# Patient Record
Sex: FEMALE | Race: WHITE | Employment: UNEMPLOYED | URBAN - METROPOLITAN AREA
[De-identification: names, ages, dates, MRNs, and addresses within clinical notes are randomized per-mention and may not be internally consistent; named-entity substitution may affect disease eponyms.]

---

## 2017-01-20 RX ORDER — AMLODIPINE BESYLATE 5 MG/1
5 TABLET ORAL EVERY MORNING
Qty: 30 TABLET | Refills: 0 | Status: SHIPPED | OUTPATIENT
Start: 2017-01-20 | End: 2017-02-16 | Stop reason: SDUPTHER

## 2017-02-16 RX ORDER — AMLODIPINE BESYLATE 5 MG/1
5 TABLET ORAL EVERY MORNING
Qty: 30 TABLET | Refills: 3 | Status: SHIPPED | OUTPATIENT
Start: 2017-02-16 | End: 2017-06-14 | Stop reason: SDUPTHER

## 2017-04-11 ENCOUNTER — OFFICE VISIT (OUTPATIENT)
Dept: FAMILY MEDICINE CLINIC | Age: 62
End: 2017-04-11
Payer: COMMERCIAL

## 2017-04-11 ENCOUNTER — HOSPITAL ENCOUNTER (OUTPATIENT)
Age: 62
Discharge: HOME | End: 2017-04-11
Payer: COMMERCIAL

## 2017-04-11 VITALS
HEIGHT: 67 IN | HEART RATE: 64 BPM | BODY MASS INDEX: 33.74 KG/M2 | RESPIRATION RATE: 16 BRPM | SYSTOLIC BLOOD PRESSURE: 134 MMHG | DIASTOLIC BLOOD PRESSURE: 80 MMHG | TEMPERATURE: 98 F | WEIGHT: 215 LBS

## 2017-04-11 DIAGNOSIS — Z12.11 COLON CANCER SCREENING: ICD-10-CM

## 2017-04-11 DIAGNOSIS — B35.3 TINEA PEDIS OF BOTH FEET: ICD-10-CM

## 2017-04-11 DIAGNOSIS — Z23 NEED FOR PNEUMOCOCCAL VACCINATION: ICD-10-CM

## 2017-04-11 DIAGNOSIS — I10 HTN, GOAL BELOW 130/80: Primary | ICD-10-CM

## 2017-04-11 LAB
ABSOLUTE EOS #: 0.2 K/UL (ref 0–0.4)
ABSOLUTE LYMPH #: 1.8 K/UL (ref 1–4.8)
ABSOLUTE MONO #: 0.4 K/UL (ref 0.1–1.3)
ALBUMIN SERPL-MCNC: 4.1 G/DL (ref 3.5–5.2)
ALP BLD-CCNC: 119 U/L (ref 35–104)
ALT SERPL-CCNC: 18 U/L (ref 5–33)
AST SERPL-CCNC: 21 U/L
BASOPHILS # BLD: 2 % (ref 0–2)
BASOPHILS ABSOLUTE: 0.1 K/UL (ref 0–0.2)
C-REACTIVE PROTEIN: 4.3 MG/L (ref 0–5)
CALCIUM SERPL-MCNC: 9.7 MG/DL (ref 8.6–10.4)
CREAT SERPL-MCNC: 0.57 MG/DL (ref 0.5–0.9)
DIFFERENTIAL TYPE: ABNORMAL
EOSINOPHILS RELATIVE PERCENT: 3 % (ref 0–4)
GFR AFRICAN AMERICAN: >60 ML/MIN
GFR NON-AFRICAN AMERICAN: >60 ML/MIN
GFR SERPL CREATININE-BSD FRML MDRD: NORMAL ML/MIN/{1.73_M2}
GFR SERPL CREATININE-BSD FRML MDRD: NORMAL ML/MIN/{1.73_M2}
HCT VFR BLD CALC: 44.6 % (ref 36–46)
HEMOGLOBIN: 14.5 G/DL (ref 12–16)
LYMPHOCYTES # BLD: 34 % (ref 24–44)
MCH RBC QN AUTO: 31.5 PG (ref 26–34)
MCHC RBC AUTO-ENTMCNC: 32.6 G/DL (ref 31–37)
MCV RBC AUTO: 96.7 FL (ref 80–100)
MONOCYTES # BLD: 8 % (ref 1–7)
PDW BLD-RTO: 14.6 % (ref 11.5–14.9)
PLATELET # BLD: 348 K/UL (ref 150–450)
PLATELET ESTIMATE: ABNORMAL
PMV BLD AUTO: 7.6 FL (ref 6–12)
RBC # BLD: 4.61 M/UL (ref 4–5.2)
RBC # BLD: ABNORMAL 10*6/UL
SEDIMENTATION RATE, ERYTHROCYTE: 15 MM (ref 0–20)
SEG NEUTROPHILS: 53 % (ref 36–66)
SEGMENTED NEUTROPHILS ABSOLUTE COUNT: 2.8 K/UL (ref 1.3–9.1)
WBC # BLD: 5.2 K/UL (ref 3.5–11)
WBC # BLD: ABNORMAL 10*3/UL

## 2017-04-11 PROCEDURE — 90471 IMMUNIZATION ADMIN: CPT | Performed by: FAMILY MEDICINE

## 2017-04-11 PROCEDURE — 85651 RBC SED RATE NONAUTOMATED: CPT

## 2017-04-11 PROCEDURE — 36415 COLL VENOUS BLD VENIPUNCTURE: CPT

## 2017-04-11 PROCEDURE — 82565 ASSAY OF CREATININE: CPT

## 2017-04-11 PROCEDURE — 84075 ASSAY ALKALINE PHOSPHATASE: CPT

## 2017-04-11 PROCEDURE — 84450 TRANSFERASE (AST) (SGOT): CPT

## 2017-04-11 PROCEDURE — 85025 COMPLETE CBC W/AUTO DIFF WBC: CPT

## 2017-04-11 PROCEDURE — 90732 PPSV23 VACC 2 YRS+ SUBQ/IM: CPT | Performed by: FAMILY MEDICINE

## 2017-04-11 PROCEDURE — 84460 ALANINE AMINO (ALT) (SGPT): CPT

## 2017-04-11 PROCEDURE — 82310 ASSAY OF CALCIUM: CPT

## 2017-04-11 PROCEDURE — 86140 C-REACTIVE PROTEIN: CPT

## 2017-04-11 PROCEDURE — 82040 ASSAY OF SERUM ALBUMIN: CPT

## 2017-04-11 PROCEDURE — 99213 OFFICE O/P EST LOW 20 MIN: CPT | Performed by: FAMILY MEDICINE

## 2017-04-11 RX ORDER — PRENATAL VIT 91/IRON/FOLIC/DHA 28-975-200
COMBINATION PACKAGE (EA) ORAL
Qty: 36 G | Refills: 3 | Status: ON HOLD | OUTPATIENT
Start: 2017-04-11 | End: 2019-12-23

## 2017-04-11 ASSESSMENT — ENCOUNTER SYMPTOMS
NAUSEA: 0
ABDOMINAL PAIN: 0
COUGH: 0
VOMITING: 0
SHORTNESS OF BREATH: 0

## 2017-06-06 RX ORDER — FOLIC ACID 1 MG/1
TABLET ORAL
Qty: 30 TABLET | Refills: 3 | Status: SHIPPED | OUTPATIENT
Start: 2017-06-06 | End: 2017-10-07 | Stop reason: SDUPTHER

## 2017-06-14 RX ORDER — AMLODIPINE BESYLATE 5 MG/1
TABLET ORAL
Qty: 30 TABLET | Refills: 3 | Status: SHIPPED | OUTPATIENT
Start: 2017-06-14 | End: 2017-10-07 | Stop reason: SDUPTHER

## 2017-07-11 ENCOUNTER — OFFICE VISIT (OUTPATIENT)
Dept: FAMILY MEDICINE CLINIC | Age: 62
End: 2017-07-11
Payer: COMMERCIAL

## 2017-07-11 VITALS
BODY MASS INDEX: 37.12 KG/M2 | DIASTOLIC BLOOD PRESSURE: 84 MMHG | OXYGEN SATURATION: 96 % | TEMPERATURE: 97.8 F | RESPIRATION RATE: 18 BRPM | HEART RATE: 76 BPM | HEIGHT: 66 IN | SYSTOLIC BLOOD PRESSURE: 134 MMHG | WEIGHT: 231 LBS

## 2017-07-11 DIAGNOSIS — M06.9 RHEUMATOID ARTHRITIS, INVOLVING UNSPECIFIED SITE, UNSPECIFIED RHEUMATOID FACTOR PRESENCE: ICD-10-CM

## 2017-07-11 DIAGNOSIS — Z87.891 STOPPED SMOKING BETWEEN 1 AND 3 MONTHS AGO: ICD-10-CM

## 2017-07-11 DIAGNOSIS — I10 HTN, GOAL BELOW 130/80: ICD-10-CM

## 2017-07-11 DIAGNOSIS — F32.A DEPRESSION, UNSPECIFIED DEPRESSION TYPE: Primary | ICD-10-CM

## 2017-07-11 PROCEDURE — 99214 OFFICE O/P EST MOD 30 MIN: CPT | Performed by: FAMILY MEDICINE

## 2017-07-11 RX ORDER — GUAIFENESIN AND DEXTROMETHORPHAN HYDROBROMIDE 600; 30 MG/1; MG/1
1 TABLET, EXTENDED RELEASE ORAL 2 TIMES DAILY
Qty: 30 TABLET | Refills: 3 | Status: SHIPPED | OUTPATIENT
Start: 2017-07-11 | End: 2019-06-12 | Stop reason: ALTCHOICE

## 2017-07-11 ASSESSMENT — ENCOUNTER SYMPTOMS
NAUSEA: 0
VOMITING: 0
COUGH: 0
SHORTNESS OF BREATH: 0

## 2017-08-02 ENCOUNTER — TELEPHONE (OUTPATIENT)
Dept: ADMINISTRATIVE | Age: 62
End: 2017-08-02

## 2017-09-21 RX ORDER — ALBUTEROL SULFATE 90 UG/1
2 AEROSOL, METERED RESPIRATORY (INHALATION) EVERY 6 HOURS PRN
Qty: 1 INHALER | Refills: 3 | Status: SHIPPED | OUTPATIENT
Start: 2017-09-21 | End: 2019-06-12 | Stop reason: SDUPTHER

## 2017-10-09 RX ORDER — AMLODIPINE BESYLATE 5 MG/1
TABLET ORAL
Qty: 30 TABLET | Refills: 3 | Status: SHIPPED | OUTPATIENT
Start: 2017-10-09 | End: 2018-01-29 | Stop reason: SDUPTHER

## 2017-10-09 RX ORDER — FOLIC ACID 1 MG/1
TABLET ORAL
Qty: 30 TABLET | Refills: 3 | Status: SHIPPED | OUTPATIENT
Start: 2017-10-09 | End: 2018-01-29 | Stop reason: SDUPTHER

## 2017-10-09 NOTE — TELEPHONE ENCOUNTER
PLEASE APPROVE OR REFUSE. Next Visit Date: Future Appointments  Date Time Provider Kuldeep Fosteristi   10/19/2017 9:30 AM Adolfo Nuñez MD fp sc [address] Maintenance   Topic Date Due    Hepatitis C screen  1955    HIV screen  05/01/1972    DTaP/Tdap/Td vaccine (1 - Tdap) 05/01/1976    Colon cancer screen colonoscopy  05/01/2007    Diabetes screen  03/19/2017    Cervical cancer screen  04/25/2017    Zostavax vaccine  05/01/2017    Breast cancer screen  05/27/2017    Flu vaccine (1) 09/01/2017    Lipid screen  01/30/2020    Pneumococcal med risk  Completed       Hemoglobin A1C (%)   Date Value   03/19/2014 5.9             ( goal A1C is < 7)   Microalb/Crt. Ratio (mcg/mg creat)   Date Value   03/19/2014 Unable to calculate or report.      LDL Cholesterol (mg/dL)   Date Value   01/30/2015 98       (goal LDL is <100)   AST (U/L)   Date Value   04/11/2017 21     ALT (U/L)   Date Value   04/11/2017 18     BUN (mg/dL)   Date Value   11/18/2016 19     BP Readings from Last 3 Encounters:   07/11/17 134/84   04/11/17 134/80   12/28/16 128/78          (goal 120/80)    All Future Testing planned in [facility]  Lab Frequency Next Occurrence   Comprehensive Metabolic Panel Once 51/78/6268   Hemoglobin A1C Once 12/29/2017   Lipid Panel Once 12/29/2017   TSH with Reflex Once 12/29/2017   Vitamin D 25 Hydroxy Once 12/29/2017   Vitamin B12 & Folate Once 12/28/2017   POCT Fecal Immunochemical Test (FIT) Once 04/11/2018         Patient Active Problem List:     HTN, goal below 130/80     GERD (gastroesophageal reflux disease)     Dysphagia     Dehydration     Nausea & vomiting     Hypocalcemia     Lower urinary tract infectious disease     Rheumatoid arthritis ([address]     Hypertension

## 2017-10-26 RX ORDER — PANTOPRAZOLE SODIUM 40 MG/1
TABLET, DELAYED RELEASE ORAL
Qty: 30 TABLET | Refills: 5 | Status: SHIPPED | OUTPATIENT
Start: 2017-10-26 | End: 2018-04-28 | Stop reason: SDUPTHER

## 2017-10-26 NOTE — TELEPHONE ENCOUNTER
PLEASE APPROVE OR REFUSE. Next Visit Date: No future appointments. Health Maintenance   Topic Date Due    Hepatitis C screen  1955    HIV screen  05/01/1972    DTaP/Tdap/Td vaccine (1 - Tdap) 05/01/1976    Colon cancer screen colonoscopy  05/01/2007    Diabetes screen  03/19/2017    Cervical cancer screen  04/25/2017    Zostavax vaccine  05/01/2017    Breast cancer screen  05/27/2017    Flu vaccine (1) 09/01/2017    Lipid screen  01/30/2020    Pneumococcal med risk  Completed       Hemoglobin A1C (%)   Date Value   03/19/2014 5.9             ( goal A1C is < 7)   Microalb/Crt. Ratio (mcg/mg creat)   Date Value   03/19/2014 Unable to calculate or report.      LDL Cholesterol (mg/dL)   Date Value   01/30/2015 98       (goal LDL is <100)   AST (U/L)   Date Value   04/11/2017 21     ALT (U/L)   Date Value   04/11/2017 18     BUN (mg/dL)   Date Value   11/18/2016 19     BP Readings from Last 3 Encounters:   07/11/17 134/84   04/11/17 134/80   12/28/16 128/78          (goal 120/80)    All Future Testing planned in [facility]  Lab Frequency Next Occurrence   Comprehensive Metabolic Panel Once 22/32/0728   Hemoglobin A1C Once 12/29/2017   Lipid Panel Once 12/29/2017   TSH with Reflex Once 12/29/2017   Vitamin D 25 Hydroxy Once 12/29/2017   Vitamin B12 & Folate Once 12/28/2017   POCT Fecal Immunochemical Test (FIT) Once 04/11/2018         Patient Active Problem List:     HTN, goal below 130/80     GERD (gastroesophageal reflux disease)     Dysphagia     Dehydration     Nausea & vomiting     Hypocalcemia     Lower urinary tract infectious disease     Rheumatoid arthritis ([facility] [address]     Hypertension

## 2018-01-30 RX ORDER — FOLIC ACID 1 MG/1
TABLET ORAL
Qty: 30 TABLET | Refills: 3 | Status: SHIPPED | OUTPATIENT
Start: 2018-01-30 | End: 2018-05-27 | Stop reason: SDUPTHER

## 2018-01-30 RX ORDER — AMLODIPINE BESYLATE 5 MG/1
TABLET ORAL
Qty: 30 TABLET | Refills: 3 | Status: SHIPPED | OUTPATIENT
Start: 2018-01-30 | End: 2018-05-27 | Stop reason: SDUPTHER

## 2018-03-05 NOTE — TELEPHONE ENCOUNTER
Please Approve or Refuse. Next Visit Date:  Visit date not found    Hemoglobin A1C (%)   Date Value   03/19/2014 5.9             ( goal A1C is < 7)   Microalb/Crt. Ratio (mcg/mg creat)   Date Value   03/19/2014 Unable to calculate or report.      LDL Cholesterol (mg/dL)   Date Value   01/30/2015 98       (goal LDL is <100)   AST (U/L)   Date Value   04/11/2017 21     ALT (U/L)   Date Value   04/11/2017 18     BUN (mg/dL)   Date Value   11/18/2016 19     BP Readings from Last 3 Encounters:   07/11/17 134/84   04/11/17 134/80   12/28/16 128/78          (goal 120/80)        Patient Active Problem List:     HTN, goal below 130/80     GERD (gastroesophageal reflux disease)     Dysphagia     Dehydration     Nausea & vomiting     Hypocalcemia     Lower urinary tract infectious disease     Rheumatoid arthritis ([facility] [address]     Hypertension

## 2018-04-30 RX ORDER — PANTOPRAZOLE SODIUM 40 MG/1
TABLET, DELAYED RELEASE ORAL
Qty: 30 TABLET | Refills: 5 | Status: SHIPPED | OUTPATIENT
Start: 2018-04-30 | End: 2018-11-21 | Stop reason: SDUPTHER

## 2018-05-29 RX ORDER — AMLODIPINE BESYLATE 5 MG/1
TABLET ORAL
Qty: 30 TABLET | Refills: 3 | Status: SHIPPED | OUTPATIENT
Start: 2018-05-29 | End: 2018-11-12 | Stop reason: SDUPTHER

## 2018-05-29 RX ORDER — FOLIC ACID 1 MG/1
TABLET ORAL
Qty: 30 TABLET | Refills: 3 | Status: SHIPPED | OUTPATIENT
Start: 2018-05-29 | End: 2018-10-08 | Stop reason: SDUPTHER

## 2018-06-02 RX ORDER — AMLODIPINE BESYLATE 5 MG/1
TABLET ORAL
Qty: 30 TABLET | Refills: 3 | Status: SHIPPED | OUTPATIENT
Start: 2018-06-02 | End: 2018-11-12 | Stop reason: SDUPTHER

## 2018-07-17 ENCOUNTER — HOSPITAL ENCOUNTER (EMERGENCY)
Age: 63
Discharge: HOME | End: 2018-07-17
Attending: EMERGENCY MEDICINE
Payer: COMMERCIAL

## 2018-07-17 VITALS
HEIGHT: 67 IN | DIASTOLIC BLOOD PRESSURE: 62 MMHG | SYSTOLIC BLOOD PRESSURE: 146 MMHG | OXYGEN SATURATION: 96 % | BODY MASS INDEX: 33.59 KG/M2 | WEIGHT: 214 LBS | TEMPERATURE: 98.3 F | HEART RATE: 68 BPM | RESPIRATION RATE: 14 BRPM

## 2018-07-17 DIAGNOSIS — M79.605 LEFT LEG PAIN: ICD-10-CM

## 2018-07-17 DIAGNOSIS — I80.02 THROMBOPHLEBITIS OF SUPERFICIAL VEINS OF LEFT LOWER EXTREMITY: Primary | ICD-10-CM

## 2018-07-17 PROCEDURE — 93970 EXTREMITY STUDY: CPT

## 2018-07-17 PROCEDURE — 99283 EMERGENCY DEPT VISIT LOW MDM: CPT

## 2018-07-17 RX ORDER — IBUPROFEN 800 MG/1
800 TABLET ORAL EVERY 6 HOURS PRN
Qty: 90 TABLET | Refills: 0 | Status: SHIPPED | OUTPATIENT
Start: 2018-07-17 | End: 2019-06-12 | Stop reason: ALTCHOICE

## 2018-07-17 ASSESSMENT — PAIN SCALES - GENERAL: PAINLEVEL_OUTOF10: 8

## 2018-07-17 ASSESSMENT — ENCOUNTER SYMPTOMS
PHOTOPHOBIA: 0
COLOR CHANGE: 0
ABDOMINAL PAIN: 0
VOMITING: 0
NAUSEA: 0
CHEST TIGHTNESS: 0
SHORTNESS OF BREATH: 0
DIARRHEA: 0
WHEEZING: 0

## 2018-07-17 ASSESSMENT — PAIN DESCRIPTION - DESCRIPTORS: DESCRIPTORS: ACHING

## 2018-07-17 ASSESSMENT — PAIN DESCRIPTION - ORIENTATION: ORIENTATION: LEFT

## 2018-07-17 ASSESSMENT — PAIN DESCRIPTION - FREQUENCY: FREQUENCY: CONTINUOUS

## 2018-07-17 ASSESSMENT — PAIN DESCRIPTION - LOCATION: LOCATION: LEG

## 2018-07-17 NOTE — ED NOTES
PT arrives with c/o L lower leg swelling and redness x 2 days. Pt states she has a hx of DVT and PE. Pt also has some redness on the medial lower leg. Pt is sitting on cart, eupneic, talkative.      Leelee Orellana RN  07/17/18 [id number]

## 2018-07-17 NOTE — ED PROVIDER NOTES
disease, DVT, cellulitis, tinea corporis, abscess    DIAGNOSTIC RESULTS / EMERGENCY DEPARTMENT COURSE / MDM     LABS:  No results found for this visit on 07/17/18. IMPRESSION: 59-year-old female with history of DVT presents with left-sided leg pain with overlying skin changes. Not currently on anticoagulation, antiplatelets. Physical exam shows tenderness medial portion. Ultrasound of lower extremities bilaterally shows superficial venous thrombosis of the left varicose veins. RADIOLOGY:    Vl Dup Lower Extremity Venous Bilateral    Result Date: 7/17/2018    [facility]  Vascular Lower Extremities DVT Study Procedure   [facility]    Date of Study           07/17/2018                 PAPO ALCAZAR   Date of Birth  1955   Gender                  Female   Age            66 year(s)   Race                       Room Number    57 [facility] ID # [id number]   Patient Acct # [de-identified]   MR #           [id number]       Sonographer             Angie Nunn RVT   Accession #    [id number]    Interpreting Physician  Joon Alvarado   Referring                   Referring Physician     ER MD *  Nurse  Practitioner  Procedure Type of Study:   Veins: Lower Extremities DVT Study, Venous Scan Lower Bilateral.  Indications for Study:Pain and swelling and Erythema. Patient Status:ER. Technical Quality:Adequate visualization. Conclusions   Summary   Simultaneous real time imaging utilizing B-Mode, color doppler and  spectral waveform analysis was performed on the bilateral lower  extremities for venous examination of the deep and superficial systems. Findings are:   Right:  No evidence of deep or superficial venous thrombosis. Left:  No evidence of deep or superficial venous thrombosis. Thrombosed superficial calf varicosity noted in the calf.    Signature   ----------------------------------------------------------------  Electronically signed by Angie Nunn RVT(Sonographer) on [facility] ED  [id number]  [phone]  Go to   If symptoms worsen      DISCHARGE MEDICATIONS:  Discharge Medication List as of 7/17/2018  6:54 PM      START taking these medications    Details   ibuprofen (IBU) 800 MG tablet Take 1 tablet by mouth every 6 hours as needed for Pain, Disp-90 tablet, R-0Print             Pablo Coronel DO    Emergency Medicine Resident    (Please note that portions of this note were completed with a voice recognition program.  Efforts were made to edit the dictations but occasionally words are mis-transcribed.)     Pablo Coronel MD  07/19/18 2422

## 2018-10-08 RX ORDER — FOLIC ACID 1 MG/1
TABLET ORAL
Qty: 30 TABLET | Refills: 3 | Status: SHIPPED | OUTPATIENT
Start: 2018-10-08 | End: 2019-01-26 | Stop reason: SDUPTHER

## 2018-11-12 ENCOUNTER — OFFICE VISIT (OUTPATIENT)
Dept: FAMILY MEDICINE CLINIC | Age: 63
End: 2018-11-12
Payer: COMMERCIAL

## 2018-11-12 VITALS
HEIGHT: 67 IN | DIASTOLIC BLOOD PRESSURE: 84 MMHG | SYSTOLIC BLOOD PRESSURE: 138 MMHG | OXYGEN SATURATION: 95 % | HEART RATE: 81 BPM | BODY MASS INDEX: 36.16 KG/M2 | WEIGHT: 230.4 LBS

## 2018-11-12 DIAGNOSIS — Z86.718 HISTORY OF DVT IN ADULTHOOD: ICD-10-CM

## 2018-11-12 DIAGNOSIS — Z72.0 TOBACCO ABUSE: ICD-10-CM

## 2018-11-12 DIAGNOSIS — F33.1 MAJOR DEPRESSIVE DISORDER, RECURRENT, MODERATE (HCC): ICD-10-CM

## 2018-11-12 DIAGNOSIS — Z23 NEED FOR INFLUENZA VACCINATION: ICD-10-CM

## 2018-11-12 DIAGNOSIS — R73.03 PREDIABETES: ICD-10-CM

## 2018-11-12 DIAGNOSIS — E66.9 OBESITY, CLASS II, BMI 35-39.9: ICD-10-CM

## 2018-11-12 DIAGNOSIS — K21.9 GASTROESOPHAGEAL REFLUX DISEASE, ESOPHAGITIS PRESENCE NOT SPECIFIED: ICD-10-CM

## 2018-11-12 DIAGNOSIS — M06.9 RHEUMATOID ARTHRITIS, INVOLVING UNSPECIFIED SITE, UNSPECIFIED RHEUMATOID FACTOR PRESENCE: ICD-10-CM

## 2018-11-12 DIAGNOSIS — Z12.31 ENCOUNTER FOR SCREENING MAMMOGRAM FOR BREAST CANCER: ICD-10-CM

## 2018-11-12 DIAGNOSIS — R73.9 HYPERGLYCEMIA: Primary | ICD-10-CM

## 2018-11-12 DIAGNOSIS — Z12.11 SCREENING FOR COLON CANCER: ICD-10-CM

## 2018-11-12 DIAGNOSIS — Z23 NEED FOR PROPHYLACTIC VACCINATION AND INOCULATION AGAINST VARICELLA: ICD-10-CM

## 2018-11-12 DIAGNOSIS — I10 ESSENTIAL HYPERTENSION: ICD-10-CM

## 2018-11-12 DIAGNOSIS — Z00.00 PREVENTATIVE HEALTH CARE: ICD-10-CM

## 2018-11-12 LAB — HBA1C MFR BLD: 5.9 %

## 2018-11-12 PROCEDURE — 90471 IMMUNIZATION ADMIN: CPT | Performed by: NURSE PRACTITIONER

## 2018-11-12 PROCEDURE — 4004F PT TOBACCO SCREEN RCVD TLK: CPT | Performed by: NURSE PRACTITIONER

## 2018-11-12 PROCEDURE — 99215 OFFICE O/P EST HI 40 MIN: CPT | Performed by: NURSE PRACTITIONER

## 2018-11-12 PROCEDURE — G8417 CALC BMI ABV UP PARAM F/U: HCPCS | Performed by: NURSE PRACTITIONER

## 2018-11-12 PROCEDURE — 90686 IIV4 VACC NO PRSV 0.5 ML IM: CPT | Performed by: NURSE PRACTITIONER

## 2018-11-12 PROCEDURE — G8482 FLU IMMUNIZE ORDER/ADMIN: HCPCS | Performed by: NURSE PRACTITIONER

## 2018-11-12 PROCEDURE — 3017F COLORECTAL CA SCREEN DOC REV: CPT | Performed by: NURSE PRACTITIONER

## 2018-11-12 PROCEDURE — G8427 DOCREV CUR MEDS BY ELIG CLIN: HCPCS | Performed by: NURSE PRACTITIONER

## 2018-11-12 PROCEDURE — 83036 HEMOGLOBIN GLYCOSYLATED A1C: CPT | Performed by: NURSE PRACTITIONER

## 2018-11-12 RX ORDER — AMLODIPINE BESYLATE 5 MG/1
TABLET ORAL
Qty: 30 TABLET | Refills: 3 | Status: SHIPPED | OUTPATIENT
Start: 2018-11-12 | End: 2019-03-05 | Stop reason: SDUPTHER

## 2018-11-12 ASSESSMENT — ENCOUNTER SYMPTOMS
SHORTNESS OF BREATH: 0
ABDOMINAL PAIN: 0
COUGH: 0
BLOOD IN STOOL: 0
EYE DISCHARGE: 0

## 2018-11-12 ASSESSMENT — PATIENT HEALTH QUESTIONNAIRE - PHQ9
1. LITTLE INTEREST OR PLEASURE IN DOING THINGS: 0
SUM OF ALL RESPONSES TO PHQ9 QUESTIONS 1 & 2: 0
2. FEELING DOWN, DEPRESSED OR HOPELESS: 0
SUM OF ALL RESPONSES TO PHQ QUESTIONS 1-9: 0
SUM OF ALL RESPONSES TO PHQ QUESTIONS 1-9: 0

## 2018-11-12 NOTE — PROGRESS NOTES
Visit Information    Have you changed or started any medications since your last visit including any over-the-counter medicines, vitamins, or herbal medicines? no   Are you having any side effects from any of your medications? -  no  Have you stopped taking any of your medications? Is so, why? -  no    Have you seen any other physician or provider since your last visit? Yes - Records Requested  Have you had any other diagnostic tests since your last visit? No  Have you been seen in the emergency room and/or had an admission to a hospital since we last saw you? No  Have you had your routine dental cleaning in the past 6 months? no    Have you activated your [employer] account? If not, what are your barriers? No:      Patient Care Team:  Pratibha Moctezuma MD as PCP - General (Family Medicine)  Joan Mcintosh MD as Consulting Physician (Internal Medicine)    Medical History Review  Past Medical, Family, and Social History reviewed and does contribute to the patient presenting condition    Health Maintenance   Topic Date Due    Hepatitis C screen  1955    HIV screen  05/01/1972    DTaP/Tdap/Td vaccine (1 - Tdap) 05/01/1976    Shingles Vaccine (1 of 2 - 2 Dose Series) 05/01/2007    Colon cancer screen colonoscopy  05/01/2007    A1C test (Diabetic or Prediabetic)  03/19/2015    Cervical cancer screen  04/25/2017    Breast cancer screen  05/27/2017    Flu vaccine (1) 09/01/2018    Lipid screen  01/30/2020    Pneumococcal med risk  Completed     Vaccine Information Sheet, \"Influenza - Inactivated\"  given to Mandie Azul, or parent/legal guardian of  Mandie Azul and verbalized understanding. Patient responses:    Have you ever had a reaction to a flu vaccine? No  Are you able to eat eggs without adverse effects? Yes  Do you have any current illness? No  Have you ever had Guillian O[place] Syndrome? No    Flu vaccine given per order. Please see immunization tab.
EOM are normal. No scleral icterus. Neck: Neck supple. Cardiovascular: Normal rate, regular rhythm, normal heart sounds and intact distal pulses. Pulmonary/Chest: Effort normal. She has wheezes (slight anterior wheeze, none posteriorly ). She has no rales. Abdominal: Soft. Bowel sounds are normal.   Musculoskeletal: Normal range of motion. She exhibits no edema. Neurological: She is alert and oriented to person, place, and time. Skin: Skin is warm and dry. Psychiatric: She has a normal mood and affect. Nursing note and vitals reviewed. /84   Pulse 81   Ht 5' 6.6\" (1.692 m)   Wt 230 lb 6.4 oz (104.5 kg)   SpO2 95%   BMI 36.52 kg/m²     CBC:   Lab Results   Component Value Date    WBC 5.2 04/11/2017    RBC 4.61 04/11/2017    HGB 14.5 04/11/2017    HCT 44.6 04/11/2017    MCV 96.7 04/11/2017    MCH 31.5 04/11/2017    MCHC 32.6 04/11/2017    RDW 14.6 04/11/2017     04/11/2017    MPV 7.6 04/11/2017     CMP:    Lab Results   Component Value Date     11/18/2016    K 3.9 11/18/2016     11/18/2016    CO2 24 11/18/2016    BUN 19 11/18/2016    CREATININE 0.57 04/11/2017    GFRAA >60 04/11/2017    LABGLOM >60 04/11/2017    GLUCOSE 116 11/18/2016    GLUCOSE 105 12/05/2011    PROT 6.5 05/03/2014    LABALBU 4.1 04/11/2017    LABALBU 4.0 12/05/2011    CALCIUM 9.7 04/11/2017    BILITOT 0.23 05/03/2014    ALKPHOS 119 04/11/2017    AST 21 04/11/2017    ALT 18 04/11/2017     Lab Results   Component Value Date    LABA1C 5.9 11/12/2018      Diagnosis Orders   1. Hyperglycemia  POCT glycosylated hemoglobin (Hb A1C)   2. Essential hypertension  amLODIPine (NORVASC) 5 MG tablet   3. Prediabetes     4. Gastroesophageal reflux disease, esophagitis presence not specified     5. Obesity, Class II, BMI 35-39.9     6. Major depressive disorder, recurrent, moderate (HCC)     7. Rheumatoid arthritis, involving unspecified site, unspecified rheumatoid factor presence ([facility] [address]     8. Tobacco abuse     9.

## 2018-11-21 RX ORDER — PANTOPRAZOLE SODIUM 40 MG/1
TABLET, DELAYED RELEASE ORAL
Qty: 30 TABLET | Refills: 5 | Status: SHIPPED | OUTPATIENT
Start: 2018-11-21 | End: 2019-05-16 | Stop reason: SDUPTHER

## 2018-12-04 ENCOUNTER — HOSPITAL ENCOUNTER (OUTPATIENT)
Dept: WOMENS IMAGING | Age: 63
Discharge: HOME | End: 2018-12-06
Payer: COMMERCIAL

## 2018-12-04 DIAGNOSIS — Z12.11 SCREENING FOR COLON CANCER: ICD-10-CM

## 2018-12-04 DIAGNOSIS — Z12.31 ENCOUNTER FOR SCREENING MAMMOGRAM FOR BREAST CANCER: ICD-10-CM

## 2018-12-04 LAB
CONTROL: NORMAL
HEMOCCULT STL QL: NEGATIVE

## 2018-12-04 PROCEDURE — 82274 ASSAY TEST FOR BLOOD FECAL: CPT | Performed by: NURSE PRACTITIONER

## 2018-12-04 PROCEDURE — 77067 SCR MAMMO BI INCL CAD: CPT

## 2019-01-28 RX ORDER — FOLIC ACID 1 MG/1
TABLET ORAL
Qty: 30 TABLET | Refills: 3 | Status: SHIPPED | OUTPATIENT
Start: 2019-01-28 | End: 2020-04-13 | Stop reason: SDUPTHER

## 2019-02-08 RX ORDER — FOLIC ACID 1 MG/1
TABLET ORAL
Qty: 30 TABLET | Refills: 3 | Status: SHIPPED | OUTPATIENT
Start: 2019-02-08 | End: 2019-06-12 | Stop reason: SDUPTHER

## 2019-03-05 DIAGNOSIS — I10 ESSENTIAL HYPERTENSION: ICD-10-CM

## 2019-03-05 RX ORDER — AMLODIPINE BESYLATE 5 MG/1
TABLET ORAL
Qty: 30 TABLET | Refills: 3 | Status: SHIPPED | OUTPATIENT
Start: 2019-03-05 | End: 2019-07-03 | Stop reason: SDUPTHER

## 2019-05-16 RX ORDER — PANTOPRAZOLE SODIUM 40 MG/1
TABLET, DELAYED RELEASE ORAL
Qty: 30 TABLET | Refills: 5 | Status: SHIPPED | OUTPATIENT
Start: 2019-05-16 | End: 2019-11-09 | Stop reason: SDUPTHER

## 2019-05-16 NOTE — TELEPHONE ENCOUNTER
Please Approve or Refuse.   Send to Pharmacy per Pt's Request:      Next Visit Date:  6/12/2019  Last Visit Date: 12/28/2016    Hemoglobin A1C (%)   Date Value   11/12/2018 5.9   03/19/2014 5.9             ( goal A1C is < 7)   BP Readings from Last 3 Encounters:   11/12/18 138/84   07/17/18 (!) 146/62   07/11/17 134/84          (goal 120/80)  BUN   Date Value Ref Range Status   11/18/2016 19 6 - 20 mg/dL Final     CREATININE   Date Value Ref Range Status   04/11/2017 0.57 0.50 - 0.90 mg/dL Final     Potassium   Date Value Ref Range Status   11/18/2016 3.9 3.7 - 5.3 mmol/L Final

## 2019-06-12 ENCOUNTER — OFFICE VISIT (OUTPATIENT)
Dept: FAMILY MEDICINE CLINIC | Age: 64
End: 2019-06-12
Payer: COMMERCIAL

## 2019-06-12 VITALS
DIASTOLIC BLOOD PRESSURE: 71 MMHG | HEIGHT: 66 IN | SYSTOLIC BLOOD PRESSURE: 132 MMHG | HEART RATE: 65 BPM | WEIGHT: 231.4 LBS | BODY MASS INDEX: 37.19 KG/M2 | OXYGEN SATURATION: 95 %

## 2019-06-12 DIAGNOSIS — I10 HTN, GOAL BELOW 130/80: Primary | ICD-10-CM

## 2019-06-12 DIAGNOSIS — K21.9 GASTROESOPHAGEAL REFLUX DISEASE, ESOPHAGITIS PRESENCE NOT SPECIFIED: ICD-10-CM

## 2019-06-12 DIAGNOSIS — M06.9 RHEUMATOID ARTHRITIS INVOLVING MULTIPLE SITES, UNSPECIFIED RHEUMATOID FACTOR PRESENCE: ICD-10-CM

## 2019-06-12 DIAGNOSIS — Z87.891 PERSONAL HISTORY OF TOBACCO USE: ICD-10-CM

## 2019-06-12 DIAGNOSIS — Z23 NEED FOR PROPHYLACTIC VACCINATION AGAINST DIPHTHERIA-TETANUS-PERTUSSIS (DTP): ICD-10-CM

## 2019-06-12 DIAGNOSIS — F33.1 MAJOR DEPRESSIVE DISORDER, RECURRENT, MODERATE (HCC): ICD-10-CM

## 2019-06-12 DIAGNOSIS — J44.9 CHRONIC OBSTRUCTIVE PULMONARY DISEASE, UNSPECIFIED COPD TYPE (HCC): ICD-10-CM

## 2019-06-12 DIAGNOSIS — Z00.00 PREVENTATIVE HEALTH CARE: ICD-10-CM

## 2019-06-12 DIAGNOSIS — R73.03 PREDIABETES: ICD-10-CM

## 2019-06-12 DIAGNOSIS — Z23 NEED FOR PROPHYLACTIC VACCINATION AND INOCULATION AGAINST VARICELLA: ICD-10-CM

## 2019-06-12 DIAGNOSIS — J30.2 SEASONAL ALLERGIES: ICD-10-CM

## 2019-06-12 LAB — HBA1C MFR BLD: 5.7 %

## 2019-06-12 PROCEDURE — G8427 DOCREV CUR MEDS BY ELIG CLIN: HCPCS | Performed by: FAMILY MEDICINE

## 2019-06-12 PROCEDURE — G8417 CALC BMI ABV UP PARAM F/U: HCPCS | Performed by: FAMILY MEDICINE

## 2019-06-12 PROCEDURE — 3023F SPIROM DOC REV: CPT | Performed by: FAMILY MEDICINE

## 2019-06-12 PROCEDURE — G8926 SPIRO NO PERF OR DOC: HCPCS | Performed by: FAMILY MEDICINE

## 2019-06-12 PROCEDURE — 4004F PT TOBACCO SCREEN RCVD TLK: CPT | Performed by: FAMILY MEDICINE

## 2019-06-12 PROCEDURE — 90471 IMMUNIZATION ADMIN: CPT | Performed by: FAMILY MEDICINE

## 2019-06-12 PROCEDURE — 3017F COLORECTAL CA SCREEN DOC REV: CPT | Performed by: FAMILY MEDICINE

## 2019-06-12 PROCEDURE — 99214 OFFICE O/P EST MOD 30 MIN: CPT | Performed by: FAMILY MEDICINE

## 2019-06-12 PROCEDURE — 90715 TDAP VACCINE 7 YRS/> IM: CPT | Performed by: FAMILY MEDICINE

## 2019-06-12 PROCEDURE — 83036 HEMOGLOBIN GLYCOSYLATED A1C: CPT | Performed by: FAMILY MEDICINE

## 2019-06-12 RX ORDER — CETIRIZINE HYDROCHLORIDE 10 MG/1
10 TABLET ORAL DAILY
Qty: 30 TABLET | Refills: 3 | Status: ON HOLD | OUTPATIENT
Start: 2019-06-12 | End: 2019-12-23

## 2019-06-12 RX ORDER — FLUTICASONE PROPIONATE 50 MCG
2 SPRAY, SUSPENSION (ML) NASAL DAILY
Qty: 1 BOTTLE | Refills: 0 | Status: SHIPPED | OUTPATIENT
Start: 2019-06-12 | End: 2020-04-01 | Stop reason: SDUPTHER

## 2019-06-12 RX ORDER — ALBUTEROL SULFATE 90 UG/1
2 AEROSOL, METERED RESPIRATORY (INHALATION) EVERY 6 HOURS PRN
Qty: 1 INHALER | Refills: 3 | Status: ON HOLD | OUTPATIENT
Start: 2019-06-12 | End: 2019-12-28 | Stop reason: HOSPADM

## 2019-06-12 ASSESSMENT — ENCOUNTER SYMPTOMS
SORE THROAT: 1
CONSTIPATION: 0
SINUS PAIN: 1
RHINORRHEA: 1
SHORTNESS OF BREATH: 1
BACK PAIN: 0
ABDOMINAL DISTENTION: 0
VOMITING: 0
WHEEZING: 0
CHEST TIGHTNESS: 0
COLOR CHANGE: 0
PHOTOPHOBIA: 0
ABDOMINAL PAIN: 0
TROUBLE SWALLOWING: 0
SINUS PRESSURE: 1
COUGH: 0
DIARRHEA: 0

## 2019-06-12 ASSESSMENT — PATIENT HEALTH QUESTIONNAIRE - PHQ9
2. FEELING DOWN, DEPRESSED OR HOPELESS: 0
1. LITTLE INTEREST OR PLEASURE IN DOING THINGS: 0
SUM OF ALL RESPONSES TO PHQ QUESTIONS 1-9: 0
SUM OF ALL RESPONSES TO PHQ QUESTIONS 1-9: 0
SUM OF ALL RESPONSES TO PHQ9 QUESTIONS 1 & 2: 0

## 2019-06-12 NOTE — PROGRESS NOTES
Chief Complaint   Patient presents with    Hypertension    Depression    Other     cant remember when last pap was     Cough         Gerald Whitley  here today for follow up on chronic medical problems, go over labs and/or diagnostic studies, and medication refills. Hypertension; Depression; Other (cant remember when last pap was ); and Cough      HPI: Patient is here for follow-up on hypertension and COPD. Hypertension controlled, still smokes 1 pack a day. Patient denies any chest pain. Patient has a history of COPD no recent PFTs takes albuterol inhaler only as needed. Patient is ready to quit has smoked 1 pack a day since last 35 years. Previous CT scan in 2016 which is normal.  Patient has not tried anything in the past to quit smoking. Prediabetes A1c has decreased to 5.7, on diet control    GERD stable on PPI    Patient has history of rheumatoid arthritis follows with rheumatologist on methotrexate  follows with rheumatologist.    Patient is complaining of seasonal allergies, complains of sinus congestion and sore throat and also watery eyes. /71   Pulse 65   Ht 5' 6\" (1.676 m)   Wt 231 lb 6.4 oz (105 kg)   SpO2 95% Comment: resting @ RA  BMI 37.35 kg/m²    Body mass index is 37.35 kg/m². Wt Readings from Last 3 Encounters:   06/12/19 231 lb 6.4 oz (105 kg)   11/12/18 230 lb 6.4 oz (104.5 kg)   07/17/18 214 lb (97.1 kg)        [x]Negative depression screening. PHQ Scores 6/12/2019 11/12/2018   PHQ2 Score 0 0   PHQ9 Score 0 0      []1-4 = Minimal depression   []5-9 = Milddepression   []10-14 = Moderate depression   []15-19 = Moderately severe depression   []20-27 = Severe depression    Discussed testing with the patient and all questions fully answered.     Orders Only on 12/04/2018   Component Date Value Ref Range Status    OCCULT BLOOD FECAL 12/04/2018 NEGATIVE   Final         Most recent labs reviewed:     Lab Results   Component Value Date    WBC 5.2 04/11/2017    HGB 14.5 04/11/2017    HCT 44.6 04/11/2017    MCV 96.7 04/11/2017     04/11/2017       @BRIEFLAB(NA,K,CL,CO2,BUN,CREATININE,GLUCOSE,CALCIUM)@     Lab Results   Component Value Date    ALT 18 04/11/2017    AST 21 04/11/2017    ALKPHOS 119 (H) 04/11/2017    BILITOT 0.23 (L) 05/03/2014       Lab Results   Component Value Date    TSH 0.93 12/05/2011       Lab Results   Component Value Date    CHOL 192 01/30/2015    CHOL 183 03/19/2014     Lab Results   Component Value Date    TRIG 84 01/30/2015    TRIG 139 03/19/2014     Lab Results   Component Value Date    HDL 77 01/30/2015    HDL 33 (L) 03/19/2014     Lab Results   Component Value Date    LDLCHOLESTEROL 98 01/30/2015    LDLCHOLESTEROL 122 (H) 03/19/2014     Lab Results   Component Value Date    VLDL NOT REPORTED 01/30/2015    VLDL NOT REPORTED 03/19/2014     Lab Results   Component Value Date    CHOLHDLRATIO 2.5 01/30/2015    CHOLHDLRATIO 5.5 (H) 03/19/2014       Lab Results   Component Value Date    LABA1C 5.7 06/12/2019       No results found for: TIOLACAL43    No results found for: FOLATE    No results found for: IRON, TIBC, FERRITIN    Lab Results   Component Value Date    VITD25 23.0 (L) 03/19/2014             Current Outpatient Medications   Medication Sig Dispense Refill    zoster recombinant adjuvanted vaccine (SHINGRIX) 50 MCG/0.5ML SUSR injection Inject 0.5 mLs into the muscle once for 1 dose 50 MCG IM then repeat 2-6 months.  1 each 1    albuterol sulfate HFA (PROAIR HFA) 108 (90 Base) MCG/ACT inhaler Inhale 2 puffs into the lungs every 6 hours as needed for Wheezing 1 Inhaler 3    albuterol (PROVENTIL) (5 MG/ML) 0.5% nebulizer solution Take 0.5 mLs by nebulization every 6 hours as needed for Wheezing 10 vial 0    fluticasone (FLONASE) 50 MCG/ACT nasal spray 2 sprays by Nasal route daily 1 Bottle 0    cetirizine (ZYRTEC ALLERGY) 10 MG tablet Take 1 tablet by mouth daily 30 tablet 3    nicotine polacrilex (NICORETTE) 2 MG gum Take 1 each by mouth as needed for Smoking cessation 110 each 3    pantoprazole (PROTONIX) 40 MG tablet take 1 tablet by mouth once daily 30 tablet 5    amLODIPine (NORVASC) 5 MG tablet take 1 tablet by mouth every morning 30 tablet 3    folic acid (FOLVITE) 1 MG tablet take 1 tablet by mouth once daily 30 tablet 3    acetaminophen (TYLENOL) 500 MG tablet Take 1,000 mg by mouth every 6 hours as needed for Pain.  methotrexate (RHEUMATREX) 2.5 MG chemo tablet Take 20 mg by mouth once a week Indications: takes 8 tablets on saturdays       terbinafine (LAMISIL) 1 % cream Apply topically 2 times daily. 36 g 3     No current facility-administered medications for this visit. Social History     Socioeconomic History    Marital status: Single     Spouse name: Not on file    Number of children: Not on file    Years of education: Not on file    Highest education level: Not on file   Occupational History     Employer: N/A   Social Needs    Financial resource strain: Not on file    Food insecurity:     Worry: Not on file     Inability: Not on file    Transportation needs:     Medical: Not on file     Non-medical: Not on file   Tobacco Use    Smoking status: Current Every Day Smoker     Packs/day: 1.00     Years: 35.00     Pack years: 35.00     Types: Cigarettes    Smokeless tobacco: Never Used   Substance and Sexual Activity    Alcohol use:  Yes     Alcohol/week: 0.0 oz     Comment: social    Drug use: No    Sexual activity: Not Currently   Lifestyle    Physical activity:     Days per week: Not on file     Minutes per session: Not on file    Stress: Not on file   Relationships    Social connections:     Talks on phone: Not on file     Gets together: Not on file     Attends [employer] service: Not on file     Active member of club or organization: Not on file     Attends meetings of clubs or organizations: Not on file     Relationship status: Not on file    Intimate partner violence:     Fear of current or ex partner: Not on file     Emotionally abused: Not on file     Physically abused: Not on file     Forced sexual activity: Not on file   Other Topics Concern    Not on file   Social History Narrative    Not on file     Ready to quit: Yes  Counseling given: Yes        Family History   Problem Relation Age of Onset    Cancer Mother     Diabetes Mother     Heart Disease Mother     Cancer Father     Cancer Brother              -rest of complaints with corresponding details per ROS    The patient's past medical, surgical, social, and family history as well as her current medications and allergies were reviewed as documented intoday's encounter. Review of Systems   Constitutional: Negative for activity change, appetite change, fatigue and unexpected weight change. HENT: Positive for congestion, rhinorrhea, sinus pressure, sinus pain, sneezing and sore throat. Negative for ear pain, postnasal drip, tinnitus and trouble swallowing. Eyes: Negative for photophobia and visual disturbance. Respiratory: Positive for shortness of breath. Negative for cough, chest tightness and wheezing. Cardiovascular: Negative for chest pain, palpitations and leg swelling. Gastrointestinal: Negative for abdominal distention, abdominal pain, constipation, diarrhea and vomiting. Endocrine: Negative for polyphagia and polyuria. Genitourinary: Negative for flank pain, frequency, urgency and vaginal pain. Musculoskeletal: Positive for arthralgias. Negative for back pain, myalgias, neck pain and neck stiffness. Skin: Negative for color change, rash and wound. Neurological: Negative for dizziness, weakness, numbness and headaches. Psychiatric/Behavioral: Negative for agitation, behavioral problems, decreased concentration and suicidal ideas. The patient is not nervous/anxious. Physical Exam   Constitutional: She is oriented to person, place, and time. She appears well-developed and well-nourished.    HENT: Right Ear: Hearing and tympanic membrane normal.   Left Ear: Hearing and tympanic membrane normal.   Nose: Mucosal edema and rhinorrhea present. Right sinus exhibits no maxillary sinus tenderness and no frontal sinus tenderness. Left sinus exhibits no frontal sinus tenderness. Mouth/Throat: Normal dentition. No dental caries. Posterior oropharyngeal erythema present. Cardiovascular: Regular rhythm, S1 normal, S2 normal and normal heart sounds. Pulmonary/Chest: Effort normal. She has no decreased breath sounds. She has wheezes in the right lower field, the left middle field and the left lower field. She has no rhonchi. Musculoskeletal: Normal range of motion. Lymphadenopathy:     She has no cervical adenopathy. She has no axillary adenopathy. Neurological: She is alert and oriented to person, place, and time. No cranial nerve deficit. Skin: Skin is warm. No rash noted. Psychiatric: She has a normal mood and affect. Her behavior is normal. Her mood appears not anxious. Her affect is not blunt. She is not actively hallucinating. Cognition and memory are normal. She is attentive. Nursing note and vitals reviewed. ASSESSMENT AND PLAN      1. HTN, goal below 130/80  Controlled continue same medications  - Comprehensive Metabolic Panel; Future  - Lipid Panel; Future  - CBC; Future  - TSH With Reflex Ft4; Future    2. Chronic obstructive pulmonary disease, unspecified COPD type ([facility] [address]  We will do PFTs, discussed about to quit smoking start on nicotine gums  - albuterol sulfate HFA (PROAIR HFA) 108 (90 Base) MCG/ACT inhaler; Inhale 2 puffs into the lungs every 6 hours as needed for Wheezing  Dispense: 1 Inhaler; Refill: 3  - albuterol (PROVENTIL) (5 MG/ML) 0.5% nebulizer solution; Take 0.5 mLs by nebulization every 6 hours as needed for Wheezing  Dispense: 10 vial; Refill: 0  - Full PFT Study With Bronchodilator; Future    3.  Prediabetes  A1c has decreased continue diet and exercise stable on current treatment  - POCT glycosylated hemoglobin (Hb A1C)    4. Gastroesophageal reflux disease, esophagitis presence not specified  Stable on current treatment    5. Major depressive disorder, recurrent, moderate ([facility] [place] [address]  Patient has stopped taking Zoloft, stable discontinue Zoloft  - CBC; Future  - TSH With Reflex Ft4; Future    6. Seasonal allergies  Start on Flonase and Zyrtec  - fluticasone (FLONASE) 50 MCG/ACT nasal spray; 2 sprays by Nasal route daily  Dispense: 1 Bottle; Refill: 0  - cetirizine (ZYRTEC ALLERGY) 10 MG tablet; Take 1 tablet by mouth daily  Dispense: 30 tablet; Refill: 3    7. Rheumatoid arthritis involving multiple sites, unspecified rheumatoid factor presence ([facility] [place] [address]  Follows with rheumatologist    8. Personal history of tobacco use  Start on nicotine gums  - nicotine polacrilex (NICORETTE) 2 MG gum; Take 1 each by mouth as needed for Smoking cessation  Dispense: 110 each; Refill: 3    9. Preventative health care    - Hepatitis C Antibody; Future  - HIV Screen; Future    10. Need for prophylactic vaccination against diphtheria-tetanus-pertussis (DTP)    - Tdap (age 6y and older) IM (Boostrix)    6. Need for prophylactic vaccination and inoculation against varicella    - zoster recombinant adjuvanted vaccine [facility]) 50 MCG/0.5ML SUSR injection; Inject 0.5 mLs into the muscle once for 1 dose 50 MCG IM then repeat 2-6 months. Dispense: 1 each;  Refill: 1      Orders Placed This Encounter   Procedures    Tdap (age 6y and older) IM (Boostrix)    Comprehensive Metabolic Panel     Standing Status:   Future     Standing Expiration Date:   6/12/2020    Hepatitis C Antibody     Standing Status:   Future     Standing Expiration Date:   6/12/2020    HIV Screen     Standing Status:   Future     Standing Expiration Date:   6/12/2020    Lipid Panel     Standing Status:   Future     Standing Expiration Date:   6/12/2020     Order Specific Question:   Is Patient Fasting?/# of Hours     Answer:   12  CBC     Standing Status:   Future     Standing Expiration Date:   2020    TSH With Reflex Ft4     Standing Status:   Future     Standing Expiration Date:   2020    POCT glycosylated hemoglobin (Hb A1C)    Full PFT Study With Bronchodilator     Standing Status:   Future     Standing Expiration Date:   2020         Medications Discontinued During This Encounter   Medication Reason    folic acid (FOLVITE) 1 MG tablet DUPLICATE    ibuprofen (IBU) 800 MG tablet Therapy completed    Dextromethorphan-Guaifenesin (MUCINEX DM)  MG TB12 Therapy completed    sertraline (ZOLOFT) 50 MG tablet Patient Choice    albuterol sulfate HFA (PROAIR HFA) 108 (90 Base) MCG/ACT inhaler REORDER    albuterol (PROVENTIL) (5 MG/ML) 0.5% nebulizer solution REORDER       Iris received counseling on the following healthy behaviors: nutrition, exercise, medication adherence and tobacco cessation  Reviewed prior labs and health maintenance  Continue current medications, diet and exercise. Discussed use, benefit, and side effects of prescribed medications. Barriers to medication compliance addressed. Patient given educational materials - see patient instructions  Was a self-tracking handout given in paper form or via Occasion? Yes    Requested Prescriptions     Signed Prescriptions Disp Refills    zoster recombinant adjuvanted vaccine (SHINGRIX) 50 MCG/0.5ML SUSR injection 1 each 1     Sig: Inject 0.5 mLs into the muscle once for 1 dose 50 MCG IM then repeat 2-6 months.     albuterol sulfate HFA (PROAIR HFA) 108 (90 Base) MCG/ACT inhaler 1 Inhaler 3     Sig: Inhale 2 puffs into the lungs every 6 hours as needed for Wheezing    albuterol (PROVENTIL) (5 MG/ML) 0.5% nebulizer solution 10 vial 0     Sig: Take 0.5 mLs by nebulization every 6 hours as needed for Wheezing    fluticasone (FLONASE) 50 MCG/ACT nasal spray 1 Bottle 0     Si sprays by Nasal route daily    cetirizine (ZYRTEC ALLERGY) 10 MG tablet 30 tablet 3     Sig: Take 1 tablet by mouth daily    nicotine polacrilex (NICORETTE) 2 MG gum 110 each 3     Sig: Take 1 each by mouth as needed for Smoking cessation       All patient questions answered. Patient voiced understanding. Quality Measures    Body mass index is 37.35 kg/m². Elevated. Weight control planned discussed Healthy diet and regular exercise. BP: 132/71 Blood pressure is normal. Treatment plan consists of No treatment change needed. Lab Results   Component Value Date    LDLCHOLESTEROL 98 01/30/2015    (goal LDL reduction with dx if diabetes is 50% LDL reduction)      PHQ Scores 6/12/2019 11/12/2018   PHQ2 Score 0 0   PHQ9 Score 0 0     Interpretation of Total Score Depression Severity: 1-4 = Minimal depression, 5-9 = Mild depression, 10-14 = Moderate depression, 15-19 = Moderately severe depression, 20-27 = Severe depression    The patient'spast medical, surgical, social, and family history as well as her   current medications and allergies were reviewed as documented in today's encounter. Medications, labs, diagnostic studies, consultations andfollow-up as documented in this encounter. Return in about 2 months (around 8/12/2019). Patient wasseen with total face to face time of 25 minutes. More than 50% of this visit was counseling and education. Future Appointments   Date Time Provider Kuldeep Sun   8/14/2019 10:30 AM Dusty Lopez MD [email or web address]     This note was completed by using the assistance of a speech-recognition program. However, inadvertent computerized transcription errors may be present. Althoughevery effort was made to ensure accuracy, no guarantees can be provided that every mistake has been identified and corrected by editing.   Electronically signed by Dusty Lopez MD on 6/12/2019  1:32 PM

## 2019-06-12 NOTE — PROGRESS NOTES
Visit Information    Have you changed or started any medications since your last visit including any over-the-counter medicines, vitamins, or herbal medicines? no   Are you having any side effects from any of your medications? -  no  Have you stopped taking any of your medications? Is so, why? -  no    Have you seen any other physician or provider since your last visit? No  Have you had any other diagnostic tests since your last visit? No  Have you been seen in the emergency room and/or had an admission to a hospital since we last saw you? No  Have you had your routine dental cleaning in the past 6 months? yes -     Have you activated your [employer] account? If not, what are your barriers?  No:      Patient Care Team:  Princess Guerrero MD as PCP - General (Family Medicine)  Princess Guerrero MD as PCP - [facility] Provider  Oliva Jasso MD as Consulting Physician (Internal Medicine)    Medical History Review  Past Medical, Family, and Social History reviewed and does contribute to the patient presenting condition    Health Maintenance   Topic Date Due    Hepatitis C screen  1955    HIV screen  05/01/1972    DTaP/Tdap/Td vaccine (1 - Tdap) 05/01/1976    Shingles Vaccine (1 of 2) 05/01/2007    Cervical cancer screen  04/25/2017    Potassium monitoring  11/18/2017    Creatinine monitoring  04/11/2018    A1C test (Diabetic or Prediabetic)  11/12/2019    Colon Cancer Screen FIT/FOBT  12/04/2019    Lipid screen  01/30/2020    Breast cancer screen  12/04/2020    Flu vaccine  Completed    Pneumococcal 0-64 years Vaccine  Completed

## 2019-07-03 DIAGNOSIS — I10 ESSENTIAL HYPERTENSION: ICD-10-CM

## 2019-07-03 RX ORDER — AMLODIPINE BESYLATE 5 MG/1
TABLET ORAL
Qty: 30 TABLET | Refills: 3 | Status: SHIPPED | OUTPATIENT
Start: 2019-07-03 | End: 2019-10-27 | Stop reason: SDUPTHER

## 2019-07-03 NOTE — TELEPHONE ENCOUNTER
Please Approve or Refuse.   Send to Pharmacy per Pt's Request:      Next Visit Date:  8/14/2019   Last Visit Date: 6/12/2019    Hemoglobin A1C (%)   Date Value   06/12/2019 5.7   11/12/2018 5.9   03/19/2014 5.9             ( goal A1C is < 7)   BP Readings from Last 3 Encounters:   06/12/19 132/71   11/12/18 138/84   07/17/18 (!) 146/62          (goal 120/80)  BUN   Date Value Ref Range Status   11/18/2016 19 6 - 20 mg/dL Final     CREATININE   Date Value Ref Range Status   04/11/2017 0.57 0.50 - 0.90 mg/dL Final     Potassium   Date Value Ref Range Status   11/18/2016 3.9 3.7 - 5.3 mmol/L Final

## 2019-08-14 ENCOUNTER — OFFICE VISIT (OUTPATIENT)
Dept: FAMILY MEDICINE CLINIC | Age: 64
End: 2019-08-14
Payer: COMMERCIAL

## 2019-08-14 VITALS
SYSTOLIC BLOOD PRESSURE: 171 MMHG | DIASTOLIC BLOOD PRESSURE: 89 MMHG | HEIGHT: 65 IN | OXYGEN SATURATION: 95 % | HEART RATE: 74 BPM | WEIGHT: 233.2 LBS | BODY MASS INDEX: 38.85 KG/M2

## 2019-08-14 DIAGNOSIS — F33.1 MAJOR DEPRESSIVE DISORDER, RECURRENT, MODERATE (HCC): ICD-10-CM

## 2019-08-14 DIAGNOSIS — J44.9 CHRONIC OBSTRUCTIVE PULMONARY DISEASE, UNSPECIFIED COPD TYPE (HCC): ICD-10-CM

## 2019-08-14 DIAGNOSIS — Z23 NEED FOR PROPHYLACTIC VACCINATION AND INOCULATION AGAINST VARICELLA: ICD-10-CM

## 2019-08-14 DIAGNOSIS — Z87.891 PERSONAL HISTORY OF TOBACCO USE: ICD-10-CM

## 2019-08-14 DIAGNOSIS — I10 ESSENTIAL HYPERTENSION: Primary | ICD-10-CM

## 2019-08-14 PROCEDURE — G8427 DOCREV CUR MEDS BY ELIG CLIN: HCPCS | Performed by: FAMILY MEDICINE

## 2019-08-14 PROCEDURE — G8926 SPIRO NO PERF OR DOC: HCPCS | Performed by: FAMILY MEDICINE

## 2019-08-14 PROCEDURE — G0296 VISIT TO DETERM LDCT ELIG: HCPCS | Performed by: FAMILY MEDICINE

## 2019-08-14 PROCEDURE — 99213 OFFICE O/P EST LOW 20 MIN: CPT | Performed by: FAMILY MEDICINE

## 2019-08-14 PROCEDURE — 3017F COLORECTAL CA SCREEN DOC REV: CPT | Performed by: FAMILY MEDICINE

## 2019-08-14 PROCEDURE — G8417 CALC BMI ABV UP PARAM F/U: HCPCS | Performed by: FAMILY MEDICINE

## 2019-08-14 PROCEDURE — 4004F PT TOBACCO SCREEN RCVD TLK: CPT | Performed by: FAMILY MEDICINE

## 2019-08-14 PROCEDURE — 3023F SPIROM DOC REV: CPT | Performed by: FAMILY MEDICINE

## 2019-08-14 ASSESSMENT — ENCOUNTER SYMPTOMS
BLOOD IN STOOL: 0
WHEEZING: 0
ABDOMINAL DISTENTION: 0
PHOTOPHOBIA: 0
SINUS PRESSURE: 0
RHINORRHEA: 0
CONSTIPATION: 0
SHORTNESS OF BREATH: 0

## 2019-08-14 NOTE — PROGRESS NOTES
Chief Complaint   Patient presents with    Hypertension    Depression         Rome Waldron  here today for follow up on chronic medical problems, go over labs and/or diagnostic studies, and medication refills. Hypertension and Depression      HPI: Patient is here for follow-up on hypertension and blood work and to discuss PFT. Patient has not done blood work has not been PFT. Patient reports her daughter has been sick and she is dying. She is taking care of her granddaughter. She did not get time to do the blood work nasal PFT. Hypertension uncontrolled normal in the past patient is crying due to stress. She does a history of depression and is on Zoloft. BP (!) 171/89   Pulse 74   Ht 5' 5\" (1.651 m)   Wt 233 lb 3.2 oz (105.8 kg)   SpO2 95% Comment: resting @ RA  BMI 38.81 kg/m²    Body mass index is 38.81 kg/m². Wt Readings from Last 3 Encounters:   08/14/19 233 lb 3.2 oz (105.8 kg)   06/12/19 231 lb 6.4 oz (105 kg)   11/12/18 230 lb 6.4 oz (104.5 kg)        [x]Negative depression screening. PHQ Scores 6/12/2019 11/12/2018   PHQ2 Score 0 0   PHQ9 Score 0 0      []1-4 = Minimal depression   []5-9 = Milddepression   []10-14 = Moderate depression   []15-19 = Moderately severe depression   []20-27 = Severe depression    Discussed testing with the patient and all questions fully answered.     Office Visit on 06/12/2019   Component Date Value Ref Range Status    Hemoglobin A1C 06/12/2019 5.7  % Final         Most recent labs reviewed:     Lab Results   Component Value Date    WBC 5.2 04/11/2017    HGB 14.5 04/11/2017    HCT 44.6 04/11/2017    MCV 96.7 04/11/2017     04/11/2017       @BRIEFLAB(NA,K,CL,CO2,BUN,CREATININE,GLUCOSE,CALCIUM)@     Lab Results   Component Value Date    ALT 18 04/11/2017    AST 21 04/11/2017    ALKPHOS 119 (H) 04/11/2017    BILITOT 0.23 (L) 05/03/2014       Lab Results   Component Value Date    TSH 0.93 12/05/2011       Lab Results   Component Value Date methotrexate (RHEUMATREX) 2.5 MG chemo tablet Take 20 mg by mouth once a week Indications: takes 8 tablets on saturdays       cetirizine (ZYRTEC ALLERGY) 10 MG tablet Take 1 tablet by mouth daily (Patient not taking: Reported on 8/14/2019) 30 tablet 3    terbinafine (LAMISIL) 1 % cream Apply topically 2 times daily. (Patient not taking: Reported on 8/14/2019) 36 g 3     No current facility-administered medications for this visit. Social History     Socioeconomic History    Marital status: Single     Spouse name: Not on file    Number of children: Not on file    Years of education: Not on file    Highest education level: Not on file   Occupational History     Employer: N/A   Social Needs    Financial resource strain: Not on file    Food insecurity:     Worry: Not on file     Inability: Not on file    Transportation needs:     Medical: Not on file     Non-medical: Not on file   Tobacco Use    Smoking status: Current Every Day Smoker     Packs/day: 1.00     Years: 35.00     Pack years: 35.00     Types: Cigarettes    Smokeless tobacco: Never Used   Substance and Sexual Activity    Alcohol use:  Yes     Alcohol/week: 0.0 standard drinks     Comment: social    Drug use: No    Sexual activity: Not Currently   Lifestyle    Physical activity:     Days per week: Not on file     Minutes per session: Not on file    Stress: Not on file   Relationships    Social connections:     Talks on phone: Not on file     Gets together: Not on file     Attends [employer]od[employer] service: Not on file     Active member of club or organization: Not on file     Attends meetings of clubs or organizations: Not on file     Relationship status: Not on file    Intimate partner violence:     Fear of current or ex partner: Not on file     Emotionally abused: Not on file     Physically abused: Not on file     Forced sexual activity: Not on file   Other Topics Concern    Not on file   Social History Narrative    Not on file months    2. Major depressive disorder, recurrent, moderate (HCC)  Discussed with patient to increase Zoloft and see if that helps, patient refused counseling. 3. Chronic obstructive pulmonary disease, unspecified COPD type ([facility] [address]  PFT reprinted    4. Need for prophylactic vaccination and inoculation against varicella    - zoster recombinant adjuvanted vaccine [facility]) 50 MCG/0.5ML SUSR injection; Inject 0.5 mLs into the muscle once for 1 dose 50 MCG IM then repeat 2-6 months. Dispense: 1 each; Refill: 1    5. Personal history of tobacco use    - [place] VISIT TO DISCUSS LUNG CA SCREEN W LDCT  - CT Lung Screen (Annual); Future      Orders Placed This Encounter   Procedures    CT Lung Screen (Annual)     Age: Patient is 60 y.o. Smoking History: Social History    Tobacco Use      Smoking status: Current Every Day Smoker        Packs/day: 1.00        Years: 35.00        Pack years: 35        Types: Cigarettes      Smokeless tobacco: Never Used    Alcohol use: Yes      Alcohol/week: 0.0 standard drinks      Comment: social    Drug use: No   Pack years: 28    Date of last lung cancer screening:       Standing Status:   Future     Standing Expiration Date:   2/14/2021     Order Specific Question:   Is there documentation of shared decision making? Answer:   Yes     Order Specific Question:   Is this a low dose CT or a routine CT? Answer:   Low Dose CT [1]     Order Specific Question:   Is this the first (baseline) CT or an annual exam?     Answer: Annual [2]     Order Specific Question:   Does the patient show any signs or symptoms of lung cancer? Answer:   No     Order Specific Question:   Smoking Status? Answer:   Current Every Day Smoker [1]     Order Specific Question:   Smoking packs per day? Answer:   1     Order Specific Question:   Years smoking? Answer:   28    [place] VISIT TO DISCUSS LUNG CA SCREEN W LDCT         There are no discontinued medications.     Caryetta Felty received counseling on

## 2019-09-30 RX ORDER — FOLIC ACID 1 MG/1
TABLET ORAL
Qty: 30 TABLET | Refills: 3 | Status: SHIPPED | OUTPATIENT
Start: 2019-09-30 | End: 2020-02-06

## 2019-10-27 DIAGNOSIS — I10 ESSENTIAL HYPERTENSION: ICD-10-CM

## 2019-10-28 RX ORDER — AMLODIPINE BESYLATE 5 MG/1
TABLET ORAL
Qty: 30 TABLET | Refills: 3 | Status: ON HOLD | OUTPATIENT
Start: 2019-10-28 | End: 2019-12-28 | Stop reason: HOSPADM

## 2019-11-11 RX ORDER — PANTOPRAZOLE SODIUM 40 MG/1
TABLET, DELAYED RELEASE ORAL
Qty: 30 TABLET | Refills: 5 | Status: SHIPPED | OUTPATIENT
Start: 2019-11-11 | End: 2020-04-15

## 2019-12-22 ENCOUNTER — HOSPITAL ENCOUNTER (INPATIENT)
Age: 64
LOS: 5 days | Discharge: HOME | DRG: 720 | End: 2019-12-28
Attending: EMERGENCY MEDICINE | Admitting: INTERNAL MEDICINE
Payer: COMMERCIAL

## 2019-12-22 ENCOUNTER — APPOINTMENT (OUTPATIENT)
Dept: GENERAL RADIOLOGY | Age: 64
DRG: 720 | End: 2019-12-22
Payer: COMMERCIAL

## 2019-12-22 ENCOUNTER — APPOINTMENT (OUTPATIENT)
Dept: CT IMAGING | Age: 64
DRG: 720 | End: 2019-12-22
Payer: COMMERCIAL

## 2019-12-22 DIAGNOSIS — J44.9 CHRONIC OBSTRUCTIVE PULMONARY DISEASE, UNSPECIFIED COPD TYPE (HCC): ICD-10-CM

## 2019-12-22 DIAGNOSIS — J18.9 PNEUMONIA DUE TO ORGANISM: Primary | ICD-10-CM

## 2019-12-22 LAB
ANION GAP SERPL CALCULATED.3IONS-SCNC: 12 MMOL/L (ref 9–17)
BUN BLDV-MCNC: 28 MG/DL (ref 8–23)
BUN/CREAT BLD: ABNORMAL (ref 9–20)
CALCIUM SERPL-MCNC: 9.9 MG/DL (ref 8.6–10.4)
CHLORIDE BLD-SCNC: 95 MMOL/L (ref 98–107)
CO2: 23 MMOL/L (ref 20–31)
CREAT SERPL-MCNC: 0.97 MG/DL (ref 0.5–0.9)
D-DIMER QUANTITATIVE: 3.43 MG/L FEU (ref 0–0.59)
DIRECT EXAM: NORMAL
GFR AFRICAN AMERICAN: >60 ML/MIN
GFR NON-AFRICAN AMERICAN: 58 ML/MIN
GFR SERPL CREATININE-BSD FRML MDRD: ABNORMAL ML/MIN/{1.73_M2}
GFR SERPL CREATININE-BSD FRML MDRD: ABNORMAL ML/MIN/{1.73_M2}
GLUCOSE BLD-MCNC: 133 MG/DL (ref 70–99)
INR BLD: 1.3
LACTIC ACID, SEPSIS WHOLE BLOOD: ABNORMAL MMOL/L (ref 0.5–1.9)
LACTIC ACID, SEPSIS: 2 MMOL/L (ref 0.5–1.9)
Lab: NORMAL
MAGNESIUM: 1.8 MG/DL (ref 1.6–2.6)
PARTIAL THROMBOPLASTIN TIME: 33.6 SEC (ref 24–36)
POTASSIUM SERPL-SCNC: 3.7 MMOL/L (ref 3.7–5.3)
PROTHROMBIN TIME: 16.1 SEC (ref 11.8–14.6)
SODIUM BLD-SCNC: 130 MMOL/L (ref 135–144)
SPECIMEN DESCRIPTION: NORMAL
TROPONIN INTERP: NORMAL
TROPONIN T: NORMAL NG/ML
TROPONIN, HIGH SENSITIVITY: 9 NG/L (ref 0–14)

## 2019-12-22 PROCEDURE — 36415 COLL VENOUS BLD VENIPUNCTURE: CPT

## 2019-12-22 PROCEDURE — 83735 ASSAY OF MAGNESIUM: CPT

## 2019-12-22 PROCEDURE — 85025 COMPLETE CBC W/AUTO DIFF WBC: CPT

## 2019-12-22 PROCEDURE — 87804 INFLUENZA ASSAY W/OPTIC: CPT

## 2019-12-22 PROCEDURE — 85610 PROTHROMBIN TIME: CPT

## 2019-12-22 PROCEDURE — 71046 X-RAY EXAM CHEST 2 VIEWS: CPT

## 2019-12-22 PROCEDURE — 83605 ASSAY OF LACTIC ACID: CPT

## 2019-12-22 PROCEDURE — 87040 BLOOD CULTURE FOR BACTERIA: CPT

## 2019-12-22 PROCEDURE — 84484 ASSAY OF TROPONIN QUANT: CPT

## 2019-12-22 PROCEDURE — 71260 CT THORAX DX C+: CPT

## 2019-12-22 PROCEDURE — 93005 ELECTROCARDIOGRAM TRACING: CPT | Performed by: STUDENT IN AN ORGANIZED HEALTH CARE EDUCATION/TRAINING PROGRAM

## 2019-12-22 PROCEDURE — 87186 SC STD MICRODIL/AGAR DIL: CPT

## 2019-12-22 PROCEDURE — 87150 DNA/RNA AMPLIFIED PROBE: CPT

## 2019-12-22 PROCEDURE — 6360000002 HC RX W HCPCS: Performed by: STUDENT IN AN ORGANIZED HEALTH CARE EDUCATION/TRAINING PROGRAM

## 2019-12-22 PROCEDURE — 2580000003 HC RX 258: Performed by: STUDENT IN AN ORGANIZED HEALTH CARE EDUCATION/TRAINING PROGRAM

## 2019-12-22 PROCEDURE — 85730 THROMBOPLASTIN TIME PARTIAL: CPT

## 2019-12-22 PROCEDURE — 85379 FIBRIN DEGRADATION QUANT: CPT

## 2019-12-22 PROCEDURE — 87205 SMEAR GRAM STAIN: CPT

## 2019-12-22 PROCEDURE — 80048 BASIC METABOLIC PNL TOTAL CA: CPT

## 2019-12-22 PROCEDURE — 80076 HEPATIC FUNCTION PANEL: CPT

## 2019-12-22 PROCEDURE — 96365 THER/PROPH/DIAG IV INF INIT: CPT

## 2019-12-22 PROCEDURE — 99285 EMERGENCY DEPT VISIT HI MDM: CPT

## 2019-12-22 PROCEDURE — 94640 AIRWAY INHALATION TREATMENT: CPT

## 2019-12-22 PROCEDURE — 6370000000 HC RX 637 (ALT 250 FOR IP): Performed by: EMERGENCY MEDICINE

## 2019-12-22 RX ORDER — IPRATROPIUM BROMIDE AND ALBUTEROL SULFATE 2.5; .5 MG/3ML; MG/3ML
1 SOLUTION RESPIRATORY (INHALATION)
Status: DISCONTINUED | OUTPATIENT
Start: 2019-12-22 | End: 2019-12-23

## 2019-12-22 RX ORDER — 0.9 % SODIUM CHLORIDE 0.9 %
80 INTRAVENOUS SOLUTION INTRAVENOUS ONCE
Status: COMPLETED | OUTPATIENT
Start: 2019-12-22 | End: 2019-12-23

## 2019-12-22 RX ORDER — METHYLPREDNISOLONE SODIUM SUCCINATE 125 MG/2ML
125 INJECTION, POWDER, LYOPHILIZED, FOR SOLUTION INTRAMUSCULAR; INTRAVENOUS ONCE
Status: COMPLETED | OUTPATIENT
Start: 2019-12-22 | End: 2019-12-22

## 2019-12-22 RX ORDER — SODIUM CHLORIDE 0.9 % (FLUSH) 0.9 %
10 SYRINGE (ML) INJECTION PRN
Status: DISCONTINUED | OUTPATIENT
Start: 2019-12-22 | End: 2019-12-28 | Stop reason: HOSPADM

## 2019-12-22 RX ORDER — IPRATROPIUM BROMIDE AND ALBUTEROL SULFATE 2.5; .5 MG/3ML; MG/3ML
SOLUTION RESPIRATORY (INHALATION)
Status: DISCONTINUED
Start: 2019-12-22 | End: 2019-12-23

## 2019-12-22 RX ORDER — 0.9 % SODIUM CHLORIDE 0.9 %
1000 INTRAVENOUS SOLUTION INTRAVENOUS ONCE
Status: COMPLETED | OUTPATIENT
Start: 2019-12-22 | End: 2019-12-22

## 2019-12-22 RX ADMIN — SODIUM CHLORIDE 1000 ML: 9 INJECTION, SOLUTION INTRAVENOUS at 22:54

## 2019-12-22 RX ADMIN — CEFTRIAXONE SODIUM 1 G: 1 INJECTION, POWDER, FOR SOLUTION INTRAMUSCULAR; INTRAVENOUS at 23:33

## 2019-12-22 RX ADMIN — IPRATROPIUM BROMIDE AND ALBUTEROL SULFATE 1 AMPULE: .5; 3 SOLUTION RESPIRATORY (INHALATION) at 23:31

## 2019-12-22 RX ADMIN — METHYLPREDNISOLONE SODIUM SUCCINATE 125 MG: 125 INJECTION, POWDER, FOR SOLUTION INTRAMUSCULAR; INTRAVENOUS at 23:21

## 2019-12-22 ASSESSMENT — PAIN DESCRIPTION - PAIN TYPE: TYPE: ACUTE PAIN

## 2019-12-22 ASSESSMENT — PAIN DESCRIPTION - FREQUENCY: FREQUENCY: CONTINUOUS

## 2019-12-22 ASSESSMENT — PAIN DESCRIPTION - ORIENTATION: ORIENTATION: RIGHT;UPPER

## 2019-12-22 ASSESSMENT — PAIN DESCRIPTION - LOCATION: LOCATION: ABDOMEN

## 2019-12-22 ASSESSMENT — PAIN DESCRIPTION - DESCRIPTORS: DESCRIPTORS: SHARP

## 2019-12-23 ENCOUNTER — APPOINTMENT (OUTPATIENT)
Dept: GENERAL RADIOLOGY | Age: 64
DRG: 720 | End: 2019-12-23
Payer: COMMERCIAL

## 2019-12-23 PROBLEM — Z86.39 HISTORY OF THYROID NODULE: Status: ACTIVE | Noted: 2019-12-23

## 2019-12-23 PROBLEM — A41.9 SEPSIS (HCC): Status: ACTIVE | Noted: 2019-12-23

## 2019-12-23 PROBLEM — J18.9 PNEUMONIA: Status: ACTIVE | Noted: 2019-12-23

## 2019-12-23 LAB
ABSOLUTE BANDS #: 4.44 K/UL (ref 0–1)
ABSOLUTE EOS #: 0 K/UL (ref 0–0.4)
ABSOLUTE IMMATURE GRANULOCYTE: ABNORMAL K/UL (ref 0–0.3)
ABSOLUTE LYMPH #: 2.96 K/UL (ref 1–4.8)
ABSOLUTE MONO #: 0.3 K/UL (ref 0.1–1.3)
ADENOVIRUS PCR: NOT DETECTED
ALBUMIN SERPL-MCNC: 3.1 G/DL (ref 3.5–5.2)
ALBUMIN/GLOBULIN RATIO: ABNORMAL (ref 1–2.5)
ALLEN TEST: ABNORMAL
ALP BLD-CCNC: 111 U/L (ref 35–104)
ALT SERPL-CCNC: 14 U/L (ref 5–33)
ANION GAP SERPL CALCULATED.3IONS-SCNC: 11 MMOL/L (ref 9–17)
AST SERPL-CCNC: 17 U/L
ATYPICAL LYMPHOCYTE ABSOLUTE COUNT: 0.3 K/UL
ATYPICAL LYMPHOCYTES: 1 %
BANDS: 15 % (ref 0–10)
BASOPHILS # BLD: 0 % (ref 0–2)
BASOPHILS ABSOLUTE: 0 K/UL (ref 0–0.2)
BILIRUB SERPL-MCNC: 0.53 MG/DL (ref 0.3–1.2)
BILIRUBIN DIRECT: 0.22 MG/DL
BILIRUBIN, INDIRECT: 0.31 MG/DL (ref 0–1)
BORDETELLA PARAPERTUSSIS: NOT DETECTED
BORDETELLA PERTUSSIS PCR: NOT DETECTED
BUN BLDV-MCNC: 34 MG/DL (ref 8–23)
BUN/CREAT BLD: ABNORMAL (ref 9–20)
CALCIUM IONIZED: 1.32 MMOL/L (ref 1.13–1.33)
CALCIUM SERPL-MCNC: 9.7 MG/DL (ref 8.6–10.4)
CARBOXYHEMOGLOBIN: 1 % (ref 0–5)
CHLAMYDIA PNEUMONIAE BY PCR: NOT DETECTED
CHLORIDE BLD-SCNC: 99 MMOL/L (ref 98–107)
CO2: 22 MMOL/L (ref 20–31)
CORONAVIRUS 229E PCR: NOT DETECTED
CORONAVIRUS HKU1 PCR: NOT DETECTED
CORONAVIRUS NL63 PCR: NOT DETECTED
CORONAVIRUS OC43 PCR: NOT DETECTED
CREAT SERPL-MCNC: 0.81 MG/DL (ref 0.5–0.9)
DIFFERENTIAL TYPE: ABNORMAL
DIRECT EXAM: NORMAL
DIRECT EXAM: NORMAL
EOSINOPHILS RELATIVE PERCENT: 0 % (ref 0–4)
ESTIMATED AVERAGE GLUCOSE: 126 MG/DL
FIO2: 70
GFR AFRICAN AMERICAN: >60 ML/MIN
GFR NON-AFRICAN AMERICAN: >60 ML/MIN
GFR SERPL CREATININE-BSD FRML MDRD: ABNORMAL ML/MIN/{1.73_M2}
GFR SERPL CREATININE-BSD FRML MDRD: ABNORMAL ML/MIN/{1.73_M2}
GLOBULIN: ABNORMAL G/DL (ref 1.5–3.8)
GLUCOSE BLD-MCNC: 160 MG/DL (ref 65–105)
GLUCOSE BLD-MCNC: 164 MG/DL (ref 65–105)
GLUCOSE BLD-MCNC: 178 MG/DL (ref 70–99)
GLUCOSE BLD-MCNC: 181 MG/DL (ref 65–105)
GLUCOSE BLD-MCNC: 195 MG/DL (ref 65–105)
GLUCOSE BLD-MCNC: 205 MG/DL (ref 65–105)
HBA1C MFR BLD: 6 % (ref 4–6)
HCO3 ARTERIAL: 25.5 MMOL/L (ref 22–26)
HCT VFR BLD CALC: 38.4 % (ref 36–46)
HCT VFR BLD CALC: 40.7 % (ref 36–46)
HEMOGLOBIN: 12.6 G/DL (ref 12–16)
HEMOGLOBIN: 13.5 G/DL (ref 12–16)
HUMAN METAPNEUMOVIRUS PCR: NOT DETECTED
IMMATURE GRANULOCYTES: ABNORMAL %
INFLUENZA A BY PCR: NOT DETECTED
INFLUENZA A H1 (2009) PCR: NORMAL
INFLUENZA A H1 PCR: NORMAL
INFLUENZA A H3 PCR: NORMAL
INFLUENZA B BY PCR: NOT DETECTED
LACTIC ACID, SEPSIS WHOLE BLOOD: NORMAL MMOL/L (ref 0.5–1.9)
LACTIC ACID, SEPSIS: 1.1 MMOL/L (ref 0.5–1.9)
LYMPHOCYTES # BLD: 10 % (ref 24–44)
Lab: NORMAL
Lab: NORMAL
MCH RBC QN AUTO: 31.3 PG (ref 26–34)
MCH RBC QN AUTO: 31.5 PG (ref 26–34)
MCHC RBC AUTO-ENTMCNC: 32.8 G/DL (ref 31–37)
MCHC RBC AUTO-ENTMCNC: 33.1 G/DL (ref 31–37)
MCV RBC AUTO: 95.3 FL (ref 80–100)
MCV RBC AUTO: 95.5 FL (ref 80–100)
METAMYELOCYTES ABSOLUTE COUNT: 0.59 K/UL
METAMYELOCYTES: 2 %
METHEMOGLOBIN: 0.9 % (ref 0–1.9)
MODE: ABNORMAL
MONOCYTES # BLD: 1 % (ref 1–7)
MORPHOLOGY: ABNORMAL
MYCOPLASMA PNEUMONIAE PCR: NOT DETECTED
NEGATIVE BASE EXCESS, ART: ABNORMAL MMOL/L (ref 0–2)
NOTIFICATION TIME: ABNORMAL
NOTIFICATION: ABNORMAL
NRBC AUTOMATED: ABNORMAL PER 100 WBC
NRBC AUTOMATED: ABNORMAL PER 100 WBC
O2 DEVICE/FLOW/%: ABNORMAL
O2 SAT, ARTERIAL: 90.4 % (ref 95–98)
OXYHEMOGLOBIN: ABNORMAL % (ref 95–98)
PARAINFLUENZA 1 PCR: NOT DETECTED
PARAINFLUENZA 2 PCR: NOT DETECTED
PARAINFLUENZA 3 PCR: NOT DETECTED
PARAINFLUENZA 4 PCR: NOT DETECTED
PATIENT TEMP: 37
PCO2 ARTERIAL: 42.6 MMHG (ref 35–45)
PCO2, ART, TEMP ADJ: ABNORMAL (ref 35–45)
PDW BLD-RTO: 15.2 % (ref 11.5–14.9)
PDW BLD-RTO: 15.4 % (ref 11.5–14.9)
PEEP/CPAP: ABNORMAL
PH ARTERIAL: 7.39 (ref 7.35–7.45)
PH, ART, TEMP ADJ: ABNORMAL (ref 7.35–7.45)
PLATELET # BLD: 284 K/UL (ref 150–450)
PLATELET # BLD: 315 K/UL (ref 150–450)
PLATELET ESTIMATE: ABNORMAL
PMV BLD AUTO: 7.7 FL (ref 6–12)
PMV BLD AUTO: 7.7 FL (ref 6–12)
PO2 ARTERIAL: 64 MMHG (ref 80–100)
PO2, ART, TEMP ADJ: ABNORMAL MMHG (ref 80–100)
POSITIVE BASE EXCESS, ART: 0.5 MMOL/L (ref 0–2)
POTASSIUM SERPL-SCNC: 3.7 MMOL/L (ref 3.7–5.3)
PSV: ABNORMAL
PT. POSITION: ABNORMAL
PTH INTACT: 34.32 PG/ML (ref 15–65)
RBC # BLD: 4.03 M/UL (ref 4–5.2)
RBC # BLD: 4.27 M/UL (ref 4–5.2)
RBC # BLD: ABNORMAL 10*6/UL
RESP SYNCYTIAL VIRUS PCR: NOT DETECTED
RESPIRATORY RATE: 24
RHINO/ENTEROVIRUS PCR: NOT DETECTED
SAMPLE SITE: ABNORMAL
SEG NEUTROPHILS: 71 % (ref 36–66)
SEGMENTED NEUTROPHILS ABSOLUTE COUNT: 21.01 K/UL (ref 1.3–9.1)
SET RATE: ABNORMAL
SODIUM BLD-SCNC: 132 MMOL/L (ref 135–144)
SPECIMEN DESCRIPTION: NORMAL
TEXT FOR RESPIRATORY: ABNORMAL
THYROXINE, FREE: 1.21 NG/DL (ref 0.93–1.7)
TOTAL HB: ABNORMAL G/DL (ref 12–16)
TOTAL PROTEIN: 9.6 G/DL (ref 6.4–8.3)
TOTAL RATE: ABNORMAL
TROPONIN INTERP: NORMAL
TROPONIN T: NORMAL NG/ML
TROPONIN, HIGH SENSITIVITY: 9 NG/L (ref 0–14)
TSH SERPL DL<=0.05 MIU/L-ACNC: 0.18 MIU/L (ref 0.3–5)
VT: ABNORMAL
WBC # BLD: 29.5 K/UL (ref 3.5–11)
WBC # BLD: 29.6 K/UL (ref 3.5–11)
WBC # BLD: ABNORMAL 10*3/UL

## 2019-12-23 PROCEDURE — 86738 MYCOPLASMA ANTIBODY: CPT

## 2019-12-23 PROCEDURE — 84484 ASSAY OF TROPONIN QUANT: CPT

## 2019-12-23 PROCEDURE — 99223 1ST HOSP IP/OBS HIGH 75: CPT | Performed by: INTERNAL MEDICINE

## 2019-12-23 PROCEDURE — 84439 ASSAY OF FREE THYROXINE: CPT

## 2019-12-23 PROCEDURE — 0100U HC RESPIRPTHGN MULT REV TRANS & AMP PRB TECH 21 TRGT: CPT

## 2019-12-23 PROCEDURE — 6360000004 HC RX CONTRAST MEDICATION: Performed by: EMERGENCY MEDICINE

## 2019-12-23 PROCEDURE — 2700000000 HC OXYGEN THERAPY PER DAY

## 2019-12-23 PROCEDURE — 6370000000 HC RX 637 (ALT 250 FOR IP): Performed by: STUDENT IN AN ORGANIZED HEALTH CARE EDUCATION/TRAINING PROGRAM

## 2019-12-23 PROCEDURE — 80048 BASIC METABOLIC PNL TOTAL CA: CPT

## 2019-12-23 PROCEDURE — 6360000002 HC RX W HCPCS: Performed by: INTERNAL MEDICINE

## 2019-12-23 PROCEDURE — 2580000003 HC RX 258: Performed by: STUDENT IN AN ORGANIZED HEALTH CARE EDUCATION/TRAINING PROGRAM

## 2019-12-23 PROCEDURE — 82805 BLOOD GASES W/O2 SATURATION: CPT

## 2019-12-23 PROCEDURE — 87641 MR-STAPH DNA AMP PROBE: CPT

## 2019-12-23 PROCEDURE — 87070 CULTURE OTHR SPECIMN AEROBIC: CPT

## 2019-12-23 PROCEDURE — 6370000000 HC RX 637 (ALT 250 FOR IP): Performed by: INTERNAL MEDICINE

## 2019-12-23 PROCEDURE — 6360000002 HC RX W HCPCS: Performed by: STUDENT IN AN ORGANIZED HEALTH CARE EDUCATION/TRAINING PROGRAM

## 2019-12-23 PROCEDURE — 2580000003 HC RX 258: Performed by: EMERGENCY MEDICINE

## 2019-12-23 PROCEDURE — 84443 ASSAY THYROID STIM HORMONE: CPT

## 2019-12-23 PROCEDURE — 94762 N-INVAS EAR/PLS OXIMTRY CONT: CPT

## 2019-12-23 PROCEDURE — 2000000000 HC ICU R&B

## 2019-12-23 PROCEDURE — 83605 ASSAY OF LACTIC ACID: CPT

## 2019-12-23 PROCEDURE — 94660 CPAP INITIATION&MGMT: CPT

## 2019-12-23 PROCEDURE — 71045 X-RAY EXAM CHEST 1 VIEW: CPT

## 2019-12-23 PROCEDURE — 82947 ASSAY GLUCOSE BLOOD QUANT: CPT

## 2019-12-23 PROCEDURE — 36415 COLL VENOUS BLD VENIPUNCTURE: CPT

## 2019-12-23 PROCEDURE — 87899 AGENT NOS ASSAY W/OPTIC: CPT

## 2019-12-23 PROCEDURE — 97530 THERAPEUTIC ACTIVITIES: CPT

## 2019-12-23 PROCEDURE — 87205 SMEAR GRAM STAIN: CPT

## 2019-12-23 PROCEDURE — 82330 ASSAY OF CALCIUM: CPT

## 2019-12-23 PROCEDURE — 2580000003 HC RX 258: Performed by: INTERNAL MEDICINE

## 2019-12-23 PROCEDURE — 94761 N-INVAS EAR/PLS OXIMETRY MLT: CPT

## 2019-12-23 PROCEDURE — 94640 AIRWAY INHALATION TREATMENT: CPT

## 2019-12-23 PROCEDURE — 36600 WITHDRAWAL OF ARTERIAL BLOOD: CPT

## 2019-12-23 PROCEDURE — 87449 NOS EACH ORGANISM AG IA: CPT

## 2019-12-23 PROCEDURE — 97162 PT EVAL MOD COMPLEX 30 MIN: CPT

## 2019-12-23 PROCEDURE — 85027 COMPLETE CBC AUTOMATED: CPT

## 2019-12-23 PROCEDURE — 83970 ASSAY OF PARATHORMONE: CPT

## 2019-12-23 PROCEDURE — 83036 HEMOGLOBIN GLYCOSYLATED A1C: CPT

## 2019-12-23 RX ORDER — IPRATROPIUM BROMIDE AND ALBUTEROL SULFATE 2.5; .5 MG/3ML; MG/3ML
1 SOLUTION RESPIRATORY (INHALATION) EVERY 4 HOURS
Status: DISCONTINUED | OUTPATIENT
Start: 2019-12-23 | End: 2019-12-23

## 2019-12-23 RX ORDER — AMLODIPINE BESYLATE 5 MG/1
5 TABLET ORAL DAILY
Status: DISCONTINUED | OUTPATIENT
Start: 2019-12-23 | End: 2019-12-26

## 2019-12-23 RX ORDER — ALBUTEROL SULFATE 2.5 MG/3ML
2.5 SOLUTION RESPIRATORY (INHALATION) EVERY 4 HOURS
Status: DISCONTINUED | OUTPATIENT
Start: 2019-12-23 | End: 2019-12-24

## 2019-12-23 RX ORDER — DEXTROSE MONOHYDRATE 25 G/50ML
12.5 INJECTION, SOLUTION INTRAVENOUS PRN
Status: DISCONTINUED | OUTPATIENT
Start: 2019-12-23 | End: 2019-12-28 | Stop reason: HOSPADM

## 2019-12-23 RX ORDER — POTASSIUM CHLORIDE 20 MEQ/1
40 TABLET, EXTENDED RELEASE ORAL PRN
Status: DISCONTINUED | OUTPATIENT
Start: 2019-12-23 | End: 2019-12-28 | Stop reason: HOSPADM

## 2019-12-23 RX ORDER — IBUPROFEN 200 MG
400 TABLET ORAL ONCE
Status: COMPLETED | OUTPATIENT
Start: 2019-12-23 | End: 2019-12-23

## 2019-12-23 RX ORDER — DEXTROSE MONOHYDRATE 50 MG/ML
100 INJECTION, SOLUTION INTRAVENOUS PRN
Status: DISCONTINUED | OUTPATIENT
Start: 2019-12-23 | End: 2019-12-28 | Stop reason: HOSPADM

## 2019-12-23 RX ORDER — SODIUM CHLORIDE 9 MG/ML
INJECTION, SOLUTION INTRAVENOUS CONTINUOUS
Status: DISCONTINUED | OUTPATIENT
Start: 2019-12-23 | End: 2019-12-25

## 2019-12-23 RX ORDER — ACETAMINOPHEN 325 MG/1
650 TABLET ORAL ONCE
Status: COMPLETED | OUTPATIENT
Start: 2019-12-23 | End: 2019-12-23

## 2019-12-23 RX ORDER — IPRATROPIUM BROMIDE AND ALBUTEROL SULFATE 2.5; .5 MG/3ML; MG/3ML
1 SOLUTION RESPIRATORY (INHALATION) EVERY 4 HOURS PRN
Status: DISCONTINUED | OUTPATIENT
Start: 2019-12-23 | End: 2019-12-23

## 2019-12-23 RX ORDER — PANTOPRAZOLE SODIUM 40 MG/1
40 TABLET, DELAYED RELEASE ORAL
Status: DISCONTINUED | OUTPATIENT
Start: 2019-12-24 | End: 2019-12-28 | Stop reason: HOSPADM

## 2019-12-23 RX ORDER — METHYLPREDNISOLONE SODIUM SUCCINATE 125 MG/2ML
60 INJECTION, POWDER, LYOPHILIZED, FOR SOLUTION INTRAMUSCULAR; INTRAVENOUS EVERY 6 HOURS
Status: DISCONTINUED | OUTPATIENT
Start: 2019-12-23 | End: 2019-12-24

## 2019-12-23 RX ORDER — ONDANSETRON 2 MG/ML
4 INJECTION INTRAMUSCULAR; INTRAVENOUS ONCE
Status: COMPLETED | OUTPATIENT
Start: 2019-12-23 | End: 2019-12-23

## 2019-12-23 RX ORDER — BENZONATATE 100 MG/1
200 CAPSULE ORAL 3 TIMES DAILY
Status: DISCONTINUED | OUTPATIENT
Start: 2019-12-23 | End: 2019-12-28 | Stop reason: HOSPADM

## 2019-12-23 RX ORDER — NICOTINE POLACRILEX 4 MG
15 LOZENGE BUCCAL PRN
Status: DISCONTINUED | OUTPATIENT
Start: 2019-12-23 | End: 2019-12-28 | Stop reason: HOSPADM

## 2019-12-23 RX ORDER — FAMOTIDINE 20 MG/1
20 TABLET, FILM COATED ORAL 2 TIMES DAILY
Status: DISCONTINUED | OUTPATIENT
Start: 2019-12-23 | End: 2019-12-23

## 2019-12-23 RX ORDER — POTASSIUM CHLORIDE 7.45 MG/ML
10 INJECTION INTRAVENOUS PRN
Status: DISCONTINUED | OUTPATIENT
Start: 2019-12-23 | End: 2019-12-28 | Stop reason: HOSPADM

## 2019-12-23 RX ORDER — CETIRIZINE HYDROCHLORIDE 10 MG/1
10 TABLET ORAL DAILY
Status: DISCONTINUED | OUTPATIENT
Start: 2019-12-23 | End: 2019-12-28 | Stop reason: HOSPADM

## 2019-12-23 RX ORDER — ACETYLCYSTEINE 200 MG/ML
200 SOLUTION ORAL; RESPIRATORY (INHALATION) EVERY 4 HOURS
Status: DISCONTINUED | OUTPATIENT
Start: 2019-12-23 | End: 2019-12-27

## 2019-12-23 RX ORDER — GUAIFENESIN 600 MG/1
1200 TABLET, EXTENDED RELEASE ORAL 2 TIMES DAILY
Status: DISCONTINUED | OUTPATIENT
Start: 2019-12-23 | End: 2019-12-28 | Stop reason: HOSPADM

## 2019-12-23 RX ADMIN — CETIRIZINE HYDROCHLORIDE 10 MG: 10 TABLET, FILM COATED ORAL at 12:08

## 2019-12-23 RX ADMIN — METHYLPREDNISOLONE SODIUM SUCCINATE 60 MG: 125 INJECTION, POWDER, FOR SOLUTION INTRAMUSCULAR; INTRAVENOUS at 18:48

## 2019-12-23 RX ADMIN — FAMOTIDINE 20 MG: 20 TABLET, FILM COATED ORAL at 09:14

## 2019-12-23 RX ADMIN — METHYLPREDNISOLONE SODIUM SUCCINATE 60 MG: 125 INJECTION, POWDER, FOR SOLUTION INTRAMUSCULAR; INTRAVENOUS at 12:07

## 2019-12-23 RX ADMIN — BENZONATATE 200 MG: 100 CAPSULE ORAL at 15:00

## 2019-12-23 RX ADMIN — AZITHROMYCIN MONOHYDRATE 500 MG: 500 INJECTION, POWDER, LYOPHILIZED, FOR SOLUTION INTRAVENOUS at 00:33

## 2019-12-23 RX ADMIN — Medication 10 ML: at 00:06

## 2019-12-23 RX ADMIN — INSULIN LISPRO 2 UNITS: 100 INJECTION, SOLUTION INTRAVENOUS; SUBCUTANEOUS at 12:12

## 2019-12-23 RX ADMIN — GUAIFENESIN 1200 MG: 600 TABLET, EXTENDED RELEASE ORAL at 21:08

## 2019-12-23 RX ADMIN — SODIUM CHLORIDE 80 ML: 9 INJECTION, SOLUTION INTRAVENOUS at 00:06

## 2019-12-23 RX ADMIN — IPRATROPIUM BROMIDE AND ALBUTEROL SULFATE 1 AMPULE: .5; 3 SOLUTION RESPIRATORY (INHALATION) at 03:55

## 2019-12-23 RX ADMIN — IPRATROPIUM BROMIDE AND ALBUTEROL SULFATE 1 AMPULE: .5; 3 SOLUTION RESPIRATORY (INHALATION) at 07:32

## 2019-12-23 RX ADMIN — SODIUM CHLORIDE: 9 INJECTION, SOLUTION INTRAVENOUS at 03:29

## 2019-12-23 RX ADMIN — AMPICILLIN SODIUM AND SULBACTAM SODIUM 3 G: 2; 1 INJECTION, POWDER, FOR SOLUTION INTRAMUSCULAR; INTRAVENOUS at 15:00

## 2019-12-23 RX ADMIN — ENOXAPARIN SODIUM 30 MG: 30 INJECTION SUBCUTANEOUS at 09:20

## 2019-12-23 RX ADMIN — BENZONATATE 200 MG: 100 CAPSULE ORAL at 09:13

## 2019-12-23 RX ADMIN — IBUPROFEN 400 MG: 200 TABLET, FILM COATED ORAL at 01:14

## 2019-12-23 RX ADMIN — SERTRALINE HYDROCHLORIDE 50 MG: 50 TABLET ORAL at 12:08

## 2019-12-23 RX ADMIN — ONDANSETRON 4 MG: 2 INJECTION INTRAMUSCULAR; INTRAVENOUS at 00:50

## 2019-12-23 RX ADMIN — ACETAMINOPHEN 650 MG: 325 TABLET, FILM COATED ORAL at 01:14

## 2019-12-23 RX ADMIN — AMPICILLIN SODIUM AND SULBACTAM SODIUM 3 G: 2; 1 INJECTION, POWDER, FOR SOLUTION INTRAMUSCULAR; INTRAVENOUS at 09:13

## 2019-12-23 RX ADMIN — AMPICILLIN SODIUM AND SULBACTAM SODIUM 3 G: 2; 1 INJECTION, POWDER, FOR SOLUTION INTRAMUSCULAR; INTRAVENOUS at 21:09

## 2019-12-23 RX ADMIN — ACETYLCYSTEINE 200 MG: 200 SOLUTION ORAL; RESPIRATORY (INHALATION) at 11:24

## 2019-12-23 RX ADMIN — INSULIN LISPRO 4 UNITS: 100 INJECTION, SOLUTION INTRAVENOUS; SUBCUTANEOUS at 18:46

## 2019-12-23 RX ADMIN — ALBUTEROL SULFATE 2.5 MG: 2.5 SOLUTION RESPIRATORY (INHALATION) at 15:15

## 2019-12-23 RX ADMIN — INSULIN LISPRO 2 UNITS: 100 INJECTION, SOLUTION INTRAVENOUS; SUBCUTANEOUS at 09:40

## 2019-12-23 RX ADMIN — ALBUTEROL SULFATE 2.5 MG: 2.5 SOLUTION RESPIRATORY (INHALATION) at 11:25

## 2019-12-23 RX ADMIN — BENZONATATE 200 MG: 100 CAPSULE ORAL at 21:08

## 2019-12-23 RX ADMIN — IOPAMIDOL 75 ML: 755 INJECTION, SOLUTION INTRAVENOUS at 00:06

## 2019-12-23 RX ADMIN — GUAIFENESIN 1200 MG: 600 TABLET, EXTENDED RELEASE ORAL at 09:13

## 2019-12-23 RX ADMIN — ACETYLCYSTEINE 200 MG: 200 SOLUTION ORAL; RESPIRATORY (INHALATION) at 15:15

## 2019-12-23 RX ADMIN — INSULIN LISPRO 1 UNITS: 100 INJECTION, SOLUTION INTRAVENOUS; SUBCUTANEOUS at 21:54

## 2019-12-23 RX ADMIN — ACETYLCYSTEINE 200 MG: 200 SOLUTION ORAL; RESPIRATORY (INHALATION) at 20:09

## 2019-12-23 RX ADMIN — ALBUTEROL SULFATE 2.5 MG: 2.5 SOLUTION RESPIRATORY (INHALATION) at 20:10

## 2019-12-23 RX ADMIN — ENOXAPARIN SODIUM 30 MG: 30 INJECTION SUBCUTANEOUS at 21:08

## 2019-12-23 RX ADMIN — METHYLPREDNISOLONE SODIUM SUCCINATE 60 MG: 125 INJECTION, POWDER, FOR SOLUTION INTRAMUSCULAR; INTRAVENOUS at 05:45

## 2019-12-23 ASSESSMENT — ENCOUNTER SYMPTOMS
NAUSEA: 1
WHEEZING: 1
SHORTNESS OF BREATH: 1
APNEA: 0
CHEST TIGHTNESS: 0
ABDOMINAL DISTENTION: 0
ABDOMINAL PAIN: 0
DIARRHEA: 1
VOMITING: 1
SINUS PAIN: 0
COUGH: 1
EYE PAIN: 0

## 2019-12-23 ASSESSMENT — PAIN DESCRIPTION - ORIENTATION: ORIENTATION: RIGHT

## 2019-12-23 ASSESSMENT — PAIN SCALES - GENERAL
PAINLEVEL_OUTOF10: 7
PAINLEVEL_OUTOF10: 3
PAINLEVEL_OUTOF10: 0

## 2019-12-23 ASSESSMENT — PAIN DESCRIPTION - FREQUENCY: FREQUENCY: CONTINUOUS

## 2019-12-23 ASSESSMENT — PAIN DESCRIPTION - DESCRIPTORS: DESCRIPTORS: SHARP

## 2019-12-23 ASSESSMENT — PAIN DESCRIPTION - PAIN TYPE: TYPE: ACUTE PAIN

## 2019-12-23 ASSESSMENT — PAIN DESCRIPTION - ONSET: ONSET: ON-GOING

## 2019-12-23 ASSESSMENT — PAIN DESCRIPTION - PROGRESSION: CLINICAL_PROGRESSION: GRADUALLY IMPROVING

## 2019-12-24 ENCOUNTER — APPOINTMENT (OUTPATIENT)
Dept: GENERAL RADIOLOGY | Age: 64
DRG: 720 | End: 2019-12-24
Payer: COMMERCIAL

## 2019-12-24 PROBLEM — J13 STREPTOCOCCUS PNEUMONIAE PNEUMONIA (HCC): Status: ACTIVE | Noted: 2019-12-24

## 2019-12-24 LAB
ANION GAP SERPL CALCULATED.3IONS-SCNC: 11 MMOL/L (ref 9–17)
BUN BLDV-MCNC: 31 MG/DL (ref 8–23)
BUN/CREAT BLD: ABNORMAL (ref 9–20)
CALCIUM SERPL-MCNC: 9.3 MG/DL (ref 8.6–10.4)
CHLORIDE BLD-SCNC: 102 MMOL/L (ref 98–107)
CO2: 22 MMOL/L (ref 20–31)
CORTISOL COLLECTION INFO: NORMAL
CORTISOL: 13.6 UG/DL (ref 2.7–18.4)
CREAT SERPL-MCNC: 0.59 MG/DL (ref 0.5–0.9)
GFR AFRICAN AMERICAN: >60 ML/MIN
GFR NON-AFRICAN AMERICAN: >60 ML/MIN
GFR SERPL CREATININE-BSD FRML MDRD: ABNORMAL ML/MIN/{1.73_M2}
GFR SERPL CREATININE-BSD FRML MDRD: ABNORMAL ML/MIN/{1.73_M2}
GLUCOSE BLD-MCNC: 163 MG/DL (ref 65–105)
GLUCOSE BLD-MCNC: 199 MG/DL (ref 65–105)
GLUCOSE BLD-MCNC: 220 MG/DL (ref 70–99)
GLUCOSE BLD-MCNC: 232 MG/DL (ref 65–105)
HCT VFR BLD CALC: 33.4 % (ref 36–46)
HEMOGLOBIN: 11.1 G/DL (ref 12–16)
MAGNESIUM: 2.2 MG/DL (ref 1.6–2.6)
MCH RBC QN AUTO: 31.8 PG (ref 26–34)
MCHC RBC AUTO-ENTMCNC: 33.3 G/DL (ref 31–37)
MCV RBC AUTO: 95.5 FL (ref 80–100)
MRSA, DNA, NASAL: NORMAL
NRBC AUTOMATED: ABNORMAL PER 100 WBC
PDW BLD-RTO: 15.3 % (ref 11.5–14.9)
PLATELET # BLD: 287 K/UL (ref 150–450)
PMV BLD AUTO: 8 FL (ref 6–12)
POTASSIUM SERPL-SCNC: 3.2 MMOL/L (ref 3.7–5.3)
RBC # BLD: 3.5 M/UL (ref 4–5.2)
SODIUM BLD-SCNC: 135 MMOL/L (ref 135–144)
SPECIMEN DESCRIPTION: NORMAL
WBC # BLD: 22.9 K/UL (ref 3.5–11)

## 2019-12-24 PROCEDURE — 82947 ASSAY GLUCOSE BLOOD QUANT: CPT

## 2019-12-24 PROCEDURE — 2580000003 HC RX 258: Performed by: STUDENT IN AN ORGANIZED HEALTH CARE EDUCATION/TRAINING PROGRAM

## 2019-12-24 PROCEDURE — 82533 TOTAL CORTISOL: CPT

## 2019-12-24 PROCEDURE — 85027 COMPLETE CBC AUTOMATED: CPT

## 2019-12-24 PROCEDURE — 80048 BASIC METABOLIC PNL TOTAL CA: CPT

## 2019-12-24 PROCEDURE — 6360000002 HC RX W HCPCS: Performed by: INTERNAL MEDICINE

## 2019-12-24 PROCEDURE — 94640 AIRWAY INHALATION TREATMENT: CPT

## 2019-12-24 PROCEDURE — 94761 N-INVAS EAR/PLS OXIMETRY MLT: CPT

## 2019-12-24 PROCEDURE — 36415 COLL VENOUS BLD VENIPUNCTURE: CPT

## 2019-12-24 PROCEDURE — 6360000002 HC RX W HCPCS: Performed by: STUDENT IN AN ORGANIZED HEALTH CARE EDUCATION/TRAINING PROGRAM

## 2019-12-24 PROCEDURE — 2700000000 HC OXYGEN THERAPY PER DAY

## 2019-12-24 PROCEDURE — 83735 ASSAY OF MAGNESIUM: CPT

## 2019-12-24 PROCEDURE — 6370000000 HC RX 637 (ALT 250 FOR IP): Performed by: INTERNAL MEDICINE

## 2019-12-24 PROCEDURE — 94667 MNPJ CHEST WALL 1ST: CPT

## 2019-12-24 PROCEDURE — 6370000000 HC RX 637 (ALT 250 FOR IP): Performed by: STUDENT IN AN ORGANIZED HEALTH CARE EDUCATION/TRAINING PROGRAM

## 2019-12-24 PROCEDURE — 71045 X-RAY EXAM CHEST 1 VIEW: CPT

## 2019-12-24 PROCEDURE — 2580000003 HC RX 258: Performed by: INTERNAL MEDICINE

## 2019-12-24 PROCEDURE — 2060000000 HC ICU INTERMEDIATE R&B

## 2019-12-24 PROCEDURE — 94668 MNPJ CHEST WALL SBSQ: CPT

## 2019-12-24 PROCEDURE — 87040 BLOOD CULTURE FOR BACTERIA: CPT

## 2019-12-24 PROCEDURE — 94660 CPAP INITIATION&MGMT: CPT

## 2019-12-24 PROCEDURE — 99233 SBSQ HOSP IP/OBS HIGH 50: CPT | Performed by: INTERNAL MEDICINE

## 2019-12-24 RX ORDER — LEVALBUTEROL 1.25 MG/.5ML
1.25 SOLUTION, CONCENTRATE RESPIRATORY (INHALATION) EVERY 4 HOURS
Status: DISCONTINUED | OUTPATIENT
Start: 2019-12-24 | End: 2019-12-27

## 2019-12-24 RX ORDER — METHYLPREDNISOLONE SODIUM SUCCINATE 40 MG/ML
40 INJECTION, POWDER, LYOPHILIZED, FOR SOLUTION INTRAMUSCULAR; INTRAVENOUS EVERY 6 HOURS
Status: DISCONTINUED | OUTPATIENT
Start: 2019-12-24 | End: 2019-12-25

## 2019-12-24 RX ORDER — SODIUM CHLORIDE FOR INHALATION 0.9 %
3 VIAL, NEBULIZER (ML) INHALATION EVERY 8 HOURS PRN
Status: DISCONTINUED | OUTPATIENT
Start: 2019-12-24 | End: 2019-12-28 | Stop reason: HOSPADM

## 2019-12-24 RX ADMIN — INSULIN LISPRO 4 UNITS: 100 INJECTION, SOLUTION INTRAVENOUS; SUBCUTANEOUS at 09:08

## 2019-12-24 RX ADMIN — CETIRIZINE HYDROCHLORIDE 10 MG: 10 TABLET, FILM COATED ORAL at 09:10

## 2019-12-24 RX ADMIN — AMPICILLIN SODIUM AND SULBACTAM SODIUM 3 G: 2; 1 INJECTION, POWDER, FOR SOLUTION INTRAMUSCULAR; INTRAVENOUS at 03:39

## 2019-12-24 RX ADMIN — GUAIFENESIN 1200 MG: 600 TABLET, EXTENDED RELEASE ORAL at 09:10

## 2019-12-24 RX ADMIN — ENOXAPARIN SODIUM 30 MG: 30 INJECTION SUBCUTANEOUS at 20:21

## 2019-12-24 RX ADMIN — METHYLPREDNISOLONE SODIUM SUCCINATE 60 MG: 125 INJECTION, POWDER, FOR SOLUTION INTRAMUSCULAR; INTRAVENOUS at 06:18

## 2019-12-24 RX ADMIN — METHYLPREDNISOLONE SODIUM SUCCINATE 60 MG: 125 INJECTION, POWDER, FOR SOLUTION INTRAMUSCULAR; INTRAVENOUS at 00:38

## 2019-12-24 RX ADMIN — ACETYLCYSTEINE 200 MG: 200 SOLUTION ORAL; RESPIRATORY (INHALATION) at 07:22

## 2019-12-24 RX ADMIN — AMPICILLIN SODIUM AND SULBACTAM SODIUM 3 G: 2; 1 INJECTION, POWDER, FOR SOLUTION INTRAMUSCULAR; INTRAVENOUS at 20:20

## 2019-12-24 RX ADMIN — Medication 1500 MG: at 21:09

## 2019-12-24 RX ADMIN — ALBUTEROL SULFATE 2.5 MG: 2.5 SOLUTION RESPIRATORY (INHALATION) at 00:47

## 2019-12-24 RX ADMIN — ACETYLCYSTEINE 200 MG: 200 SOLUTION ORAL; RESPIRATORY (INHALATION) at 23:56

## 2019-12-24 RX ADMIN — LEVALBUTEROL 1.25 MG: 1.25 SOLUTION, CONCENTRATE RESPIRATORY (INHALATION) at 11:04

## 2019-12-24 RX ADMIN — METHYLPREDNISOLONE SODIUM SUCCINATE 40 MG: 40 INJECTION, POWDER, FOR SOLUTION INTRAMUSCULAR; INTRAVENOUS at 17:17

## 2019-12-24 RX ADMIN — INSULIN LISPRO 2 UNITS: 100 INJECTION, SOLUTION INTRAVENOUS; SUBCUTANEOUS at 12:46

## 2019-12-24 RX ADMIN — BENZONATATE 200 MG: 100 CAPSULE ORAL at 14:09

## 2019-12-24 RX ADMIN — LEVALBUTEROL 1.25 MG: 1.25 SOLUTION, CONCENTRATE RESPIRATORY (INHALATION) at 20:23

## 2019-12-24 RX ADMIN — ACETYLCYSTEINE 200 MG: 200 SOLUTION ORAL; RESPIRATORY (INHALATION) at 00:47

## 2019-12-24 RX ADMIN — PANTOPRAZOLE SODIUM 40 MG: 40 TABLET, DELAYED RELEASE ORAL at 09:10

## 2019-12-24 RX ADMIN — POTASSIUM BICARBONATE 40 MEQ: 782 TABLET, EFFERVESCENT ORAL at 06:18

## 2019-12-24 RX ADMIN — ACETYLCYSTEINE 200 MG: 200 SOLUTION ORAL; RESPIRATORY (INHALATION) at 20:23

## 2019-12-24 RX ADMIN — ACETYLCYSTEINE 200 MG: 200 SOLUTION ORAL; RESPIRATORY (INHALATION) at 11:04

## 2019-12-24 RX ADMIN — INSULIN LISPRO 2 UNITS: 100 INJECTION, SOLUTION INTRAVENOUS; SUBCUTANEOUS at 17:17

## 2019-12-24 RX ADMIN — GUAIFENESIN 1200 MG: 600 TABLET, EXTENDED RELEASE ORAL at 20:21

## 2019-12-24 RX ADMIN — Medication 1500 MG: at 10:11

## 2019-12-24 RX ADMIN — AZITHROMYCIN MONOHYDRATE 500 MG: 500 INJECTION, POWDER, LYOPHILIZED, FOR SOLUTION INTRAVENOUS at 00:36

## 2019-12-24 RX ADMIN — LEVALBUTEROL 1.25 MG: 1.25 SOLUTION, CONCENTRATE RESPIRATORY (INHALATION) at 23:56

## 2019-12-24 RX ADMIN — ENOXAPARIN SODIUM 30 MG: 30 INJECTION SUBCUTANEOUS at 09:09

## 2019-12-24 RX ADMIN — INSULIN LISPRO 1 UNITS: 100 INJECTION, SOLUTION INTRAVENOUS; SUBCUTANEOUS at 21:07

## 2019-12-24 RX ADMIN — ACETYLCYSTEINE 200 MG: 200 SOLUTION ORAL; RESPIRATORY (INHALATION) at 14:57

## 2019-12-24 RX ADMIN — METHYLPREDNISOLONE SODIUM SUCCINATE 40 MG: 40 INJECTION, POWDER, FOR SOLUTION INTRAMUSCULAR; INTRAVENOUS at 12:46

## 2019-12-24 RX ADMIN — AMPICILLIN SODIUM AND SULBACTAM SODIUM 3 G: 2; 1 INJECTION, POWDER, FOR SOLUTION INTRAMUSCULAR; INTRAVENOUS at 09:09

## 2019-12-24 RX ADMIN — SODIUM CHLORIDE 100 ML/HR: 9 INJECTION, SOLUTION INTRAVENOUS at 16:43

## 2019-12-24 RX ADMIN — BENZONATATE 200 MG: 100 CAPSULE ORAL at 09:10

## 2019-12-24 RX ADMIN — BENZONATATE 200 MG: 100 CAPSULE ORAL at 20:21

## 2019-12-24 RX ADMIN — SERTRALINE HYDROCHLORIDE 50 MG: 50 TABLET ORAL at 09:10

## 2019-12-24 RX ADMIN — ALBUTEROL SULFATE 2.5 MG: 2.5 SOLUTION RESPIRATORY (INHALATION) at 07:22

## 2019-12-24 RX ADMIN — ACETYLCYSTEINE 200 MG: 200 SOLUTION ORAL; RESPIRATORY (INHALATION) at 03:56

## 2019-12-24 RX ADMIN — AMPICILLIN SODIUM AND SULBACTAM SODIUM 3 G: 2; 1 INJECTION, POWDER, FOR SOLUTION INTRAMUSCULAR; INTRAVENOUS at 14:16

## 2019-12-24 RX ADMIN — ALBUTEROL SULFATE 2.5 MG: 2.5 SOLUTION RESPIRATORY (INHALATION) at 03:56

## 2019-12-24 RX ADMIN — SODIUM CHLORIDE: 9 INJECTION, SOLUTION INTRAVENOUS at 03:40

## 2019-12-24 RX ADMIN — LEVALBUTEROL 1.25 MG: 1.25 SOLUTION, CONCENTRATE RESPIRATORY (INHALATION) at 14:57

## 2019-12-24 ASSESSMENT — PAIN SCALES - GENERAL
PAINLEVEL_OUTOF10: 0

## 2019-12-25 LAB
ANION GAP SERPL CALCULATED.3IONS-SCNC: 10 MMOL/L (ref 9–17)
BUN BLDV-MCNC: 27 MG/DL (ref 8–23)
BUN/CREAT BLD: ABNORMAL (ref 9–20)
CALCIUM SERPL-MCNC: 9.2 MG/DL (ref 8.6–10.4)
CHLORIDE BLD-SCNC: 105 MMOL/L (ref 98–107)
CO2: 22 MMOL/L (ref 20–31)
CREAT SERPL-MCNC: 0.54 MG/DL (ref 0.5–0.9)
CULTURE: ABNORMAL
EKG ATRIAL RATE: 94 BPM
EKG P AXIS: 75 DEGREES
EKG P-R INTERVAL: 136 MS
EKG Q-T INTERVAL: 362 MS
EKG QRS DURATION: 86 MS
EKG QTC CALCULATION (BAZETT): 452 MS
EKG R AXIS: -27 DEGREES
EKG T AXIS: 45 DEGREES
EKG VENTRICULAR RATE: 94 BPM
GFR AFRICAN AMERICAN: >60 ML/MIN
GFR NON-AFRICAN AMERICAN: >60 ML/MIN
GFR SERPL CREATININE-BSD FRML MDRD: ABNORMAL ML/MIN/{1.73_M2}
GFR SERPL CREATININE-BSD FRML MDRD: ABNORMAL ML/MIN/{1.73_M2}
GLUCOSE BLD-MCNC: 148 MG/DL (ref 65–105)
GLUCOSE BLD-MCNC: 160 MG/DL (ref 65–105)
GLUCOSE BLD-MCNC: 163 MG/DL (ref 65–105)
GLUCOSE BLD-MCNC: 167 MG/DL (ref 65–105)
GLUCOSE BLD-MCNC: 173 MG/DL (ref 65–105)
GLUCOSE BLD-MCNC: 197 MG/DL (ref 70–99)
HCT VFR BLD CALC: 33.6 % (ref 36–46)
HEMOGLOBIN: 11 G/DL (ref 12–16)
Lab: ABNORMAL
Lab: ABNORMAL
MCH RBC QN AUTO: 31.4 PG (ref 26–34)
MCHC RBC AUTO-ENTMCNC: 32.8 G/DL (ref 31–37)
MCV RBC AUTO: 95.9 FL (ref 80–100)
NRBC AUTOMATED: ABNORMAL PER 100 WBC
PDW BLD-RTO: 15.8 % (ref 11.5–14.9)
PLATELET # BLD: 344 K/UL (ref 150–450)
PMV BLD AUTO: 7.8 FL (ref 6–12)
POTASSIUM SERPL-SCNC: 3.7 MMOL/L (ref 3.7–5.3)
RBC # BLD: 3.5 M/UL (ref 4–5.2)
SODIUM BLD-SCNC: 137 MMOL/L (ref 135–144)
SPECIMEN DESCRIPTION: ABNORMAL
SPECIMEN DESCRIPTION: ABNORMAL
VANCOMYCIN TROUGH DATE LAST DOSE: NORMAL
VANCOMYCIN TROUGH DOSE AMOUNT: NORMAL
VANCOMYCIN TROUGH TIME LAST DOSE: 926
VANCOMYCIN TROUGH: 12.3 UG/ML (ref 10–20)
WBC # BLD: 13.8 K/UL (ref 3.5–11)

## 2019-12-25 PROCEDURE — 94669 MECHANICAL CHEST WALL OSCILL: CPT

## 2019-12-25 PROCEDURE — 2700000000 HC OXYGEN THERAPY PER DAY

## 2019-12-25 PROCEDURE — 85027 COMPLETE CBC AUTOMATED: CPT

## 2019-12-25 PROCEDURE — 99233 SBSQ HOSP IP/OBS HIGH 50: CPT | Performed by: INTERNAL MEDICINE

## 2019-12-25 PROCEDURE — 6370000000 HC RX 637 (ALT 250 FOR IP): Performed by: INTERNAL MEDICINE

## 2019-12-25 PROCEDURE — 93010 ELECTROCARDIOGRAM REPORT: CPT | Performed by: INTERNAL MEDICINE

## 2019-12-25 PROCEDURE — 6370000000 HC RX 637 (ALT 250 FOR IP): Performed by: STUDENT IN AN ORGANIZED HEALTH CARE EDUCATION/TRAINING PROGRAM

## 2019-12-25 PROCEDURE — 2580000003 HC RX 258: Performed by: STUDENT IN AN ORGANIZED HEALTH CARE EDUCATION/TRAINING PROGRAM

## 2019-12-25 PROCEDURE — 80202 ASSAY OF VANCOMYCIN: CPT

## 2019-12-25 PROCEDURE — 2060000000 HC ICU INTERMEDIATE R&B

## 2019-12-25 PROCEDURE — 36415 COLL VENOUS BLD VENIPUNCTURE: CPT

## 2019-12-25 PROCEDURE — 6360000002 HC RX W HCPCS: Performed by: STUDENT IN AN ORGANIZED HEALTH CARE EDUCATION/TRAINING PROGRAM

## 2019-12-25 PROCEDURE — 94761 N-INVAS EAR/PLS OXIMETRY MLT: CPT

## 2019-12-25 PROCEDURE — 6360000002 HC RX W HCPCS: Performed by: INTERNAL MEDICINE

## 2019-12-25 PROCEDURE — 94640 AIRWAY INHALATION TREATMENT: CPT

## 2019-12-25 PROCEDURE — 2580000003 HC RX 258: Performed by: INTERNAL MEDICINE

## 2019-12-25 PROCEDURE — 80048 BASIC METABOLIC PNL TOTAL CA: CPT

## 2019-12-25 RX ORDER — DEXTROMETHORPHAN POLISTIREX 30 MG/5ML
30 SUSPENSION ORAL 2 TIMES DAILY
Status: DISCONTINUED | OUTPATIENT
Start: 2019-12-25 | End: 2019-12-28 | Stop reason: HOSPADM

## 2019-12-25 RX ORDER — LACTOBACILLUS RHAMNOSUS GG 10B CELL
1 CAPSULE ORAL
Status: DISCONTINUED | OUTPATIENT
Start: 2019-12-25 | End: 2019-12-28 | Stop reason: HOSPADM

## 2019-12-25 RX ORDER — METHYLPREDNISOLONE SODIUM SUCCINATE 40 MG/ML
40 INJECTION, POWDER, LYOPHILIZED, FOR SOLUTION INTRAMUSCULAR; INTRAVENOUS EVERY 8 HOURS
Status: DISCONTINUED | OUTPATIENT
Start: 2019-12-25 | End: 2019-12-26

## 2019-12-25 RX ADMIN — Medication 10 ML: at 20:45

## 2019-12-25 RX ADMIN — Medication 1500 MG: at 09:26

## 2019-12-25 RX ADMIN — INSULIN LISPRO 1 UNITS: 100 INJECTION, SOLUTION INTRAVENOUS; SUBCUTANEOUS at 21:04

## 2019-12-25 RX ADMIN — ACETYLCYSTEINE 200 MG: 200 SOLUTION ORAL; RESPIRATORY (INHALATION) at 08:56

## 2019-12-25 RX ADMIN — METHYLPREDNISOLONE SODIUM SUCCINATE 40 MG: 40 INJECTION, POWDER, FOR SOLUTION INTRAMUSCULAR; INTRAVENOUS at 20:44

## 2019-12-25 RX ADMIN — BENZONATATE 200 MG: 100 CAPSULE ORAL at 14:17

## 2019-12-25 RX ADMIN — ACETYLCYSTEINE 200 MG: 200 SOLUTION ORAL; RESPIRATORY (INHALATION) at 12:00

## 2019-12-25 RX ADMIN — INSULIN LISPRO 2 UNITS: 100 INJECTION, SOLUTION INTRAVENOUS; SUBCUTANEOUS at 11:57

## 2019-12-25 RX ADMIN — ENOXAPARIN SODIUM 30 MG: 30 INJECTION SUBCUTANEOUS at 07:41

## 2019-12-25 RX ADMIN — ENOXAPARIN SODIUM 30 MG: 30 INJECTION SUBCUTANEOUS at 20:44

## 2019-12-25 RX ADMIN — ACETYLCYSTEINE 200 MG: 200 SOLUTION ORAL; RESPIRATORY (INHALATION) at 20:07

## 2019-12-25 RX ADMIN — Medication 10 ML: at 08:08

## 2019-12-25 RX ADMIN — METHYLPREDNISOLONE SODIUM SUCCINATE 40 MG: 40 INJECTION, POWDER, FOR SOLUTION INTRAMUSCULAR; INTRAVENOUS at 05:31

## 2019-12-25 RX ADMIN — ACETYLCYSTEINE 200 MG: 200 SOLUTION ORAL; RESPIRATORY (INHALATION) at 16:39

## 2019-12-25 RX ADMIN — SODIUM CHLORIDE: 9 INJECTION, SOLUTION INTRAVENOUS at 03:44

## 2019-12-25 RX ADMIN — AMPICILLIN SODIUM AND SULBACTAM SODIUM 3 G: 2; 1 INJECTION, POWDER, FOR SOLUTION INTRAMUSCULAR; INTRAVENOUS at 20:43

## 2019-12-25 RX ADMIN — LEVALBUTEROL 1.25 MG: 1.25 SOLUTION, CONCENTRATE RESPIRATORY (INHALATION) at 12:00

## 2019-12-25 RX ADMIN — LEVALBUTEROL 1.25 MG: 1.25 SOLUTION, CONCENTRATE RESPIRATORY (INHALATION) at 20:07

## 2019-12-25 RX ADMIN — DEXTROMETHORPHAN POLISTIREX 30 MG: 30 SUSPENSION ORAL at 10:44

## 2019-12-25 RX ADMIN — LEVALBUTEROL 1.25 MG: 1.25 SOLUTION, CONCENTRATE RESPIRATORY (INHALATION) at 08:55

## 2019-12-25 RX ADMIN — INSULIN LISPRO 2 UNITS: 100 INJECTION, SOLUTION INTRAVENOUS; SUBCUTANEOUS at 07:55

## 2019-12-25 RX ADMIN — BENZONATATE 200 MG: 100 CAPSULE ORAL at 07:42

## 2019-12-25 RX ADMIN — DEXTROMETHORPHAN POLISTIREX 30 MG: 30 SUSPENSION ORAL at 21:26

## 2019-12-25 RX ADMIN — AMPICILLIN SODIUM AND SULBACTAM SODIUM 3 G: 2; 1 INJECTION, POWDER, FOR SOLUTION INTRAMUSCULAR; INTRAVENOUS at 02:58

## 2019-12-25 RX ADMIN — Medication 1 CAPSULE: at 17:01

## 2019-12-25 RX ADMIN — GUAIFENESIN 1200 MG: 600 TABLET, EXTENDED RELEASE ORAL at 07:42

## 2019-12-25 RX ADMIN — BENZONATATE 200 MG: 100 CAPSULE ORAL at 20:44

## 2019-12-25 RX ADMIN — METHYLPREDNISOLONE SODIUM SUCCINATE 40 MG: 40 INJECTION, POWDER, FOR SOLUTION INTRAMUSCULAR; INTRAVENOUS at 14:17

## 2019-12-25 RX ADMIN — SERTRALINE HYDROCHLORIDE 50 MG: 50 TABLET ORAL at 07:42

## 2019-12-25 RX ADMIN — GUAIFENESIN 1200 MG: 600 TABLET, EXTENDED RELEASE ORAL at 20:44

## 2019-12-25 RX ADMIN — Medication 1500 MG: at 22:31

## 2019-12-25 RX ADMIN — LEVALBUTEROL 1.25 MG: 1.25 SOLUTION, CONCENTRATE RESPIRATORY (INHALATION) at 16:38

## 2019-12-25 RX ADMIN — AMPICILLIN SODIUM AND SULBACTAM SODIUM 3 G: 2; 1 INJECTION, POWDER, FOR SOLUTION INTRAMUSCULAR; INTRAVENOUS at 07:55

## 2019-12-25 RX ADMIN — METHYLPREDNISOLONE SODIUM SUCCINATE 40 MG: 40 INJECTION, POWDER, FOR SOLUTION INTRAMUSCULAR; INTRAVENOUS at 00:09

## 2019-12-25 RX ADMIN — AMPICILLIN SODIUM AND SULBACTAM SODIUM 3 G: 2; 1 INJECTION, POWDER, FOR SOLUTION INTRAMUSCULAR; INTRAVENOUS at 15:03

## 2019-12-25 RX ADMIN — CETIRIZINE HYDROCHLORIDE 10 MG: 10 TABLET, FILM COATED ORAL at 07:42

## 2019-12-25 RX ADMIN — PANTOPRAZOLE SODIUM 40 MG: 40 TABLET, DELAYED RELEASE ORAL at 05:37

## 2019-12-25 ASSESSMENT — PAIN SCALES - GENERAL: PAINLEVEL_OUTOF10: 0

## 2019-12-26 ENCOUNTER — APPOINTMENT (OUTPATIENT)
Dept: GENERAL RADIOLOGY | Age: 64
DRG: 720 | End: 2019-12-26
Payer: COMMERCIAL

## 2019-12-26 LAB
ANION GAP SERPL CALCULATED.3IONS-SCNC: 8 MMOL/L (ref 9–17)
BUN BLDV-MCNC: 21 MG/DL (ref 8–23)
BUN/CREAT BLD: ABNORMAL (ref 9–20)
CALCIUM SERPL-MCNC: 9 MG/DL (ref 8.6–10.4)
CHLORIDE BLD-SCNC: 104 MMOL/L (ref 98–107)
CO2: 26 MMOL/L (ref 20–31)
CREAT SERPL-MCNC: 0.49 MG/DL (ref 0.5–0.9)
GFR AFRICAN AMERICAN: >60 ML/MIN
GFR NON-AFRICAN AMERICAN: >60 ML/MIN
GFR SERPL CREATININE-BSD FRML MDRD: ABNORMAL ML/MIN/{1.73_M2}
GFR SERPL CREATININE-BSD FRML MDRD: ABNORMAL ML/MIN/{1.73_M2}
GLUCOSE BLD-MCNC: 142 MG/DL (ref 65–105)
GLUCOSE BLD-MCNC: 150 MG/DL (ref 65–105)
GLUCOSE BLD-MCNC: 175 MG/DL (ref 65–105)
GLUCOSE BLD-MCNC: 189 MG/DL (ref 70–99)
GLUCOSE BLD-MCNC: 192 MG/DL (ref 65–105)
HCT VFR BLD CALC: 34 % (ref 36–46)
HEMOGLOBIN: 11.2 G/DL (ref 12–16)
MCH RBC QN AUTO: 31.4 PG (ref 26–34)
MCHC RBC AUTO-ENTMCNC: 32.9 G/DL (ref 31–37)
MCV RBC AUTO: 95.5 FL (ref 80–100)
MYCOPLASMA PNEUMONIAE IGM: 0.07
NRBC AUTOMATED: ABNORMAL PER 100 WBC
PDW BLD-RTO: 15.3 % (ref 11.5–14.9)
PLATELET # BLD: 378 K/UL (ref 150–450)
PMV BLD AUTO: 7.8 FL (ref 6–12)
POTASSIUM SERPL-SCNC: 4.1 MMOL/L (ref 3.7–5.3)
RBC # BLD: 3.56 M/UL (ref 4–5.2)
SODIUM BLD-SCNC: 138 MMOL/L (ref 135–144)
WBC # BLD: 9.7 K/UL (ref 3.5–11)

## 2019-12-26 PROCEDURE — 2580000003 HC RX 258: Performed by: INTERNAL MEDICINE

## 2019-12-26 PROCEDURE — 6370000000 HC RX 637 (ALT 250 FOR IP): Performed by: INTERNAL MEDICINE

## 2019-12-26 PROCEDURE — 94640 AIRWAY INHALATION TREATMENT: CPT

## 2019-12-26 PROCEDURE — 6370000000 HC RX 637 (ALT 250 FOR IP): Performed by: STUDENT IN AN ORGANIZED HEALTH CARE EDUCATION/TRAINING PROGRAM

## 2019-12-26 PROCEDURE — 82947 ASSAY GLUCOSE BLOOD QUANT: CPT

## 2019-12-26 PROCEDURE — 80048 BASIC METABOLIC PNL TOTAL CA: CPT

## 2019-12-26 PROCEDURE — 6360000002 HC RX W HCPCS: Performed by: INTERNAL MEDICINE

## 2019-12-26 PROCEDURE — 2060000000 HC ICU INTERMEDIATE R&B

## 2019-12-26 PROCEDURE — 97110 THERAPEUTIC EXERCISES: CPT

## 2019-12-26 PROCEDURE — 97116 GAIT TRAINING THERAPY: CPT

## 2019-12-26 PROCEDURE — 99232 SBSQ HOSP IP/OBS MODERATE 35: CPT | Performed by: INTERNAL MEDICINE

## 2019-12-26 PROCEDURE — 6360000002 HC RX W HCPCS: Performed by: STUDENT IN AN ORGANIZED HEALTH CARE EDUCATION/TRAINING PROGRAM

## 2019-12-26 PROCEDURE — 71045 X-RAY EXAM CHEST 1 VIEW: CPT

## 2019-12-26 PROCEDURE — 94761 N-INVAS EAR/PLS OXIMETRY MLT: CPT

## 2019-12-26 PROCEDURE — 36415 COLL VENOUS BLD VENIPUNCTURE: CPT

## 2019-12-26 PROCEDURE — 85027 COMPLETE CBC AUTOMATED: CPT

## 2019-12-26 PROCEDURE — 2700000000 HC OXYGEN THERAPY PER DAY

## 2019-12-26 PROCEDURE — 94660 CPAP INITIATION&MGMT: CPT

## 2019-12-26 RX ORDER — METHYLPREDNISOLONE SODIUM SUCCINATE 40 MG/ML
40 INJECTION, POWDER, LYOPHILIZED, FOR SOLUTION INTRAMUSCULAR; INTRAVENOUS EVERY 12 HOURS
Status: DISCONTINUED | OUTPATIENT
Start: 2019-12-26 | End: 2019-12-28 | Stop reason: HOSPADM

## 2019-12-26 RX ORDER — AMLODIPINE BESYLATE 10 MG/1
10 TABLET ORAL DAILY
Status: DISCONTINUED | OUTPATIENT
Start: 2019-12-27 | End: 2019-12-28 | Stop reason: HOSPADM

## 2019-12-26 RX ADMIN — LEVALBUTEROL 1.25 MG: 1.25 SOLUTION, CONCENTRATE RESPIRATORY (INHALATION) at 04:31

## 2019-12-26 RX ADMIN — LEVALBUTEROL 1.25 MG: 1.25 SOLUTION, CONCENTRATE RESPIRATORY (INHALATION) at 22:37

## 2019-12-26 RX ADMIN — ENOXAPARIN SODIUM 30 MG: 30 INJECTION SUBCUTANEOUS at 08:15

## 2019-12-26 RX ADMIN — ACETYLCYSTEINE 200 MG: 200 SOLUTION ORAL; RESPIRATORY (INHALATION) at 11:54

## 2019-12-26 RX ADMIN — DEXTROMETHORPHAN POLISTIREX 30 MG: 30 SUSPENSION ORAL at 20:43

## 2019-12-26 RX ADMIN — METHYLPREDNISOLONE SODIUM SUCCINATE 40 MG: 40 INJECTION, POWDER, FOR SOLUTION INTRAMUSCULAR; INTRAVENOUS at 05:33

## 2019-12-26 RX ADMIN — INSULIN LISPRO 2 UNITS: 100 INJECTION, SOLUTION INTRAVENOUS; SUBCUTANEOUS at 12:30

## 2019-12-26 RX ADMIN — INSULIN LISPRO 1 UNITS: 100 INJECTION, SOLUTION INTRAVENOUS; SUBCUTANEOUS at 22:06

## 2019-12-26 RX ADMIN — GUAIFENESIN 1200 MG: 600 TABLET, EXTENDED RELEASE ORAL at 20:42

## 2019-12-26 RX ADMIN — DEXTROMETHORPHAN POLISTIREX 30 MG: 30 SUSPENSION ORAL at 08:17

## 2019-12-26 RX ADMIN — CETIRIZINE HYDROCHLORIDE 10 MG: 10 TABLET, FILM COATED ORAL at 08:15

## 2019-12-26 RX ADMIN — AMPICILLIN SODIUM AND SULBACTAM SODIUM 3 G: 2; 1 INJECTION, POWDER, FOR SOLUTION INTRAMUSCULAR; INTRAVENOUS at 02:35

## 2019-12-26 RX ADMIN — ACETYLCYSTEINE 200 MG: 200 SOLUTION ORAL; RESPIRATORY (INHALATION) at 08:31

## 2019-12-26 RX ADMIN — AMPICILLIN SODIUM AND SULBACTAM SODIUM 3 G: 2; 1 INJECTION, POWDER, FOR SOLUTION INTRAMUSCULAR; INTRAVENOUS at 08:40

## 2019-12-26 RX ADMIN — Medication 1 CAPSULE: at 17:30

## 2019-12-26 RX ADMIN — INSULIN LISPRO 2 UNITS: 100 INJECTION, SOLUTION INTRAVENOUS; SUBCUTANEOUS at 08:30

## 2019-12-26 RX ADMIN — AMLODIPINE BESYLATE 5 MG: 5 TABLET ORAL at 08:13

## 2019-12-26 RX ADMIN — Medication 1 CAPSULE: at 08:14

## 2019-12-26 RX ADMIN — BENZONATATE 200 MG: 100 CAPSULE ORAL at 20:41

## 2019-12-26 RX ADMIN — METHYLPREDNISOLONE SODIUM SUCCINATE 40 MG: 40 INJECTION, POWDER, FOR SOLUTION INTRAMUSCULAR; INTRAVENOUS at 17:30

## 2019-12-26 RX ADMIN — AMPICILLIN SODIUM AND SULBACTAM SODIUM 3 G: 2; 1 INJECTION, POWDER, FOR SOLUTION INTRAMUSCULAR; INTRAVENOUS at 14:52

## 2019-12-26 RX ADMIN — LEVALBUTEROL 1.25 MG: 1.25 SOLUTION, CONCENTRATE RESPIRATORY (INHALATION) at 08:31

## 2019-12-26 RX ADMIN — GUAIFENESIN 1200 MG: 600 TABLET, EXTENDED RELEASE ORAL at 08:12

## 2019-12-26 RX ADMIN — PANTOPRAZOLE SODIUM 40 MG: 40 TABLET, DELAYED RELEASE ORAL at 05:33

## 2019-12-26 RX ADMIN — ACETYLCYSTEINE 200 MG: 200 SOLUTION ORAL; RESPIRATORY (INHALATION) at 22:38

## 2019-12-26 RX ADMIN — BENZONATATE 200 MG: 100 CAPSULE ORAL at 08:12

## 2019-12-26 RX ADMIN — AMPICILLIN SODIUM AND SULBACTAM SODIUM 3 G: 2; 1 INJECTION, POWDER, FOR SOLUTION INTRAMUSCULAR; INTRAVENOUS at 21:13

## 2019-12-26 RX ADMIN — SERTRALINE HYDROCHLORIDE 50 MG: 50 TABLET ORAL at 08:12

## 2019-12-26 RX ADMIN — LEVALBUTEROL 1.25 MG: 1.25 SOLUTION, CONCENTRATE RESPIRATORY (INHALATION) at 11:54

## 2019-12-26 RX ADMIN — Medication 1 CAPSULE: at 12:31

## 2019-12-26 RX ADMIN — ACETYLCYSTEINE 200 MG: 200 SOLUTION ORAL; RESPIRATORY (INHALATION) at 04:32

## 2019-12-26 RX ADMIN — BENZONATATE 200 MG: 100 CAPSULE ORAL at 12:31

## 2019-12-26 ASSESSMENT — PAIN SCALES - GENERAL
PAINLEVEL_OUTOF10: 0

## 2019-12-27 LAB
ANION GAP SERPL CALCULATED.3IONS-SCNC: 8 MMOL/L (ref 9–17)
BAL DIFF COMMENT: ABNORMAL
BUN BLDV-MCNC: 17 MG/DL (ref 8–23)
BUN/CREAT BLD: ABNORMAL (ref 9–20)
CALCIUM SERPL-MCNC: 9 MG/DL (ref 8.6–10.4)
CHLORIDE BLD-SCNC: 102 MMOL/L (ref 98–107)
CO2: 30 MMOL/L (ref 20–31)
COLUMNAR EPIS BAL: PRESENT
CREAT SERPL-MCNC: 0.45 MG/DL (ref 0.5–0.9)
CULTURE: ABNORMAL
DIRECT EXAM: ABNORMAL
EOSINOPHILS BAL: ABNORMAL % (ref 0–1)
FLUID DIFF COMMENT: NORMAL
GFR AFRICAN AMERICAN: >60 ML/MIN
GFR NON-AFRICAN AMERICAN: >60 ML/MIN
GFR SERPL CREATININE-BSD FRML MDRD: ABNORMAL ML/MIN/{1.73_M2}
GFR SERPL CREATININE-BSD FRML MDRD: ABNORMAL ML/MIN/{1.73_M2}
GLUCOSE BLD-MCNC: 121 MG/DL (ref 65–105)
GLUCOSE BLD-MCNC: 132 MG/DL (ref 70–99)
GLUCOSE BLD-MCNC: 148 MG/DL (ref 65–105)
GLUCOSE BLD-MCNC: 150 MG/DL (ref 65–105)
GLUCOSE BLD-MCNC: 156 MG/DL (ref 65–105)
HCT VFR BLD CALC: 36 % (ref 36–46)
HEMOGLOBIN: 11.9 G/DL (ref 12–16)
LYMPHOCYTES, BAL: 18 % (ref 8–12)
Lab: ABNORMAL
MACROPHAGES, BAL: 15 % (ref 85–95)
MCH RBC QN AUTO: 31.3 PG (ref 26–34)
MCHC RBC AUTO-ENTMCNC: 33.1 G/DL (ref 31–37)
MCV RBC AUTO: 94.5 FL (ref 80–100)
NRBC AUTOMATED: ABNORMAL PER 100 WBC
PDW BLD-RTO: 15.3 % (ref 11.5–14.9)
PLATELET # BLD: 438 K/UL (ref 150–450)
PMV BLD AUTO: 7.3 FL (ref 6–12)
POTASSIUM SERPL-SCNC: 3.9 MMOL/L (ref 3.7–5.3)
RBC # BLD: 3.81 M/UL (ref 4–5.2)
RBC, BAL: 530 /MM3
SEGMENTED NEUTROPHILS, BAL: 67 % (ref 0–10)
SODIUM BLD-SCNC: 140 MMOL/L (ref 135–144)
SPECIMEN DESCRIPTION: ABNORMAL
SPECIMEN TYPE: NORMAL
WBC # BLD: 12 K/UL (ref 3.5–11)
WBC, BAL: 163 /MM3

## 2019-12-27 PROCEDURE — 6370000000 HC RX 637 (ALT 250 FOR IP): Performed by: INTERNAL MEDICINE

## 2019-12-27 PROCEDURE — 99152 MOD SED SAME PHYS/QHP 5/>YRS: CPT | Performed by: INTERNAL MEDICINE

## 2019-12-27 PROCEDURE — 80048 BASIC METABOLIC PNL TOTAL CA: CPT

## 2019-12-27 PROCEDURE — 6370000000 HC RX 637 (ALT 250 FOR IP): Performed by: STUDENT IN AN ORGANIZED HEALTH CARE EDUCATION/TRAINING PROGRAM

## 2019-12-27 PROCEDURE — 3609010800 HC BRONCHOSCOPY ALVEOLAR LAVAGE: Performed by: INTERNAL MEDICINE

## 2019-12-27 PROCEDURE — 82947 ASSAY GLUCOSE BLOOD QUANT: CPT

## 2019-12-27 PROCEDURE — 87015 SPECIMEN INFECT AGNT CONCNTJ: CPT

## 2019-12-27 PROCEDURE — 6360000002 HC RX W HCPCS: Performed by: INTERNAL MEDICINE

## 2019-12-27 PROCEDURE — 94640 AIRWAY INHALATION TREATMENT: CPT

## 2019-12-27 PROCEDURE — 85027 COMPLETE CBC AUTOMATED: CPT

## 2019-12-27 PROCEDURE — 7100000010 HC PHASE II RECOVERY - FIRST 15 MIN: Performed by: INTERNAL MEDICINE

## 2019-12-27 PROCEDURE — 2500000003 HC RX 250 WO HCPCS: Performed by: INTERNAL MEDICINE

## 2019-12-27 PROCEDURE — 2709999900 HC NON-CHARGEABLE SUPPLY: Performed by: INTERNAL MEDICINE

## 2019-12-27 PROCEDURE — 87205 SMEAR GRAM STAIN: CPT

## 2019-12-27 PROCEDURE — 94761 N-INVAS EAR/PLS OXIMETRY MLT: CPT

## 2019-12-27 PROCEDURE — 87070 CULTURE OTHR SPECIMN AEROBIC: CPT

## 2019-12-27 PROCEDURE — 88112 CYTOPATH CELL ENHANCE TECH: CPT

## 2019-12-27 PROCEDURE — 88104 CYTOPATH FL NONGYN SMEARS: CPT

## 2019-12-27 PROCEDURE — 36415 COLL VENOUS BLD VENIPUNCTURE: CPT

## 2019-12-27 PROCEDURE — 2060000000 HC ICU INTERMEDIATE R&B

## 2019-12-27 PROCEDURE — 88312 SPECIAL STAINS GROUP 1: CPT

## 2019-12-27 PROCEDURE — 99153 MOD SED SAME PHYS/QHP EA: CPT | Performed by: INTERNAL MEDICINE

## 2019-12-27 PROCEDURE — 87116 MYCOBACTERIA CULTURE: CPT

## 2019-12-27 PROCEDURE — 0BC58ZZ EXTIRPATION OF MATTER FROM RIGHT MIDDLE LOBE BRONCHUS, VIA NATURAL OR ARTIFICIAL OPENING ENDOSCOPIC: ICD-10-PCS | Performed by: INTERNAL MEDICINE

## 2019-12-27 PROCEDURE — 2580000003 HC RX 258: Performed by: INTERNAL MEDICINE

## 2019-12-27 PROCEDURE — 2700000000 HC OXYGEN THERAPY PER DAY

## 2019-12-27 PROCEDURE — 0B9D8ZX DRAINAGE OF RIGHT MIDDLE LUNG LOBE, VIA NATURAL OR ARTIFICIAL OPENING ENDOSCOPIC, DIAGNOSTIC: ICD-10-PCS | Performed by: INTERNAL MEDICINE

## 2019-12-27 PROCEDURE — 87102 FUNGUS ISOLATION CULTURE: CPT

## 2019-12-27 PROCEDURE — 99232 SBSQ HOSP IP/OBS MODERATE 35: CPT | Performed by: INTERNAL MEDICINE

## 2019-12-27 PROCEDURE — 87206 SMEAR FLUORESCENT/ACID STAI: CPT

## 2019-12-27 PROCEDURE — 6360000002 HC RX W HCPCS: Performed by: STUDENT IN AN ORGANIZED HEALTH CARE EDUCATION/TRAINING PROGRAM

## 2019-12-27 PROCEDURE — 88108 CYTOPATH CONCENTRATE TECH: CPT

## 2019-12-27 PROCEDURE — 88305 TISSUE EXAM BY PATHOLOGIST: CPT

## 2019-12-27 PROCEDURE — 89051 BODY FLUID CELL COUNT: CPT

## 2019-12-27 PROCEDURE — 87106 FUNGI IDENTIFICATION YEAST: CPT

## 2019-12-27 PROCEDURE — 7100000011 HC PHASE II RECOVERY - ADDTL 15 MIN: Performed by: INTERNAL MEDICINE

## 2019-12-27 RX ORDER — SODIUM CHLORIDE, SODIUM LACTATE, POTASSIUM CHLORIDE, CALCIUM CHLORIDE 600; 310; 30; 20 MG/100ML; MG/100ML; MG/100ML; MG/100ML
INJECTION, SOLUTION INTRAVENOUS CONTINUOUS
Status: DISCONTINUED | OUTPATIENT
Start: 2019-12-27 | End: 2019-12-27

## 2019-12-27 RX ORDER — FENTANYL CITRATE 50 UG/ML
INJECTION, SOLUTION INTRAMUSCULAR; INTRAVENOUS PRN
Status: DISCONTINUED | OUTPATIENT
Start: 2019-12-27 | End: 2019-12-27 | Stop reason: ALTCHOICE

## 2019-12-27 RX ORDER — LIDOCAINE HYDROCHLORIDE 10 MG/ML
INJECTION, SOLUTION EPIDURAL; INFILTRATION; INTRACAUDAL; PERINEURAL PRN
Status: DISCONTINUED | OUTPATIENT
Start: 2019-12-27 | End: 2019-12-27 | Stop reason: ALTCHOICE

## 2019-12-27 RX ORDER — ACETYLCYSTEINE 200 MG/ML
200 SOLUTION ORAL; RESPIRATORY (INHALATION) 4 TIMES DAILY
Status: DISCONTINUED | OUTPATIENT
Start: 2019-12-27 | End: 2019-12-28 | Stop reason: HOSPADM

## 2019-12-27 RX ORDER — ALBUTEROL SULFATE 2.5 MG/3ML
2.5 SOLUTION RESPIRATORY (INHALATION) ONCE
Status: COMPLETED | OUTPATIENT
Start: 2019-12-27 | End: 2019-12-27

## 2019-12-27 RX ORDER — LIDOCAINE HYDROCHLORIDE 20 MG/ML
SOLUTION OROPHARYNGEAL PRN
Status: DISCONTINUED | OUTPATIENT
Start: 2019-12-27 | End: 2019-12-27 | Stop reason: ALTCHOICE

## 2019-12-27 RX ORDER — MIDAZOLAM HYDROCHLORIDE 1 MG/ML
INJECTION INTRAMUSCULAR; INTRAVENOUS PRN
Status: DISCONTINUED | OUTPATIENT
Start: 2019-12-27 | End: 2019-12-27 | Stop reason: ALTCHOICE

## 2019-12-27 RX ORDER — LEVALBUTEROL 1.25 MG/.5ML
1.25 SOLUTION, CONCENTRATE RESPIRATORY (INHALATION) 4 TIMES DAILY
Status: DISCONTINUED | OUTPATIENT
Start: 2019-12-27 | End: 2019-12-28 | Stop reason: HOSPADM

## 2019-12-27 RX ORDER — LIDOCAINE HYDROCHLORIDE 40 MG/ML
4 INJECTION, SOLUTION RETROBULBAR; TOPICAL ONCE
Status: COMPLETED | OUTPATIENT
Start: 2019-12-27 | End: 2019-12-27

## 2019-12-27 RX ADMIN — ACETYLCYSTEINE 200 MG: 200 SOLUTION ORAL; RESPIRATORY (INHALATION) at 15:07

## 2019-12-27 RX ADMIN — METHYLPREDNISOLONE SODIUM SUCCINATE 40 MG: 40 INJECTION, POWDER, FOR SOLUTION INTRAMUSCULAR; INTRAVENOUS at 16:58

## 2019-12-27 RX ADMIN — ALBUTEROL SULFATE 2.5 MG: 2.5 SOLUTION RESPIRATORY (INHALATION) at 09:30

## 2019-12-27 RX ADMIN — Medication 1 CAPSULE: at 16:57

## 2019-12-27 RX ADMIN — LIDOCAINE HYDROCHLORIDE 4 ML: 40 INJECTION, SOLUTION RETROBULBAR; TOPICAL at 09:30

## 2019-12-27 RX ADMIN — LEVALBUTEROL 1.25 MG: 1.25 SOLUTION, CONCENTRATE RESPIRATORY (INHALATION) at 06:53

## 2019-12-27 RX ADMIN — BENZONATATE 200 MG: 100 CAPSULE ORAL at 16:57

## 2019-12-27 RX ADMIN — INSULIN LISPRO 2 UNITS: 100 INJECTION, SOLUTION INTRAVENOUS; SUBCUTANEOUS at 16:57

## 2019-12-27 RX ADMIN — AMPICILLIN SODIUM AND SULBACTAM SODIUM 3 G: 2; 1 INJECTION, POWDER, FOR SOLUTION INTRAMUSCULAR; INTRAVENOUS at 02:45

## 2019-12-27 RX ADMIN — GUAIFENESIN 1200 MG: 600 TABLET, EXTENDED RELEASE ORAL at 12:26

## 2019-12-27 RX ADMIN — ACETYLCYSTEINE 200 MG: 200 SOLUTION ORAL; RESPIRATORY (INHALATION) at 20:03

## 2019-12-27 RX ADMIN — ACETYLCYSTEINE 200 MG: 200 SOLUTION ORAL; RESPIRATORY (INHALATION) at 06:53

## 2019-12-27 RX ADMIN — INSULIN LISPRO 1 UNITS: 100 INJECTION, SOLUTION INTRAVENOUS; SUBCUTANEOUS at 21:17

## 2019-12-27 RX ADMIN — DEXTROMETHORPHAN POLISTIREX 30 MG: 30 SUSPENSION ORAL at 21:24

## 2019-12-27 RX ADMIN — CETIRIZINE HYDROCHLORIDE 10 MG: 10 TABLET, FILM COATED ORAL at 12:26

## 2019-12-27 RX ADMIN — AMPICILLIN SODIUM AND SULBACTAM SODIUM 3 G: 2; 1 INJECTION, POWDER, FOR SOLUTION INTRAMUSCULAR; INTRAVENOUS at 17:01

## 2019-12-27 RX ADMIN — SERTRALINE HYDROCHLORIDE 50 MG: 50 TABLET ORAL at 12:24

## 2019-12-27 RX ADMIN — GUAIFENESIN 1200 MG: 600 TABLET, EXTENDED RELEASE ORAL at 21:18

## 2019-12-27 RX ADMIN — LEVALBUTEROL 1.25 MG: 1.25 SOLUTION, CONCENTRATE RESPIRATORY (INHALATION) at 15:06

## 2019-12-27 RX ADMIN — METHYLPREDNISOLONE SODIUM SUCCINATE 40 MG: 40 INJECTION, POWDER, FOR SOLUTION INTRAMUSCULAR; INTRAVENOUS at 05:22

## 2019-12-27 RX ADMIN — ENOXAPARIN SODIUM 30 MG: 30 INJECTION SUBCUTANEOUS at 21:17

## 2019-12-27 RX ADMIN — AMLODIPINE BESYLATE 10 MG: 10 TABLET ORAL at 12:24

## 2019-12-27 RX ADMIN — BENZONATATE 200 MG: 100 CAPSULE ORAL at 21:18

## 2019-12-27 RX ADMIN — PANTOPRAZOLE SODIUM 40 MG: 40 TABLET, DELAYED RELEASE ORAL at 05:24

## 2019-12-27 RX ADMIN — LEVALBUTEROL 1.25 MG: 1.25 SOLUTION, CONCENTRATE RESPIRATORY (INHALATION) at 20:03

## 2019-12-27 RX ADMIN — Medication 1 CAPSULE: at 12:24

## 2019-12-27 RX ADMIN — AMPICILLIN SODIUM AND SULBACTAM SODIUM 3 G: 2; 1 INJECTION, POWDER, FOR SOLUTION INTRAMUSCULAR; INTRAVENOUS at 23:01

## 2019-12-27 ASSESSMENT — PAIN SCALES - GENERAL
PAINLEVEL_OUTOF10: 0

## 2019-12-27 ASSESSMENT — ENCOUNTER SYMPTOMS
VOMITING: 0
NAUSEA: 0
EYE PAIN: 0
COUGH: 1
CHEST TIGHTNESS: 0
APNEA: 0
SHORTNESS OF BREATH: 1
ABDOMINAL PAIN: 0
SINUS PAIN: 0
ABDOMINAL DISTENTION: 0

## 2019-12-28 VITALS
HEIGHT: 65 IN | DIASTOLIC BLOOD PRESSURE: 86 MMHG | TEMPERATURE: 98.6 F | OXYGEN SATURATION: 98 % | RESPIRATION RATE: 16 BRPM | WEIGHT: 224.87 LBS | SYSTOLIC BLOOD PRESSURE: 133 MMHG | HEART RATE: 60 BPM | BODY MASS INDEX: 37.47 KG/M2

## 2019-12-28 LAB
ANION GAP SERPL CALCULATED.3IONS-SCNC: 8 MMOL/L (ref 9–17)
BUN BLDV-MCNC: 17 MG/DL (ref 8–23)
BUN/CREAT BLD: ABNORMAL (ref 9–20)
CALCIUM SERPL-MCNC: 8.7 MG/DL (ref 8.6–10.4)
CHLORIDE BLD-SCNC: 101 MMOL/L (ref 98–107)
CO2: 30 MMOL/L (ref 20–31)
CREAT SERPL-MCNC: 0.44 MG/DL (ref 0.5–0.9)
CULTURE: NO GROWTH
DIRECT EXAM: NORMAL
GFR AFRICAN AMERICAN: >60 ML/MIN
GFR NON-AFRICAN AMERICAN: >60 ML/MIN
GFR SERPL CREATININE-BSD FRML MDRD: ABNORMAL ML/MIN/{1.73_M2}
GFR SERPL CREATININE-BSD FRML MDRD: ABNORMAL ML/MIN/{1.73_M2}
GLUCOSE BLD-MCNC: 110 MG/DL (ref 65–105)
GLUCOSE BLD-MCNC: 132 MG/DL (ref 70–99)
HCT VFR BLD CALC: 37.8 % (ref 36–46)
HEMOGLOBIN: 12.5 G/DL (ref 12–16)
Lab: NORMAL
Lab: NORMAL
MCH RBC QN AUTO: 31.4 PG (ref 26–34)
MCHC RBC AUTO-ENTMCNC: 33.1 G/DL (ref 31–37)
MCV RBC AUTO: 94.8 FL (ref 80–100)
NRBC AUTOMATED: ABNORMAL PER 100 WBC
PDW BLD-RTO: 15.2 % (ref 11.5–14.9)
PLATELET # BLD: 531 K/UL (ref 150–450)
PMV BLD AUTO: 7.4 FL (ref 6–12)
POTASSIUM SERPL-SCNC: 3.9 MMOL/L (ref 3.7–5.3)
RBC # BLD: 3.99 M/UL (ref 4–5.2)
SODIUM BLD-SCNC: 139 MMOL/L (ref 135–144)
SPECIMEN DESCRIPTION: NORMAL
SPECIMEN DESCRIPTION: NORMAL
WBC # BLD: 13 K/UL (ref 3.5–11)

## 2019-12-28 PROCEDURE — 6370000000 HC RX 637 (ALT 250 FOR IP): Performed by: INTERNAL MEDICINE

## 2019-12-28 PROCEDURE — 2580000003 HC RX 258: Performed by: INTERNAL MEDICINE

## 2019-12-28 PROCEDURE — 97116 GAIT TRAINING THERAPY: CPT

## 2019-12-28 PROCEDURE — 6360000002 HC RX W HCPCS: Performed by: INTERNAL MEDICINE

## 2019-12-28 PROCEDURE — 82947 ASSAY GLUCOSE BLOOD QUANT: CPT

## 2019-12-28 PROCEDURE — 6360000002 HC RX W HCPCS: Performed by: STUDENT IN AN ORGANIZED HEALTH CARE EDUCATION/TRAINING PROGRAM

## 2019-12-28 PROCEDURE — 99239 HOSP IP/OBS DSCHRG MGMT >30: CPT | Performed by: INTERNAL MEDICINE

## 2019-12-28 PROCEDURE — 94761 N-INVAS EAR/PLS OXIMETRY MLT: CPT

## 2019-12-28 PROCEDURE — 80048 BASIC METABOLIC PNL TOTAL CA: CPT

## 2019-12-28 PROCEDURE — 36415 COLL VENOUS BLD VENIPUNCTURE: CPT

## 2019-12-28 PROCEDURE — 97530 THERAPEUTIC ACTIVITIES: CPT

## 2019-12-28 PROCEDURE — 2700000000 HC OXYGEN THERAPY PER DAY

## 2019-12-28 PROCEDURE — 85027 COMPLETE CBC AUTOMATED: CPT

## 2019-12-28 PROCEDURE — 94640 AIRWAY INHALATION TREATMENT: CPT

## 2019-12-28 RX ORDER — DEXTROMETHORPHAN POLISTIREX 30 MG/5ML
30 SUSPENSION ORAL 2 TIMES DAILY
Qty: 100 ML | Refills: 0 | Status: SHIPPED | OUTPATIENT
Start: 2019-12-28 | End: 2020-01-07

## 2019-12-28 RX ORDER — LEVALBUTEROL TARTRATE 45 UG/1
2 AEROSOL, METERED ORAL EVERY 4 HOURS PRN
Qty: 1 INHALER | Refills: 3 | Status: SHIPPED | OUTPATIENT
Start: 2019-12-28 | End: 2020-04-13

## 2019-12-28 RX ORDER — PREDNISONE 10 MG/1
TABLET ORAL
Qty: 31 TABLET | Refills: 0 | Status: ON HOLD | OUTPATIENT
Start: 2019-12-28 | End: 2020-02-12 | Stop reason: ALTCHOICE

## 2019-12-28 RX ORDER — GUAIFENESIN 600 MG/1
1200 TABLET, EXTENDED RELEASE ORAL 2 TIMES DAILY
Qty: 56 TABLET | Refills: 3 | Status: SHIPPED | OUTPATIENT
Start: 2019-12-28 | End: 2020-01-11

## 2019-12-28 RX ORDER — AMLODIPINE BESYLATE 10 MG/1
10 TABLET ORAL DAILY
Qty: 30 TABLET | Refills: 3 | Status: SHIPPED | OUTPATIENT
Start: 2019-12-29 | End: 2020-04-27

## 2019-12-28 RX ORDER — AMOXICILLIN AND CLAVULANATE POTASSIUM 875; 125 MG/1; MG/1
1 TABLET, FILM COATED ORAL 2 TIMES DAILY
Qty: 14 TABLET | Refills: 0 | Status: SHIPPED | OUTPATIENT
Start: 2019-12-28 | End: 2020-01-04

## 2019-12-28 RX ORDER — BENZONATATE 200 MG/1
200 CAPSULE ORAL 3 TIMES DAILY
Qty: 21 CAPSULE | Refills: 3 | Status: SHIPPED | OUTPATIENT
Start: 2019-12-28 | End: 2020-01-04

## 2019-12-28 RX ORDER — LEVALBUTEROL 1.25 MG/.5ML
1.25 SOLUTION, CONCENTRATE RESPIRATORY (INHALATION) 4 TIMES DAILY
Qty: 120 EACH | Refills: 3 | Status: SHIPPED | OUTPATIENT
Start: 2019-12-28 | End: 2020-01-09 | Stop reason: ALTCHOICE

## 2019-12-28 RX ADMIN — GUAIFENESIN 1200 MG: 600 TABLET, EXTENDED RELEASE ORAL at 08:07

## 2019-12-28 RX ADMIN — SERTRALINE HYDROCHLORIDE 50 MG: 50 TABLET ORAL at 08:07

## 2019-12-28 RX ADMIN — BENZONATATE 200 MG: 100 CAPSULE ORAL at 08:06

## 2019-12-28 RX ADMIN — ACETYLCYSTEINE 200 MG: 200 SOLUTION ORAL; RESPIRATORY (INHALATION) at 07:19

## 2019-12-28 RX ADMIN — AMPICILLIN SODIUM AND SULBACTAM SODIUM 3 G: 2; 1 INJECTION, POWDER, FOR SOLUTION INTRAMUSCULAR; INTRAVENOUS at 04:01

## 2019-12-28 RX ADMIN — LEVALBUTEROL 1.25 MG: 1.25 SOLUTION, CONCENTRATE RESPIRATORY (INHALATION) at 07:19

## 2019-12-28 RX ADMIN — PANTOPRAZOLE SODIUM 40 MG: 40 TABLET, DELAYED RELEASE ORAL at 06:07

## 2019-12-28 RX ADMIN — ENOXAPARIN SODIUM 30 MG: 30 INJECTION SUBCUTANEOUS at 08:07

## 2019-12-28 RX ADMIN — Medication 1 CAPSULE: at 08:07

## 2019-12-28 RX ADMIN — METHYLPREDNISOLONE SODIUM SUCCINATE 40 MG: 40 INJECTION, POWDER, FOR SOLUTION INTRAMUSCULAR; INTRAVENOUS at 06:06

## 2019-12-28 RX ADMIN — ACETYLCYSTEINE 200 MG: 200 SOLUTION ORAL; RESPIRATORY (INHALATION) at 11:09

## 2019-12-28 RX ADMIN — AMLODIPINE BESYLATE 10 MG: 10 TABLET ORAL at 08:07

## 2019-12-28 RX ADMIN — CETIRIZINE HYDROCHLORIDE 10 MG: 10 TABLET, FILM COATED ORAL at 08:07

## 2019-12-28 RX ADMIN — DEXTROMETHORPHAN POLISTIREX 30 MG: 30 SUSPENSION ORAL at 08:09

## 2019-12-28 RX ADMIN — LEVALBUTEROL 1.25 MG: 1.25 SOLUTION, CONCENTRATE RESPIRATORY (INHALATION) at 11:08

## 2019-12-28 ASSESSMENT — PAIN SCALES - GENERAL
PAINLEVEL_OUTOF10: 0
PAINLEVEL_OUTOF10: 0

## 2019-12-30 ENCOUNTER — TELEPHONE (OUTPATIENT)
Dept: FAMILY MEDICINE CLINIC | Age: 64
End: 2019-12-30

## 2019-12-30 DIAGNOSIS — J43.1 PANLOBULAR EMPHYSEMA (HCC): Primary | ICD-10-CM

## 2019-12-30 DIAGNOSIS — B37.0 ORAL THRUSH: ICD-10-CM

## 2019-12-30 LAB
CULTURE: NORMAL
CULTURE: NORMAL
Lab: NORMAL
Lab: NORMAL
SPECIMEN DESCRIPTION: NORMAL
SPECIMEN DESCRIPTION: NORMAL
SURGICAL PATHOLOGY REPORT: NORMAL

## 2019-12-30 RX ORDER — ALBUTEROL SULFATE 2.5 MG/3ML
2.5 SOLUTION RESPIRATORY (INHALATION) EVERY 6 HOURS PRN
Qty: 125 VIAL | Refills: 0 | Status: SHIPPED | OUTPATIENT
Start: 2019-12-30 | End: 2021-08-23 | Stop reason: SDUPTHER

## 2020-01-02 ENCOUNTER — TELEPHONE (OUTPATIENT)
Dept: INTERNAL MEDICINE CLINIC | Age: 65
End: 2020-01-02

## 2020-01-03 LAB
CULTURE: ABNORMAL
CULTURE: ABNORMAL
Lab: ABNORMAL
SPECIMEN DESCRIPTION: ABNORMAL

## 2020-01-09 ENCOUNTER — OFFICE VISIT (OUTPATIENT)
Dept: FAMILY MEDICINE CLINIC | Age: 65
End: 2020-01-09
Payer: COMMERCIAL

## 2020-01-09 VITALS
HEART RATE: 77 BPM | SYSTOLIC BLOOD PRESSURE: 131 MMHG | HEIGHT: 66 IN | BODY MASS INDEX: 35.36 KG/M2 | OXYGEN SATURATION: 97 % | DIASTOLIC BLOOD PRESSURE: 75 MMHG | WEIGHT: 220 LBS

## 2020-01-09 PROBLEM — E04.1 THYROID NODULE: Status: ACTIVE | Noted: 2020-01-09

## 2020-01-09 PROBLEM — J44.1 COPD EXACERBATION (HCC): Status: ACTIVE | Noted: 2019-08-14

## 2020-01-09 PROBLEM — A41.9 SEPSIS (HCC): Status: RESOLVED | Noted: 2019-12-23 | Resolved: 2020-01-09

## 2020-01-09 PROCEDURE — 4004F PT TOBACCO SCREEN RCVD TLK: CPT | Performed by: FAMILY MEDICINE

## 2020-01-09 PROCEDURE — 3017F COLORECTAL CA SCREEN DOC REV: CPT | Performed by: FAMILY MEDICINE

## 2020-01-09 PROCEDURE — G8417 CALC BMI ABV UP PARAM F/U: HCPCS | Performed by: FAMILY MEDICINE

## 2020-01-09 PROCEDURE — 1111F DSCHRG MED/CURRENT MED MERGE: CPT | Performed by: FAMILY MEDICINE

## 2020-01-09 PROCEDURE — G8427 DOCREV CUR MEDS BY ELIG CLIN: HCPCS | Performed by: FAMILY MEDICINE

## 2020-01-09 PROCEDURE — 99215 OFFICE O/P EST HI 40 MIN: CPT | Performed by: FAMILY MEDICINE

## 2020-01-09 PROCEDURE — G8484 FLU IMMUNIZE NO ADMIN: HCPCS | Performed by: FAMILY MEDICINE

## 2020-01-09 PROCEDURE — G8926 SPIRO NO PERF OR DOC: HCPCS | Performed by: FAMILY MEDICINE

## 2020-01-09 PROCEDURE — 3023F SPIROM DOC REV: CPT | Performed by: FAMILY MEDICINE

## 2020-01-09 ASSESSMENT — ENCOUNTER SYMPTOMS
ABDOMINAL DISTENTION: 0
COUGH: 1
VOMITING: 0
CONSTIPATION: 0
BACK PAIN: 0
DIARRHEA: 0
RHINORRHEA: 1
WHEEZING: 0
FACIAL SWELLING: 0
PHOTOPHOBIA: 0
SHORTNESS OF BREATH: 1
BLOOD IN STOOL: 0
ABDOMINAL PAIN: 0

## 2020-01-09 NOTE — PROGRESS NOTES
Chief Complaint   Patient presents with   Katherine Gupta  here today for follow up on chronic medical problems, go over labs and/or diagnostic studies, and medication refills. Check-Up      HPI: Patient is here for follow-up on hospital visit, was admitted with pneumonia in the right middle lobe. Patient reports she was sick in the past 2 weeks and her symptoms got worse and she was short of breath and wheezy. She went to ER had chest x-ray done followed with CT chest that showed consolidation in the right middle lobe. Patient was admitted was treated with steroids antibiotics and also aerosol treatments. Patient does a history of smoking and had PFT and CT lung screening ordered on previous appointment she has not done that. Patient was discharged on aerosols and steroids. Patient reports her symptoms has improved. Patient developed oral thrush due to use of steroids and aerosol treatments. Patient was given nystatin reports the lesions improved. She has appointment with pulmonologist.    Patient has quit smoking since past 2 weeks needs help with nicotine gums. Prediabetes A1c has mildly increased  Likely due to steroids. /75   Pulse 77   Ht 5' 6\" (1.676 m)   Wt 220 lb (99.8 kg)   SpO2 97%   BMI 35.51 kg/m²    Body mass index is 35.51 kg/m². Wt Readings from Last 3 Encounters:   01/09/20 220 lb (99.8 kg)   12/24/19 224 lb 13.9 oz (102 kg)   08/14/19 233 lb 3.2 oz (105.8 kg)        [x]Negative depression screening. PHQ Scores 6/12/2019 11/12/2018   PHQ2 Score 0 0   PHQ9 Score 0 0      []1-4 = Minimal depression   []5-9 = Milddepression   []10-14 = Moderate depression   []15-19 = Moderately severe depression   []20-27 = Severe depression    Discussed testing with the patient and all questions fully answered. No results displayed because visit has over 200 results.             Most recent labs reviewed:     Lab Results   Component Value Date    WBC 13.0 (H) 12/28/2019    HGB 12.5 12/28/2019    HCT 37.8 12/28/2019    MCV 94.8 12/28/2019     (H) 12/28/2019       @BRIEFLAB(NA,K,CL,CO2,BUN,CREATININE,GLUCOSE,CALCIUM)@     Lab Results   Component Value Date    ALT 14 12/22/2019    AST 17 12/22/2019    ALKPHOS 111 (H) 12/22/2019    BILITOT 0.53 12/22/2019       Lab Results   Component Value Date    TSH 0.18 (L) 12/23/2019       Lab Results   Component Value Date    CHOL 192 01/30/2015    CHOL 183 03/19/2014     Lab Results   Component Value Date    TRIG 84 01/30/2015    TRIG 139 03/19/2014     Lab Results   Component Value Date    HDL 77 01/30/2015    HDL 33 (L) 03/19/2014     Lab Results   Component Value Date    LDLCHOLESTEROL 98 01/30/2015    LDLCHOLESTEROL 122 (H) 03/19/2014     Lab Results   Component Value Date    VLDL NOT REPORTED 01/30/2015    VLDL NOT REPORTED 03/19/2014     Lab Results   Component Value Date    CHOLHDLRATIO 2.5 01/30/2015    CHOLHDLRATIO 5.5 (H) 03/19/2014       Lab Results   Component Value Date    LABA1C 6.0 12/23/2019       No results found for: [id number]    No results found for: FOLATE    No results found for: IRON, TIBC, FERRITIN    Lab Results   Component Value Date    VITD25 23.0 (L) 03/19/2014             Current Outpatient Medications   Medication Sig Dispense Refill    nicotine polacrilex (NICORETTE) 2 MG gum Take 1 each by mouth as needed for Smoking cessation 110 each 3    albuterol (PROVENTIL) (2.5 MG/3ML) 0.083% nebulizer solution Take 3 mLs by nebulization every 6 hours as needed for Wheezing or Shortness of Breath 125 vial 0    nystatin (MYCOSTATIN) 869511 UNIT/ML suspension Take 5 mLs by mouth 4 times daily Swish and swallow 240 mL 0    levalbuterol (XOPENEX HFA) 45 MCG/ACT inhaler Inhale 2 puffs into the lungs every 4 hours as needed for Wheezing 1 Inhaler 3    guaiFENesin (MUCINEX) 600 MG extended release tablet Take 2 tablets by mouth 2 times daily for 14 days 56 tablet 3    predniSONE (DELTASONE) 10 MG tablet Take 40 mg for 3 days, then 30 mg for 3 days, then 20 mg for 3 days, then 10 mg for 4 days then stop. 31 tablet 0    amLODIPine (NORVASC) 10 MG tablet Take 1 tablet by mouth daily 30 tablet 3    pantoprazole (PROTONIX) 40 MG tablet take 1 tablet by mouth once daily 30 tablet 5    folic acid (FOLVITE) 1 MG tablet take 1 tablet by mouth once daily 30 tablet 3    fluticasone (FLONASE) 50 MCG/ACT nasal spray 2 sprays by Nasal route daily 1 Bottle 0    folic acid (FOLVITE) 1 MG tablet take 1 tablet by mouth once daily 30 tablet 3    methotrexate (RHEUMATREX) 2.5 MG chemo tablet Take 20 mg by mouth once a week Indications: takes 8 tablets on saturdays        No current facility-administered medications for this visit. Social History     Socioeconomic History    Marital status: Single     Spouse name: Not on file    Number of children: Not on file    Years of education: Not on file    Highest education level: Not on file   Occupational History     Employer: N/A   Social Needs    Financial resource strain: Not on file    Food insecurity:     Worry: Not on file     Inability: Not on file    Transportation needs:     Medical: Not on file     Non-medical: Not on file   Tobacco Use    Smoking status: Current Every Day Smoker     Packs/day: 1.00     Years: 35.00     Pack years: 35.00     Types: Cigarettes    Smokeless tobacco: Never Used   Substance and Sexual Activity    Alcohol use:  Yes     Alcohol/week: 0.0 standard drinks     Comment: social    Drug use: No    Sexual activity: Not Currently   Lifestyle    Physical activity:     Days per week: Not on file     Minutes per session: Not on file    Stress: Not on file   Relationships    Social connections:     Talks on phone: Not on file     Gets together: Not on file     Attends Mosque service: Not on file     Active member of club or organization: Not on file     Attends meetings of clubs or organizations: Not on file     Relationship status: Not on file    Intimate partner violence:     Fear of current or ex partner: Not on file     Emotionally abused: Not on file     Physically abused: Not on file     Forced sexual activity: Not on file   Other Topics Concern    Not on file   Social History Narrative    Not on file     Ready to quit: No  Counseling given: Yes        Family History   Problem Relation Age of Onset    Cancer Mother     Diabetes Mother     Heart Disease Mother     Cancer Father     Cancer Brother              -rest of complaints with corresponding details per ROS    The patient's past medical, surgical, social, and family history as well as her current medications and allergies were reviewed as documented intoday's encounter. Review of Systems   Constitutional: Positive for fatigue. Negative for activity change, appetite change, fever and unexpected weight change. HENT: Positive for congestion, mouth sores and rhinorrhea. Negative for ear pain, facial swelling, nosebleeds and postnasal drip. Eyes: Negative for photophobia and visual disturbance. Respiratory: Positive for cough and shortness of breath. Negative for wheezing. Cardiovascular: Negative for chest pain, palpitations and leg swelling. Gastrointestinal: Negative for abdominal distention, abdominal pain, blood in stool, constipation, diarrhea and vomiting. Endocrine: Negative for polyphagia and polyuria. Genitourinary: Negative for difficulty urinating, flank pain, frequency, menstrual problem, urgency and vaginal pain. Musculoskeletal: Negative for arthralgias, back pain, gait problem, neck pain and neck stiffness. Neurological: Negative for dizziness, weakness, numbness and headaches. Psychiatric/Behavioral: Negative for agitation, behavioral problems, decreased concentration, dysphoric mood and hallucinations. The patient is not nervous/anxious.             Physical Exam  Pulmonary:      Effort: No accessory muscle usage or respiratory each; Refill: 3    9. Screening for colon cancer    - Cologuard (For External Results Only); Future    10. Screening cholesterol level    - Lipid Panel; Future      Orders Placed This Encounter   Procedures    Cologuard (For External Results Only)     This test is performed by an external laboratory and is used for result attachment only. It is required that this order requisition be faxed to: [facility] @ [phone]. See [email or web address] for further information. Standing Status:   Future     Standing Expiration Date:   1/9/2021    Lipid Panel     Standing Status:   Future     Standing Expiration Date:   1/9/2021     Order Specific Question:   Is Patient Fasting?/# of Hours     Answer:   12         Medications Discontinued During This Encounter   Medication Reason    levalbuterol (Tanna Bob) 1.25 MG/0.5ML nebulizer solution Therapy completed       Bryan Yoder received counseling on the following healthy behaviors: nutrition, exercise, medication adherence and tobacco cessation  Reviewed prior labs and health maintenance. Continue current medications, diet and exercise. Discussed use, benefit, and side effects of prescribed medications. Barriers to medication compliance addressed. Patient given educational materials - see patient instructions. All patient questions answered. Patient voiced understanding. The patient'spast medical, surgical, social, and family history as well as her   current medications and allergies were reviewed as documented in today's encounter. Medications, labs, diagnostic studies, consultations andfollow-up as documented in this encounter. Return in about 3 months (around 4/9/2020) for pft results, fit test results, copd . Patient wasseen with total face to face time of 45 minutes. More than 50% of this visit was counseling and education.        Future Appointments   Date Time Provider Kuldeep Sun   4/9/2020 12:30 PM Nancy Harrell MD fp [facility]

## 2020-02-01 PROBLEM — R19.5 POSITIVE COLORECTAL CANCER SCREENING USING COLOGUARD TEST: Status: ACTIVE | Noted: 2020-02-01

## 2020-02-05 ENCOUNTER — TELEPHONE (OUTPATIENT)
Dept: GASTROENTEROLOGY | Age: 65
End: 2020-02-05

## 2020-02-05 RX ORDER — POLYETHYLENE GLYCOL 3350 17 G/17G
POWDER, FOR SOLUTION ORAL
Qty: 238 G | Refills: 0 | Status: ON HOLD | OUTPATIENT
Start: 2020-02-05 | End: 2020-02-12 | Stop reason: ALTCHOICE

## 2020-02-06 RX ORDER — FOLIC ACID 1 MG/1
TABLET ORAL
Qty: 30 TABLET | Refills: 3 | Status: ON HOLD | OUTPATIENT
Start: 2020-02-06 | End: 2020-04-13

## 2020-02-07 ENCOUNTER — TELEPHONE (OUTPATIENT)
Dept: GASTROENTEROLOGY | Age: 65
End: 2020-02-07

## 2020-02-10 LAB
CULTURE: NORMAL
DIRECT EXAM: NORMAL
Lab: NORMAL
SPECIMEN DESCRIPTION: NORMAL

## 2020-02-12 ENCOUNTER — HOSPITAL ENCOUNTER (OUTPATIENT)
Age: 65
Setting detail: OUTPATIENT SURGERY
Discharge: HOME | End: 2020-02-12
Attending: INTERNAL MEDICINE | Admitting: INTERNAL MEDICINE
Payer: COMMERCIAL

## 2020-02-12 ENCOUNTER — ANESTHESIA EVENT (OUTPATIENT)
Dept: ENDOSCOPY | Age: 65
End: 2020-02-12
Payer: COMMERCIAL

## 2020-02-12 ENCOUNTER — ANESTHESIA (OUTPATIENT)
Dept: ENDOSCOPY | Age: 65
End: 2020-02-12
Payer: COMMERCIAL

## 2020-02-12 VITALS — TEMPERATURE: 96.8 F | SYSTOLIC BLOOD PRESSURE: 126 MMHG | DIASTOLIC BLOOD PRESSURE: 62 MMHG | OXYGEN SATURATION: 95 %

## 2020-02-12 VITALS
OXYGEN SATURATION: 93 % | WEIGHT: 220 LBS | TEMPERATURE: 97.5 F | BODY MASS INDEX: 35.36 KG/M2 | RESPIRATION RATE: 16 BRPM | HEART RATE: 68 BPM | SYSTOLIC BLOOD PRESSURE: 136 MMHG | DIASTOLIC BLOOD PRESSURE: 71 MMHG | HEIGHT: 66 IN

## 2020-02-12 PROCEDURE — 2580000003 HC RX 258: Performed by: ANESTHESIOLOGY

## 2020-02-12 PROCEDURE — 7100000000 HC PACU RECOVERY - FIRST 15 MIN: Performed by: INTERNAL MEDICINE

## 2020-02-12 PROCEDURE — 3700000000 HC ANESTHESIA ATTENDED CARE: Performed by: INTERNAL MEDICINE

## 2020-02-12 PROCEDURE — 3609010400 HC COLONOSCOPY POLYPECTOMY HOT BIOPSY: Performed by: INTERNAL MEDICINE

## 2020-02-12 PROCEDURE — 45380 COLONOSCOPY AND BIOPSY: CPT | Performed by: INTERNAL MEDICINE

## 2020-02-12 PROCEDURE — 7100000001 HC PACU RECOVERY - ADDTL 15 MIN: Performed by: INTERNAL MEDICINE

## 2020-02-12 PROCEDURE — 88305 TISSUE EXAM BY PATHOLOGIST: CPT

## 2020-02-12 PROCEDURE — 2709999900 HC NON-CHARGEABLE SUPPLY: Performed by: INTERNAL MEDICINE

## 2020-02-12 PROCEDURE — 45385 COLONOSCOPY W/LESION REMOVAL: CPT | Performed by: INTERNAL MEDICINE

## 2020-02-12 PROCEDURE — 3700000001 HC ADD 15 MINUTES (ANESTHESIA): Performed by: INTERNAL MEDICINE

## 2020-02-12 PROCEDURE — 6360000002 HC RX W HCPCS: Performed by: NURSE ANESTHETIST, CERTIFIED REGISTERED

## 2020-02-12 PROCEDURE — 2580000003 HC RX 258: Performed by: NURSE ANESTHETIST, CERTIFIED REGISTERED

## 2020-02-12 RX ORDER — LIDOCAINE HYDROCHLORIDE 10 MG/ML
1 INJECTION, SOLUTION EPIDURAL; INFILTRATION; INTRACAUDAL; PERINEURAL
Status: DISCONTINUED | OUTPATIENT
Start: 2020-02-12 | End: 2020-02-12 | Stop reason: HOSPADM

## 2020-02-12 RX ORDER — SODIUM CHLORIDE, SODIUM LACTATE, POTASSIUM CHLORIDE, CALCIUM CHLORIDE 600; 310; 30; 20 MG/100ML; MG/100ML; MG/100ML; MG/100ML
INJECTION, SOLUTION INTRAVENOUS CONTINUOUS
Status: DISCONTINUED | OUTPATIENT
Start: 2020-02-12 | End: 2020-02-12 | Stop reason: HOSPADM

## 2020-02-12 RX ORDER — 0.9 % SODIUM CHLORIDE 0.9 %
500 INTRAVENOUS SOLUTION INTRAVENOUS
Status: DISCONTINUED | OUTPATIENT
Start: 2020-02-12 | End: 2020-02-12 | Stop reason: HOSPADM

## 2020-02-12 RX ORDER — DIPHENHYDRAMINE HYDROCHLORIDE 50 MG/ML
12.5 INJECTION INTRAMUSCULAR; INTRAVENOUS
Status: DISCONTINUED | OUTPATIENT
Start: 2020-02-12 | End: 2020-02-12 | Stop reason: HOSPADM

## 2020-02-12 RX ORDER — LABETALOL 20 MG/4 ML (5 MG/ML) INTRAVENOUS SYRINGE
5 EVERY 10 MIN PRN
Status: DISCONTINUED | OUTPATIENT
Start: 2020-02-12 | End: 2020-02-12 | Stop reason: HOSPADM

## 2020-02-12 RX ORDER — PROMETHAZINE HYDROCHLORIDE 25 MG/ML
6.25 INJECTION, SOLUTION INTRAMUSCULAR; INTRAVENOUS
Status: DISCONTINUED | OUTPATIENT
Start: 2020-02-12 | End: 2020-02-12 | Stop reason: CLARIF

## 2020-02-12 RX ORDER — ONDANSETRON 2 MG/ML
4 INJECTION INTRAMUSCULAR; INTRAVENOUS
Status: DISCONTINUED | OUTPATIENT
Start: 2020-02-12 | End: 2020-02-12 | Stop reason: HOSPADM

## 2020-02-12 RX ORDER — HYDRALAZINE HYDROCHLORIDE 20 MG/ML
5 INJECTION INTRAMUSCULAR; INTRAVENOUS EVERY 10 MIN PRN
Status: DISCONTINUED | OUTPATIENT
Start: 2020-02-12 | End: 2020-02-12 | Stop reason: HOSPADM

## 2020-02-12 RX ORDER — SODIUM CHLORIDE 0.9 % (FLUSH) 0.9 %
10 SYRINGE (ML) INJECTION EVERY 12 HOURS SCHEDULED
Status: DISCONTINUED | OUTPATIENT
Start: 2020-02-12 | End: 2020-02-12 | Stop reason: HOSPADM

## 2020-02-12 RX ORDER — SODIUM CHLORIDE 0.9 % (FLUSH) 0.9 %
10 SYRINGE (ML) INJECTION PRN
Status: DISCONTINUED | OUTPATIENT
Start: 2020-02-12 | End: 2020-02-12 | Stop reason: HOSPADM

## 2020-02-12 RX ORDER — SODIUM CHLORIDE, SODIUM LACTATE, POTASSIUM CHLORIDE, CALCIUM CHLORIDE 600; 310; 30; 20 MG/100ML; MG/100ML; MG/100ML; MG/100ML
INJECTION, SOLUTION INTRAVENOUS CONTINUOUS PRN
Status: DISCONTINUED | OUTPATIENT
Start: 2020-02-12 | End: 2020-02-12 | Stop reason: SDUPTHER

## 2020-02-12 RX ORDER — PROPOFOL 10 MG/ML
INJECTION, EMULSION INTRAVENOUS CONTINUOUS PRN
Status: DISCONTINUED | OUTPATIENT
Start: 2020-02-12 | End: 2020-02-12 | Stop reason: SDUPTHER

## 2020-02-12 RX ADMIN — PROPOFOL 125 MCG/KG/MIN: 10 INJECTION, EMULSION INTRAVENOUS at 09:05

## 2020-02-12 RX ADMIN — SODIUM CHLORIDE, POTASSIUM CHLORIDE, SODIUM LACTATE AND CALCIUM CHLORIDE: 600; 310; 30; 20 INJECTION, SOLUTION INTRAVENOUS at 09:00

## 2020-02-12 RX ADMIN — SODIUM CHLORIDE, POTASSIUM CHLORIDE, SODIUM LACTATE AND CALCIUM CHLORIDE: 600; 310; 30; 20 INJECTION, SOLUTION INTRAVENOUS at 07:32

## 2020-02-12 ASSESSMENT — PULMONARY FUNCTION TESTS
PIF_VALUE: 0
PIF_VALUE: 0
PIF_VALUE: 1
PIF_VALUE: 1
PIF_VALUE: 0
PIF_VALUE: 1
PIF_VALUE: 0

## 2020-02-12 ASSESSMENT — PAIN SCALES - GENERAL
PAINLEVEL_OUTOF10: 0

## 2020-02-12 ASSESSMENT — PAIN - FUNCTIONAL ASSESSMENT: PAIN_FUNCTIONAL_ASSESSMENT: 0-10

## 2020-02-12 NOTE — H&P
HISTORY and [id number]       NAME:  Jori Chan  MRN: [id number]   YOB: 1955   Date: 2/12/2020   Age: 60 y.o. Gender: female       COMPLAINT AND PRESENT HISTORY:     Jori Chan is 60 y.o.,   female, having a screening colonoscopy. Pt had a positive fit test 2 weeks ago. Denies previous colonoscopy. Pt c/o intermittent generalized abdominal pain and heartburn with certain foods which began about 20 years ago. Has had previous EGD which was negative. Patient denies any other GI symptoms. No nausea / vomiting. No diarrhea or constipation. No hematochezia or melena. Denies family history of colon cancer. Pt reports completed prep and BM running clear. Denies any current chest pain/pressure, palpitations, SOB, nausea, vomiting, diarrhea, constipation, fever or chills.      PAST MEDICAL HISTORY     Past Medical History:   Diagnosis Date    Arthritis     [facility] 2014    b/l     GERD (gastroesophageal reflux disease)     Hypertension     Rheumatoid arthritis ([facility]        SURGICAL HISTORY       Past Surgical History:   Procedure Laterality Date    APPENDECTOMY  1982    BRONCHOSCOPY N/A 12/27/2019    BRONCHOSCOPY ALVEOLAR LAVAGE performed by Samantha Farnsworth MD at [facility] ENDOSCOPY, COLON, DIAGNOSTIC  [id number]    gastritis, sm. bowel lipoma, biopsies       FAMILY HISTORY       Family History   Problem Relation Age of Onset    Cancer Mother     Diabetes Mother     Heart Disease Mother     Cancer Father     Cancer Brother        SOCIAL HISTORY       Social History     Socioeconomic History    Marital status: Single     Spouse name: None    Number of children: None    Years of education: None    Highest education level: None   Occupational History     Employer: N/A   Social Needs    Financial resource strain: None    Food insecurity:     Worry: None     Inability: None    Transportation needs:     Medical: Problem List    Diagnosis Date Noted    Positive colorectal cancer screening using Cologuard test 02/01/2020    Thyroid nodule 01/09/2020    Streptococcus pneumoniae pneumonia ([facility] 12/24/2019    Pneumonia 12/23/2019    History of thyroid nodule 12/23/2019    COPD exacerbation ([facility] 08/14/2019    Obesity, Class II, BMI 35-39.9 11/12/2018    Major depressive disorder, recurrent, moderate ([facility] 11/12/2018    Prediabetes 11/12/2018    Tobacco abuse 11/12/2018    History of DVT in adulthood 11/12/2018    Rheumatoid arthritis ([facility] 07/14/2014    Essential hypertension 07/14/2014    HTN, goal below 130/80 09/16/2013    GERD (gastroesophageal reflux disease) 09/16/2013           ILIR Landon - CNP on 2/12/2020 at 7:27 AM

## 2020-02-12 NOTE — OP NOTE
COLONOSCOPY    DATE OF PROCEDURE: 2/12/2020    SURGEON: Steph Smith MD    ASSISTANT: None    PREOPERATIVE DIAGNOSIS: Positive fit test.  Procedure performed to evaluate colonic lesions    POSTOPERATIVE DIAGNOSIS: Probable rectal prolapse, multiple polyps    OPERATION: Total colonoscopy and excision of polyp with biopsy forceps from cecum, snare polypectomy from hepatic flexure, splenic flexure, sigmoid colon. Kalosis  ANESTHESIA: MAC    ESTIMATED BLOOD LOSS: None    COMPLICATIONS: None     SPECIMENS:  Was Obtained: Polyp from left colon, polyp from cecum, polyp from right colon, polyp from hepatic flexure, polyp from transverse colon, polyp from sigmoid colon, biopsies of probable prolapsing mucosa from the rectum    HISTORY: The patient is a 60y.o. year old female with history of above preop diagnosis. I recommended colonoscopy with possible biopsy or polypectomy and I explained the risk, benefits, expected outcome, and alternatives to the procedure. Risks included but are not limited to bleeding, infection, respiratory distress, hypotension, and perforation of the colon and possibility of missing a lesion. The patient understands and is in agreement. PROCEDURE:  The patient's SPO2 remained above 90% throughout the procedure. Digital rectal exam was normal.  The colonoscope was inserted through the anus into the rectum and advanced under direct vision to the cecum without difficulty. Terminal ileum was examined for approximately 2 inches. The prep was adequate. Findings:  Terminal ileum: normal    Cecum/Ascending colon: abnormal: Also examined in the retroflexed view. In the base of the cecum there were 2 polyps seen. These are about 3 to 5 mm, excised with biopsy forceps. In the mid right colon there were 2 polyps seen which are about 3 to 5 mm, excised with biopsy forceps.     Near the hepatic flexure there is another polyp which is about 5 mm, removed with snare and cautery technique. Transverse colon: abnormal: In the transverse colon there is a polyp which is about 1 cm, sessile, removed with snare and cautery technique. Near the splenic flexure there is another polyp which is about 5 mm, removed with snare and cautery technique. Descending/Sigmoid colon: abnormal: There is a polyp which is about 5 mm, removed with the snare and cautery technique. Has diverticulosis         rectum/Anus: examined in normal and retroflexed positions and was abnormal: Looks like patient had prolapse of the rectum. This looks like a polyp. Multiple biopsies taken from this area. Withdrawal Time was (minutes): 30    Because of multiple polyps this became prolonged procedure and this is about 15 minutes longer than usual procedure. The colon was decompressed and the scope was removed. The patient tolerated the procedure without unusual events. During the procedure, the patient's blood pressure, pulse and oxygen saturation remained stable and documented. No unusual events occurred during the procedure. Patient was transferred to recovery room and will be discharged when criteria is met. Recommendations/Plan:   1. F/U Biopsies  2. F/U In Office as instructed  3. Discussed with the family  4. High fiber diet   5. Precautions to avoid constipation     Next colonoscopy: 2 years. If Colonoscopy is less than 10 years the recommended reason is due:polyps, multiple polyps.     Electronically signed by Beth Moss MD  on 2/12/2020 at 9:48 AM

## 2020-02-12 NOTE — ANESTHESIA PRE PROCEDURE
Department of Anesthesiology  Preprocedure Note       Name:  Chay Bill   Age:  60 y.o.  :  1955                                          MRN:  [id number]         Date:  2020      Surgeon: Jose Madrigal):  Aidee Sorto MD    Procedure: COLORECTAL CANCER SCREENING, NOT HIGH RISK (N/A )    Medications prior to admission:   Prior to Admission medications    Medication Sig Start Date End Date Taking?  Authorizing Provider   nicotine polacrilex (NICORETTE) 2 MG gum Take 1 each by mouth as needed for Smoking cessation 20  Yes Romero Castellon MD   amLODIPine (NORVASC) 10 MG tablet Take 1 tablet by mouth daily 19  Yes Deanna Wright MD   pantoprazole (PROTONIX) 40 MG tablet take 1 tablet by mouth once daily 19  Yes Romero Castellon MD   folic acid (FOLVITE) 1 MG tablet take 1 tablet by mouth once daily 19  Yes Romero Castellon MD   methotrexate (RHEUMATREX) 2.5 MG chemo tablet Take 20 mg by mouth once a week Indications: takes 8 tablets on     Yes Historical Provider, MD   folic acid (FOLVITE) 1 MG tablet take 1 tablet by mouth once daily 20   Romero Castellon MD   albuterol (PROVENTIL) (2.5 MG/3ML) 0.083% nebulizer solution Take 3 mLs by nebulization every 6 hours as needed for Wheezing or Shortness of Breath 19   Romero Castellon MD   levalbuterol [facility]) 45 MCG/ACT inhaler Inhale 2 puffs into the lungs every 4 hours as needed for Wheezing 19  Pj Stauffer MD   fluticasone [facility]) 50 MCG/ACT nasal spray 2 sprays by Nasal route daily 19   Romero Castellon MD       Current medications:    Current Facility-Administered Medications   Medication Dose Route Frequency Provider Last Rate Last Dose    lactated ringers infusion   Intravenous Continuous Kaushik Lazaro  mL/hr at 20 0732      sodium chloride flush 0.9 % injection 10 mL  10 mL Intravenous 2 times per day Kaushik Lazaro MD        sodium chloride flush 0.9 % injection 10 mL

## 2020-02-13 LAB — SURGICAL PATHOLOGY REPORT: NORMAL

## 2020-03-27 RX ORDER — AMLODIPINE BESYLATE 10 MG/1
TABLET ORAL
Qty: 30 TABLET | Refills: 3 | OUTPATIENT
Start: 2020-03-27

## 2020-04-01 ENCOUNTER — TELEMEDICINE (OUTPATIENT)
Dept: FAMILY MEDICINE CLINIC | Age: 65
End: 2020-04-01
Payer: COMMERCIAL

## 2020-04-01 PROBLEM — J44.1 COPD EXACERBATION (HCC): Status: RESOLVED | Noted: 2019-08-14 | Resolved: 2020-04-01

## 2020-04-01 PROBLEM — J18.9 PNEUMONIA: Status: RESOLVED | Noted: 2019-12-23 | Resolved: 2020-04-01

## 2020-04-01 PROCEDURE — G8427 DOCREV CUR MEDS BY ELIG CLIN: HCPCS | Performed by: FAMILY MEDICINE

## 2020-04-01 PROCEDURE — 3017F COLORECTAL CA SCREEN DOC REV: CPT | Performed by: FAMILY MEDICINE

## 2020-04-01 PROCEDURE — 99213 OFFICE O/P EST LOW 20 MIN: CPT | Performed by: FAMILY MEDICINE

## 2020-04-01 RX ORDER — GUAIFENESIN 600 MG/1
600 TABLET, EXTENDED RELEASE ORAL 2 TIMES DAILY
Qty: 30 TABLET | Refills: 0 | Status: ON HOLD | OUTPATIENT
Start: 2020-04-01 | End: 2020-04-13

## 2020-04-01 RX ORDER — FLUTICASONE PROPIONATE 50 MCG
2 SPRAY, SUSPENSION (ML) NASAL DAILY
Qty: 1 BOTTLE | Refills: 2 | Status: ON HOLD | OUTPATIENT
Start: 2020-04-01 | End: 2020-04-13

## 2020-04-01 RX ORDER — SERTRALINE HYDROCHLORIDE 25 MG/1
25 TABLET, FILM COATED ORAL DAILY
Qty: 90 TABLET | Refills: 3 | Status: ON HOLD | OUTPATIENT
Start: 2020-04-01 | End: 2020-04-13

## 2020-04-01 ASSESSMENT — PATIENT HEALTH QUESTIONNAIRE - PHQ9
SUM OF ALL RESPONSES TO PHQ9 QUESTIONS 1 & 2: 2
SUM OF ALL RESPONSES TO PHQ QUESTIONS 1-9: 2
SUM OF ALL RESPONSES TO PHQ QUESTIONS 1-9: 2
2. FEELING DOWN, DEPRESSED OR HOPELESS: 1
1. LITTLE INTEREST OR PLEASURE IN DOING THINGS: 1

## 2020-04-01 ASSESSMENT — ENCOUNTER SYMPTOMS
ABDOMINAL DISTENTION: 0
SHORTNESS OF BREATH: 0
COUGH: 1
SINUS PRESSURE: 0
RHINORRHEA: 0
CHEST TIGHTNESS: 1
DIARRHEA: 0
CONSTIPATION: 0
WHEEZING: 0

## 2020-04-01 NOTE — PROGRESS NOTES
Packs/day: 1.00     Years: 35.00     Pack years: 35.00     Types: Cigarettes    Smokeless tobacco: Never Used   Substance Use Topics    Alcohol use:  Yes     Alcohol/week: 0.0 standard drinks     Comment: social    Drug use: No   ,   Immunization History   Administered Date(s) Administered    Influenza Virus Vaccine 01/26/2016    Influenza, Quadv, IM, PF (6 mo and older Fluzone, Flulaval, Fluarix, and 3 yrs and older Afluria) 10/28/2016, 11/12/2018    Pneumococcal Polysaccharide ([id number]) 04/11/2017    Tdap (Boostrix, Adacel) 06/12/2019    Zoster Recombinant (Shingrix) 06/12/2019, 11/20/2019   ,   Health Maintenance   Topic Date Due    Hepatitis C screen  1955    HIV screen  05/01/1972    Low dose CT lung screening  05/01/2012    Cervical cancer screen  04/25/2017    Lipid screen  01/30/2020    Flu vaccine (Season Ended) 09/01/2020    Breast cancer screen  12/04/2020    A1C test (Diabetic or Prediabetic)  12/23/2020    Potassium monitoring  12/28/2020    Creatinine monitoring  12/28/2020    Colon cancer screen colonoscopy  02/12/2022    DTaP/Tdap/Td vaccine (2 - Td) 06/12/2029    Shingles Vaccine  Completed    Pneumococcal 0-64 years Vaccine  Completed    Hepatitis A vaccine  Aged Out    Hepatitis B vaccine  Aged Out    Hib vaccine  Aged Out    Meningococcal (ACWY) vaccine  Aged Out       PHYSICAL EXAMINATION:  [ INSTRUCTIONS:  \"[x]\" Indicates a positive item  \"[]\" Indicates a negative item  -- DELETE ALL ITEMS NOT EXAMINED]  Vital Signs: (As obtained by patient/caregiver or practitioner observation)    Blood pressure-  Heart rate-    Respiratory rate-    Temperature- 98.1 Pulse oximetry-     Constitutional: [x] Appears well-developed and well-nourished [x] No apparent distress      [] Abnormal-   Mental status  [x] Alert and awake  [x] Oriented to person/place/time [x]Able to follow commands      Eyes:  EOM    [x]  Normal  [] Abnormal- wear glasses Sclera  []  Normal  [] Abnormal

## 2020-04-03 ENCOUNTER — TELEPHONE (OUTPATIENT)
Dept: FAMILY MEDICINE CLINIC | Age: 65
End: 2020-04-03

## 2020-04-03 RX ORDER — PREDNISONE 20 MG/1
20 TABLET ORAL 2 TIMES DAILY
Qty: 10 TABLET | Refills: 0 | Status: SHIPPED | OUTPATIENT
Start: 2020-04-03 | End: 2020-04-08

## 2020-04-03 RX ORDER — AZITHROMYCIN 250 MG/1
TABLET, FILM COATED ORAL
Qty: 6 TABLET | Refills: 0 | Status: SHIPPED | OUTPATIENT
Start: 2020-04-03 | End: 2020-04-08

## 2020-04-03 NOTE — TELEPHONE ENCOUNTER
I will send z-pack and prednisone to pharmacy and she will continue inhalers. I started her on spiriva recently , confirm if she has received it.

## 2020-04-13 ENCOUNTER — HOSPITAL ENCOUNTER (INPATIENT)
Age: 65
LOS: 1 days | Discharge: HOME | DRG: 197 | End: 2020-04-14
Attending: EMERGENCY MEDICINE | Admitting: INTERNAL MEDICINE
Payer: COMMERCIAL

## 2020-04-13 PROBLEM — I82.4Y2 DVT, LOWER EXTREMITY, PROXIMAL, ACUTE, LEFT (HCC): Status: ACTIVE | Noted: 2020-04-13

## 2020-04-13 LAB
ABSOLUTE BANDS #: 0.12 K/UL (ref 0–1)
ABSOLUTE EOS #: 0.36 K/UL (ref 0–0.4)
ABSOLUTE IMMATURE GRANULOCYTE: ABNORMAL K/UL (ref 0–0.3)
ABSOLUTE LYMPH #: 2.54 K/UL (ref 1–4.8)
ABSOLUTE MONO #: 0.48 K/UL (ref 0.1–1.3)
ALBUMIN SERPL-MCNC: 3.6 G/DL (ref 3.5–5.2)
ALBUMIN/GLOBULIN RATIO: ABNORMAL (ref 1–2.5)
ALP BLD-CCNC: 116 U/L (ref 35–104)
ALT SERPL-CCNC: 22 U/L (ref 5–33)
ANION GAP SERPL CALCULATED.3IONS-SCNC: 12 MMOL/L (ref 9–17)
AST SERPL-CCNC: 15 U/L
BANDS: 1 % (ref 0–10)
BASOPHILS # BLD: 1 % (ref 0–2)
BASOPHILS ABSOLUTE: 0.12 K/UL (ref 0–0.2)
BILIRUB SERPL-MCNC: 0.51 MG/DL (ref 0.3–1.2)
BUN BLDV-MCNC: 12 MG/DL (ref 8–23)
BUN/CREAT BLD: ABNORMAL (ref 9–20)
C-REACTIVE PROTEIN: 116.1 MG/L (ref 0–5)
CALCIUM SERPL-MCNC: 9.6 MG/DL (ref 8.6–10.4)
CHLORIDE BLD-SCNC: 102 MMOL/L (ref 98–107)
CO2: 23 MMOL/L (ref 20–31)
CREAT SERPL-MCNC: 0.75 MG/DL (ref 0.5–0.9)
DIFFERENTIAL TYPE: ABNORMAL
EOSINOPHILS RELATIVE PERCENT: 3 % (ref 0–4)
GFR AFRICAN AMERICAN: >60 ML/MIN
GFR NON-AFRICAN AMERICAN: >60 ML/MIN
GFR SERPL CREATININE-BSD FRML MDRD: ABNORMAL ML/MIN/{1.73_M2}
GFR SERPL CREATININE-BSD FRML MDRD: ABNORMAL ML/MIN/{1.73_M2}
GLUCOSE BLD-MCNC: 98 MG/DL (ref 70–99)
HCT VFR BLD CALC: 40.2 % (ref 36–46)
HEMOGLOBIN: 13.3 G/DL (ref 12–16)
IMMATURE GRANULOCYTES: ABNORMAL %
INR BLD: 1
LACTATE DEHYDROGENASE: 271 U/L (ref 135–214)
LYMPHOCYTES # BLD: 21 % (ref 24–44)
MCH RBC QN AUTO: 30.6 PG (ref 26–34)
MCHC RBC AUTO-ENTMCNC: 33 G/DL (ref 31–37)
MCV RBC AUTO: 92.8 FL (ref 80–100)
METAMYELOCYTES ABSOLUTE COUNT: 0.24 K/UL
METAMYELOCYTES: 2 %
MONOCYTES # BLD: 4 % (ref 1–7)
MORPHOLOGY: ABNORMAL
MORPHOLOGY: ABNORMAL
MYELOCYTES ABSOLUTE COUNT: 0.12 K/UL
MYELOCYTES: 1 %
NRBC AUTOMATED: ABNORMAL PER 100 WBC
PARTIAL THROMBOPLASTIN TIME: 21.9 SEC (ref 24–36)
PDW BLD-RTO: 15.1 % (ref 11.5–14.9)
PLATELET # BLD: 384 K/UL (ref 150–450)
PLATELET ESTIMATE: ABNORMAL
PMV BLD AUTO: 7.6 FL (ref 6–12)
POTASSIUM SERPL-SCNC: 3.5 MMOL/L (ref 3.7–5.3)
PROTHROMBIN TIME: 13.3 SEC (ref 11.8–14.6)
RBC # BLD: 4.34 M/UL (ref 4–5.2)
RBC # BLD: ABNORMAL 10*6/UL
SEG NEUTROPHILS: 67 % (ref 36–66)
SEGMENTED NEUTROPHILS ABSOLUTE COUNT: 8.12 K/UL (ref 1.3–9.1)
SODIUM BLD-SCNC: 137 MMOL/L (ref 135–144)
TOTAL PROTEIN: 9.5 G/DL (ref 6.4–8.3)
WBC # BLD: 12.1 K/UL (ref 3.5–11)
WBC # BLD: ABNORMAL 10*3/UL

## 2020-04-13 PROCEDURE — 85025 COMPLETE CBC W/AUTO DIFF WBC: CPT

## 2020-04-13 PROCEDURE — 93971 EXTREMITY STUDY: CPT

## 2020-04-13 PROCEDURE — 83615 LACTATE (LD) (LDH) ENZYME: CPT

## 2020-04-13 PROCEDURE — 85610 PROTHROMBIN TIME: CPT

## 2020-04-13 PROCEDURE — 6360000002 HC RX W HCPCS: Performed by: EMERGENCY MEDICINE

## 2020-04-13 PROCEDURE — 85730 THROMBOPLASTIN TIME PARTIAL: CPT

## 2020-04-13 PROCEDURE — 2580000003 HC RX 258: Performed by: INTERNAL MEDICINE

## 2020-04-13 PROCEDURE — 1200000000 HC SEMI PRIVATE

## 2020-04-13 PROCEDURE — 96365 THER/PROPH/DIAG IV INF INIT: CPT

## 2020-04-13 PROCEDURE — 99284 EMERGENCY DEPT VISIT MOD MDM: CPT

## 2020-04-13 PROCEDURE — 6370000000 HC RX 637 (ALT 250 FOR IP): Performed by: INTERNAL MEDICINE

## 2020-04-13 PROCEDURE — 94640 AIRWAY INHALATION TREATMENT: CPT

## 2020-04-13 PROCEDURE — 80053 COMPREHEN METABOLIC PANEL: CPT

## 2020-04-13 PROCEDURE — 86140 C-REACTIVE PROTEIN: CPT

## 2020-04-13 PROCEDURE — 6360000002 HC RX W HCPCS: Performed by: NURSE PRACTITIONER

## 2020-04-13 PROCEDURE — 36415 COLL VENOUS BLD VENIPUNCTURE: CPT

## 2020-04-13 RX ORDER — ALBUTEROL SULFATE 90 UG/1
2 AEROSOL, METERED RESPIRATORY (INHALATION) EVERY 6 HOURS PRN
COMMUNITY
End: 2021-06-01

## 2020-04-13 RX ORDER — PANTOPRAZOLE SODIUM 40 MG/1
40 TABLET, DELAYED RELEASE ORAL
Status: DISCONTINUED | OUTPATIENT
Start: 2020-04-14 | End: 2020-04-14 | Stop reason: HOSPADM

## 2020-04-13 RX ORDER — SODIUM CHLORIDE 0.9 % (FLUSH) 0.9 %
10 SYRINGE (ML) INJECTION PRN
Status: DISCONTINUED | OUTPATIENT
Start: 2020-04-13 | End: 2020-04-14 | Stop reason: HOSPADM

## 2020-04-13 RX ORDER — HEPARIN SODIUM 5000 [USP'U]/ML
40 INJECTION, SOLUTION INTRAVENOUS; SUBCUTANEOUS PRN
Status: DISCONTINUED | OUTPATIENT
Start: 2020-04-13 | End: 2020-04-14

## 2020-04-13 RX ORDER — MAGNESIUM SULFATE 1 G/100ML
1 INJECTION INTRAVENOUS PRN
Status: DISCONTINUED | OUTPATIENT
Start: 2020-04-13 | End: 2020-04-14 | Stop reason: HOSPADM

## 2020-04-13 RX ORDER — ONDANSETRON 2 MG/ML
4 INJECTION INTRAMUSCULAR; INTRAVENOUS EVERY 6 HOURS PRN
Status: DISCONTINUED | OUTPATIENT
Start: 2020-04-13 | End: 2020-04-14 | Stop reason: HOSPADM

## 2020-04-13 RX ORDER — PROMETHAZINE HYDROCHLORIDE 25 MG/1
12.5 TABLET ORAL EVERY 6 HOURS PRN
Status: DISCONTINUED | OUTPATIENT
Start: 2020-04-13 | End: 2020-04-14 | Stop reason: HOSPADM

## 2020-04-13 RX ORDER — POTASSIUM CHLORIDE 20 MEQ/1
40 TABLET, EXTENDED RELEASE ORAL PRN
Status: DISCONTINUED | OUTPATIENT
Start: 2020-04-13 | End: 2020-04-14 | Stop reason: HOSPADM

## 2020-04-13 RX ORDER — SODIUM CHLORIDE 0.9 % (FLUSH) 0.9 %
10 SYRINGE (ML) INJECTION EVERY 12 HOURS SCHEDULED
Status: DISCONTINUED | OUTPATIENT
Start: 2020-04-13 | End: 2020-04-14 | Stop reason: HOSPADM

## 2020-04-13 RX ORDER — HEPARIN SODIUM 10000 [USP'U]/100ML
18 INJECTION, SOLUTION INTRAVENOUS CONTINUOUS
Status: DISCONTINUED | OUTPATIENT
Start: 2020-04-13 | End: 2020-04-14

## 2020-04-13 RX ORDER — POLYETHYLENE GLYCOL 3350 17 G/17G
17 POWDER, FOR SOLUTION ORAL DAILY PRN
Status: DISCONTINUED | OUTPATIENT
Start: 2020-04-13 | End: 2020-04-14 | Stop reason: HOSPADM

## 2020-04-13 RX ORDER — AMLODIPINE BESYLATE 10 MG/1
10 TABLET ORAL DAILY
Status: DISCONTINUED | OUTPATIENT
Start: 2020-04-14 | End: 2020-04-14 | Stop reason: HOSPADM

## 2020-04-13 RX ORDER — HEPARIN SODIUM 5000 [USP'U]/ML
80 INJECTION, SOLUTION INTRAVENOUS; SUBCUTANEOUS ONCE
Status: COMPLETED | OUTPATIENT
Start: 2020-04-13 | End: 2020-04-13

## 2020-04-13 RX ORDER — HEPARIN SODIUM 5000 [USP'U]/ML
80 INJECTION, SOLUTION INTRAVENOUS; SUBCUTANEOUS PRN
Status: DISCONTINUED | OUTPATIENT
Start: 2020-04-13 | End: 2020-04-14

## 2020-04-13 RX ORDER — POTASSIUM CHLORIDE 7.45 MG/ML
10 INJECTION INTRAVENOUS PRN
Status: DISCONTINUED | OUTPATIENT
Start: 2020-04-13 | End: 2020-04-14 | Stop reason: HOSPADM

## 2020-04-13 RX ORDER — ACETAMINOPHEN 325 MG/1
650 TABLET ORAL EVERY 6 HOURS PRN
Status: DISCONTINUED | OUTPATIENT
Start: 2020-04-13 | End: 2020-04-14 | Stop reason: HOSPADM

## 2020-04-13 RX ORDER — ACETAMINOPHEN 650 MG/1
650 SUPPOSITORY RECTAL EVERY 6 HOURS PRN
Status: DISCONTINUED | OUTPATIENT
Start: 2020-04-13 | End: 2020-04-14 | Stop reason: HOSPADM

## 2020-04-13 RX ORDER — ALBUTEROL SULFATE 2.5 MG/3ML
2.5 SOLUTION RESPIRATORY (INHALATION) EVERY 6 HOURS PRN
Status: DISCONTINUED | OUTPATIENT
Start: 2020-04-13 | End: 2020-04-14 | Stop reason: HOSPADM

## 2020-04-13 RX ORDER — SODIUM CHLORIDE 9 MG/ML
INJECTION, SOLUTION INTRAVENOUS CONTINUOUS
Status: DISCONTINUED | OUTPATIENT
Start: 2020-04-13 | End: 2020-04-14 | Stop reason: HOSPADM

## 2020-04-13 RX ADMIN — SODIUM CHLORIDE: 9 INJECTION, SOLUTION INTRAVENOUS at 21:31

## 2020-04-13 RX ADMIN — HEPARIN SODIUM 18 UNITS/KG/HR: 10000 INJECTION, SOLUTION INTRAVENOUS at 19:17

## 2020-04-13 RX ADMIN — ACETAMINOPHEN 650 MG: 325 TABLET, FILM COATED ORAL at 22:35

## 2020-04-13 RX ADMIN — ALBUTEROL SULFATE 2.5 MG: 2.5 SOLUTION RESPIRATORY (INHALATION) at 22:43

## 2020-04-13 RX ADMIN — HEPARIN SODIUM 7200 UNITS: 5000 INJECTION INTRAVENOUS; SUBCUTANEOUS at 19:17

## 2020-04-13 ASSESSMENT — PAIN SCALES - GENERAL
PAINLEVEL_OUTOF10: 8
PAINLEVEL_OUTOF10: 6
PAINLEVEL_OUTOF10: 6

## 2020-04-13 ASSESSMENT — ENCOUNTER SYMPTOMS
CONSTIPATION: 0
COUGH: 0
BLOOD IN STOOL: 0
BACK PAIN: 0
TROUBLE SWALLOWING: 0
ABDOMINAL PAIN: 0
VOMITING: 0
SORE THROAT: 0
COLOR CHANGE: 0
DIARRHEA: 0
SHORTNESS OF BREATH: 0
NAUSEA: 0

## 2020-04-13 ASSESSMENT — PAIN DESCRIPTION - LOCATION: LOCATION: LEG

## 2020-04-13 ASSESSMENT — PAIN DESCRIPTION - DESCRIPTORS: DESCRIPTORS: ACHING

## 2020-04-13 ASSESSMENT — PAIN DESCRIPTION - PAIN TYPE: TYPE: ACUTE PAIN

## 2020-04-13 ASSESSMENT — PAIN DESCRIPTION - ORIENTATION: ORIENTATION: LEFT

## 2020-04-13 ASSESSMENT — PAIN DESCRIPTION - FREQUENCY: FREQUENCY: CONTINUOUS

## 2020-04-13 NOTE — ED PROVIDER NOTES
[address] ED  eMERGENCY dEPARTMENT eNCOUnter    Pt Name: Trena Dixon  MRN: [id number]  YOB: 1955  Date of evaluation:4/13/20  PCP: Vince Ugarte MD    [address] [place] [address]       Chief Complaint   Patient presents with    Leg Swelling    Cough       HISTORY OF PRESENT ILLNESS    Trena Dixon is a 60 y.o. female who presents with a chief complaint of leg swelling. Patient states for the past 3 to 4 days she is had left leg swelling and pain in her left calf. No falls or trauma. Describes as sharp. Pain does not radiate. Nothing makes it better or worse. She denies any new chest pain, difficulty breathing or cough but states she coughs chronically. No fevers or chills at home. No GI symptoms. No other infectious symptoms. She does have a history of DVT. Not currently on blood thinners but she used to be on Coumadin. Symptoms are acute. Symptoms are moderate. Nothing make symptoms better or worse. Patient has no other complaints at this time. REVIEW OF SYSTEMS       Review of Systems   Constitutional: Negative for chills, fatigue and fever. HENT: Negative for congestion, ear pain, sore throat and trouble swallowing. Eyes: Negative for visual disturbance. Respiratory: Negative for cough and shortness of breath. Cardiovascular: Positive for leg swelling. Negative for chest pain and palpitations. Gastrointestinal: Negative for abdominal pain, blood in stool, constipation, diarrhea, nausea and vomiting. Genitourinary: Negative for dysuria and flank pain. Musculoskeletal: Positive for myalgias. Negative for arthralgias, back pain and neck pain. Skin: Negative for color change, rash and wound. Neurological: Negative for dizziness, weakness, light-headedness, numbness and headaches. Psychiatric/Behavioral: Negative for confusion. All other systems reviewed and are negative. Negativein 10 essential Systems except as mentioned above and in the HPI. PAST MEDICAL HISTORY     Past Medical History:   Diagnosis Date    Arthritis     D[facility] (deep venous thrombosis) ([facility] 2014    b/l     GERD (gastroesophageal reflux disease)     Hypertension     Rheumatoid arthritis ([facility]          SURGICAL HISTORY      has a past surgical history that includes Appendectomy (1982); Endoscopy, colon, diagnostic (398400); bronchoscopy (N/A, 12/27/2019); and Colonoscopy (N/A, 2/12/2020). CURRENT MEDICATIONS       Previous Medications    ALBUTEROL (PROVENTIL) (2.5 MG/3ML) 0.083% NEBULIZER SOLUTION    Take 3 mLs by nebulization every 6 hours as needed for Wheezing or Shortness of Breath    AMLODIPINE (NORVASC) 10 MG TABLET    Take 1 tablet by mouth daily    FLUTICASONE (FLONASE) 50 MCG/ACT NASAL SPRAY    2 sprays by Nasal route daily    FOLIC ACID (FOLVITE) 1 MG TABLET    take 1 tablet by mouth once daily    FOLIC ACID (FOLVITE) 1 MG TABLET    take 1 tablet by mouth once daily    GUAIFENESIN (MUCINEX) 600 MG EXTENDED RELEASE TABLET    Take 1 tablet by mouth 2 times daily    LEVALBUTEROL (XOPENEX HFA) 45 MCG/ACT INHALER    Inhale 2 puffs into the lungs every 4 hours as needed for Wheezing    METHOTREXATE (RHEUMATREX) 2.5 MG CHEMO TABLET    Take 20 mg by mouth once a week Indications: takes 8 tablets on saturdays     NICOTINE POLACRILEX (NICORETTE) 2 MG GUM    Take 1 each by mouth as needed for Smoking cessation    PANTOPRAZOLE (PROTONIX) 40 MG TABLET    take 1 tablet by mouth once daily    SERTRALINE (ZOLOFT) 25 MG TABLET    Take 1 tablet by mouth daily    TIOTROPIUM (SPIRIVA RESPIMAT) 2.5 MCG/ACT AERS INHALER    Inhale 2 puffs into the lungs daily       ALLERGIES     has No Known Allergies. FAMILY HISTORY     She indicated that the status of her mother is unknown. She indicated that the status of her father is unknown.  She indicated that the status of her brother is unknown.     family history includes Cancer in her brother, father, and mother; Diabetes in her mother;

## 2020-04-14 VITALS
DIASTOLIC BLOOD PRESSURE: 65 MMHG | HEIGHT: 67 IN | TEMPERATURE: 98.1 F | RESPIRATION RATE: 16 BRPM | SYSTOLIC BLOOD PRESSURE: 119 MMHG | HEART RATE: 81 BPM | WEIGHT: 198 LBS | BODY MASS INDEX: 31.08 KG/M2 | OXYGEN SATURATION: 92 %

## 2020-04-14 PROBLEM — I82.402 ACUTE DEEP VEIN THROMBOSIS (DVT) OF LEFT LOWER EXTREMITY (HCC): Status: ACTIVE | Noted: 2020-04-13

## 2020-04-14 LAB
ANION GAP SERPL CALCULATED.3IONS-SCNC: 9 MMOL/L (ref 9–17)
ANTI-XA UNFRAC HEPARIN: 0.66 IU/L (ref 0.3–0.7)
ANTI-XA UNFRAC HEPARIN: 0.76 IU/L (ref 0.3–0.7)
BUN BLDV-MCNC: 12 MG/DL (ref 8–23)
BUN/CREAT BLD: ABNORMAL (ref 9–20)
CALCIUM SERPL-MCNC: 8.8 MG/DL (ref 8.6–10.4)
CHLORIDE BLD-SCNC: 107 MMOL/L (ref 98–107)
CO2: 25 MMOL/L (ref 20–31)
CREAT SERPL-MCNC: 0.69 MG/DL (ref 0.5–0.9)
GFR AFRICAN AMERICAN: >60 ML/MIN
GFR NON-AFRICAN AMERICAN: >60 ML/MIN
GFR SERPL CREATININE-BSD FRML MDRD: ABNORMAL ML/MIN/{1.73_M2}
GFR SERPL CREATININE-BSD FRML MDRD: ABNORMAL ML/MIN/{1.73_M2}
GLUCOSE BLD-MCNC: 116 MG/DL (ref 70–99)
HCT VFR BLD CALC: 35.8 % (ref 36–46)
HEMOGLOBIN: 11.9 G/DL (ref 12–16)
MCH RBC QN AUTO: 30.7 PG (ref 26–34)
MCHC RBC AUTO-ENTMCNC: 33.3 G/DL (ref 31–37)
MCV RBC AUTO: 92.3 FL (ref 80–100)
NRBC AUTOMATED: ABNORMAL PER 100 WBC
PDW BLD-RTO: 15.7 % (ref 11.5–14.9)
PLATELET # BLD: 359 K/UL (ref 150–450)
PMV BLD AUTO: 7.2 FL (ref 6–12)
POTASSIUM SERPL-SCNC: 3.7 MMOL/L (ref 3.7–5.3)
RBC # BLD: 3.87 M/UL (ref 4–5.2)
SODIUM BLD-SCNC: 141 MMOL/L (ref 135–144)
WBC # BLD: 9.2 K/UL (ref 3.5–11)

## 2020-04-14 PROCEDURE — 6370000000 HC RX 637 (ALT 250 FOR IP): Performed by: NURSE PRACTITIONER

## 2020-04-14 PROCEDURE — 85520 HEPARIN ASSAY: CPT

## 2020-04-14 PROCEDURE — 99223 1ST HOSP IP/OBS HIGH 75: CPT | Performed by: INTERNAL MEDICINE

## 2020-04-14 PROCEDURE — 85027 COMPLETE CBC AUTOMATED: CPT

## 2020-04-14 PROCEDURE — 6370000000 HC RX 637 (ALT 250 FOR IP): Performed by: INTERNAL MEDICINE

## 2020-04-14 PROCEDURE — 80048 BASIC METABOLIC PNL TOTAL CA: CPT

## 2020-04-14 PROCEDURE — 85220 BLOOC CLOT FACTOR V TEST: CPT

## 2020-04-14 PROCEDURE — 36415 COLL VENOUS BLD VENIPUNCTURE: CPT

## 2020-04-14 PROCEDURE — 6360000002 HC RX W HCPCS: Performed by: INTERNAL MEDICINE

## 2020-04-14 RX ADMIN — APIXABAN 10 MG: 5 TABLET, FILM COATED ORAL at 13:04

## 2020-04-14 RX ADMIN — AMLODIPINE BESYLATE 10 MG: 10 TABLET ORAL at 08:48

## 2020-04-14 RX ADMIN — HEPARIN SODIUM 18 UNITS/KG/HR: 10000 INJECTION, SOLUTION INTRAVENOUS at 10:52

## 2020-04-14 RX ADMIN — PANTOPRAZOLE SODIUM 40 MG: 40 TABLET, DELAYED RELEASE ORAL at 05:47

## 2020-04-14 RX ADMIN — ACETAMINOPHEN 650 MG: 325 TABLET, FILM COATED ORAL at 08:52

## 2020-04-14 ASSESSMENT — ENCOUNTER SYMPTOMS
SORE THROAT: 0
NAUSEA: 0
VOMITING: 0
COUGH: 0
BACK PAIN: 0
CONSTIPATION: 0
WHEEZING: 1
ABDOMINAL PAIN: 0
SHORTNESS OF BREATH: 0
DIARRHEA: 0

## 2020-04-14 ASSESSMENT — PAIN SCALES - GENERAL
PAINLEVEL_OUTOF10: 6
PAINLEVEL_OUTOF10: 7

## 2020-04-14 NOTE — PROGRESS NOTES
CLINICAL PHARMACY NOTE: MEDS TO [address] Select Patient?: No  Total # of Prescriptions Filled: 1   The following medications were delivered to the patient:  · Eliquis starter pack  Total # of Interventions Completed: 0  Time Spent (min): 30    Additional Documentation:

## 2020-04-14 NOTE — CONSULTS
on file   Occupational History     Employer: N/A   Social Needs    Financial resource strain: Not on file    Food insecurity     Worry: Not on file     Inability: Not on file   [employer] needs     Medical: Not on file     Non-medical: Not on file   Tobacco Use    Smoking status: Current Every Day Smoker     Packs/day: 1.50     Years: 35.00     Pack years: 52.50     Types: Cigarettes    Smokeless tobacco: Never Used   Substance and Sexual Activity    Alcohol use:  Yes     Alcohol/week: 0.0 standard drinks     Comment: social    Drug use: No    Sexual activity: Not Currently   Lifestyle    Physical activity     Days per week: Not on file     Minutes per session: Not on file    Stress: Not on file   Relationships    Social connections     Talks on phone: Not on file     Gets together: Not on file     Attends Mandaeism service: Not on file     Active member of club or organization: Not on file     Attends meetings of clubs or organizations: Not on file     Relationship status: Not on file    Intimate partner violence     Fear of current or ex partner: Not on file     Emotionally abused: Not on file     Physically abused: Not on file     Forced sexual activity: Not on file   Other Topics Concern    Not on file   Social History Narrative    Not on file        Current Medications   apixaban  10 mg Oral BID    sodium chloride flush  10 mL Intravenous 2 times per day    amLODIPine  10 mg Oral Daily    [START ON 4/18/2020] methotrexate  20 mg Oral Weekly    pantoprazole  40 mg Oral QAM AC    tiotropium  2 puff Inhalation Daily        Allergies  No Known Allergies     Labs  Recent Labs     04/14/20  0548 04/13/20  1911   WBC 9.2 12.1*   HGB 11.9* 13.3   HCT 35.8* 40.2   MCV 92.3 92.8    384     Lab Results   Component Value Date     04/14/2020    K 3.7 04/14/2020     04/14/2020    CO2 25 04/14/2020    BUN 12 04/14/2020    CREATININE 0.69 04/14/2020    GLUCOSE 116 04/14/2020

## 2020-04-14 NOTE — PROGRESS NOTES
Discharged to home with family per w/c via private vehicle. All belongings taken home with pt. Stable upon discharge.

## 2020-04-14 NOTE — H&P
[address] [facility]     HISTORY AND PHYSICAL EXAMINATION            Date:   4/14/2020  Patientname:  Samy Martinez  Date of admission:  4/13/2020  3:53 PM  MRN:   [id number]  Account:  [de-identified]  YOB: 1955  PCP:    Marquis Violetta MD  Room:   2072/2072-01  Code Status:    Full Code    CHIEF COMPLAINT     Chief Complaint   Patient presents with    Leg Swelling    Cough       HISTORY OF PRESENT ILLNESS  (Character, Onset, Location, Duration,  Exacerbating/RelievingFactors, Radiation,   Associated Symptoms, Severity )      The patient is a 60 y.o.  female, with a history of COPD, HTN, prediabetes, and previous history of PE and DVT, who presents with leg swelling and cough. According to patient, she has swelling in left lower leg, which has progressively worsened over the past few days. Patient reports that she has had a DVT and PE in the past, for which she took Coumadin. However, her physician discontinued the medication a few years ago. Symptoms are associated with left calf pain. Additionally, patient reports occasional cough and wheezing, which is chronic in nature and unchanged from usual.  Denies fever, chills, chest pain, abdominal pain, nausea, vomiting, diarrhea, and urinary symptoms. There are no aggravating or alleviating factors. Symptoms are reported as constant and moderate. PAST MEDICAL HISTORY   Patient  has a past medical history of Arthritis, DVT (deep venous thrombosis) ([facility] GERD (gastroesophageal reflux disease), Hypertension, and Rheumatoid arthritis ([facility] PAST SURGICAL HISTORY    Patient  has a past surgical history that includes Appendectomy (1982); Endoscopy, colon, diagnostic (921243); bronchoscopy (N/A, 12/27/2019); and Colonoscopy (N/A, 2/12/2020). FAMILY HISTORY    Patient family history includes Cancer in her brother, father, and mother; Diabetes in her mother; Heart Disease in her mother.     SOCIAL HISTORY 132/55   Pulse 96   Temp 97.9 °F (36.6 °C) (Oral)   Resp 14   Ht 5' 7\" (1.702 m)   Wt 198 lb (89.8 kg)   SpO2 93%   BMI 31.01 kg/m²  Body mass index is 31.01 kg/m². Physical Exam  Constitutional:       General: She is not in acute distress. Appearance: She is well-developed. She is not diaphoretic. HENT:      Head: Normocephalic and atraumatic. Eyes:      Conjunctiva/sclera: Conjunctivae normal.      Pupils: Pupils are equal, round, and reactive to light. Neck:      Musculoskeletal: Normal range of motion and neck supple. Trachea: No tracheal deviation. Cardiovascular:      Rate and Rhythm: Normal rate and regular rhythm. Heart sounds: Normal heart sounds. No murmur. No friction rub. No gallop. Pulmonary:      Effort: Pulmonary effort is normal. No respiratory distress. Breath sounds: Wheezing present. No rales. Chest:      Chest wall: No tenderness. Abdominal:      General: Bowel sounds are normal. There is no distension. Palpations: Abdomen is soft. Tenderness: There is no abdominal tenderness. There is no guarding. Musculoskeletal: Normal range of motion. General: Tenderness (left calf) present. Left lower leg: Edema present. Lymphadenopathy:      Cervical: No cervical adenopathy. Skin:     General: Skin is warm and dry. Coloration: Skin is not pale. Findings: No erythema or rash. Neurological:      Mental Status: She is alert and oriented to person, place, and time. Motor: No seizure activity. Coordination: Coordination normal.   Psychiatric:         Behavior: Behavior normal.         Thought Content: Thought content normal.       DIAGNOSTICS      EKG: none    Labs:  CBC:   Recent Labs     04/13/20 1911   WBC 12.1*   HGB 13.3        BMP:    Recent Labs     04/13/20 1911      K 3.5*      CO2 23   BUN 12   CREATININE 0.75   GLUCOSE 98     S.  Calcium:  Recent Labs     04/13/20 1911   CALCIUM 9.6 S. Ionized Calcium:No results for input(s): IONCA in the last 72 hours. S. Magnesium:No results for input(s): MG in the last 72 hours. S. Phosphorus:No results for input(s): PHOS in the last 72 hours. S. Glucose:No results for input(s): POCGLU in the last 72 hours. Glycosylated hemoglobin A1C:   Lab Results   Component Value Date    LABA1C 6.0 12/23/2019     Hepatic:   Recent Labs     04/13/20 1911   AST 15   ALT 22   ALKPHOS 116*     CARDIAC ENZY: No results for input(s): CKTOTAL, CKMB, CKMBINDEX, TROPHS, MYOGLOBIN in the last 72 hours. INR:   Recent Labs     04/13/20 1911   INR 1.0     BNP: No results for input(s): PROBNP in the last 72 hours. ABGs: No results for input(s): PH, PCO2, PO2, HCO3, O2SAT in the last 72 hours. Lipids: No results for input(s): CHOL, TRIG, HDL, LDLCALC in the last 72 hours. Invalid input(s): LDL  Pancreatic functions:No results for input(s): LIPASE, AMYLASE in the last 72 hours. Fayne Crafts: No results for input(s): LACTA in the last 72 hours. Thyroid functions:   Lab Results   Component Value Date    TSH 0.18 12/23/2019      U/A:No results for input(s): NITRITE, COLORU, WBCUA, RBCUA, MUCUS, BACTERIA, CLARITYU, SPECGRAV, LEUKOCYTESUR, BLOODU, GLUCOSEU, AMORPHOUS in the last 72 hours.     Invalid input(s): Tanner Felipe    Imaging/Diagonstics:     Vl Dup Lower Extremity Venous Left    Result Date: 4/13/2020    [facility]  Vascular Lower Extremities DVT Study Procedure   Patient Name   [facility]    Date of Study           04/13/2020                 PAPO ALCAZAR   Date of Birth  1955   Gender                  Female   Age            60 year(s)   Race                       Room Number    B   Corporate ID # [id number]   Patient Acct # [de-identified]   MR #           [id number]       Sonographer             Shahana Irving, T   Accession #    [id number]    Interpreting Physician  Elvera Boxer   Referring                   Referring Physician     Bam Alfred Sharon Coronel DO  Nurse  Practitioner  Procedure Type of Study:   Veins: Lower Extremities DVT Study, Venous Scan Lower Left. Patient Status:ER. Technical Quality:Adequate visualization.   - Critical Result:MARTIN Coronel Do. Conclusions   Summary   Acute deep vein thrombosis of the left leg involving the femoral and  popliteal veins with superficial vein thrombosis noted in the left short  saphenous vein. Signature   ----------------------------------------------------------------  Electronically signed by Hali Villarreal RVT(Sonographer) on  04/13/2020 06:27 PM  ----------------------------------------------------------------   ----------------------------------------------------------------  Electronically signed by Sallee Nicolas Reyes,Arthur(Interpreting  physician) on 04/13/2020 07:44 PM  ----------------------------------------------------------------  Findings:   Right Impression:                 Left Impression:  The common femoral vein           The common femoral and tibial veins  demonstrates normal               demonstrate normal compressibility and  compressibility and augmentation. augmentation. The mid and distal femoral vein is                                    dilated and non-compressible with                                    hypoechoic echoes throughout. The popliteal and small saphenous veins                                    are dilated and non-compressible with                                    hypoechoic echoes. Normal compressibility of the great                                    saphenous vein. Velocities are measured in cm/s ; Diameters are measured in cm Right Lower Extremities DVT Study Measurements Right 2D Measurements +------------------------------------+----------+---------------+----------+ ! Location                            ! Visualized! Compressibility! Thrombosis!

## 2020-04-15 ENCOUNTER — TELEPHONE (OUTPATIENT)
Dept: FAMILY MEDICINE CLINIC | Age: 65
End: 2020-04-15

## 2020-04-15 ENCOUNTER — CARE COORDINATION (OUTPATIENT)
Dept: CASE MANAGEMENT | Age: 65
End: 2020-04-15

## 2020-04-15 NOTE — CARE COORDINATION
[facility] Transitions Initial Follow Up Call    Call within 2 business days of discharge: Yes    Patient: Samy Martinez Patient : 1955   MRN: [id number]  Reason for Admission:   Discharge Date: 20 RARS: Readmission Risk Score: 13      Last Discharge [facility]       Complaint Diagnosis Description Type Department Provider    20 Leg Swelling; Cough Acute deep vein thrombosis (DVT) of left lower extremity, unspecified vein ([address] ED to Hosp-Admission (Discharged) (ADMITTED) [facility] Kinza Ty MD; Regina Gabriel. .. Spoke with: [address]: [place]    Non-face-to-face services provided:  Obtained and reviewed discharge summary and/or continuity of care documents  Education of patient/family/caregiver/guardian to support self-management-COVID 19 Education  Assessment and support for treatment adherence and medication management-Medications reviewed. Care Transitions 24 Hour Call    Do you have any ongoing symptoms?:  Yes  Patient-reported symptoms:  Other  Interventions for patient-reported symptoms:  Other  Do you have a copy of your discharge instructions?:  Yes  Do you have all of your prescriptions and are they filled?:  Yes  Have you been contacted by a [address]?:  No  Were you discharged with any Home Care or Post Acute Services:  No  Do you feel like you have everything you need to keep you well at home?:  Yes  Care Transitions Interventions     Reports ace wrap in place. States LLE redness and warmth decreasing. Elevates. Patient contacted regarding recent discharge and COVID-19 risk   Care Transition Nurse contacted the patient by telephone to perform post discharge assessment. Verified name and  with patient as identifiers. Patient has following risk factors of: COPD CTN reviewed discharge instructions, medical action plan and red flags related to discharge diagnosis.  Reviewed and educated them on any new and changed medications related to discharge diagnosis. Advised obtaining a 90-day supply of all daily and as-needed medications. Education provided regarding infection prevention, and signs and symptoms of COVID-19 and when to seek medical attention with patient who verbalized understanding. Discussed exposure protocols and quarantine from [address] you at higher risk for severe illness 2019 and given an opportunity for questions and concerns. The patient agrees to contact her PCP office for questions related to their healthcare. From CDC: Are you at higher risk for severe illness?  Wash your hands often.  Avoid close contact (6 feet, which is about two arm lengths) with people who are sick.  Put distance between yourself and other people if COVID-19 is spreading in your community.  Clean and disinfect frequently touched surfaces.  Avoid all cruise travel and non-essential air travel.  Call your healthcare professional if you have concerns about COVID-19 and your underlying condition or if you are sick.     For more information on steps you can take to protect yourself, see CDC's How to Protect Yourself    Future Appointments   Date Time Provider Kuldeep Sun   4/16/2020  1:30 PM Cj Elizabeth MD fp kori Prado RN

## 2020-04-16 ENCOUNTER — TELEMEDICINE (OUTPATIENT)
Dept: FAMILY MEDICINE CLINIC | Age: 65
End: 2020-04-16
Payer: COMMERCIAL

## 2020-04-16 VITALS
WEIGHT: 198 LBS | HEART RATE: 65 BPM | DIASTOLIC BLOOD PRESSURE: 65 MMHG | HEIGHT: 67 IN | BODY MASS INDEX: 31.08 KG/M2 | SYSTOLIC BLOOD PRESSURE: 119 MMHG

## 2020-04-16 PROBLEM — I82.412 DEEP VENOUS THROMBOSIS OF LEFT PROFUNDA FEMORIS VEIN (HCC): Status: ACTIVE | Noted: 2020-04-16

## 2020-04-16 PROCEDURE — 3017F COLORECTAL CA SCREEN DOC REV: CPT | Performed by: FAMILY MEDICINE

## 2020-04-16 PROCEDURE — G8427 DOCREV CUR MEDS BY ELIG CLIN: HCPCS | Performed by: FAMILY MEDICINE

## 2020-04-16 PROCEDURE — 99214 OFFICE O/P EST MOD 30 MIN: CPT | Performed by: FAMILY MEDICINE

## 2020-04-16 PROCEDURE — 1111F DSCHRG MED/CURRENT MED MERGE: CPT | Performed by: FAMILY MEDICINE

## 2020-04-16 RX ORDER — BLOOD PRESSURE TEST KIT
KIT MISCELLANEOUS
Qty: 1 KIT | Refills: 0 | Status: SHIPPED | OUTPATIENT
Start: 2020-04-16 | End: 2021-05-18 | Stop reason: SDUPTHER

## 2020-04-16 ASSESSMENT — ENCOUNTER SYMPTOMS
COLOR CHANGE: 0
WHEEZING: 0
PHOTOPHOBIA: 0
RHINORRHEA: 0
DIARRHEA: 0
COUGH: 1
CHEST TIGHTNESS: 0
VOMITING: 0
SINUS PRESSURE: 0
NAUSEA: 0
ABDOMINAL DISTENTION: 0
CONSTIPATION: 0
SHORTNESS OF BREATH: 0
STRIDOR: 0
BACK PAIN: 1

## 2020-04-16 NOTE — DISCHARGE SUMMARY
[place] [address] Internal Medicine    Discharge Summary     Patient ID: Dontae Mercado  :  1955   MRN: [id number]     ACCOUNT:  [de-identified]   Patient's PCP: Leah Lin MD  Admit Date: 2020   Discharge Date: 2020    Length of Stay: 1  Code Status:  Prior  Admitting Physician: Jose Robledo MD  Discharge Physician: Aleida Marcial MD     Active Discharge Diagnoses:     Primary Problem  Acute deep vein thrombosis (DVT) of left lower extremity [facility])      [place] Problems    Diagnosis Date Noted    Acute deep vein thrombosis (DVT) of left lower extremity ([facility] [I82.402] 2020    [facility] [J44.9] 2019    Tobacco abuse [Z72.0] 2018       Admission Condition:  poor    Discharged Condition: fair    Hospital Stay:     Hospital Course:  Dontae Mercado is a 60 y.o. female who was admitted for the management of Acute deep vein thrombosis (DVT) of left lower extremity ([facility] [address] [facility] , presented to ER with Leg Swelling and Cough  Patient mated with acute DVT of left leg. She has history of DVT 10 years back, treated with anticoagulation. Patient did not had any immobilization. Evaluated by vascular surgery. Patient getting discharged on Eliquis. Will follow with vascular surgery as outpatient.         Significant therapeutic interventions:     Significant Diagnostic Studies:   Labs / Micro:        ,     Radiology:    Vl Dup Lower Extremity Venous Left    Result Date: 2020    [facility]  Vascular Lower Extremities DVT Study Procedure   Patient Name   [facility]    Date of Study           2020                 PAPO ALCAZAR   Date of Birth  1955   Gender                  Female   Age            60 year(s)   Race                       Room Number    B   Corporate ID # [id number]   Patient Acct # [de-identified]   MR #           [id number]       Destineenikole Naders Siri Ruiz T   Accession #    [id number]    Interpreting Physician  Sudha Kenney   Referring                   Referring Physician     Elyse Guallpa DO  Nurse  Practitioner  Procedure Type of Study:   Veins: Lower Extremities DVT Study, Venous Scan Lower Left. Patient Status:ER. Technical Quality:Adequate visualization.   - Critical Result:MARTIN Sharpe Do. Conclusions   Summary   Acute deep vein thrombosis of the left leg involving the femoral and  popliteal veins with superficial vein thrombosis noted in the left short  saphenous vein. Signature   ----------------------------------------------------------------  Electronically signed by Mata Mendez RVT(Sonographer) on  04/13/2020 06:27 PM  ----------------------------------------------------------------   ----------------------------------------------------------------  Electronically signed by Lawrence Sear Reyes,Arthur(Interpreting  physician) on 04/13/2020 07:44 PM  ----------------------------------------------------------------  Findings:   Right Impression:                 Left Impression:  The common femoral vein           The common femoral and tibial veins  demonstrates normal               demonstrate normal compressibility and  compressibility and augmentation. augmentation. The mid and distal femoral vein is                                    dilated and non-compressible with                                    hypoechoic echoes throughout. The popliteal and small saphenous veins                                    are dilated and non-compressible with                                    hypoechoic echoes. Normal compressibility of the great                                    saphenous vein.   Velocities are measured in cm/s ; Diameters are measured in cm Right Lower Extremities DVT Study Measurements Right 2D Measurements thereafter. CONTINUE taking these medications    * albuterol (2.5 MG/3ML) 0.083% nebulizer solution  Commonly known as:  PROVENTIL  Take 3 mLs by nebulization every 6 hours as needed for Wheezing or Shortness of Breath     * Ventolin  (90 Base) MCG/ACT inhaler  Generic drug:  albuterol sulfate HFA     amLODIPine 10 MG tablet  Commonly known as:  NORVASC  Take 1 tablet by mouth daily     methotrexate 2.5 MG chemo tablet  Commonly known as:  RHEUMATREX     tiotropium 2.5 MCG/ACT Aers inhaler  Commonly known as:  Spiriva Respimat  Inhale 2 puffs into the lungs daily         * This list has 2 medication(s) that are the same as other medications prescribed for you. Read the directions carefully, and ask your doctor or other care provider to review them with you. STOP taking these medications    pantoprazole 40 MG tablet  Commonly known as:  PROTONIX           Where to Get Your Medications      These medications were sent to [place] [address] [phone] raphael Hoffman [phone]  [address] [place] [phone]    Phone:  [phone]   · apixaban 5 MG Tabs tablet         Time Spent on discharge is  35 mins in patient examination, evaluation, counseling as well as medication reconciliation, prescriptions for required medications, discharge plan and follow up. Electronically signed by   Marylyn Apley, MD  4/16/2020  7:03 AM      Thank you Dr. Yovany Brooks MD for the opportunity to be involved in this patient's care.

## 2020-04-16 NOTE — PROGRESS NOTES
Alcohol/week: 0.0 standard drinks     Comment: social    Drug use: No   ,   Family History   Problem Relation Age of Onset    Cancer Mother     Diabetes Mother     Heart Disease Mother     Cancer Father     Cancer Brother    ,   Immunization History   Administered Date(s) Administered    Influenza Virus Vaccine 01/26/2016    Influenza, Quadv, IM, PF (6 mo and older Fluzone, Flulaval, Fluarix, and 3 yrs and older Afluria) 10/28/2016, 11/12/2018    Pneumococcal Polysaccharide ([id number]) 04/11/2017    Tdap (Boostrix, Adacel) 06/12/2019    Zoster Recombinant (Shingrix) 06/12/2019, 11/20/2019   ,   Health Maintenance   Topic Date Due    Hepatitis C screen  1955    HIV screen  05/01/1972    Low dose CT lung screening  05/01/2012    Cervical cancer screen  04/25/2017    Lipid screen  01/30/2020    Flu vaccine (Season Ended) 09/01/2020    Breast cancer screen  12/04/2020    A1C test (Diabetic or Prediabetic)  12/23/2020    Potassium monitoring  04/14/2021    Creatinine monitoring  04/14/2021    Colon cancer screen colonoscopy  02/12/2022    DTaP/Tdap/Td vaccine (2 - Td) 06/12/2029    Shingles Vaccine  Completed    Pneumococcal 0-64 years Vaccine  Completed    Hepatitis A vaccine  Aged Out    Hepatitis B vaccine  Aged Out    Hib vaccine  Aged Out    Meningococcal (ACWY) vaccine  Aged Out       PHYSICAL EXAMINATION:  [ INSTRUCTIONS:  \"[x]\" Indicates a positive item  \"[]\" Indicates a negative item  -- DELETE ALL ITEMS NOT EXAMINED]  Vital Signs: (As obtained by patient/caregiver or practitioner observation)  Vitals:    04/16/20 1122   BP: 119/65   Pulse: 65       Blood pressure- 119/65 Heart rate- 81   Respiratory rate-    Temperature- 97.2 Pulse oximetry-     Constitutional: [x] Appears well-developed and well-nourished [x] No apparent distress      [] Abnormal-   Mental status  [x] Alert and awake  [x] Oriented to person/place/time [x]Able to follow commands      Eyes:  EOM    [x] Normal  [] Abnormal-  Sclera  [x]  Normal  [] Abnormal -wearing glasses       discharge []  None visible  [] Abnormal -    HENT:   [x] Normocephalic, atraumatic. [] Abnormal   [x] Mouth/Throat: Mucous membranes are moist.     External Ears [x] Normal  [] Abnormal-     Neck: [x] No visualized mass     Pulmonary/Chest: [x] Respiratory effort normal.  [x] No visualized signs of difficulty breathing or respiratory distress        [] Abnormal-      Musculoskeletal:   [x] Normal gait with no signs of ataxia         [x] Normal range of motion of neck        [] Abnormal-       Neurological:        [x] No Facial Asymmetry (Cranial nerve 7 motor function) (limited exam to video visit)          [x] No gaze palsy        [] Abnormal-         Skin:        [x] No significant exanthematous lesions or discoloration noted on facial skin         [] Abnormal-            Psychiatric:       [x] Normal Affect [] No Hallucinations        [] Abnormal-     Other pertinent observable physical exam findings-     ASSESSMENT/PLAN:  1. Deep venous thrombosis of left profunda femoris vein (HCC)  Continue Eliquis, will refer to vascular surgeon in next appointment after covered pandemic is over  - [place] DISCHARGE MEDS RECONCILED W/ CURRENT OUTPATIENT MED LIST    2. HTN, goal below 130/80  Controlled, monitor blood pressure at home  - Blood Pressure KIT; Dx: HTN. Needs automatic blood pressure machine to monitor her blood pressure. Dispense: 1 kit; Refill: 0    3. Panlobular emphysema (HCC)  Breathing has improved continue Spiriva and albuterol and aerosol treatments. Return in about 3 months (around 7/16/2020) for copd. Josefa Garcia is a 60 y.o. female being evaluated by a Virtual Visit (video visit) encounter to address concerns as mentioned above. A caregiver was present when appropriate.  Due to this being a TeleHealth encounter (During [facility] public health emergency), evaluation of the following organ systems was limited:

## 2020-04-17 ENCOUNTER — TELEPHONE (OUTPATIENT)
Dept: FAMILY MEDICINE CLINIC | Age: 65
End: 2020-04-17

## 2020-04-17 LAB — FACTOR V ACTIVITY: 50 % (ref 50–150)

## 2020-04-20 RX ORDER — AMLODIPINE BESYLATE 10 MG/1
TABLET ORAL
Qty: 30 TABLET | Refills: 3 | OUTPATIENT
Start: 2020-04-20

## 2020-04-27 RX ORDER — AMLODIPINE BESYLATE 10 MG/1
TABLET ORAL
Qty: 30 TABLET | Refills: 3 | Status: SHIPPED | OUTPATIENT
Start: 2020-04-27 | End: 2020-08-17 | Stop reason: SDUPTHER

## 2020-05-07 RX ORDER — PANTOPRAZOLE SODIUM 40 MG/1
TABLET, DELAYED RELEASE ORAL
Qty: 90 TABLET | Refills: 2 | Status: SHIPPED | OUTPATIENT
Start: 2020-05-07 | End: 2021-02-18 | Stop reason: SDUPTHER

## 2020-06-12 RX ORDER — FOLIC ACID 1 MG/1
TABLET ORAL
Qty: 30 TABLET | Refills: 3 | Status: SHIPPED | OUTPATIENT
Start: 2020-06-12 | End: 2020-10-13 | Stop reason: SDUPTHER

## 2020-07-06 RX ORDER — APIXABAN 5 MG/1
TABLET, FILM COATED ORAL
Qty: 180 TABLET | Refills: 3 | Status: SHIPPED | OUTPATIENT
Start: 2020-07-06 | End: 2021-07-16

## 2020-07-10 RX ORDER — TIOTROPIUM BROMIDE INHALATION SPRAY 3.12 UG/1
SPRAY, METERED RESPIRATORY (INHALATION)
Qty: 4 G | Refills: 3 | Status: SHIPPED | OUTPATIENT
Start: 2020-07-10 | End: 2020-11-20

## 2020-08-17 RX ORDER — AMLODIPINE BESYLATE 10 MG/1
TABLET ORAL
Qty: 30 TABLET | Refills: 1 | Status: SHIPPED | OUTPATIENT
Start: 2020-08-17 | End: 2020-10-27

## 2020-08-17 NOTE — TELEPHONE ENCOUNTER
Please Approve or Refuse.   Send to Pharmacy per Pt's Request:      Next Visit Date:  Visit date not found   Last Visit Date: 4/16/2020    Hemoglobin A1C (%)   Date Value   12/23/2019 6.0   06/12/2019 5.7   11/12/2018 5.9             ( goal A1C is < 7)   BP Readings from Last 3 Encounters:   04/16/20 119/65   04/14/20 119/65   02/12/20 136/71          (goal 120/80)  BUN   Date Value Ref Range Status   04/14/2020 12 8 - 23 mg/dL Final     CREATININE   Date Value Ref Range Status   04/14/2020 0.69 0.50 - 0.90 mg/dL Final     Potassium   Date Value Ref Range Status   04/14/2020 3.7 3.7 - 5.3 mmol/L Final

## 2020-10-13 RX ORDER — FOLIC ACID 1 MG/1
1 TABLET ORAL DAILY
Qty: 30 TABLET | Refills: 0 | Status: SHIPPED | OUTPATIENT
Start: 2020-10-13 | End: 2020-11-10

## 2020-10-27 RX ORDER — AMLODIPINE BESYLATE 10 MG/1
TABLET ORAL
Qty: 90 TABLET | Refills: 1 | Status: SHIPPED | OUTPATIENT
Start: 2020-10-27 | End: 2021-04-15

## 2020-11-10 RX ORDER — FOLIC ACID 1 MG/1
TABLET ORAL
Qty: 30 TABLET | Refills: 0 | Status: SHIPPED | OUTPATIENT
Start: 2020-11-10 | End: 2020-12-10

## 2020-11-20 RX ORDER — TIOTROPIUM BROMIDE INHALATION SPRAY 3.12 UG/1
SPRAY, METERED RESPIRATORY (INHALATION)
Qty: 4 G | Refills: 3 | Status: SHIPPED | OUTPATIENT
Start: 2020-11-20 | End: 2021-05-24

## 2020-12-10 RX ORDER — FOLIC ACID 1 MG/1
TABLET ORAL
Qty: 30 TABLET | Refills: 0 | Status: SHIPPED | OUTPATIENT
Start: 2020-12-10 | End: 2021-01-12

## 2021-01-01 ENCOUNTER — APPOINTMENT (OUTPATIENT)
Dept: CT IMAGING | Age: 66
DRG: 207 | End: 2021-01-01
Payer: COMMERCIAL

## 2021-01-01 ENCOUNTER — HOSPITAL ENCOUNTER (INPATIENT)
Age: 66
LOS: 2 days | Discharge: HOME | DRG: 140 | End: 2021-09-21
Attending: INTERNAL MEDICINE | Admitting: INTERNAL MEDICINE
Payer: COMMERCIAL

## 2021-01-01 ENCOUNTER — TELEMEDICINE (OUTPATIENT)
Dept: FAMILY MEDICINE CLINIC | Age: 66
End: 2021-01-01
Payer: COMMERCIAL

## 2021-01-01 ENCOUNTER — TELEPHONE (OUTPATIENT)
Dept: FAMILY MEDICINE CLINIC | Age: 66
End: 2021-01-01

## 2021-01-01 ENCOUNTER — APPOINTMENT (OUTPATIENT)
Dept: ULTRASOUND IMAGING | Age: 66
DRG: 720 | End: 2021-01-01
Payer: COMMERCIAL

## 2021-01-01 ENCOUNTER — HOSPITAL ENCOUNTER (OUTPATIENT)
Facility: MEDICAL CENTER | Age: 66
End: 2021-01-01
Payer: COMMERCIAL

## 2021-01-01 ENCOUNTER — TELEPHONE (OUTPATIENT)
Dept: CASE MANAGEMENT | Age: 66
End: 2021-01-01

## 2021-01-01 ENCOUNTER — APPOINTMENT (OUTPATIENT)
Dept: CT IMAGING | Age: 66
DRG: 140 | End: 2021-01-01
Payer: COMMERCIAL

## 2021-01-01 ENCOUNTER — HOSPITAL ENCOUNTER (INPATIENT)
Age: 66
LOS: 2 days | Discharge: HOME | DRG: 207 | End: 2021-12-02
Attending: EMERGENCY MEDICINE | Admitting: INTERNAL MEDICINE
Payer: COMMERCIAL

## 2021-01-01 ENCOUNTER — TELEPHONE (OUTPATIENT)
Dept: ONCOLOGY | Age: 66
End: 2021-01-01

## 2021-01-01 ENCOUNTER — VIRTUAL VISIT (OUTPATIENT)
Dept: FAMILY MEDICINE CLINIC | Age: 66
End: 2021-01-01
Payer: COMMERCIAL

## 2021-01-01 ENCOUNTER — APPOINTMENT (OUTPATIENT)
Dept: GENERAL RADIOLOGY | Age: 66
DRG: 720 | End: 2021-01-01
Payer: COMMERCIAL

## 2021-01-01 ENCOUNTER — APPOINTMENT (OUTPATIENT)
Dept: GENERAL RADIOLOGY | Age: 66
DRG: 207 | End: 2021-01-01
Payer: COMMERCIAL

## 2021-01-01 ENCOUNTER — HOSPITAL ENCOUNTER (INPATIENT)
Age: 66
LOS: 1 days | Discharge: HOME | DRG: 720 | End: 2021-10-16
Attending: EMERGENCY MEDICINE | Admitting: INTERNAL MEDICINE
Payer: COMMERCIAL

## 2021-01-01 ENCOUNTER — HOSPITAL ENCOUNTER (OUTPATIENT)
Facility: MEDICAL CENTER | Age: 66
End: 2021-01-01

## 2021-01-01 ENCOUNTER — HOSPITAL ENCOUNTER (INPATIENT)
Age: 66
LOS: 3 days | Discharge: HOME | DRG: 720 | End: 2021-11-03
Attending: EMERGENCY MEDICINE | Admitting: INTERNAL MEDICINE
Payer: COMMERCIAL

## 2021-01-01 ENCOUNTER — CARE COORDINATION (OUTPATIENT)
Dept: CASE MANAGEMENT | Age: 66
End: 2021-01-01

## 2021-01-01 ENCOUNTER — TELEPHONE (OUTPATIENT)
Dept: INFECTIOUS DISEASES | Age: 66
End: 2021-01-01

## 2021-01-01 ENCOUNTER — APPOINTMENT (OUTPATIENT)
Dept: CT IMAGING | Age: 66
DRG: 720 | End: 2021-01-01
Payer: COMMERCIAL

## 2021-01-01 ENCOUNTER — OFFICE VISIT (OUTPATIENT)
Dept: ONCOLOGY | Age: 66
End: 2021-01-01
Payer: COMMERCIAL

## 2021-01-01 VITALS
HEIGHT: 65 IN | RESPIRATION RATE: 20 BRPM | OXYGEN SATURATION: 97 % | DIASTOLIC BLOOD PRESSURE: 60 MMHG | TEMPERATURE: 97.5 F | HEART RATE: 67 BPM | BODY MASS INDEX: 33.17 KG/M2 | WEIGHT: 199.08 LBS | SYSTOLIC BLOOD PRESSURE: 119 MMHG

## 2021-01-01 VITALS
TEMPERATURE: 97.4 F | SYSTOLIC BLOOD PRESSURE: 117 MMHG | WEIGHT: 198.2 LBS | HEART RATE: 91 BPM | BODY MASS INDEX: 32.98 KG/M2 | RESPIRATION RATE: 18 BRPM | DIASTOLIC BLOOD PRESSURE: 66 MMHG

## 2021-01-01 VITALS
HEIGHT: 65 IN | DIASTOLIC BLOOD PRESSURE: 74 MMHG | WEIGHT: 220.46 LBS | RESPIRATION RATE: 17 BRPM | BODY MASS INDEX: 36.73 KG/M2 | TEMPERATURE: 97.6 F | SYSTOLIC BLOOD PRESSURE: 92 MMHG | HEART RATE: 107 BPM | OXYGEN SATURATION: 100 %

## 2021-01-01 VITALS
RESPIRATION RATE: 16 BRPM | DIASTOLIC BLOOD PRESSURE: 89 MMHG | OXYGEN SATURATION: 95 % | BODY MASS INDEX: 33.74 KG/M2 | HEIGHT: 65 IN | TEMPERATURE: 98.2 F | WEIGHT: 202.5 LBS | SYSTOLIC BLOOD PRESSURE: 136 MMHG | HEART RATE: 112 BPM

## 2021-01-01 VITALS
WEIGHT: 221.78 LBS | OXYGEN SATURATION: 93 % | SYSTOLIC BLOOD PRESSURE: 126 MMHG | DIASTOLIC BLOOD PRESSURE: 68 MMHG | HEART RATE: 85 BPM | TEMPERATURE: 98.1 F | BODY MASS INDEX: 36.95 KG/M2 | HEIGHT: 65 IN | RESPIRATION RATE: 20 BRPM

## 2021-01-01 DIAGNOSIS — C34.92 ADENOCARCINOMA, LUNG, LEFT (HCC): Primary | ICD-10-CM

## 2021-01-01 DIAGNOSIS — J20.5 RSV BRONCHITIS: ICD-10-CM

## 2021-01-01 DIAGNOSIS — R09.02 HYPOXIA: Primary | ICD-10-CM

## 2021-01-01 DIAGNOSIS — D72.829 LEUKOCYTOSIS, UNSPECIFIED TYPE: ICD-10-CM

## 2021-01-01 DIAGNOSIS — F33.1 MAJOR DEPRESSIVE DISORDER, RECURRENT, MODERATE (HCC): ICD-10-CM

## 2021-01-01 DIAGNOSIS — R11.0 CHRONIC NAUSEA: ICD-10-CM

## 2021-01-01 DIAGNOSIS — J44.9 CHRONIC OBSTRUCTIVE PULMONARY DISEASE, UNSPECIFIED COPD TYPE (HCC): ICD-10-CM

## 2021-01-01 DIAGNOSIS — I82.412 DEEP VENOUS THROMBOSIS OF LEFT PROFUNDA FEMORIS VEIN (HCC): ICD-10-CM

## 2021-01-01 DIAGNOSIS — E78.2 MIXED HYPERLIPIDEMIA: ICD-10-CM

## 2021-01-01 DIAGNOSIS — R73.9 HYPERGLYCEMIA: ICD-10-CM

## 2021-01-01 DIAGNOSIS — J31.0 CHRONIC RHINITIS: ICD-10-CM

## 2021-01-01 DIAGNOSIS — I10 PRIMARY HYPERTENSION: ICD-10-CM

## 2021-01-01 DIAGNOSIS — J44.1 COPD EXACERBATION (HCC): Primary | ICD-10-CM

## 2021-01-01 DIAGNOSIS — J43.1 PANLOBULAR EMPHYSEMA (HCC): ICD-10-CM

## 2021-01-01 DIAGNOSIS — C34.90 PRIMARY MUCINOUS ADENOCARCINOMA OF LUNG (HCC): Primary | ICD-10-CM

## 2021-01-01 DIAGNOSIS — J20.5 RSV BRONCHITIS: Primary | ICD-10-CM

## 2021-01-01 DIAGNOSIS — I31.39 PERICARDIAL EFFUSION: ICD-10-CM

## 2021-01-01 DIAGNOSIS — Z90.2 S/P LOBECTOMY OF LUNG: ICD-10-CM

## 2021-01-01 DIAGNOSIS — Z90.2 S/P LOBECTOMY OF LUNG: Primary | ICD-10-CM

## 2021-01-01 DIAGNOSIS — J18.9 PNEUMONIA OF BOTH LUNGS DUE TO INFECTIOUS ORGANISM, UNSPECIFIED PART OF LUNG: Primary | ICD-10-CM

## 2021-01-01 DIAGNOSIS — R11.0 NAUSEA: ICD-10-CM

## 2021-01-01 DIAGNOSIS — R06.89 DYSPNEA AND RESPIRATORY ABNORMALITIES: ICD-10-CM

## 2021-01-01 DIAGNOSIS — C34.90 PRIMARY MUCINOUS ADENOCARCINOMA OF LUNG (HCC): ICD-10-CM

## 2021-01-01 DIAGNOSIS — J18.1 CONSOLIDATION OF LEFT LOWER LOBE OF LUNG (HCC): Primary | ICD-10-CM

## 2021-01-01 DIAGNOSIS — M05.79 RHEUMATOID ARTHRITIS INVOLVING MULTIPLE SITES WITH POSITIVE RHEUMATOID FACTOR (HCC): ICD-10-CM

## 2021-01-01 DIAGNOSIS — A41.9 SEPSIS WITHOUT ACUTE ORGAN DYSFUNCTION, DUE TO UNSPECIFIED ORGANISM (HCC): Primary | ICD-10-CM

## 2021-01-01 DIAGNOSIS — B37.0 ORAL THRUSH: Primary | ICD-10-CM

## 2021-01-01 DIAGNOSIS — E66.9 OBESITY, CLASS II, BMI 35-39.9: Primary | ICD-10-CM

## 2021-01-01 DIAGNOSIS — E44.0 MODERATE MALNUTRITION (HCC): ICD-10-CM

## 2021-01-01 DIAGNOSIS — I10 ESSENTIAL HYPERTENSION: ICD-10-CM

## 2021-01-01 DIAGNOSIS — R78.81 BACTEREMIA: Primary | ICD-10-CM

## 2021-01-01 DIAGNOSIS — R06.00 DYSPNEA AND RESPIRATORY ABNORMALITIES: ICD-10-CM

## 2021-01-01 LAB
-: ABNORMAL
-: NORMAL
ABSOLUTE BANDS #: 0.12 K/UL (ref 0–1)
ABSOLUTE EOS #: 0 K/UL (ref 0–0.4)
ABSOLUTE EOS #: 0.11 K/UL (ref 0–0.4)
ABSOLUTE EOS #: 0.12 K/UL (ref 0–0.4)
ABSOLUTE EOS #: 0.26 K/UL (ref 0–0.4)
ABSOLUTE IMMATURE GRANULOCYTE: 0.66 K/UL (ref 0–0.3)
ABSOLUTE IMMATURE GRANULOCYTE: 0.71 K/UL (ref 0–0.3)
ABSOLUTE IMMATURE GRANULOCYTE: 0.72 K/UL (ref 0–0.3)
ABSOLUTE IMMATURE GRANULOCYTE: 1.45 K/UL (ref 0–0.3)
ABSOLUTE IMMATURE GRANULOCYTE: 1.55 K/UL (ref 0–0.3)
ABSOLUTE IMMATURE GRANULOCYTE: 2.14 K/UL (ref 0–0.3)
ABSOLUTE IMMATURE GRANULOCYTE: 2.39 K/UL (ref 0–0.3)
ABSOLUTE IMMATURE GRANULOCYTE: 2.66 K/UL (ref 0–0.3)
ABSOLUTE IMMATURE GRANULOCYTE: 4.28 K/UL (ref 0–0.3)
ABSOLUTE IMMATURE GRANULOCYTE: ABNORMAL K/UL (ref 0–0.3)
ABSOLUTE LYMPH #: 1.68 K/UL (ref 1–4.8)
ABSOLUTE LYMPH #: 1.68 K/UL (ref 1–4.8)
ABSOLUTE LYMPH #: 1.72 K/UL (ref 1–4.8)
ABSOLUTE LYMPH #: 1.86 K/UL (ref 1–4.8)
ABSOLUTE LYMPH #: 2.26 K/UL (ref 1–4.8)
ABSOLUTE LYMPH #: 2.42 K/UL (ref 1–4.8)
ABSOLUTE LYMPH #: 2.85 K/UL (ref 1–4.8)
ABSOLUTE LYMPH #: 2.92 K/UL (ref 1–4.8)
ABSOLUTE LYMPH #: 2.94 K/UL (ref 1–4.8)
ABSOLUTE LYMPH #: 4.28 K/UL (ref 1–4.8)
ABSOLUTE MONO #: 0.3 K/UL (ref 0.2–0.8)
ABSOLUTE MONO #: 0.33 K/UL (ref 0.2–0.8)
ABSOLUTE MONO #: 0.4 K/UL (ref 0.1–0.8)
ABSOLUTE MONO #: 0.61 K/UL (ref 0.1–0.8)
ABSOLUTE MONO #: 0.72 K/UL (ref 0.2–0.8)
ABSOLUTE MONO #: 0.83 K/UL (ref 0.1–0.8)
ABSOLUTE MONO #: 1.04 K/UL (ref 0.1–0.8)
ABSOLUTE MONO #: 1.07 K/UL (ref 0.1–1.3)
ABSOLUTE MONO #: 1.34 K/UL (ref 0.1–0.8)
ABSOLUTE MONO #: 1.59 K/UL (ref 0.1–0.8)
ADENOVIRUS PCR: NOT DETECTED
ALBUMIN SERPL-MCNC: 2.4 G/DL (ref 3.5–5.2)
ALBUMIN/GLOBULIN RATIO: 0.3 (ref 1–2.5)
ALLEN TEST: ABNORMAL
ALLEN TEST: ABNORMAL
ALP BLD-CCNC: 106 U/L (ref 35–104)
ALT SERPL-CCNC: 6 U/L (ref 5–33)
AMORPHOUS: ABNORMAL
ANION GAP SERPL CALCULATED.3IONS-SCNC: 10 MMOL/L (ref 9–17)
ANION GAP SERPL CALCULATED.3IONS-SCNC: 10 MMOL/L (ref 9–17)
ANION GAP SERPL CALCULATED.3IONS-SCNC: 12 MMOL/L (ref 9–17)
ANION GAP SERPL CALCULATED.3IONS-SCNC: 4 MMOL/L (ref 9–17)
ANION GAP SERPL CALCULATED.3IONS-SCNC: 6 MMOL/L (ref 9–17)
ANION GAP SERPL CALCULATED.3IONS-SCNC: 7 MMOL/L (ref 9–17)
ANION GAP SERPL CALCULATED.3IONS-SCNC: 7 MMOL/L (ref 9–17)
ANION GAP SERPL CALCULATED.3IONS-SCNC: 8 MMOL/L (ref 9–17)
ANION GAP SERPL CALCULATED.3IONS-SCNC: 8 MMOL/L (ref 9–17)
ANION GAP SERPL CALCULATED.3IONS-SCNC: 9 MMOL/L (ref 9–17)
APPEARANCE FLUID: NORMAL
AST SERPL-CCNC: 15 U/L
ATYPICAL LYMPHOCYTE ABSOLUTE COUNT: 0.2 K/UL
ATYPICAL LYMPHOCYTE ABSOLUTE COUNT: 0.44 K/UL
ATYPICAL LYMPHOCYTES: 2 %
ATYPICAL LYMPHOCYTES: 4 %
BACTERIA: ABNORMAL
BANDS: 1 % (ref 0–10)
BASO FLUID: NORMAL %
BASOPHILS # BLD: 0 %
BASOPHILS # BLD: 0 % (ref 0–2)
BASOPHILS # BLD: 1 % (ref 0–2)
BASOPHILS ABSOLUTE: 0 K/UL (ref 0–0.2)
BASOPHILS ABSOLUTE: 0.15 K/UL (ref 0–0.2)
BILIRUB SERPL-MCNC: 0.6 MG/DL (ref 0.3–1.2)
BILIRUBIN DIRECT: 0.09 MG/DL
BILIRUBIN URINE: NEGATIVE
BILIRUBIN, INDIRECT: 0.51 MG/DL (ref 0–1)
BNP INTERPRETATION: ABNORMAL
BNP INTERPRETATION: NORMAL
BNP INTERPRETATION: NORMAL
BORDETELLA PARAPERTUSSIS: NOT DETECTED
BORDETELLA PERTUSSIS PCR: NOT DETECTED
BUN BLDV-MCNC: 10 MG/DL (ref 8–23)
BUN BLDV-MCNC: 10 MG/DL (ref 8–23)
BUN BLDV-MCNC: 12 MG/DL (ref 8–23)
BUN BLDV-MCNC: 12 MG/DL (ref 8–23)
BUN BLDV-MCNC: 17 MG/DL (ref 8–23)
BUN BLDV-MCNC: 30 MG/DL (ref 8–23)
BUN BLDV-MCNC: 34 MG/DL (ref 8–23)
BUN BLDV-MCNC: 42 MG/DL (ref 8–23)
BUN BLDV-MCNC: 42 MG/DL (ref 8–23)
BUN BLDV-MCNC: 8 MG/DL (ref 8–23)
BUN/CREAT BLD: 14 (ref 9–20)
BUN/CREAT BLD: 16 (ref 9–20)
BUN/CREAT BLD: 35 (ref 9–20)
BUN/CREAT BLD: ABNORMAL (ref 9–20)
C-REACTIVE PROTEIN: 108.3 MG/L (ref 0–5)
CALCIUM SERPL-MCNC: 8.7 MG/DL (ref 8.6–10.4)
CALCIUM SERPL-MCNC: 8.8 MG/DL (ref 8.6–10.4)
CALCIUM SERPL-MCNC: 8.8 MG/DL (ref 8.6–10.4)
CALCIUM SERPL-MCNC: 8.9 MG/DL (ref 8.6–10.4)
CALCIUM SERPL-MCNC: 9 MG/DL (ref 8.6–10.4)
CALCIUM SERPL-MCNC: 9 MG/DL (ref 8.6–10.4)
CALCIUM SERPL-MCNC: 9.1 MG/DL (ref 8.6–10.4)
CALCIUM SERPL-MCNC: 9.1 MG/DL (ref 8.6–10.4)
CALCIUM SERPL-MCNC: 9.2 MG/DL (ref 8.6–10.4)
CALCIUM SERPL-MCNC: 9.3 MG/DL (ref 8.6–10.4)
CARBOXYHEMOGLOBIN: 1.3 % (ref 0–5)
CARBOXYHEMOGLOBIN: 4.7 % (ref 0–5)
CASTS UA: ABNORMAL /LPF (ref 0–8)
CHLAMYDIA PNEUMONIAE BY PCR: NOT DETECTED
CHLORIDE BLD-SCNC: 100 MMOL/L (ref 98–107)
CHLORIDE BLD-SCNC: 101 MMOL/L (ref 98–107)
CHLORIDE BLD-SCNC: 101 MMOL/L (ref 98–107)
CHLORIDE BLD-SCNC: 102 MMOL/L (ref 98–107)
CHLORIDE BLD-SCNC: 102 MMOL/L (ref 98–107)
CHLORIDE BLD-SCNC: 95 MMOL/L (ref 98–107)
CHLORIDE BLD-SCNC: 98 MMOL/L (ref 98–107)
CHLORIDE BLD-SCNC: 98 MMOL/L (ref 98–107)
CO2: 22 MMOL/L (ref 20–31)
CO2: 22 MMOL/L (ref 20–31)
CO2: 23 MMOL/L (ref 20–31)
CO2: 24 MMOL/L (ref 20–31)
CO2: 24 MMOL/L (ref 20–31)
CO2: 25 MMOL/L (ref 20–31)
CO2: 25 MMOL/L (ref 20–31)
CO2: 27 MMOL/L (ref 20–31)
CO2: 30 MMOL/L (ref 20–31)
CO2: 31 MMOL/L (ref 20–31)
COLOR FLUID: NORMAL
COLOR: YELLOW
CORONAVIRUS 229E PCR: NOT DETECTED
CORONAVIRUS HKU1 PCR: NOT DETECTED
CORONAVIRUS NL63 PCR: NOT DETECTED
CORONAVIRUS OC43 PCR: NOT DETECTED
CREAT SERPL-MCNC: 0.48 MG/DL (ref 0.5–0.9)
CREAT SERPL-MCNC: 0.63 MG/DL (ref 0.5–0.9)
CREAT SERPL-MCNC: 0.65 MG/DL (ref 0.5–0.9)
CREAT SERPL-MCNC: 0.69 MG/DL (ref 0.5–0.9)
CREAT SERPL-MCNC: 0.71 MG/DL (ref 0.5–0.9)
CREAT SERPL-MCNC: 0.73 MG/DL (ref 0.5–0.9)
CREAT SERPL-MCNC: 0.74 MG/DL (ref 0.5–0.9)
CREAT SERPL-MCNC: 0.84 MG/DL (ref 0.5–0.9)
CREAT SERPL-MCNC: 1.05 MG/DL (ref 0.5–0.9)
CREAT SERPL-MCNC: 1.32 MG/DL (ref 0.5–0.9)
CRYSTALS, UA: ABNORMAL /HPF
CULTURE: ABNORMAL
CULTURE: NORMAL
D-DIMER QUANTITATIVE: 1.97 MG/L FEU
DIFFERENTIAL TYPE: ABNORMAL
DIRECT EXAM: ABNORMAL
DIRECT EXAM: ABNORMAL
DIRECT EXAM: NORMAL
EKG ATRIAL RATE: 150 BPM
EKG ATRIAL RATE: 86 BPM
EKG ATRIAL RATE: 93 BPM
EKG ATRIAL RATE: 95 BPM
EKG P AXIS: 54 DEGREES
EKG P AXIS: 54 DEGREES
EKG P AXIS: 68 DEGREES
EKG P-R INTERVAL: 136 MS
EKG P-R INTERVAL: 142 MS
EKG P-R INTERVAL: 156 MS
EKG Q-T INTERVAL: 306 MS
EKG Q-T INTERVAL: 340 MS
EKG Q-T INTERVAL: 364 MS
EKG Q-T INTERVAL: 398 MS
EKG QRS DURATION: 78 MS
EKG QRS DURATION: 84 MS
EKG QRS DURATION: 86 MS
EKG QRS DURATION: 90 MS
EKG QTC CALCULATION (BAZETT): 427 MS
EKG QTC CALCULATION (BAZETT): 438 MS
EKG QTC CALCULATION (BAZETT): 452 MS
EKG QTC CALCULATION (BAZETT): 476 MS
EKG R AXIS: -17 DEGREES
EKG R AXIS: -17 DEGREES
EKG R AXIS: -9 DEGREES
EKG R AXIS: 7 DEGREES
EKG T AXIS: 38 DEGREES
EKG T AXIS: 43 DEGREES
EKG T AXIS: 44 DEGREES
EKG T AXIS: 91 DEGREES
EKG VENTRICULAR RATE: 123 BPM
EKG VENTRICULAR RATE: 86 BPM
EKG VENTRICULAR RATE: 93 BPM
EKG VENTRICULAR RATE: 95 BPM
EOSINOPHIL FLUID: NORMAL %
EOSINOPHILS RELATIVE PERCENT: 0 % (ref 1–4)
EOSINOPHILS RELATIVE PERCENT: 1 % (ref 0–4)
EOSINOPHILS RELATIVE PERCENT: 1 % (ref 1–4)
EOSINOPHILS RELATIVE PERCENT: 1 % (ref 1–4)
EPITHELIAL CELLS UA: ABNORMAL /HPF (ref 0–5)
FIO2: ABNORMAL
FIO2: ABNORMAL
FLUID DIFF COMMENT: NORMAL
GFR AFRICAN AMERICAN: 49 ML/MIN
GFR AFRICAN AMERICAN: >60 ML/MIN
GFR NON-AFRICAN AMERICAN: 41 ML/MIN
GFR NON-AFRICAN AMERICAN: 53 ML/MIN
GFR NON-AFRICAN AMERICAN: >60 ML/MIN
GFR SERPL CREATININE-BSD FRML MDRD: ABNORMAL ML/MIN/{1.73_M2}
GLOBULIN: ABNORMAL G/DL (ref 1.5–3.8)
GLUCOSE BLD-MCNC: 140 MG/DL (ref 70–99)
GLUCOSE BLD-MCNC: 145 MG/DL (ref 70–99)
GLUCOSE BLD-MCNC: 162 MG/DL (ref 70–99)
GLUCOSE BLD-MCNC: 163 MG/DL (ref 65–105)
GLUCOSE BLD-MCNC: 165 MG/DL (ref 70–99)
GLUCOSE BLD-MCNC: 166 MG/DL (ref 70–99)
GLUCOSE BLD-MCNC: 196 MG/DL (ref 70–99)
GLUCOSE BLD-MCNC: 198 MG/DL (ref 65–105)
GLUCOSE BLD-MCNC: 204 MG/DL (ref 70–99)
GLUCOSE BLD-MCNC: 209 MG/DL (ref 65–105)
GLUCOSE BLD-MCNC: 211 MG/DL (ref 70–99)
GLUCOSE BLD-MCNC: 222 MG/DL (ref 65–105)
GLUCOSE BLD-MCNC: 223 MG/DL (ref 65–105)
GLUCOSE BLD-MCNC: 227 MG/DL (ref 65–105)
GLUCOSE BLD-MCNC: 233 MG/DL (ref 70–99)
GLUCOSE BLD-MCNC: 99 MG/DL (ref 70–99)
GLUCOSE URINE: NEGATIVE
HCO3 ARTERIAL: 26.7 MMOL/L (ref 22–26)
HCO3 VENOUS: 26 MMOL/L (ref 24–30)
HCT VFR BLD CALC: 26.3 % (ref 36.3–47.1)
HCT VFR BLD CALC: 26.6 % (ref 36.3–47.1)
HCT VFR BLD CALC: 26.8 % (ref 36.3–47.1)
HCT VFR BLD CALC: 28.6 % (ref 36.3–47.1)
HCT VFR BLD CALC: 29.1 % (ref 36–46)
HCT VFR BLD CALC: 29.8 % (ref 36.3–47.1)
HCT VFR BLD CALC: 30.8 % (ref 36.3–47.1)
HCT VFR BLD CALC: 31 % (ref 36.3–47.1)
HCT VFR BLD CALC: 31.3 % (ref 36.3–47.1)
HCT VFR BLD CALC: 31.5 % (ref 36.3–47.1)
HCT VFR BLD CALC: 32.5 % (ref 36.3–47.1)
HEMOGLOBIN: 10.5 G/DL (ref 11.9–15.1)
HEMOGLOBIN: 7.6 G/DL (ref 11.9–15.1)
HEMOGLOBIN: 8.1 G/DL (ref 11.9–15.1)
HEMOGLOBIN: 8.4 G/DL (ref 11.9–15.1)
HEMOGLOBIN: 8.6 G/DL (ref 11.9–15.1)
HEMOGLOBIN: 8.9 G/DL (ref 11.9–15.1)
HEMOGLOBIN: 8.9 G/DL (ref 11.9–15.1)
HEMOGLOBIN: 9.2 G/DL (ref 11.9–15.1)
HEMOGLOBIN: 9.4 G/DL (ref 11.9–15.1)
HEMOGLOBIN: 9.7 G/DL (ref 12–16)
HEMOGLOBIN: 9.9 G/DL (ref 11.9–15.1)
HUMAN METAPNEUMOVIRUS PCR: NOT DETECTED
IMMATURE GRANULOCYTES: 20 %
IMMATURE GRANULOCYTES: 28 %
IMMATURE GRANULOCYTES: 6 %
IMMATURE GRANULOCYTES: 7 %
IMMATURE GRANULOCYTES: 7 %
IMMATURE GRANULOCYTES: 8 %
IMMATURE GRANULOCYTES: 9 %
IMMATURE GRANULOCYTES: ABNORMAL %
INFLUENZA A BY PCR: NOT DETECTED
INFLUENZA A H1 (2009) PCR: ABNORMAL
INFLUENZA A H1 PCR: ABNORMAL
INFLUENZA A H3 PCR: ABNORMAL
INFLUENZA A: NEGATIVE
INFLUENZA B BY PCR: NOT DETECTED
INFLUENZA B: NEGATIVE
INR BLD: 1
INR BLD: 1.2
INTERVENTION: NORMAL
KETONES, URINE: ABNORMAL
LACTIC ACID, SEPSIS WHOLE BLOOD: 1.1 MMOL/L (ref 0.5–1.9)
LACTIC ACID, SEPSIS: NORMAL MMOL/L (ref 0.5–1.9)
LACTIC ACID, WHOLE BLOOD: 1 MMOL/L (ref 0.7–2.1)
LACTIC ACID: 1 MMOL/L (ref 0.5–2.2)
LEGIONELLA PNEUMOPHILIA AG, URINE: NEGATIVE
LEUKOCYTE ESTERASE, URINE: ABNORMAL
LV EF: 55 %
LV EF: 60 %
LVEF MODALITY: NORMAL
LVEF MODALITY: NORMAL
LYMPHOCYTES # BLD: 11 % (ref 24–44)
LYMPHOCYTES # BLD: 14 % (ref 24–44)
LYMPHOCYTES # BLD: 17 % (ref 24–44)
LYMPHOCYTES # BLD: 17 % (ref 24–44)
LYMPHOCYTES # BLD: 22 % (ref 24–44)
LYMPHOCYTES # BLD: 36 % (ref 24–44)
LYMPHOCYTES # BLD: 9 % (ref 24–44)
LYMPHOCYTES, BODY FLUID: 6 %
Lab: ABNORMAL
Lab: NORMAL
MAGNESIUM: 1.7 MG/DL (ref 1.6–2.6)
MCH RBC QN AUTO: 26.9 PG (ref 25.2–33.5)
MCH RBC QN AUTO: 27.2 PG (ref 25.2–33.5)
MCH RBC QN AUTO: 27.3 PG (ref 25.2–33.5)
MCH RBC QN AUTO: 28.3 PG (ref 25.2–33.5)
MCH RBC QN AUTO: 28.4 PG (ref 25.2–33.5)
MCH RBC QN AUTO: 29.1 PG (ref 25.2–33.5)
MCH RBC QN AUTO: 29.4 PG (ref 25.2–33.5)
MCH RBC QN AUTO: 29.6 PG (ref 25.2–33.5)
MCH RBC QN AUTO: 31.5 PG (ref 26–34)
MCHC RBC AUTO-ENTMCNC: 28.3 G/DL (ref 28.4–34.8)
MCHC RBC AUTO-ENTMCNC: 28.9 G/DL (ref 28.4–34.8)
MCHC RBC AUTO-ENTMCNC: 28.9 G/DL (ref 28.4–34.8)
MCHC RBC AUTO-ENTMCNC: 29.9 G/DL (ref 28.4–34.8)
MCHC RBC AUTO-ENTMCNC: 30.3 G/DL (ref 28.4–34.8)
MCHC RBC AUTO-ENTMCNC: 30.5 G/DL (ref 28.4–34.8)
MCHC RBC AUTO-ENTMCNC: 31.1 G/DL (ref 28.4–34.8)
MCHC RBC AUTO-ENTMCNC: 31.3 G/DL (ref 28.4–34.8)
MCHC RBC AUTO-ENTMCNC: 31.6 G/DL (ref 28.4–34.8)
MCHC RBC AUTO-ENTMCNC: 32.3 G/DL (ref 28.4–34.8)
MCHC RBC AUTO-ENTMCNC: 33.3 G/DL (ref 31–37)
MCV RBC AUTO: 90.5 FL (ref 82.6–102.9)
MCV RBC AUTO: 91.5 FL (ref 82.6–102.9)
MCV RBC AUTO: 92.1 FL (ref 82.6–102.9)
MCV RBC AUTO: 93 FL (ref 82.6–102.9)
MCV RBC AUTO: 94.3 FL (ref 82.6–102.9)
MCV RBC AUTO: 94.4 FL (ref 82.6–102.9)
MCV RBC AUTO: 94.6 FL (ref 82.6–102.9)
MCV RBC AUTO: 94.7 FL (ref 80–100)
MCV RBC AUTO: 95.2 FL (ref 82.6–102.9)
MCV RBC AUTO: 96.9 FL (ref 82.6–102.9)
MCV RBC AUTO: 98.4 FL (ref 82.6–102.9)
METAMYELOCYTES ABSOLUTE COUNT: 0.24 K/UL
METAMYELOCYTES: 2 %
METHEMOGLOBIN: 0.4 % (ref 0–1.9)
METHEMOGLOBIN: ABNORMAL % (ref 0–1.5)
MODE: ABNORMAL
MODE: ABNORMAL
MONOCYTE, FLUID: NORMAL %
MONOCYTES # BLD: 3 % (ref 1–7)
MONOCYTES # BLD: 4 % (ref 1–7)
MONOCYTES # BLD: 5 % (ref 1–7)
MONOCYTES # BLD: 6 % (ref 1–7)
MONOCYTES # BLD: 6 % (ref 1–7)
MONOCYTES # BLD: 9 % (ref 1–7)
MORPHOLOGY: ABNORMAL
MUCUS: ABNORMAL
MYCOPLASMA PNEUMONIAE IGM: 0.04
MYCOPLASMA PNEUMONIAE IGM: 0.08
MYCOPLASMA PNEUMONIAE PCR: NOT DETECTED
MYOGLOBIN: <21 NG/ML (ref 25–58)
MYOGLOBIN: <21 NG/ML (ref 25–58)
NEGATIVE BASE EXCESS, ART: ABNORMAL MMOL/L (ref 0–2)
NEGATIVE BASE EXCESS, VEN: ABNORMAL MMOL/L (ref 0–2)
NEUTROPHIL, FLUID: 48 %
NITRITE, URINE: NEGATIVE
NOTIFICATION TIME: ABNORMAL
NOTIFICATION TIME: ABNORMAL
NOTIFICATION: ABNORMAL
NOTIFICATION: ABNORMAL
NRBC AUTOMATED: 0.1 PER 100 WBC
NRBC AUTOMATED: 0.2 PER 100 WBC
NRBC AUTOMATED: 0.3 PER 100 WBC
NRBC AUTOMATED: 0.5 PER 100 WBC
NRBC AUTOMATED: 0.7 PER 100 WBC
NRBC AUTOMATED: ABNORMAL PER 100 WBC
NUCLEATED RED BLOOD CELLS: 1 PER 100 WBC
O2 DEVICE/FLOW/%: ABNORMAL
O2 DEVICE/FLOW/%: ABNORMAL
O2 SAT, ARTERIAL: 71.1 % (ref 95–98)
O2 SAT, VEN: 92.5 % (ref 60–85)
OTHER CELLS FLUID: 46 %
OTHER OBSERVATIONS UA: ABNORMAL
OXYHEMOGLOBIN: ABNORMAL % (ref 95–98)
OXYHEMOGLOBIN: ABNORMAL % (ref 95–98)
PARAINFLUENZA 1 PCR: NOT DETECTED
PARAINFLUENZA 2 PCR: NOT DETECTED
PARAINFLUENZA 3 PCR: DETECTED
PARAINFLUENZA 3 PCR: NOT DETECTED
PARAINFLUENZA 3 PCR: NOT DETECTED
PARAINFLUENZA 4 PCR: NOT DETECTED
PARTIAL THROMBOPLASTIN TIME: 22.8 SEC (ref 20.5–30.5)
PARTIAL THROMBOPLASTIN TIME: 27.4 SEC (ref 20.5–30.5)
PATIENT TEMP: 37
PATIENT TEMP: 37
PCO2 ARTERIAL: 39.2 MMHG (ref 35–45)
PCO2, ART, TEMP ADJ: ABNORMAL (ref 35–45)
PCO2, VEN, TEMP ADJ: ABNORMAL MMHG (ref 39–55)
PCO2, VEN: 48.6 (ref 39–55)
PDW BLD-RTO: 18.2 % (ref 11.5–14.9)
PDW BLD-RTO: 19 % (ref 11.8–14.4)
PDW BLD-RTO: 19.2 % (ref 11.8–14.4)
PDW BLD-RTO: 19.2 % (ref 11.8–14.4)
PDW BLD-RTO: 19.4 % (ref 11.8–14.4)
PDW BLD-RTO: 19.8 % (ref 11.8–14.4)
PDW BLD-RTO: 20.1 % (ref 11.8–14.4)
PDW BLD-RTO: 20.2 % (ref 11.8–14.4)
PDW BLD-RTO: 20.2 % (ref 11.8–14.4)
PDW BLD-RTO: 20.3 % (ref 11.8–14.4)
PDW BLD-RTO: 20.6 % (ref 11.8–14.4)
PEEP/CPAP: ABNORMAL
PEEP/CPAP: ABNORMAL
PH ARTERIAL: 7.44 (ref 7.35–7.45)
PH FLUID: 6.5
PH UA: 5.5 (ref 5–8)
PH VENOUS: 7.35 (ref 7.32–7.42)
PH, ART, TEMP ADJ: ABNORMAL (ref 7.35–7.45)
PH, VEN, TEMP ADJ: ABNORMAL (ref 7.32–7.42)
PLATELET # BLD: 299 K/UL (ref 150–450)
PLATELET # BLD: 353 K/UL (ref 138–453)
PLATELET # BLD: 382 K/UL (ref 138–453)
PLATELET # BLD: 387 K/UL (ref 138–453)
PLATELET # BLD: 407 K/UL (ref 138–453)
PLATELET # BLD: 424 K/UL (ref 138–453)
PLATELET # BLD: 429 K/UL (ref 138–453)
PLATELET # BLD: 443 K/UL (ref 138–453)
PLATELET # BLD: 467 K/UL (ref 138–453)
PLATELET # BLD: 477 K/UL (ref 138–453)
PLATELET # BLD: 492 K/UL (ref 138–453)
PLATELET ESTIMATE: ABNORMAL
PMV BLD AUTO: 10.2 FL (ref 8.1–13.5)
PMV BLD AUTO: 10.2 FL (ref 8.1–13.5)
PMV BLD AUTO: 6.6 FL (ref 6–12)
PMV BLD AUTO: 9 FL (ref 8.1–13.5)
PMV BLD AUTO: 9.1 FL (ref 8.1–13.5)
PMV BLD AUTO: 9.3 FL (ref 8.1–13.5)
PMV BLD AUTO: 9.3 FL (ref 8.1–13.5)
PMV BLD AUTO: 9.4 FL (ref 8.1–13.5)
PMV BLD AUTO: 9.6 FL (ref 8.1–13.5)
PMV BLD AUTO: 9.8 FL (ref 8.1–13.5)
PMV BLD AUTO: 9.9 FL (ref 8.1–13.5)
PNEUMOCOCCAL ANTIBODY TYPE 12: 0 UG/ML
PNEUMOCOCCAL ANTIBODY TYPE 14: 0.12 UG/ML
PNEUMOCOCCAL ANTIBODY TYPE 18: 0.17 UG/ML
PNEUMOCOCCAL ANTIBODY TYPE 19F: 0.67 UG/ML
PNEUMOCOCCAL ANTIBODY TYPE 1: 0.12 UG/ML
PNEUMOCOCCAL ANTIBODY TYPE 23: 0.06 UG/ML
PNEUMOCOCCAL ANTIBODY TYPE 3: 0.02 UG/ML
PNEUMOCOCCAL ANTIBODY TYPE 4: 0.01 UG/ML
PNEUMOCOCCAL ANTIBODY TYPE 5: 0.76 UG/ML
PNEUMOCOCCAL ANTIBODY TYPE 6B: 0.21 UG/ML
PNEUMOCOCCAL ANTIBODY TYPE 7F: 0.15 UG/ML
PNEUMOCOCCAL ANTIBODY TYPE 8: 0.04 UG/ML
PNEUMOCOCCAL ANTIBODY TYPE 9N: 0.1 UG/ML
PNEUMOCOCCAL ANTIBODY TYPE 9V: 0.41 UG/ML
PNEUMOCOCCAL INTERPRETATION: NORMAL
PO2 ARTERIAL: 38.2 MMHG (ref 80–100)
PO2, ART, TEMP ADJ: ABNORMAL MMHG (ref 80–100)
PO2, VEN, TEMP ADJ: ABNORMAL MMHG (ref 30–50)
PO2, VEN: 71 (ref 30–50)
POSITIVE BASE EXCESS, ART: 2.6 MMOL/L (ref 0–2)
POSITIVE BASE EXCESS, VEN: 0.6 MMOL/L (ref 0–2)
POTASSIUM SERPL-SCNC: 3.3 MMOL/L (ref 3.7–5.3)
POTASSIUM SERPL-SCNC: 3.6 MMOL/L (ref 3.7–5.3)
POTASSIUM SERPL-SCNC: 3.7 MMOL/L (ref 3.7–5.3)
POTASSIUM SERPL-SCNC: 3.8 MMOL/L (ref 3.7–5.3)
POTASSIUM SERPL-SCNC: 3.8 MMOL/L (ref 3.7–5.3)
POTASSIUM SERPL-SCNC: 3.9 MMOL/L (ref 3.7–5.3)
POTASSIUM SERPL-SCNC: 4.2 MMOL/L (ref 3.7–5.3)
POTASSIUM SERPL-SCNC: 4.3 MMOL/L (ref 3.7–5.3)
PRO-BNP: 1118 PG/ML
PRO-BNP: 1438 PG/ML
PRO-BNP: 163 PG/ML
PRO-BNP: 293 PG/ML
PRO-BNP: 467 PG/ML
PROCALCITONIN: 0.04 NG/ML
PROCALCITONIN: 0.04 NG/ML
PROCALCITONIN: 0.2 NG/ML
PROCALCITONIN: 3.47 NG/ML
PROTEIN UA: ABNORMAL
PROTHROMBIN TIME: 11.1 SEC (ref 9.1–12.3)
PROTHROMBIN TIME: 12.5 SEC (ref 9.1–12.3)
PSV: ABNORMAL
PSV: ABNORMAL
PT. POSITION: ABNORMAL
PT. POSITION: ABNORMAL
RBC # BLD: 2.79 M/UL (ref 3.95–5.11)
RBC # BLD: 2.86 M/UL (ref 3.95–5.11)
RBC # BLD: 2.96 M/UL (ref 3.95–5.11)
RBC # BLD: 3.03 M/UL (ref 3.95–5.11)
RBC # BLD: 3.07 M/UL (ref 4–5.2)
RBC # BLD: 3.13 M/UL (ref 3.95–5.11)
RBC # BLD: 3.15 M/UL (ref 3.95–5.11)
RBC # BLD: 3.2 M/UL (ref 3.95–5.11)
RBC # BLD: 3.31 M/UL (ref 3.95–5.11)
RBC # BLD: 3.4 M/UL (ref 3.95–5.11)
RBC # BLD: 3.55 M/UL (ref 3.95–5.11)
RBC # BLD: ABNORMAL 10*6/UL
RBC FLUID: NORMAL /MM3
RBC UA: ABNORMAL /HPF (ref 0–4)
REASON FOR REJECTION: NORMAL
RENAL EPITHELIAL, UA: ABNORMAL /HPF
RESP SYNCYTIAL VIRUS PCR: DETECTED
RESP SYNCYTIAL VIRUS PCR: NOT DETECTED
RESP SYNCYTIAL VIRUS PCR: NOT DETECTED
RESPIRATORY RATE: 22
RESPIRATORY RATE: ABNORMAL
RHINO/ENTEROVIRUS PCR: DETECTED
RHINO/ENTEROVIRUS PCR: NOT DETECTED
RHINO/ENTEROVIRUS PCR: NOT DETECTED
SAMPLE SITE: ABNORMAL
SAMPLE SITE: ABNORMAL
SARS-COV-2 RNA, RT PCR: NOT DETECTED
SARS-COV-2, PCR: NOT DETECTED
SARS-COV-2, RAPID: NOT DETECTED
SEG NEUTROPHILS: 51 % (ref 36–66)
SEG NEUTROPHILS: 56 % (ref 36–66)
SEG NEUTROPHILS: 60 % (ref 36–66)
SEG NEUTROPHILS: 64 % (ref 36–66)
SEG NEUTROPHILS: 71 % (ref 36–66)
SEG NEUTROPHILS: 74 % (ref 36–66)
SEG NEUTROPHILS: 74 % (ref 36–66)
SEG NEUTROPHILS: 76 % (ref 36–66)
SEG NEUTROPHILS: 78 % (ref 36–66)
SEG NEUTROPHILS: 80 % (ref 36–66)
SEGMENTED NEUTROPHILS ABSOLUTE COUNT: 16.56 K/UL (ref 1.8–7.7)
SEGMENTED NEUTROPHILS ABSOLUTE COUNT: 19.6 K/UL (ref 1.8–7.7)
SEGMENTED NEUTROPHILS ABSOLUTE COUNT: 20.2 K/UL (ref 1.8–7.7)
SEGMENTED NEUTROPHILS ABSOLUTE COUNT: 20.28 K/UL (ref 1.8–7.7)
SEGMENTED NEUTROPHILS ABSOLUTE COUNT: 6.05 K/UL (ref 1.3–9.1)
SEGMENTED NEUTROPHILS ABSOLUTE COUNT: 7.04 K/UL (ref 1.8–7.7)
SEGMENTED NEUTROPHILS ABSOLUTE COUNT: 7.17 K/UL (ref 1.8–7.7)
SEGMENTED NEUTROPHILS ABSOLUTE COUNT: 7.98 K/UL (ref 1.8–7.7)
SEGMENTED NEUTROPHILS ABSOLUTE COUNT: 8.58 K/UL (ref 1.8–7.7)
SEGMENTED NEUTROPHILS ABSOLUTE COUNT: 8.88 K/UL (ref 1.8–7.7)
SET RATE: ABNORMAL
SET RATE: ABNORMAL
SODIUM BLD-SCNC: 129 MMOL/L (ref 135–144)
SODIUM BLD-SCNC: 129 MMOL/L (ref 135–144)
SODIUM BLD-SCNC: 132 MMOL/L (ref 135–144)
SODIUM BLD-SCNC: 132 MMOL/L (ref 135–144)
SODIUM BLD-SCNC: 133 MMOL/L (ref 135–144)
SODIUM BLD-SCNC: 134 MMOL/L (ref 135–144)
SODIUM BLD-SCNC: 135 MMOL/L (ref 135–144)
SODIUM BLD-SCNC: 137 MMOL/L (ref 135–144)
SOURCE: NORMAL
SPECIFIC GRAVITY UA: 1.02 (ref 1–1.03)
SPECIMEN DESCRIPTION: ABNORMAL
SPECIMEN DESCRIPTION: NORMAL
SPECIMEN TYPE: NORMAL
STREP PNEUMONIAE ANTIGEN: NEGATIVE
STREP PNEUMONIAE ANTIGEN: NEGATIVE
STREP PNEUMONIAE ANTIGEN: POSITIVE
TEXT FOR RESPIRATORY: ABNORMAL
TEXT FOR RESPIRATORY: ABNORMAL
TOTAL HB: ABNORMAL G/DL (ref 12–16)
TOTAL HB: ABNORMAL G/DL (ref 12–16)
TOTAL PROTEIN, BODY FLUID: <1 G/DL
TOTAL PROTEIN: 11.1 G/DL (ref 6.4–8.3)
TOTAL RATE: ABNORMAL
TOTAL RATE: ABNORMAL
TRICHOMONAS: ABNORMAL
TROPONIN INTERP: ABNORMAL
TROPONIN T: ABNORMAL NG/ML
TROPONIN, HIGH SENSITIVITY: 17 NG/L (ref 0–14)
TROPONIN, HIGH SENSITIVITY: 17 NG/L (ref 0–14)
TROPONIN, HIGH SENSITIVITY: 18 NG/L (ref 0–14)
TROPONIN, HIGH SENSITIVITY: 19 NG/L (ref 0–14)
TROPONIN, HIGH SENSITIVITY: 19 NG/L (ref 0–14)
TROPONIN, HIGH SENSITIVITY: 21 NG/L (ref 0–14)
TROPONIN, HIGH SENSITIVITY: 21 NG/L (ref 0–14)
TROPONIN, HIGH SENSITIVITY: 24 NG/L (ref 0–14)
TURBIDITY: CLEAR
URINE HGB: NEGATIVE
UROBILINOGEN, URINE: NORMAL
VANCOMYCIN RANDOM DATE LAST DOSE: NORMAL
VANCOMYCIN RANDOM DOSE AMOUNT: NORMAL
VANCOMYCIN RANDOM TIME LAST DOSE: NORMAL
VANCOMYCIN RANDOM: 9.9 UG/ML
VT: ABNORMAL
VT: ABNORMAL
WBC # BLD: 10.1 K/UL (ref 3.5–11.3)
WBC # BLD: 11 K/UL (ref 3.5–11.3)
WBC # BLD: 11.9 K/UL (ref 3.5–11)
WBC # BLD: 12 K/UL (ref 3.5–11.3)
WBC # BLD: 13.3 K/UL (ref 3.5–11.3)
WBC # BLD: 15.3 K/UL (ref 3.5–11.3)
WBC # BLD: 20.7 K/UL (ref 3.5–11.3)
WBC # BLD: 25.9 K/UL (ref 3.5–11.3)
WBC # BLD: 26.5 K/UL (ref 3.5–11.3)
WBC # BLD: 26.7 K/UL (ref 3.5–11.3)
WBC # BLD: 27.5 K/UL (ref 3.5–11.3)
WBC # BLD: ABNORMAL 10*3/UL
WBC FLUID: 62 /MM3
WBC UA: ABNORMAL /HPF (ref 0–5)
YEAST: ABNORMAL
ZZ NTE CLEAN UP: ORDERED TEST: NORMAL
ZZ NTE WITH NAME CLEAN UP: SPECIMEN SOURCE: NORMAL

## 2021-01-01 PROCEDURE — 87186 SC STD MICRODIL/AGAR DIL: CPT

## 2021-01-01 PROCEDURE — 84484 ASSAY OF TROPONIN QUANT: CPT

## 2021-01-01 PROCEDURE — 6370000000 HC RX 637 (ALT 250 FOR IP): Performed by: INTERNAL MEDICINE

## 2021-01-01 PROCEDURE — 6360000002 HC RX W HCPCS: Performed by: INTERNAL MEDICINE

## 2021-01-01 PROCEDURE — 87449 NOS EACH ORGANISM AG IA: CPT

## 2021-01-01 PROCEDURE — 6370000000 HC RX 637 (ALT 250 FOR IP): Performed by: STUDENT IN AN ORGANIZED HEALTH CARE EDUCATION/TRAINING PROGRAM

## 2021-01-01 PROCEDURE — 2580000003 HC RX 258: Performed by: INTERNAL MEDICINE

## 2021-01-01 PROCEDURE — 2580000003 HC RX 258: Performed by: NURSE PRACTITIONER

## 2021-01-01 PROCEDURE — 94640 AIRWAY INHALATION TREATMENT: CPT

## 2021-01-01 PROCEDURE — 2580000003 HC RX 258: Performed by: STUDENT IN AN ORGANIZED HEALTH CARE EDUCATION/TRAINING PROGRAM

## 2021-01-01 PROCEDURE — 99232 SBSQ HOSP IP/OBS MODERATE 35: CPT | Performed by: INTERNAL MEDICINE

## 2021-01-01 PROCEDURE — 6360000002 HC RX W HCPCS: Performed by: STUDENT IN AN ORGANIZED HEALTH CARE EDUCATION/TRAINING PROGRAM

## 2021-01-01 PROCEDURE — 76937 US GUIDE VASCULAR ACCESS: CPT

## 2021-01-01 PROCEDURE — 87086 URINE CULTURE/COLONY COUNT: CPT

## 2021-01-01 PROCEDURE — 80048 BASIC METABOLIC PNL TOTAL CA: CPT

## 2021-01-01 PROCEDURE — 85610 PROTHROMBIN TIME: CPT

## 2021-01-01 PROCEDURE — 86317 IMMUNOASSAY INFECTIOUS AGENT: CPT

## 2021-01-01 PROCEDURE — 99239 HOSP IP/OBS DSCHRG MGMT >30: CPT | Performed by: INTERNAL MEDICINE

## 2021-01-01 PROCEDURE — 87205 SMEAR GRAM STAIN: CPT

## 2021-01-01 PROCEDURE — 6370000000 HC RX 637 (ALT 250 FOR IP): Performed by: NURSE PRACTITIONER

## 2021-01-01 PROCEDURE — 87040 BLOOD CULTURE FOR BACTERIA: CPT

## 2021-01-01 PROCEDURE — G8427 DOCREV CUR MEDS BY ELIG CLIN: HCPCS | Performed by: INTERNAL MEDICINE

## 2021-01-01 PROCEDURE — 94761 N-INVAS EAR/PLS OXIMETRY MLT: CPT

## 2021-01-01 PROCEDURE — 6360000002 HC RX W HCPCS: Performed by: EMERGENCY MEDICINE

## 2021-01-01 PROCEDURE — 93306 TTE W/DOPPLER COMPLETE: CPT

## 2021-01-01 PROCEDURE — 2700000000 HC OXYGEN THERAPY PER DAY

## 2021-01-01 PROCEDURE — 85379 FIBRIN DEGRADATION QUANT: CPT

## 2021-01-01 PROCEDURE — 36415 COLL VENOUS BLD VENIPUNCTURE: CPT

## 2021-01-01 PROCEDURE — 84145 PROCALCITONIN (PCT): CPT

## 2021-01-01 PROCEDURE — 6360000004 HC RX CONTRAST MEDICATION: Performed by: STUDENT IN AN ORGANIZED HEALTH CARE EDUCATION/TRAINING PROGRAM

## 2021-01-01 PROCEDURE — 85025 COMPLETE CBC W/AUTO DIFF WBC: CPT

## 2021-01-01 PROCEDURE — 0202U NFCT DS 22 TRGT SARS-COV-2: CPT

## 2021-01-01 PROCEDURE — 71045 X-RAY EXAM CHEST 1 VIEW: CPT

## 2021-01-01 PROCEDURE — 85027 COMPLETE CBC AUTOMATED: CPT

## 2021-01-01 PROCEDURE — 2060000000 HC ICU INTERMEDIATE R&B

## 2021-01-01 PROCEDURE — 97116 GAIT TRAINING THERAPY: CPT

## 2021-01-01 PROCEDURE — 97535 SELF CARE MNGMENT TRAINING: CPT

## 2021-01-01 PROCEDURE — 96374 THER/PROPH/DIAG INJ IV PUSH: CPT

## 2021-01-01 PROCEDURE — 36569 INSJ PICC 5 YR+ W/O IMAGING: CPT

## 2021-01-01 PROCEDURE — 87899 AGENT NOS ASSAY W/OPTIC: CPT

## 2021-01-01 PROCEDURE — 93005 ELECTROCARDIOGRAM TRACING: CPT | Performed by: STUDENT IN AN ORGANIZED HEALTH CARE EDUCATION/TRAINING PROGRAM

## 2021-01-01 PROCEDURE — 97161 PT EVAL LOW COMPLEX 20 MIN: CPT

## 2021-01-01 PROCEDURE — 99223 1ST HOSP IP/OBS HIGH 75: CPT | Performed by: INTERNAL MEDICINE

## 2021-01-01 PROCEDURE — 94760 N-INVAS EAR/PLS OXIMETRY 1: CPT

## 2021-01-01 PROCEDURE — 99254 IP/OBS CNSLTJ NEW/EST MOD 60: CPT | Performed by: INTERNAL MEDICINE

## 2021-01-01 PROCEDURE — 0W9B3ZZ DRAINAGE OF LEFT PLEURAL CAVITY, PERCUTANEOUS APPROACH: ICD-10-PCS | Performed by: PHYSICIAN ASSISTANT

## 2021-01-01 PROCEDURE — 71260 CT THORAX DX C+: CPT

## 2021-01-01 PROCEDURE — 87635 SARS-COV-2 COVID-19 AMP PRB: CPT

## 2021-01-01 PROCEDURE — 85730 THROMBOPLASTIN TIME PARTIAL: CPT

## 2021-01-01 PROCEDURE — 94660 CPAP INITIATION&MGMT: CPT

## 2021-01-01 PROCEDURE — 93010 ELECTROCARDIOGRAM REPORT: CPT | Performed by: INTERNAL MEDICINE

## 2021-01-01 PROCEDURE — 2580000003 HC RX 258: Performed by: EMERGENCY MEDICINE

## 2021-01-01 PROCEDURE — 99285 EMERGENCY DEPT VISIT HI MDM: CPT

## 2021-01-01 PROCEDURE — 87077 CULTURE AEROBIC IDENTIFY: CPT

## 2021-01-01 PROCEDURE — 6370000000 HC RX 637 (ALT 250 FOR IP): Performed by: EMERGENCY MEDICINE

## 2021-01-01 PROCEDURE — 82947 ASSAY GLUCOSE BLOOD QUANT: CPT

## 2021-01-01 PROCEDURE — 83605 ASSAY OF LACTIC ACID: CPT

## 2021-01-01 PROCEDURE — 1200000000 HC SEMI PRIVATE

## 2021-01-01 PROCEDURE — 99284 EMERGENCY DEPT VISIT MOD MDM: CPT

## 2021-01-01 PROCEDURE — 87070 CULTURE OTHR SPECIMN AEROBIC: CPT

## 2021-01-01 PROCEDURE — G8417 CALC BMI ABV UP PARAM F/U: HCPCS | Performed by: INTERNAL MEDICINE

## 2021-01-01 PROCEDURE — 83880 ASSAY OF NATRIURETIC PEPTIDE: CPT

## 2021-01-01 PROCEDURE — 96375 TX/PRO/DX INJ NEW DRUG ADDON: CPT

## 2021-01-01 PROCEDURE — 89051 BODY FLUID CELL COUNT: CPT

## 2021-01-01 PROCEDURE — 87636 SARSCOV2 & INF A&B AMP PRB: CPT

## 2021-01-01 PROCEDURE — 99233 SBSQ HOSP IP/OBS HIGH 50: CPT | Performed by: INTERNAL MEDICINE

## 2021-01-01 PROCEDURE — 82805 BLOOD GASES W/O2 SATURATION: CPT

## 2021-01-01 PROCEDURE — 83986 ASSAY PH BODY FLUID NOS: CPT

## 2021-01-01 PROCEDURE — 99214 OFFICE O/P EST MOD 30 MIN: CPT | Performed by: FAMILY MEDICINE

## 2021-01-01 PROCEDURE — 81001 URINALYSIS AUTO W/SCOPE: CPT

## 2021-01-01 PROCEDURE — 86738 MYCOPLASMA ANTIBODY: CPT

## 2021-01-01 PROCEDURE — 84157 ASSAY OF PROTEIN OTHER: CPT

## 2021-01-01 PROCEDURE — 80076 HEPATIC FUNCTION PANEL: CPT

## 2021-01-01 PROCEDURE — 94664 DEMO&/EVAL PT USE INHALER: CPT

## 2021-01-01 PROCEDURE — 36600 WITHDRAWAL OF ARTERIAL BLOOD: CPT

## 2021-01-01 PROCEDURE — 6360000004 HC RX CONTRAST MEDICATION: Performed by: EMERGENCY MEDICINE

## 2021-01-01 PROCEDURE — 97530 THERAPEUTIC ACTIVITIES: CPT

## 2021-01-01 PROCEDURE — 83874 ASSAY OF MYOGLOBIN: CPT

## 2021-01-01 PROCEDURE — 97110 THERAPEUTIC EXERCISES: CPT

## 2021-01-01 PROCEDURE — 80202 ASSAY OF VANCOMYCIN: CPT

## 2021-01-01 PROCEDURE — 99211 OFF/OP EST MAY X REQ PHY/QHP: CPT | Performed by: INTERNAL MEDICINE

## 2021-01-01 PROCEDURE — 3017F COLORECTAL CA SCREEN DOC REV: CPT | Performed by: INTERNAL MEDICINE

## 2021-01-01 PROCEDURE — 97162 PT EVAL MOD COMPLEX 30 MIN: CPT

## 2021-01-01 PROCEDURE — 6360000002 HC RX W HCPCS

## 2021-01-01 PROCEDURE — 86140 C-REACTIVE PROTEIN: CPT

## 2021-01-01 PROCEDURE — 99214 OFFICE O/P EST MOD 30 MIN: CPT | Performed by: INTERNAL MEDICINE

## 2021-01-01 PROCEDURE — 83735 ASSAY OF MAGNESIUM: CPT

## 2021-01-01 PROCEDURE — 99283 EMERGENCY DEPT VISIT LOW MDM: CPT

## 2021-01-01 PROCEDURE — C1729 CATH, DRAINAGE: HCPCS

## 2021-01-01 PROCEDURE — 1111F DSCHRG MED/CURRENT MED MERGE: CPT | Performed by: FAMILY MEDICINE

## 2021-01-01 PROCEDURE — 93005 ELECTROCARDIOGRAM TRACING: CPT

## 2021-01-01 PROCEDURE — 02HV33Z INSERTION OF INFUSION DEVICE INTO SUPERIOR VENA CAVA, PERCUTANEOUS APPROACH: ICD-10-PCS | Performed by: INTERNAL MEDICINE

## 2021-01-01 PROCEDURE — 97165 OT EVAL LOW COMPLEX 30 MIN: CPT

## 2021-01-01 PROCEDURE — 1111F DSCHRG MED/CURRENT MED MERGE: CPT | Performed by: INTERNAL MEDICINE

## 2021-01-01 PROCEDURE — 1036F TOBACCO NON-USER: CPT | Performed by: INTERNAL MEDICINE

## 2021-01-01 PROCEDURE — 87150 DNA/RNA AMPLIFIED PROBE: CPT

## 2021-01-01 PROCEDURE — 94669 MECHANICAL CHEST WALL OSCILL: CPT

## 2021-01-01 PROCEDURE — 97166 OT EVAL MOD COMPLEX 45 MIN: CPT

## 2021-01-01 PROCEDURE — 87075 CULTR BACTERIA EXCEPT BLOOD: CPT

## 2021-01-01 PROCEDURE — 2500000003 HC RX 250 WO HCPCS: Performed by: INTERNAL MEDICINE

## 2021-01-01 PROCEDURE — 6360000002 HC RX W HCPCS: Performed by: NURSE PRACTITIONER

## 2021-01-01 PROCEDURE — C1751 CATH, INF, PER/CENT/MIDLINE: HCPCS

## 2021-01-01 PROCEDURE — G8484 FLU IMMUNIZE NO ADMIN: HCPCS | Performed by: INTERNAL MEDICINE

## 2021-01-01 PROCEDURE — 93005 ELECTROCARDIOGRAM TRACING: CPT | Performed by: EMERGENCY MEDICINE

## 2021-01-01 RX ORDER — ONDANSETRON 4 MG/1
4 TABLET, ORALLY DISINTEGRATING ORAL EVERY 8 HOURS PRN
Status: DISCONTINUED | OUTPATIENT
Start: 2021-01-01 | End: 2021-01-01 | Stop reason: HOSPADM

## 2021-01-01 RX ORDER — SODIUM CHLORIDE 0.9 % (FLUSH) 0.9 %
5-40 SYRINGE (ML) INJECTION EVERY 12 HOURS SCHEDULED
Status: DISCONTINUED | OUTPATIENT
Start: 2021-01-01 | End: 2021-01-01 | Stop reason: HOSPADM

## 2021-01-01 RX ORDER — AMLODIPINE BESYLATE 10 MG/1
10 TABLET ORAL DAILY
Status: DISCONTINUED | OUTPATIENT
Start: 2021-01-01 | End: 2021-01-01 | Stop reason: HOSPADM

## 2021-01-01 RX ORDER — ACETAMINOPHEN 325 MG/1
650 TABLET ORAL EVERY 6 HOURS PRN
Status: DISCONTINUED | OUTPATIENT
Start: 2021-01-01 | End: 2021-01-01 | Stop reason: HOSPADM

## 2021-01-01 RX ORDER — 0.9 % SODIUM CHLORIDE 0.9 %
500 INTRAVENOUS SOLUTION INTRAVENOUS ONCE
Status: COMPLETED | OUTPATIENT
Start: 2021-01-01 | End: 2021-01-01

## 2021-01-01 RX ORDER — IPRATROPIUM BROMIDE AND ALBUTEROL SULFATE 2.5; .5 MG/3ML; MG/3ML
1 SOLUTION RESPIRATORY (INHALATION)
Status: DISCONTINUED | OUTPATIENT
Start: 2021-01-01 | End: 2021-01-01 | Stop reason: HOSPADM

## 2021-01-01 RX ORDER — BENZONATATE 200 MG/1
200 CAPSULE ORAL 3 TIMES DAILY
Qty: 21 CAPSULE | Refills: 0 | Status: SHIPPED | OUTPATIENT
Start: 2021-01-01 | End: 2021-01-01

## 2021-01-01 RX ORDER — SODIUM CHLORIDE 9 MG/ML
25 INJECTION, SOLUTION INTRAVENOUS PRN
Status: DISCONTINUED | OUTPATIENT
Start: 2021-01-01 | End: 2021-01-01 | Stop reason: HOSPADM

## 2021-01-01 RX ORDER — BUDESONIDE AND FORMOTEROL FUMARATE DIHYDRATE 160; 4.5 UG/1; UG/1
2 AEROSOL RESPIRATORY (INHALATION) 2 TIMES DAILY
Status: DISCONTINUED | OUTPATIENT
Start: 2021-01-01 | End: 2021-01-01 | Stop reason: HOSPADM

## 2021-01-01 RX ORDER — FLUTICASONE PROPIONATE 50 MCG
2 SPRAY, SUSPENSION (ML) NASAL DAILY
Status: DISCONTINUED | OUTPATIENT
Start: 2021-01-01 | End: 2021-01-01 | Stop reason: HOSPADM

## 2021-01-01 RX ORDER — METHYLPREDNISOLONE SODIUM SUCCINATE 125 MG/2ML
40 INJECTION, POWDER, LYOPHILIZED, FOR SOLUTION INTRAMUSCULAR; INTRAVENOUS EVERY 8 HOURS
Status: DISCONTINUED | OUTPATIENT
Start: 2021-01-01 | End: 2021-01-01

## 2021-01-01 RX ORDER — ALBUTEROL SULFATE 2.5 MG/3ML
2.5 SOLUTION RESPIRATORY (INHALATION) ONCE
Status: COMPLETED | OUTPATIENT
Start: 2021-01-01 | End: 2021-01-01

## 2021-01-01 RX ORDER — ONDANSETRON 4 MG/1
4 TABLET, FILM COATED ORAL 3 TIMES DAILY PRN
Qty: 30 TABLET | Refills: 2 | Status: SHIPPED | OUTPATIENT
Start: 2021-01-01 | End: 2022-01-01

## 2021-01-01 RX ORDER — ALBUTEROL SULFATE 2.5 MG/3ML
2.5 SOLUTION RESPIRATORY (INHALATION) EVERY 4 HOURS PRN
Status: DISCONTINUED | OUTPATIENT
Start: 2021-01-01 | End: 2021-01-01 | Stop reason: HOSPADM

## 2021-01-01 RX ORDER — PREDNISONE 20 MG/1
40 TABLET ORAL DAILY
Qty: 6 TABLET | Refills: 0 | Status: SHIPPED | OUTPATIENT
Start: 2021-01-01 | End: 2021-01-01

## 2021-01-01 RX ORDER — METHYLPREDNISOLONE SODIUM SUCCINATE 125 MG/2ML
125 INJECTION, POWDER, LYOPHILIZED, FOR SOLUTION INTRAMUSCULAR; INTRAVENOUS DAILY
Status: DISCONTINUED | OUTPATIENT
Start: 2021-01-01 | End: 2021-01-01

## 2021-01-01 RX ORDER — PREDNISONE 20 MG/1
TABLET ORAL
Qty: 20 TABLET | Refills: 0 | Status: SHIPPED | OUTPATIENT
Start: 2021-01-01 | End: 2021-01-01

## 2021-01-01 RX ORDER — ONDANSETRON 2 MG/ML
4 INJECTION INTRAMUSCULAR; INTRAVENOUS ONCE
Status: COMPLETED | OUTPATIENT
Start: 2021-01-01 | End: 2021-01-01

## 2021-01-01 RX ORDER — ONDANSETRON 2 MG/ML
4 INJECTION INTRAMUSCULAR; INTRAVENOUS EVERY 6 HOURS PRN
Status: DISCONTINUED | OUTPATIENT
Start: 2021-01-01 | End: 2021-01-01 | Stop reason: HOSPADM

## 2021-01-01 RX ORDER — SODIUM CHLORIDE 0.9 % (FLUSH) 0.9 %
5-40 SYRINGE (ML) INJECTION PRN
Status: DISCONTINUED | OUTPATIENT
Start: 2021-01-01 | End: 2021-01-01 | Stop reason: HOSPADM

## 2021-01-01 RX ORDER — LIDOCAINE HYDROCHLORIDE 10 MG/ML
5 INJECTION, SOLUTION EPIDURAL; INFILTRATION; INTRACAUDAL; PERINEURAL ONCE
Status: DISCONTINUED | OUTPATIENT
Start: 2021-01-01 | End: 2021-01-01 | Stop reason: HOSPADM

## 2021-01-01 RX ORDER — ACETAMINOPHEN 650 MG/1
650 SUPPOSITORY RECTAL EVERY 6 HOURS PRN
Status: DISCONTINUED | OUTPATIENT
Start: 2021-01-01 | End: 2021-01-01 | Stop reason: HOSPADM

## 2021-01-01 RX ORDER — AMLODIPINE BESYLATE 10 MG/1
TABLET ORAL
Qty: 90 TABLET | Refills: 1 | Status: ON HOLD | OUTPATIENT
Start: 2021-01-01 | End: 2021-01-01 | Stop reason: HOSPADM

## 2021-01-01 RX ORDER — FOLIC ACID 1 MG/1
1 TABLET ORAL DAILY
Status: DISCONTINUED | OUTPATIENT
Start: 2021-01-01 | End: 2021-01-01 | Stop reason: HOSPADM

## 2021-01-01 RX ORDER — PREDNISONE 20 MG/1
20 TABLET ORAL DAILY
Qty: 3 TABLET | Refills: 0 | Status: SHIPPED | OUTPATIENT
Start: 2021-01-01 | End: 2021-01-01

## 2021-01-01 RX ORDER — METHYLPREDNISOLONE SODIUM SUCCINATE 40 MG/ML
40 INJECTION, POWDER, LYOPHILIZED, FOR SOLUTION INTRAMUSCULAR; INTRAVENOUS DAILY
Status: DISCONTINUED | OUTPATIENT
Start: 2021-01-01 | End: 2021-01-01 | Stop reason: HOSPADM

## 2021-01-01 RX ORDER — BUDESONIDE AND FORMOTEROL FUMARATE DIHYDRATE 80; 4.5 UG/1; UG/1
2 AEROSOL RESPIRATORY (INHALATION) 2 TIMES DAILY
Status: DISCONTINUED | OUTPATIENT
Start: 2021-01-01 | End: 2021-01-01 | Stop reason: HOSPADM

## 2021-01-01 RX ORDER — DEXTROSE MONOHYDRATE 50 MG/ML
100 INJECTION, SOLUTION INTRAVENOUS PRN
Status: DISCONTINUED | OUTPATIENT
Start: 2021-01-01 | End: 2021-01-01 | Stop reason: HOSPADM

## 2021-01-01 RX ORDER — ACETAMINOPHEN 500 MG
1000 TABLET ORAL EVERY 6 HOURS PRN
Status: DISCONTINUED | OUTPATIENT
Start: 2021-01-01 | End: 2021-01-01 | Stop reason: HOSPADM

## 2021-01-01 RX ORDER — FLUTICASONE FUROATE AND VILANTEROL 100; 25 UG/1; UG/1
1 POWDER RESPIRATORY (INHALATION) DAILY
Qty: 2 EACH | Refills: 0 | Status: SHIPPED | OUTPATIENT
Start: 2021-01-01 | End: 2021-01-01

## 2021-01-01 RX ORDER — SERTRALINE HYDROCHLORIDE 100 MG/1
TABLET, FILM COATED ORAL
Qty: 60 TABLET | Refills: 1 | Status: ON HOLD | OUTPATIENT
Start: 2021-01-01 | End: 2021-01-01

## 2021-01-01 RX ORDER — NICOTINE POLACRILEX 4 MG
15 LOZENGE BUCCAL PRN
Status: DISCONTINUED | OUTPATIENT
Start: 2021-01-01 | End: 2021-01-01 | Stop reason: HOSPADM

## 2021-01-01 RX ORDER — 0.9 % SODIUM CHLORIDE 0.9 %
80 INTRAVENOUS SOLUTION INTRAVENOUS ONCE
Status: COMPLETED | OUTPATIENT
Start: 2021-01-01 | End: 2021-01-01

## 2021-01-01 RX ORDER — SERTRALINE HYDROCHLORIDE 100 MG/1
100 TABLET, FILM COATED ORAL DAILY PRN
COMMUNITY
End: 2022-01-01

## 2021-01-01 RX ORDER — POLYETHYLENE GLYCOL 3350 17 G/17G
17 POWDER, FOR SOLUTION ORAL DAILY PRN
Status: DISCONTINUED | OUTPATIENT
Start: 2021-01-01 | End: 2021-01-01 | Stop reason: HOSPADM

## 2021-01-01 RX ORDER — AMLODIPINE BESYLATE 10 MG/1
10 TABLET ORAL DAILY
Status: DISCONTINUED | OUTPATIENT
Start: 2021-01-01 | End: 2021-01-01

## 2021-01-01 RX ORDER — HEPARIN SODIUM 10000 [USP'U]/100ML
5-30 INJECTION, SOLUTION INTRAVENOUS CONTINUOUS
Status: DISCONTINUED | OUTPATIENT
Start: 2021-01-01 | End: 2021-01-01

## 2021-01-01 RX ORDER — CETIRIZINE HYDROCHLORIDE 10 MG/1
10 TABLET ORAL DAILY
Qty: 30 TABLET | Refills: 3 | Status: SHIPPED | OUTPATIENT
Start: 2021-01-01 | End: 2021-01-01 | Stop reason: SDUPTHER

## 2021-01-01 RX ORDER — ALBUTEROL SULFATE 2.5 MG/3ML
2.5 SOLUTION RESPIRATORY (INHALATION) 4 TIMES DAILY
Status: DISCONTINUED | OUTPATIENT
Start: 2021-01-01 | End: 2021-01-01 | Stop reason: HOSPADM

## 2021-01-01 RX ORDER — FUROSEMIDE 20 MG/1
40 TABLET ORAL DAILY
Status: DISCONTINUED | OUTPATIENT
Start: 2021-01-01 | End: 2021-01-01

## 2021-01-01 RX ORDER — SODIUM CHLORIDE 0.9 % (FLUSH) 0.9 %
10 SYRINGE (ML) INJECTION EVERY 12 HOURS SCHEDULED
Status: DISCONTINUED | OUTPATIENT
Start: 2021-01-01 | End: 2021-01-01 | Stop reason: HOSPADM

## 2021-01-01 RX ORDER — SODIUM CHLORIDE 0.9 % (FLUSH) 0.9 %
10 SYRINGE (ML) INJECTION PRN
Status: DISCONTINUED | OUTPATIENT
Start: 2021-01-01 | End: 2021-01-01 | Stop reason: HOSPADM

## 2021-01-01 RX ORDER — ALBUTEROL SULFATE 90 UG/1
2 AEROSOL, METERED RESPIRATORY (INHALATION) EVERY 6 HOURS PRN
Status: DISCONTINUED | OUTPATIENT
Start: 2021-01-01 | End: 2021-01-01

## 2021-01-01 RX ORDER — METHYLPREDNISOLONE SODIUM SUCCINATE 125 MG/2ML
125 INJECTION, POWDER, LYOPHILIZED, FOR SOLUTION INTRAMUSCULAR; INTRAVENOUS ONCE
Status: COMPLETED | OUTPATIENT
Start: 2021-01-01 | End: 2021-01-01

## 2021-01-01 RX ORDER — PROMETHAZINE HYDROCHLORIDE 25 MG/ML
12.5 INJECTION, SOLUTION INTRAMUSCULAR; INTRAVENOUS ONCE
Status: DISCONTINUED | OUTPATIENT
Start: 2021-01-01 | End: 2021-01-01

## 2021-01-01 RX ORDER — DEXTROSE MONOHYDRATE 25 G/50ML
12.5 INJECTION, SOLUTION INTRAVENOUS PRN
Status: DISCONTINUED | OUTPATIENT
Start: 2021-01-01 | End: 2021-01-01 | Stop reason: HOSPADM

## 2021-01-01 RX ORDER — PREDNISONE 10 MG/1
30 TABLET ORAL DAILY
Qty: 9 TABLET | Refills: 0 | Status: SHIPPED | OUTPATIENT
Start: 2021-01-01 | End: 2021-01-01

## 2021-01-01 RX ORDER — PREDNISONE 10 MG/1
TABLET ORAL
Qty: 30 TABLET | Refills: 0 | Status: SHIPPED | OUTPATIENT
Start: 2021-01-01 | End: 2021-01-01

## 2021-01-01 RX ORDER — LEVOFLOXACIN 5 MG/ML
500 INJECTION, SOLUTION INTRAVENOUS ONCE
Status: COMPLETED | OUTPATIENT
Start: 2021-01-01 | End: 2021-01-01

## 2021-01-01 RX ORDER — LEVOFLOXACIN 750 MG/1
750 TABLET ORAL DAILY
Status: DISCONTINUED | OUTPATIENT
Start: 2021-01-01 | End: 2021-01-01 | Stop reason: HOSPADM

## 2021-01-01 RX ORDER — FUROSEMIDE 10 MG/ML
40 INJECTION INTRAMUSCULAR; INTRAVENOUS DAILY
Status: DISCONTINUED | OUTPATIENT
Start: 2021-01-01 | End: 2021-01-01

## 2021-01-01 RX ORDER — DEXTROMETHORPHAN POLISTIREX 30 MG/5ML
60 SUSPENSION ORAL NIGHTLY PRN
Qty: 1 EACH | Refills: 1 | Status: SHIPPED | OUTPATIENT
Start: 2021-01-01 | End: 2021-01-01

## 2021-01-01 RX ORDER — ACETAMINOPHEN 325 MG/1
650 TABLET ORAL ONCE
Status: COMPLETED | OUTPATIENT
Start: 2021-01-01 | End: 2021-01-01

## 2021-01-01 RX ORDER — METHYLPREDNISOLONE SODIUM SUCCINATE 40 MG/ML
40 INJECTION, POWDER, LYOPHILIZED, FOR SOLUTION INTRAMUSCULAR; INTRAVENOUS EVERY 8 HOURS
Status: DISCONTINUED | OUTPATIENT
Start: 2021-01-01 | End: 2021-01-01

## 2021-01-01 RX ORDER — FLUTICASONE PROPIONATE 50 MCG
2 SPRAY, SUSPENSION (ML) NASAL DAILY
Qty: 16 G | Refills: 0 | Status: SHIPPED | OUTPATIENT
Start: 2021-01-01 | End: 2021-01-01

## 2021-01-01 RX ORDER — PREDNISONE 20 MG/1
40 TABLET ORAL DAILY
Status: DISCONTINUED | OUTPATIENT
Start: 2021-01-01 | End: 2021-01-01 | Stop reason: HOSPADM

## 2021-01-01 RX ORDER — ALBUTEROL SULFATE 2.5 MG/3ML
2.5 SOLUTION RESPIRATORY (INHALATION)
Status: DISCONTINUED | OUTPATIENT
Start: 2021-01-01 | End: 2021-01-01 | Stop reason: HOSPADM

## 2021-01-01 RX ORDER — ALBUTEROL SULFATE 2.5 MG/3ML
2.5 SOLUTION RESPIRATORY (INHALATION) EVERY 6 HOURS PRN
Qty: 90 ML | Refills: 0 | Status: SHIPPED | OUTPATIENT
Start: 2021-01-01 | End: 2022-01-01

## 2021-01-01 RX ORDER — IPRATROPIUM BROMIDE AND ALBUTEROL SULFATE 2.5; .5 MG/3ML; MG/3ML
1 SOLUTION RESPIRATORY (INHALATION)
Status: DISCONTINUED | OUTPATIENT
Start: 2021-01-01 | End: 2021-01-01

## 2021-01-01 RX ORDER — PREDNISONE 10 MG/1
10 TABLET ORAL DAILY
Qty: 10 TABLET | Refills: 0 | Status: SHIPPED | OUTPATIENT
Start: 2021-01-01 | End: 2021-01-01

## 2021-01-01 RX ORDER — FUROSEMIDE 40 MG/1
40 TABLET ORAL DAILY
Qty: 60 TABLET | Refills: 3 | Status: SHIPPED | OUTPATIENT
Start: 2021-01-01

## 2021-01-01 RX ORDER — FUROSEMIDE 10 MG/ML
20 INJECTION INTRAMUSCULAR; INTRAVENOUS ONCE
Status: COMPLETED | OUTPATIENT
Start: 2021-01-01 | End: 2021-01-01

## 2021-01-01 RX ORDER — LIDOCAINE HYDROCHLORIDE 10 MG/ML
5 INJECTION, SOLUTION EPIDURAL; INFILTRATION; INTRACAUDAL; PERINEURAL ONCE
Status: COMPLETED | OUTPATIENT
Start: 2021-01-01 | End: 2021-01-01

## 2021-01-01 RX ORDER — BENZOCAINE/MENTHOL 6 MG-10 MG
1 LOZENGE MUCOUS MEMBRANE
Qty: 30 LOZENGE | Refills: 0 | Status: SHIPPED | OUTPATIENT
Start: 2021-01-01 | End: 2021-01-01 | Stop reason: ALTCHOICE

## 2021-01-01 RX ORDER — FLUTICASONE PROPIONATE 50 MCG
SPRAY, SUSPENSION (ML) NASAL
Qty: 16 G | Refills: 0 | Status: SHIPPED | OUTPATIENT
Start: 2021-01-01 | End: 2021-01-01

## 2021-01-01 RX ORDER — FUROSEMIDE 10 MG/ML
20 INJECTION INTRAMUSCULAR; INTRAVENOUS DAILY
Status: DISCONTINUED | OUTPATIENT
Start: 2021-01-01 | End: 2021-01-01 | Stop reason: HOSPADM

## 2021-01-01 RX ORDER — POTASSIUM CHLORIDE 20 MEQ/1
40 TABLET, EXTENDED RELEASE ORAL ONCE
Status: COMPLETED | OUTPATIENT
Start: 2021-01-01 | End: 2021-01-01

## 2021-01-01 RX ORDER — PANTOPRAZOLE SODIUM 20 MG/1
20 TABLET, DELAYED RELEASE ORAL
Status: DISCONTINUED | OUTPATIENT
Start: 2021-01-01 | End: 2021-01-01 | Stop reason: HOSPADM

## 2021-01-01 RX ORDER — SODIUM CHLORIDE 9 MG/ML
INJECTION, SOLUTION INTRAVENOUS CONTINUOUS
Status: DISCONTINUED | OUTPATIENT
Start: 2021-01-01 | End: 2021-01-01

## 2021-01-01 RX ORDER — LEVOFLOXACIN 750 MG/1
750 TABLET ORAL DAILY
Qty: 5 TABLET | Refills: 0 | Status: SHIPPED | OUTPATIENT
Start: 2021-01-01 | End: 2021-01-01

## 2021-01-01 RX ORDER — ONDANSETRON 4 MG/1
4 TABLET, FILM COATED ORAL EVERY 6 HOURS PRN
Qty: 24 TABLET | Refills: 0 | Status: SHIPPED | OUTPATIENT
Start: 2021-01-01 | End: 2021-01-01

## 2021-01-01 RX ORDER — DEXTROMETHORPHAN POLISTIREX 30 MG/5ML
60 SUSPENSION ORAL NIGHTLY PRN
Status: DISCONTINUED | OUTPATIENT
Start: 2021-01-01 | End: 2021-01-01 | Stop reason: HOSPADM

## 2021-01-01 RX ORDER — FLUTICASONE FUROATE AND VILANTEROL 100; 25 UG/1; UG/1
1 POWDER RESPIRATORY (INHALATION) DAILY
Qty: 2 EACH | Refills: 0 | Status: CANCELLED | OUTPATIENT
Start: 2021-01-01

## 2021-01-01 RX ORDER — METHYLPREDNISOLONE SODIUM SUCCINATE 125 MG/2ML
60 INJECTION, POWDER, LYOPHILIZED, FOR SOLUTION INTRAMUSCULAR; INTRAVENOUS EVERY 6 HOURS
Status: DISCONTINUED | OUTPATIENT
Start: 2021-01-01 | End: 2021-01-01

## 2021-01-01 RX ORDER — PNV NO.95/FERROUS FUM/FOLIC AC 28MG-0.8MG
1 TABLET ORAL DAILY
Qty: 30 TABLET | Refills: 3 | Status: SHIPPED | OUTPATIENT
Start: 2021-01-01 | End: 2022-01-01

## 2021-01-01 RX ORDER — TIOTROPIUM BROMIDE INHALATION SPRAY 3.12 UG/1
SPRAY, METERED RESPIRATORY (INHALATION)
Qty: 4 G | Refills: 3 | Status: SHIPPED | OUTPATIENT
Start: 2021-01-01 | End: 2022-01-01

## 2021-01-01 RX ORDER — FUROSEMIDE 10 MG/ML
40 INJECTION INTRAMUSCULAR; INTRAVENOUS DAILY
Status: DISCONTINUED | OUTPATIENT
Start: 2021-01-01 | End: 2021-01-01 | Stop reason: HOSPADM

## 2021-01-01 RX ORDER — GUAIFENESIN/DEXTROMETHORPHAN 100-10MG/5
5 SYRUP ORAL EVERY 6 HOURS
Status: DISCONTINUED | OUTPATIENT
Start: 2021-01-01 | End: 2021-01-01 | Stop reason: HOSPADM

## 2021-01-01 RX ORDER — PANTOPRAZOLE SODIUM 40 MG/1
40 TABLET, DELAYED RELEASE ORAL
Status: DISCONTINUED | OUTPATIENT
Start: 2021-01-01 | End: 2021-01-01 | Stop reason: HOSPADM

## 2021-01-01 RX ORDER — PROMETHAZINE HYDROCHLORIDE 12.5 MG/1
12.5 TABLET ORAL EVERY 6 HOURS PRN
Status: DISCONTINUED | OUTPATIENT
Start: 2021-01-01 | End: 2021-01-01 | Stop reason: HOSPADM

## 2021-01-01 RX ORDER — ONDANSETRON 4 MG/1
4 TABLET, FILM COATED ORAL EVERY 6 HOURS PRN
Qty: 24 TABLET | Refills: 0 | Status: SHIPPED | OUTPATIENT
Start: 2021-01-01 | End: 2021-01-01 | Stop reason: SDUPTHER

## 2021-01-01 RX ORDER — LEVOFLOXACIN 5 MG/ML
750 INJECTION, SOLUTION INTRAVENOUS EVERY 24 HOURS
Status: DISCONTINUED | OUTPATIENT
Start: 2021-01-01 | End: 2021-01-01 | Stop reason: HOSPADM

## 2021-01-01 RX ORDER — MORPHINE SULFATE 4 MG/ML
4 INJECTION, SOLUTION INTRAMUSCULAR; INTRAVENOUS ONCE
Status: COMPLETED | OUTPATIENT
Start: 2021-01-01 | End: 2021-01-01

## 2021-01-01 RX ORDER — HEPARIN SODIUM 1000 [USP'U]/ML
80 INJECTION, SOLUTION INTRAVENOUS; SUBCUTANEOUS PRN
Status: DISCONTINUED | OUTPATIENT
Start: 2021-01-01 | End: 2021-01-01

## 2021-01-01 RX ORDER — CETIRIZINE HYDROCHLORIDE 10 MG/1
10 TABLET ORAL DAILY
Status: DISCONTINUED | OUTPATIENT
Start: 2021-01-01 | End: 2021-01-01 | Stop reason: HOSPADM

## 2021-01-01 RX ORDER — BENZONATATE 100 MG/1
200 CAPSULE ORAL 3 TIMES DAILY
Status: DISCONTINUED | OUTPATIENT
Start: 2021-01-01 | End: 2021-01-01 | Stop reason: HOSPADM

## 2021-01-01 RX ORDER — DOXYCYCLINE 100 MG/1
100 CAPSULE ORAL EVERY 12 HOURS SCHEDULED
Status: DISCONTINUED | OUTPATIENT
Start: 2021-01-01 | End: 2021-01-01 | Stop reason: HOSPADM

## 2021-01-01 RX ORDER — ALBUTEROL SULFATE 2.5 MG/3ML
2.5 SOLUTION RESPIRATORY (INHALATION) EVERY 6 HOURS PRN
Status: DISCONTINUED | OUTPATIENT
Start: 2021-01-01 | End: 2021-01-01

## 2021-01-01 RX ORDER — POTASSIUM CHLORIDE 7.45 MG/ML
10 INJECTION INTRAVENOUS PRN
Status: DISCONTINUED | OUTPATIENT
Start: 2021-01-01 | End: 2021-01-01 | Stop reason: HOSPADM

## 2021-01-01 RX ORDER — DEXAMETHASONE SODIUM PHOSPHATE 10 MG/ML
6 INJECTION, SOLUTION INTRAMUSCULAR; INTRAVENOUS ONCE
Status: COMPLETED | OUTPATIENT
Start: 2021-01-01 | End: 2021-01-01

## 2021-01-01 RX ORDER — METHYLPREDNISOLONE SODIUM SUCCINATE 125 MG/2ML
40 INJECTION, POWDER, LYOPHILIZED, FOR SOLUTION INTRAMUSCULAR; INTRAVENOUS EVERY 12 HOURS
Status: DISCONTINUED | OUTPATIENT
Start: 2021-01-01 | End: 2021-01-01

## 2021-01-01 RX ORDER — METHYLPREDNISOLONE SODIUM SUCCINATE 40 MG/ML
40 INJECTION, POWDER, LYOPHILIZED, FOR SOLUTION INTRAMUSCULAR; INTRAVENOUS EVERY 12 HOURS
Status: DISCONTINUED | OUTPATIENT
Start: 2021-01-01 | End: 2021-01-01 | Stop reason: HOSPADM

## 2021-01-01 RX ORDER — BENZONATATE 100 MG/1
100 CAPSULE ORAL 3 TIMES DAILY PRN
Qty: 30 CAPSULE | Refills: 0 | Status: SHIPPED | OUTPATIENT
Start: 2021-01-01 | End: 2021-01-01

## 2021-01-01 RX ORDER — HEPARIN SODIUM 1000 [USP'U]/ML
80 INJECTION, SOLUTION INTRAVENOUS; SUBCUTANEOUS ONCE
Status: COMPLETED | OUTPATIENT
Start: 2021-01-01 | End: 2021-01-01

## 2021-01-01 RX ORDER — METHYLPREDNISOLONE SODIUM SUCCINATE 125 MG/2ML
60 INJECTION, POWDER, LYOPHILIZED, FOR SOLUTION INTRAMUSCULAR; INTRAVENOUS EVERY 8 HOURS
Status: DISCONTINUED | OUTPATIENT
Start: 2021-01-01 | End: 2021-01-01

## 2021-01-01 RX ORDER — FLUTICASONE FUROATE AND VILANTEROL 100; 25 UG/1; UG/1
1 POWDER RESPIRATORY (INHALATION) DAILY
Qty: 2 EACH | Refills: 0 | Status: ON HOLD | OUTPATIENT
Start: 2021-01-01 | End: 2021-01-01

## 2021-01-01 RX ORDER — METHYLPREDNISOLONE SODIUM SUCCINATE 40 MG/ML
40 INJECTION, POWDER, LYOPHILIZED, FOR SOLUTION INTRAMUSCULAR; INTRAVENOUS EVERY 12 HOURS
Status: DISCONTINUED | OUTPATIENT
Start: 2021-01-01 | End: 2021-01-01

## 2021-01-01 RX ORDER — POTASSIUM CHLORIDE 20 MEQ/1
40 TABLET, EXTENDED RELEASE ORAL PRN
Status: DISCONTINUED | OUTPATIENT
Start: 2021-01-01 | End: 2021-01-01 | Stop reason: HOSPADM

## 2021-01-01 RX ORDER — POTASSIUM BICARBONATE 25 MEQ/1
50 TABLET, EFFERVESCENT ORAL ONCE
Status: COMPLETED | OUTPATIENT
Start: 2021-01-01 | End: 2021-01-01

## 2021-01-01 RX ORDER — FUROSEMIDE 20 MG/1
20 TABLET ORAL 2 TIMES DAILY
Status: DISCONTINUED | OUTPATIENT
Start: 2021-01-01 | End: 2021-01-01 | Stop reason: HOSPADM

## 2021-01-01 RX ORDER — CETIRIZINE HYDROCHLORIDE 10 MG/1
10 TABLET ORAL DAILY
Qty: 90 TABLET | Refills: 2 | Status: SHIPPED | OUTPATIENT
Start: 2021-01-01 | End: 2021-01-01

## 2021-01-01 RX ORDER — ACETYLCYSTEINE 200 MG/ML
600 SOLUTION ORAL; RESPIRATORY (INHALATION) 2 TIMES DAILY
Status: DISCONTINUED | OUTPATIENT
Start: 2021-01-01 | End: 2021-01-01 | Stop reason: HOSPADM

## 2021-01-01 RX ORDER — PREDNISONE 20 MG/1
40 TABLET ORAL DAILY
Qty: 10 TABLET | Refills: 0 | Status: SHIPPED | OUTPATIENT
Start: 2021-01-01 | End: 2021-01-01

## 2021-01-01 RX ORDER — METHYLPREDNISOLONE SODIUM SUCCINATE 40 MG/ML
40 INJECTION, POWDER, LYOPHILIZED, FOR SOLUTION INTRAMUSCULAR; INTRAVENOUS EVERY 8 HOURS
Status: DISCONTINUED | OUTPATIENT
Start: 2021-01-01 | End: 2021-01-01 | Stop reason: HOSPADM

## 2021-01-01 RX ORDER — ALBUTEROL SULFATE 2.5 MG/3ML
2.5 SOLUTION RESPIRATORY (INHALATION) EVERY 6 HOURS PRN
Status: DISCONTINUED | OUTPATIENT
Start: 2021-01-01 | End: 2021-01-01 | Stop reason: HOSPADM

## 2021-01-01 RX ORDER — LEVOFLOXACIN 750 MG/1
750 TABLET ORAL DAILY
Qty: 4 TABLET | Refills: 0 | Status: SHIPPED | OUTPATIENT
Start: 2021-01-01 | End: 2021-01-01

## 2021-01-01 RX ORDER — HEPARIN SODIUM 1000 [USP'U]/ML
40 INJECTION, SOLUTION INTRAVENOUS; SUBCUTANEOUS PRN
Status: DISCONTINUED | OUTPATIENT
Start: 2021-01-01 | End: 2021-01-01

## 2021-01-01 RX ADMIN — APIXABAN 5 MG: 5 TABLET, FILM COATED ORAL at 09:47

## 2021-01-01 RX ADMIN — APIXABAN 5 MG: 5 TABLET, FILM COATED ORAL at 08:23

## 2021-01-01 RX ADMIN — DOXYCYCLINE 100 MG: 100 CAPSULE ORAL at 21:45

## 2021-01-01 RX ADMIN — ACETAMINOPHEN 650 MG: 325 TABLET ORAL at 01:11

## 2021-01-01 RX ADMIN — PREDNISONE 40 MG: 20 TABLET ORAL at 14:18

## 2021-01-01 RX ADMIN — ACETAMINOPHEN 650 MG: 325 TABLET ORAL at 16:13

## 2021-01-01 RX ADMIN — METHYLPREDNISOLONE SODIUM SUCCINATE 40 MG: 40 INJECTION, POWDER, FOR SOLUTION INTRAMUSCULAR; INTRAVENOUS at 09:47

## 2021-01-01 RX ADMIN — FUROSEMIDE 20 MG: 10 INJECTION, SOLUTION INTRAMUSCULAR; INTRAVENOUS at 08:34

## 2021-01-01 RX ADMIN — AMLODIPINE BESYLATE 10 MG: 10 TABLET ORAL at 08:08

## 2021-01-01 RX ADMIN — ONDANSETRON 4 MG: 2 INJECTION INTRAMUSCULAR; INTRAVENOUS at 11:28

## 2021-01-01 RX ADMIN — SODIUM CHLORIDE, PRESERVATIVE FREE 10 ML: 5 INJECTION INTRAVENOUS at 23:30

## 2021-01-01 RX ADMIN — ONDANSETRON 4 MG: 2 INJECTION INTRAMUSCULAR; INTRAVENOUS at 06:28

## 2021-01-01 RX ADMIN — IPRATROPIUM BROMIDE AND ALBUTEROL SULFATE 1 AMPULE: .5; 2.5 SOLUTION RESPIRATORY (INHALATION) at 11:41

## 2021-01-01 RX ADMIN — APIXABAN 5 MG: 5 TABLET, FILM COATED ORAL at 10:18

## 2021-01-01 RX ADMIN — ACETAMINOPHEN 650 MG: 325 TABLET ORAL at 14:21

## 2021-01-01 RX ADMIN — IPRATROPIUM BROMIDE AND ALBUTEROL SULFATE 1 AMPULE: .5; 2.5 SOLUTION RESPIRATORY (INHALATION) at 07:56

## 2021-01-01 RX ADMIN — ACETAMINOPHEN 650 MG: 325 TABLET ORAL at 21:43

## 2021-01-01 RX ADMIN — ACETAMINOPHEN 650 MG: 325 TABLET ORAL at 00:55

## 2021-01-01 RX ADMIN — LIDOCAINE HYDROCHLORIDE 5 ML: 10 INJECTION, SOLUTION EPIDURAL; INFILTRATION; INTRACAUDAL; PERINEURAL at 14:07

## 2021-01-01 RX ADMIN — FOLIC ACID 1 MG: 1 TABLET ORAL at 09:47

## 2021-01-01 RX ADMIN — METHYLPREDNISOLONE SODIUM SUCCINATE 40 MG: 40 INJECTION, POWDER, FOR SOLUTION INTRAMUSCULAR; INTRAVENOUS at 01:04

## 2021-01-01 RX ADMIN — PANTOPRAZOLE SODIUM 40 MG: 40 TABLET, DELAYED RELEASE ORAL at 06:04

## 2021-01-01 RX ADMIN — METHYLPREDNISOLONE SODIUM SUCCINATE 60 MG: 125 INJECTION, POWDER, FOR SOLUTION INTRAMUSCULAR; INTRAVENOUS at 07:13

## 2021-01-01 RX ADMIN — SODIUM CHLORIDE, PRESERVATIVE FREE 10 ML: 5 INJECTION INTRAVENOUS at 07:29

## 2021-01-01 RX ADMIN — IPRATROPIUM BROMIDE AND ALBUTEROL SULFATE 1 AMPULE: .5; 2.5 SOLUTION RESPIRATORY (INHALATION) at 17:33

## 2021-01-01 RX ADMIN — SODIUM CHLORIDE 500 ML: 9 INJECTION, SOLUTION INTRAVENOUS at 06:28

## 2021-01-01 RX ADMIN — APIXABAN 5 MG: 5 TABLET, FILM COATED ORAL at 20:49

## 2021-01-01 RX ADMIN — SODIUM CHLORIDE, PRESERVATIVE FREE 10 ML: 5 INJECTION INTRAVENOUS at 17:03

## 2021-01-01 RX ADMIN — AMLODIPINE BESYLATE 10 MG: 10 TABLET ORAL at 20:27

## 2021-01-01 RX ADMIN — ACETAMINOPHEN 650 MG: 325 TABLET ORAL at 07:27

## 2021-01-01 RX ADMIN — ACETAMINOPHEN 1000 MG: 500 TABLET, FILM COATED ORAL at 07:17

## 2021-01-01 RX ADMIN — INSULIN LISPRO 4 UNITS: 100 INJECTION, SOLUTION INTRAVENOUS; SUBCUTANEOUS at 08:34

## 2021-01-01 RX ADMIN — BUDESONIDE AND FORMOTEROL FUMARATE DIHYDRATE 2 PUFF: 160; 4.5 AEROSOL RESPIRATORY (INHALATION) at 08:18

## 2021-01-01 RX ADMIN — FUROSEMIDE 20 MG: 20 TABLET ORAL at 08:07

## 2021-01-01 RX ADMIN — ONDANSETRON 4 MG: 2 INJECTION INTRAMUSCULAR; INTRAVENOUS at 07:29

## 2021-01-01 RX ADMIN — IPRATROPIUM BROMIDE AND ALBUTEROL SULFATE 1 AMPULE: .5; 2.5 SOLUTION RESPIRATORY (INHALATION) at 19:26

## 2021-01-01 RX ADMIN — ALBUTEROL SULFATE 2.5 MG: 2.5 SOLUTION RESPIRATORY (INHALATION) at 15:49

## 2021-01-01 RX ADMIN — HEPARIN SODIUM AND DEXTROSE 18 UNITS/KG/HR: 10000; 5 INJECTION INTRAVENOUS at 13:08

## 2021-01-01 RX ADMIN — GUAIFENESIN AND DEXTROMETHORPHAN 5 ML: 100; 10 SYRUP ORAL at 05:50

## 2021-01-01 RX ADMIN — CEFTRIAXONE SODIUM 2000 MG: 2 INJECTION, POWDER, FOR SOLUTION INTRAMUSCULAR; INTRAVENOUS at 15:05

## 2021-01-01 RX ADMIN — ALBUTEROL SULFATE 2.5 MG: 2.5 SOLUTION RESPIRATORY (INHALATION) at 20:48

## 2021-01-01 RX ADMIN — METHYLPREDNISOLONE SODIUM SUCCINATE 40 MG: 125 INJECTION, POWDER, FOR SOLUTION INTRAMUSCULAR; INTRAVENOUS at 13:07

## 2021-01-01 RX ADMIN — IPRATROPIUM BROMIDE AND ALBUTEROL SULFATE 1 AMPULE: .5; 2.5 SOLUTION RESPIRATORY (INHALATION) at 19:38

## 2021-01-01 RX ADMIN — GUAIFENESIN AND DEXTROMETHORPHAN 5 ML: 100; 10 SYRUP ORAL at 15:32

## 2021-01-01 RX ADMIN — ALBUTEROL SULFATE 2.5 MG: 2.5 SOLUTION RESPIRATORY (INHALATION) at 16:05

## 2021-01-01 RX ADMIN — PIPERACILLIN AND TAZOBACTAM 3375 MG: 3; .375 INJECTION, POWDER, FOR SOLUTION INTRAVENOUS at 13:30

## 2021-01-01 RX ADMIN — SODIUM CHLORIDE, PRESERVATIVE FREE 10 ML: 5 INJECTION INTRAVENOUS at 10:18

## 2021-01-01 RX ADMIN — CEFEPIME HYDROCHLORIDE 2000 MG: 2 INJECTION, POWDER, FOR SOLUTION INTRAVENOUS at 10:17

## 2021-01-01 RX ADMIN — CEFEPIME HYDROCHLORIDE 2000 MG: 2 INJECTION, POWDER, FOR SOLUTION INTRAVENOUS at 08:34

## 2021-01-01 RX ADMIN — ACETAMINOPHEN 650 MG: 325 TABLET ORAL at 16:05

## 2021-01-01 RX ADMIN — FLUTICASONE PROPIONATE 2 SPRAY: 50 SPRAY, METERED NASAL at 09:47

## 2021-01-01 RX ADMIN — IPRATROPIUM BROMIDE AND ALBUTEROL SULFATE 1 AMPULE: .5; 2.5 SOLUTION RESPIRATORY (INHALATION) at 10:15

## 2021-01-01 RX ADMIN — IPRATROPIUM BROMIDE AND ALBUTEROL SULFATE 1 AMPULE: .5; 2.5 SOLUTION RESPIRATORY (INHALATION) at 08:17

## 2021-01-01 RX ADMIN — FUROSEMIDE 20 MG: 20 TABLET ORAL at 17:13

## 2021-01-01 RX ADMIN — INSULIN LISPRO 2 UNITS: 100 INJECTION, SOLUTION INTRAVENOUS; SUBCUTANEOUS at 11:32

## 2021-01-01 RX ADMIN — METHYLPREDNISOLONE SODIUM SUCCINATE 60 MG: 125 INJECTION, POWDER, FOR SOLUTION INTRAMUSCULAR; INTRAVENOUS at 03:34

## 2021-01-01 RX ADMIN — LEVOFLOXACIN 500 MG: 5 INJECTION, SOLUTION INTRAVENOUS at 01:36

## 2021-01-01 RX ADMIN — DEXAMETHASONE SODIUM PHOSPHATE 6 MG: 10 INJECTION, SOLUTION INTRAMUSCULAR; INTRAVENOUS at 22:51

## 2021-01-01 RX ADMIN — ACETAMINOPHEN 1000 MG: 500 TABLET, FILM COATED ORAL at 20:47

## 2021-01-01 RX ADMIN — METHYLPREDNISOLONE SODIUM SUCCINATE 40 MG: 125 INJECTION, POWDER, FOR SOLUTION INTRAMUSCULAR; INTRAVENOUS at 21:14

## 2021-01-01 RX ADMIN — ACETAMINOPHEN 650 MG: 325 TABLET ORAL at 16:46

## 2021-01-01 RX ADMIN — METHYLPREDNISOLONE SODIUM SUCCINATE 60 MG: 125 INJECTION, POWDER, FOR SOLUTION INTRAMUSCULAR; INTRAVENOUS at 12:15

## 2021-01-01 RX ADMIN — FOLIC ACID 1 MG: 1 TABLET ORAL at 20:27

## 2021-01-01 RX ADMIN — METHYLPREDNISOLONE SODIUM SUCCINATE 60 MG: 125 INJECTION, POWDER, FOR SOLUTION INTRAMUSCULAR; INTRAVENOUS at 07:47

## 2021-01-01 RX ADMIN — FUROSEMIDE 20 MG: 10 INJECTION, SOLUTION INTRAMUSCULAR; INTRAVENOUS at 18:25

## 2021-01-01 RX ADMIN — APIXABAN 5 MG: 5 TABLET, FILM COATED ORAL at 20:32

## 2021-01-01 RX ADMIN — CEFEPIME HYDROCHLORIDE 2000 MG: 2 INJECTION, POWDER, FOR SOLUTION INTRAVENOUS at 18:02

## 2021-01-01 RX ADMIN — FOLIC ACID 1 MG: 1 TABLET ORAL at 10:17

## 2021-01-01 RX ADMIN — METHYLPREDNISOLONE SODIUM SUCCINATE 40 MG: 40 INJECTION, POWDER, FOR SOLUTION INTRAMUSCULAR; INTRAVENOUS at 21:45

## 2021-01-01 RX ADMIN — CETIRIZINE HYDROCHLORIDE 10 MG: 10 TABLET, FILM COATED ORAL at 09:47

## 2021-01-01 RX ADMIN — APIXABAN 5 MG: 5 TABLET, FILM COATED ORAL at 21:35

## 2021-01-01 RX ADMIN — SODIUM CHLORIDE, PRESERVATIVE FREE 10 ML: 5 INJECTION INTRAVENOUS at 09:06

## 2021-01-01 RX ADMIN — FUROSEMIDE 40 MG: 10 INJECTION, SOLUTION INTRAMUSCULAR; INTRAVENOUS at 13:08

## 2021-01-01 RX ADMIN — BUDESONIDE AND FORMOTEROL FUMARATE DIHYDRATE 2 PUFF: 160; 4.5 AEROSOL RESPIRATORY (INHALATION) at 12:16

## 2021-01-01 RX ADMIN — FOLIC ACID 1 MG: 1 TABLET ORAL at 14:08

## 2021-01-01 RX ADMIN — ACETAMINOPHEN 1000 MG: 500 TABLET, FILM COATED ORAL at 20:32

## 2021-01-01 RX ADMIN — IPRATROPIUM BROMIDE AND ALBUTEROL SULFATE 1 AMPULE: .5; 2.5 SOLUTION RESPIRATORY (INHALATION) at 06:06

## 2021-01-01 RX ADMIN — METHYLPREDNISOLONE SODIUM SUCCINATE 40 MG: 40 INJECTION, POWDER, FOR SOLUTION INTRAMUSCULAR; INTRAVENOUS at 06:13

## 2021-01-01 RX ADMIN — BUDESONIDE AND FORMOTEROL FUMARATE DIHYDRATE 2 PUFF: 160; 4.5 AEROSOL RESPIRATORY (INHALATION) at 20:52

## 2021-01-01 RX ADMIN — FUROSEMIDE 20 MG: 10 INJECTION, SOLUTION INTRAMUSCULAR; INTRAVENOUS at 13:24

## 2021-01-01 RX ADMIN — METHYLPREDNISOLONE SODIUM SUCCINATE 40 MG: 125 INJECTION, POWDER, FOR SOLUTION INTRAMUSCULAR; INTRAVENOUS at 08:34

## 2021-01-01 RX ADMIN — IPRATROPIUM BROMIDE AND ALBUTEROL SULFATE 1 AMPULE: .5; 2.5 SOLUTION RESPIRATORY (INHALATION) at 18:35

## 2021-01-01 RX ADMIN — GUAIFENESIN AND DEXTROMETHORPHAN 5 ML: 100; 10 SYRUP ORAL at 06:04

## 2021-01-01 RX ADMIN — FLUTICASONE PROPIONATE 2 SPRAY: 50 SPRAY, METERED NASAL at 11:28

## 2021-01-01 RX ADMIN — APIXABAN 5 MG: 5 TABLET, FILM COATED ORAL at 08:34

## 2021-01-01 RX ADMIN — IPRATROPIUM BROMIDE AND ALBUTEROL SULFATE 1 AMPULE: .5; 3 SOLUTION RESPIRATORY (INHALATION) at 18:52

## 2021-01-01 RX ADMIN — BUDESONIDE AND FORMOTEROL FUMARATE DIHYDRATE 2 PUFF: 80; 4.5 AEROSOL RESPIRATORY (INHALATION) at 07:22

## 2021-01-01 RX ADMIN — SODIUM CHLORIDE, PRESERVATIVE FREE 10 ML: 5 INJECTION INTRAVENOUS at 21:17

## 2021-01-01 RX ADMIN — DOXYCYCLINE 100 MG: 100 CAPSULE ORAL at 10:17

## 2021-01-01 RX ADMIN — Medication 400 MG: at 00:31

## 2021-01-01 RX ADMIN — CETIRIZINE HYDROCHLORIDE 10 MG: 10 TABLET, FILM COATED ORAL at 10:17

## 2021-01-01 RX ADMIN — PANTOPRAZOLE SODIUM 20 MG: 20 TABLET, DELAYED RELEASE ORAL at 05:51

## 2021-01-01 RX ADMIN — ACETYLCYSTEINE 600 MG: 200 INHALANT RESPIRATORY (INHALATION) at 07:39

## 2021-01-01 RX ADMIN — IOPAMIDOL 75 ML: 755 INJECTION, SOLUTION INTRAVENOUS at 23:30

## 2021-01-01 RX ADMIN — ONDANSETRON 4 MG: 2 INJECTION INTRAMUSCULAR; INTRAVENOUS at 02:17

## 2021-01-01 RX ADMIN — CEFEPIME HYDROCHLORIDE 2000 MG: 2 INJECTION, POWDER, FOR SOLUTION INTRAVENOUS at 02:00

## 2021-01-01 RX ADMIN — INSULIN LISPRO 2 UNITS: 100 INJECTION, SOLUTION INTRAVENOUS; SUBCUTANEOUS at 09:20

## 2021-01-01 RX ADMIN — BUDESONIDE AND FORMOTEROL FUMARATE DIHYDRATE 2 PUFF: 160; 4.5 AEROSOL RESPIRATORY (INHALATION) at 19:32

## 2021-01-01 RX ADMIN — METHYLPREDNISOLONE SODIUM SUCCINATE 40 MG: 125 INJECTION, POWDER, FOR SOLUTION INTRAMUSCULAR; INTRAVENOUS at 21:22

## 2021-01-01 RX ADMIN — SODIUM CHLORIDE, PRESERVATIVE FREE 10 ML: 5 INJECTION INTRAVENOUS at 08:38

## 2021-01-01 RX ADMIN — METHOTREXATE 20 MG: 2.5 TABLET ORAL at 20:48

## 2021-01-01 RX ADMIN — SODIUM CHLORIDE 80 ML: 9 INJECTION, SOLUTION INTRAVENOUS at 23:30

## 2021-01-01 RX ADMIN — TIOTROPIUM BROMIDE INHALATION SPRAY 2 PUFF: 3.12 SPRAY, METERED RESPIRATORY (INHALATION) at 12:16

## 2021-01-01 RX ADMIN — ACETAMINOPHEN 650 MG: 325 TABLET ORAL at 23:16

## 2021-01-01 RX ADMIN — METHYLPREDNISOLONE SODIUM SUCCINATE 40 MG: 125 INJECTION, POWDER, FOR SOLUTION INTRAMUSCULAR; INTRAVENOUS at 23:27

## 2021-01-01 RX ADMIN — POTASSIUM CHLORIDE 40 MEQ: 1500 TABLET, EXTENDED RELEASE ORAL at 08:34

## 2021-01-01 RX ADMIN — METHYLPREDNISOLONE SODIUM SUCCINATE 40 MG: 40 INJECTION, POWDER, FOR SOLUTION INTRAMUSCULAR; INTRAVENOUS at 00:25

## 2021-01-01 RX ADMIN — AMLODIPINE BESYLATE 10 MG: 10 TABLET ORAL at 08:47

## 2021-01-01 RX ADMIN — ACETAMINOPHEN 650 MG: 325 TABLET ORAL at 16:51

## 2021-01-01 RX ADMIN — TIOTROPIUM BROMIDE INHALATION SPRAY 2 PUFF: 3.12 SPRAY, METERED RESPIRATORY (INHALATION) at 08:18

## 2021-01-01 RX ADMIN — Medication 1250 MG: at 16:08

## 2021-01-01 RX ADMIN — CEFEPIME HYDROCHLORIDE 2000 MG: 2 INJECTION, POWDER, FOR SOLUTION INTRAVENOUS at 21:44

## 2021-01-01 RX ADMIN — IPRATROPIUM BROMIDE AND ALBUTEROL SULFATE 1 AMPULE: .5; 2.5 SOLUTION RESPIRATORY (INHALATION) at 07:21

## 2021-01-01 RX ADMIN — PANTOPRAZOLE SODIUM 40 MG: 40 TABLET, DELAYED RELEASE ORAL at 06:49

## 2021-01-01 RX ADMIN — Medication 1500 MG: at 09:51

## 2021-01-01 RX ADMIN — BENZONATATE 200 MG: 100 CAPSULE ORAL at 08:47

## 2021-01-01 RX ADMIN — INSULIN LISPRO 1 UNITS: 100 INJECTION, SOLUTION INTRAVENOUS; SUBCUTANEOUS at 22:18

## 2021-01-01 RX ADMIN — BUDESONIDE AND FORMOTEROL FUMARATE DIHYDRATE 2 PUFF: 160; 4.5 AEROSOL RESPIRATORY (INHALATION) at 07:39

## 2021-01-01 RX ADMIN — FUROSEMIDE 40 MG: 10 INJECTION, SOLUTION INTRAMUSCULAR; INTRAVENOUS at 10:17

## 2021-01-01 RX ADMIN — IPRATROPIUM BROMIDE AND ALBUTEROL SULFATE 1 AMPULE: .5; 2.5 SOLUTION RESPIRATORY (INHALATION) at 10:43

## 2021-01-01 RX ADMIN — BUDESONIDE AND FORMOTEROL FUMARATE DIHYDRATE 2 PUFF: 160; 4.5 AEROSOL RESPIRATORY (INHALATION) at 19:38

## 2021-01-01 RX ADMIN — SODIUM CHLORIDE, PRESERVATIVE FREE 10 ML: 5 INJECTION INTRAVENOUS at 21:37

## 2021-01-01 RX ADMIN — FUROSEMIDE 20 MG: 10 INJECTION, SOLUTION INTRAMUSCULAR; INTRAVENOUS at 09:06

## 2021-01-01 RX ADMIN — APIXABAN 5 MG: 5 TABLET, FILM COATED ORAL at 21:14

## 2021-01-01 RX ADMIN — ALBUTEROL SULFATE 2.5 MG: 2.5 SOLUTION RESPIRATORY (INHALATION) at 15:48

## 2021-01-01 RX ADMIN — ALBUTEROL SULFATE 2.5 MG: 2.5 SOLUTION RESPIRATORY (INHALATION) at 11:56

## 2021-01-01 RX ADMIN — SODIUM CHLORIDE: 9 INJECTION, SOLUTION INTRAVENOUS at 20:16

## 2021-01-01 RX ADMIN — BENZONATATE 200 MG: 100 CAPSULE ORAL at 20:32

## 2021-01-01 RX ADMIN — APIXABAN 5 MG: 5 TABLET, FILM COATED ORAL at 08:28

## 2021-01-01 RX ADMIN — FLUTICASONE PROPIONATE 2 SPRAY: 50 SPRAY, METERED NASAL at 08:34

## 2021-01-01 RX ADMIN — MORPHINE SULFATE 4 MG: 4 INJECTION INTRAVENOUS at 06:14

## 2021-01-01 RX ADMIN — ONDANSETRON 4 MG: 2 INJECTION INTRAMUSCULAR; INTRAVENOUS at 22:47

## 2021-01-01 RX ADMIN — APIXABAN 5 MG: 5 TABLET, FILM COATED ORAL at 14:08

## 2021-01-01 RX ADMIN — DOXYCYCLINE 100 MG: 100 CAPSULE ORAL at 21:35

## 2021-01-01 RX ADMIN — ACETAMINOPHEN 1000 MG: 500 TABLET, FILM COATED ORAL at 14:06

## 2021-01-01 RX ADMIN — CEFEPIME HYDROCHLORIDE 2000 MG: 2 INJECTION, POWDER, FOR SOLUTION INTRAVENOUS at 07:41

## 2021-01-01 RX ADMIN — SODIUM CHLORIDE, PRESERVATIVE FREE 10 ML: 5 INJECTION INTRAVENOUS at 08:34

## 2021-01-01 RX ADMIN — ACETAMINOPHEN 650 MG: 325 TABLET ORAL at 00:46

## 2021-01-01 RX ADMIN — PANTOPRAZOLE SODIUM 40 MG: 40 TABLET, DELAYED RELEASE ORAL at 07:43

## 2021-01-01 RX ADMIN — SODIUM CHLORIDE, PRESERVATIVE FREE 10 ML: 5 INJECTION INTRAVENOUS at 22:19

## 2021-01-01 RX ADMIN — ENOXAPARIN SODIUM 40 MG: 100 INJECTION SUBCUTANEOUS at 20:27

## 2021-01-01 RX ADMIN — ONDANSETRON 4 MG: 2 INJECTION INTRAMUSCULAR; INTRAVENOUS at 21:48

## 2021-01-01 RX ADMIN — LEVOFLOXACIN 750 MG: 5 INJECTION, SOLUTION INTRAVENOUS at 13:10

## 2021-01-01 RX ADMIN — FLUTICASONE PROPIONATE 2 SPRAY: 50 SPRAY, METERED NASAL at 10:18

## 2021-01-01 RX ADMIN — AMLODIPINE BESYLATE 10 MG: 10 TABLET ORAL at 14:23

## 2021-01-01 RX ADMIN — IPRATROPIUM BROMIDE AND ALBUTEROL SULFATE 1 AMPULE: .5; 2.5 SOLUTION RESPIRATORY (INHALATION) at 14:34

## 2021-01-01 RX ADMIN — ACETYLCYSTEINE 600 MG: 200 INHALANT RESPIRATORY (INHALATION) at 20:48

## 2021-01-01 RX ADMIN — LEVOFLOXACIN 750 MG: 5 INJECTION, SOLUTION INTRAVENOUS at 12:43

## 2021-01-01 RX ADMIN — IPRATROPIUM BROMIDE AND ALBUTEROL SULFATE 1 AMPULE: .5; 3 SOLUTION RESPIRATORY (INHALATION) at 15:26

## 2021-01-01 RX ADMIN — ALBUTEROL SULFATE 2.5 MG: 2.5 SOLUTION RESPIRATORY (INHALATION) at 19:23

## 2021-01-01 RX ADMIN — ACETYLCYSTEINE 600 MG: 200 INHALANT RESPIRATORY (INHALATION) at 15:49

## 2021-01-01 RX ADMIN — SODIUM CHLORIDE, PRESERVATIVE FREE 10 ML: 5 INJECTION INTRAVENOUS at 16:19

## 2021-01-01 RX ADMIN — FUROSEMIDE 40 MG: 10 INJECTION, SOLUTION INTRAMUSCULAR; INTRAVENOUS at 09:47

## 2021-01-01 RX ADMIN — METHYLPREDNISOLONE SODIUM SUCCINATE 40 MG: 40 INJECTION, POWDER, FOR SOLUTION INTRAMUSCULAR; INTRAVENOUS at 18:24

## 2021-01-01 RX ADMIN — IOPAMIDOL 75 ML: 755 INJECTION, SOLUTION INTRAVENOUS at 16:19

## 2021-01-01 RX ADMIN — PANTOPRAZOLE SODIUM 40 MG: 40 TABLET, DELAYED RELEASE ORAL at 05:50

## 2021-01-01 RX ADMIN — BUDESONIDE AND FORMOTEROL FUMARATE DIHYDRATE 2 PUFF: 80; 4.5 AEROSOL RESPIRATORY (INHALATION) at 06:06

## 2021-01-01 RX ADMIN — CEFEPIME HYDROCHLORIDE 2000 MG: 2 INJECTION, POWDER, FOR SOLUTION INTRAVENOUS at 18:46

## 2021-01-01 RX ADMIN — SODIUM CHLORIDE, PRESERVATIVE FREE 10 ML: 5 INJECTION INTRAVENOUS at 21:45

## 2021-01-01 RX ADMIN — IOPAMIDOL 85 ML: 755 INJECTION, SOLUTION INTRAVENOUS at 07:47

## 2021-01-01 RX ADMIN — PIPERACILLIN AND TAZOBACTAM 4500 MG: 4; .5 INJECTION, POWDER, LYOPHILIZED, FOR SOLUTION INTRAVENOUS; PARENTERAL at 13:07

## 2021-01-01 RX ADMIN — IPRATROPIUM BROMIDE AND ALBUTEROL SULFATE 1 AMPULE: .5; 2.5 SOLUTION RESPIRATORY (INHALATION) at 16:08

## 2021-01-01 RX ADMIN — ALBUTEROL SULFATE 2.5 MG: 2.5 SOLUTION RESPIRATORY (INHALATION) at 11:30

## 2021-01-01 RX ADMIN — IPRATROPIUM BROMIDE AND ALBUTEROL SULFATE 1 AMPULE: .5; 3 SOLUTION RESPIRATORY (INHALATION) at 14:51

## 2021-01-01 RX ADMIN — CEFEPIME HYDROCHLORIDE 2000 MG: 2 INJECTION, POWDER, FOR SOLUTION INTRAVENOUS at 09:46

## 2021-01-01 RX ADMIN — IPRATROPIUM BROMIDE AND ALBUTEROL SULFATE 1 AMPULE: .5; 2.5 SOLUTION RESPIRATORY (INHALATION) at 12:17

## 2021-01-01 RX ADMIN — ONDANSETRON 4 MG: 2 INJECTION INTRAMUSCULAR; INTRAVENOUS at 16:05

## 2021-01-01 RX ADMIN — APIXABAN 5 MG: 5 TABLET, FILM COATED ORAL at 21:42

## 2021-01-01 RX ADMIN — METHYLPREDNISOLONE SODIUM SUCCINATE 60 MG: 125 INJECTION, POWDER, FOR SOLUTION INTRAMUSCULAR; INTRAVENOUS at 14:08

## 2021-01-01 RX ADMIN — SODIUM CHLORIDE, PRESERVATIVE FREE 10 ML: 5 INJECTION INTRAVENOUS at 09:48

## 2021-01-01 RX ADMIN — SODIUM CHLORIDE, PRESERVATIVE FREE 10 ML: 5 INJECTION INTRAVENOUS at 02:36

## 2021-01-01 RX ADMIN — GUAIFENESIN AND DEXTROMETHORPHAN 5 ML: 100; 10 SYRUP ORAL at 01:04

## 2021-01-01 RX ADMIN — GUAIFENESIN AND DEXTROMETHORPHAN 5 ML: 100; 10 SYRUP ORAL at 18:21

## 2021-01-01 RX ADMIN — ALBUTEROL SULFATE 2.5 MG: 2.5 SOLUTION RESPIRATORY (INHALATION) at 07:46

## 2021-01-01 RX ADMIN — FLUTICASONE PROPIONATE 2 SPRAY: 50 SPRAY, METERED NASAL at 21:42

## 2021-01-01 RX ADMIN — ONDANSETRON 4 MG: 2 INJECTION INTRAMUSCULAR; INTRAVENOUS at 16:48

## 2021-01-01 RX ADMIN — INSULIN LISPRO 4 UNITS: 100 INJECTION, SOLUTION INTRAVENOUS; SUBCUTANEOUS at 12:37

## 2021-01-01 RX ADMIN — DOXYCYCLINE 100 MG: 100 CAPSULE ORAL at 09:47

## 2021-01-01 RX ADMIN — ONDANSETRON 4 MG: 2 INJECTION INTRAMUSCULAR; INTRAVENOUS at 16:13

## 2021-01-01 RX ADMIN — APIXABAN 5 MG: 5 TABLET, FILM COATED ORAL at 22:18

## 2021-01-01 RX ADMIN — ACETAMINOPHEN 650 MG: 325 TABLET ORAL at 16:59

## 2021-01-01 RX ADMIN — CEFTRIAXONE SODIUM 1000 MG: 1 INJECTION, POWDER, FOR SOLUTION INTRAMUSCULAR; INTRAVENOUS at 17:53

## 2021-01-01 RX ADMIN — IPRATROPIUM BROMIDE AND ALBUTEROL SULFATE 1 AMPULE: .5; 2.5 SOLUTION RESPIRATORY (INHALATION) at 08:29

## 2021-01-01 RX ADMIN — METHYLPREDNISOLONE SODIUM SUCCINATE 40 MG: 40 INJECTION, POWDER, FOR SOLUTION INTRAMUSCULAR; INTRAVENOUS at 10:17

## 2021-01-01 RX ADMIN — POTASSIUM BICARBONATE 50 MEQ: 978 TABLET, EFFERVESCENT ORAL at 00:31

## 2021-01-01 RX ADMIN — PANTOPRAZOLE SODIUM 40 MG: 40 TABLET, DELAYED RELEASE ORAL at 08:36

## 2021-01-01 RX ADMIN — FUROSEMIDE 40 MG: 10 INJECTION, SOLUTION INTRAMUSCULAR; INTRAVENOUS at 08:34

## 2021-01-01 RX ADMIN — METHYLPREDNISOLONE SODIUM SUCCINATE 40 MG: 125 INJECTION, POWDER, FOR SOLUTION INTRAMUSCULAR; INTRAVENOUS at 05:26

## 2021-01-01 RX ADMIN — TIOTROPIUM BROMIDE INHALATION SPRAY 2 PUFF: 3.12 SPRAY, METERED RESPIRATORY (INHALATION) at 07:39

## 2021-01-01 RX ADMIN — IPRATROPIUM BROMIDE AND ALBUTEROL SULFATE 1 AMPULE: .5; 3 SOLUTION RESPIRATORY (INHALATION) at 20:10

## 2021-01-01 RX ADMIN — FOLIC ACID 1 MG: 1 TABLET ORAL at 08:08

## 2021-01-01 RX ADMIN — BUDESONIDE AND FORMOTEROL FUMARATE DIHYDRATE 2 PUFF: 80; 4.5 AEROSOL RESPIRATORY (INHALATION) at 21:52

## 2021-01-01 RX ADMIN — SODIUM CHLORIDE, PRESERVATIVE FREE 10 ML: 5 INJECTION INTRAVENOUS at 20:32

## 2021-01-01 RX ADMIN — SODIUM CHLORIDE, PRESERVATIVE FREE 10 ML: 5 INJECTION INTRAVENOUS at 08:08

## 2021-01-01 RX ADMIN — BENZONATATE 200 MG: 100 CAPSULE ORAL at 15:17

## 2021-01-01 RX ADMIN — SODIUM CHLORIDE, PRESERVATIVE FREE 10 ML: 5 INJECTION INTRAVENOUS at 21:23

## 2021-01-01 RX ADMIN — BENZONATATE 200 MG: 100 CAPSULE ORAL at 20:48

## 2021-01-01 RX ADMIN — APIXABAN 5 MG: 5 TABLET, FILM COATED ORAL at 21:45

## 2021-01-01 RX ADMIN — TIOTROPIUM BROMIDE INHALATION SPRAY 2 PUFF: 3.12 SPRAY, METERED RESPIRATORY (INHALATION) at 07:46

## 2021-01-01 RX ADMIN — IPRATROPIUM BROMIDE AND ALBUTEROL SULFATE 1 AMPULE: .5; 2.5 SOLUTION RESPIRATORY (INHALATION) at 10:28

## 2021-01-01 RX ADMIN — IPRATROPIUM BROMIDE AND ALBUTEROL SULFATE 1 AMPULE: .5; 2.5 SOLUTION RESPIRATORY (INHALATION) at 21:53

## 2021-01-01 RX ADMIN — SODIUM CHLORIDE, PRESERVATIVE FREE 10 ML: 5 INJECTION INTRAVENOUS at 12:15

## 2021-01-01 RX ADMIN — IPRATROPIUM BROMIDE AND ALBUTEROL SULFATE 1 AMPULE: .5; 3 SOLUTION RESPIRATORY (INHALATION) at 11:14

## 2021-01-01 RX ADMIN — PANTOPRAZOLE SODIUM 20 MG: 20 TABLET, DELAYED RELEASE ORAL at 07:17

## 2021-01-01 RX ADMIN — BUDESONIDE AND FORMOTEROL FUMARATE DIHYDRATE 2 PUFF: 160; 4.5 AEROSOL RESPIRATORY (INHALATION) at 07:46

## 2021-01-01 RX ADMIN — APIXABAN 5 MG: 5 TABLET, FILM COATED ORAL at 08:08

## 2021-01-01 RX ADMIN — IPRATROPIUM BROMIDE AND ALBUTEROL SULFATE 1 AMPULE: .5; 2.5 SOLUTION RESPIRATORY (INHALATION) at 14:10

## 2021-01-01 RX ADMIN — AZITHROMYCIN MONOHYDRATE 500 MG: 500 INJECTION, POWDER, LYOPHILIZED, FOR SOLUTION INTRAVENOUS at 18:42

## 2021-01-01 RX ADMIN — PANTOPRAZOLE SODIUM 40 MG: 40 TABLET, DELAYED RELEASE ORAL at 08:34

## 2021-01-01 RX ADMIN — METHYLPREDNISOLONE SODIUM SUCCINATE 40 MG: 40 INJECTION, POWDER, FOR SOLUTION INTRAMUSCULAR; INTRAVENOUS at 16:41

## 2021-01-01 RX ADMIN — SODIUM CHLORIDE 500 ML: 0.9 INJECTION, SOLUTION INTRAVENOUS at 08:35

## 2021-01-01 RX ADMIN — METHYLPREDNISOLONE SODIUM SUCCINATE 40 MG: 125 INJECTION, POWDER, FOR SOLUTION INTRAMUSCULAR; INTRAVENOUS at 16:46

## 2021-01-01 RX ADMIN — METHYLPREDNISOLONE SODIUM SUCCINATE 125 MG: 125 INJECTION, POWDER, FOR SOLUTION INTRAMUSCULAR; INTRAVENOUS at 00:46

## 2021-01-01 RX ADMIN — IPRATROPIUM BROMIDE AND ALBUTEROL SULFATE 1 AMPULE: .5; 2.5 SOLUTION RESPIRATORY (INHALATION) at 16:19

## 2021-01-01 RX ADMIN — FLUTICASONE PROPIONATE 2 SPRAY: 50 SPRAY, METERED NASAL at 08:38

## 2021-01-01 RX ADMIN — LEVOFLOXACIN 750 MG: 5 INJECTION, SOLUTION INTRAVENOUS at 12:45

## 2021-01-01 RX ADMIN — METHYLPREDNISOLONE SODIUM SUCCINATE 40 MG: 40 INJECTION, POWDER, FOR SOLUTION INTRAMUSCULAR; INTRAVENOUS at 08:08

## 2021-01-01 RX ADMIN — SODIUM CHLORIDE, PRESERVATIVE FREE 10 ML: 5 INJECTION INTRAVENOUS at 14:11

## 2021-01-01 RX ADMIN — Medication 1500 MG: at 23:30

## 2021-01-01 RX ADMIN — FOLIC ACID 1 MG: 1 TABLET ORAL at 08:47

## 2021-01-01 RX ADMIN — CEFEPIME HYDROCHLORIDE 2000 MG: 2 INJECTION, POWDER, FOR SOLUTION INTRAVENOUS at 23:09

## 2021-01-01 RX ADMIN — BENZONATATE 200 MG: 100 CAPSULE ORAL at 08:08

## 2021-01-01 RX ADMIN — CEFTRIAXONE SODIUM 2000 MG: 2 INJECTION, POWDER, FOR SOLUTION INTRAMUSCULAR; INTRAVENOUS at 14:30

## 2021-01-01 RX ADMIN — SODIUM CHLORIDE, PRESERVATIVE FREE 10 ML: 5 INJECTION INTRAVENOUS at 20:48

## 2021-01-01 RX ADMIN — ACETAMINOPHEN 1000 MG: 500 TABLET, FILM COATED ORAL at 12:15

## 2021-01-01 RX ADMIN — SODIUM CHLORIDE, PRESERVATIVE FREE 10 ML: 5 INJECTION INTRAVENOUS at 16:05

## 2021-01-01 RX ADMIN — ALBUTEROL SULFATE 2.5 MG: 2.5 SOLUTION RESPIRATORY (INHALATION) at 07:38

## 2021-01-01 RX ADMIN — APIXABAN 5 MG: 5 TABLET, FILM COATED ORAL at 08:48

## 2021-01-01 RX ADMIN — HEPARIN SODIUM 6900 UNITS: 1000 INJECTION, SOLUTION INTRAVENOUS; SUBCUTANEOUS at 13:08

## 2021-01-01 RX ADMIN — ACETAMINOPHEN 650 MG: 325 TABLET ORAL at 11:28

## 2021-01-01 RX ADMIN — SODIUM CHLORIDE 80 ML: 9 INJECTION, SOLUTION INTRAVENOUS at 16:19

## 2021-01-01 RX ADMIN — METHYLPREDNISOLONE SODIUM SUCCINATE 60 MG: 125 INJECTION, POWDER, FOR SOLUTION INTRAMUSCULAR; INTRAVENOUS at 20:48

## 2021-01-01 RX ADMIN — ACETAMINOPHEN 650 MG: 325 TABLET ORAL at 20:00

## 2021-01-01 RX ADMIN — LEVOFLOXACIN 750 MG: 750 TABLET, FILM COATED ORAL at 10:45

## 2021-01-01 RX ADMIN — SODIUM CHLORIDE, PRESERVATIVE FREE 10 ML: 5 INJECTION INTRAVENOUS at 03:34

## 2021-01-01 RX ADMIN — IPRATROPIUM BROMIDE AND ALBUTEROL SULFATE 1 AMPULE: .5; 3 SOLUTION RESPIRATORY (INHALATION) at 07:18

## 2021-01-01 RX ADMIN — IPRATROPIUM BROMIDE AND ALBUTEROL SULFATE 1 AMPULE: .5; 3 SOLUTION RESPIRATORY (INHALATION) at 10:58

## 2021-01-01 RX ADMIN — BUDESONIDE AND FORMOTEROL FUMARATE DIHYDRATE 2 PUFF: 80; 4.5 AEROSOL RESPIRATORY (INHALATION) at 18:35

## 2021-01-01 RX ADMIN — METHOTREXATE SODIUM 20 MG: 2.5 TABLET ORAL at 08:22

## 2021-01-01 RX ADMIN — METHYLPREDNISOLONE SODIUM SUCCINATE 125 MG: 125 INJECTION, POWDER, FOR SOLUTION INTRAMUSCULAR; INTRAVENOUS at 06:29

## 2021-01-01 RX ADMIN — IPRATROPIUM BROMIDE AND ALBUTEROL SULFATE 1 AMPULE: .5; 3 SOLUTION RESPIRATORY (INHALATION) at 07:24

## 2021-01-01 RX ADMIN — INSULIN LISPRO 2 UNITS: 100 INJECTION, SOLUTION INTRAVENOUS; SUBCUTANEOUS at 16:41

## 2021-01-01 RX ADMIN — BUDESONIDE AND FORMOTEROL FUMARATE DIHYDRATE 2 PUFF: 80; 4.5 AEROSOL RESPIRATORY (INHALATION) at 07:56

## 2021-01-01 ASSESSMENT — ENCOUNTER SYMPTOMS
COUGH: 1
VOMITING: 0
CONSTIPATION: 0
VOMITING: 1
ABDOMINAL PAIN: 0
NAUSEA: 1
RHINORRHEA: 0
WHEEZING: 1
RECTAL PAIN: 0
CHEST TIGHTNESS: 0
COUGH: 1
EYE DISCHARGE: 0
SORE THROAT: 1
ABDOMINAL DISTENTION: 0
SORE THROAT: 0
ABDOMINAL DISTENTION: 0
STRIDOR: 0
SHORTNESS OF BREATH: 1
DIARRHEA: 0
SHORTNESS OF BREATH: 0
SHORTNESS OF BREATH: 0
SHORTNESS OF BREATH: 1
ABDOMINAL DISTENTION: 0
COLOR CHANGE: 0
FACIAL SWELLING: 0
COLOR CHANGE: 0
DIARRHEA: 0
BLOOD IN STOOL: 0
BACK PAIN: 1
COLOR CHANGE: 0
COUGH: 1
COLOR CHANGE: 0
APNEA: 0
BACK PAIN: 0
ABDOMINAL DISTENTION: 0
SINUS PAIN: 0
DIARRHEA: 0
EYE DISCHARGE: 0
WHEEZING: 1
VOMITING: 0
WHEEZING: 1
SINUS PRESSURE: 0
BLOOD IN STOOL: 0
GASTROINTESTINAL NEGATIVE: 1
EYE DISCHARGE: 0
COUGH: 1
CONSTIPATION: 0
DIARRHEA: 0
WHEEZING: 0
BACK PAIN: 0
NAUSEA: 1
VOMITING: 1
CHOKING: 0
CONSTIPATION: 0
DIARRHEA: 0
WHEEZING: 1
VOMITING: 1
ABDOMINAL PAIN: 0
VOMITING: 0
VOICE CHANGE: 0
STRIDOR: 0
SINUS PAIN: 0
WHEEZING: 1
VOMITING: 1
CHEST TIGHTNESS: 0
SHORTNESS OF BREATH: 1
ABDOMINAL DISTENTION: 0
WHEEZING: 1
DIARRHEA: 0
APNEA: 0
EYE REDNESS: 0
DIARRHEA: 0
ABDOMINAL PAIN: 0
TROUBLE SWALLOWING: 0
SHORTNESS OF BREATH: 1
ABDOMINAL PAIN: 0
NAUSEA: 1
TACHYPNEA: 1
CONSTIPATION: 0
ABDOMINAL PAIN: 0
CONSTIPATION: 0
COLOR CHANGE: 0
EYE DISCHARGE: 0
TROUBLE SWALLOWING: 0
SORE THROAT: 0
WHEEZING: 1
CHOKING: 0
EYES NEGATIVE: 1
BACK PAIN: 0
COUGH: 1
ABDOMINAL DISTENTION: 0
COUGH: 0
SHORTNESS OF BREATH: 1
COUGH: 1
ABDOMINAL PAIN: 0
CHEST TIGHTNESS: 0
NAUSEA: 1
SINUS PRESSURE: 1
ABDOMINAL PAIN: 0
COLOR CHANGE: 0
COLOR CHANGE: 0
SHORTNESS OF BREATH: 1
NAUSEA: 1
SORE THROAT: 0
RHINORRHEA: 1
COLOR CHANGE: 0
CONSTIPATION: 0
NAUSEA: 0
VOICE CHANGE: 0
RHINORRHEA: 1
NAUSEA: 1
TROUBLE SWALLOWING: 0
ABDOMINAL DISTENTION: 0
WHEEZING: 0

## 2021-01-01 ASSESSMENT — PAIN DESCRIPTION - ORIENTATION
ORIENTATION: MID;LOWER
ORIENTATION: LEFT;POSTERIOR
ORIENTATION: LEFT
ORIENTATION: LEFT;POSTERIOR
ORIENTATION: MID;LOWER
ORIENTATION: MID;LOWER

## 2021-01-01 ASSESSMENT — PAIN SCALES - GENERAL
PAINLEVEL_OUTOF10: 0
PAINLEVEL_OUTOF10: 6
PAINLEVEL_OUTOF10: 0
PAINLEVEL_OUTOF10: 0
PAINLEVEL_OUTOF10: 8
PAINLEVEL_OUTOF10: 8
PAINLEVEL_OUTOF10: 6
PAINLEVEL_OUTOF10: 5
PAINLEVEL_OUTOF10: 8
PAINLEVEL_OUTOF10: 7
PAINLEVEL_OUTOF10: 0
PAINLEVEL_OUTOF10: 6
PAINLEVEL_OUTOF10: 6
PAINLEVEL_OUTOF10: 2
PAINLEVEL_OUTOF10: 3
PAINLEVEL_OUTOF10: 0
PAINLEVEL_OUTOF10: 3
PAINLEVEL_OUTOF10: 2
PAINLEVEL_OUTOF10: 7
PAINLEVEL_OUTOF10: 5
PAINLEVEL_OUTOF10: 3
PAINLEVEL_OUTOF10: 3
PAINLEVEL_OUTOF10: 2
PAINLEVEL_OUTOF10: 9
PAINLEVEL_OUTOF10: 0
PAINLEVEL_OUTOF10: 8
PAINLEVEL_OUTOF10: 0
PAINLEVEL_OUTOF10: 7
PAINLEVEL_OUTOF10: 0
PAINLEVEL_OUTOF10: 3
PAINLEVEL_OUTOF10: 10
PAINLEVEL_OUTOF10: 10
PAINLEVEL_OUTOF10: 6
PAINLEVEL_OUTOF10: 9
PAINLEVEL_OUTOF10: 3
PAINLEVEL_OUTOF10: 6
PAINLEVEL_OUTOF10: 4
PAINLEVEL_OUTOF10: 3
PAINLEVEL_OUTOF10: 2
PAINLEVEL_OUTOF10: 0
PAINLEVEL_OUTOF10: 7
PAINLEVEL_OUTOF10: 7
PAINLEVEL_OUTOF10: 8
PAINLEVEL_OUTOF10: 8
PAINLEVEL_OUTOF10: 0
PAINLEVEL_OUTOF10: 7
PAINLEVEL_OUTOF10: 5
PAINLEVEL_OUTOF10: 5
PAINLEVEL_OUTOF10: 10
PAINLEVEL_OUTOF10: 7
PAINLEVEL_OUTOF10: 0
PAINLEVEL_OUTOF10: 0
PAINLEVEL_OUTOF10: 10
PAINLEVEL_OUTOF10: 0

## 2021-01-01 ASSESSMENT — PAIN DESCRIPTION - DESCRIPTORS
DESCRIPTORS: ACHING
DESCRIPTORS: ACHING
DESCRIPTORS: HEADACHE
DESCRIPTORS: ACHING
DESCRIPTORS: HEADACHE

## 2021-01-01 ASSESSMENT — PATIENT HEALTH QUESTIONNAIRE - PHQ9
SUM OF ALL RESPONSES TO PHQ9 QUESTIONS 1 & 2: 0
1. LITTLE INTEREST OR PLEASURE IN DOING THINGS: 0
SUM OF ALL RESPONSES TO PHQ QUESTIONS 1-9: 0
SUM OF ALL RESPONSES TO PHQ QUESTIONS 1-9: 0
2. FEELING DOWN, DEPRESSED OR HOPELESS: 0
SUM OF ALL RESPONSES TO PHQ QUESTIONS 1-9: 0
1. LITTLE INTEREST OR PLEASURE IN DOING THINGS: 0
SUM OF ALL RESPONSES TO PHQ QUESTIONS 1-9: 0
SUM OF ALL RESPONSES TO PHQ9 QUESTIONS 1 & 2: 0
SUM OF ALL RESPONSES TO PHQ QUESTIONS 1-9: 0
2. FEELING DOWN, DEPRESSED OR HOPELESS: 0
SUM OF ALL RESPONSES TO PHQ QUESTIONS 1-9: 0

## 2021-01-01 ASSESSMENT — PAIN DESCRIPTION - PAIN TYPE
TYPE: SURGICAL PAIN;CHRONIC PAIN
TYPE: CHRONIC PAIN
TYPE: ACUTE PAIN;CHRONIC PAIN
TYPE: CHRONIC PAIN
TYPE: ACUTE PAIN
TYPE: CHRONIC PAIN
TYPE: ACUTE PAIN

## 2021-01-01 ASSESSMENT — PAIN DESCRIPTION - LOCATION
LOCATION: CHEST
LOCATION: CHEST;INCISION
LOCATION: BACK
LOCATION: BACK
LOCATION: HEAD
LOCATION: BACK
LOCATION: HEAD

## 2021-01-01 ASSESSMENT — PAIN DESCRIPTION - FREQUENCY
FREQUENCY: INTERMITTENT
FREQUENCY: CONTINUOUS

## 2021-01-01 ASSESSMENT — PAIN DESCRIPTION - ONSET
ONSET: GRADUAL
ONSET: ON-GOING
ONSET: GRADUAL
ONSET: ON-GOING

## 2021-01-01 ASSESSMENT — PAIN DESCRIPTION - PROGRESSION
CLINICAL_PROGRESSION: GRADUALLY WORSENING
CLINICAL_PROGRESSION: NOT CHANGED

## 2021-01-01 ASSESSMENT — PAIN - FUNCTIONAL ASSESSMENT
PAIN_FUNCTIONAL_ASSESSMENT: ACTIVITIES ARE NOT PREVENTED
PAIN_FUNCTIONAL_ASSESSMENT: ACTIVITIES ARE NOT PREVENTED

## 2021-01-12 RX ORDER — FOLIC ACID 1 MG/1
TABLET ORAL
Qty: 30 TABLET | Refills: 0 | Status: SHIPPED | OUTPATIENT
Start: 2021-01-12 | End: 2021-02-18 | Stop reason: SDUPTHER

## 2021-02-18 RX ORDER — FOLIC ACID 1 MG/1
TABLET ORAL
Qty: 30 TABLET | Refills: 0 | Status: SHIPPED | OUTPATIENT
Start: 2021-02-18 | End: 2021-03-15

## 2021-02-18 RX ORDER — PANTOPRAZOLE SODIUM 40 MG/1
TABLET, DELAYED RELEASE ORAL
Qty: 90 TABLET | Refills: 2 | Status: SHIPPED | OUTPATIENT
Start: 2021-02-18 | End: 2021-05-18 | Stop reason: SDUPTHER

## 2021-02-18 NOTE — TELEPHONE ENCOUNTER
Please Approve or Refuse.   Send to Pharmacy per Pt's Request:      Next Visit Date:  3/3/2021   Last Visit Date: 4/16/2020    Hemoglobin A1C (%)   Date Value   12/23/2019 6.0   06/12/2019 5.7   11/12/2018 5.9             ( goal A1C is < 7)   BP Readings from Last 3 Encounters:   04/16/20 119/65   04/14/20 119/65   02/12/20 136/71          (goal 120/80)  BUN   Date Value Ref Range Status   04/14/2020 12 8 - 23 mg/dL Final     CREATININE   Date Value Ref Range Status   04/14/2020 0.69 0.50 - 0.90 mg/dL Final     Potassium   Date Value Ref Range Status   04/14/2020 3.7 3.7 - 5.3 mmol/L Final

## 2021-03-02 NOTE — PROGRESS NOTES
Visit Information    Have you changed or started any medications since your last visit including any over-the-counter medicines, vitamins, or herbal medicines? no   Are you having any side effects from any of your medications? -  no  Have you stopped taking any of your medications? Is so, why? -  no    Have you seen any other physician or provider since your last visit? No  Have you had any other diagnostic tests since your last visit? No  Have you been seen in the emergency room and/or had an admission to a hospital since we last saw you? No  Have you had your routine dental cleaning in the past 6 months? no    Have you activated your [employer] account? If not, what are your barriers?  Yes     Patient Care Team:  Nato Hernández MD as PCP - General (Family Medicine)  Nato Hernández MD as PCP - [address] [place] [address] Provider  Nir Shields MD as Consulting Physician (Internal Medicine)    Medical History Review  Past Medical, Family, and Social History reviewed and does contribute to the patient presenting condition    Health Maintenance   Topic Date Due    Hepatitis C screen  Never done    HIV screen  Never done    Low dose CT lung screening  Never done    Cervical cancer screen  04/25/2017    Lipid screen  01/30/2020    Flu vaccine (1) 09/01/2020    Breast cancer screen  12/04/2020    A1C test (Diabetic or Prediabetic)  12/23/2020    Colon cancer screen colonoscopy  02/12/2022    DTaP/Tdap/Td vaccine (2 - Td) 06/12/2029    Shingles Vaccine  Completed    Pneumococcal 0-64 years Vaccine  Completed    Hepatitis A vaccine  Aged Out    Hepatitis B vaccine  Aged Out    Hib vaccine  Aged Out    Meningococcal (ACWY) vaccine  Aged Out

## 2021-03-03 ENCOUNTER — TELEMEDICINE (OUTPATIENT)
Dept: FAMILY MEDICINE CLINIC | Age: 66
End: 2021-03-03
Payer: COMMERCIAL

## 2021-03-03 DIAGNOSIS — J43.1 PANLOBULAR EMPHYSEMA (HCC): Primary | ICD-10-CM

## 2021-03-03 DIAGNOSIS — I10 HTN, GOAL BELOW 130/80: ICD-10-CM

## 2021-03-03 DIAGNOSIS — R73.03 PREDIABETES: ICD-10-CM

## 2021-03-03 DIAGNOSIS — J40 BRONCHITIS: ICD-10-CM

## 2021-03-03 DIAGNOSIS — E04.1 THYROID NODULE: ICD-10-CM

## 2021-03-03 DIAGNOSIS — Z87.891 PERSONAL HISTORY OF TOBACCO USE: ICD-10-CM

## 2021-03-03 DIAGNOSIS — I82.412 DEEP VENOUS THROMBOSIS OF LEFT PROFUNDA FEMORIS VEIN (HCC): ICD-10-CM

## 2021-03-03 DIAGNOSIS — F33.1 MAJOR DEPRESSIVE DISORDER, RECURRENT, MODERATE (HCC): ICD-10-CM

## 2021-03-03 DIAGNOSIS — M06.9 RHEUMATOID ARTHRITIS INVOLVING MULTIPLE SITES, UNSPECIFIED WHETHER RHEUMATOID FACTOR PRESENT (HCC): ICD-10-CM

## 2021-03-03 DIAGNOSIS — Z12.31 ENCOUNTER FOR SCREENING MAMMOGRAM FOR BREAST CANCER: ICD-10-CM

## 2021-03-03 PROBLEM — J13 STREPTOCOCCUS PNEUMONIAE PNEUMONIA (HCC): Status: RESOLVED | Noted: 2019-12-24 | Resolved: 2021-03-03

## 2021-03-03 PROCEDURE — 3017F COLORECTAL CA SCREEN DOC REV: CPT | Performed by: FAMILY MEDICINE

## 2021-03-03 PROCEDURE — 99214 OFFICE O/P EST MOD 30 MIN: CPT | Performed by: FAMILY MEDICINE

## 2021-03-03 PROCEDURE — G8427 DOCREV CUR MEDS BY ELIG CLIN: HCPCS | Performed by: FAMILY MEDICINE

## 2021-03-03 RX ORDER — GUAIFENESIN, PSEUDOEPHEDRINE HYDROCHLORIDE 600; 60 MG/1; MG/1
1 TABLET, EXTENDED RELEASE ORAL EVERY 12 HOURS
Qty: 30 TABLET | Refills: 1 | Status: SHIPPED | OUTPATIENT
Start: 2021-03-03 | End: 2021-03-10

## 2021-03-03 RX ORDER — FLUTICASONE FUROATE AND VILANTEROL 100; 25 UG/1; UG/1
1 POWDER RESPIRATORY (INHALATION) DAILY
Qty: 2 EACH | Refills: 0 | Status: SHIPPED | OUTPATIENT
Start: 2021-03-03 | End: 2021-04-23 | Stop reason: ALTCHOICE

## 2021-03-03 RX ORDER — AZITHROMYCIN 250 MG/1
TABLET, FILM COATED ORAL
Qty: 6 TABLET | Refills: 0 | Status: SHIPPED | OUTPATIENT
Start: 2021-03-03 | End: 2021-03-08

## 2021-03-03 ASSESSMENT — ENCOUNTER SYMPTOMS
RHINORRHEA: 0
BLOOD IN STOOL: 0
RECTAL PAIN: 0
SHORTNESS OF BREATH: 0
SORE THROAT: 0
DIARRHEA: 0
ANAL BLEEDING: 0
CHEST TIGHTNESS: 1
APNEA: 0
COUGH: 1
CONSTIPATION: 0
NAUSEA: 0
ABDOMINAL PAIN: 0
SINUS PRESSURE: 0
BACK PAIN: 1
ABDOMINAL DISTENTION: 0
COLOR CHANGE: 0
VOMITING: 0
WHEEZING: 0

## 2021-03-03 ASSESSMENT — PATIENT HEALTH QUESTIONNAIRE - PHQ9
1. LITTLE INTEREST OR PLEASURE IN DOING THINGS: 1
SUM OF ALL RESPONSES TO PHQ9 QUESTIONS 1 & 2: 1

## 2021-03-03 NOTE — PROGRESS NOTES
[address] [address]  Phone: [phone], Fax: [phone]    TELEHEALTH EVALUATION -- Audio/Visual (During ZERQR-05 public health emergency)    Patient ID verified by me prior to start of this visit    Arielle Mccarty (:  1955) has requested an audio/video evaluation for the following concern(s):  Chief Complaint   Patient presents with    Hypertension    Depression    COPD      HPI:  Arielle Mccarty is an established patient of Demetrius Hector MD   Patient has a history of COPD on aerosols and given albuterol inhaler. Patient reports recently she is feeling congested, her grandson was sick 2 weeks before and she got sick also. She denies any fever chills complains of cough with mucopurulent discharge. Patient reports she tried over-the-counter medications and also did her aerosol treatments that did not help. She denies any chest pain, denies any breathing problems. Patient also follows with pulmonologist    Hypertension usually controlled on amlodipine does not check blood pressure at home. Reports her insurance does not cover BP apparatus. DVT on Eliquis deniesany pain redness. Prediabetes on diet control needs repeat A1c. Patient has history of smoking history quit last year in December, last CAT scan is in 2019 is due for CT lung screening. Patient also has history of depression stable on Zoloft. Patient needs refill, denies any suicidal thoughts or attempts. Patient is also due for health maintenance. []Negative depression screening.    [x]1-4 = Minimal depression   []5-9 = Mild depression   []10-14 = Moderate depression   []15-19 = Moderately severe depression   []20-27 = Severe depression  PHQ Scores 3/3/2021 2020 2019 2018   PHQ2 Score 1 2 0 0   PHQ9 Score 1 2 0 0     Review of Systems Constitutional: Negative for activity change, appetite change, diaphoresis, fatigue and unexpected weight change. HENT: Positive for congestion. Negative for dental problem, ear pain, hearing loss, mouth sores, postnasal drip, rhinorrhea, sinus pressure and sore throat. Eyes: Negative for visual disturbance. Respiratory: Positive for cough and chest tightness. Negative for apnea, shortness of breath and wheezing. Cardiovascular: Negative for chest pain, palpitations and leg swelling. Gastrointestinal: Negative for abdominal distention, abdominal pain, anal bleeding, blood in stool, constipation, diarrhea, nausea, rectal pain and vomiting. Endocrine: Negative for polyuria. Genitourinary: Negative for difficulty urinating, flank pain, frequency, pelvic pain and urgency. Musculoskeletal: Positive for arthralgias, back pain, gait problem, joint swelling and myalgias. Negative for neck stiffness. Skin: Negative for color change. Neurological: Positive for numbness. Negative for dizziness, speech difficulty, weakness, light-headedness and headaches. Psychiatric/Behavioral: Negative for agitation, decreased concentration, dysphoric mood and sleep disturbance. The patient is nervous/anxious. The patient is not hyperactive.         Patient Active Problem List    Diagnosis Date Noted    Deep venous thrombosis of left profunda femoris vein ([facility] 04/16/2020    Positive colorectal cancer screening using Cologuard test 02/01/2020    Thyroid nodule 01/09/2020    History of thyroid nodule 12/23/2019    Chronic obstructive pulmonary disease ([address] 08/14/2019    Obesity, Class II, BMI 35-39.9 11/12/2018    Major depressive disorder, recurrent, moderate ([address] 11/12/2018    Prediabetes 11/12/2018    Tobacco abuse 11/12/2018    History of DVT in adulthood 11/12/2018    Rheumatoid arthritis ([address] 07/14/2014    HTN, goal below 130/80 09/16/2013    GERD (gastroesophageal reflux disease) 09/16/2013 Past Surgical History:   Procedure Laterality Date    APPENDECTOMY  1982    BRONCHOSCOPY N/A 12/27/2019    BRONCHOSCOPY ALVEOLAR LAVAGE performed by Ambar Nielsen MD at [facility] COLONOSCOPY N/A 2/12/2020    COLONOSCOPY POLYPECTOMIES HOT SNARE, COLD BIOPSY POLYPECTOMIES performed by Bessie Siemens, MD at [address], COLON, DIAGNOSTIC  [id number]    gastritis, sm. bowel lipoma, biopsies     Family History   Problem Relation Age of Onset    Cancer Mother     Diabetes Mother     Heart Disease Mother     Cancer Father     Cancer Brother      Current Outpatient Medications   Medication Sig Dispense Refill    fluticasone-vilanterol (BREO ELLIPTA) 100-25 MCG/INH AEPB inhaler Inhale 1 puff into the lungs daily 2 each 0    azithromycin (ZITHROMAX) 250 MG tablet 500 mg orally on day one followed by 250 mg daily on days two through five 6 tablet 0    pseudoephedrine-guaiFENesin (MUCINEX D)  MG per extended release tablet Take 1 tablet by mouth every 12 hours for 7 days 30 tablet 1    sertraline (ZOLOFT) 50 MG tablet Take 1 tablet by mouth daily 90 tablet 0    folic acid (FOLVITE) 1 MG tablet take 1 tablet by mouth once daily 30 tablet 0    pantoprazole (PROTONIX) 40 MG tablet take 1 tablet by mouth once daily 90 tablet 2    SPIRIVA RESPIMAT 2.5 MCG/ACT AERS inhaler inhale 2 puffs INTO THE LUNGS once daily 4 g 3    amLODIPine (NORVASC) 10 MG tablet take 1 tablet by mouth once daily 90 tablet 1    ELIQUIS 5 MG TABS tablet take 1 tablet by mouth twice a day 180 tablet 3    Blood Pressure KIT Dx: HTN. Needs automatic blood pressure machine to monitor her blood pressure.  1 kit 0    albuterol sulfate HFA (VENTOLIN HFA) 108 (90 Base) MCG/ACT inhaler Inhale 2 puffs into the lungs every 6 hours as needed for Wheezing      albuterol (PROVENTIL) (2.5 MG/3ML) 0.083% nebulizer solution Take 3 mLs by nebulization every 6 hours as needed for Wheezing or Shortness of Breath 125 vial 0  methotrexate (RHEUMATREX) 2.5 MG chemo tablet Take 20 mg by mouth once a week Indications: takes 8 tablets on saturdays        No current facility-administered medications for this visit. No Known Allergies     Social History     Tobacco Use    Smoking status: Current Every Day Smoker     Packs/day: 1.50     Years: 35.00     Pack years: 52.50     Types: Cigarettes    Smokeless tobacco: Never Used   Substance Use Topics    Alcohol use: Yes     Alcohol/week: 0.0 standard drinks     Comment: social    Drug use: No        PHYSICAL EXAMINATION:  Vital Signs: (As obtained by patient/caregiver or practitioner observation)  Patient-Reported Vitals 3/2/2021   Patient-Reported Weight 192 lb   Patient-Reported Height 5'7        Constitutional: [x] Appears well-developed and well-nourished [x] No apparent distress      [] Abnormal-   Mental status  [x] Alert and awake  [x] Oriented to person/place/time [x]Able to follow commands      Eyes:  EOM    [x]  Normal  [] Abnormal-  Sclera  [x]  Normal  [] Abnormal -         Discharge [x]  None visible  [] Abnormal -    HENT:   [x] Normocephalic, atraumatic.   [] Abnormal   [x] Mouth/Throat: Mucous membranes are moist.     External Ears [x] Normal  [] Abnormal-     Neck: [x] No visualized mass     Pulmonary/Chest: [x] Respiratory effort normal.  [x] No visualized signs of difficulty breathing or respiratory distress        [] Abnormal , feels congested    Musculoskeletal:   [x] Normal gait with no signs of ataxia         [x] Normal range of motion of neck        [] Abnormal-     Neurological:        [x] No Facial Asymmetry (Cranial nerve 7 motor function) (limited exam to video visit)          [x] No gaze palsy        [] Abnormal-     Skin:        [x] No significant exanthematous lesions or discoloration noted on facial skin         [] Abnormal-     Psychiatric:       [x] Normal Affect [x] No Hallucinations        [x] Abnormal- Anxious, with pressured speech Other pertinent observable physical exam findings-   Lab Results   Component Value Date    WBC 9.2 04/14/2020    HGB 11.9 (L) 04/14/2020    HCT 35.8 (L) 04/14/2020    MCV 92.3 04/14/2020     04/14/2020     Lab Results   Component Value Date     04/14/2020    K 3.7 04/14/2020     04/14/2020    CO2 25 04/14/2020    BUN 12 04/14/2020    CREATININE 0.69 04/14/2020    GLUCOSE 116 04/14/2020    GLUCOSE 105 12/05/2011    CALCIUM 8.8 04/14/2020        Due to this being a TeleHealth encounter, evaluation of the following organ systems is limited: Vitals/Constitutional/EENT/Resp/CV/GI//MS/Neuro/Skin/Heme-Lymph-Imm. ASSESSMENT/PLAN:  1. Panlobular emphysema ([facility]  Start on Breo continue all other inhalers. Continue aerosol treatments  - fluticasone-vilanterol (BREO ELLIPTA) 100-25 MCG/INH AEPB inhaler; Inhale 1 puff into the lungs daily  Dispense: 2 each; Refill: 0    2. Bronchitis  Antibiotic , start on Breo, continue using aerosol treatments and inhalers. - azithromycin (ZITHROMAX) 250 MG tablet; 500 mg orally on day one followed by 250 mg daily on days two through five  Dispense: 6 tablet; Refill: 0  - pseudoephedrine-guaiFENesin (MUCINEX D)  MG per extended release tablet; Take 1 tablet by mouth every 12 hours for 7 days  Dispense: 30 tablet; Refill: 1    3. HTN, goal below 130/80  Usually controlled continue same medications  - Lipid Panel; Future  - CBC Auto Differential; Future  - Comprehensive Metabolic Panel; Future    4. Deep venous thrombosis of left profunda femoris vein (HCC)  Continue Eliquis    5. Thyroid nodule  Recheck TSH  - TSH with Reflex; Future  - Vitamin D 25 Hydroxy; Future    6. Prediabetes  On diet controlled recheck A1c  - Hemoglobin A1C; Future    7. Rheumatoid arthritis involving multiple sites, unspecified whether rheumatoid factor present ([facility]  Stable on methotrexate follows with rheumatologist    8.  Major depressive disorder, recurrent, moderate (HCC) Refill Zoloft stable  - sertraline (ZOLOFT) 50 MG tablet; Take 1 tablet by mouth daily  Dispense: 90 tablet; Refill: 0    9. Encounter for screening mammogram for breast cancer    - INDY Digital Screen Bilateral [[id number]]; Future    10. Personal history of tobacco use    - CT lung screen [Initial/Annual]; Future    Controlled Substance Monitoring:  Acute and Chronic Pain Monitoring:   No flowsheet data found. Orders Placed This Encounter   Procedures    INDY Digital Screen Bilateral [id number]     Standing Status:   Future     Standing Expiration Date:   3/2/2022     Order Specific Question:   Reason for exam:     Answer:   screening    CT lung screen [Initial/Annual]     Age: 63 y.o. Smoking History:   Social History    Tobacco Use      Smoking status: Current Every Day Smoker        Packs/day: 1.50        Years: 35.00        Pack years: 52.5        Types: Cigarettes      Smokeless tobacco: Never Used    Alcohol use: Yes      Alcohol/week: 0.0 standard drinks      Comment: social    Drug use: No    Pack years: 52.5  Last CT lung screen: [unfilled]     Standing Status:   Future     Standing Expiration Date:   3/3/2022     Order Specific Question:   Is there documentation of shared decision making? Answer:   Yes     Order Specific Question:   Does the patient show any signs or symptoms of lung cancer? Answer:   No     Order Specific Question:   Is this the first (baseline) CT or an annual exam?     Answer: Annual [2]     Order Specific Question:   Is this a low dose CT or a routine CT? Answer:   Low Dose CT [1]     Order Specific Question:   Smoking Status? Answer: Former Smoker [4]     Order Specific Question:   Date quit smoking? (must be within 15 years)     Answer:   12/1/2020     Order Specific Question:   Smoking packs per day? Answer:   1.5     Order Specific Question:   Years smoking?      Answer:   35    Hemoglobin A1C     Standing Status:   Future Standing Expiration Date:   3/2/2022    Lipid Panel     Standing Status:   Future     Standing Expiration Date:   3/2/2022     Order Specific Question:   Is Patient Fasting?/# of Hours     Answer:   8/10    CBC Auto Differential     Standing Status:   Future     Standing Expiration Date:   3/4/2022    Comprehensive Metabolic Panel     Fasting 8 hrs     Standing Status:   Future     Standing Expiration Date:   3/3/2022    TSH with Reflex     Standing Status:   Future     Standing Expiration Date:   3/3/2022    Vitamin D 25 Hydroxy     Standing Status:   Future     Standing Expiration Date:   3/3/2022      Orders Placed This Encounter   Medications    fluticasone-vilanterol (BREO ELLIPTA) 100-25 MCG/INH AEPB inhaler     Sig: Inhale 1 puff into the lungs daily     Dispense:  2 each     Refill:  0    azithromycin (ZITHROMAX) 250 MG tablet     Si mg orally on day one followed by 250 mg daily on days two through five     Dispense:  6 tablet     Refill:  0    pseudoephedrine-guaiFENesin (MUCINEX D)  MG per extended release tablet     Sig: Take 1 tablet by mouth every 12 hours for 7 days     Dispense:  30 tablet     Refill:  1    sertraline (ZOLOFT) 50 MG tablet     Sig: Take 1 tablet by mouth daily     Dispense:  90 tablet     Refill:  0      Medications Discontinued During This Encounter   Medication Reason    apixaban (ELIQUIS DVT/PE STARTER PACK) 5 MG TABS tablet DOSE ADJUSTMENT      Haresh Chavez received counseling on the following healthy behaviors: nutrition, exercise and medication adherence  Reviewed prior labs and health maintenance. Continue current medications, diet and exercise. Discussed use, benefit, and side effects of prescribed medications. Barriers to medication compliance addressed. Patient given educational materials - see patient instructions. All patient questions answered. Patient voiced understanding. Return in about 2 months (around 5/3/2021). Alexus Shields is a 63 y.o. female patient  being evaluated by a Virtual Visit (video visit) encounter to address concerns as mentioned above. A caregiver was present when appropriate. Due to this being a TeleHealth encounter (During GJSIB-09 public health emergency), evaluation of the following organ systems was limited:Vitals/Constitutional/EENT/Resp/CV/GI//MS/Neuro/Skin/Heme-Lymph-Imm. Services were provided through a video synchronous discussion virtually to substitute for in-person clinic visit. This is a telehealth visit that was performed with the originating site at Patient Location: home and provider Location of [place], [place]. Verbal consent to participate in video visit was obtained. Patient ID verified by me prior to start of this visit  I discussed with the patient the nature of our telehealth visits via interactive/real-time audio/video that:  - I would evaluate the patient and recommend diagnostics and treatments based on my assessment  - Our sessions are not being recorded and that personal health information is protected  - Our team would provide follow up care in person if/when the patient needs it. Pursuant to the emergency declaration under the [facility], [address] [place] [address] waiver authority and the [employer] Act, this Virtual Visit was conducted with patient's (and/or legal guardian's) consent, to reduce the patient's risk of exposure to COVID-19 and provide necessary medical care. The patient (and/or legal guardian) has also been advised to contact this office for worsening conditions or problems, and seek emergency medical treatment and/or call 911 if deemed necessary. This note was completed by using the assistance of a speech-recognition program. However, inadvertent computerized transcription errors may be present. Although every effort was made to ensure accuracy, no guarantees can be provided that every mistake has been identified and corrected by editing.   Electronically signed by Kvng Vicente MD on 3/3/21 at 7:41 AM EST

## 2021-03-15 RX ORDER — FOLIC ACID 1 MG/1
TABLET ORAL
Qty: 30 TABLET | Refills: 0 | Status: SHIPPED | OUTPATIENT
Start: 2021-03-15 | End: 2021-04-12

## 2021-03-15 NOTE — TELEPHONE ENCOUNTER
Please Approve or Refuse.   Send to Pharmacy per Pt's Request:      Next Visit Date:  5/18/2021   Last Visit Date: 3/3/2021    Hemoglobin A1C (%)   Date Value   12/23/2019 6.0   06/12/2019 5.7   11/12/2018 5.9             ( goal A1C is < 7)   BP Readings from Last 3 Encounters:   04/16/20 119/65   04/14/20 119/65   02/12/20 136/71          (goal 120/80)  BUN   Date Value Ref Range Status   04/14/2020 12 8 - 23 mg/dL Final     CREATININE   Date Value Ref Range Status   04/14/2020 0.69 0.50 - 0.90 mg/dL Final     Potassium   Date Value Ref Range Status   04/14/2020 3.7 3.7 - 5.3 mmol/L Final

## 2021-04-12 RX ORDER — FOLIC ACID 1 MG/1
TABLET ORAL
Qty: 30 TABLET | Refills: 0 | Status: SHIPPED | OUTPATIENT
Start: 2021-04-12 | End: 2022-01-01 | Stop reason: ALTCHOICE

## 2021-04-14 ENCOUNTER — TELEPHONE (OUTPATIENT)
Dept: ONCOLOGY | Age: 66
End: 2021-04-14

## 2021-04-14 NOTE — LETTER
4/14/2021        [address] [place] [address]    Dear Aditi Davenport: Your healthcare provider has ordered a low dose CT scan of the chest for lung cancer screening. You will find enclosed, information about CT lung screening. Please review the statement of understanding, you will be asked to sign a copy of this at the time of your CT scan    If you have not already been contacted to make the appointment for your scan, please call our scheduling department at [phone]    Keep in mind that CT lung screening does not take the place of smoking cessation. If you are a current smoker, you will find enclosed smoking cessation resources. Please do not hesitate to contact me if you have any questions or concerns.     [address] [facility] Lung Screening Program  [phone]

## 2021-04-15 DIAGNOSIS — I10 ESSENTIAL HYPERTENSION: ICD-10-CM

## 2021-04-15 RX ORDER — AMLODIPINE BESYLATE 10 MG/1
TABLET ORAL
Qty: 90 TABLET | Refills: 1 | Status: SHIPPED | OUTPATIENT
Start: 2021-04-15 | End: 2021-01-01

## 2021-04-20 ENCOUNTER — HOSPITAL ENCOUNTER (OUTPATIENT)
Dept: CT IMAGING | Age: 66
Discharge: HOME | End: 2021-04-22
Payer: COMMERCIAL

## 2021-04-20 DIAGNOSIS — Z87.891 PERSONAL HISTORY OF TOBACCO USE: ICD-10-CM

## 2021-04-20 PROCEDURE — 71271 CT THORAX LUNG CANCER SCR C-: CPT

## 2021-04-21 DIAGNOSIS — R93.89 ABNORMAL CT OF THE CHEST: Primary | ICD-10-CM

## 2021-04-23 ENCOUNTER — OFFICE VISIT (OUTPATIENT)
Dept: PULMONOLOGY | Age: 66
End: 2021-04-23
Payer: COMMERCIAL

## 2021-04-23 VITALS
OXYGEN SATURATION: 96 % | WEIGHT: 241.6 LBS | BODY MASS INDEX: 37.92 KG/M2 | HEIGHT: 67 IN | DIASTOLIC BLOOD PRESSURE: 75 MMHG | TEMPERATURE: 97.9 F | HEART RATE: 82 BPM | RESPIRATION RATE: 14 BRPM | SYSTOLIC BLOOD PRESSURE: 137 MMHG

## 2021-04-23 DIAGNOSIS — J44.9 COPD, SEVERITY TO BE DETERMINED (HCC): ICD-10-CM

## 2021-04-23 DIAGNOSIS — Z71.6 ENCOUNTER FOR SMOKING CESSATION COUNSELING: ICD-10-CM

## 2021-04-23 DIAGNOSIS — R91.1 NODULE OF LOWER LOBE OF LEFT LUNG: Primary | ICD-10-CM

## 2021-04-23 DIAGNOSIS — M06.9 RHEUMATOID ARTHRITIS INVOLVING MULTIPLE SITES, UNSPECIFIED WHETHER RHEUMATOID FACTOR PRESENT (HCC): ICD-10-CM

## 2021-04-23 DIAGNOSIS — Z86.718 HISTORY OF DVT IN ADULTHOOD: ICD-10-CM

## 2021-04-23 PROCEDURE — G8417 CALC BMI ABV UP PARAM F/U: HCPCS | Performed by: INTERNAL MEDICINE

## 2021-04-23 PROCEDURE — G8926 SPIRO NO PERF OR DOC: HCPCS | Performed by: INTERNAL MEDICINE

## 2021-04-23 PROCEDURE — 99204 OFFICE O/P NEW MOD 45 MIN: CPT | Performed by: INTERNAL MEDICINE

## 2021-04-23 PROCEDURE — 3023F SPIROM DOC REV: CPT | Performed by: INTERNAL MEDICINE

## 2021-04-23 PROCEDURE — 3017F COLORECTAL CA SCREEN DOC REV: CPT | Performed by: INTERNAL MEDICINE

## 2021-04-23 PROCEDURE — 1036F TOBACCO NON-USER: CPT | Performed by: INTERNAL MEDICINE

## 2021-04-23 PROCEDURE — G8427 DOCREV CUR MEDS BY ELIG CLIN: HCPCS | Performed by: INTERNAL MEDICINE

## 2021-04-23 RX ORDER — VARENICLINE TARTRATE
KIT
Qty: 1 BOX | Refills: 0 | Status: ON HOLD | OUTPATIENT
Start: 2021-04-23 | End: 2021-07-19

## 2021-04-23 NOTE — PATIENT INSTRUCTIONS
Called IR left VM to return call. I will call on Mon. To follow up. PFT sched. On 21 @ 7:30 am. Covid screening Mon May 10th. 2:45 pm. Pet scan please arrive at 1:30 test at 2pm. Destinee Salazar [id number]. PRIOR              Patient Education        varenicline  Pronunciation:  ilda Hurtado  Brand:  Chantix  What is the most important information I should know about varenicline? When you stop smoking, you may have nicotine withdrawal symptoms with or without using medication such as varenicline. This includes feeling restless, depressed, angry, frustrated, or irritated. Stop taking varenicline and call your doctor if you have if you feel depressed, agitated, hostile, aggressive, or have thoughts about suicide or hurting yourself. Do not drink large amounts alcohol. Varenicline can increase the effects of alcohol or change the way you react to it. What is varenicline? Varenicline is a smoking cessation medicine. It is used together with behavior modification and counseling support to help you stop smoking. Varenicline may also be used for purposes not listed in this medication guide. What should I discuss with my health care provider before taking varenicline? You should not use varenicline if you used it in the past and had:  · a serious allergic reaction --trouble breathing, swelling in your face (lips, tongue, throat) or neck; or  · a serious skin reaction --blisters in your mouth, peeling skin rash. Tell your doctor if you have ever had:  · depression or mental illness;  · a seizure;  · kidney disease (or if you are on dialysis);  · heart or blood vessel problems; or  · if you drink alcohol. Tell your doctor if you are pregnant. It is not known whether varenicline will harm an unborn baby if you use the medicine during pregnancy. However, smoking while you are pregnant can harm the unborn baby or cause birth defects.    If you breast-feed while using this medicine, your baby may spit up or vomit more than normal, and may have a seizure. Varenicline is not approved for use by anyone younger than 25years old. How should I take varenicline? Follow all directions on your prescription label and read all medication guides or instruction sheets. Use the medicine exactly as directed. When you first start taking varenicline, you will take a low dose and then gradually increase it over the first several days of treatment. Take varenicline regularly to get the most benefit. You may choose from 3 ways to use varenicline. Ask your doctor which method is best for you:  · Set a date to quit smoking and start taking varenicline 1 week before that date. Make sure to quit smoking on your planned quit date. Take varenicline for a total of 12 weeks. · Start taking varenicline before you set a planned quit date, and choose a quit date that is between 8 and 35 days after you start treatment. Take varenicline for a total of 12 weeks. · Start taking varenicline and gradually reduce the number of cigarettes you smoke each day over a 12-week period, until you no longer smoke at all. Then take varenicline for another 12 weeks, for a total of 24 weeks. Take varenicline after eating. Take the medicine with a full glass of water. When you stop smoking, you may have nicotine withdrawal symptoms with or without using medication such as varenicline. Withdrawal symptoms include: increased appetite, weight gain, trouble sleeping, slower heart rate, feeling anxious or restless, and having the urge to smoke. Smoking cessation may also cause new or worsening mental health problems, such as depression. Stop taking varenicline and call your doctor if you have if you feel depressed, agitated, hostile, aggressive, or have thoughts about suicide or hurting yourself. Store at room temperature away from moisture and heat. What happens if I miss a dose?   Take the medicine as soon as you can, but skip the missed dose if it is almost time for This is not a complete list of side effects and others may occur. Call your doctor for medical advice about side effects. You may report side effects to FDA at 8-336-QYY-4936. What other drugs will affect varenicline? After you stop smoking, your doctor may need to adjust the doses of certain medicines you take on a regular basis. Tell your doctor about all your current medicines and any medicine you start or stop using. This includes prescription and over-the-counter medicines, vitamins, and herbal products. Not all possible interactions are listed here. Where can I get more information? Your pharmacist can provide more information about varenicline. Remember, keep this and all other medicines out of the reach of children, never share your medicines with others, and use this medication only for the indication prescribed. Every effort has been made to ensure that the information provided by [address] is accurate, up-to-date, and complete, but no guarantee is made to that effect. Drug information contained herein may be time sensitive. [facility] information has been compiled for use by healthcare practitioners and consumers in the [facility] and therefore [facility] does not warrant that uses outside of the [facility] are appropriate, unless specifically indicated otherwise. [facility]'s drug information does not endorse drugs, diagnose patients or recommend therapy. [employer] drug information is an informational resource designed to assist licensed healthcare practitioners in caring for their patients and/or to serve consumers viewing this service as a supplement to, and not a substitute for, the expertise, skill, knowledge and judgment of healthcare practitioners. The absence of a warning for a given drug or drug combination in no way should be construed to indicate that the drug or drug combination is safe, effective or appropriate for any given patient.  [employer] does not assume any responsibility for any aspect of healthcare administered with the aid of information [facility] provides. The information contained herein is not intended to cover all possible uses, directions, precautions, warnings, drug interactions, allergic reactions, or adverse effects. If you have questions about the drugs you are taking, check with your doctor, nurse or pharmacist.  Copyright 2685-3046 Jose [address] [place] [address]. Version: 10.01. Revision date: 8/8/2018. Care instructions adapted under license by [facility] ([facility]). If you have questions about a medical condition or this instruction, always ask your healthcare professional. [place] [address] any warranty or liability for your use of this information. Patient Education        Learning About Lung Nodules  What is a lung nodule? A lung nodule is a growth in the lung. A single nodule surrounded by lung tissue is called a solitary pulmonary nodule. A lung nodule might not cause any symptoms. Your doctor may have found one or more nodules on your lung when you were having a chest X-ray or CT scan. Or it may have been found during a lung cancer screening. A lung nodule may be caused by an old infection or cancer. It might also be a noncancerous growth. Lung nodules can cause a screening to give an abnormal result. Most nodules do not cause any harm. But without further tests, your doctor can't tell whether an abnormal finding is cancer, a harmless nodule, or something else. What can you expect when you have a lung nodule? Your doctor will look at several risk factors to see how likely it is that the nodule is cancer. He or she will look at:  · Whether you smoke or have ever smoked. · Your age and your family's medical history. · Whether you have ever had lung cancer. · The size, density, and other characteristics of the nodule. · Whether the nodule has changed in size.  Your doctor may look at past chest X-rays or CT scans, if available, and

## 2021-04-23 NOTE — PROGRESS NOTES
N/A 12/27/2019    BRONCHOSCOPY ALVEOLAR LAVAGE performed by Luis Rodriguez MD at [address] [facility] COLONOSCOPY N/A 2/12/2020    COLONOSCOPY POLYPECTOMIES HOT SNARE, COLD BIOPSY POLYPECTOMIES performed by Michael Fischer MD at [address], DIAGNOSTIC  [id number]    gastritis, sm. bowel lipoma, biopsies           SOCIAL AND OCCUPATIONAL HEALTH:    No history of tuberculosis   no occupational exposure  Pets -cats Yes, dogsNo  Birds No    Occupational history - heavy duty cleaning     TOBACCO:   reports that she quit smoking about 35 years ago. Her smoking use included cigarettes. She has a 52.50 pack-year smoking history. She has never used smokeless tobacco.  ETOH:   reports current alcohol use. ALLERGIES:      No Known Allergies      Home Meds:   Prior to Admission medications    Medication Sig Start Date End Date Taking? Authorizing Provider   varenicline (CHANTIX STARTING MONTH PAK) 0.5 MG X 11 & 1 MG X 42 tablet Take by mouth as directed 4/23/21  Yes Johanny Duncan MD   amLODIPine (NORVASC) 10 MG tablet take 1 tablet by mouth once daily 4/15/21  Yes Vickie Gutierrez MD   folic acid (FOLVITE) 1 MG tablet take 1 tablet by mouth once daily 4/12/21  Yes Vickie Gutierrez MD   sertraline (ZOLOFT) 50 MG tablet Take 1 tablet by mouth daily 3/3/21  Yes Vickie Gutierrez MD   pantoprazole (PROTONIX) 40 MG tablet take 1 tablet by mouth once daily 2/18/21  Yes Vickie Gutierrez MD   SPIRIVA RESPIMAT 2.5 MCG/ACT AERS inhaler inhale 2 puffs INTO THE LUNGS once daily 11/20/20  Yes Vickie Gutierrez MD   ELIQUIS 5 MG TABS tablet take 1 tablet by mouth twice a day 7/6/20  Yes Vickie Gutierrez MD   Blood Pressure KIT Dx: HTN. Needs automatic blood pressure machine to monitor her blood pressure.  4/16/20  Yes Vickie Gutierrez MD   methotrexate (RHEUMATREX) 2.5 MG chemo tablet Take 20 mg by mouth once a week Indications: takes 8 tablets on saturdays    Yes Historical Provider, MD   albuterol sulfate HFA (VENTOLIN HFA) 108 (90 Base) MCG/ACT inhaler Inhale 2 puffs into the lungs every 6 hours as needed for Wheezing    Historical Provider, MD   albuterol (PROVENTIL) (2.5 MG/3ML) 0.083% nebulizer solution Take 3 mLs by nebulization every 6 hours as needed for Wheezing or Shortness of Breath  Patient not taking: Reported on 4/23/2021 12/30/19   Citlali Silveira MD              REVIEW OF SYSTEMS:    CONSTITUTIONAL:  negative for  fevers, chills, sweats, fatigue, malaise, anorexia and weight loss  EYES:  negative for  double vision, blurred vision, dry eyes, eye discharge and redness  HEENT:  negative for  hearing loss, tinnitus, ear drainage, earaches and nasal congestion  RESPIRATORY:  See hpi  CARDIOVASCULAR:  negative for  chest pain,, palpitations, orthopnea, PND  GASTROINTESTINAL:  negative for nausea, vomiting, change in bowel habits, diarrhea, constipation, abdominal pain, pruritus, abdominal mass and abdominal distention  GENITOURINARY:  negative for frequency, dysuria, nocturia, urinary incontinence and hesitancy  INTEGUMENT  negative for rash, skin lesion(s), dryness, skin color change, changes in lesion, pruritus and changes in hair  HEMATOLOGIC/LYMPHATIC:  negative for easy bruising, bleeding, lymphadenopathy, petechiae and swelling/edema  ALLERGIC/IMMUNOLOGIC:  negative for recurrent infections, urticaria and drug reactions  ENDOCRINE:  negative for heat intolerance, cold intolerance, tremor, weight changes and change in bowel habits  MUSCULOSKELETAL:  negative for  myalgias, arthralgias, pain, joint swelling, stiff joints and decreased range of motion  NEUROLOGICAL:  negative for headaches, dizziness, seizures, memory problems, speech problems, visual disturbance and coordination problems  Skin no rash no dermatitis  Vitals:  /75   Pulse 82   Temp 97.9 °F (36.6 °C) (Temporal)   Resp 14   Ht 5' 7\" (1.702 m)   Wt 241 lb 9.6 oz (109.6 kg)   SpO2 96% Comment: Room air at rest  Breastfeeding No   BMI 37.84 kg/m² PHYSICAL EXAM:  General Appearance:    Alert, cooperative, no distress, appears stated age   Head:    Normocephalic, without obvious abnormality, atraumatic      Eyes:    PERRL, conjunctiva/corneas clear, EOM's intact   Ears:    Normal  external ear canals, both ears   Nose:   Nares normal, septum midline, mucosa normal, no drainage        or sinus tenderness   Throat:   Lips, mucosa, and tongue normal; teeth and gums normal   Neck:   Supple, symmetrical, trachea midline, no adenopathy;     thyroid:  no enlargement/tenderness/nodules; no carotid    bruit , noJVD   Back:     Symmetric, no curvature, ROM normal, no CVA tenderness   Lungs:    Ventilating all lobes without any dullness no bronchial breath sounds there are expiratory rhonchi scattered which are appreciated no wheezing. Chest Wall:    No tenderness or deformity      Heart:    Regular rate and rhythm, S1 and S2 normal, no murmur, rub        or gallop no rvh                           Abdomen:                                                 Pulses:                              Skin:                  Lymph nodes:                    Neurologic:                  Soft, non-tender, bowel sounds active all four quadrants,     no masses, no organomegaly         2+ and symmetric all extremities     Skin color, texture, turgor normal, no rashes or lesions       Cervical, supraclavicular not enlarged or matted or tender      CNII-XII intact, normal strength 5/5 . Sensation grossly normal  and reflexes normal 2+  throughout     Clubbing No  Lower ext edema No  Upper ext edema No         Musculoskeletal no synovitis. No joint swelling or tenderness        CBC: No results for input(s): WBC, HGB, PLT in the last 72 hours. BMP:  No results for input(s): NA, K, CL, CO2, BUN, CREATININE, GLUCOSE in the last 72 hours. Hepatic: No results for input(s): AST, ALT, ALB, BILITOT, ALKPHOS in the last 72 hours.   Amylase:   Lab Results   Component Value Date    AMYLASE 18 05/01/2014     Lipase:   Lab Results   Component Value Date    LIPASE 21 05/01/2014     Troponin: No results for input(s): TROPONINI in the last 72 hours. BNP: No results for input(s): BNP in the last 72 hours. Lipids: No results for input(s): CHOL, HDL in the last 72 hours. Invalid input(s): LDLCALCU  ABGs:   Lab Results   Component Value Date    PHART 7.386 12/23/2019    PO2ART 64.0 12/23/2019    TGC7WKT 42.6 12/23/2019     INR: No results for input(s): INR in the last 72 hours. Thyroid:   Lab Results   Component Value Date    TSH 0.18 12/23/2019     Urinalysis: No results for input(s): BACTERIA, BLOODU, CLARITYU, COLORU, PHUR, PROTEINU, RBCUA, SPECGRAV, BILIRUBINUR, NITRU, WBCUA, LEUKOCYTESUR, GLUCOSEU in the last 72 hours. Ct Lung Screen [initial/annual]    Result Date: 4/21/2021  1. Interval increase in size of a somewhat spiculated nodule involving the left lower lobe now measuring 1.7 x 1.6 cm. Malignancy is suspected. Please see follow-up recommendations below. 2. Subtle ground-glass nodular opacities are identified within both lungs. Finding is nonspecific and may represent underlying infectious, inflammatory or possibly neoplastic process. 3. Interval resolution of the right middle lobe airspace disease with residual scarring noted. 4. Partially calcified thyroid nodules, largest measuring 13 mm within the isthmus. Given the lung findings, further evaluation with ultrasound is suggested. LUNG RADS: Per ACR Lung-RADS Version 1.1 Category 4B, Very Suspicious (Findings for which additional diagnostic testing and/or tissue sampling is recommended). Management: Chest CT with or without contrast, PET/CT and/or tissue sampling depending on the probability of malignancy and comorbidities. PET/CT may be used when there is a > 8 mm solid component.  For new large nodules that develop on an annual repeat screening CT, a 1 month LDCT may be recommended to address potentially infectious or inflammatory conditions. RECOMMENDATIONS: If you would like to register your patient with the [facility] Nodule/Lung Cancer Screening Program, please contact the Nurse Navigator at [phone]). IMPRESSION:     Diagnosis Orders   1. Nodule of lower lobe of left lung  CT GUIDED NEEDLE PLACEMENT   2. COPD, severity to be determined ([facility] [address]  Full PFT Study With Bronchodilator   3. Encounter for smoking cessation counseling  varenicline (CHANTIX STARTING MONTH LYNDSAY) 0.5 MG X 11 & 1 MG X 42 tablet   4. History of DVT in adulthood     5. Rheumatoid arthritis involving multiple sites, unspecified whether rheumatoid factor present ([facility] [address]          :                PLAN:      Left lower lobe lung nodule is spiculated and has increased in size. Highly concerning for a neoplastic process. Differential would be rheumatoid nodule. We will proceed with PET CT scan and CT-guided biopsy of the lung nodule. We will also obtain PFT to assess severity of obstructive lung disease and in case required for preop evaluation for lobectomy. Continue Spiriva and use albuterol as needed  Strongly advised patient to stop smoking  Discussed different smoking strategies  Will prescribe Chantix and she can use nicotine gum    I discussed with this patient today the risks and benefits of various tobacco cessation strategies, including, but not limited to \"cold turkey,\" nicotine replacement, & Chantix.  We specifically discussed the risks of Chantix, with focus on side effects to include nausea, intense dreams, and spent considerable time focusing on the black box warning regarding depression.  The patient specifically denies suicidal ideation/homicidal ideation and was counseled to stop the product and immediately seek medical assistance for any worsening depression/mood disturbance.  The patient is encouraged to read enclosed literature for any prescribed medications.      Reviewed imaging and discussed with both patient and her . Follow-up 1 week after biopsy is completed    Requested Prescriptions     Signed Prescriptions Disp Refills    varenicline (CHANTIX STARTING MONTH LYNDSAY) 0.5 MG X 11 & 1 MG X 42 tablet 1 box 0     Sig: Take by mouth as directed       Medications Discontinued During This Encounter   Medication Reason    fluticasone-vilanterol (BREO ELLIPTA) 100-25 MCG/INH AEPB inhaler Therapy completed       Unknown Javier received counseling on the following healthy behaviors: nutrition, exercise and medication adherence    Patient given educational materials : see patient instruction       Discussed use, benefit, and side effects of prescribed medications. Barriers to medication compliance addressed. All patient questions answered. Pt voiced understanding. I hope this updates you on my evaluation and clinical thinking. Thank you for allowing me to participate in his care. Sincerely,    Electronically signed by Blossom Groves MD on 4/23/2021 at 3:14 PM       Please note that this chart was generated using voice recognition Dragon dictation software. Although every effort was made to ensure the accuracy of this automated transcription, some errors in transcription may have occurred.

## 2021-04-26 ENCOUNTER — TELEPHONE (OUTPATIENT)
Dept: CASE MANAGEMENT | Age: 66
End: 2021-04-26

## 2021-04-26 NOTE — TELEPHONE ENCOUNTER
Lung Navigator reviewing recent Lung Screening and pulmonary notes. Pt. Needing PET and Biopsy, plan to follow.

## 2021-04-27 ENCOUNTER — TELEPHONE (OUTPATIENT)
Dept: PULMONOLOGY | Age: 66
End: 2021-04-27

## 2021-04-30 ENCOUNTER — HOSPITAL ENCOUNTER (OUTPATIENT)
Dept: NUCLEAR MEDICINE | Age: 66
Discharge: HOME | End: 2021-05-02
Payer: COMMERCIAL

## 2021-04-30 VITALS — BODY MASS INDEX: 38.73 KG/M2 | HEIGHT: 66 IN | WEIGHT: 241 LBS

## 2021-04-30 DIAGNOSIS — R93.89 ABNORMAL CT OF THE CHEST: ICD-10-CM

## 2021-04-30 LAB — GLUCOSE BLD-MCNC: 92 MG/DL (ref 65–105)

## 2021-04-30 PROCEDURE — A9552 F18 FDG: HCPCS | Performed by: FAMILY MEDICINE

## 2021-04-30 PROCEDURE — 2580000003 HC RX 258: Performed by: FAMILY MEDICINE

## 2021-04-30 PROCEDURE — 78815 PET IMAGE W/CT SKULL-THIGH: CPT

## 2021-04-30 PROCEDURE — 82947 ASSAY GLUCOSE BLOOD QUANT: CPT

## 2021-04-30 PROCEDURE — 3430000000 HC RX DIAGNOSTIC RADIOPHARMACEUTICAL: Performed by: FAMILY MEDICINE

## 2021-04-30 RX ORDER — SODIUM CHLORIDE 0.9 % (FLUSH) 0.9 %
10 SYRINGE (ML) INJECTION ONCE
Status: COMPLETED | OUTPATIENT
Start: 2021-04-30 | End: 2021-04-30

## 2021-04-30 RX ORDER — FLUDEOXYGLUCOSE F 18 200 MCI/ML
10 INJECTION, SOLUTION INTRAVENOUS
Status: COMPLETED | OUTPATIENT
Start: 2021-04-30 | End: 2021-04-30

## 2021-04-30 RX ADMIN — SODIUM CHLORIDE, PRESERVATIVE FREE 10 ML: 5 INJECTION INTRAVENOUS at 13:54

## 2021-04-30 RX ADMIN — FLUDEOXYGLUCOSE F 18 13.16 MILLICURIE: 200 INJECTION, SOLUTION INTRAVENOUS at 13:54

## 2021-05-10 ENCOUNTER — HOSPITAL ENCOUNTER (OUTPATIENT)
Dept: LAB | Age: 66
Setting detail: SPECIMEN
Discharge: HOME | End: 2021-05-10
Payer: COMMERCIAL

## 2021-05-10 DIAGNOSIS — Z20.822 COVID-19 RULED OUT BY LABORATORY TESTING: Primary | ICD-10-CM

## 2021-05-10 PROCEDURE — U0005 INFEC AGEN DETEC AMPLI PROBE: HCPCS

## 2021-05-10 PROCEDURE — U0003 INFECTIOUS AGENT DETECTION BY NUCLEIC ACID (DNA OR RNA); SEVERE ACUTE RESPIRATORY SYNDROME CORONAVIRUS 2 (SARS-COV-2) (CORONAVIRUS DISEASE [COVID-19]), AMPLIFIED PROBE TECHNIQUE, MAKING USE OF HIGH THROUGHPUT TECHNOLOGIES AS DESCRIBED BY CMS-2020-01-R: HCPCS

## 2021-05-11 ENCOUNTER — HOSPITAL ENCOUNTER (OUTPATIENT)
Dept: WOMENS IMAGING | Age: 66
Discharge: HOME | End: 2021-05-13
Payer: COMMERCIAL

## 2021-05-11 DIAGNOSIS — Z12.31 ENCOUNTER FOR SCREENING MAMMOGRAM FOR BREAST CANCER: ICD-10-CM

## 2021-05-11 LAB
SARS-COV-2: NORMAL
SARS-COV-2: NOT DETECTED
SOURCE: NORMAL

## 2021-05-11 PROCEDURE — 77063 BREAST TOMOSYNTHESIS BI: CPT

## 2021-05-12 RX ORDER — SODIUM CHLORIDE 9 MG/ML
INJECTION, SOLUTION INTRAVENOUS CONTINUOUS
Status: CANCELLED | OUTPATIENT
Start: 2021-05-12

## 2021-05-13 ENCOUNTER — HOSPITAL ENCOUNTER (OUTPATIENT)
Dept: GENERAL RADIOLOGY | Age: 66
Discharge: HOME | End: 2021-05-15
Payer: COMMERCIAL

## 2021-05-13 ENCOUNTER — HOSPITAL ENCOUNTER (OUTPATIENT)
Dept: CT IMAGING | Age: 66
Discharge: HOME | End: 2021-05-15
Payer: COMMERCIAL

## 2021-05-13 VITALS
BODY MASS INDEX: 39.21 KG/M2 | DIASTOLIC BLOOD PRESSURE: 75 MMHG | TEMPERATURE: 97.3 F | HEART RATE: 66 BPM | WEIGHT: 244 LBS | HEIGHT: 66 IN | SYSTOLIC BLOOD PRESSURE: 134 MMHG | OXYGEN SATURATION: 97 % | RESPIRATION RATE: 18 BRPM

## 2021-05-13 DIAGNOSIS — R91.1 NODULE OF LOWER LOBE OF LEFT LUNG: ICD-10-CM

## 2021-05-13 LAB
INR BLD: 1
PARTIAL THROMBOPLASTIN TIME: 25.9 SEC (ref 24–36)
PLATELET # BLD: 328 K/UL (ref 150–450)
PROTHROMBIN TIME: 13.5 SEC (ref 11.8–14.6)

## 2021-05-13 PROCEDURE — 88305 TISSUE EXAM BY PATHOLOGIST: CPT

## 2021-05-13 PROCEDURE — 2580000003 HC RX 258: Performed by: RADIOLOGY

## 2021-05-13 PROCEDURE — 88313 SPECIAL STAINS GROUP 2: CPT

## 2021-05-13 PROCEDURE — 7100000031 HC ASPR PHASE II RECOVERY - ADDTL 15 MIN

## 2021-05-13 PROCEDURE — 88333 PATH CONSLTJ SURG CYTO XM 1: CPT

## 2021-05-13 PROCEDURE — 71045 X-RAY EXAM CHEST 1 VIEW: CPT

## 2021-05-13 PROCEDURE — 85730 THROMBOPLASTIN TIME PARTIAL: CPT

## 2021-05-13 PROCEDURE — 36415 COLL VENOUS BLD VENIPUNCTURE: CPT

## 2021-05-13 PROCEDURE — 2709999900 CT NEEDLE BIOPSY LUNG PERCUTANEOUS W IMAGING GUIDANCE

## 2021-05-13 PROCEDURE — 88342 IMHCHEM/IMCYTCHM 1ST ANTB: CPT

## 2021-05-13 PROCEDURE — 85049 AUTOMATED PLATELET COUNT: CPT

## 2021-05-13 PROCEDURE — 85610 PROTHROMBIN TIME: CPT

## 2021-05-13 PROCEDURE — 7100000030 HC ASPR PHASE II RECOVERY - FIRST 15 MIN

## 2021-05-13 PROCEDURE — 6360000002 HC RX W HCPCS: Performed by: RADIOLOGY

## 2021-05-13 PROCEDURE — 88341 IMHCHEM/IMCYTCHM EA ADD ANTB: CPT

## 2021-05-13 RX ORDER — ACETAMINOPHEN 325 MG/1
650 TABLET ORAL EVERY 4 HOURS PRN
Status: DISCONTINUED | OUTPATIENT
Start: 2021-05-13 | End: 2021-05-16 | Stop reason: HOSPADM

## 2021-05-13 RX ORDER — SODIUM CHLORIDE 9 MG/ML
INJECTION, SOLUTION INTRAVENOUS CONTINUOUS
Status: DISCONTINUED | OUTPATIENT
Start: 2021-05-13 | End: 2021-05-16 | Stop reason: HOSPADM

## 2021-05-13 RX ORDER — FENTANYL CITRATE 50 UG/ML
INJECTION, SOLUTION INTRAMUSCULAR; INTRAVENOUS
Status: COMPLETED | OUTPATIENT
Start: 2021-05-13 | End: 2021-05-13

## 2021-05-13 RX ORDER — MIDAZOLAM HYDROCHLORIDE 1 MG/ML
INJECTION INTRAMUSCULAR; INTRAVENOUS
Status: COMPLETED | OUTPATIENT
Start: 2021-05-13 | End: 2021-05-13

## 2021-05-13 RX ADMIN — SODIUM CHLORIDE: 9 INJECTION, SOLUTION INTRAVENOUS at 08:25

## 2021-05-13 RX ADMIN — Medication 50 MCG: at 09:48

## 2021-05-13 RX ADMIN — MIDAZOLAM 1 MG: 1 INJECTION INTRAMUSCULAR; INTRAVENOUS at 09:48

## 2021-05-13 ASSESSMENT — ENCOUNTER SYMPTOMS
SHORTNESS OF BREATH: 1
GASTROINTESTINAL NEGATIVE: 1
WHEEZING: 1
COUGH: 1

## 2021-05-13 NOTE — H&P
surrounding chest wall   demonstrate no acute findings.  Osseous structures demonstrate degenerative   change.           Impression   1. Interval increase in size of a somewhat spiculated nodule involving the   left lower lobe now measuring 1.7 x 1.6 cm.  Malignancy is suspected.  Please   see follow-up recommendations below. 2. Subtle ground-glass nodular opacities are identified within both lungs. Finding is nonspecific and may represent underlying infectious, inflammatory   or possibly neoplastic process. 3. Interval resolution of the right middle lobe airspace disease with   residual scarring noted. 4. Partially calcified thyroid nodules, largest measuring 13 mm within the   isthmus.  Given the lung findings, further evaluation with ultrasound is   suggested.       LUNG RADS:   Per ACR Lung-RADS Version 1.1       Category 4B, Very Suspicious (Findings for which additional diagnostic   testing and/or tissue sampling is recommended).       Management: Chest CT with or without contrast, PET/CT and/or tissue sampling   depending on the probability of malignancy and comorbidities. PET/CT may be   used when there is a > 8 mm solid component. For new large nodules that   develop on an annual repeat screening CT, a 1 month LDCT may be recommended   to address potentially infectious or inflammatory conditions.        PET CT SKULL BASE TO MID THIGH    FINDINGS:   HEAD/NECK: No abnormal FDG activity is identified.       CHEST: The previously identified nodule involving the left lower lobe is not   FDG avid.  SUV max of 1.7.  No abnormal FDG activity is identified involving   the chest.       ABDOMEN/PELVIS: No abnormal FDG activity is identified.       BONES/SOFT TISSUE: No focal abnormal FDG activity is identified.       INCIDENTAL CT FINDINGS: No pneumothorax.  No pericardial or pleural   effusions.  No free air or free fluid.  Cholelithiasis.  Questionable   punctate nonobstructing calculus left kidney.  Sigmoid Food insecurity     Worry: None     Inability: None    Transportation needs     Medical: None     Non-medical: None   Tobacco Use    Smoking status: Current Every Day Smoker     Packs/day: 0.25     Years: 35.00     Pack years: 8.75     Types: Cigarettes     Last attempt to quit: 1986     Years since quittin.3    Smokeless tobacco: Never Used    Tobacco comment: restarted smoking    Substance and Sexual Activity    Alcohol use:  Yes     Alcohol/week: 0.0 standard drinks     Comment: social    Drug use: No    Sexual activity: Not Currently   Lifestyle    Physical activity     Days per week: None     Minutes per session: None    Stress: None   Relationships    Social connections     Talks on phone: None     Gets together: None     Attends Orthodox service: None     Active member of club or organization: None     Attends meetings of clubs or organizations: None     Relationship status: None    Intimate partner violence     Fear of current or ex partner: None     Emotionally abused: None     Physically abused: None     Forced sexual activity: None   Other Topics Concern    None   Social History Narrative    None       REVIEW OF SYSTEMS    No Known Allergies    Current Outpatient Medications on File Prior to Encounter   Medication Sig Dispense Refill    varenicline (CHANTIX STARTING MONTH ) 0.5 MG X 11 & 1 MG X 42 tablet Take by mouth as directed 1 box 0    amLODIPine (NORVASC) 10 MG tablet take 1 tablet by mouth once daily 90 tablet 1    folic acid (FOLVITE) 1 MG tablet take 1 tablet by mouth once daily 30 tablet 0    sertraline (ZOLOFT) 50 MG tablet Take 1 tablet by mouth daily 90 tablet 0    pantoprazole (PROTONIX) 40 MG tablet take 1 tablet by mouth once daily 90 tablet 2    SPIRIVA RESPIMAT 2.5 MCG/ACT AERS inhaler inhale 2 puffs INTO THE LUNGS once daily 4 g 3    methotrexate (RHEUMATREX) 2.5 MG chemo tablet Take 20 mg by mouth once a week Indications: takes 8 tablets on saturdays       ELIQUIS 5 MG TABS tablet take 1 tablet by mouth twice a day 180 tablet 3    Blood Pressure KIT Dx: HTN. Needs automatic blood pressure machine to monitor her blood pressure. 1 kit 0    albuterol sulfate HFA (VENTOLIN HFA) 108 (90 Base) MCG/ACT inhaler Inhale 2 puffs into the lungs every 6 hours as needed for Wheezing      albuterol (PROVENTIL) (2.5 MG/3ML) 0.083% nebulizer solution Take 3 mLs by nebulization every 6 hours as needed for Wheezing or Shortness of Breath (Patient not taking: Reported on 4/23/2021) 125 vial 0     No current facility-administered medications on file prior to encounter. Review of Systems   Constitutional: Positive for fatigue. HENT: Negative. Respiratory: Positive for cough, shortness of breath and wheezing. Cardiovascular: Negative. Gastrointestinal: Negative. Endocrine: Negative. Genitourinary: Negative. Musculoskeletal: Negative. Skin: Negative. Neurological: Negative. Psychiatric/Behavioral: Negative. GENERAL PHYSICAL EXAM     Vitals: BP (!) 158/71   Pulse 71   Temp 97.5 °F (36.4 °C) (Temporal)   Resp 18   Ht 5' 6\" (1.676 m)   Wt 244 lb (110.7 kg)   SpO2 96%   BMI 39.38 kg/m²      GENERAL APPEARANCE:   Lennox Glatter is 61 y.o.,  female, mildly obese, nourished, conscious, alert. Does not appear to be in any distress or pain at this time. Physical Exam   Constitutional: She is oriented to person, place, and time. She appears well-developed and well-nourished. HENT:   Head: Normocephalic. Right Ear: External ear normal.   Left Ear: External ear normal.   Eyes: Pupils are equal, round, and reactive to light. Right eye exhibits no discharge. Left eye exhibits no discharge. Neck: Normal range of motion. Neck supple. Cardiovascular: Normal rate and regular rhythm. No murmur heard. Pulses:       Radial pulses are 2+ on the right side and 2+ on the left side.         Dorsalis pedis pulses are 2+ on the right side and 2+ on the left side. Posterior tibial pulses are 2+ on the right side and 2+ on the left side. Pulmonary/Chest: She has wheezes. Abdominal: Soft. Bowel sounds are normal. She exhibits no distension. There is no abdominal tenderness. Musculoskeletal: Normal range of motion. General: No tenderness or edema. Neurological: She is alert and oriented to person, place, and time. She has normal reflexes. Skin: Skin is warm and dry. No rash noted. No erythema. Psychiatric: She has a normal mood and affect.  Her behavior is normal.       RECENT LAB WORK     Lab Results   Component Value Date     04/14/2020    K 3.7 04/14/2020     04/14/2020    CO2 25 04/14/2020    BUN 12 04/14/2020    CREATININE 0.69 04/14/2020    GLUCOSE 116 (H) 04/14/2020    CALCIUM 8.8 04/14/2020    PROT 9.5 (H) 04/13/2020    LABALBU 3.6 04/13/2020    BILITOT 0.51 04/13/2020    ALKPHOS 116 (H) 04/13/2020    AST 15 04/13/2020    ALT 22 04/13/2020    LABGLOM >60 04/14/2020    GFRAA >60 04/14/2020    GLOB NOT REPORTED 12/22/2019     Lab Results   Component Value Date    WBC 9.2 04/14/2020    HGB 11.9 (L) 04/14/2020    HCT 35.8 (L) 04/14/2020    MCV 92.3 04/14/2020     05/13/2021     PROVISIONAL DIAGNOSES / SURGERY:      Nodule of lower lobe of left lung   STC IR BIOPSY W CT      Patient Active Problem List    Diagnosis Date Noted    Encounter for smoking cessation counseling 04/23/2021    Nodule of lower lobe of left lung 04/23/2021    Abnormal CT of the chest 04/21/2021    Deep venous thrombosis of left profunda femoris vein ([address] 04/16/2020    Positive colorectal cancer screening using Cologuard test 02/01/2020    Thyroid nodule 01/09/2020    History of thyroid nodule 12/23/2019    COPD, severity to be determined ([address] 08/14/2019    Obesity, Class II, BMI 35-39.9 11/12/2018    Major depressive disorder, recurrent, moderate ([address] 11/12/2018    Prediabetes 11/12/2018  Tobacco abuse 11/12/2018    History of DVT in adulthood 11/12/2018    Rheumatoid arthritis ([facility] [address] 07/14/2014    HTN, goal below 130/80 09/16/2013    GERD (gastroesophageal reflux disease) 09/16/2013           ILIR Gardner - CNP on 5/13/2021 at 8:49 AM

## 2021-05-13 NOTE — BRIEF OP NOTE
Brief Postoperative Note    Brain Daily  YOB: 1955  [id number]    Pre-operative Diagnosis: LLL lung nodule    Post-operative Diagnosis: Same    Procedure: lung nodule bx    Anesthesia: Local and Moderate Sedation    Surgeons/Assistants: FANNY LAZO    Estimated Blood Loss: less than 1mL     Complications: None    Specimens: Was Obtained: 20g core x 5 passes    Findings: successful LLL lung nodule biopsy    Electronically signed by Angeles Servin MD on 5/13/2021 at 10:35 AM

## 2021-05-13 NOTE — POST SEDATION
Sedation Post Procedure Note    Patient Name: Mary Ann Abreu   YOB: 1955  Room/Bed: Room/bed info not found  Medical Record Number: [id number]  Date: 5/13/2021   Time: 10:36 AM         Physicians/Assistants: Flory King MD    Procedure Performed:  LLL lung nodule bx    Post-Sedation Vital Signs:  Vitals:    05/13/21 1016   BP: (!) 162/80   Pulse: 80   Resp: 16   Temp:    SpO2: 95%      Vital signs were reviewed and were stable after the procedure (see flow sheet for vitals)            Post-Sedation Exam: pt remains stable           Complications: none    Electronically signed by Flory King MD on 5/13/2021 at 10:36 AM

## 2021-05-13 NOTE — PRE SEDATION
Sedation Pre-Procedure Note    Patient Name: Eligio Jaramillo   YOB: 1955  Room/Bed: Room/bed info not found  Medical Record Number: [id number]  Date: 5/13/2021   Time: 9:28 AM       Indication:  Lung bx    Consent: I have discussed with the patient and/or the patient representative the indication, alternatives, and the possible risks and/or complications of the planned procedure and the anesthesia methods. The patient and/or patient representative appear to understand and agree to proceed. Vital Signs:   Vitals:    05/13/21 0828   BP: (!) 158/71   Pulse: 71   Resp: 18   Temp: 97.5 °F (36.4 °C)   SpO2: 96%       Past Medical History:   has a past medical history of Arthritis, Depression, DVT (deep venous thrombosis) ([facility] [address] GERD (gastroesophageal reflux disease), Hx of blood clots, Hypertension, and Rheumatoid arthritis ([facility] [address] Past Surgical History:   has a past surgical history that includes Appendectomy (1982); Endoscopy, colon, diagnostic (374333); bronchoscopy (N/A, 12/27/2019); and Colonoscopy (N/A, 2/12/2020). Medications:   Scheduled Meds:   Continuous Infusions:    sodium chloride       PRN Meds:   Home Meds:   Prior to Admission medications    Medication Sig Start Date End Date Taking?  Authorizing Provider   varenicline (CHANTIX STARTING MONTH PAK) 0.5 MG X 11 & 1 MG X 42 tablet Take by mouth as directed 4/23/21  Yes Sara Carter MD   amLODIPine (NORVASC) 10 MG tablet take 1 tablet by mouth once daily 4/15/21  Yes Alyssa Palafox MD   folic acid (FOLVITE) 1 MG tablet take 1 tablet by mouth once daily 4/12/21  Yes Alyssa Palafox MD   sertraline (ZOLOFT) 50 MG tablet Take 1 tablet by mouth daily 3/3/21  Yes Alyssa Palafox MD   pantoprazole (PROTONIX) 40 MG tablet take 1 tablet by mouth once daily 2/18/21  Yes Alyssa Palafox MD   SPIRIVA RESPIMAT 2.5 MCG/ACT AERS inhaler inhale 2 puffs INTO THE LUNGS once daily 11/20/20  Yes Alyssa Palafox MD   methotrexate (RHEUMATREX) 2.5 MG chemo tablet Take 20 mg by mouth once a week Indications: takes 8 tablets on saturdays    Yes Historical Provider, MD KRISHNAN 5 MG TABS tablet take 1 tablet by mouth twice a day 7/6/20   Jade Duque MD   Blood Pressure KIT Dx: HTN. Needs automatic blood pressure machine to monitor her blood pressure. 4/16/20   Jade Duque MD   albuterol sulfate HFA (VENTOLIN HFA) 108 (90 Base) MCG/ACT inhaler Inhale 2 puffs into the lungs every 6 hours as needed for Wheezing    Historical Provider, MD   albuterol (PROVENTIL) (2.5 MG/3ML) 0.083% nebulizer solution Take 3 mLs by nebulization every 6 hours as needed for Wheezing or Shortness of Breath  Patient not taking: Reported on 4/23/2021 12/30/19   Jade Duque MD     Coumadin Use Last 7 Days:  no  Antiplatelet drug therapy use last 7 days: no  Other anticoagulant use last 7 days: yes - eliquis, held      Pre-Sedation Documentation and Exam:   I have reviewed the patient's history and review of systems.     Mallampati Airway Assessment:  Mallampati Class II - (soft palate, fauces & uvula are visible)    Prior History of Anesthesia Complications:   none    ASA Classification:  Class 2 - A normal healthy patient with mild systemic disease    Sedation/ Anesthesia Plan:   intravenous sedation    Medications Planned:   midazolam (Versed) intravenously and fentanyl intravenously    Patient is an appropriate candidate for plan of sedation: yes    Electronically signed by Stacia Ambrose MD on 5/13/2021 at 9:28 AM

## 2021-05-14 DIAGNOSIS — C34.92 ADENOCARCINOMA OF LEFT LUNG (HCC): ICD-10-CM

## 2021-05-14 DIAGNOSIS — R91.1 NODULE OF LOWER LOBE OF LEFT LUNG: Primary | ICD-10-CM

## 2021-05-14 PROBLEM — C34.90 PRIMARY MUCINOUS ADENOCARCINOMA OF LUNG (HCC): Status: ACTIVE | Noted: 2021-05-14

## 2021-05-14 LAB — SURGICAL PATHOLOGY REPORT: NORMAL

## 2021-05-17 ENCOUNTER — TELEPHONE (OUTPATIENT)
Dept: INFECTIOUS DISEASES | Age: 66
End: 2021-05-17

## 2021-05-17 SDOH — ECONOMIC STABILITY: FOOD INSECURITY: WITHIN THE PAST 12 MONTHS, THE FOOD YOU BOUGHT JUST DIDN'T LAST AND YOU DIDN'T HAVE MONEY TO GET MORE.: NEVER TRUE

## 2021-05-17 SDOH — ECONOMIC STABILITY: FOOD INSECURITY: WITHIN THE PAST 12 MONTHS, YOU WORRIED THAT YOUR FOOD WOULD RUN OUT BEFORE YOU GOT MONEY TO BUY MORE.: NEVER TRUE

## 2021-05-17 ASSESSMENT — PATIENT HEALTH QUESTIONNAIRE - PHQ9
SUM OF ALL RESPONSES TO PHQ QUESTIONS 1-9: 1
SUM OF ALL RESPONSES TO PHQ QUESTIONS 1-9: 1
1. LITTLE INTEREST OR PLEASURE IN DOING THINGS: 0
2. FEELING DOWN, DEPRESSED OR HOPELESS: 1

## 2021-05-17 NOTE — TELEPHONE ENCOUNTER
Called pt in AM to inform her I received this message. Reached out to both offices to verify they received referrals. Both appts have been scheduled. Voicemail message out to [facility] to see if she can fit in a PFT SOON.

## 2021-05-17 NOTE — TELEPHONE ENCOUNTER
----- Message from Betsy Cotto MD sent at 5/14/2021  5:02 PM EDT -----  Called updated patient 5:02 PM 5/14/2021     Please arrange pt to see Dr Ximena Mathew -cardiothoracic surgery and oncologist -orders placed    Please schedule PFT ASAP for preop evaluation

## 2021-05-17 NOTE — PROGRESS NOTES
Visit Information    Have you changed or started any medications since your last visit including any over-the-counter medicines, vitamins, or herbal medicines? no   Are you having any side effects from any of your medications? -  no  Have you stopped taking any of your medications? Is so, why? -  no    Have you seen any other physician or provider since your last visit? No  Have you had any other diagnostic tests since your last visit? Yes - Records Obtained  Have you been seen in the emergency room and/or had an admission to a hospital since we last saw you? NO  Have you had your routine dental cleaning in the past 6 months? no    Have you activated your [facility] account? If not, what are your barriers?  Yes     Patient Care Team:  Jade Duque MD as PCP - General (Family Medicine)  Jade Duque MD as PCP - [facility] Provider  Dianna Carreno MD as Consulting Physician (Internal Medicine)  Marixa Garcia RN as Imaging Navigator    Medical History Review  Past Medical, Family, and Social History reviewed and does contribute to the patient presenting condition    Health Maintenance   Topic Date Due    Hepatitis C screen  Never done    COVID-19 Vaccine (1) Never done    HIV screen  Never done    Cervical cancer screen  04/25/2017    Lipid screen  01/30/2020    A1C test (Diabetic or Prediabetic)  12/23/2020    Flu vaccine (Season Ended) 09/01/2021    Colon cancer screen colonoscopy  02/12/2022    Pneumococcal 0-64 years Vaccine (2 of 2) 05/01/2022    Breast cancer screen  05/11/2023    DTaP/Tdap/Td vaccine (2 - Td) 06/12/2029    Shingles Vaccine  Completed    Hepatitis A vaccine  Aged Out    Hepatitis B vaccine  Aged Out    Hib vaccine  Aged Out    Meningococcal (ACWY) vaccine  Aged Out

## 2021-05-18 ENCOUNTER — TELEPHONE (OUTPATIENT)
Dept: CASE MANAGEMENT | Age: 66
End: 2021-05-18

## 2021-05-18 ENCOUNTER — TELEMEDICINE (OUTPATIENT)
Dept: FAMILY MEDICINE CLINIC | Age: 66
End: 2021-05-18
Payer: COMMERCIAL

## 2021-05-18 DIAGNOSIS — C34.92 ADENOCARCINOMA, LUNG, LEFT (HCC): Primary | ICD-10-CM

## 2021-05-18 DIAGNOSIS — I10 HTN, GOAL BELOW 130/80: ICD-10-CM

## 2021-05-18 DIAGNOSIS — K21.9 GASTROESOPHAGEAL REFLUX DISEASE, UNSPECIFIED WHETHER ESOPHAGITIS PRESENT: ICD-10-CM

## 2021-05-18 DIAGNOSIS — R73.03 PREDIABETES: ICD-10-CM

## 2021-05-18 DIAGNOSIS — F33.1 MAJOR DEPRESSIVE DISORDER, RECURRENT, MODERATE (HCC): ICD-10-CM

## 2021-05-18 PROCEDURE — 3017F COLORECTAL CA SCREEN DOC REV: CPT | Performed by: FAMILY MEDICINE

## 2021-05-18 PROCEDURE — 99214 OFFICE O/P EST MOD 30 MIN: CPT | Performed by: FAMILY MEDICINE

## 2021-05-18 PROCEDURE — G8427 DOCREV CUR MEDS BY ELIG CLIN: HCPCS | Performed by: FAMILY MEDICINE

## 2021-05-18 RX ORDER — SERTRALINE HYDROCHLORIDE 100 MG/1
100 TABLET, FILM COATED ORAL DAILY
Qty: 60 TABLET | Refills: 1 | Status: SHIPPED | OUTPATIENT
Start: 2021-05-18 | End: 2021-01-01

## 2021-05-18 RX ORDER — BLOOD PRESSURE TEST KIT
KIT MISCELLANEOUS
Qty: 1 KIT | Refills: 0 | Status: SHIPPED | OUTPATIENT
Start: 2021-05-18 | End: 2021-01-01

## 2021-05-18 RX ORDER — PANTOPRAZOLE SODIUM 40 MG/1
TABLET, DELAYED RELEASE ORAL
Qty: 90 TABLET | Refills: 2 | Status: SHIPPED | OUTPATIENT
Start: 2021-05-18 | End: 2022-01-01

## 2021-05-18 ASSESSMENT — ENCOUNTER SYMPTOMS
PHOTOPHOBIA: 0
RHINORRHEA: 0
BLOOD IN STOOL: 0
WHEEZING: 0
COLOR CHANGE: 0
NAUSEA: 0
SORE THROAT: 0
SHORTNESS OF BREATH: 1
VOMITING: 0
SINUS PRESSURE: 0
DIARRHEA: 0
SINUS PAIN: 0
COUGH: 1
CONSTIPATION: 0
BACK PAIN: 0
CHEST TIGHTNESS: 0
ABDOMINAL DISTENTION: 0
ABDOMINAL PAIN: 0

## 2021-05-18 NOTE — PROGRESS NOTES
[address] [address]  Phone: [phone], Fax: [phone]    TELEHEALTH EVALUATION -- Audio/Visual (During DKSSL-50 public health emergency)    Patient ID verified by me prior to start of this visit    Lennox Villafuerte (:  1955) has requested an audio/video evaluation for the following concern(s):  Chief Complaint   Patient presents with    COPD    Results     CT RESULTS       HPI:  Lennox Villafuerte is an established patient of Fe Maria MD  . Patient patient is scheduled to discuss blood work and CT lung. Patient has not done blood work. She had CT lung screening which showed increasing lung nodule. That was followed with PET scan, which came back normal but he did biopsy. Patient's biopsy showed adenocarcinoma. Patient was referred to [facility] surgeon and oncologist has appointment already scheduled. Patient has PFT ordered and is scheduled on . She needs to reschedule as soon as possible. Patient is emotionally weak, is tearful. Patient reports she has family around. She has history of depression and is on Zoloft 50 mg increased to 100 today. Patient was taking Xanax in the past.  Hypertension usually controlled, does not check blood pressure at home. [x]Negative depression screening. []1-4 = Minimal depression   []5-9 = Mild depression   []10-14 = Moderate depression   []15-19 = Moderately severe depression   []20-27 = Severe depression  PHQ Scores 2021 3/3/2021 2020 2019 2018   PHQ2 Score 1 1 2 0 0   PHQ9 Score 1 1 2 0 0     Review of Systems   Constitutional: Negative for activity change, diaphoresis, fatigue and unexpected weight change. HENT: Negative for congestion, ear pain, rhinorrhea, sinus pressure, sinus pain and sore throat. Eyes: Negative for photophobia and visual disturbance. Respiratory: Positive for cough and shortness of breath.  Negative for chest tightness and wheezing. Cardiovascular: Negative for chest pain, palpitations and leg swelling. Gastrointestinal: Negative for abdominal distention, abdominal pain, blood in stool, constipation, diarrhea, nausea and vomiting. Endocrine: Negative for polyphagia and polyuria. Genitourinary: Negative for difficulty urinating, dysuria, frequency, hematuria, pelvic pain and urgency. Musculoskeletal: Positive for arthralgias. Negative for back pain, gait problem, myalgias, neck pain and neck stiffness. Skin: Negative for color change and wound. Neurological: Negative for dizziness, seizures, speech difficulty, weakness, light-headedness, numbness and headaches. Hematological: Negative for adenopathy. Psychiatric/Behavioral: Negative for agitation, confusion, decreased concentration, dysphoric mood, hallucinations and sleep disturbance. The patient is nervous/anxious.         Patient Active Problem List    Diagnosis Date Noted    Primary mucinous adenocarcinoma of lung ([address] 05/14/2021    Adenocarcinoma, lung, left ([address] 05/14/2021    Encounter for smoking cessation counseling 04/23/2021    Nodule of lower lobe of left lung 04/23/2021    Abnormal CT of the chest 04/21/2021    Deep venous thrombosis of left profunda femoris vein ([address] 04/16/2020    Positive colorectal cancer screening using Cologuard test 02/01/2020    Thyroid nodule 01/09/2020    History of thyroid nodule 12/23/2019    COPD, severity to be determined ([address] 08/14/2019    Obesity, Class II, BMI 35-39.9 11/12/2018    Major depressive disorder, recurrent, moderate ([address] 11/12/2018    Prediabetes 11/12/2018    Tobacco abuse 11/12/2018    History of DVT in adulthood 11/12/2018    Rheumatoid arthritis ([address] 07/14/2014    HTN, goal below 130/80 09/16/2013    GERD (gastroesophageal reflux disease) 09/16/2013        Past Surgical History:   Procedure Laterality Date    APPENDECTOMY  1982    BRONCHOSCOPY N/A 12/27/2019    BRONCHOSCOPY ALVEOLAR LAVAGE performed by Azam Duggan MD at [facility] COLONOSCOPY N/A 2/12/2020    COLONOSCOPY POLYPECTOMIES HOT SNARE, COLD BIOPSY POLYPECTOMIES performed by Shahida Gambino MD at [facility] CT NEEDLE BIOPSY LUNG PERCUTANEOUS  5/13/2021    CT NEEDLE BIOPSY LUNG PERCUTANEOUS 5/13/2021 [facility] CT SCAN    ENDOSCOPY, COLON, DIAGNOSTIC  [id number]    gastritis, sm. bowel lipoma, biopsies     Family History   Problem Relation Age of Onset    Cancer Mother     Diabetes Mother     Heart Disease Mother     Cancer Father     Cancer Brother      Current Outpatient Medications   Medication Sig Dispense Refill    pantoprazole (PROTONIX) 40 MG tablet take 1 tablet by mouth once daily 90 tablet 2    Blood Pressure KIT Dx: HTN. Needs automatic blood pressure machine to monitor her blood pressure. 1 kit 0    sertraline (ZOLOFT) 100 MG tablet Take 1 tablet by mouth daily 60 tablet 1    varenicline (CHANTIX STARTING MONTH LYNDSAY) 0.5 MG X 11 & 1 MG X 42 tablet Take by mouth as directed 1 box 0    amLODIPine (NORVASC) 10 MG tablet take 1 tablet by mouth once daily 90 tablet 1    folic acid (FOLVITE) 1 MG tablet take 1 tablet by mouth once daily 30 tablet 0    SPIRIVA RESPIMAT 2.5 MCG/ACT AERS inhaler inhale 2 puffs INTO THE LUNGS once daily 4 g 3    ELIQUIS 5 MG TABS tablet take 1 tablet by mouth twice a day 180 tablet 3    albuterol sulfate HFA (VENTOLIN HFA) 108 (90 Base) MCG/ACT inhaler Inhale 2 puffs into the lungs every 6 hours as needed for Wheezing      methotrexate (RHEUMATREX) 2.5 MG chemo tablet Take 20 mg by mouth once a week Indications: takes 8 tablets on saturdays       albuterol (PROVENTIL) (2.5 MG/3ML) 0.083% nebulizer solution Take 3 mLs by nebulization every 6 hours as needed for Wheezing or Shortness of Breath (Patient not taking: Reported on 4/23/2021) 125 vial 0     No current facility-administered medications for this visit.        No Known Allergies     Social History     Tobacco Use    Smoking status: Current Every Day Smoker     Packs/day: 0.25     Years: 35.00     Pack years: 8.75     Types: Cigarettes     Last attempt to quit: 1986     Years since quittin.3    Smokeless tobacco: Never Used    Tobacco comment: restarted smoking 2019   Vaping Use    Vaping Use: Never used   Substance Use Topics    Alcohol use: Yes     Alcohol/week: 0.0 standard drinks     Comment: social    Drug use: No        PHYSICAL EXAMINATION:  Vital Signs: (As obtained by patient/caregiver or practitioner observation)  Patient-Reported Vitals 2021   Patient-Reported Weight 243   Patient-Reported Height 5 6        Constitutional: [x] Appears well-developed and well-nourished [x] No apparent distress      [] Abnormal-   Mental status  [x] Alert and awake  [x] Oriented to person/place/time [x]Able to follow commands      Eyes:  EOM    [x]  Normal  [] Abnormal-  Sclera  [x]  Normal  [] Abnormal -         Discharge [x]  None visible  [] Abnormal -    HENT:   [x] Normocephalic, atraumatic.   [] Abnormal   [x] Mouth/Throat: Mucous membranes are moist.     External Ears [x] Normal  [] Abnormal-     Neck: [x] No visualized mass     Pulmonary/Chest: [x] Respiratory effort normal.  [x] No visualized signs of difficulty breathing or respiratory distress        [] Abnormal     Musculoskeletal:   [x] Normal gait with no signs of ataxia         [x] Normal range of motion of neck        [] Abnormal-     Neurological:        [x] No Facial Asymmetry (Cranial nerve 7 motor function) (limited exam to video visit)          [x] No gaze palsy        [] Abnormal-     Skin:        [x] No significant exanthematous lesions or discoloration noted on facial skin         [] Abnormal-     Psychiatric:       [] Normal Affect [x] No Hallucinations        [x] Abnormal- Anxious, with pressured speech, tear full    Other pertinent observable physical exam findings-   Lab Results   Component Value Date    WBC 9.2 2020    HGB 11.9 (L) REORDER    pantoprazole (PROTONIX) 40 MG tablet REORDER    sertraline (ZOLOFT) 50 MG tablet REORDER      Shyam Jagdeep received counseling on the following healthy behaviors: nutrition, exercise and medication adherence  Reviewed prior labs and health maintenance. Continue current medications, diet and exercise. Discussed use, benefit, and side effects of prescribed medications. Barriers to medication compliance addressed. Patient given educational materials - see patient instructions. All patient questions answered. Patient voiced understanding. No follow-ups on file. Aditi Davenport is a 61 y.o. female patient  being evaluated by a Virtual Visit (video visit) encounter to address concerns as mentioned above. A caregiver was present when appropriate. Due to this being a TeleHealth encounter (During ZIZSI-80 public health emergency), evaluation of the following organ systems was limited:Vitals/Constitutional/EENT/Resp/CV/GI//MS/Neuro/Skin/Heme-Lymph-Imm. Services were provided through a video synchronous discussion virtually to substitute for in-person clinic visit. This is a telehealth visit that was performed with the originating site at Patient Location: home and provider Location of [place], [place]. Verbal consent to participate in video visit was obtained. Patient ID verified by me prior to start of this visit  I discussed with the patient the nature of our telehealth visits via interactive/real-time audio/video that:  - I would evaluate the patient and recommend diagnostics and treatments based on my assessment  - Our sessions are not being recorded and that personal health information is protected  - Our team would provide follow up care in person if/when the patient needs it.      Pursuant to the emergency declaration under the [facility], [address] [place] [address] authority and the [employer] Act, this Virtual Visit was conducted with patient's (and/or legal guardian's) consent, to reduce the patient's risk of exposure to COVID-19 and provide necessary medical care. The patient (and/or legal guardian) has also been advised to contact this office for worsening conditions or problems, and seek emergency medical treatment and/or call 911 if deemed necessary. This note was completed by using the assistance of a speech-recognition program. However, inadvertent computerized transcription errors may be present. Although every effort was made to ensure accuracy, no guarantees can be provided that every mistake has been identified and corrected by editing.   Electronically signed by Aurora Becerra MD on 5/18/21 at 7:39 AM EDT

## 2021-05-18 NOTE — TELEPHONE ENCOUNTER
Araseli Chavez can do PFT 6/1 OR 6/2 at 12:30 on her lunch hour. Called pt, left her Voicemail asking her to call me back. IF her last Covid vaccine was 2 wks out- she will not need covid testing.

## 2021-05-18 NOTE — TELEPHONE ENCOUNTER
Pt returned my call - she was fine with either day. Called Esther to inform her she will get PFT on 6/1/21 at 12:30 - pt has NOT been vaccinated.   Transferred her to [facility] scheduling to schedule covid swab prior to test.

## 2021-05-21 ENCOUNTER — TELEPHONE (OUTPATIENT)
Dept: FAMILY MEDICINE CLINIC | Age: 66
End: 2021-05-21

## 2021-05-24 ENCOUNTER — INITIAL CONSULT (OUTPATIENT)
Dept: ONCOLOGY | Age: 66
End: 2021-05-24
Payer: COMMERCIAL

## 2021-05-24 ENCOUNTER — TELEPHONE (OUTPATIENT)
Dept: ONCOLOGY | Age: 66
End: 2021-05-24

## 2021-05-24 VITALS
SYSTOLIC BLOOD PRESSURE: 133 MMHG | HEIGHT: 65 IN | RESPIRATION RATE: 16 BRPM | DIASTOLIC BLOOD PRESSURE: 79 MMHG | WEIGHT: 243 LBS | HEART RATE: 73 BPM | BODY MASS INDEX: 40.48 KG/M2 | TEMPERATURE: 97.7 F

## 2021-05-24 DIAGNOSIS — R91.1 NODULE OF LOWER LOBE OF LEFT LUNG: ICD-10-CM

## 2021-05-24 DIAGNOSIS — C34.92 ADENOCARCINOMA, LUNG, LEFT (HCC): Primary | ICD-10-CM

## 2021-05-24 PROCEDURE — G8417 CALC BMI ABV UP PARAM F/U: HCPCS | Performed by: INTERNAL MEDICINE

## 2021-05-24 PROCEDURE — 99202 OFFICE O/P NEW SF 15 MIN: CPT | Performed by: INTERNAL MEDICINE

## 2021-05-24 PROCEDURE — 99245 OFF/OP CONSLTJ NEW/EST HI 55: CPT | Performed by: INTERNAL MEDICINE

## 2021-05-24 PROCEDURE — G8427 DOCREV CUR MEDS BY ELIG CLIN: HCPCS | Performed by: INTERNAL MEDICINE

## 2021-05-24 NOTE — PROGRESS NOTES
_               Ms. Ronel Londono is a very pleasant 61 y.o. female with history of multiple co morbidities as listed. Patient is referred for further evaluation and management of lung cancer. The patient had bacterial pneumonia in December 2019. CT scan at that time showed questionable abnormality in the left lower lobe as well. Recommendation was for monitoring. Patient did fairly well since then. She had screening CT scan done April 2021 and that showed suspicious 1.7 lesion in the left lung lower lobe. And CT scan did not show any significant activity but due to the increase in the size of the nodule compared to last year biopsy was done which was positive for invasive adenocarcinoma. Patient is referred for further management. Patient denies any chest pain or hemoptysis. She has occasional cough and sputum. No fever. No headaches. No back pain. No weight loss or decreased appetite. No other complaints. Patient was also referred to see chest surgery which is scheduled for May 27, 2021. She has scheduled pulmonary function test on June 1, 2021. Patient smoker of 1 to 1.5 packs/day for the last 33 years. Denies alcohol drinking. PAST MEDICAL HISTORY: has a past medical history of Arthritis, Depression, DVT (deep venous thrombosis) ([facility] GERD (gastroesophageal reflux disease), Hx of blood clots, Hypertension, and Rheumatoid arthritis ([facility] PAST SURGICAL HISTORY: has a past surgical history that includes Appendectomy (1982); Endoscopy, colon, diagnostic (052491); bronchoscopy (N/A, 12/27/2019); Colonoscopy (N/A, 2/12/2020); and CT NEEDLE BIOPSY LUNG PERCUTANEOUS (5/13/2021).      CURRENT MEDICATIONS:  has a current medication list which includes the following prescription(s): spiriva respimat, pantoprazole, blood pressure, sertraline, chantix starting month chidi, amlodipine, folic acid, eliquis, albuterol sulfate hfa, albuterol, and methotrexate. ALLERGIES:  has No Known Allergies. FAMILY HISTORY: 2 brothers and father had lung cancer. Mother had uterine and breast cancer. Otherwise negative for any hematological or oncological conditions. SOCIAL HISTORY:  reports that she has been smoking cigarettes. She has a 8.75 pack-year smoking history. She has never used smokeless tobacco. She reports current alcohol use. She reports that she does not use drugs. REVIEW OF SYSTEMS:     · General: No weakness or fatigue. No unanticipated weight loss or decreased appetite. No fever or chills. · Eyes: No blurred vision, eye pain or double vision. · Ears: No hearing problems or drainage. No tinnitus. · Throat: No sore throat, problems with swallowing or dysphagia. · Respiratory: As above  · Cardiovascular: No chest pain, orthopnea or PND. No lower extremity edema. No palpitation. · Gastrointestinal: No problems with swallowing. No abdominal pain or bloating. No nausea or vomiting. No diarrhea or constipation. No GI bleeding. · Genitourinary: No dysuria, hematuria, frequency or urgency. · Musculoskeletal: No muscle aches or pains. No limitation of movement. No back pain. No gait disturbance, No joint complaints. · Dermatologic: No skin rashes or pruritus. No skin lesions or discolorations. · Psychiatric: No depression, anxiety, or stress or signs of schizophrenia. No change in mood or affect. · Hematologic: No history of bleeding tendency. No bruises or ecchymosis. No history of clotting problems. · Infectious disease: No fever, chills or frequent infections. · Endocrine: No polydipsia or polyuria. No temperature intolerance. · Neurologic: No headaches or dizziness. No weakness or numbness of the extremities. No changes in balance, coordination,  memory, mentation, behavior. · Allergic/Immunologic: No nasal congestion or hives. No repeated infections.        PHYSICAL EXAM:  The patient is not in acute distress. Vital signs: Blood pressure 133/79, pulse 73, temperature 97.7 °F (36.5 °C), temperature source Oral, resp. rate 16, height 5' 5\" (1.651 m), weight 243 lb (110.2 kg), not currently breastfeeding.      General appearance - well appearing, not in pain or distress  Mental status - good mood, alert and oriented  Eyes - pupils equal and reactive, extraocular eye movements intact  Ears - bilateral TM's and external ear canals normal  Nose - normal and patent, no erythema, discharge or polyps  Mouth - mucous membranes moist, pharynx normal without lesions  Neck - supple, no significant adenopathy  Lymphatics - no palpable lymphadenopathy, no hepatosplenomegaly  Chest - clear to auscultation, no wheezes, rales or rhonchi, symmetric air entry  Heart - normal rate, regular rhythm, normal S1, S2, no murmurs, rubs, clicks or gallops  Abdomen - soft, nontender, nondistended, no masses or organomegaly  Neurological - alert, oriented, normal speech, no focal findings or movement disorder noted  Musculoskeletal - no joint tenderness, deformity or swelling  Extremities - peripheral pulses normal, no pedal edema, no clubbing or cyanosis  Skin - normal coloration and turgor, no rashes, no suspicious skin lesions noted     Review of Diagnostic data:   Lab Results   Component Value Date    WBC 9.2 04/14/2020    HGB 11.9 (L) 04/14/2020    HCT 35.8 (L) 04/14/2020    MCV 92.3 04/14/2020     05/13/2021       Chemistry        Component Value Date/Time     04/14/2020 0548    K 3.7 04/14/2020 0548     04/14/2020 0548    CO2 25 04/14/2020 0548    BUN 12 04/14/2020 0548    CREATININE 0.69 04/14/2020 0548        Component Value Date/Time    CALCIUM 8.8 04/14/2020 0548    ALKPHOS 116 (H) 04/13/2020 1911    AST 15 04/13/2020 1911    ALT 22 04/13/2020 1911    BILITOT 0.51 04/13/2020 1911            IMPRESSION:   Clinical stage T1 a N0 M0 left lower lobe lung adenocarcinoma  COPD  Chronic tobacco

## 2021-05-24 NOTE — LETTER
_    Lalo Lao MD    5/31/2021     Rosario Bernal MD   [address] [id number]    Dear Dr Elodia Parra: Thank you for referring Nicol Moctezuma, 1955, to me for evaluation. Below are the relevant portions of my assessment and plan of care. Ms. Nicol Moctezuma is a very pleasant 61 y.o. female with history of multiple co morbidities as listed. Patient is referred for further evaluation and management of lung cancer. The patient had bacterial pneumonia in December 2019. CT scan at that time showed questionable abnormality in the left lower lobe as well. Recommendation was for monitoring. Patient did fairly well since then. She had screening CT scan done April 2021 and that showed suspicious 1.7 lesion in the left lung lower lobe. And CT scan did not show any significant activity but due to the increase in the size of the nodule compared to last year biopsy was done which was positive for invasive adenocarcinoma. Patient is referred for further management. Patient denies any chest pain or hemoptysis. She has occasional cough and sputum. No fever. No headaches. No back pain. No weight loss or decreased appetite. No other complaints. Patient was also referred to see chest surgery which is scheduled for May 27, 2021. She has scheduled pulmonary function test on June 1, 2021. Patient smoker of 1 to 1.5 packs/day for the last 33 years. Denies alcohol drinking. PAST MEDICAL HISTORY: has a past medical history of Arthritis, Depression, DVT (deep venous thrombosis) ([address] GERD (gastroesophageal reflux disease), Hx of blood clots, Hypertension, and Rheumatoid arthritis ([address] PAST SURGICAL HISTORY: has a past surgical history that includes Appendectomy (1982); Endoscopy, colon, diagnostic (977572); bronchoscopy (N/A, 12/27/2019); Colonoscopy (N/A, 2/12/2020); and CT NEEDLE BIOPSY LUNG PERCUTANEOUS (5/13/2021).      CURRENT MEDICATIONS:  has a current medication changes in balance, coordination,  memory, mentation, behavior. · Allergic/Immunologic: No nasal congestion or hives. No repeated infections. PHYSICAL EXAM:  The patient is not in acute distress. Vital signs: Blood pressure 133/79, pulse 73, temperature 97.7 °F (36.5 °C), temperature source Oral, resp. rate 16, height 5' 5\" (1.651 m), weight 243 lb (110.2 kg), not currently breastfeeding.      General appearance - well appearing, not in pain or distress  Mental status - good mood, alert and oriented  Eyes - pupils equal and reactive, extraocular eye movements intact  Ears - bilateral TM's and external ear canals normal  Nose - normal and patent, no erythema, discharge or polyps  Mouth - mucous membranes moist, pharynx normal without lesions  Neck - supple, no significant adenopathy  Lymphatics - no palpable lymphadenopathy, no hepatosplenomegaly  Chest - clear to auscultation, no wheezes, rales or rhonchi, symmetric air entry  Heart - normal rate, regular rhythm, normal S1, S2, no murmurs, rubs, clicks or gallops  Abdomen - soft, nontender, nondistended, no masses or organomegaly  Neurological - alert, oriented, normal speech, no focal findings or movement disorder noted  Musculoskeletal - no joint tenderness, deformity or swelling  Extremities - peripheral pulses normal, no pedal edema, no clubbing or cyanosis  Skin - normal coloration and turgor, no rashes, no suspicious skin lesions noted     Review of Diagnostic data:   Lab Results   Component Value Date    WBC 9.2 04/14/2020    HGB 11.9 (L) 04/14/2020    HCT 35.8 (L) 04/14/2020    MCV 92.3 04/14/2020     05/13/2021       Chemistry        Component Value Date/Time     04/14/2020 0548    K 3.7 04/14/2020 0548     04/14/2020 0548    CO2 25 04/14/2020 0548    BUN 12 04/14/2020 0548    CREATININE 0.69 04/14/2020 0548        Component Value Date/Time    CALCIUM 8.8 04/14/2020 0548    ALKPHOS 116 (H) 04/13/2020 1911    AST 15 04/13/2020 1911 ALT 22 04/13/2020 1911    BILITOT 0.51 04/13/2020 1911            IMPRESSION:   Clinical stage T1 a N0 M0 left lower lobe lung adenocarcinoma  COPD  Chronic tobacco abuse  Multiple comorbidities as listed    PLAN: For more than 60 minutes of face to face discussion, I explained to the patient the nature of lung cancer, staging, prognosis and treatment. The patient presents with a small tumor size with no clear evidence of metastasis. So she is scheduled for pulmonary function test on June 1. Based on NCCN guidelines patient would be a candidate for surgical resection if pulmonary function test allows. Otherwise recommendation will be for SBRT. Explained these modalities to the patient and she understands. She is seeing her surgeon later this week. I will see the patient again after her surgery. We will decide about the role of adjuvant treatment based on the outcome from the surgery. Patient will be seen sooner if she develops any problems. Patient was counseled about importance of tobacco cessation. Patient's questions were answered to the best of her satisfaction and she verbalized full understanding and agreement. If you have questions, please do not hesitate to call me. I look forward to following Charmaine Canales along with you. Sincerely,                            [facility]                            This note is created with the assistance of a speech recognition program.  While intending to generate a document that actually reflects the content of the visit, the document can still have some errors including those of syntax and sound a like substitutions which may escape proof reading. It such instances, actual meaning can be extrapolated by contextual diversion.

## 2021-05-25 ENCOUNTER — TELEPHONE (OUTPATIENT)
Dept: CASE MANAGEMENT | Age: 66
End: 2021-05-25

## 2021-05-25 NOTE — TELEPHONE ENCOUNTER
Name: Jerome Presley  : 1955  MRN: [id number]    Oncology Navigation Follow-Up Note    Navigator reaching out to pt. Introducing self and offering assistance as needed. Pt. Informed of resources available and contact information given to pt. . Pt. To see CTS  This week ,plan to follow.    Electronically signed by Latrice Tavarez RN on 2021 at 3:12 PM

## 2021-05-27 ENCOUNTER — TELEPHONE (OUTPATIENT)
Dept: CARDIOTHORACIC SURGERY | Age: 66
End: 2021-05-27

## 2021-05-27 ENCOUNTER — APPOINTMENT (OUTPATIENT)
Dept: CT IMAGING | Age: 66
DRG: 139 | End: 2021-05-27
Payer: COMMERCIAL

## 2021-05-27 ENCOUNTER — HOSPITAL ENCOUNTER (INPATIENT)
Age: 66
LOS: 3 days | Discharge: HOME | DRG: 139 | End: 2021-05-30
Attending: EMERGENCY MEDICINE | Admitting: INTERNAL MEDICINE
Payer: COMMERCIAL

## 2021-05-27 DIAGNOSIS — J18.9 COMMUNITY ACQUIRED PNEUMONIA OF LEFT UPPER LOBE OF LUNG: Primary | ICD-10-CM

## 2021-05-27 DIAGNOSIS — J18.9 PNEUMONIA DUE TO ORGANISM: ICD-10-CM

## 2021-05-27 PROBLEM — Z79.631 ON METHOTREXATE THERAPY: Status: ACTIVE | Noted: 2021-05-27

## 2021-05-27 PROBLEM — E66.01 OBESITY, CLASS III, BMI 40-49.9 (MORBID OBESITY) (HCC): Status: ACTIVE | Noted: 2021-05-27

## 2021-05-27 PROBLEM — Z86.711 HISTORY OF PULMONARY EMBOLISM: Status: ACTIVE | Noted: 2021-05-27

## 2021-05-27 PROBLEM — Z79.01 ON ANTICOAGULANT THERAPY: Status: ACTIVE | Noted: 2021-05-27

## 2021-05-27 LAB
ABSOLUTE BANDS #: 3.9 K/UL (ref 0–1)
ABSOLUTE EOS #: 0 K/UL (ref 0–0.4)
ABSOLUTE IMMATURE GRANULOCYTE: ABNORMAL K/UL (ref 0–0.3)
ABSOLUTE LYMPH #: 1.46 K/UL (ref 1–4.8)
ABSOLUTE MONO #: 1.22 K/UL (ref 0.1–1.3)
ALBUMIN SERPL-MCNC: 3.8 G/DL (ref 3.5–5.2)
ALBUMIN/GLOBULIN RATIO: ABNORMAL (ref 1–2.5)
ALP BLD-CCNC: 100 U/L (ref 35–104)
ALT SERPL-CCNC: 14 U/L (ref 5–33)
ANION GAP SERPL CALCULATED.3IONS-SCNC: 9 MMOL/L (ref 9–17)
AST SERPL-CCNC: 16 U/L
BANDS: 16 % (ref 0–10)
BASOPHILS # BLD: 0 % (ref 0–2)
BASOPHILS ABSOLUTE: 0 K/UL (ref 0–0.2)
BILIRUB SERPL-MCNC: 0.78 MG/DL (ref 0.3–1.2)
BNP INTERPRETATION: ABNORMAL
BUN BLDV-MCNC: 9 MG/DL (ref 8–23)
BUN/CREAT BLD: ABNORMAL (ref 9–20)
CALCIUM SERPL-MCNC: 9.1 MG/DL (ref 8.6–10.4)
CHLORIDE BLD-SCNC: 102 MMOL/L (ref 98–107)
CO2: 24 MMOL/L (ref 20–31)
CREAT SERPL-MCNC: 0.76 MG/DL (ref 0.5–0.9)
DIFFERENTIAL TYPE: ABNORMAL
EKG ATRIAL RATE: 91 BPM
EKG P AXIS: 45 DEGREES
EKG P-R INTERVAL: 136 MS
EKG Q-T INTERVAL: 370 MS
EKG QRS DURATION: 84 MS
EKG QTC CALCULATION (BAZETT): 455 MS
EKG R AXIS: -27 DEGREES
EKG T AXIS: 27 DEGREES
EKG VENTRICULAR RATE: 91 BPM
EOSINOPHILS RELATIVE PERCENT: 0 % (ref 0–4)
GFR AFRICAN AMERICAN: >60 ML/MIN
GFR NON-AFRICAN AMERICAN: >60 ML/MIN
GFR SERPL CREATININE-BSD FRML MDRD: ABNORMAL ML/MIN/{1.73_M2}
GFR SERPL CREATININE-BSD FRML MDRD: ABNORMAL ML/MIN/{1.73_M2}
GLUCOSE BLD-MCNC: 138 MG/DL (ref 70–99)
HCT VFR BLD CALC: 38.2 % (ref 36–46)
HEMOGLOBIN: 12.8 G/DL (ref 12–16)
IMMATURE GRANULOCYTES: ABNORMAL %
INFLUENZA A: NEGATIVE
INFLUENZA B: NEGATIVE
LYMPHOCYTES # BLD: 6 % (ref 24–44)
MCH RBC QN AUTO: 32.5 PG (ref 26–34)
MCHC RBC AUTO-ENTMCNC: 33.4 G/DL (ref 31–37)
MCV RBC AUTO: 97.2 FL (ref 80–100)
METAMYELOCYTES ABSOLUTE COUNT: 0.49 K/UL
METAMYELOCYTES: 2 %
MONOCYTES # BLD: 5 % (ref 1–7)
MORPHOLOGY: ABNORMAL
MORPHOLOGY: ABNORMAL
NRBC AUTOMATED: ABNORMAL PER 100 WBC
PDW BLD-RTO: 16.1 % (ref 11.5–14.9)
PLATELET # BLD: 261 K/UL (ref 150–450)
PLATELET ESTIMATE: ABNORMAL
PMV BLD AUTO: 7.2 FL (ref 6–12)
POTASSIUM SERPL-SCNC: 3.7 MMOL/L (ref 3.7–5.3)
PRO-BNP: 336 PG/ML
PROCALCITONIN: 0.31 NG/ML
RBC # BLD: 3.93 M/UL (ref 4–5.2)
RBC # BLD: ABNORMAL 10*6/UL
SARS-COV-2 RNA, RT PCR: NOT DETECTED
SEG NEUTROPHILS: 71 % (ref 36–66)
SEGMENTED NEUTROPHILS ABSOLUTE COUNT: 17.33 K/UL (ref 1.3–9.1)
SODIUM BLD-SCNC: 135 MMOL/L (ref 135–144)
SOURCE: NORMAL
SPECIMEN DESCRIPTION: NORMAL
TOTAL PROTEIN: 10.5 G/DL (ref 6.4–8.3)
TROPONIN INTERP: NORMAL
TROPONIN INTERP: NORMAL
TROPONIN T: NORMAL NG/ML
TROPONIN T: NORMAL NG/ML
TROPONIN, HIGH SENSITIVITY: 12 NG/L (ref 0–14)
TROPONIN, HIGH SENSITIVITY: 9 NG/L (ref 0–14)
WBC # BLD: 24.4 K/UL (ref 3.5–11)
WBC # BLD: ABNORMAL 10*3/UL

## 2021-05-27 PROCEDURE — 93005 ELECTROCARDIOGRAM TRACING: CPT | Performed by: EMERGENCY MEDICINE

## 2021-05-27 PROCEDURE — 96367 TX/PROPH/DG ADDL SEQ IV INF: CPT

## 2021-05-27 PROCEDURE — 85025 COMPLETE CBC W/AUTO DIFF WBC: CPT

## 2021-05-27 PROCEDURE — 6370000000 HC RX 637 (ALT 250 FOR IP): Performed by: EMERGENCY MEDICINE

## 2021-05-27 PROCEDURE — 6360000004 HC RX CONTRAST MEDICATION: Performed by: EMERGENCY MEDICINE

## 2021-05-27 PROCEDURE — 93010 ELECTROCARDIOGRAM REPORT: CPT | Performed by: INTERNAL MEDICINE

## 2021-05-27 PROCEDURE — 99285 EMERGENCY DEPT VISIT HI MDM: CPT

## 2021-05-27 PROCEDURE — 96365 THER/PROPH/DIAG IV INF INIT: CPT

## 2021-05-27 PROCEDURE — 2580000003 HC RX 258: Performed by: STUDENT IN AN ORGANIZED HEALTH CARE EDUCATION/TRAINING PROGRAM

## 2021-05-27 PROCEDURE — 2580000003 HC RX 258: Performed by: EMERGENCY MEDICINE

## 2021-05-27 PROCEDURE — 94640 AIRWAY INHALATION TREATMENT: CPT

## 2021-05-27 PROCEDURE — 80053 COMPREHEN METABOLIC PANEL: CPT

## 2021-05-27 PROCEDURE — 2700000000 HC OXYGEN THERAPY PER DAY

## 2021-05-27 PROCEDURE — 6360000002 HC RX W HCPCS: Performed by: EMERGENCY MEDICINE

## 2021-05-27 PROCEDURE — 83880 ASSAY OF NATRIURETIC PEPTIDE: CPT

## 2021-05-27 PROCEDURE — 6360000002 HC RX W HCPCS: Performed by: STUDENT IN AN ORGANIZED HEALTH CARE EDUCATION/TRAINING PROGRAM

## 2021-05-27 PROCEDURE — 71260 CT THORAX DX C+: CPT

## 2021-05-27 PROCEDURE — 99223 1ST HOSP IP/OBS HIGH 75: CPT | Performed by: INTERNAL MEDICINE

## 2021-05-27 PROCEDURE — 87899 AGENT NOS ASSAY W/OPTIC: CPT

## 2021-05-27 PROCEDURE — 84145 PROCALCITONIN (PCT): CPT

## 2021-05-27 PROCEDURE — 87636 SARSCOV2 & INF A&B AMP PRB: CPT

## 2021-05-27 PROCEDURE — 2060000000 HC ICU INTERMEDIATE R&B

## 2021-05-27 PROCEDURE — 84484 ASSAY OF TROPONIN QUANT: CPT

## 2021-05-27 PROCEDURE — 36415 COLL VENOUS BLD VENIPUNCTURE: CPT

## 2021-05-27 PROCEDURE — 6370000000 HC RX 637 (ALT 250 FOR IP): Performed by: STUDENT IN AN ORGANIZED HEALTH CARE EDUCATION/TRAINING PROGRAM

## 2021-05-27 RX ORDER — 0.9 % SODIUM CHLORIDE 0.9 %
80 INTRAVENOUS SOLUTION INTRAVENOUS ONCE
Status: COMPLETED | OUTPATIENT
Start: 2021-05-27 | End: 2021-05-27

## 2021-05-27 RX ORDER — POLYETHYLENE GLYCOL 3350 17 G/17G
17 POWDER, FOR SOLUTION ORAL DAILY PRN
Status: DISCONTINUED | OUTPATIENT
Start: 2021-05-27 | End: 2021-05-30 | Stop reason: HOSPADM

## 2021-05-27 RX ORDER — ACETAMINOPHEN 500 MG
1000 TABLET ORAL ONCE
Status: COMPLETED | OUTPATIENT
Start: 2021-05-27 | End: 2021-05-27

## 2021-05-27 RX ORDER — FOLIC ACID 1 MG/1
1 TABLET ORAL DAILY
Status: DISCONTINUED | OUTPATIENT
Start: 2021-05-27 | End: 2021-05-30 | Stop reason: HOSPADM

## 2021-05-27 RX ORDER — ACETAMINOPHEN 325 MG/1
650 TABLET ORAL EVERY 6 HOURS PRN
Status: DISCONTINUED | OUTPATIENT
Start: 2021-05-27 | End: 2021-05-30 | Stop reason: HOSPADM

## 2021-05-27 RX ORDER — SODIUM CHLORIDE 0.9 % (FLUSH) 0.9 %
5-40 SYRINGE (ML) INJECTION EVERY 12 HOURS SCHEDULED
Status: DISCONTINUED | OUTPATIENT
Start: 2021-05-27 | End: 2021-05-30 | Stop reason: HOSPADM

## 2021-05-27 RX ORDER — AMLODIPINE BESYLATE 10 MG/1
10 TABLET ORAL DAILY
Status: DISCONTINUED | OUTPATIENT
Start: 2021-05-27 | End: 2021-05-30 | Stop reason: HOSPADM

## 2021-05-27 RX ORDER — ACETAMINOPHEN 650 MG/1
650 SUPPOSITORY RECTAL EVERY 6 HOURS PRN
Status: DISCONTINUED | OUTPATIENT
Start: 2021-05-27 | End: 2021-05-30 | Stop reason: HOSPADM

## 2021-05-27 RX ORDER — ALBUTEROL SULFATE 2.5 MG/3ML
2.5 SOLUTION RESPIRATORY (INHALATION) EVERY 6 HOURS PRN
Status: DISCONTINUED | OUTPATIENT
Start: 2021-05-27 | End: 2021-05-30 | Stop reason: HOSPADM

## 2021-05-27 RX ORDER — ONDANSETRON 2 MG/ML
4 INJECTION INTRAMUSCULAR; INTRAVENOUS EVERY 6 HOURS PRN
Status: DISCONTINUED | OUTPATIENT
Start: 2021-05-27 | End: 2021-05-30 | Stop reason: HOSPADM

## 2021-05-27 RX ORDER — ALBUTEROL SULFATE 2.5 MG/3ML
2.5 SOLUTION RESPIRATORY (INHALATION) 3 TIMES DAILY
Status: DISCONTINUED | OUTPATIENT
Start: 2021-05-27 | End: 2021-05-28

## 2021-05-27 RX ORDER — SODIUM CHLORIDE 9 MG/ML
25 INJECTION, SOLUTION INTRAVENOUS PRN
Status: DISCONTINUED | OUTPATIENT
Start: 2021-05-27 | End: 2021-05-30 | Stop reason: HOSPADM

## 2021-05-27 RX ORDER — ALBUTEROL SULFATE 90 UG/1
2 AEROSOL, METERED RESPIRATORY (INHALATION) EVERY 6 HOURS PRN
Status: DISCONTINUED | OUTPATIENT
Start: 2021-05-27 | End: 2021-05-30 | Stop reason: HOSPADM

## 2021-05-27 RX ORDER — PROMETHAZINE HYDROCHLORIDE 25 MG/1
12.5 TABLET ORAL EVERY 6 HOURS PRN
Status: DISCONTINUED | OUTPATIENT
Start: 2021-05-27 | End: 2021-05-30 | Stop reason: HOSPADM

## 2021-05-27 RX ORDER — SERTRALINE HYDROCHLORIDE 100 MG/1
100 TABLET, FILM COATED ORAL DAILY
Status: DISCONTINUED | OUTPATIENT
Start: 2021-05-27 | End: 2021-05-30 | Stop reason: HOSPADM

## 2021-05-27 RX ORDER — SODIUM CHLORIDE 0.9 % (FLUSH) 0.9 %
10 SYRINGE (ML) INJECTION PRN
Status: DISCONTINUED | OUTPATIENT
Start: 2021-05-27 | End: 2021-05-27

## 2021-05-27 RX ORDER — SODIUM CHLORIDE 0.9 % (FLUSH) 0.9 %
5-40 SYRINGE (ML) INJECTION PRN
Status: DISCONTINUED | OUTPATIENT
Start: 2021-05-27 | End: 2021-05-30 | Stop reason: HOSPADM

## 2021-05-27 RX ORDER — ASPIRIN 325 MG
325 TABLET ORAL ONCE
Status: COMPLETED | OUTPATIENT
Start: 2021-05-27 | End: 2021-05-27

## 2021-05-27 RX ORDER — 0.9 % SODIUM CHLORIDE 0.9 %
1000 INTRAVENOUS SOLUTION INTRAVENOUS ONCE
Status: COMPLETED | OUTPATIENT
Start: 2021-05-27 | End: 2021-05-27

## 2021-05-27 RX ORDER — SODIUM CHLORIDE 9 MG/ML
INJECTION, SOLUTION INTRAVENOUS CONTINUOUS
Status: DISCONTINUED | OUTPATIENT
Start: 2021-05-27 | End: 2021-05-30 | Stop reason: HOSPADM

## 2021-05-27 RX ADMIN — APIXABAN 5 MG: 5 TABLET, FILM COATED ORAL at 20:44

## 2021-05-27 RX ADMIN — IOPAMIDOL 75 ML: 755 INJECTION, SOLUTION INTRAVENOUS at 09:40

## 2021-05-27 RX ADMIN — FOLIC ACID 1 MG: 1 TABLET ORAL at 17:24

## 2021-05-27 RX ADMIN — AMLODIPINE BESYLATE 10 MG: 10 TABLET ORAL at 17:24

## 2021-05-27 RX ADMIN — ASPIRIN 325 MG ORAL TABLET 325 MG: 325 PILL ORAL at 08:56

## 2021-05-27 RX ADMIN — AZITHROMYCIN MONOHYDRATE 500 MG: 500 INJECTION, POWDER, LYOPHILIZED, FOR SOLUTION INTRAVENOUS at 17:25

## 2021-05-27 RX ADMIN — SODIUM CHLORIDE, PRESERVATIVE FREE 10 ML: 5 INJECTION INTRAVENOUS at 09:41

## 2021-05-27 RX ADMIN — TIOTROPIUM BROMIDE INHALATION SPRAY 2 PUFF: 3.12 SPRAY, METERED RESPIRATORY (INHALATION) at 17:24

## 2021-05-27 RX ADMIN — SODIUM CHLORIDE 80 ML: 9 INJECTION, SOLUTION INTRAVENOUS at 09:41

## 2021-05-27 RX ADMIN — SODIUM CHLORIDE 1000 ML: 9 INJECTION, SOLUTION INTRAVENOUS at 08:51

## 2021-05-27 RX ADMIN — ACETAMINOPHEN 650 MG: 325 TABLET, FILM COATED ORAL at 16:09

## 2021-05-27 RX ADMIN — ALBUTEROL SULFATE 2.5 MG: 2.5 SOLUTION RESPIRATORY (INHALATION) at 19:04

## 2021-05-27 RX ADMIN — CEFTRIAXONE SODIUM 1000 MG: 1 INJECTION, POWDER, FOR SOLUTION INTRAMUSCULAR; INTRAVENOUS at 11:43

## 2021-05-27 RX ADMIN — SERTRALINE HYDROCHLORIDE 100 MG: 100 TABLET ORAL at 17:25

## 2021-05-27 RX ADMIN — ONDANSETRON 4 MG: 2 INJECTION INTRAMUSCULAR; INTRAVENOUS at 20:40

## 2021-05-27 RX ADMIN — ACETAMINOPHEN 1000 MG: 500 TABLET, FILM COATED ORAL at 08:56

## 2021-05-27 RX ADMIN — AZITHROMYCIN MONOHYDRATE 500 MG: 500 INJECTION, POWDER, LYOPHILIZED, FOR SOLUTION INTRAVENOUS at 12:33

## 2021-05-27 RX ADMIN — SODIUM CHLORIDE: 9 INJECTION, SOLUTION INTRAVENOUS at 16:28

## 2021-05-27 ASSESSMENT — PAIN DESCRIPTION - INTENSITY
RATING_2: 10
RATING_2: 6
RATING_3: 6
RATING_3: 10

## 2021-05-27 ASSESSMENT — PAIN DESCRIPTION - DESCRIPTORS
DESCRIPTORS_3: ACHING
DESCRIPTORS_2: ACHING
DESCRIPTORS_3: ACHING
DESCRIPTORS: ACHING
DESCRIPTORS: ACHING
DESCRIPTORS_2: ACHING
DESCRIPTORS: SHARP

## 2021-05-27 ASSESSMENT — ENCOUNTER SYMPTOMS
DIARRHEA: 0
EYE PAIN: 0
CONSTIPATION: 0
SHORTNESS OF BREATH: 0
CHEST TIGHTNESS: 0
VOMITING: 0
ABDOMINAL PAIN: 0
COUGH: 1
NAUSEA: 0
BACK PAIN: 1
SORE THROAT: 0
EYE REDNESS: 0
SHORTNESS OF BREATH: 1

## 2021-05-27 ASSESSMENT — PAIN DESCRIPTION - ONSET
ONSET: ON-GOING
ONSET_2: ON-GOING
ONSET_2: ON-GOING
ONSET: ON-GOING
ONSET: ON-GOING
ONSET_3: ON-GOING

## 2021-05-27 ASSESSMENT — PAIN DESCRIPTION - LOCATION
LOCATION_3: BACK
LOCATION: OTHER (COMMENT)
LOCATION: OTHER (COMMENT)
LOCATION: BACK
LOCATION_2: GENERALIZED
LOCATION_2: GENERALIZED
LOCATION_3: BACK

## 2021-05-27 ASSESSMENT — PAIN DESCRIPTION - PAIN TYPE
TYPE_2: ACUTE PAIN
TYPE: ACUTE PAIN
TYPE_3: ACUTE PAIN
TYPE_2: ACUTE PAIN
TYPE_3: ACUTE PAIN

## 2021-05-27 ASSESSMENT — PAIN SCALES - GENERAL
PAINLEVEL_OUTOF10: 10
PAINLEVEL_OUTOF10: 8
PAINLEVEL_OUTOF10: 8
PAINLEVEL_OUTOF10: 6
PAINLEVEL_OUTOF10: 9

## 2021-05-27 ASSESSMENT — PAIN DESCRIPTION - FREQUENCY
FREQUENCY: CONTINUOUS

## 2021-05-27 ASSESSMENT — PAIN DESCRIPTION - ORIENTATION
ORIENTATION_3: LEFT;MID
ORIENTATION: LEFT
ORIENTATION_3: LEFT;MID

## 2021-05-27 ASSESSMENT — PAIN - FUNCTIONAL ASSESSMENT
PAIN_FUNCTIONAL_ASSESSMENT: PREVENTS OR INTERFERES SOME ACTIVE ACTIVITIES AND ADLS
PAIN_FUNCTIONAL_ASSESSMENT: PREVENTS OR INTERFERES SOME ACTIVE ACTIVITIES AND ADLS

## 2021-05-27 ASSESSMENT — PAIN DESCRIPTION - DURATION
DURATION_2: CONTINUOUS
DURATION_2: CONTINUOUS
DURATION_3: CONTINUOUS
DURATION_3: CONTINUOUS

## 2021-05-27 ASSESSMENT — PAIN DESCRIPTION - PROGRESSION
CLINICAL_PROGRESSION_3: GRADUALLY WORSENING
CLINICAL_PROGRESSION: NOT CHANGED
CLINICAL_PROGRESSION_3: GRADUALLY IMPROVING
CLINICAL_PROGRESSION_2: NOT CHANGED
CLINICAL_PROGRESSION_2: NOT CHANGED
CLINICAL_PROGRESSION: GRADUALLY IMPROVING

## 2021-05-27 ASSESSMENT — VISUAL ACUITY: OU: 1

## 2021-05-27 NOTE — PROGRESS NOTES
Breath Sounds: diminished throughout with exp wheeze in the PASCALE  RR[de-identified] 20  Pulse Sat: 86-89% on 2.5 L NC. Increased to 3.5 L at this time. (nursing aware). Pt states she does not wear oxygen at home  Home Meds: pt states she takes breathing treatments BID at home. · Bronchodilator assessment at level:  duoNeb TID  · [x]    Home Level  BRONCHODILATOR ASSESSMENT SCORE  Score 0 1 2 3 4 5   Breath Sounds   []  Patient Baseline []  No Wheeze good aeration []  Faint, scattered wheezing, good aeration [x]  Expiratory Wheezing and or moderately diminished []  Insp/Exp wheeze and/or very diminished []  Insp/Exp and/ or marked distress   Respiratory Rate   [x]  Patient Baseline []  Less than 20 []  Less than 20 []  20-25 []  Greater than 25 []  Greater than 25   Peak flow % of Pred or PB [x]  NA   []  Greater than 90%  []  81-90% []  71-80% []  Less than or equal to 70%  or unable to perform []  Unable due to Respiratory Distress   Dyspnea re []  Patient Baseline [x]  No SOB []  No SOB []  SOB on exertion []  SOB min activity []  At rest/acute   e FEV% Predicted       [x]  NA []  Above 69%  []  Unable []  Above 60-69%  []  Unable []  Above 50-59%  []  Unable []  Above 35-49%  []  Unable []  Less than 35%  []  Unable          Pt refuses breathing treatment at this time. No respiratory distress noted.

## 2021-05-27 NOTE — ED PROVIDER NOTES
[address] ED  eMERGENCY dEPARTMENT eNCOUnter    Pt Name: Jerome Presley  MRN: [id number]  [place] 1955  Date of evaluation: 5/27/21  CHIEF COMPLAINT       Chief Complaint   Patient presents with    Pain     Generalized full body ache    Cough     Light pink blood, about the size of a dime x2    Back Pain    Pain     Lt. lateral pain d/t biopsy of lung 1.5 weeks ago. Test for spot on lung. HISTORY OF PRESENT ILLNESS   HPI   Patient presenting with chest pain and cough. She reports approx 10 days ago she underwent a needle biopsy of left lung mass which was found to be malignant. She had some initial pain after the biopsy which resolved. For the last 2 days she reports return of left sided chest and back pain which is pleuritic and feels like previous PE, also associated with diffuse body aches, fatigue. No fevers. She has coughed up some light pink sputum today. She is on eliquis due to previous VTE. REVIEW OF SYSTEMS     Review of Systems   Constitutional: Positive for fatigue. Negative for chills and fever. HENT: Negative for congestion, ear pain and sore throat. Eyes: Negative for pain, redness and visual disturbance. Respiratory: Positive for cough. Negative for chest tightness and shortness of breath. Cardiovascular: Positive for chest pain. Negative for palpitations. Gastrointestinal: Negative for abdominal pain, constipation, diarrhea, nausea and vomiting. Genitourinary: Negative for dysuria and vaginal discharge. Musculoskeletal: Positive for back pain. Negative for neck pain. Skin: Negative for rash and wound. Neurological: Negative for seizures, syncope and headaches.      PASTMEDICAL HISTORY     Past Medical History:   Diagnosis Date    Arthritis     Depression     DVT (deep venous thrombosis) ([facility] [address] 2014    b/l     GERD (gastroesophageal reflux disease)     Hx of blood clots     Hypertension     Rheumatoid arthritis ([facility] [address]      SURGICAL HISTORY       Past Surgical History:   Procedure Laterality Date    APPENDECTOMY  1982    BRONCHOSCOPY N/A 12/27/2019    BRONCHOSCOPY ALVEOLAR LAVAGE performed by Michelle Manning MD at [facility] COLONOSCOPY N/A 2/12/2020    COLONOSCOPY POLYPECTOMIES HOT SNARE, COLD BIOPSY POLYPECTOMIES performed by Ebony Lindsey MD at [facility] CT NEEDLE BIOPSY LUNG PERCUTANEOUS  5/13/2021    CT NEEDLE BIOPSY LUNG PERCUTANEOUS 5/13/2021 [facility] CT SCAN    ENDOSCOPY, COLON, DIAGNOSTIC  [id number]    gastritis, sm. bowel lipoma, biopsies     CURRENT MEDICATIONS       Previous Medications    ALBUTEROL (PROVENTIL) (2.5 MG/3ML) 0.083% NEBULIZER SOLUTION    Take 3 mLs by nebulization every 6 hours as needed for Wheezing or Shortness of Breath    ALBUTEROL SULFATE HFA (VENTOLIN HFA) 108 (90 BASE) MCG/ACT INHALER    Inhale 2 puffs into the lungs every 6 hours as needed for Wheezing    AMLODIPINE (NORVASC) 10 MG TABLET    take 1 tablet by mouth once daily    BLOOD PRESSURE KIT    Dx: HTN. Needs automatic blood pressure machine to monitor her blood pressure. ELIQUIS 5 MG TABS TABLET    take 1 tablet by mouth twice a day    FOLIC ACID (FOLVITE) 1 MG TABLET    take 1 tablet by mouth once daily    METHOTREXATE (RHEUMATREX) 2.5 MG CHEMO TABLET    Take 20 mg by mouth once a week Indications: takes 8 tablets on saturdays     PANTOPRAZOLE (PROTONIX) 40 MG TABLET    take 1 tablet by mouth once daily    SERTRALINE (ZOLOFT) 100 MG TABLET    Take 1 tablet by mouth daily    SPIRIVA RESPIMAT 2.5 MCG/ACT AERS INHALER    inhale 2 puffs INTO THE LUNGS once daily    VARENICLINE (CHANTIX STARTING MONTH PAK) 0.5 MG X 11 & 1 MG X 42 TABLET    Take by mouth as directed     ALLERGIES     has No Known Allergies. FAMILY HISTORY     She indicated that the status of her mother is unknown. She indicated that the status of her father is unknown. She indicated that the status of her brother is unknown. SOCIALHISTORY      reports that she has been smoking cigarettes. She has a 8.75 pack-year smoking history. She has never used smokeless tobacco. She reports current alcohol use. She reports that she does not use drugs. PHYSICAL EXAM     INITIAL VITALS: /69   Pulse 112   Temp 98.9 °F (37.2 °C) (Oral)   Resp (!) 31   Ht 5' 5\" (1.651 m)   Wt 242 lb (109.8 kg)   SpO2 (!) 88%   BMI 40.27 kg/m²    Physical Exam  Vitals and nursing note reviewed. Constitutional:       Appearance: She is well-developed. Comments: Fatigued, non-toxic appearing. HENT:      Head: Normocephalic and atraumatic. Right Ear: External ear normal.      Left Ear: External ear normal.   Eyes:      General:         Left eye: No discharge. Conjunctiva/sclera: Conjunctivae normal.      Pupils: Pupils are equal, round, and reactive to light. Neck:      Vascular: No JVD. Trachea: No tracheal deviation. Cardiovascular:      Rate and Rhythm: Normal rate and regular rhythm. Heart sounds: Normal heart sounds. Comments: No visible wound from biopsy site. Pulmonary:      Effort: Pulmonary effort is normal. No respiratory distress. Breath sounds: Normal breath sounds. No stridor. Abdominal:      General: Bowel sounds are normal.      Palpations: Abdomen is soft. Tenderness: There is no abdominal tenderness. Musculoskeletal:         General: No tenderness or deformity. Normal range of motion. Cervical back: Normal range of motion and neck supple. Skin:     General: Skin is warm and dry. Neurological:      Mental Status: She is oriented to person, place, and time. Cranial Nerves: No cranial nerve deficit. Coordination: Coordination normal.         MEDICAL DECISION MAKING:   Assessment:  Eligio Jaramillo is a 61 y.o. female who presents with chest pain and hemoptysis 1 week post lung biopsy. ED Course/MDM:   Patient arrived hemodynamically stable and in no acute distress. Patient's previous imaging and studies reviewed.      EKG NSR with no acute ST segment elevation. CT PE with no acute clot noted but there is a new left upper consolidation, we will initiate treatment for CAP and admit given new oxygen requirement. Procedures    DIAGNOSTIC RESULTS   EKG: All EKG's are interpreted by the Emergency Department Physician who either signs or Co-signs this chart inthe absence of a cardiologist.      RADIOLOGY:All plain film, CT, MRI, and formal ultrasound images (except ED bedside ultrasound) are read by the radiologist, see reports below, unless otherwise noted in MDM or here. CT CHEST PULMONARY EMBOLISM W CONTRAST   Final Result   1. No evidence of pulmonary embolism. 2.  Left upper lobe consolidation, likely representing infection. No imaging   evidence for aspiration. 3.  No significant interval change in biopsied malignancy in the left lower   lobe. 4.  Cholelithiasis. 5.  Punctate left nephrolithiasis. 6.  Findings suggestive of hepatic steatosis. LABS: All lab results were reviewed by myself, and all abnormals are listed below.   Labs Reviewed   BRAIN NATRIURETIC PEPTIDE - Abnormal; Notable for the following components:       Result Value    Pro- (*)     All other components within normal limits   CBC WITH AUTO DIFFERENTIAL - Abnormal; Notable for the following components:    WBC 24.4 (*)     RBC 3.93 (*)     RDW 16.1 (*)     Seg Neutrophils 71 (*)     Lymphocytes 6 (*)     Bands 16 (*)     Metamyelocytes 2 (*)     Segs Absolute 17.33 (*)     Absolute Bands # 3.90 (*)     Metamyelocytes Absolute 0.49 (*)     All other components within normal limits   COMPREHENSIVE METABOLIC PANEL - Abnormal; Notable for the following components:    Glucose 138 (*)     Total Protein 10.5 (*)     All other components within normal limits   COVID-19 & INFLUENZA COMBO   TROPONIN   TROPONIN     EMERGENCY DEPARTMENT COURSE:   Vitals:    Vitals:    05/27/21 1415 05/27/21 1430 05/27/21 1445 05/27/21 1523   BP: 0

## 2021-05-27 NOTE — ED NOTES
[facility] [place] [facility]. All questions asked and answered.       Gerardo Abbott RN  05/27/21 [id number]

## 2021-05-27 NOTE — TELEPHONE ENCOUNTER
BRENDA Mai Said calls from [facility] stating that patient is currently admitted with them and will not be coming to her appointment today.

## 2021-05-27 NOTE — H&P
[address]      [address] [facility]     HISTORY AND PHYSICAL EXAMINATION            Date:   5/27/2021  Patient name:  Eusebio Hernandez  Date of admission:  5/27/2021  8:11 AM  MRN:   [id number]  Account:  [de-identified]  YOB: 1955  PCP:    Tatum May MD  Room:   E/E  Code Status:    Prior    Chief Complaint:     Chief Complaint   Patient presents with    Pain     Generalized full body ache    Cough     Light pink blood, about the size of a dime x2    Back Pain    Pain     Lt. lateral pain d/t biopsy of lung 1.5 weeks ago. Test for spot on lung. History Obtained From:     patient    History of Present Illness: The patient is a 61 y.o. Non-/non  female with a past medical history of smoking, COPD, DVT, PE (2014) and recently diagnosed Adenocarcinoma of the left upper lung who presents today with worsening productive cough and fatigue. Patient has a 35 year pack history. States for the last 3 days her cough has worsened, with productive sputum and blood streaks in it. Associated with pleuritic chest pain, night sweats and worsening shortness of breath. Patient does not use O2 at home, currently requiring 2L of O2 via NC to maintain saturation. Patient was found to have a 1.7 cm spiculated nodule on CT chest, a biopsy was done about 2 weeks ago. Biopsy showed Adenocarcinoma. Patient is now following Dr. Meghan Garvey and Dr. Melvi Cadena Hem/Onc. Patient also complaining of some left lower back pain and some nausea. Denies any chest pain, lightheadedness, numbness, weakness. In the ED, Patient is mildly hypertensive, has increased RR and is breathing 90% on 2L of O2 via NC. CT PE was done which showed left upper lobe consolidation and no change in previously biopsied 1.6 cm mass in the left lower lung. COVID negative.  WBC count 2 puffs into the lungs every 6 hours as needed for Wheezing   Yes Historical Provider, MD   albuterol (PROVENTIL) (2.5 MG/3ML) 0.083% nebulizer solution Take 3 mLs by nebulization every 6 hours as needed for Wheezing or Shortness of Breath 19  Yes Britney Vazquez MD   methotrexate (RHEUMATREX) 2.5 MG chemo tablet Take 20 mg by mouth once a week Indications: takes 8 tablets on     Yes Historical Provider, MD   Blood Pressure KIT Dx: HTN. Needs automatic blood pressure machine to monitor her blood pressure. 21   Britney Vazquez MD        Allergies:     Patient has no known allergies. Social History:     Tobacco:    reports that she has been smoking cigarettes. She has a 8.75 pack-year smoking history. She has never used smokeless tobacco.  Alcohol:      reports current alcohol use. Drug Use:  reports no history of drug use. Family History:     Family History   Problem Relation Age of Onset    Cancer Mother     Diabetes Mother     Heart Disease Mother     Cancer Father     Cancer Brother        Review of Systems:     Positive and Negative as described in HPI. Review of Systems   Constitutional: Positive for chills and fatigue. Negative for fever. HENT: Negative for congestion. Eyes: Negative for visual disturbance. Respiratory: Positive for cough and shortness of breath. Cardiovascular: Negative for chest pain and leg swelling. Gastrointestinal: Negative for abdominal pain, diarrhea, nausea and vomiting. Endocrine: Negative for polyuria. Genitourinary: Negative for dysuria. Musculoskeletal: Positive for back pain. Skin: Negative for rash. Neurological: Negative for weakness and numbness. All other systems reviewed and are negative.     Physical Exam:   BP (!) 148/93   Pulse 88   Temp 98.9 °F (37.2 °C) (Oral)   Resp (!) 33   Ht 5' 5\" (1.651 m)   Wt 242 lb (109.8 kg)   SpO2 90%   BMI 40.27 kg/m²   Temp (24hrs), Av.9 °F (37.2 °C), Min:98.9 °F (37.2 °C), Morphology NOT REPORTED     Platelet Estimate NOT REPORTED     Seg Neutrophils 71 (H) 36 - 66 %    Lymphocytes 6 (L) 24 - 44 %    Monocytes 5 1 - 7 %    Eosinophils % 0 0 - 4 %    Basophils 0 0 - 2 %    Bands 16 (H) 0 - 10 %    Metamyelocytes 2 (H) 0 %    Segs Absolute 17.33 (H) 1.3 - 9.1 k/uL    Absolute Lymph # 1.46 1.0 - 4.8 k/uL    Absolute Mono # 1.22 0.1 - 1.3 k/uL    Absolute Eos # 0.00 0.0 - 0.4 k/uL    Basophils Absolute 0.00 0.0 - 0.2 k/uL    Absolute Bands # 3.90 (H) 0.0 - 1.0 k/uL    Metamyelocytes Absolute 0.49 (H) 0 k/uL    Morphology ANISOCYTOSIS PRESENT     Morphology SLT POLYCHROMASIA    Comprehensive Metabolic Panel    Collection Time: 05/27/21  8:43 AM   Result Value Ref Range    Glucose 138 (H) 70 - 99 mg/dL    BUN 9 8 - 23 mg/dL    CREATININE 0.76 0.50 - 0.90 mg/dL    Bun/Cre Ratio NOT REPORTED 9 - 20    Calcium 9.1 8.6 - 10.4 mg/dL    Sodium 135 135 - 144 mmol/L    Potassium 3.7 3.7 - 5.3 mmol/L    Chloride 102 98 - 107 mmol/L    CO2 24 20 - 31 mmol/L    Anion Gap 9 9 - 17 mmol/L    Alkaline Phosphatase 100 35 - 104 U/L    ALT 14 5 - 33 U/L    AST 16 <32 U/L    Total Bilirubin 0.78 0.3 - 1.2 mg/dL    Total Protein 10.5 (H) 6.4 - 8.3 g/dL    Albumin 3.8 3.5 - 5.2 g/dL    Albumin/Globulin Ratio NOT REPORTED 1.0 - 2.5    GFR Non-African American >60 >60 mL/min    GFR African American >60 >60 mL/min    GFR Comment          GFR Staging NOT REPORTED    Troponin    Collection Time: 05/27/21  8:43 AM   Result Value Ref Range    Troponin, High Sensitivity 12 0 - 14 ng/L    Troponin T NOT REPORTED <0.03 ng/mL    Troponin Interp NOT REPORTED    EKG 12 Lead    Collection Time: 05/27/21  8:52 AM   Result Value Ref Range    Ventricular Rate 91 BPM    Atrial Rate 91 BPM    P-R Interval 136 ms    QRS Duration 84 ms    Q-T Interval 370 ms    QTc Calculation (Bazett) 455 ms    P Axis 45 degrees    R Axis -27 degrees    T Axis 27 degrees   COVID-19 & Influenza Combo    Collection Time: 05/27/21  9:59 AM Specimen: Nasopharyngeal Swab   Result Value Ref Range    Specimen Description . NASOPHARYNGEAL SWAB     Source . NASOPHARYNGEAL SWAB     SARS-CoV-2 RNA, RT PCR Not Detected Not Detected    INFLUENZA A NEGATIVE NEGATIVE    INFLUENZA B NEGATIVE NEGATIVE   Troponin    Collection Time: 05/27/21 11:14 AM   Result Value Ref Range    Troponin, High Sensitivity 9 0 - 14 ng/L    Troponin T NOT REPORTED <0.03 ng/mL    Troponin Interp NOT REPORTED        Imaging/Diagnostics:  XR CHEST PORTABLE    Result Date: 5/13/2021  EXAMINATION: ONE XRAY VIEW OF THE CHEST 5/13/2021 12:21 pm COMPARISON: CT April 20, 2021 and radiograph December 26, 2019 HISTORY: ORDERING SYSTEM PROVIDED HISTORY: s/p LLL nodule bx, evaluate for ptx TECHNOLOGIST PROVIDED HISTORY: s/p LLL nodule bx, evaluate for ptx Reason for Exam: s/p LLL nodule bx, evaluate for ptx Acuity: Unknown Type of Exam: Unknown FINDINGS: Mild cardiomegaly. Cardiomediastinal silhouette otherwise within normal limits. No evidence of pneumothorax. No acute pulmonary findings. No pleural effusion or subdiaphragmatic free air. No acute osseous abnormality identified. No pneumothorax status post left lower lobe nodule biopsy. CT CHEST PULMONARY EMBOLISM W CONTRAST    Result Date: 5/27/2021  EXAMINATION: CTA OF THE CHEST 5/27/2021 9:14 am TECHNIQUE: CTA of the chest was performed after the administration of intravenous contrast.  Multiplanar reformatted images are provided for review. MIP images are provided for review. Dose modulation, iterative reconstruction, and/or weight based adjustment of the mA/kV was utilized to reduce the radiation dose to as low as reasonably achievable. COMPARISON: Chest radiograph 05/13/2021. PET-CT 04/30/2021. Chest CT 04/20/2021.  HISTORY: ORDERING SYSTEM PROVIDED HISTORY: pleuritic chest pain, known lung malignancy TECHNOLOGIST PROVIDED HISTORY: pleuritic chest pain, known lung malignancy Decision Support Exception - unselect if not a suspected or confirmed emergency medical condition->Emergency Medical Condition (MA) Reason for Exam: chest pain, body aches Acuity: Unknown Type of Exam: Unknown FINDINGS: Pulmonary Arteries: Pulmonary arteries are adequately opacified for evaluation. No evidence of intraluminal filling defect to suggest pulmonary embolism. Main pulmonary artery is normal in caliber. Mediastinum: No evidence of mediastinal lymphadenopathy. The heart and pericardium demonstrate no acute abnormality. There is no acute abnormality of the thoracic aorta. Lungs/pleura: Dense consolidation is now present in the left upper lobe with air bronchograms. Known site of malignancy with irregular nodule in the posterior left lower lobe is again demonstrated measuring 1.6 cm on axial image 73. No pneumothorax. No effusion. The central airway is patent. Upper Abdomen: Findings suggestive of hepatic steatosis. Cholelithiasis. Punctate left nephrolithiasis. Mild thickening of the left adrenal gland again demonstrated, which demonstrated no abnormal metabolic activity. Soft Tissues/Bones: No acute bone or soft tissue abnormality. 1.  No evidence of pulmonary embolism. 2.  Left upper lobe consolidation, likely representing infection. No imaging evidence for aspiration. 3.  No significant interval change in biopsied malignancy in the left lower lobe. 4.  Cholelithiasis. 5.  Punctate left nephrolithiasis. 6.  Findings suggestive of hepatic steatosis. CT NEEDLE BIOPSY LUNG PERCUTANEOUS    Result Date: 5/13/2021  PROCEDURE: CT NEEDLE BIOPSY LUNG PERCUTANEOUS MODERATE CONSCIOUS SEDATION 5/13/2021 HISTORY: ORDERING SYSTEM PROVIDED HISTORY: Nodule of lower lobe of left lung TECHNOLOGIST PROVIDED HISTORY: left lower lobe lung nodule TECHNIQUE: Dose modulation, iterative reconstruction, and/or weight based adjustment of the mA/kV was utilized to reduce the radiation dose to as low as reasonably achievable.  CONTRAST: None SEDATION: 1 mg versed and 50 mcg fentanyl were titrated intravenously for moderate sedation monitored under my direction. Total intra-service time of sedation was 30 minutes. The patient's vital signs were monitored throughout the procedure and recorded in the patient's medical record by a qualified procedure nurse. DESCRIPTION OF PROCEDURE: Informed consent was obtained after a detailed discussion about the procedure including the risk, benefits, and alternatives. Universal protocol was followed including a time-out to confirm the correct patient and procedure. The patient was positioned prone on the CT table. Axial CT images were obtained through the chest and a suitable skin site was prepped and draped in sterile fashion. Local anesthesia was achieved with lidocaine. Under repeat CT guidance, a 19 gauge coaxial needle was advanced into the left lower lobe pulmonary nodule. Final needle position was confirmed prior to biopsy. Five 20 gauge core biopsy specimens were obtained through the coaxial needle and given to Pathology who was present to help confirm sample adequacy. The coaxial needle was removed and pressure held for hemostasis. A sterile dressing was applied. Final CT was performed. Patient tolerated the procedure well. No immediate complication. Estimated blood loss: Less than 5 mL. Dose modulation, iterative reconstruction, and/or weight based adjustment of the mA/kV was utilized to reduce the radiation dose to as low as reasonably achievable. FINDINGS:  CT redemonstrates the left lower lobe pulmonary nodule and guides coaxial needle placement. CT immediately prior to biopsy demonstrates satisfactory coaxial needle placement with the tip in the periphery of the nodule. Final CT after biopsy demonstrates no evidence of pneumothorax. There is mild pulmonary hemorrhage surrounding the nodule. Successful CT-guided left lower lobe lung nodule core biopsy.      PET CT SKULL BASE TO MID THIGH    Result Date: 4/30/2021  EXAMINATION: WHOLE BODY PET/CT 4/30/2021 TECHNIQUE: Following IV injection of 13.165 mCi of F 18 -FDG, PET  tumor imaging was acquired from the base of the skull to the mid thighs. Computed tomography was used for purposes of attenuation correction and anatomic localization. Fusion imaging was utilized for interpretation. Uptake time 57 mins. Glucose level 92 mg/dl. COMPARISON: Lung screening CT April 20, 2021. HISTORY: ORDERING SYSTEM PROVIDED HISTORY: Abnormal CT of the chest TECHNOLOGIST PROVIDED HISTORY: increase in lung nodule Reason for Exam: Abnormal CT of chest, increase in lung nodule Acuity: Acute Type of Exam: Initial FINDINGS: HEAD/NECK: No abnormal FDG activity is identified. CHEST: The previously identified nodule involving the left lower lobe is not FDG avid. SUV max of 1.7. No abnormal FDG activity is identified involving the chest. ABDOMEN/PELVIS: No abnormal FDG activity is identified. BONES/SOFT TISSUE: No focal abnormal FDG activity is identified. INCIDENTAL CT FINDINGS: No pneumothorax. No pericardial or pleural effusions. No free air or free fluid. Cholelithiasis. Questionable punctate nonobstructing calculus left kidney. Sigmoid diverticulosis. Negative PET-CT. The previously identified nodule involving the left lower lobe is not FDG avid. Tissue sampling is still recommended given the nodules morphology is well as reported increase in size. If no tissue sampling is performed, CT follow-up is recommended. [facility]DESTA DIGITAL SCREEN BILATERAL    Result Date: 5/11/2021  EXAMINATION: SCREENING DIGITAL BILATERAL  MAMMOGRAM WITH TOMOSYNTHESIS 5/11/2021 TECHNIQUE: Screening mammography was performed with tomosynthesis including MLO and CC views of the bilateral breasts. Computer aided detection was used for the interpretation of this exam. COMPARISON: Bilateral mammography December 4, 2018 and May 27, 2015. Also February 5, 2010. HISTORY: Screening.  FINDINGS: There are scattered areas of fibroglandular density. There is no new dominant mass, suspicious microcalcification, or area of architectural distortion. Benign fatty centered lymph node again apparent mid depth outer aspect right breast.     No mammographic evidence of malignancy. No evidence of significant interval change. BIRADS: BIRADS - CATEGORY 1 Negative, no evidence of malignancy. Normal interval follow-up is recommended in 12 months. OVERALL ASSESSMENT - NEGATIVE A letter of notification will be sent to the patient regarding the results. The [employer] recommends annual mammograms for women 40 years and older. Assessment :      Primary Problem  Community acquired pneumonia of left upper lobe of lung    Active Hospital Problems    Diagnosis Date Noted    History of pulmonary embolism [Z86.711] 05/27/2021    On anticoagulant therapy [Z79.01] 05/27/2021    On methotrexate therapy [Z79.899] 05/27/2021    Obesity, Class III, BMI 40-49.9 (morbid obesity) ([facility] [address] [E66.01] 05/27/2021    Adenocarcinoma, lung, left ([address] [C34.92] 05/14/2021    Community acquired pneumonia of left upper lobe of lung [J18.9] 12/23/2019    COPD (chronic obstructive pulmonary disease) ([address] [J44.9] 08/14/2019    Tobacco abuse [Z72.0] 11/12/2018    Major depressive disorder, recurrent, moderate ([address] [F33.1] 11/12/2018    Hypertension [I10] 09/16/2013       Plan:     Patient status Admit as inpatient in the  Progressive Unit/Step down    Community Acquired Pneumonia, Left Upper Lobe  - WBC 24.4  - Hemoglobin wnl  - CT Chest 05/27: Left upper lobe consolidation. No significant interval change in biopsied left lower lobe 1.6 cm mass.   - IV Azithromycin and IV Rocephin  - Legionella, Strep Pneumo antigen  - Pro calcitonin  - IVF NS at 75 mL/hr    Adenocarcinoma Left Lower Lung  - Recently diagnosed 2 weeks ago with needle biopsy  - Follows with Dr. Matt Magana  - Hem/Onc consulted    History of DVT, PE  - Eliquis 5 mg twice daily    Hypertension  - Norvasc 10 mg daily     Depression  - Zoloft 100 mg daily    COPD  - Albuterol PRN  - Spiriva     DVT Prophylaxis: Already on Eliquis    Consultations:   IP CONSULT TO INTERNAL MEDICINE  IP CONSULT TO SOCIAL WORK  IP CONSULT TO HEM/ONC    Patient is admitted as inpatient status because of co-morbiditieslisted above, severity of signs and symptoms as outlined, requirement for current medical therapies and most importantly because of direct risk to patient if care not provided in a hospital setting. Claude Henry MD  5/27/2021  2:00 PM    Copy sent to Dr. Jade Duque MD    Attestation and add on       I have discussed the care of Kathy Doss , including pertinent history and exam findings,      5/27/21    with the resident. I have seen and examined the patient and the key elements of all parts of the encounter have been performed by me . I agree with the assessment, plan and orders as documented by the resident.     ---- ;     MD WEN Sparks[address]    Phone [phone]   Fax: [phone]  Answering Service: [phone]

## 2021-05-28 ENCOUNTER — APPOINTMENT (OUTPATIENT)
Dept: GENERAL RADIOLOGY | Age: 66
DRG: 139 | End: 2021-05-28
Payer: COMMERCIAL

## 2021-05-28 ENCOUNTER — HOSPITAL ENCOUNTER (OUTPATIENT)
Dept: LAB | Age: 66
Setting detail: SPECIMEN
Discharge: HOME | End: 2021-05-28
Payer: COMMERCIAL

## 2021-05-28 DIAGNOSIS — Z20.822 COVID-19 RULED OUT BY LABORATORY TESTING: Primary | ICD-10-CM

## 2021-05-28 LAB
ABSOLUTE BANDS #: 3.74 K/UL (ref 0–1)
ABSOLUTE EOS #: 0 K/UL (ref 0–0.4)
ABSOLUTE IMMATURE GRANULOCYTE: ABNORMAL K/UL (ref 0–0.3)
ABSOLUTE LYMPH #: 1.5 K/UL (ref 1–4.8)
ABSOLUTE MONO #: 0.75 K/UL (ref 0.1–1.3)
ALLEN TEST: ABNORMAL
ANION GAP SERPL CALCULATED.3IONS-SCNC: 6 MMOL/L (ref 9–17)
BANDS: 15 % (ref 0–10)
BASOPHILS # BLD: 0 % (ref 0–2)
BASOPHILS ABSOLUTE: 0 K/UL (ref 0–0.2)
BUN BLDV-MCNC: 12 MG/DL (ref 8–23)
BUN/CREAT BLD: ABNORMAL (ref 9–20)
CALCIUM SERPL-MCNC: 8.8 MG/DL (ref 8.6–10.4)
CARBOXYHEMOGLOBIN: 1.5 % (ref 0–5)
CHLORIDE BLD-SCNC: 104 MMOL/L (ref 98–107)
CO2: 26 MMOL/L (ref 20–31)
CREAT SERPL-MCNC: 0.77 MG/DL (ref 0.5–0.9)
DIFFERENTIAL TYPE: ABNORMAL
EOSINOPHILS RELATIVE PERCENT: 0 % (ref 0–4)
FIO2: ABNORMAL
GFR AFRICAN AMERICAN: >60 ML/MIN
GFR NON-AFRICAN AMERICAN: >60 ML/MIN
GFR SERPL CREATININE-BSD FRML MDRD: ABNORMAL ML/MIN/{1.73_M2}
GFR SERPL CREATININE-BSD FRML MDRD: ABNORMAL ML/MIN/{1.73_M2}
GLUCOSE BLD-MCNC: 130 MG/DL (ref 70–99)
HCO3 ARTERIAL: 27.2 MMOL/L (ref 22–26)
HCT VFR BLD CALC: 33.5 % (ref 36–46)
HEMOGLOBIN: 11.2 G/DL (ref 12–16)
IMMATURE GRANULOCYTES: ABNORMAL %
LYMPHOCYTES # BLD: 6 % (ref 24–44)
MCH RBC QN AUTO: 32.6 PG (ref 26–34)
MCHC RBC AUTO-ENTMCNC: 33.3 G/DL (ref 31–37)
MCV RBC AUTO: 97.9 FL (ref 80–100)
METAMYELOCYTES ABSOLUTE COUNT: 0.75 K/UL
METAMYELOCYTES: 3 %
METHEMOGLOBIN: 0.9 % (ref 0–1.9)
MODE: ABNORMAL
MONOCYTES # BLD: 3 % (ref 1–7)
MORPHOLOGY: ABNORMAL
NEGATIVE BASE EXCESS, ART: ABNORMAL MMOL/L (ref 0–2)
NOTIFICATION TIME: ABNORMAL
NOTIFICATION: ABNORMAL
NRBC AUTOMATED: ABNORMAL PER 100 WBC
NUCLEATED RED BLOOD CELLS: 1 PER 100 WBC
O2 DEVICE/FLOW/%: ABNORMAL
O2 SAT, ARTERIAL: 88.4 % (ref 95–98)
OXYHEMOGLOBIN: ABNORMAL % (ref 95–98)
PATIENT TEMP: 37
PCO2 ARTERIAL: 41.2 MMHG (ref 35–45)
PCO2, ART, TEMP ADJ: ABNORMAL (ref 35–45)
PDW BLD-RTO: 16.4 % (ref 11.5–14.9)
PEEP/CPAP: ABNORMAL
PH ARTERIAL: 7.43 (ref 7.35–7.45)
PH, ART, TEMP ADJ: ABNORMAL (ref 7.35–7.45)
PLATELET # BLD: 235 K/UL (ref 150–450)
PLATELET ESTIMATE: ABNORMAL
PMV BLD AUTO: 7.2 FL (ref 6–12)
PO2 ARTERIAL: 55.4 MMHG (ref 80–100)
PO2, ART, TEMP ADJ: ABNORMAL MMHG (ref 80–100)
POSITIVE BASE EXCESS, ART: 2.7 MMOL/L (ref 0–2)
POTASSIUM SERPL-SCNC: 3.9 MMOL/L (ref 3.7–5.3)
PSV: ABNORMAL
PT. POSITION: ABNORMAL
RBC # BLD: 3.42 M/UL (ref 4–5.2)
RBC # BLD: ABNORMAL 10*6/UL
RESPIRATORY RATE: 16
SAMPLE SITE: ABNORMAL
SEG NEUTROPHILS: 73 % (ref 36–66)
SEGMENTED NEUTROPHILS ABSOLUTE COUNT: 18.21 K/UL (ref 1.3–9.1)
SET RATE: ABNORMAL
SODIUM BLD-SCNC: 136 MMOL/L (ref 135–144)
TEXT FOR RESPIRATORY: ABNORMAL
TOTAL HB: ABNORMAL G/DL (ref 12–16)
TOTAL RATE: ABNORMAL
VT: ABNORMAL
WBC # BLD: 25 K/UL (ref 3.5–11)
WBC # BLD: ABNORMAL 10*3/UL

## 2021-05-28 PROCEDURE — 6370000000 HC RX 637 (ALT 250 FOR IP): Performed by: STUDENT IN AN ORGANIZED HEALTH CARE EDUCATION/TRAINING PROGRAM

## 2021-05-28 PROCEDURE — 2580000003 HC RX 258: Performed by: STUDENT IN AN ORGANIZED HEALTH CARE EDUCATION/TRAINING PROGRAM

## 2021-05-28 PROCEDURE — 94667 MNPJ CHEST WALL 1ST: CPT

## 2021-05-28 PROCEDURE — 6360000002 HC RX W HCPCS: Performed by: STUDENT IN AN ORGANIZED HEALTH CARE EDUCATION/TRAINING PROGRAM

## 2021-05-28 PROCEDURE — 36600 WITHDRAWAL OF ARTERIAL BLOOD: CPT

## 2021-05-28 PROCEDURE — 6370000000 HC RX 637 (ALT 250 FOR IP): Performed by: INTERNAL MEDICINE

## 2021-05-28 PROCEDURE — 6360000002 HC RX W HCPCS: Performed by: INTERNAL MEDICINE

## 2021-05-28 PROCEDURE — 80048 BASIC METABOLIC PNL TOTAL CA: CPT

## 2021-05-28 PROCEDURE — 2060000000 HC ICU INTERMEDIATE R&B

## 2021-05-28 PROCEDURE — 2580000003 HC RX 258: Performed by: INTERNAL MEDICINE

## 2021-05-28 PROCEDURE — 99255 IP/OBS CONSLTJ NEW/EST HI 80: CPT | Performed by: INTERNAL MEDICINE

## 2021-05-28 PROCEDURE — 87449 NOS EACH ORGANISM AG IA: CPT

## 2021-05-28 PROCEDURE — 94761 N-INVAS EAR/PLS OXIMETRY MLT: CPT

## 2021-05-28 PROCEDURE — 99232 SBSQ HOSP IP/OBS MODERATE 35: CPT | Performed by: INTERNAL MEDICINE

## 2021-05-28 PROCEDURE — 94640 AIRWAY INHALATION TREATMENT: CPT

## 2021-05-28 PROCEDURE — 82805 BLOOD GASES W/O2 SATURATION: CPT

## 2021-05-28 PROCEDURE — 71045 X-RAY EXAM CHEST 1 VIEW: CPT

## 2021-05-28 PROCEDURE — 97161 PT EVAL LOW COMPLEX 20 MIN: CPT

## 2021-05-28 PROCEDURE — 85025 COMPLETE CBC W/AUTO DIFF WBC: CPT

## 2021-05-28 PROCEDURE — 36415 COLL VENOUS BLD VENIPUNCTURE: CPT

## 2021-05-28 PROCEDURE — 2700000000 HC OXYGEN THERAPY PER DAY

## 2021-05-28 PROCEDURE — 87641 MR-STAPH DNA AMP PROBE: CPT

## 2021-05-28 PROCEDURE — 94664 DEMO&/EVAL PT USE INHALER: CPT

## 2021-05-28 RX ORDER — DEXTROMETHORPHAN POLISTIREX 30 MG/5ML
60 SUSPENSION ORAL EVERY 12 HOURS SCHEDULED
Status: DISCONTINUED | OUTPATIENT
Start: 2021-05-28 | End: 2021-05-30 | Stop reason: HOSPADM

## 2021-05-28 RX ORDER — BENZONATATE 100 MG/1
200 CAPSULE ORAL 3 TIMES DAILY
Status: DISCONTINUED | OUTPATIENT
Start: 2021-05-28 | End: 2021-05-30 | Stop reason: HOSPADM

## 2021-05-28 RX ORDER — IPRATROPIUM BROMIDE AND ALBUTEROL SULFATE 2.5; .5 MG/3ML; MG/3ML
1 SOLUTION RESPIRATORY (INHALATION)
Status: DISCONTINUED | OUTPATIENT
Start: 2021-05-28 | End: 2021-05-30 | Stop reason: HOSPADM

## 2021-05-28 RX ORDER — METHYLPREDNISOLONE SODIUM SUCCINATE 40 MG/ML
40 INJECTION, POWDER, LYOPHILIZED, FOR SOLUTION INTRAMUSCULAR; INTRAVENOUS EVERY 6 HOURS
Status: DISCONTINUED | OUTPATIENT
Start: 2021-05-28 | End: 2021-05-30 | Stop reason: HOSPADM

## 2021-05-28 RX ORDER — METHYLPREDNISOLONE SODIUM SUCCINATE 40 MG/ML
40 INJECTION, POWDER, LYOPHILIZED, FOR SOLUTION INTRAMUSCULAR; INTRAVENOUS ONCE
Status: COMPLETED | OUTPATIENT
Start: 2021-05-28 | End: 2021-05-28

## 2021-05-28 RX ADMIN — METHYLPREDNISOLONE SODIUM SUCCINATE 40 MG: 40 INJECTION, POWDER, FOR SOLUTION INTRAMUSCULAR; INTRAVENOUS at 21:32

## 2021-05-28 RX ADMIN — SERTRALINE HYDROCHLORIDE 100 MG: 100 TABLET ORAL at 07:43

## 2021-05-28 RX ADMIN — METHYLPREDNISOLONE SODIUM SUCCINATE 40 MG: 40 INJECTION, POWDER, FOR SOLUTION INTRAMUSCULAR; INTRAVENOUS at 14:37

## 2021-05-28 RX ADMIN — TIOTROPIUM BROMIDE INHALATION SPRAY 2 PUFF: 3.12 SPRAY, METERED RESPIRATORY (INHALATION) at 07:43

## 2021-05-28 RX ADMIN — ACETAMINOPHEN 650 MG: 325 TABLET, FILM COATED ORAL at 02:01

## 2021-05-28 RX ADMIN — CEFTRIAXONE SODIUM 1000 MG: 1 INJECTION, POWDER, FOR SOLUTION INTRAMUSCULAR; INTRAVENOUS at 11:48

## 2021-05-28 RX ADMIN — APIXABAN 5 MG: 5 TABLET, FILM COATED ORAL at 07:43

## 2021-05-28 RX ADMIN — IPRATROPIUM BROMIDE AND ALBUTEROL SULFATE 1 AMPULE: .5; 3 SOLUTION RESPIRATORY (INHALATION) at 15:02

## 2021-05-28 RX ADMIN — NYSTATIN 500000 UNITS: 100000 SUSPENSION ORAL at 21:31

## 2021-05-28 RX ADMIN — Medication 60 MG: at 21:32

## 2021-05-28 RX ADMIN — IPRATROPIUM BROMIDE AND ALBUTEROL SULFATE 1 AMPULE: .5; 3 SOLUTION RESPIRATORY (INHALATION) at 11:09

## 2021-05-28 RX ADMIN — SODIUM CHLORIDE: 9 INJECTION, SOLUTION INTRAVENOUS at 08:18

## 2021-05-28 RX ADMIN — BENZONATATE 200 MG: 100 CAPSULE ORAL at 14:49

## 2021-05-28 RX ADMIN — BENZONATATE 200 MG: 100 CAPSULE ORAL at 21:32

## 2021-05-28 RX ADMIN — IPRATROPIUM BROMIDE AND ALBUTEROL SULFATE 1 AMPULE: .5; 3 SOLUTION RESPIRATORY (INHALATION) at 19:45

## 2021-05-28 RX ADMIN — METHYLPREDNISOLONE SODIUM SUCCINATE 40 MG: 40 INJECTION, POWDER, FOR SOLUTION INTRAMUSCULAR; INTRAVENOUS at 08:18

## 2021-05-28 RX ADMIN — FOLIC ACID 1 MG: 1 TABLET ORAL at 07:43

## 2021-05-28 RX ADMIN — SODIUM CHLORIDE, PRESERVATIVE FREE 10 ML: 5 INJECTION INTRAVENOUS at 07:43

## 2021-05-28 RX ADMIN — ACETAMINOPHEN 650 MG: 325 TABLET, FILM COATED ORAL at 16:50

## 2021-05-28 RX ADMIN — ALBUTEROL SULFATE 2.5 MG: 2.5 SOLUTION RESPIRATORY (INHALATION) at 06:51

## 2021-05-28 RX ADMIN — ALBUTEROL SULFATE 2.5 MG: 2.5 SOLUTION RESPIRATORY (INHALATION) at 02:11

## 2021-05-28 RX ADMIN — APIXABAN 5 MG: 5 TABLET, FILM COATED ORAL at 21:31

## 2021-05-28 RX ADMIN — CEFEPIME 2000 MG: 2 INJECTION, POWDER, FOR SOLUTION INTRAVENOUS at 14:49

## 2021-05-28 RX ADMIN — AZITHROMYCIN MONOHYDRATE 500 MG: 500 INJECTION, POWDER, LYOPHILIZED, FOR SOLUTION INTRAVENOUS at 16:50

## 2021-05-28 ASSESSMENT — PAIN DESCRIPTION - FREQUENCY: FREQUENCY: INTERMITTENT

## 2021-05-28 ASSESSMENT — PAIN DESCRIPTION - LOCATION: LOCATION: BACK

## 2021-05-28 ASSESSMENT — PAIN DESCRIPTION - ONSET: ONSET: ON-GOING

## 2021-05-28 ASSESSMENT — PAIN DESCRIPTION - ORIENTATION: ORIENTATION: LEFT;LOWER

## 2021-05-28 ASSESSMENT — PAIN SCALES - GENERAL
PAINLEVEL_OUTOF10: 6
PAINLEVEL_OUTOF10: 6
PAINLEVEL_OUTOF10: 7
PAINLEVEL_OUTOF10: 6

## 2021-05-28 ASSESSMENT — PAIN DESCRIPTION - PAIN TYPE: TYPE: ACUTE PAIN

## 2021-05-28 ASSESSMENT — PAIN DESCRIPTION - DESCRIPTORS: DESCRIPTORS: DISCOMFORT

## 2021-05-28 ASSESSMENT — ENCOUNTER SYMPTOMS
NAUSEA: 0
ABDOMINAL PAIN: 0
VOMITING: 0
COUGH: 1
SHORTNESS OF BREATH: 1

## 2021-05-28 ASSESSMENT — PAIN - FUNCTIONAL ASSESSMENT: PAIN_FUNCTIONAL_ASSESSMENT: PREVENTS OR INTERFERES SOME ACTIVE ACTIVITIES AND ADLS

## 2021-05-28 ASSESSMENT — VISUAL ACUITY: OU: 1

## 2021-05-28 ASSESSMENT — PAIN DESCRIPTION - PROGRESSION: CLINICAL_PROGRESSION: NOT CHANGED

## 2021-05-28 NOTE — PROGRESS NOTES
Sent message and received callback from Dr. Carlee Piña concerning consult of Increasing oxygen requirements Cap, Left Lung adenocarcinoma.

## 2021-05-28 NOTE — CARE COORDINATION
CASE MANAGEMENT NOTE:    Admission Date:  5/27/2021 Jerome Presley is a 61 y.o.  female    Admitted for : Community acquired pneumonia of left upper lobe of lung [J18.9]    Met with:  Patient    PCP:  Margaret Lee MD                                Insurance:  [facility]      Is patient alert and oriented at time of discussion:  Yes    Current Residence/ Living Arrangements:  independently at home             Current Services PTA:  No    Does patient go to outpatient dialysis: No  If yes, location and chair time: N/A    Is patient agreeable to VNS: No    Freedom of choice provided:  Yes    List of [address] provided: NA    VNS chosen:  No    DME:  none    Home Oxygen: No    Nebulizer: Yes    CPAP/BIPAP: No    Supplier: N/A    Potential Assistance Needed: Yes    SNF needed: No    Freedom of choice and list provided: NA    Pharmacy:  [facility]       Does Patient want to use MEDS to BEDS? No    Is patient currently receiving oral anticoagulation therapy? Yes - Eliquis PTA    Is the [facility] an [facility] with Readmission Risk Score greater than 14%? No  If yes, pt needs a follow up appointment made within 7 days. Family Members/Caregivers that pt would like involved in their care:    Yes    If yes, list name here:  Son [address]    Transportation Provider:  Patient and Family             Discharge Plan:  5/28: [facility] - From 2-story home with 2nd bed/bath with son and granddaughter. She is independent and depends on family for transportation. DME - nebulizer. Eliquis PTA. Declines VNS. On O2 @5L - We are following for need upon discharge. On IV zithromax, IV rocephin, IV steroids 40Q6. New pulm consult. Recent stage I lung cancer diagnosis - Heme/Onc consult.  //LANI                  Electronically signed by: Lucero De Souza RN on 5/28/2021 at 11:41 AM

## 2021-05-28 NOTE — CONSULTS
biopsies          SOCIAL HISTORY:  Social History     Socioeconomic History    Marital status: Single     Spouse name: Not on file    Number of children: Not on file    Years of education: Not on file    Highest education level: Not on file   Occupational History     Employer: N/A   Tobacco Use    Smoking status: Current Every Day Smoker     Packs/day: 0.25     Years: 35.00     Pack years: 8.75     Types: Cigarettes     Last attempt to quit: 1986     Years since quittin.3    Smokeless tobacco: Never Used    Tobacco comment: restarted smoking 2019   Vaping Use    Vaping Use: Never used   Substance and Sexual Activity    Alcohol use: Yes     Alcohol/week: 0.0 standard drinks     Comment: social    Drug use: No    Sexual activity: Not Currently   Other Topics Concern    Not on file   Social History Narrative    Not on file     Social Determinants of Health     Financial Resource Strain: Low Risk     Difficulty of Paying Living Expenses: Not hard at all   Food Insecurity: No Food Insecurity    Worried About Running Out of Food in the Last Year: Never true    Emelyn of Food in the Last Year: Never true   Transportation Needs: No Transportation Needs    Lack of Transportation (Medical): No    Lack of Transportation (Non-Medical):  No   Physical Activity:     Days of Exercise per Week:     Minutes of Exercise per Session:    Stress:     Feeling of Stress :    Social Connections:     Frequency of Communication with Friends and Family:     Frequency of Social Gatherings with Friends and Family:     Attends Bahai Services:     Active Member of Clubs or Organizations:     Attends Club or Organization Meetings:     Marital Status:    Intimate Partner Violence:     Fear of Current or Ex-Partner:     Emotionally Abused:     Physically Abused:     Sexually Abused:        FAMILY HISTORY:  Family History   Problem Relation Age of Onset    Cancer Mother     Diabetes Mother     Heart Disease Mother     Cancer Father     Cancer Brother        REVIEW OF SYSTEMS:  All other systems reviewed and are negative. PHYSICAL EXAM:  Vital Signs Blood pressure (!) 142/74, pulse 100, temperature 98.4 °F (36.9 °C), temperature source Oral, resp. rate 18, height 5' 5\" (1.651 m), weight 261 lb 3.9 oz (118.5 kg), SpO2 92 %, not currently breastfeeding. Oxygen Amount and Delivery: O2 Flow Rate (L/min): 3.5 L/min    Admission Weight Weight: 242 lb (109.8 kg)    General Appearance   Middle-aged female looking older than her stated age in no acute respiratory distress  Head  Normocephalic, without obvious abnormality, atraumatic    Eyes  conjunctivae/corneas clear. PERRL, EOM's intact. Fundi benign. ENT positive for thrush in the oral cavity  Neck  no adenopathy, no carotid bruit, no JVD, supple, symmetrical, trachea midline and thyroid not enlarged, symmetric, no tenderness/mass/nodules  Lungs diminished breath sounds left lower base and mid lung field compared with right, no rhonchi or wheezes  Heart: regular rate and rhythm, S1, S2 normal, no murmur, click, rub or gallop  Abdomen  soft, non-tender; bowel sounds normal; no masses,  no organomegaly  Extremities  No edema  Skin  Skin color, texture, turgor normal. No rashes or lesions  Neurologic: Alert and oriented X 3, normal strength and tone.          Imaging      Lab Review  CBC     Lab Results   Component Value Date    WBC 25.0 05/28/2021    RBC 3.42 05/28/2021    HGB 11.2 05/28/2021    HCT 33.5 05/28/2021     05/28/2021    MCV 97.9 05/28/2021    MCH 32.6 05/28/2021    MCHC 33.3 05/28/2021    RDW 16.4 05/28/2021    NRBC 1 05/28/2021    METASPCT 3 05/28/2021    LYMPHOPCT 6 05/28/2021    MONOPCT 3 05/28/2021    MYELOPCT 1 04/13/2020    BASOPCT 0 05/28/2021    MONOSABS 0.75 05/28/2021    LYMPHSABS 1.50 05/28/2021    EOSABS 0.00 05/28/2021    BASOSABS 0.00 05/28/2021    DIFFTYPE NOT REPORTED 05/28/2021       BMP   Lab Results   Component Value Date

## 2021-05-28 NOTE — PROGRESS NOTES
thrombosis) ([facility] [address] GERD (gastroesophageal reflux disease), Hx of blood clots, Hypertension, and Rheumatoid arthritis ([facility] [address] has a past surgical history that includes Appendectomy (1982); Endoscopy, colon, diagnostic ([id number]); bronchoscopy (N/A, 12/27/2019); Colonoscopy (N/A, 2/12/2020); and CT NEEDLE BIOPSY LUNG PERCUTANEOUS (5/13/2021). Restrictions  Restrictions/Precautions  Restrictions/Precautions: General Precautions, Fall Risk  Required Braces or Orthoses?: No  Implants present? :  (pt denies)  Position Activity Restriction  Other position/activity restrictions: up as tolerated  Vision/Hearing  Vision: Impaired  Vision Exceptions: Wears glasses at all times  Hearing: Within functional limits     Subjective  General  Chart Reviewed: Yes  Patient assessed for rehabilitation services?: Yes  Additional Pertinent Hx: adenocarcinoma, RA, (-) PE  Family / Caregiver Present: No  Referring Practitioner: Rohini Santoyo MD  Referral Date : 05/27/21  Diagnosis: community acquired pnuemonia of left upper lobe  Follows Commands: Within Functional Limits  Subjective  Subjective: Pt sitting EOB on 5L O2. Pt is agreeable to PT eval. RN Eliza Barrera. Pain Screening  Patient Currently in Pain: Yes  Pain Assessment  Pain Assessment: 0-10  Pain Level: 6  Patient's Stated Pain Goal: No pain  Pain Type: Acute pain  Pain Location: Back  Pain Orientation: Left; Lower  Pain Descriptors: Discomfort  Pain Frequency: Intermittent (with coughing and deep breathing)  Pain Onset: On-going  Clinical Progression: Not changed  Functional Pain Assessment: Prevents or interferes some active activities and ADLs  Non-Pharmaceutical Pain Intervention(s): Ambulation/Increased Activity; Distraction;Repositioned; Rest  Response to Pain Intervention: Patient Satisfied  Vital Signs  Patient Currently in Pain: Yes  Oxygen Therapy  SpO2: 90 %  O2 Device: Nasal cannula  O2 Flow Rate (L/min): 5 L/min  Patient Observation  Observations: O2 sats drop to 86% with activity donning B socks and post amb - return to 90-92% in 1-2 minutes with rest and pursed lip breathing on 5L O2       Orientation  Orientation  Overall Orientation Status: Within Normal Limits  Social/Functional History  Social/Functional History  Lives With: Family (son and granddaughter)  Type of Home: House  Home Layout: Two level, Bed/Bath upstairs (no bathroom on first floor)  Home Access: Level entry (2+12+2 L HR to 2nd floor (landings- could fit chair))  Bathroom Shower/Tub: Tub/Shower unit  Bathroom Toilet: Handicap height  Bathroom Equipment: Hand-held shower  Bathroom Accessibility: Accessible, Walker accessible  Home Equipment:  (no DME - was not using O2)  ADL Assistance: [address]: Independent  Homemaking Responsibilities: Yes  Ambulation Assistance: Independent (no DME)  Transfer Assistance: Independent  Active : No  Patient's  Info: kids  Mode of Transportation: Car  Occupation: On disability  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 Reports having family members that could assist when he returns to work. Daughter in law is RN. No recent PT OT or pulmonary rehab. Cognition        Objective          AROM RLE (degrees)  RLE AROM: WNL  AROM LLE (degrees)  LLE AROM : WNL  AROM RUE (degrees)  RUE AROM : WFL  AROM LUE (degrees)  LUE AROM : WFL  Strength RLE  Strength RLE: WNL  Comment: Grossly 4/5  Strength LLE  Strength LLE: WNL  Comment: Grossly 4/5  Strength RUE  Strength RUE: WFL  Strength LUE  Strength LUE: WFL     Sensation  Overall Sensation Status: WNL (pt denies)  Bed mobility  Comment: No formal bed mobility. Pt reports no assist to EOB. Sitting EOB at start/end of session.   Transfers  Sit to Stand: Stand by assistance  Stand to sit: Stand by assistance  Comment: SBA no device, no difficulty with standing. O2 on throughout session at 5L   Ambulation  Ambulation?: Yes  Ambulation 1  Surface: level tile  Device: No Device  Other Apparatus: O2 (5L)  Assistance: Stand by assistance;Contact guard assistance  Quality of Gait: slow goldie, small steps, no LOB, steady  Gait Deviations: Slow Goldie  Distance: 20'  Comments: Minimal signs of fatigue. Pt managing IV pole part of the way, occasionally reaches for objects. Stairs/Curb  Stairs?: No     Balance  Posture: Fair  Sitting - Static: Good  Sitting - Dynamic: Good  Standing - Static: Good  Standing - Dynamic: Good;-  Comments: Low fall risk, standing balance with IV pole vs no pole. Other exercises  Other exercises?: Yes  Other exercises 1: Education on pursed lip breathing and pulse oximeter for home  Other exercises 2: Education on benefits of a rollator     Plan   Plan  Times per week: 5x/week  Specific instructions for Next Treatment: progress gait/stairs, trial rollator, HEP  Current Treatment Recommendations: Strengthening, ROM, Functional Mobility Training, Balance Training, Transfer Training, Endurance Training, Gait Training, Stair training, Home Exercise Program, Safety Education & Training, Patient/Caregiver Education & Training, Equipment Evaluation, Education, & procurement  Safety Devices  Type of devices: All fall risk precautions in place, Call light within reach, Gait belt, Patient at risk for falls, Nurse notified, Left in bed (BRENDA Hendrickson)    G-Code       OutComes Score                                                  AM-PAC Score  AM-PAC Inpatient Mobility Raw Score : 18 (05/28/21 0935)  AM-PAC Inpatient T-Scale Score : 43.63 (05/28/21 0935)  Mobility Inpatient CMS 0-100% Score: 46.58 (05/28/21 0935)  Mobility Inpatient CMS G-Code Modifier : CK (05/28/21 0935)          Goals  Short term goals  Time Frame for Short term goals: 2-3 days  Short term goal 1: Pt to demo IND bed mobility.   Short term goal 2: Pt to demo IND

## 2021-05-28 NOTE — PROGRESS NOTES
[id number]    PROGRESS NOTE             5/28/2021    9:13 AM    Name:   Kera Olvera  MRN:     [id number]     Acct:      [de-identified]   Room:   2089/2089-01   Day:  1  Admit Date:  5/27/2021  8:11 AM    PCP:  Renetta Teixeira MD  Code Status:  Full Code    Subjective:     C/C:   Chief Complaint   Patient presents with    Pain     Generalized full body ache    Cough     Light pink blood, about the size of a dime x2    Back Pain    Pain     Lt. lateral pain d/t biopsy of lung 1.5 weeks ago. Test for spot on lung. Interval History Status: worsened. Patient seen and examined at bedside. On yesterday's evaluation patient was requiring 2L of O2. It seems her oxygen requirements have been increasing. Patient was saturating 87% on 3L of O2, this had to be increased to 5L. Currently patient is saturating 92% on 5L. On day 2 of Rocephin and Azithromycin. Patient started on IV Steroids. Procalcitonin is 0.32. ABG and CXR were ordered. ABG showing pH 7.428, pCO2 41.2, pO2 55.4, HCO3 27.2. Pulmonology will be consulted. Brief History:     The patient is a 61 y.o. Non-/non  female with a past medical history of smoking, COPD, DVT, PE (2014) and recently diagnosed Adenocarcinoma of the left upper lung who presents today with worsening productive cough and fatigue. Patient has a 35 year pack history. States for the last 3 days her cough has worsened, with productive sputum and blood streaks in it. Associated with pleuritic chest pain, night sweats and worsening shortness of breath. Patient does not use O2 at home, currently requiring 2L of O2 via NC to maintain saturation. Patient was found to have a 1.7 cm spiculated nodule on CT chest, a biopsy was done about 2 weeks ago. Biopsy showed Adenocarcinoma. Patient is now following Dr. Juan Carlos Carbone and Dr. Felicia Young Hem/Onc.  Patient also complaining of some left thrombosis) ([facility] [address] GERD (gastroesophageal reflux disease), Hx of blood clots, Hypertension, and Rheumatoid arthritis ([facility] [address] Social History:   reports that she has been smoking cigarettes. She has a 8.75 pack-year smoking history. She has never used smokeless tobacco. She reports current alcohol use. She reports that she does not use drugs. Family History:   Family History   Problem Relation Age of Onset   Trudi Tineo Cancer Mother     Diabetes Mother     Heart Disease Mother     Cancer Father     Cancer Brother        Vitals:  BP 96/67   Pulse 92   Temp 98.1 °F (36.7 °C) (Oral)   Resp 18   Ht 5' 5\" (1.651 m)   Wt 261 lb 3.9 oz (118.5 kg)   SpO2 90%   BMI 43.47 kg/m²   Temp (24hrs), Av.6 °F (37 °C), Min:97.9 °F (36.6 °C), Max:100 °F (37.8 °C)    No results for input(s): POCGLU in the last 72 hours. I/O(24Hr):     Intake/Output Summary (Last 24 hours) at 2021 0913  Last data filed at 2021 7429  Gross per 24 hour   Intake 1112 ml   Output --   Net 1112 ml       Labs:    CBC with Differential:    Lab Results   Component Value Date    WBC 25.0 2021    RBC 3.42 2021    HGB 11.2 2021    HCT 33.5 2021     2021    MCV 97.9 2021    MCH 32.6 2021    MCHC 33.3 2021    RDW 16.4 2021    NRBC 1 2021    METASPCT 3 2021    LYMPHOPCT 6 2021    MONOPCT 3 2021    MYELOPCT 1 2020    BASOPCT 0 2021    MONOSABS 0.75 2021    LYMPHSABS 1.50 2021    EOSABS 0.00 2021    BASOSABS 0.00 2021    DIFFTYPE NOT REPORTED 2021     BMP:    Lab Results   Component Value Date     2021    K 3.9 2021     2021    CO2 26 2021    BUN 12 2021    LABALBU 3.8 2021    LABALBU 4.0 2011    CREATININE 0.77 2021    CALCIUM 8.8 2021    GFRAA >60 2021    LABGLOM >60 2021    GLUCOSE 130 2021    GLUCOSE 105 2011     ABG:  No results found for: PH, PCO2, PO2, HCO3, BE, THGB, TCO2, O2SAT    Lab Results   Component Value Date/Time    SPECIAL NOT REPORTED 12/27/2019 10:50 AM    SPECIAL NOT REPORTED 12/27/2019 10:50 AM    SPECIAL NOT REPORTED 12/27/2019 10:50 AM    SPECIAL NOT REPORTED 12/27/2019 10:50 AM     Lab Results   Component Value Date/Time    CULTURE NO GROWTH 12/27/2019 10:50 AM    CULTURE YEAST ISOLATED, ID TO FOLLOW (A) 12/27/2019 10:50 AM    CULTURE CANDIDA ALBICANS (A) 12/27/2019 10:50 AM    CULTURE NO GROWTH 44 DAYS 12/27/2019 10:50 AM         Radiology:    XR CHEST PORTABLE    Result Date: 5/13/2021  EXAMINATION: ONE XRAY VIEW OF THE CHEST 5/13/2021 12:21 pm COMPARISON: CT April 20, 2021 and radiograph December 26, 2019 HISTORY: ORDERING SYSTEM PROVIDED HISTORY: s/p LLL nodule bx, evaluate for ptx TECHNOLOGIST PROVIDED HISTORY: s/p LLL nodule bx, evaluate for ptx Reason for Exam: s/p LLL nodule bx, evaluate for ptx Acuity: Unknown Type of Exam: Unknown FINDINGS: Mild cardiomegaly. Cardiomediastinal silhouette otherwise within normal limits. No evidence of pneumothorax. No acute pulmonary findings. No pleural effusion or subdiaphragmatic free air. No acute osseous abnormality identified. No pneumothorax status post left lower lobe nodule biopsy. CT CHEST PULMONARY EMBOLISM W CONTRAST    Result Date: 5/27/2021  EXAMINATION: CTA OF THE CHEST 5/27/2021 9:14 am TECHNIQUE: CTA of the chest was performed after the administration of intravenous contrast.  Multiplanar reformatted images are provided for review. MIP images are provided for review. Dose modulation, iterative reconstruction, and/or weight based adjustment of the mA/kV was utilized to reduce the radiation dose to as low as reasonably achievable. COMPARISON: Chest radiograph 05/13/2021. PET-CT 04/30/2021. Chest CT 04/20/2021.  HISTORY: ORDERING SYSTEM PROVIDED HISTORY: pleuritic chest pain, known lung malignancy TECHNOLOGIST PROVIDED HISTORY: pleuritic chest pain, known lung malignancy Decision Support Exception - unselect if not a suspected or confirmed emergency medical condition->Emergency Medical Condition (MA) Reason for Exam: chest pain, body aches Acuity: Unknown Type of Exam: Unknown FINDINGS: Pulmonary Arteries: Pulmonary arteries are adequately opacified for evaluation. No evidence of intraluminal filling defect to suggest pulmonary embolism. Main pulmonary artery is normal in caliber. Mediastinum: No evidence of mediastinal lymphadenopathy. The heart and pericardium demonstrate no acute abnormality. There is no acute abnormality of the thoracic aorta. Lungs/pleura: Dense consolidation is now present in the left upper lobe with air bronchograms. Known site of malignancy with irregular nodule in the posterior left lower lobe is again demonstrated measuring 1.6 cm on axial image 73. No pneumothorax. No effusion. The central airway is patent. Upper Abdomen: Findings suggestive of hepatic steatosis. Cholelithiasis. Punctate left nephrolithiasis. Mild thickening of the left adrenal gland again demonstrated, which demonstrated no abnormal metabolic activity. Soft Tissues/Bones: No acute bone or soft tissue abnormality. 1.  No evidence of pulmonary embolism. 2.  Left upper lobe consolidation, likely representing infection. No imaging evidence for aspiration. 3.  No significant interval change in biopsied malignancy in the left lower lobe. 4.  Cholelithiasis. 5.  Punctate left nephrolithiasis. 6.  Findings suggestive of hepatic steatosis.      CT NEEDLE BIOPSY LUNG PERCUTANEOUS    Result Date: 5/13/2021  PROCEDURE: CT NEEDLE BIOPSY LUNG PERCUTANEOUS MODERATE CONSCIOUS SEDATION 5/13/2021 HISTORY: ORDERING SYSTEM PROVIDED HISTORY: Nodule of lower lobe of left lung TECHNOLOGIST PROVIDED HISTORY: left lower lobe lung nodule TECHNIQUE: Dose modulation, iterative reconstruction, and/or weight based adjustment of the mA/kV was utilized to reduce the radiation dose to as low as reasonably achievable. CONTRAST: None SEDATION: 1 mg versed and 50 mcg fentanyl were titrated intravenously for moderate sedation monitored under my direction. Total intra-service time of sedation was 30 minutes. The patient's vital signs were monitored throughout the procedure and recorded in the patient's medical record by a qualified procedure nurse. DESCRIPTION OF PROCEDURE: Informed consent was obtained after a detailed discussion about the procedure including the risk, benefits, and alternatives. Universal protocol was followed including a time-out to confirm the correct patient and procedure. The patient was positioned prone on the CT table. Axial CT images were obtained through the chest and a suitable skin site was prepped and draped in sterile fashion. Local anesthesia was achieved with lidocaine. Under repeat CT guidance, a 19 gauge coaxial needle was advanced into the left lower lobe pulmonary nodule. Final needle position was confirmed prior to biopsy. Five 20 gauge core biopsy specimens were obtained through the coaxial needle and given to Pathology who was present to help confirm sample adequacy. The coaxial needle was removed and pressure held for hemostasis. A sterile dressing was applied. Final CT was performed. Patient tolerated the procedure well. No immediate complication. Estimated blood loss: Less than 5 mL. Dose modulation, iterative reconstruction, and/or weight based adjustment of the mA/kV was utilized to reduce the radiation dose to as low as reasonably achievable. FINDINGS:  CT redemonstrates the left lower lobe pulmonary nodule and guides coaxial needle placement. CT immediately prior to biopsy demonstrates satisfactory coaxial needle placement with the tip in the periphery of the nodule. Final CT after biopsy demonstrates no evidence of pneumothorax. There is mild pulmonary hemorrhage surrounding the nodule.      Successful CT-guided left lower lobe lung nodule core biopsy. PET CT SKULL BASE TO MID THIGH    Result Date: 4/30/2021  EXAMINATION: WHOLE BODY PET/CT 4/30/2021 TECHNIQUE: Following IV injection of 13.165 mCi of F 18 -FDG, PET  tumor imaging was acquired from the base of the skull to the mid thighs. Computed tomography was used for purposes of attenuation correction and anatomic localization. Fusion imaging was utilized for interpretation. Uptake time 57 mins. Glucose level 92 mg/dl. COMPARISON: Lung screening CT April 20, 2021. HISTORY: ORDERING SYSTEM PROVIDED HISTORY: Abnormal CT of the chest TECHNOLOGIST PROVIDED HISTORY: increase in lung nodule Reason for Exam: Abnormal CT of chest, increase in lung nodule Acuity: Acute Type of Exam: Initial FINDINGS: HEAD/NECK: No abnormal FDG activity is identified. CHEST: The previously identified nodule involving the left lower lobe is not FDG avid. SUV max of 1.7. No abnormal FDG activity is identified involving the chest. ABDOMEN/PELVIS: No abnormal FDG activity is identified. BONES/SOFT TISSUE: No focal abnormal FDG activity is identified. INCIDENTAL CT FINDINGS: No pneumothorax. No pericardial or pleural effusions. No free air or free fluid. Cholelithiasis. Questionable punctate nonobstructing calculus left kidney. Sigmoid diverticulosis. Negative PET-CT. The previously identified nodule involving the left lower lobe is not FDG avid. Tissue sampling is still recommended given the nodules morphology is well as reported increase in size. If no tissue sampling is performed, CT follow-up is recommended. [facility] DIGITAL SCREEN BILATERAL    Result Date: 5/11/2021  EXAMINATION: SCREENING DIGITAL BILATERAL  MAMMOGRAM WITH TOMOSYNTHESIS 5/11/2021 TECHNIQUE: Screening mammography was performed with tomosynthesis including MLO and CC views of the bilateral breasts. Computer aided detection was used for the interpretation of this exam. COMPARISON: Bilateral mammography December 4, 2018 and May 27, 2015. Also February 5, 2010. HISTORY: Screening. FINDINGS: There are scattered areas of fibroglandular density. There is no new dominant mass, suspicious microcalcification, or area of architectural distortion. Benign fatty centered lymph node again apparent mid depth outer aspect right breast.     No mammographic evidence of malignancy. No evidence of significant interval change. BIRADS: BIRADS - CATEGORY 1 Negative, no evidence of malignancy. Normal interval follow-up is recommended in 12 months. OVERALL ASSESSMENT - NEGATIVE A letter of notification will be sent to the patient regarding the results. The [employer] recommends annual mammograms for women 40 years and older. Physical Examination:        Physical Exam  Vitals reviewed. Constitutional:       General: She is awake. She is not in acute distress. Appearance: She is obese. Interventions: Nasal cannula in place. HENT:      Head: Normocephalic. Eyes:      General: Vision grossly intact. Cardiovascular:      Rate and Rhythm: Normal rate and regular rhythm. Heart sounds: Normal heart sounds. Pulmonary:      Effort: Pulmonary effort is normal.      Breath sounds: Wheezing (improved compared to day before) present. Abdominal:      General: Abdomen is flat. Palpations: Abdomen is soft. Tenderness: There is no abdominal tenderness. Musculoskeletal:      Cervical back: Neck supple. Right lower leg: No edema. Left lower leg: No edema. Skin:     Findings: No rash. Neurological:      Mental Status: She is alert and easily aroused. Psychiatric:         Behavior: Behavior is cooperative.        Assessment:        Primary Problem  Community acquired pneumonia of left upper lobe of lung    Active Hospital Problems    Diagnosis Date Noted    History of pulmonary embolism [Z86.711] 05/27/2021    On anticoagulant therapy [Z79.01] 05/27/2021    On methotrexate therapy [Z79.899] 05/27/2021    Obesity, Class III, BMI 40-49.9 (morbid obesity) ([facility] [E66.01] 05/27/2021    Adenocarcinoma, lung, left ([facility] [C34.92] 05/14/2021    Community acquired pneumonia of left upper lobe of lung [J18.9] 12/23/2019    COPD (chronic obstructive pulmonary disease) ([facility] [J44.9] 08/14/2019    Tobacco abuse [Z72.0] 11/12/2018    Major depressive disorder, recurrent, moderate (HCC) [F33.1] 11/12/2018    Rheumatoid arthritis ([facility] [M06.9] 07/14/2014    Hypertension [I10] 09/16/2013       Plan:        Community Acquired Pneumonia, Left Upper Lobe  - WBC 25.0 today  - Hemoglobin 11.2  - CT Chest 05/27: Left upper lobe consolidation. No significant interval change in biopsied left lower lobe 1.6 cm mass.   - IV Azithromycin and IV Rocephin day 2  - Legionella, Strep Pneumo antigen pending  - Pro calcitonin 0.32  - IVF NS at 75 mL/hr    COPD Exacerbation  - Worsening O2 requirements  - ABG showing pH 7.428, pCO2 41.2, pO2 55.4, HCO3 27.2  - Will start IV Solumedrol 40 mg Q6 hours  - Duoneb  - Pulmonology consulted, appreciate recs     Adenocarcinoma Left Lower Lung  - Recently diagnosed 2 weeks ago with needle biopsy  - Follows with Dr. Alicia Woodward  - Hem/Onc consulted     History of DVT, PE  - Eliquis 5 mg twice daily     Hypertension  - Norvasc 10 mg daily      Depression  - Zoloft 100 mg daily     COPD  - Albuterol PRN  - Duoneb     DVT Prophylaxis: Already on Lopez Acuna MD  5/28/2021  9:13 AM

## 2021-05-28 NOTE — PLAN OF CARE
Problem: Falls - Risk of:  Goal: Will remain free from falls  Description: Will remain free from falls  5/27/2021 1734 by Sandra Telles RN  Outcome: Ongoing  Goal: Absence of physical injury  Description: Absence of physical injury  5/27/2021 1734 by Sandra Telles RN  Outcome: Ongoing     Problem: Infection:  Goal: Will remain free from infection  Description: Will remain free from infection  Outcome: Ongoing     Problem: Safety:  Goal: Free from accidental physical injury  Description: Free from accidental physical injury  Outcome: Ongoing     Problem: Daily Care:  Goal: Daily care needs are met  Description: Daily care needs are met  Outcome: Ongoing     Problem: Pain:  Goal: Patient's pain/discomfort is manageable  Description: Patient's pain/discomfort is manageable  Outcome: Ongoing     Problem: Discharge Planning:  Goal: Patients continuum of care needs are met  Description: Patients continuum of care needs are met  Outcome: Ongoing     Problem: Skin Integrity:  Goal: Skin integrity will stabilize  Description: Skin integrity will stabilize  Outcome: Ongoing     Problem: Pain:  Goal: Patient's pain/discomfort is manageable  Description: Patient's pain/discomfort is manageable  Outcome: Ongoing

## 2021-05-28 NOTE — CONSULTS
Today's Date: 5/28/2021  Patient Name: Kera Olvera  Date of admission: 5/27/2021  8:11 AM  Patient's age: 61 y.o., 1955  Admission Dx: Community acquired pneumonia of left upper lobe of lung [J18.9]    Reason for Consult: management recommendations  Requesting Physician: Nicolette Hernandez MD    CHIEF COMPLAINT:  Dyspnea, hemoptysis     History Obtained From:  patient, electronic medical record    HISTORY OF PRESENT ILLNESS:      The patient is a 61 y.o.  female who is admitted to the hospital for worsening hemoptysis and shortness of breath. Imaging studies suggested multifocal pneumonia and she is admitted for antibiotics. Patient is known to us, she presented with isolated lung mass diagnosed after screening lung CT scan . The mass is localized into the left lower lobe. PET scan did not show any evidence of spread. Biopsy was confirmatory for lung cancer. The patient was seen by Dr. Felicia Young who planned resection versus radiation and the plans are underway. The patient is admitted and started on antibiotics. She is on oxygen at 4 L, it was adjusted up to 5 L because of shortness of breath overnight. She is complaining of cough and chest discomfort. She admits to a fever although she has been afebrile since admission. She has no nausea no vomiting  Hemoptysis have been a small amount and mostly streaks with normal sputum  She has history of rheumatoid arthritis on methotrexate  Past Medical History:   has a past medical history of Arthritis, Depression, DVT (deep venous thrombosis) ([address] GERD (gastroesophageal reflux disease), Hx of blood clots, Hypertension, and Rheumatoid arthritis ([address] Past Surgical History:   has a past surgical history that includes Appendectomy (1982); Endoscopy, colon, diagnostic (121926); bronchoscopy (N/A, 12/27/2019); Colonoscopy (N/A, 2/12/2020); and CT NEEDLE BIOPSY LUNG PERCUTANEOUS (5/13/2021).      Medications:    Reviewed in Epic     Allergies: Patient has no known allergies. Social History:   reports that she has been smoking cigarettes. She has a 8.75 pack-year smoking history. She has never used smokeless tobacco. She reports current alcohol use. She reports that she does not use drugs. Family History: family history includes Cancer in her brother, father, and mother; Diabetes in her mother; Heart Disease in her mother. REVIEW OF SYSTEMS:    Constitutional: She had a fever at home, nursing since admission, no night sweats,  Eyes: No eye discharge, double vision, or eye pain   HEENT: negative for sore mouth, sore throat, hoarseness and voice change   Respiratory: Cough and dyspnea, sputum with streaks of hemoptysis as discussed  Cardiovascular: negative for chest pain, dyspnea, palpitations, orthopnea, PND   Gastrointestinal: negative for nausea, vomiting, diarrhea, constipation, abdominal pain, Dysphagia, hematemesis and hematochezia   Genitourinary: negative for frequency, dysuria, nocturia, urinary incontinence, and hematuria   Integument: negative for rash, skin lesions, bruises.    Hematologic/Lymphatic: negative for easy bruising, bleeding, lymphadenopathy, or petechiae   Endocrine: negative for heat or cold intolerance,weight changes, change in bowel habits and hair loss   Musculoskeletal: negative for myalgias, arthralgias, pain, joint swelling,and bone pain   Neurological: negative for headaches, dizziness, seizures, weakness, numbness    PHYSICAL EXAM:      BP (!) 142/74   Pulse 100   Temp 98.4 °F (36.9 °C) (Oral)   Resp 18   Ht 5' 5\" (1.651 m)   Wt 261 lb 3.9 oz (118.5 kg)   SpO2 93%   BMI 43.47 kg/m²    Temp (24hrs), Av.6 °F (37 °C), Min:97.9 °F (36.6 °C), Max:100 °F (37.8 °C)    General appearance - well appearing, no in pain or distress   Mental status - alert and cooperative   Eyes - pupils equal and reactive, extraocular eye movements intact   Ears - bilateral TM's and external ear canals normal   Mouth - mucous membranes moist, pharynx normal without lesions   Neck - supple, no significant adenopathy   Lymphatics - no palpable lymphadenopathy, no hepatosplenomegaly   Chest -decreased air entry, bilateral rhonchi, scattered wheezing appreciated. Heart - normal rate, regular rhythm, normal S1, S2, no murmurs  Abdomen - soft, nontender, nondistended, no masses or organomegaly   Neurological - alert, oriented, normal speech, no focal findings or movement disorder noted   Musculoskeletal - no joint tenderness, deformity or swelling   Extremities - peripheral pulses normal, no pedal edema, no clubbing or cyanosis   Skin - normal coloration and turgor, no rashes, no suspicious skin lesions noted ,    DATA:    Labs:   CBC:   Recent Labs     05/27/21  0843 05/28/21  0545   WBC 24.4* 25.0*   HGB 12.8 11.2*   HCT 38.2 33.5*    235     BMP:   Recent Labs     05/27/21  0843 05/28/21  0545    136   K 3.7 3.9   CO2 24 26   BUN 9 12   CREATININE 0.76 0.77   LABGLOM >60 >60   GLUCOSE 138* 130*     PT/INR: No results for input(s): PROTIME, INR in the last 72 hours.     IMAGING DATA:  CT scan chest   Impression   1.  No evidence of pulmonary embolism.       2.  Left upper lobe consolidation, likely representing infection.  No imaging   evidence for aspiration.       3.  No significant interval change in biopsied malignancy in the left lower   lobe.       4.  Cholelithiasis.       5.  Punctate left nephrolithiasis.       6.  Findings suggestive of hepatic steatosis.           Primary Problem  Community acquired pneumonia of left upper lobe of lung    Active Hospital Problems    Diagnosis Date Noted    History of pulmonary embolism [Z86.711] 05/27/2021    On anticoagulant therapy [Z79.01] 05/27/2021    On methotrexate therapy [Z79.899] 05/27/2021    Obesity, Class III, BMI 40-49.9 (morbid obesity) ([facility] [E66.01] 05/27/2021    Adenocarcinoma, lung, left ([facility] [C34.92] 05/14/2021    Community acquired pneumonia of left upper lobe of lung [J18.9] 12/23/2019    COPD (chronic obstructive pulmonary disease) ([facility] [J44.9] 08/14/2019    Tobacco abuse [Z72.0] 11/12/2018    Major depressive disorder, recurrent, moderate (HCC) [F33.1] 11/12/2018    Rheumatoid arthritis ([facility] [M06.9] 07/14/2014    Hypertension [I10] 09/16/2013         IMPRESSION:   1. History of left-sided lung cancer, stage I clinically  2. Community-acquired pneumonia, likely multifocal  3. Hemoptysis secondary to pneumonia  4. Underlying COPD and possible BRAYAN  5. Underlying rheumatoid    RECOMMENDATIONS:  1. Continue supportive care  2. Hold methotrexate for now  3. Continue empiric antibiotics, check Legionella antibody  4. Continue with anticoagulation with Eliquis, we will evaluate daily depending on the amount of hemoptysis she has and her hemoglobin. Might stop if she has any significant hemoptysis. 5. As for the lung cancer, obviously it will be addressed after few records from acute infection. He might not be a candidate for surgery considering her lung function, if that is the case, will offer SBRT. We will discuss with pulmonary service. Discussed with patient and Nurse. Thank you for asking us to see this patient.     TONY JOYNER [facility] MD Noel  Hematologist/Medical Oncologist  Cell: [phone]

## 2021-05-29 LAB
ABSOLUTE BANDS #: 1.34 K/UL (ref 0–1)
ABSOLUTE EOS #: 0 K/UL (ref 0–0.4)
ABSOLUTE IMMATURE GRANULOCYTE: ABNORMAL K/UL (ref 0–0.3)
ABSOLUTE LYMPH #: 0.67 K/UL (ref 1–4.8)
ABSOLUTE MONO #: 0.9 K/UL (ref 0.1–1.3)
ANION GAP SERPL CALCULATED.3IONS-SCNC: 7 MMOL/L (ref 9–17)
BANDS: 6 % (ref 0–10)
BASOPHILS # BLD: 0 % (ref 0–2)
BASOPHILS ABSOLUTE: 0 K/UL (ref 0–0.2)
BUN BLDV-MCNC: 15 MG/DL (ref 8–23)
BUN/CREAT BLD: ABNORMAL (ref 9–20)
CALCIUM SERPL-MCNC: 9.2 MG/DL (ref 8.6–10.4)
CHLORIDE BLD-SCNC: 102 MMOL/L (ref 98–107)
CO2: 26 MMOL/L (ref 20–31)
CREAT SERPL-MCNC: 0.59 MG/DL (ref 0.5–0.9)
DIFFERENTIAL TYPE: ABNORMAL
EOSINOPHILS RELATIVE PERCENT: 0 % (ref 0–4)
GFR AFRICAN AMERICAN: >60 ML/MIN
GFR NON-AFRICAN AMERICAN: >60 ML/MIN
GFR SERPL CREATININE-BSD FRML MDRD: ABNORMAL ML/MIN/{1.73_M2}
GFR SERPL CREATININE-BSD FRML MDRD: ABNORMAL ML/MIN/{1.73_M2}
GLUCOSE BLD-MCNC: 184 MG/DL (ref 70–99)
HCT VFR BLD CALC: 33.1 % (ref 36–46)
HEMOGLOBIN: 11.2 G/DL (ref 12–16)
IMMATURE GRANULOCYTES: ABNORMAL %
LEGIONELLA PNEUMOPHILIA AG, URINE: NEGATIVE
LYMPHOCYTES # BLD: 3 % (ref 24–44)
MAGNESIUM: 2.1 MG/DL (ref 1.6–2.6)
MCH RBC QN AUTO: 32.8 PG (ref 26–34)
MCHC RBC AUTO-ENTMCNC: 33.7 G/DL (ref 31–37)
MCV RBC AUTO: 97.2 FL (ref 80–100)
METAMYELOCYTES ABSOLUTE COUNT: 0.22 K/UL
METAMYELOCYTES: 1 %
MONOCYTES # BLD: 4 % (ref 1–7)
MORPHOLOGY: ABNORMAL
MRSA, DNA, NASAL: NORMAL
NRBC AUTOMATED: ABNORMAL PER 100 WBC
PDW BLD-RTO: 16 % (ref 11.5–14.9)
PLATELET # BLD: 236 K/UL (ref 150–450)
PLATELET ESTIMATE: ABNORMAL
PMV BLD AUTO: 7.9 FL (ref 6–12)
POTASSIUM SERPL-SCNC: 3.5 MMOL/L (ref 3.7–5.3)
RBC # BLD: 3.4 M/UL (ref 4–5.2)
RBC # BLD: ABNORMAL 10*6/UL
SEG NEUTROPHILS: 86 % (ref 36–66)
SEGMENTED NEUTROPHILS ABSOLUTE COUNT: 19.27 K/UL (ref 1.3–9.1)
SODIUM BLD-SCNC: 135 MMOL/L (ref 135–144)
SOURCE: NORMAL
SPECIMEN DESCRIPTION: NORMAL
STREP PNEUMONIAE ANTIGEN: NEGATIVE
WBC # BLD: 22.4 K/UL (ref 3.5–11)
WBC # BLD: ABNORMAL 10*3/UL

## 2021-05-29 PROCEDURE — 94640 AIRWAY INHALATION TREATMENT: CPT

## 2021-05-29 PROCEDURE — 6360000002 HC RX W HCPCS: Performed by: INTERNAL MEDICINE

## 2021-05-29 PROCEDURE — 2060000000 HC ICU INTERMEDIATE R&B

## 2021-05-29 PROCEDURE — 36415 COLL VENOUS BLD VENIPUNCTURE: CPT

## 2021-05-29 PROCEDURE — 6370000000 HC RX 637 (ALT 250 FOR IP): Performed by: STUDENT IN AN ORGANIZED HEALTH CARE EDUCATION/TRAINING PROGRAM

## 2021-05-29 PROCEDURE — 99233 SBSQ HOSP IP/OBS HIGH 50: CPT | Performed by: INTERNAL MEDICINE

## 2021-05-29 PROCEDURE — 83735 ASSAY OF MAGNESIUM: CPT

## 2021-05-29 PROCEDURE — 6360000002 HC RX W HCPCS: Performed by: STUDENT IN AN ORGANIZED HEALTH CARE EDUCATION/TRAINING PROGRAM

## 2021-05-29 PROCEDURE — 2580000003 HC RX 258: Performed by: STUDENT IN AN ORGANIZED HEALTH CARE EDUCATION/TRAINING PROGRAM

## 2021-05-29 PROCEDURE — 85025 COMPLETE CBC W/AUTO DIFF WBC: CPT

## 2021-05-29 PROCEDURE — 94761 N-INVAS EAR/PLS OXIMETRY MLT: CPT

## 2021-05-29 PROCEDURE — 2580000003 HC RX 258: Performed by: INTERNAL MEDICINE

## 2021-05-29 PROCEDURE — 6370000000 HC RX 637 (ALT 250 FOR IP): Performed by: INTERNAL MEDICINE

## 2021-05-29 PROCEDURE — 99232 SBSQ HOSP IP/OBS MODERATE 35: CPT | Performed by: INTERNAL MEDICINE

## 2021-05-29 PROCEDURE — 80048 BASIC METABOLIC PNL TOTAL CA: CPT

## 2021-05-29 RX ORDER — PANTOPRAZOLE SODIUM 40 MG/1
40 TABLET, DELAYED RELEASE ORAL
Status: DISCONTINUED | OUTPATIENT
Start: 2021-05-29 | End: 2021-05-30 | Stop reason: HOSPADM

## 2021-05-29 RX ORDER — POTASSIUM CHLORIDE 20 MEQ/1
40 TABLET, EXTENDED RELEASE ORAL PRN
Status: DISCONTINUED | OUTPATIENT
Start: 2021-05-29 | End: 2021-05-30 | Stop reason: HOSPADM

## 2021-05-29 RX ORDER — POTASSIUM CHLORIDE 7.45 MG/ML
10 INJECTION INTRAVENOUS PRN
Status: DISCONTINUED | OUTPATIENT
Start: 2021-05-29 | End: 2021-05-30 | Stop reason: HOSPADM

## 2021-05-29 RX ADMIN — BENZONATATE 200 MG: 100 CAPSULE ORAL at 14:22

## 2021-05-29 RX ADMIN — METHYLPREDNISOLONE SODIUM SUCCINATE 40 MG: 40 INJECTION, POWDER, FOR SOLUTION INTRAMUSCULAR; INTRAVENOUS at 02:27

## 2021-05-29 RX ADMIN — SODIUM CHLORIDE: 9 INJECTION, SOLUTION INTRAVENOUS at 18:05

## 2021-05-29 RX ADMIN — CEFEPIME 2000 MG: 2 INJECTION, POWDER, FOR SOLUTION INTRAVENOUS at 14:22

## 2021-05-29 RX ADMIN — SODIUM CHLORIDE, PRESERVATIVE FREE 10 ML: 5 INJECTION INTRAVENOUS at 10:14

## 2021-05-29 RX ADMIN — METHYLPREDNISOLONE SODIUM SUCCINATE 40 MG: 40 INJECTION, POWDER, FOR SOLUTION INTRAMUSCULAR; INTRAVENOUS at 19:51

## 2021-05-29 RX ADMIN — FOLIC ACID 1 MG: 1 TABLET ORAL at 10:10

## 2021-05-29 RX ADMIN — ACETAMINOPHEN 650 MG: 325 TABLET, FILM COATED ORAL at 02:27

## 2021-05-29 RX ADMIN — APIXABAN 5 MG: 5 TABLET, FILM COATED ORAL at 19:51

## 2021-05-29 RX ADMIN — METHYLPREDNISOLONE SODIUM SUCCINATE 40 MG: 40 INJECTION, POWDER, FOR SOLUTION INTRAMUSCULAR; INTRAVENOUS at 10:14

## 2021-05-29 RX ADMIN — NYSTATIN 500000 UNITS: 100000 SUSPENSION ORAL at 12:39

## 2021-05-29 RX ADMIN — METHYLPREDNISOLONE SODIUM SUCCINATE 40 MG: 40 INJECTION, POWDER, FOR SOLUTION INTRAMUSCULAR; INTRAVENOUS at 14:21

## 2021-05-29 RX ADMIN — APIXABAN 5 MG: 5 TABLET, FILM COATED ORAL at 10:13

## 2021-05-29 RX ADMIN — AMLODIPINE BESYLATE 10 MG: 10 TABLET ORAL at 10:16

## 2021-05-29 RX ADMIN — ACETAMINOPHEN 650 MG: 325 TABLET, FILM COATED ORAL at 11:44

## 2021-05-29 RX ADMIN — IPRATROPIUM BROMIDE AND ALBUTEROL SULFATE 1 AMPULE: .5; 3 SOLUTION RESPIRATORY (INHALATION) at 07:03

## 2021-05-29 RX ADMIN — NYSTATIN 500000 UNITS: 100000 SUSPENSION ORAL at 10:17

## 2021-05-29 RX ADMIN — POTASSIUM CHLORIDE 40 MEQ: 1500 TABLET, EXTENDED RELEASE ORAL at 12:36

## 2021-05-29 RX ADMIN — BENZONATATE 200 MG: 100 CAPSULE ORAL at 10:13

## 2021-05-29 RX ADMIN — NYSTATIN 500000 UNITS: 100000 SUSPENSION ORAL at 19:51

## 2021-05-29 RX ADMIN — SODIUM CHLORIDE: 9 INJECTION, SOLUTION INTRAVENOUS at 02:26

## 2021-05-29 RX ADMIN — IPRATROPIUM BROMIDE AND ALBUTEROL SULFATE 1 AMPULE: .5; 3 SOLUTION RESPIRATORY (INHALATION) at 14:46

## 2021-05-29 RX ADMIN — PANTOPRAZOLE SODIUM 40 MG: 40 TABLET, DELAYED RELEASE ORAL at 12:35

## 2021-05-29 RX ADMIN — IPRATROPIUM BROMIDE AND ALBUTEROL SULFATE 1 AMPULE: .5; 3 SOLUTION RESPIRATORY (INHALATION) at 10:44

## 2021-05-29 RX ADMIN — BENZONATATE 200 MG: 100 CAPSULE ORAL at 19:51

## 2021-05-29 RX ADMIN — IPRATROPIUM BROMIDE AND ALBUTEROL SULFATE 1 AMPULE: .5; 3 SOLUTION RESPIRATORY (INHALATION) at 19:13

## 2021-05-29 RX ADMIN — Medication 60 MG: at 10:16

## 2021-05-29 RX ADMIN — AZITHROMYCIN MONOHYDRATE 500 MG: 500 INJECTION, POWDER, LYOPHILIZED, FOR SOLUTION INTRAVENOUS at 15:58

## 2021-05-29 RX ADMIN — CEFEPIME 2000 MG: 2 INJECTION, POWDER, FOR SOLUTION INTRAVENOUS at 02:26

## 2021-05-29 RX ADMIN — Medication 60 MG: at 19:51

## 2021-05-29 RX ADMIN — NYSTATIN 500000 UNITS: 100000 SUSPENSION ORAL at 17:39

## 2021-05-29 ASSESSMENT — ENCOUNTER SYMPTOMS
SHORTNESS OF BREATH: 0
WHEEZING: 0
RESPIRATORY NEGATIVE: 1
COUGH: 0
EYES NEGATIVE: 1
GASTROINTESTINAL NEGATIVE: 1

## 2021-05-29 ASSESSMENT — PAIN SCALES - GENERAL
PAINLEVEL_OUTOF10: 6
PAINLEVEL_OUTOF10: 6
PAINLEVEL_OUTOF10: 4
PAINLEVEL_OUTOF10: 2

## 2021-05-29 NOTE — PLAN OF CARE
Problem: Falls - Risk of:  Goal: Will remain free from falls  Description: Will remain free from falls  5/29/2021 0355 by Ren Roldan RN  Outcome: Ongoing  Note: Pt is up per self. Gait is steady, remains free from falls. Will monitor      Problem: Falls - Risk of:  Goal: Absence of physical injury  Description: Absence of physical injury  5/29/2021 0355 by Ren Roldan RN  Outcome: Ongoing  Note: Pt remains free from any physical injury this RN's shift. Will monitor      Problem: Pain:  Goal: Pain level will decrease  Description: Pain level will decrease  5/29/2021 0355 by Ren Roldan RN  Outcome: Ongoing  Note: Pt has complained of back pain for this RN. Pt received tylenol.  Will monitor

## 2021-05-29 NOTE — PROGRESS NOTES
[address]    PROGRESS NOTE             5/29/2021    11:20 AM    Name:   Kera Olvera  MRN:     [id number]     Acct:      [de-identified]   Room:   2089/2089-01  IP Day:  2  Admit Date:  5/27/2021  8:11 AM    PCP:  Renetta Teixeira MD  Code Status:  Full Code    Subjective:     C/C:   Chief Complaint   Patient presents with    Pain     Generalized full body ache    Cough     Light pink blood, about the size of a dime x2    Back Pain    Pain     Lt. lateral pain d/t biopsy of lung 1.5 weeks ago. Test for spot on lung. Interval History Status: improved. No acte events over night. Patient seen and examined while sitting in chair at bedside. She reports improved SOB denies cough. Not on oxygen. SaO2 92% on room air. Patient would like to discuss when she can go home. Brief History:     The patient is a 61 y.o. Non-/non  female with a past medical history of smoking, COPD, DVT, PE (2014) and recently diagnosed Adenocarcinoma of the left upper lung who presents today with worsening productive cough and fatigue. Patient has a 35 year pack history. States for the last 3 days her cough has worsened, with productive sputum and blood streaks in it. Associated with pleuritic chest pain, night sweats and worsening shortness of breath. Patient does not use O2 at home, currently requiring 2L of O2 via NC to maintain saturation. Patient was found to have a 1.7 cm spiculated nodule on CT chest, a biopsy was done about 2 weeks ago. Biopsy showed Adenocarcinoma. Patient is now following Dr. Juan Carlos Carbone and Dr. Felicia Young Hem/Onc. Patient also complaining of some left lower back pain and some nausea.  Denies any chest pain, lightheadedness, numbness, weakness.     In the ED, Patient is mildly hypertensive, has increased RR and is breathing 90% on 2L of O2 via NC. CT PE was done which showed left upper lobe consolidation and no change in previously biopsied 1.6 cm mass in the left lower lung. COVID negative. WBC count is 24.4. Was given 1 fluid bolus and started on Azithromycin and Rocephin. Patient will be admitted for management of Left Upper Lobe Community Acquired Pneumonia. Review of Systems:     Review of Systems   Constitutional: Negative for chills and fever. HENT: Negative. Eyes: Negative. Respiratory: Negative. Negative for cough, shortness of breath and wheezing. Cardiovascular: Negative. Gastrointestinal: Negative. Genitourinary: Negative. Musculoskeletal: Negative. Skin: Negative. Neurological: Negative. Psychiatric/Behavioral: Negative. Medications: Allergies:  No Known Allergies    Current Meds:   Scheduled Meds:    methylPREDNISolone  40 mg Intravenous Q6H    ipratropium-albuterol  1 ampule Inhalation Q4H WA    cefepime  2,000 mg Intravenous Q12H    benzonatate  200 mg Oral TID    dextromethorphan  60 mg Oral 2 times per day    nystatin  5 mL Oral 4x Daily    amLODIPine  10 mg Oral Daily    apixaban  5 mg Oral BID    folic acid  1 mg Oral Daily    sertraline  100 mg Oral Daily    azithromycin  500 mg Intravenous Q24H    sodium chloride flush  5-40 mL Intravenous 2 times per day     Continuous Infusions:    sodium chloride      sodium chloride 75 mL/hr at 05/29/21 0226     PRN Meds: albuterol, albuterol sulfate HFA, sodium chloride flush, sodium chloride, promethazine **OR** ondansetron, polyethylene glycol, acetaminophen **OR** acetaminophen    Data:     Past Medical History:   has a past medical history of Arthritis, Depression, DVT (deep venous thrombosis) ([facility] GERD (gastroesophageal reflux disease), Hx of blood clots, Hypertension, and Rheumatoid arthritis ([facility] Social History:   reports that she has been smoking cigarettes. She has a 8.75 pack-year smoking history.  She has never used smokeless tobacco. She reports current alcohol use. She reports that she does not use drugs. Family History:   Family History   Problem Relation Age of Onset   [facility] Cancer Mother     Diabetes Mother     Heart Disease Mother     Cancer Father     Cancer Brother        Vitals:  BP (!) 141/58   Pulse 94   Temp 97.9 °F (36.6 °C) (Oral)   Resp 18   Ht 5' 5\" (1.651 m)   Wt 264 lb 8.8 oz (120 kg)   SpO2 92%   BMI 44.02 kg/m²   Temp (24hrs), Av.3 °F (36.8 °C), Min:97.9 °F (36.6 °C), Max:98.4 °F (36.9 °C)    No results for input(s): POCGLU in the last 72 hours. I/O(24Hr): No intake or output data in the 24 hours ending 21 1120    Labs:    [unfilled]    Lab Results   Component Value Date/Time    SPECIAL NOT REPORTED 2019 10:50 AM    SPECIAL NOT REPORTED 2019 10:50 AM    SPECIAL NOT REPORTED 2019 10:50 AM    SPECIAL NOT REPORTED 2019 10:50 AM     Lab Results   Component Value Date/Time    CULTURE NO GROWTH 2019 10:50 AM    CULTURE YEAST ISOLATED, ID TO FOLLOW (A) 2019 10:50 AM    CULTURE CANDIDA ALBICANS (A) 2019 10:50 AM    CULTURE NO GROWTH 44 DAYS 2019 10:50 AM       [unfilled]    Radiology:    XR CHEST (SINGLE VIEW FRONTAL)    Result Date: 2021  EXAMINATION: ONE XRAY VIEW OF THE CHEST 2021 11:36 am COMPARISON: AP chest from 2021 HISTORY: ORDERING SYSTEM PROVIDED HISTORY: Short of breath TECHNOLOGIST PROVIDED HISTORY: Short of breath Reason for Exam: dyspnea Acuity: Acute Type of Exam: Initial History of hypertension, GERD and rheumatoid arthritis. FINDINGS: Overlying ECG monitor leads and gown snaps. Left heart border obscured by extensive dense confluent airspace opacity involving ~ 2/3 mid-lower left hemithorax with a few UL air bronchograms and obscuration left hemidiaphragm. Right lung similar in appearance with minimal basilar atelectasis but no localized pulmonary opacity or large pleural effusion. Somewhat engorged right vasculature. Bones unchanged.      Extensive left-sided airspace pneumonia with probable pleural effusion and some degree of atelectasis right lung unchanged. XR CHEST PORTABLE    Result Date: 5/13/2021  EXAMINATION: ONE XRAY VIEW OF THE CHEST 5/13/2021 12:21 pm COMPARISON: CT April 20, 2021 and radiograph December 26, 2019 HISTORY: ORDERING SYSTEM PROVIDED HISTORY: s/p LLL nodule bx, evaluate for ptx TECHNOLOGIST PROVIDED HISTORY: s/p LLL nodule bx, evaluate for ptx Reason for Exam: s/p LLL nodule bx, evaluate for ptx Acuity: Unknown Type of Exam: Unknown FINDINGS: Mild cardiomegaly. Cardiomediastinal silhouette otherwise within normal limits. No evidence of pneumothorax. No acute pulmonary findings. No pleural effusion or subdiaphragmatic free air. No acute osseous abnormality identified. No pneumothorax status post left lower lobe nodule biopsy. CT CHEST PULMONARY EMBOLISM W CONTRAST    Result Date: 5/27/2021  EXAMINATION: CTA OF THE CHEST 5/27/2021 9:14 am TECHNIQUE: CTA of the chest was performed after the administration of intravenous contrast.  Multiplanar reformatted images are provided for review. MIP images are provided for review. Dose modulation, iterative reconstruction, and/or weight based adjustment of the mA/kV was utilized to reduce the radiation dose to as low as reasonably achievable. COMPARISON: Chest radiograph 05/13/2021. PET-CT 04/30/2021. Chest CT 04/20/2021. HISTORY: ORDERING SYSTEM PROVIDED HISTORY: pleuritic chest pain, known lung malignancy TECHNOLOGIST PROVIDED HISTORY: pleuritic chest pain, known lung malignancy Decision Support Exception - unselect if not a suspected or confirmed emergency medical condition->Emergency Medical Condition (MA) Reason for Exam: chest pain, body aches Acuity: Unknown Type of Exam: Unknown FINDINGS: Pulmonary Arteries: Pulmonary arteries are adequately opacified for evaluation. No evidence of intraluminal filling defect to suggest pulmonary embolism.   Main pulmonary artery is obtained after a detailed discussion about the procedure including the risk, benefits, and alternatives. Universal protocol was followed including a time-out to confirm the correct patient and procedure. The patient was positioned prone on the CT table. Axial CT images were obtained through the chest and a suitable skin site was prepped and draped in sterile fashion. Local anesthesia was achieved with lidocaine. Under repeat CT guidance, a 19 gauge coaxial needle was advanced into the left lower lobe pulmonary nodule. Final needle position was confirmed prior to biopsy. Five 20 gauge core biopsy specimens were obtained through the coaxial needle and given to Pathology who was present to help confirm sample adequacy. The coaxial needle was removed and pressure held for hemostasis. A sterile dressing was applied. Final CT was performed. Patient tolerated the procedure well. No immediate complication. Estimated blood loss: Less than 5 mL. Dose modulation, iterative reconstruction, and/or weight based adjustment of the mA/kV was utilized to reduce the radiation dose to as low as reasonably achievable. FINDINGS:  CT redemonstrates the left lower lobe pulmonary nodule and guides coaxial needle placement. CT immediately prior to biopsy demonstrates satisfactory coaxial needle placement with the tip in the periphery of the nodule. Final CT after biopsy demonstrates no evidence of pneumothorax. There is mild pulmonary hemorrhage surrounding the nodule. Successful CT-guided left lower lobe lung nodule core biopsy. PET CT SKULL BASE TO MID THIGH    Result Date: 4/30/2021  EXAMINATION: WHOLE BODY PET/CT 4/30/2021 TECHNIQUE: Following IV injection of 13.165 mCi of F 18 -FDG, PET  tumor imaging was acquired from the base of the skull to the mid thighs. Computed tomography was used for purposes of attenuation correction and anatomic localization. Fusion imaging was utilized for interpretation.  Uptake time 62 mins.  Glucose level 92 mg/dl. COMPARISON: Lung screening CT April 20, 2021. HISTORY: ORDERING SYSTEM PROVIDED HISTORY: Abnormal CT of the chest TECHNOLOGIST PROVIDED HISTORY: increase in lung nodule Reason for Exam: Abnormal CT of chest, increase in lung nodule Acuity: Acute Type of Exam: Initial FINDINGS: HEAD/NECK: No abnormal FDG activity is identified. CHEST: The previously identified nodule involving the left lower lobe is not FDG avid. SUV max of 1.7. No abnormal FDG activity is identified involving the chest. ABDOMEN/PELVIS: No abnormal FDG activity is identified. BONES/SOFT TISSUE: No focal abnormal FDG activity is identified. INCIDENTAL CT FINDINGS: No pneumothorax. No pericardial or pleural effusions. No free air or free fluid. Cholelithiasis. Questionable punctate nonobstructing calculus left kidney. Sigmoid diverticulosis. Negative PET-CT. The previously identified nodule involving the left lower lobe is not FDG avid. Tissue sampling is still recommended given the nodules morphology is well as reported increase in size. If no tissue sampling is performed, CT follow-up is recommended. [facility] MODESTA DIGITAL SCREEN BILATERAL    Result Date: 5/11/2021  EXAMINATION: SCREENING DIGITAL BILATERAL  MAMMOGRAM WITH TOMOSYNTHESIS 5/11/2021 TECHNIQUE: Screening mammography was performed with tomosynthesis including MLO and CC views of the bilateral breasts. Computer aided detection was used for the interpretation of this exam. COMPARISON: Bilateral mammography December 4, 2018 and May 27, 2015. Also February 5, 2010. HISTORY: Screening. FINDINGS: There are scattered areas of fibroglandular density. There is no new dominant mass, suspicious microcalcification, or area of architectural distortion. Benign fatty centered lymph node again apparent mid depth outer aspect right breast.     No mammographic evidence of malignancy. No evidence of significant interval change.  BIRADS: BIRADS - CATEGORY 1 Negative, no evidence of malignancy. Normal interval follow-up is recommended in 12 months. OVERALL ASSESSMENT - NEGATIVE A letter of notification will be sent to the patient regarding the results. The [employer] recommends annual mammograms for women 40 years and older. Physical Examination:        Physical Exam  Constitutional:       Appearance: Normal appearance. She is obese. Eyes:      Conjunctiva/sclera: Conjunctivae normal.   Cardiovascular:      Rate and Rhythm: Normal rate and regular rhythm. Pulses: Normal pulses. Heart sounds: Normal heart sounds. Pulmonary:      Breath sounds: Normal breath sounds. No wheezing. Abdominal:      Palpations: Abdomen is soft. Tenderness: There is no abdominal tenderness. Musculoskeletal:      Right lower leg: No edema. Left lower leg: No edema. Skin:     Capillary Refill: Capillary refill takes 2 to 3 seconds. Neurological:      Mental Status: She is alert. Assessment:        Primary Problem  Community acquired pneumonia of left upper lobe of lung    Active Hospital Problems    Diagnosis Date Noted    History of pulmonary embolism [Z86.711] 05/27/2021    On anticoagulant therapy [Z79.01] 05/27/2021    On methotrexate therapy [Z79.899] 05/27/2021    Obesity, Class III, BMI 40-49.9 (morbid obesity) ([facility] [E66.01] 05/27/2021    Adenocarcinoma, lung, left ([facility] [C34.92] 05/14/2021    Community acquired pneumonia of left upper lobe of lung [J18.9] 12/23/2019    COPD (chronic obstructive pulmonary disease) ([facility] [J44.9] 08/14/2019    Tobacco abuse [Z72.0] 11/12/2018    Major depressive disorder, recurrent, moderate ([address] [F33.1] 11/12/2018    Rheumatoid arthritis ([facility] [M06.9] 07/14/2014    Hypertension [I10] 09/16/2013       Plan:        Community Acquired Pneumonia, Left Upper Lobe  - WBC 25.0 today 22.4  - Hemoglobin 11.2 and stable  - CT Chest 05/27: Left upper lobe consolidation.  No significant interval change in biopsied left lower lobe 1.6 cm mass. - IV Azithromycin and Cefepime day 2  - Legionella, Strep Pneumo antigen both negative  - Pro calcitonin 0.32  - IVF NS at 75 mL/hr     COPD Exacerbation  - Worsening O2 requirements  - ABG showing pH 7.428, pCO2 41.2, pO2 55.4, HCO3 27.2  - Continue IV Solumedrol 40 mg Q6 hours  - Duoneb  - Per Pulmonology   -Continue Solu medrol    - Acapella/flutter valve treatment   -Will eventually need outpatient PFTs   -Presumptive BRAYAN until OP sleep study can be arranged.      Adenocarcinoma Left Lower Lung  - Recently diagnosed 2 weeks ago with needle biopsy  - Follows with Dr. Juanita Gerard  - Hem/Onc consulted   - HELD methotrexate   -Not candidate for surgery, will offer SBRT. - Continue Eliquis     History of DVT, PE  - Eliquis 5 mg twice daily     Hypertension  - Norvasc 10 mg daily      Depression  - Zoloft 100 mg daily     COPD  - Albuterol PRN  - Duoneb     DVT Prophylaxis: Already on Eliquis  GI Prophylaxis: Protonix 40 mg PO QD. Andrade Rodarte MD  5/29/2021  11:20 AM       I have discussed the care of Hilario Marion , including pertinent history and exam findings,    today with the resident. I have seen and examined the patient and the key elements of all parts of the encounter have been performed by me . I agree with the assessment, plan and orders as documented by the resident. Principal Problem:    Pneumonia due to organism  Active Problems:    Hypertension    Rheumatoid arthritis ([facility]    Major depressive disorder, recurrent, moderate (HCC)    Tobacco abuse    COPD (chronic obstructive pulmonary disease) (HCC)    Adenocarcinoma, lung, left (HCC)    History of pulmonary embolism    On anticoagulant therapy    On methotrexate therapy    Obesity, Class III, BMI 40-49.9 (morbid obesity) ([facility]  Resolved Problems:    * No resolved hospital problems. *        Overall  course ;                                   are improving over time.

## 2021-05-29 NOTE — PROGRESS NOTES
Today's Date: 5/29/2021  Patient Name: Jose Ramon Mendez  Date of admission: 5/27/2021  8:11 AM  Patient's age: 61 y.o., 1955  Admission Dx: Community acquired pneumonia of left upper lobe of lung [J18.9]    Reason for Consult: management recommendations  Requesting Physician: Bernabe Benjamin MD    CHIEF COMPLAINT:  Dyspnea, hemoptysis     SUBJECTIVE:  . The patient was seen and examined. Clinically stable. She is improving. Less shortness of breath. No events overnight. No active bleeding. No fever. BRIEF CASE HISTORY:    The patient is a 61 y.o.  female who is admitted to the hospital for worsening hemoptysis and shortness of breath. Imaging studies suggested multifocal pneumonia and she is admitted for antibiotics. Patient is known to us, she presented with isolated lung mass diagnosed after screening lung CT scan . The mass is localized into the left lower lobe. PET scan did not show any evidence of spread. Biopsy was confirmatory for lung cancer. The patient was seen by Dr. Francesco Lima who planned resection versus radiation and the plans are underway. The patient is admitted and started on antibiotics. She is on oxygen at 4 L, it was adjusted up to 5 L because of shortness of breath overnight. She is complaining of cough and chest discomfort. She admits to a fever although she has been afebrile since admission. She has no nausea no vomiting  Hemoptysis have been a small amount and mostly streaks with normal sputum  She has history of rheumatoid arthritis on methotrexate  Past Medical History:   has a past medical history of Arthritis, Depression, DVT (deep venous thrombosis) ([address] GERD (gastroesophageal reflux disease), Hx of blood clots, Hypertension, and Rheumatoid arthritis ([address] Past Surgical History:   has a past surgical history that includes Appendectomy (1982); Endoscopy, colon, diagnostic (718397); bronchoscopy (N/A, 12/27/2019);  Colonoscopy (N/A, 2/12/2020); and CT NEEDLE BIOPSY LUNG PERCUTANEOUS (2021). Medications:    Reviewed in Epic     Allergies:  Patient has no known allergies. Social History:   reports that she has been smoking cigarettes. She has a 8.75 pack-year smoking history. She has never used smokeless tobacco. She reports current alcohol use. She reports that she does not use drugs. Family History: family history includes Cancer in her brother, father, and mother; Diabetes in her mother; Heart Disease in her mother. REVIEW OF SYSTEMS:    Constitutional: She had a fever at home, nursing since admission, no night sweats,  Eyes: No eye discharge, double vision, or eye pain   HEENT: negative for sore mouth, sore throat, hoarseness and voice change   Respiratory: Cough and dyspnea, sputum with streaks of hemoptysis as discussed  Cardiovascular: negative for chest pain, dyspnea, palpitations, orthopnea, PND   Gastrointestinal: negative for nausea, vomiting, diarrhea, constipation, abdominal pain, Dysphagia, hematemesis and hematochezia   Genitourinary: negative for frequency, dysuria, nocturia, urinary incontinence, and hematuria   Integument: negative for rash, skin lesions, bruises.    Hematologic/Lymphatic: negative for easy bruising, bleeding, lymphadenopathy, or petechiae   Endocrine: negative for heat or cold intolerance,weight changes, change in bowel habits and hair loss   Musculoskeletal: negative for myalgias, arthralgias, pain, joint swelling,and bone pain   Neurological: negative for headaches, dizziness, seizures, weakness, numbness    PHYSICAL EXAM:      /67   Pulse 94   Temp 98.1 °F (36.7 °C) (Oral)   Resp 18   Ht 5' 5\" (1.651 m)   Wt 264 lb 8.8 oz (120 kg)   SpO2 93%   BMI 44.02 kg/m²    Temp (24hrs), Av.2 °F (36.8 °C), Min:97.9 °F (36.6 °C), Max:98.4 °F (36.9 °C)    General appearance - well appearing, no in pain or distress   Mental status - alert and cooperative   Eyes - pupils equal and reactive, extraocular eye movements intact   Ears - bilateral TM's and external ear canals normal   Mouth - mucous membranes moist, pharynx normal without lesions   Neck - supple, no significant adenopathy   Lymphatics - no palpable lymphadenopathy, no hepatosplenomegaly   Chest -decreased air entry, bilateral rhonchi, scattered wheezing appreciated. Heart - normal rate, regular rhythm, normal S1, S2, no murmurs  Abdomen - soft, nontender, nondistended, no masses or organomegaly   Neurological - alert, oriented, normal speech, no focal findings or movement disorder noted   Musculoskeletal - no joint tenderness, deformity or swelling   Extremities - peripheral pulses normal, no pedal edema, no clubbing or cyanosis   Skin - normal coloration and turgor, no rashes, no suspicious skin lesions noted ,    DATA:    Labs:   CBC:   Recent Labs     05/28/21  0545 05/29/21  0532   WBC 25.0* 22.4*   HGB 11.2* 11.2*   HCT 33.5* 33.1*    236     BMP:   Recent Labs     05/28/21  0545 05/29/21  0532    135   K 3.9 3.5*   CO2 26 26   BUN 12 15   CREATININE 0.77 0.59   LABGLOM >60 >60   GLUCOSE 130* 184*     PT/INR: No results for input(s): PROTIME, INR in the last 72 hours.     IMAGING DATA:  CT scan chest   Impression   1.  No evidence of pulmonary embolism.       2.  Left upper lobe consolidation, likely representing infection.  No imaging   evidence for aspiration.       3.  No significant interval change in biopsied malignancy in the left lower   lobe.       4.  Cholelithiasis.       5.  Punctate left nephrolithiasis.       6.  Findings suggestive of hepatic steatosis.           Primary Problem  Community acquired pneumonia of left upper lobe of lung    Active Hospital Problems    Diagnosis Date Noted    History of pulmonary embolism [Z86.711] 05/27/2021    On anticoagulant therapy [Z79.01] 05/27/2021    On methotrexate therapy [Z79.899] 05/27/2021    Obesity, Class III, BMI 40-49.9 (morbid obesity) ([facility] [E66.01]

## 2021-05-29 NOTE — PLAN OF CARE
Problem: Pain:  Goal: Patient's pain/discomfort is manageable  Description: Patient's pain/discomfort is manageable  Outcome: Met This Shift  Note: Pt medicated with pain medication prn. Assessed all pain characteristics including level, type, location, frequency, and onset. Non-pharmacologic interventions offered to pt as well. Pt states pain is tolerable at this time. Will continue to monitor       Problem: Falls - Risk of:  Goal: Will remain free from falls  Description: Will remain free from falls  5/29/2021 1453 by Deejay Cohn RN  Outcome: Ongoing  Note: The patient remained free from falls this shift, call light within reach, bed in locked and lowest position. Side rails up x2. Continue to monitor closely. Problem: Infection:  Goal: Will remain free from infection  Description: Will remain free from infection  Outcome: Ongoing  Note: Pt shows no signs or symptoms of infection at this time. Pt has been off O2 this shift. VS stable, alert and oriented x4. Hand hygiene utilized upon entry and exit. Will continue to monitor. Problem: Daily Care:  Goal: Daily care needs are met  Description: Daily care needs are met  Outcome: Ongoing  Note: Pt is up per self, able to ask for help if needed.

## 2021-05-29 NOTE — PROGRESS NOTES
Pulmonary Progress Note  [facility]      Patient - Emily Camilo,  Age - 61 y.o.    - 1955      Room Number - 6041/2186-33   MRN -  [id number]   [facility]  Date of Admission -  2021  8:11 Rocco Pierce MD  Primary Care Physician - Alejandro Padron MD     SUBJECTIVE   Very anxious for DC. She feels well, no SOB or fever. No sputum of significance. No hemoptysis for over 24 hrs. RA SAO2 94%  OBJECTIVE   VITALS    height is 5' 5\" (1.651 m) and weight is 264 lb 8.8 oz (120 kg). Her oral temperature is 98.1 °F (36.7 °C). Her blood pressure is 131/67 and her pulse is 94. Her respiration is 18 and oxygen saturation is 92%. Body mass index is 44.02 kg/m². Temperature Range: Temp: 98.1 °F (36.7 °C) Temp  Av.2 °F (36.8 °C)  Min: 97.9 °F (36.6 °C)  Max: 98.4 °F (36.9 °C)  BP Range:  Systolic (75SGT), RMU:907 , Min:131 , AMH:532     Diastolic (10PWH), WOP:24, Min:58, Max:80    Pulse Range: Pulse  Av.4  Min: 91  Max: 103  Respiration Range: Resp  Av.9  Min: 17  Max: 18  Current Pulse Ox[de-identified]  SpO2: 92 %  24HR Pulse Ox Range:  SpO2  Av %  Min: 90 %  Max: 93 %  Oxygen Amount and Delivery: O2 Flow Rate (L/min): 1 L/min    Wt Readings from Last 3 Encounters:   21 264 lb 8.8 oz (120 kg)   21 243 lb (110.2 kg)   21 244 lb (110.7 kg)       I/O (24 Hours)    Intake/Output Summary (Last 24 hours) at 2021 1847  Last data filed at 2021 1806  Gross per 24 hour   Intake 2692 ml   Output --   Net 2692 ml       EXAM     General Appearance  Awake, alert, oriented, in no acute distress  HEENT - normocephalic, atraumatic. []  Mallampati  [] Crowded airway   [] Macroglossia  []  Retrognathia  [] Micrognathia  []  Normal tongue size []  Normal Bite  [] Laclede sign positive    Neck - Supple,  trachea midline   Lungs - Diminished LT.  No wheeze, no LIVER PROFILE   Recent Labs     05/27/21  0843   AST 16   ALT 14   BILITOT 0.78   ALKPHOS 100     INR No results for input(s): INR in the last 72 hours. PTT   Lab Results   Component Value Date    APTT 25.9 05/13/2021         RADIOLOGY     (See actual reports for details)    ASSESSMENT/PLAN   Principal Problem:    Pneumonia due to organism  Active Problems:    Hypertension    Rheumatoid arthritis ([facility]    Major depressive disorder, recurrent, moderate (HCC)    Tobacco abuse    COPD (chronic obstructive pulmonary disease) (HCC)    Adenocarcinoma, lung, left (HCC)    History of pulmonary embolism    On anticoagulant therapy    On methotrexate therapy    Obesity, Class III, BMI 40-49.9 (morbid obesity) ([facility]  Resolved Problems:    * No resolved hospital problems. *    Multilobar pneumonia left lingula and lower lobe  Hemoptysis secondary to pneumonia  Adenocarcinoma of left lower lobe  COPD, clinical diagnosis  Active tobacco use  Possible BRAYAN given upper airway and morbid obesity     REC:  1. I suggested another hospital day but she is very anxious/adamant to go home. 2. If DC no need for further steroid. 3. If DC would complete another 3 days of Zithromax 500mg daily and another 5 days of CEFTIN 500mg bid. 4. F/u Dr. Kranthi Ortega 3 weeks or so.          Electronically signed by Marilyn Martino MD on 5/29/2021 at 6:47 PM

## 2021-05-30 VITALS
WEIGHT: 268.96 LBS | HEART RATE: 95 BPM | HEIGHT: 65 IN | OXYGEN SATURATION: 91 % | RESPIRATION RATE: 18 BRPM | SYSTOLIC BLOOD PRESSURE: 148 MMHG | BODY MASS INDEX: 44.81 KG/M2 | DIASTOLIC BLOOD PRESSURE: 74 MMHG | TEMPERATURE: 97.7 F

## 2021-05-30 LAB
ABSOLUTE BANDS #: 0.2 K/UL (ref 0–1)
ABSOLUTE EOS #: 0 K/UL (ref 0–0.4)
ABSOLUTE IMMATURE GRANULOCYTE: ABNORMAL K/UL (ref 0–0.3)
ABSOLUTE LYMPH #: 2.22 K/UL (ref 1–4.8)
ABSOLUTE MONO #: 1.41 K/UL (ref 0.1–1.3)
ANION GAP SERPL CALCULATED.3IONS-SCNC: 7 MMOL/L (ref 9–17)
BANDS: 1 % (ref 0–10)
BASOPHILS # BLD: 0 % (ref 0–2)
BASOPHILS ABSOLUTE: 0 K/UL (ref 0–0.2)
BUN BLDV-MCNC: 19 MG/DL (ref 8–23)
BUN/CREAT BLD: ABNORMAL (ref 9–20)
CALCIUM SERPL-MCNC: 9.1 MG/DL (ref 8.6–10.4)
CHLORIDE BLD-SCNC: 106 MMOL/L (ref 98–107)
CO2: 23 MMOL/L (ref 20–31)
CREAT SERPL-MCNC: 0.57 MG/DL (ref 0.5–0.9)
DIFFERENTIAL TYPE: ABNORMAL
EOSINOPHILS RELATIVE PERCENT: 0 % (ref 0–4)
GFR AFRICAN AMERICAN: >60 ML/MIN
GFR NON-AFRICAN AMERICAN: >60 ML/MIN
GFR SERPL CREATININE-BSD FRML MDRD: ABNORMAL ML/MIN/{1.73_M2}
GFR SERPL CREATININE-BSD FRML MDRD: ABNORMAL ML/MIN/{1.73_M2}
GLUCOSE BLD-MCNC: 167 MG/DL (ref 70–99)
HCT VFR BLD CALC: 34.3 % (ref 36–46)
HEMOGLOBIN: 11.6 G/DL (ref 12–16)
IMMATURE GRANULOCYTES: ABNORMAL %
LYMPHOCYTES # BLD: 11 % (ref 24–44)
MCH RBC QN AUTO: 32.9 PG (ref 26–34)
MCHC RBC AUTO-ENTMCNC: 33.9 G/DL (ref 31–37)
MCV RBC AUTO: 97.1 FL (ref 80–100)
MONOCYTES # BLD: 7 % (ref 1–7)
MORPHOLOGY: ABNORMAL
NRBC AUTOMATED: ABNORMAL PER 100 WBC
PDW BLD-RTO: 16.3 % (ref 11.5–14.9)
PLATELET # BLD: 281 K/UL (ref 150–450)
PLATELET ESTIMATE: ABNORMAL
PMV BLD AUTO: 7.7 FL (ref 6–12)
POTASSIUM SERPL-SCNC: 3.7 MMOL/L (ref 3.7–5.3)
RBC # BLD: 3.53 M/UL (ref 4–5.2)
RBC # BLD: ABNORMAL 10*6/UL
SEG NEUTROPHILS: 81 % (ref 36–66)
SEGMENTED NEUTROPHILS ABSOLUTE COUNT: 16.37 K/UL (ref 1.3–9.1)
SODIUM BLD-SCNC: 136 MMOL/L (ref 135–144)
WBC # BLD: 20.2 K/UL (ref 3.5–11)
WBC # BLD: ABNORMAL 10*3/UL

## 2021-05-30 PROCEDURE — 6370000000 HC RX 637 (ALT 250 FOR IP): Performed by: STUDENT IN AN ORGANIZED HEALTH CARE EDUCATION/TRAINING PROGRAM

## 2021-05-30 PROCEDURE — 6370000000 HC RX 637 (ALT 250 FOR IP): Performed by: INTERNAL MEDICINE

## 2021-05-30 PROCEDURE — 6360000002 HC RX W HCPCS: Performed by: INTERNAL MEDICINE

## 2021-05-30 PROCEDURE — 36415 COLL VENOUS BLD VENIPUNCTURE: CPT

## 2021-05-30 PROCEDURE — 99239 HOSP IP/OBS DSCHRG MGMT >30: CPT | Performed by: INTERNAL MEDICINE

## 2021-05-30 PROCEDURE — 94761 N-INVAS EAR/PLS OXIMETRY MLT: CPT

## 2021-05-30 PROCEDURE — 85025 COMPLETE CBC W/AUTO DIFF WBC: CPT

## 2021-05-30 PROCEDURE — 6360000002 HC RX W HCPCS: Performed by: STUDENT IN AN ORGANIZED HEALTH CARE EDUCATION/TRAINING PROGRAM

## 2021-05-30 PROCEDURE — 94640 AIRWAY INHALATION TREATMENT: CPT

## 2021-05-30 PROCEDURE — 99232 SBSQ HOSP IP/OBS MODERATE 35: CPT | Performed by: INTERNAL MEDICINE

## 2021-05-30 PROCEDURE — 80048 BASIC METABOLIC PNL TOTAL CA: CPT

## 2021-05-30 PROCEDURE — 2580000003 HC RX 258: Performed by: INTERNAL MEDICINE

## 2021-05-30 RX ORDER — AZITHROMYCIN 500 MG/1
500 TABLET, FILM COATED ORAL DAILY
Qty: 1 PACKET | Refills: 0 | Status: SHIPPED | OUTPATIENT
Start: 2021-05-30 | End: 2021-06-02 | Stop reason: ALTCHOICE

## 2021-05-30 RX ORDER — CEFUROXIME AXETIL 500 MG/1
500 TABLET ORAL 2 TIMES DAILY
Qty: 14 TABLET | Refills: 0 | Status: SHIPPED | OUTPATIENT
Start: 2021-05-30 | End: 2021-06-06

## 2021-05-30 RX ADMIN — AMLODIPINE BESYLATE 10 MG: 10 TABLET ORAL at 08:39

## 2021-05-30 RX ADMIN — APIXABAN 5 MG: 5 TABLET, FILM COATED ORAL at 08:39

## 2021-05-30 RX ADMIN — Medication 60 MG: at 08:39

## 2021-05-30 RX ADMIN — ACETAMINOPHEN 650 MG: 325 TABLET, FILM COATED ORAL at 04:04

## 2021-05-30 RX ADMIN — NYSTATIN 500000 UNITS: 100000 SUSPENSION ORAL at 08:42

## 2021-05-30 RX ADMIN — IPRATROPIUM BROMIDE AND ALBUTEROL SULFATE 1 AMPULE: .5; 3 SOLUTION RESPIRATORY (INHALATION) at 10:54

## 2021-05-30 RX ADMIN — FOLIC ACID 1 MG: 1 TABLET ORAL at 08:39

## 2021-05-30 RX ADMIN — IPRATROPIUM BROMIDE AND ALBUTEROL SULFATE 1 AMPULE: .5; 3 SOLUTION RESPIRATORY (INHALATION) at 06:58

## 2021-05-30 RX ADMIN — METHYLPREDNISOLONE SODIUM SUCCINATE 40 MG: 40 INJECTION, POWDER, FOR SOLUTION INTRAMUSCULAR; INTRAVENOUS at 02:41

## 2021-05-30 RX ADMIN — BENZONATATE 200 MG: 100 CAPSULE ORAL at 08:38

## 2021-05-30 RX ADMIN — PANTOPRAZOLE SODIUM 40 MG: 40 TABLET, DELAYED RELEASE ORAL at 06:26

## 2021-05-30 RX ADMIN — CEFEPIME 2000 MG: 2 INJECTION, POWDER, FOR SOLUTION INTRAVENOUS at 02:41

## 2021-05-30 RX ADMIN — METHYLPREDNISOLONE SODIUM SUCCINATE 40 MG: 40 INJECTION, POWDER, FOR SOLUTION INTRAMUSCULAR; INTRAVENOUS at 08:38

## 2021-05-30 ASSESSMENT — ENCOUNTER SYMPTOMS
EYES NEGATIVE: 1
GASTROINTESTINAL NEGATIVE: 1
SHORTNESS OF BREATH: 1
COUGH: 1

## 2021-05-30 ASSESSMENT — PAIN SCALES - GENERAL
PAINLEVEL_OUTOF10: 6
PAINLEVEL_OUTOF10: 2

## 2021-05-30 NOTE — PLAN OF CARE
Problem: Pain:  Goal: Patient's pain/discomfort is manageable  Description: Patient's pain/discomfort is manageable  5/30/2021 1221 by Vince Conner RN  Outcome: Met This Shift  Note: Pt free of pain this shift     Problem: Falls - Risk of:  Goal: Will remain free from falls  Description: Will remain free from falls  5/30/2021 1221 by Vince Conner RN  Outcome: Ongoing  Note: The patient remained free from falls this shift, call light within reach, bed in locked and lowest position. Side rails up x2. Continue to monitor closely. Problem: Infection:  Goal: Will remain free from infection  Description: Will remain free from infection  Outcome: Ongoing  Note: Pt shows no signs or symptoms of infection at this time. VS stable, alert and oriented x4. Hand hygiene utilized upon entry and exit. Will continue to monitor.

## 2021-05-30 NOTE — PROGRESS NOTES
Today's Date: 5/30/2021  Patient Name: Sandra Nelson  Date of admission: 5/27/2021  8:11 AM  Patient's age: 61 y.o., 1955  Admission Dx: Community acquired pneumonia of left upper lobe of lung [J18.9]    Reason for Consult: management recommendations  Requesting Physician: Ileana Elizabeth MD    CHIEF COMPLAINT:  Dyspnea, hemoptysis     SUBJECTIVE:  . The patient was seen and examined. Clinically stable. She is improving. Less shortness of breath. Continues to have cough and sputum. No events overnight. No active bleeding. No fever. BRIEF CASE HISTORY:    The patient is a 61 y.o.  female who is admitted to the hospital for worsening hemoptysis and shortness of breath. Imaging studies suggested multifocal pneumonia and she is admitted for antibiotics. Patient is known to us, she presented with isolated lung mass diagnosed after screening lung CT scan . The mass is localized into the left lower lobe. PET scan did not show any evidence of spread. Biopsy was confirmatory for lung cancer. The patient was seen by Dr. Kae Pope who planned resection versus radiation and the plans are underway. The patient is admitted and started on antibiotics. She is on oxygen at 4 L, it was adjusted up to 5 L because of shortness of breath overnight. She is complaining of cough and chest discomfort. She admits to a fever although she has been afebrile since admission. She has no nausea no vomiting  Hemoptysis have been a small amount and mostly streaks with normal sputum  She has history of rheumatoid arthritis on methotrexate  Past Medical History:   has a past medical history of Arthritis, Depression, DVT (deep venous thrombosis) ([facility] [address] GERD (gastroesophageal reflux disease), Hx of blood clots, Hypertension, and Rheumatoid arthritis ([facility] [address] Past Surgical History:   has a past surgical history that includes Appendectomy (1982);  Endoscopy, colon, diagnostic ([id number]); bronchoscopy (N/A, 2019); Colonoscopy (N/A, 2020); and CT NEEDLE BIOPSY LUNG PERCUTANEOUS (2021). Medications:    Reviewed in Epic     Allergies:  Patient has no known allergies. Social History:   reports that she has been smoking cigarettes. She has a 8.75 pack-year smoking history. She has never used smokeless tobacco. She reports current alcohol use. She reports that she does not use drugs. Family History: family history includes Cancer in her brother, father, and mother; Diabetes in her mother; Heart Disease in her mother. REVIEW OF SYSTEMS:    Constitutional: She had a fever at home, nursing since admission, no night sweats,  Eyes: No eye discharge, double vision, or eye pain   HEENT: negative for sore mouth, sore throat, hoarseness and voice change   Respiratory: Cough and dyspnea, sputum with streaks of hemoptysis as discussed  Cardiovascular: negative for chest pain, dyspnea, palpitations, orthopnea, PND   Gastrointestinal: negative for nausea, vomiting, diarrhea, constipation, abdominal pain, Dysphagia, hematemesis and hematochezia   Genitourinary: negative for frequency, dysuria, nocturia, urinary incontinence, and hematuria   Integument: negative for rash, skin lesions, bruises.    Hematologic/Lymphatic: negative for easy bruising, bleeding, lymphadenopathy, or petechiae   Endocrine: negative for heat or cold intolerance,weight changes, change in bowel habits and hair loss   Musculoskeletal: negative for myalgias, arthralgias, pain, joint swelling,and bone pain   Neurological: negative for headaches, dizziness, seizures, weakness, numbness    PHYSICAL EXAM:      /69   Pulse 80   Temp 97.5 °F (36.4 °C) (Oral)   Resp 18   Ht 5' 5\" (1.651 m)   Wt 268 lb 15.4 oz (122 kg)   SpO2 92%   BMI 44.76 kg/m²    Temp (24hrs), Av.9 °F (36.6 °C), Min:97.5 °F (36.4 °C), Max:98.1 °F (36.7 °C)    General appearance - well appearing, no in pain or distress   Mental status - alert and cooperative Eyes - pupils equal and reactive, extraocular eye movements intact   Ears - bilateral TM's and external ear canals normal   Mouth - mucous membranes moist, pharynx normal without lesions   Neck - supple, no significant adenopathy   Lymphatics - no palpable lymphadenopathy, no hepatosplenomegaly   Chest -decreased air entry, bilateral rhonchi, scattered wheezing appreciated. Heart - normal rate, regular rhythm, normal S1, S2, no murmurs  Abdomen - soft, nontender, nondistended, no masses or organomegaly   Neurological - alert, oriented, normal speech, no focal findings or movement disorder noted   Musculoskeletal - no joint tenderness, deformity or swelling   Extremities - peripheral pulses normal, no pedal edema, no clubbing or cyanosis   Skin - normal coloration and turgor, no rashes, no suspicious skin lesions noted ,    DATA:    Labs:   CBC:   Recent Labs     05/29/21  0532 05/30/21  0606   WBC 22.4* 20.2*   HGB 11.2* 11.6*   HCT 33.1* 34.3*    281     BMP:   Recent Labs     05/29/21  0532 05/30/21  0606    136   K 3.5* 3.7   CO2 26 23   BUN 15 19   CREATININE 0.59 0.57   LABGLOM >60 >60   GLUCOSE 184* 167*     PT/INR: No results for input(s): PROTIME, INR in the last 72 hours.     IMAGING DATA:  CT scan chest   Impression   1.  No evidence of pulmonary embolism.       2.  Left upper lobe consolidation, likely representing infection.  No imaging   evidence for aspiration.       3.  No significant interval change in biopsied malignancy in the left lower   lobe.       4.  Cholelithiasis.       5.  Punctate left nephrolithiasis.       6.  Findings suggestive of hepatic steatosis.           Primary Problem  Pneumonia due to organism    Active Hospital Problems    Diagnosis Date Noted    History of pulmonary embolism [Z86.711] 05/27/2021    On anticoagulant therapy [Z79.01] 05/27/2021    On methotrexate therapy [Z79.899] 05/27/2021    Obesity, Class III, BMI 40-49.9 (morbid obesity) ([facility] [address] [E66.01] 05/27/2021    Adenocarcinoma, lung, left ([facility] [C34.92] 05/14/2021    Pneumonia due to organism [J18.9] 12/23/2019    COPD (chronic obstructive pulmonary disease) ([facility] [J44.9] 08/14/2019    Tobacco abuse [Z72.0] 11/12/2018    Major depressive disorder, recurrent, moderate (HCC) [F33.1] 11/12/2018    Rheumatoid arthritis ([facility] [M06.9] 07/14/2014    Hypertension [I10] 09/16/2013         IMPRESSION:   1. History of left-sided lung cancer, stage I clinically  2. Community-acquired pneumonia, likely multifocal  3. Hemoptysis secondary to pneumonia  4. Underlying COPD and possible BRAYAN  5. Underlying rheumatoid    RECOMMENDATIONS:  1. Continue pulmonary care. 2. Continue supportive care  3. Hold methotrexate for now  4. Continue with anticoagulation with Eliquis,   5. As for the lung cancer, patient was evaluated last week in my office. Patient scheduled to see chest surgeon after pulmonary function test.  If she is not a surgical candidate she will benefit from treatment with SBRT. [address]                            This note is created with the assistance of a speech recognition program.  While intending to generate a document that actually reflects the content of the visit, the document can still have some errors including those of syntax and sound a like substitutions which may escape proof reading. It such instances, actual meaning can be extrapolated by contextual diversion.

## 2021-05-30 NOTE — PLAN OF CARE
Problem: Falls - Risk of:  Goal: Will remain free from falls  Description: Will remain free from falls  5/30/2021 0321 by Corey Cantu RN  Outcome: Ongoing  Note: Pt is up per self, gait is steady. Pt remains free from falls, will monitor      Problem: Falls - Risk of:  Goal: Absence of physical injury  Description: Absence of physical injury  Outcome: Ongoing  Note: Pt remains free from any injury this RN's shift. Will monitor      Problem: Pain:  Goal: Patient's pain/discomfort is manageable  Description: Patient's pain/discomfort is manageable  5/30/2021 0321 by Corey Cantu RN  Outcome: Ongoing  Note: Pt has not complained of any pain for this RN.  Will monitor

## 2021-05-30 NOTE — PROGRESS NOTES
[address]    PROGRESS NOTE             5/30/2021    10:36 AM    Name:   Jose Ramon Mendez  MRN:     [id number]     Acct:      [de-identified]   Room:   2089/2089-01  IP Day:  3  Admit Date:  5/27/2021  8:11 AM    PCP:  Edinson Lane MD  Code Status:  Full Code    Subjective:     C/C:   Chief Complaint   Patient presents with    Pain     Generalized full body ache    Cough     Light pink blood, about the size of a dime x2    Back Pain    Pain     Lt. lateral pain d/t biopsy of lung 1.5 weeks ago. Test for spot on lung. Interval History Status: improved. No acte events over night. Patient did require Tylenol for pain. Patient seen and examined while sitting in chair at bedside. She reports improved SOB and pinkish colored sputum. Not on oxygen. SaO2 92% on room air. Brief History:     The patient is a 61 y.o. Non-/non  female with a past medical history of smoking, COPD, DVT, PE (2014) and recently diagnosed Adenocarcinoma of the left upper lung who presents today with worsening productive cough and fatigue. Patient has a 35 year pack history. States for the last 3 days her cough has worsened, with productive sputum and blood streaks in it. Associated with pleuritic chest pain, night sweats and worsening shortness of breath. Patient does not use O2 at home, currently requiring 2L of O2 via NC to maintain saturation. Patient was found to have a 1.7 cm spiculated nodule on CT chest, a biopsy was done about 2 weeks ago. Biopsy showed Adenocarcinoma. Patient is now following Dr. Gerri Noguera and Dr. [place] [facility]. Patient also complaining of some left lower back pain and some nausea.  Denies any chest pain, lightheadedness, numbness, weakness.     In the ED, Patient is mildly hypertensive, has increased RR and is breathing 90% on 2L of O2 via NC. CT PE was done which showed left upper lobe consolidation and no change in previously biopsied 1.6 cm mass in the left lower lung. COVID negative. WBC count is 24.4. Was given 1 fluid bolus and started on Azithromycin and Rocephin. Patient will be admitted for management of Left Upper Lobe Community Acquired Pneumonia. Review of Systems:     Review of Systems   Constitutional: Negative for chills and fever. HENT: Negative. Eyes: Negative. Respiratory: Positive for cough and shortness of breath. Cardiovascular: Negative. Gastrointestinal: Negative. Genitourinary: Negative. Musculoskeletal: Negative. Skin: Negative. Neurological: Negative. Psychiatric/Behavioral: Negative. Medications: Allergies:  No Known Allergies    Current Meds:   Scheduled Meds:    pantoprazole  40 mg Oral QAM AC    methylPREDNISolone  40 mg Intravenous Q6H    ipratropium-albuterol  1 ampule Inhalation Q4H WA    cefepime  2,000 mg Intravenous Q12H    benzonatate  200 mg Oral TID    dextromethorphan  60 mg Oral 2 times per day    nystatin  5 mL Oral 4x Daily    amLODIPine  10 mg Oral Daily    apixaban  5 mg Oral BID    folic acid  1 mg Oral Daily    sertraline  100 mg Oral Daily    azithromycin  500 mg Intravenous Q24H    sodium chloride flush  5-40 mL Intravenous 2 times per day     Continuous Infusions:    sodium chloride      sodium chloride 75 mL/hr at 05/29/21 1805     PRN Meds: potassium chloride **OR** potassium alternative oral replacement **OR** potassium chloride, albuterol, albuterol sulfate HFA, sodium chloride flush, sodium chloride, promethazine **OR** ondansetron, polyethylene glycol, acetaminophen **OR** acetaminophen    Data:     Past Medical History:   has a past medical history of Arthritis, Depression, DVT (deep venous thrombosis) ([facility] GERD (gastroesophageal reflux disease), Hx of blood clots, Hypertension, and Rheumatoid arthritis ([facility] [address] Social History:   reports that she has been smoking cigarettes.  She lung similar in appearance with minimal basilar atelectasis but no localized pulmonary opacity or large pleural effusion. Somewhat engorged right vasculature. Bones unchanged. Extensive left-sided airspace pneumonia with probable pleural effusion and some degree of atelectasis right lung unchanged. XR CHEST PORTABLE    Result Date: 5/13/2021  EXAMINATION: ONE XRAY VIEW OF THE CHEST 5/13/2021 12:21 pm COMPARISON: CT April 20, 2021 and radiograph December 26, 2019 HISTORY: ORDERING SYSTEM PROVIDED HISTORY: s/p LLL nodule bx, evaluate for ptx TECHNOLOGIST PROVIDED HISTORY: s/p LLL nodule bx, evaluate for ptx Reason for Exam: s/p LLL nodule bx, evaluate for ptx Acuity: Unknown Type of Exam: Unknown FINDINGS: Mild cardiomegaly. Cardiomediastinal silhouette otherwise within normal limits. No evidence of pneumothorax. No acute pulmonary findings. No pleural effusion or subdiaphragmatic free air. No acute osseous abnormality identified. No pneumothorax status post left lower lobe nodule biopsy. CT CHEST PULMONARY EMBOLISM W CONTRAST    Result Date: 5/27/2021  EXAMINATION: CTA OF THE CHEST 5/27/2021 9:14 am TECHNIQUE: CTA of the chest was performed after the administration of intravenous contrast.  Multiplanar reformatted images are provided for review. MIP images are provided for review. Dose modulation, iterative reconstruction, and/or weight based adjustment of the mA/kV was utilized to reduce the radiation dose to as low as reasonably achievable. COMPARISON: Chest radiograph 05/13/2021. PET-CT 04/30/2021. Chest CT 04/20/2021.  HISTORY: ORDERING SYSTEM PROVIDED HISTORY: pleuritic chest pain, known lung malignancy TECHNOLOGIST PROVIDED HISTORY: pleuritic chest pain, known lung malignancy Decision Support Exception - unselect if not a suspected or confirmed emergency medical condition->Emergency Medical Condition (MA) Reason for Exam: chest pain, body aches Acuity: Unknown Type of Exam: Unknown FINDINGS: Pulmonary Arteries: Pulmonary arteries are adequately opacified for evaluation. No evidence of intraluminal filling defect to suggest pulmonary embolism. Main pulmonary artery is normal in caliber. Mediastinum: No evidence of mediastinal lymphadenopathy. The heart and pericardium demonstrate no acute abnormality. There is no acute abnormality of the thoracic aorta. Lungs/pleura: Dense consolidation is now present in the left upper lobe with air bronchograms. Known site of malignancy with irregular nodule in the posterior left lower lobe is again demonstrated measuring 1.6 cm on axial image 73. No pneumothorax. No effusion. The central airway is patent. Upper Abdomen: Findings suggestive of hepatic steatosis. Cholelithiasis. Punctate left nephrolithiasis. Mild thickening of the left adrenal gland again demonstrated, which demonstrated no abnormal metabolic activity. Soft Tissues/Bones: No acute bone or soft tissue abnormality. 1.  No evidence of pulmonary embolism. 2.  Left upper lobe consolidation, likely representing infection. No imaging evidence for aspiration. 3.  No significant interval change in biopsied malignancy in the left lower lobe. 4.  Cholelithiasis. 5.  Punctate left nephrolithiasis. 6.  Findings suggestive of hepatic steatosis. CT NEEDLE BIOPSY LUNG PERCUTANEOUS    Result Date: 5/13/2021  PROCEDURE: CT NEEDLE BIOPSY LUNG PERCUTANEOUS MODERATE CONSCIOUS SEDATION 5/13/2021 HISTORY: ORDERING SYSTEM PROVIDED HISTORY: Nodule of lower lobe of left lung TECHNOLOGIST PROVIDED HISTORY: left lower lobe lung nodule TECHNIQUE: Dose modulation, iterative reconstruction, and/or weight based adjustment of the mA/kV was utilized to reduce the radiation dose to as low as reasonably achievable. CONTRAST: None SEDATION: 1 mg versed and 50 mcg fentanyl were titrated intravenously for moderate sedation monitored under my direction. Total intra-service time of sedation was 30 minutes.   The lymph node again apparent mid depth outer aspect right breast.     No mammographic evidence of malignancy. No evidence of significant interval change. BIRADS: BIRADS - CATEGORY 1 Negative, no evidence of malignancy. Normal interval follow-up is recommended in 12 months. OVERALL ASSESSMENT - NEGATIVE A letter of notification will be sent to the patient regarding the results. The [facility] recommends annual mammograms for women 40 years and older. Physical Examination:        Physical Exam  Constitutional:       Appearance: Normal appearance. She is obese. Cardiovascular:      Rate and Rhythm: Normal rate and regular rhythm. Pulses: Normal pulses. Heart sounds: Normal heart sounds. Pulmonary:      Breath sounds: Normal breath sounds. No wheezing. Abdominal:      Palpations: Abdomen is soft. Tenderness: There is no abdominal tenderness. Musculoskeletal:      Right lower leg: No edema. Left lower leg: No edema. Skin:     Capillary Refill: Capillary refill takes 2 to 3 seconds. Neurological:      Mental Status: She is alert.            Assessment:        Primary Problem  Pneumonia due to organism    Active Hospital Problems    Diagnosis Date Noted    History of pulmonary embolism [Z86.711] 05/27/2021    On anticoagulant therapy [Z79.01] 05/27/2021    On methotrexate therapy [Z79.899] 05/27/2021    Obesity, Class III, BMI 40-49.9 (morbid obesity) ([facility] [E66.01] 05/27/2021    Adenocarcinoma, lung, left ([facility] [C34.92] 05/14/2021    Pneumonia due to organism [J18.9] 12/23/2019    COPD (chronic obstructive pulmonary disease) ([facility] [J44.9] 08/14/2019    Tobacco abuse [Z72.0] 11/12/2018    Major depressive disorder, recurrent, moderate ([facility] [address] [F33.1] 11/12/2018    Rheumatoid arthritis ([facility] [M06.9] 07/14/2014    Hypertension [I10] 09/16/2013       Plan:        Community Acquired Pneumonia, Left Upper Lobe  - WBC 25.0 today 20.0  - Hemoglobin 11.6   - CT Chest 05/27: Left upper lobe consolidation. No significant interval change in biopsied left lower lobe 1.6 cm mass. - IV Azithromycin and Cefepime day 2  - Legionella, Strep Pneumo antigen both negative  - Pro calcitonin 0.32  - IVF NS at 75 mL/hr  -Per pulmonology, oral antibiotics per discharge   -Azithromycin 500 mg p.o. daily for 3 days    -Ceftin 500 mg twice daily for 5 days   -Outpatient follow-up Dr. Michelle Isidro 3 weeks     COPD Exacerbation  - Worsening O2 requirements  - ABG showing pH 7.428, pCO2 41.2, pO2 55.4, HCO3 27.2  - Continue IV Solumedrol 40 mg Q6 hours  - Duoneb  - Per Pulmonology   -Continue Solu medrol    - Acapella/flutter valve treatment   -Will eventually need outpatient PFTs   -Presumptive BRAYAN until OP sleep study can be arranged.      Adenocarcinoma Left Lower Lung  - Recently diagnosed 2 weeks ago with needle biopsy  - Follows with Dr. Kae Pope  - Hem/Onc consulted   - HELD methotrexate   -Not candidate for surgery, will offer SBRT. - Continue Eliquis     History of DVT, PE  - Eliquis 5 mg twice daily     Hypertension  - Norvasc 10 mg daily      Depression  - Zoloft 100 mg daily     COPD  - Albuterol PRN  - Duoneb     Diet General  DVT Prophylaxis: Already on Eliquis  GI Prophylaxis: Protonix 40 mg PO QD. Dispo: Patient is cleared by pulmonology for discharge    Chacha Spicer MD  5/30/2021  10:36 AM     I have discussed the care of Sandra Nelson , including pertinent history and exam findings,    today with the resident. I have seen and examined the patient and the key elements of all parts of the encounter have been performed by me . I agree with the assessment, plan and orders as documented by the resident.      Principal Problem:    Community acquired pneumonia of left upper lobe of lung  Active Problems:    Hypertension    Rheumatoid arthritis ([address]    Major depressive disorder, recurrent, moderate (HCC)    Tobacco abuse    COPD (chronic obstructive pulmonary disease)

## 2021-06-01 ENCOUNTER — TELEPHONE (OUTPATIENT)
Dept: CASE MANAGEMENT | Age: 66
End: 2021-06-01

## 2021-06-01 ENCOUNTER — TELEPHONE (OUTPATIENT)
Dept: FAMILY MEDICINE CLINIC | Age: 66
End: 2021-06-01

## 2021-06-01 RX ORDER — ALBUTEROL SULFATE 90 UG/1
AEROSOL, METERED RESPIRATORY (INHALATION)
Qty: 18 G | Refills: 2 | Status: ON HOLD | OUTPATIENT
Start: 2021-06-01 | End: 2022-01-01 | Stop reason: SDUPTHER

## 2021-06-01 NOTE — TELEPHONE ENCOUNTER
[facility] Trans[facility] Initial Follow Up Call    Outreach made within 2 business days of discharge: Yes    Patient: Lucy Lam Patient : 1955   MRN: [id number]  Reason for Admission: There are no discharge diagnoses documented for the most recent discharge. Discharge Date: 21       Spoke with: [facility]    Discharge department/facility: [address] [place] [address] Interactive Patient Contact:  Was patient able to fill all prescriptions: Yes  Was patient instructed to bring all medications to the follow-up visit: Yes  Is patient taking all medications as directed in the discharge summary?  Yes  Does patient understand their discharge instructions: Yes  Does patient have questions or concerns that need addressed prior to 7-14 day follow up office visit: yes -     Scheduled appointment with PCP within 7-14 days    Follow Up  Future Appointments   Date Time Provider Kuldeep Sun   2021  1:00 PM Jacquelyn Mckoy MD Resp Spec [facility]   6/15/2021  9:00 AM Darnell Child MD [facility]   2021 12:00 PM Wayne Cunha MD fp sc Kerri Diamond MA

## 2021-06-02 ENCOUNTER — VIRTUAL VISIT (OUTPATIENT)
Dept: FAMILY MEDICINE CLINIC | Age: 66
End: 2021-06-02
Payer: COMMERCIAL

## 2021-06-02 DIAGNOSIS — C34.92 ADENOCARCINOMA, LUNG, LEFT (HCC): ICD-10-CM

## 2021-06-02 DIAGNOSIS — J18.9 COMMUNITY ACQUIRED PNEUMONIA, UNSPECIFIED LATERALITY: Primary | ICD-10-CM

## 2021-06-02 PROCEDURE — 99214 OFFICE O/P EST MOD 30 MIN: CPT | Performed by: FAMILY MEDICINE

## 2021-06-02 RX ORDER — ALBUTEROL SULFATE 2.5 MG/3ML
2.5 SOLUTION RESPIRATORY (INHALATION) EVERY 6 HOURS PRN
Qty: 125 VIAL | Refills: 0 | Status: SHIPPED | OUTPATIENT
Start: 2021-06-02 | End: 2021-07-29

## 2021-06-02 NOTE — PROGRESS NOTES
Jose Ramon Mendez is a 61 y.o. female evaluated via telephone on 6/2/2021. Chief Complaint   Patient presents with    Pulmonary Nodule    Pneumonia     Patient-Reported Vitals 6/2/2021   Patient-Reported Weight -   Patient-Reported Height -   Patient-Reported Systolic 674   Patient-Reported Diastolic 70        Consent:  She and/or health care decision maker is aware that that she may receive a bill for this telephone service, depending on her insurance coverage, and has provided verbal consent to proceed: Yes   Patient is scheduled for hospital follow-up was admitted with pneumonia community-acquired. Patient had biopsy of lung nodule done that showed adenocarcinoma. She was seen by oncologist also and was planned for surgery. Patient developed a pneumonia and was treated with antibiotics. Reports she completed the antibiotic course. Her symptoms has improved. She needs refill on aerosol treatments. Patient has appointment scheduled for CT surgery and oncologist.  Patient reports she has quit smoking    In the ED, Patient is mildly hypertensive, has increased RR and is breathing 90% on 2L of O2 via NC. CT PE was done which showed left upper lobe consolidation and no change in previously biopsied 1.6 cm mass in the left lower lung. COVID negative. WBC count is 24.4. Was given 1 fluid bolus and started on Azithromycin and Rocephin. Patient will be admitted for management of Left Upper Lobe Community Acquired Pneumonia. Documentation:  I communicated with the patient and/or health care decision maker about . Details of this discussion including any medical advice provided:         1. Community acquired pneumonia, unspecified laterality  Done with antibiotics symptoms has improved follow-up with pulmonologist.  - albuterol (PROVENTIL) (2.5 MG/3ML) 0.083% nebulizer solution; Take 3 mLs by nebulization every 6 hours as needed for Wheezing or Shortness of Breath  Dispense: 125 vial; Refill: 0    2. Adenocarcinoma, lung, left [facility])  Follow-up with CT surgery oncologist.        I affirm this is a Patient Initiated Episode with a Patient who has not had a related appointment within my department in the past 7 days or scheduled within the next 24 hours. Patient identification was verified at the start of the visit: Yes    Total Time: minutes: 21-30 minutes    The visit was conducted pursuant to the emergency declaration under the [address] [place] [address] and the [employer] General Act. Patient identification was verified, and a caregiver was present when appropriate. The patient was located in a state where the provider was credentialed to provide care.     Note: not billable if this call serves to triage the patient into an appointment for the relevant concern      Junior Adler MD

## 2021-06-08 ENCOUNTER — HOSPITAL ENCOUNTER (OUTPATIENT)
Facility: MEDICAL CENTER | Age: 66
End: 2021-06-08
Payer: COMMERCIAL

## 2021-06-09 NOTE — DISCHARGE SUMMARY
[address]    Discharge Summary     Patient ID: Sal Agee  :  1955   MRN: [id number]     ACCOUNT:  [de-identified]   Patient's PCP: Agustina Barrera MD  Admit Date: 2021   Discharge Date: 2021   Length of Stay: 3  Code Status:  Prior  Admitting Physician: Yulissa Mtz MD  Discharge Physician: Abram Brizuela MD     Active Discharge Diagnoses:       Primary Problem  Community acquired pneumonia      Hospital Problems  Active Hospital Problems    Diagnosis Date Noted    History of pulmonary embolism [Z86.711] 2021    On anticoagulant therapy [Z79.01] 2021    On methotrexate therapy [Z79.899] 2021    Obesity, Class III, BMI 40-49.9 (morbid obesity) ([facility] [facility] [E66.01] 2021    Adenocarcinoma, lung, left ([facility] [facility] [C34.92] 2021    Community acquired pneumonia [J18.9] 2019    COPD (chronic obstructive pulmonary disease) ([facility] [J44.9] 2019    Tobacco abuse [Z72.0] 2018    Major depressive disorder, recurrent, moderate ([address] [F33.1] 2018    Rheumatoid arthritis ([facility] [address] [facility] [M06.9] 2014    Hypertension [I10] 2013       Admission Condition:  fair     Discharged Condition: good    Hospital Stay:       Hospital Course:  Sal Agee is a 61 y.o. female who was admitted for the management of   Community acquired pneumonia , presented to ER with Pain (Generalized full body ache), Cough (Light pink blood, about the size of a dime x2), Back Pain, and Pain (Lt. lateral pain d/t biopsy of lung 1.5 weeks ago. Test for spot on lung. )  Past medical history of smoking, COPD, DVT, PE () and recently diagnosed Adenocarcinoma of the left upper lung who presents today with worsening productive cough and fatigue. Patient was admitted left upper lobe community-acquired pneumonia. Leukocytosis improved with IV antibiotics. Patient received azithromycin and cefepime.   Patient was sent home pneumonia with probable pleural effusion and some degree of atelectasis right lung unchanged. XR CHEST PORTABLE    Result Date: 5/13/2021  EXAMINATION: ONE XRAY VIEW OF THE CHEST 5/13/2021 12:21 pm COMPARISON: CT April 20, 2021 and radiograph December 26, 2019 HISTORY: ORDERING SYSTEM PROVIDED HISTORY: s/p LLL nodule bx, evaluate for ptx TECHNOLOGIST PROVIDED HISTORY: s/p LLL nodule bx, evaluate for ptx Reason for Exam: s/p LLL nodule bx, evaluate for ptx Acuity: Unknown Type of Exam: Unknown FINDINGS: Mild cardiomegaly. Cardiomediastinal silhouette otherwise within normal limits. No evidence of pneumothorax. No acute pulmonary findings. No pleural effusion or subdiaphragmatic free air. No acute osseous abnormality identified. No pneumothorax status post left lower lobe nodule biopsy. CT CHEST PULMONARY EMBOLISM W CONTRAST    Result Date: 5/27/2021  EXAMINATION: CTA OF THE CHEST 5/27/2021 9:14 am TECHNIQUE: CTA of the chest was performed after the administration of intravenous contrast.  Multiplanar reformatted images are provided for review. MIP images are provided for review. Dose modulation, iterative reconstruction, and/or weight based adjustment of the mA/kV was utilized to reduce the radiation dose to as low as reasonably achievable. COMPARISON: Chest radiograph 05/13/2021. PET-CT 04/30/2021. Chest CT 04/20/2021. HISTORY: ORDERING SYSTEM PROVIDED HISTORY: pleuritic chest pain, known lung malignancy TECHNOLOGIST PROVIDED HISTORY: pleuritic chest pain, known lung malignancy Decision Support Exception - unselect if not a suspected or confirmed emergency medical condition->Emergency Medical Condition (MA) Reason for Exam: chest pain, body aches Acuity: Unknown Type of Exam: Unknown FINDINGS: Pulmonary Arteries: Pulmonary arteries are adequately opacified for evaluation. No evidence of intraluminal filling defect to suggest pulmonary embolism. Main pulmonary artery is normal in caliber. discussion about the procedure including the risk, benefits, and alternatives. Universal protocol was followed including a time-out to confirm the correct patient and procedure. The patient was positioned prone on the CT table. Axial CT images were obtained through the chest and a suitable skin site was prepped and draped in sterile fashion. Local anesthesia was achieved with lidocaine. Under repeat CT guidance, a 19 gauge coaxial needle was advanced into the left lower lobe pulmonary nodule. Final needle position was confirmed prior to biopsy. Five 20 gauge core biopsy specimens were obtained through the coaxial needle and given to Pathology who was present to help confirm sample adequacy. The coaxial needle was removed and pressure held for hemostasis. A sterile dressing was applied. Final CT was performed. Patient tolerated the procedure well. No immediate complication. Estimated blood loss: Less than 5 mL. Dose modulation, iterative reconstruction, and/or weight based adjustment of the mA/kV was utilized to reduce the radiation dose to as low as reasonably achievable. FINDINGS:  CT redemonstrates the left lower lobe pulmonary nodule and guides coaxial needle placement. CT immediately prior to biopsy demonstrates satisfactory coaxial needle placement with the tip in the periphery of the nodule. Final CT after biopsy demonstrates no evidence of pneumothorax. There is mild pulmonary hemorrhage surrounding the nodule. Successful CT-guided left lower lobe lung nodule core biopsy. [facility] DIGITAL SCREEN BILATERAL    Result Date: 5/11/2021  EXAMINATION: SCREENING DIGITAL BILATERAL  MAMMOGRAM WITH TOMOSYNTHESIS 5/11/2021 TECHNIQUE: Screening mammography was performed with tomosynthesis including MLO and CC views of the bilateral breasts. Computer aided detection was used for the interpretation of this exam. COMPARISON: Bilateral mammography December 4, 2018 and May 27, 2015. Also February 5, 2010. medication with the same name was removed. Continue taking this medication, and follow the directions you see here. CONTINUE taking these medications      amLODIPine 10 MG tablet  Commonly known as: NORVASC  take 1 tablet by mouth once daily     Blood Pressure Kit  Dx: HTN. Needs automatic blood pressure machine to monitor her blood pressure. Chantix Starting Month Reji 0.5 MG X 11 & 1 MG X 42 tablet  Generic drug: varenicline  Take by mouth as directed     Eliquis 5 MG Tabs tablet  Generic drug: apixaban  take 1 tablet by mouth twice a day     folic acid 1 MG tablet  Commonly known as: FOLVITE  take 1 tablet by mouth once daily     methotrexate 2.5 MG chemo tablet  Commonly known as: RHEUMATREX     pantoprazole 40 MG tablet  Commonly known as: PROTONIX  take 1 tablet by mouth once daily     sertraline 100 MG tablet  Commonly known as: Zoloft  Take 1 tablet by mouth daily     Spiriva Respimat 2.5 MCG/ACT Aers inhaler  Generic drug: tiotropium  inhale 2 puffs INTO THE LUNGS once daily            ASK your doctor about these medications      cefUROXime 500 MG tablet  Commonly known as: CEFTIN  Take 1 tablet by mouth 2 times daily for 7 days  Ask about: Should I take this medication? Where to Get Your Medications        These medications were sent to [place] [address], [facility]. [facility] [phone] [facility] [phone]  [address] [place] [id number]      Phone: [phone]   cefUROXime 500 MG tablet         Time Spent on discharge is  31 mins in patient examination, evaluation, counseling as well as medication reconciliation, prescriptions for required medications, discharge plan and follow up. Electronically signed by   Miranda Esquivel MD  6/9/2021  3:53 PM      Thank you Dr. Joel Cole MD for the opportunity to be involved in this patient's care.     Attending Physician Statement  I have discussed the care of Daisy Zarate and I have

## 2021-06-16 NOTE — PATIENT INSTRUCTIONS
Patient Education        Lung Cancer: Care Instructions  Your Care Instructions     Lung cancer occurs when abnormal cells grow out of control in the lung. Lung cancer can start anywhere in the lungs and spread to other parts of the body. Treatment is based on the type and stage of lung cancer and other factors, such as your overall health. Treatment may include surgery, radiation therapy, or chemotherapy. It may also include immunotherapy or targeted therapy. Being treated for cancer can weaken your body, and you may feel very tired. Home treatment and certain medicines can help you feel better. Finding out that you have cancer is scary. You may feel many emotions and may need some help coping. Seek out family, friends, and counselors for support. You also can do things at home to make yourself feel better while you go through treatment. Call the [employer] [facility] ([phone]) or visit its website at [address] for more information. Follow-up care is a key part of your treatment and safety. Be sure to make and go to all appointments, and call your doctor if you are having problems. It's also a good idea to know your test results and keep a list of the medicines you take. How can you care for yourself at home? · Take your medicines exactly as prescribed. Call your doctor if you think you are having a problem with your medicine. You will get more details on the specific medicines your doctor prescribes. · Follow your doctor's instructions to relieve pain. Use pain medicine when you first feel pain, before it becomes severe. Taking pain medicines regularly is often the best way to keep pain under control. · Eat healthy food. If you do not feel like eating, try to eat food that has protein and extra calories to keep up your strength and prevent weight loss. Drink liquid meal replacements for extra calories and protein. Try to eat your main meal early.  Eating smaller portions more often may help as well. · Get some physical activity every day, but do not get too tired. Keep doing the hobbies you enjoy as your energy allows. · Do not smoke. Smoking can make your cancer symptoms worse. If you need help quitting, talk to your doctor about stop-smoking programs and medicines. These can increase your chances of quitting for good. · If you use oxygen, do not smoke, light a cigarette, or use a flame while your oxygen is on. Smoking while using oxygen can lead to fire and even explosion. · If you have nausea, try to eat several small meals a day. When you feel better, eat clear soups and mild foods until all symptoms are gone for 12 to 48 hours. Other good choices include dry toast, crackers, cooked cereal, and gelatin dessert, such as Jell-O.  · If you are vomiting or have diarrhea:  ? Drink plenty of fluids to prevent dehydration. Choose water and other caffeine-free clear liquids. If you have kidney, heart, or liver disease and have to limit fluids, talk with your doctor before you increase the amount of fluids you drink. ? When you are able to eat, try clear soups, mild foods, and liquids until all symptoms are gone for 12 to 48 hours. Other good choices include dry toast, crackers, cooked cereal, and gelatin dessert, such as Jell-O.  · Take steps to control your stress and workload. Learn relaxation techniques. ? Share your feelings. Stress and tension affect our emotions. By expressing your feelings to others, you may be able to understand and cope with them. ? Consider joining a support group. Talking about a problem with your spouse, a good friend, or other people with similar problems is a good way to reduce tension and stress. ? Express yourself with art. Try writing, crafts, dance, or art to relieve stress. Some dance, writing, or art groups may be available just for people who have cancer. ? Be kind to your body and mind.  Getting enough sleep, eating a healthy diet, and taking time to do things you enjoy can contribute to an overall feeling of balance in your life and help reduce stress. ? Get help if you need it. Discuss your concerns with your doctor or counselor. · If you have not already done so, prepare a list of advance directives. Advance directives are instructions to your doctor and family members about what kind of care you want if you become unable to speak or express yourself. When should you call for help? Call 911 anytime you think you may need emergency care. For example, call if:    · You passed out (lost consciousness).     · You have severe trouble breathing.     · You cough up a lot of blood. Call your doctor now or seek immediate medical care if:    · You have a fever.     · You are short of breath.     · You have new or worse pain.     · You have a new or worse cough.     · You think you have an infection. Watch closely for changes in your health, and be sure to contact your doctor if:    · You do not get better as expected. Where can you learn more? Go to https://Normal[employer] org and sign in to your [employer] account. Enter P186 in the [employer] box to learn more about \"Lung Cancer: Care Instructions. \"     If you do not have an account, please click on the \"Sign Up Now\" link. Current as of: December 17, 2020               Content Version: 12.9  © 9765-0463 [employer], [employer]. Care instructions adapted under license by [facility] ([facility]). If you have questions about a medical condition or this instruction, always ask your healthcare professional. [place] [address] any warranty or liability for your use of this information.

## 2021-06-17 ENCOUNTER — INITIAL CONSULT (OUTPATIENT)
Dept: CARDIOTHORACIC SURGERY | Age: 66
End: 2021-06-17
Payer: COMMERCIAL

## 2021-06-17 VITALS
SYSTOLIC BLOOD PRESSURE: 141 MMHG | WEIGHT: 268 LBS | RESPIRATION RATE: 18 BRPM | BODY MASS INDEX: 44.65 KG/M2 | TEMPERATURE: 97.2 F | HEART RATE: 75 BPM | OXYGEN SATURATION: 98 % | DIASTOLIC BLOOD PRESSURE: 74 MMHG | HEIGHT: 65 IN

## 2021-06-17 DIAGNOSIS — C34.92 ADENOCARCINOMA, LUNG, LEFT (HCC): Primary | ICD-10-CM

## 2021-06-17 PROCEDURE — 99203 OFFICE O/P NEW LOW 30 MIN: CPT | Performed by: THORACIC SURGERY (CARDIOTHORACIC VASCULAR SURGERY)

## 2021-06-17 PROCEDURE — 3017F COLORECTAL CA SCREEN DOC REV: CPT | Performed by: THORACIC SURGERY (CARDIOTHORACIC VASCULAR SURGERY)

## 2021-06-17 PROCEDURE — G8427 DOCREV CUR MEDS BY ELIG CLIN: HCPCS | Performed by: THORACIC SURGERY (CARDIOTHORACIC VASCULAR SURGERY)

## 2021-06-17 PROCEDURE — G8417 CALC BMI ABV UP PARAM F/U: HCPCS | Performed by: THORACIC SURGERY (CARDIOTHORACIC VASCULAR SURGERY)

## 2021-06-17 PROCEDURE — 4004F PT TOBACCO SCREEN RCVD TLK: CPT | Performed by: THORACIC SURGERY (CARDIOTHORACIC VASCULAR SURGERY)

## 2021-06-17 PROCEDURE — 1111F DSCHRG MED/CURRENT MED MERGE: CPT | Performed by: THORACIC SURGERY (CARDIOTHORACIC VASCULAR SURGERY)

## 2021-06-17 NOTE — PROGRESS NOTES
[facility] Cardiothoracic Surgery   CONSULTATION     CC: Adenocarcinoma of the left upper lung     HPI: Ms. Esteban Rivera is a 61 y.o.  female who presents with recently diagnosed adenocarcinoma of the left lower lung. In April patient underwent annual CT lung screening due to history of smoking which revealed an increase in size of a previously noted pulmonary nodule to the left lower lobe. Nodule measured 1.7 x 1.6 cm and was suspicious for malignancy but negative on subsequent PET scan. CT guided needle biopsy was completed on 5/132021 and found to be positive for adenocarcinoma. Pertinent medical history includes COPD, diabetes, history of PE/DVT with chronic anticoagulation on Eliquis, history of thyroid nodule, hypertension and obesity. She is a current everyday smoker (now down to 10 cigarettes per day; previously 2 ppd x 30 years) and was recently admitted for community-acquired pneumonia with discharge home on 5/30/2021. Patient admits to SOB at baseline and can ambulate approximately 1-2 blocks before becoming SOB and able to walk up 1 flight of stairs with ease but does not think she could make 2 without feeling winded. Denies any fevers, chills, chest pain, nausea, vomiting, diarrhea, dysuria, hematuria. She has been referred for consideration of left lobectomy for adenocarcinoma. Of note, patient's father passed away from lung cancer at age 61 as well as her two brothers. She admits to having exposure to asbestos about 3 years ago from an old house. Mother had breast and uterine cancer. PMH:   Past Medical History in prose (no negatives)      PSH: has a past surgical history that includes Appendectomy (1982); Endoscopy, colon, diagnostic (775054); bronchoscopy (N/A, 12/27/2019); Colonoscopy (N/A, 2/12/2020); and CT NEEDLE BIOPSY LUNG PERCUTANEOUS (5/13/2021). Allergies: No Known Allergies   Medications:   Current Medication      Social Hx: reports that she has been smoking cigarettes.  She has GLUCOSEU NEGATIVE 01/24/2015    AMORPHOUS NOT REPORTED 01/24/2015     Testing:   Pathology:   Final Diagnosis     LUNG, LEFT LOWER LOBE, NODULE, 1.7 CM, CT-GUIDED CORE BIOPSY:     LUNG ADENOCARCINOMA, ACINAR-PREDOMINANT GROWTH PATTERN (SEE   COMMENT)     Diagnosis Comment   For , representative slides were reviewed by a second   pathologist.       Nadja Mary M.D.   **Electronically Signed Out**   clj/5/14/2021   Frozen Section Diagnosis   Rapid Cytologic Evaluation: Lung, LLL, nodule, 1.7 cm, CT guided core   bx (one evaluation event): Satisfactory. The result is discussed   with Dr. Saira Mackey by Dr. Mirtha Jama at 10:15 a.m. on 5/13/2021   PET:   WHOLE BODY PET/CT      4/30/2021      TECHNIQUE:   Following IV injection of 13.165 mCi of F 18 -FDG, PET tumor imaging was   acquired from the base of the skull to the mid thighs. Computed tomography   was used for purposes of attenuation correction and anatomic localization. Fusion imaging was utilized for interpretation. Uptake time 57 mins. Glucose level 92 mg/dl. COMPARISON:   Lung screening CT April 20, 2021. HISTORY:   ORDERING SYSTEM PROVIDED HISTORY: Abnormal CT of the chest   TECHNOLOGIST PROVIDED HISTORY:   increase in lung nodule   Reason for Exam: Abnormal CT of chest, increase in lung nodule   Acuity: Acute   Type of Exam: Initial      FINDINGS:   HEAD/NECK: No abnormal FDG activity is identified. CHEST: The previously identified nodule involving the left lower lobe is not   FDG avid. SUV max of 1.7. No abnormal FDG activity is identified involving   the chest.      ABDOMEN/PELVIS: No abnormal FDG activity is identified. BONES/SOFT TISSUE: No focal abnormal FDG activity is identified. INCIDENTAL CT FINDINGS: No pneumothorax. No pericardial or pleural   effusions. No free air or free fluid. Cholelithiasis. Questionable   punctate nonobstructing calculus left kidney. Sigmoid diverticulosis.          Impression Negative PET-CT. The previously identified nodule involving the left lower   lobe is not FDG avid. Tissue sampling is still recommended given the nodules   morphology is well as reported increase in size. If no tissue sampling is   performed, CT follow-up is recommended. CXR:   ONE XRAY VIEW OF THE CHEST      5/28/2021 11:36 am      COMPARISON:   AP chest from 05/13/2021      HISTORY:   ORDERING SYSTEM PROVIDED HISTORY: Short of breath   TECHNOLOGIST PROVIDED HISTORY:   Short of breath   Reason for Exam: dyspnea   Acuity: Acute   Type of Exam: Initial      History of hypertension, GERD and rheumatoid arthritis. FINDINGS:   Overlying ECG monitor leads and gown snaps. Left heart border obscured by extensive dense confluent airspace opacity   involving ~ 2/3 mid-lower left hemithorax with a few UL air bronchograms   and obscuration left hemidiaphragm. Right lung similar in appearance with   minimal basilar atelectasis but no localized pulmonary opacity or large   pleural effusion. Somewhat engorged right vasculature. Bones unchanged. Impression   Extensive left-sided airspace pneumonia with probable pleural effusion and   some degree of atelectasis right lung unchanged. CT scan:   CTA OF THE CHEST 5/27/2021 9:14 am      TECHNIQUE:   CTA of the chest was performed after the administration of intravenous   contrast. Multiplanar reformatted images are provided for review. MIP   images are provided for review. Dose modulation, iterative reconstruction,   and/or weight based adjustment of the mA/kV was utilized to reduce the   radiation dose to as low as reasonably achievable. COMPARISON:   Chest radiograph 05/13/2021. PET-CT 04/30/2021. Chest CT 04/20/2021.       HISTORY:   ORDERING SYSTEM PROVIDED HISTORY: pleuritic chest pain, known lung malignancy   TECHNOLOGIST PROVIDED HISTORY:   pleuritic chest pain, known lung malignancy   Decision Support Exception - unselect if not a suspected or confirmed   emergency medical condition->Emergency Medical Condition (MA)   Reason for Exam: chest pain, body aches   Acuity: Unknown   Type of Exam: Unknown      FINDINGS:   Pulmonary Arteries: Pulmonary arteries are adequately opacified for   evaluation. No evidence of intraluminal filling defect to suggest pulmonary   embolism. Main pulmonary artery is normal in caliber. Mediastinum: No evidence of mediastinal lymphadenopathy. The heart and   pericardium demonstrate no acute abnormality. There is no acute abnormality   of the thoracic aorta. Lungs/pleura: Dense consolidation is now present in the left upper lobe with   air bronchograms. Known site of malignancy with irregular nodule in the   posterior left lower lobe is again demonstrated measuring 1.6 cm on axial   image 73. No pneumothorax. No effusion. The central airway is patent. Upper Abdomen: Findings suggestive of hepatic steatosis. Cholelithiasis. Punctate left nephrolithiasis. Mild thickening of the left adrenal gland   again demonstrated, which demonstrated no abnormal metabolic activity. Soft Tissues/Bones: No acute bone or soft tissue abnormality. Impression   1. No evidence of pulmonary embolism. 2. Left upper lobe consolidation, likely representing infection. No imaging   evidence for aspiration. 3. No significant interval change in biopsied malignancy in the left lower   lobe. 4. Cholelithiasis. 5. Punctate left nephrolithiasis. 6. Findings suggestive of hepatic steatosis. Imaging Studies: I have personally reviewed the testing/imaging above with Dr Francis Mitchell, he is in agreement with the findings listed above. In summary, Ms. Alka Lagos is a 61y.o. year-old female with adenocarcinoma of the left lower lobe.    Problem List:   Adenocarcinoma of the left lower lobe   COPD with possible BRAYAN   Rheumatoid arthritis   On chronic methotrexate therapy  History of pulmonary embolism and DVT Chronic anticoagulation secondary above on Eliquis   Hypertension   Major depressive disorder   Tobacco abuse   Obesity, BMI 44.6 kg/m²    Recommendation: Per discussion with Dr Morgan Barrera, I believe Ms. Tripp Roach would benefit optimally from robotic assisted laparoscopic left lower lobectomy. She will need to obtain her pulmonary function tests as well as a cardiac ECHO with cardiac clearance. I have discussed the proposed procedure, along with its risk, benefits, and therapeutic alternatives. Specific risks discussedincluded death, stroke, mediastinitis, re-operation for bleeding, and atrial fibrillation. We have also discussed the potential need for blood transfusion, along with its risks and benefits. She appears to understand,and consents to proceed. Surgical intervention has been tentatively scheduled for July 12, 2021 at 7:20am.     On this date 6/17/2021 I have spent 20 minutes reviewing previous notes, test results and face to face with the patient discussing the diagnosis and importance of compliance with the treatment plan as well as documenting on the day of the visit. At least 50% of the time documented was spent with the patient to provide counseling and/or coordination of care. Jacquie Aguilar D.O. PGY-3  Department of Surgery  6/17/2021, 10:56 AM     Agree with the above  Plan for cardiac clearance  Echo to be done  PFTs to be done  We will plan for robotic left lower lobectomy after testing completed  Appreciate the consult from pulmonary medicine  Risk, benefits, and the intentions were discussed with the patient.

## 2021-06-21 ENCOUNTER — HOSPITAL ENCOUNTER (OUTPATIENT)
Dept: GENERAL RADIOLOGY | Age: 66
Discharge: HOME | End: 2021-06-23
Payer: COMMERCIAL

## 2021-06-21 ENCOUNTER — HOSPITAL ENCOUNTER (OUTPATIENT)
Age: 66
Discharge: HOME | End: 2021-06-23
Payer: COMMERCIAL

## 2021-06-21 ENCOUNTER — OFFICE VISIT (OUTPATIENT)
Dept: PULMONOLOGY | Age: 66
End: 2021-06-21
Payer: COMMERCIAL

## 2021-06-21 ENCOUNTER — HOSPITAL ENCOUNTER (OUTPATIENT)
Dept: LAB | Age: 66
Setting detail: SPECIMEN
Discharge: HOME | End: 2021-06-21
Payer: COMMERCIAL

## 2021-06-21 ENCOUNTER — TELEPHONE (OUTPATIENT)
Dept: CASE MANAGEMENT | Age: 66
End: 2021-06-21

## 2021-06-21 VITALS
SYSTOLIC BLOOD PRESSURE: 123 MMHG | TEMPERATURE: 98.2 F | HEART RATE: 65 BPM | WEIGHT: 239 LBS | HEIGHT: 65 IN | BODY MASS INDEX: 39.82 KG/M2 | OXYGEN SATURATION: 98 % | RESPIRATION RATE: 18 BRPM | DIASTOLIC BLOOD PRESSURE: 71 MMHG

## 2021-06-21 DIAGNOSIS — Z11.52 ENCOUNTER FOR SCREENING FOR COVID-19: ICD-10-CM

## 2021-06-21 DIAGNOSIS — J44.9 COPD, SEVERITY TO BE DETERMINED (HCC): Primary | ICD-10-CM

## 2021-06-21 DIAGNOSIS — C34.92 ADENOCARCINOMA OF LEFT LUNG (HCC): ICD-10-CM

## 2021-06-21 DIAGNOSIS — J18.9 PNEUMONIA OF LEFT UPPER LOBE DUE TO INFECTIOUS ORGANISM: ICD-10-CM

## 2021-06-21 DIAGNOSIS — R91.1 NODULE OF LOWER LOBE OF LEFT LUNG: ICD-10-CM

## 2021-06-21 DIAGNOSIS — M06.9 RHEUMATOID ARTHRITIS INVOLVING MULTIPLE SITES, UNSPECIFIED WHETHER RHEUMATOID FACTOR PRESENT (HCC): ICD-10-CM

## 2021-06-21 PROCEDURE — G8427 DOCREV CUR MEDS BY ELIG CLIN: HCPCS | Performed by: INTERNAL MEDICINE

## 2021-06-21 PROCEDURE — 3017F COLORECTAL CA SCREEN DOC REV: CPT | Performed by: INTERNAL MEDICINE

## 2021-06-21 PROCEDURE — 99214 OFFICE O/P EST MOD 30 MIN: CPT | Performed by: INTERNAL MEDICINE

## 2021-06-21 PROCEDURE — 4004F PT TOBACCO SCREEN RCVD TLK: CPT | Performed by: INTERNAL MEDICINE

## 2021-06-21 PROCEDURE — 71046 X-RAY EXAM CHEST 2 VIEWS: CPT

## 2021-06-21 PROCEDURE — 3023F SPIROM DOC REV: CPT | Performed by: INTERNAL MEDICINE

## 2021-06-21 PROCEDURE — U0005 INFEC AGEN DETEC AMPLI PROBE: HCPCS

## 2021-06-21 PROCEDURE — G8926 SPIRO NO PERF OR DOC: HCPCS | Performed by: INTERNAL MEDICINE

## 2021-06-21 PROCEDURE — G8417 CALC BMI ABV UP PARAM F/U: HCPCS | Performed by: INTERNAL MEDICINE

## 2021-06-21 PROCEDURE — 1111F DSCHRG MED/CURRENT MED MERGE: CPT | Performed by: INTERNAL MEDICINE

## 2021-06-21 PROCEDURE — U0003 INFECTIOUS AGENT DETECTION BY NUCLEIC ACID (DNA OR RNA); SEVERE ACUTE RESPIRATORY SYNDROME CORONAVIRUS 2 (SARS-COV-2) (CORONAVIRUS DISEASE [COVID-19]), AMPLIFIED PROBE TECHNIQUE, MAKING USE OF HIGH THROUGHPUT TECHNOLOGIES AS DESCRIBED BY CMS-2020-01-R: HCPCS

## 2021-06-21 ASSESSMENT — SLEEP AND FATIGUE QUESTIONNAIRES
HOW LIKELY ARE YOU TO NOD OFF OR FALL ASLEEP WHEN YOU ARE A PASSENGER IN A CAR FOR AN HOUR WITHOUT A BREAK: 0
ESS TOTAL SCORE: 4
HOW LIKELY ARE YOU TO NOD OFF OR FALL ASLEEP WHILE SITTING INACTIVE IN A PUBLIC PLACE: 0
HOW LIKELY ARE YOU TO NOD OFF OR FALL ASLEEP IN A CAR, WHILE STOPPED FOR A FEW MINUTES IN TRAFFIC: 0
HOW LIKELY ARE YOU TO NOD OFF OR FALL ASLEEP WHILE WATCHING TV: 1
HOW LIKELY ARE YOU TO NOD OFF OR FALL ASLEEP WHILE SITTING QUIETLY AFTER LUNCH WITHOUT ALCOHOL: 0
HOW LIKELY ARE YOU TO NOD OFF OR FALL ASLEEP WHILE SITTING AND READING: 1
HOW LIKELY ARE YOU TO NOD OFF OR FALL ASLEEP WHILE LYING DOWN TO REST IN THE AFTERNOON WHEN CIRCUMSTANCES PERMIT: 2
HOW LIKELY ARE YOU TO NOD OFF OR FALL ASLEEP WHILE SITTING AND TALKING TO SOMEONE: 0

## 2021-06-21 NOTE — PROGRESS NOTES
REASON FOR THE CONSULTATION:  Lung nodule  HISTORY OF PRESENT ILLNESS:    Gabby Veliz is a 61y.o. year old female   Patient was seen by cardiothoracic surgery and she is scheduled for left lower lobe lobectomy for adenocarcinoma of lung on July 12, 2021. She needs to get PFT but has not had COVID-19 vaccination so will need COVID-19 testing prior to PFT. Unfortunately she continues to smoke almost a pack a day. Advised her to quit smoking. She did not use Chantix. She wants to quit on her own. In May patient was admitted to [facility] with a left upper lobe pneumonia. She was treated with antibiotics and discharged. Denies any purulent sputum or hemoptysis or fevers at present. She is using Spiriva daily and uses albuterol as needed. Last visit   here for evaluation of lung nodule which was seen on CT lung screening. It is spiculated and he is 1.7 cm. This has increased in size from CTA of the chest done December 2019. Patient has chronic grade 2 dyspnea , which has been progressively getting worse withafter one flight stairs, with one/ half  block walking , Complains of Yes Cough , Yessputum production clear in morning   , No  hemoptysis , Yes  Associated with wheezing . No home oxygen  . She has spiriva which helps . LUNG CANCER SCREENING     1. CRITERIA MET    [x]     CT ORDERED  []      2. CRITERIA NOT MET   []      3. REFUSED                    []        REASON CRITERIA NOT MET     1. SMOKING LESS THAN 30 PY  []      2. AGE LESS THAN 55 or GREATER 77 YEARS  []      3. QUIT SMOKING 15 YEARS OR GREATER   []      4. RECENT CT WITH IN 11 MONTHS    []      5. LIFE EXPECTANCY < 5 YEARS   []      6.  SIGNS  AND SYMPTOMS OF LUNG CANCER   []         Immunization   Immunization History   Administered Date(s) Administered    Influenza Virus Vaccine 01/26/2016    Influenza, Quadv, IM, PF (6 mo and older Fluzone, Flulaval, Fluarix, and 3 yrs and older Afluria) 10/28/2016, 11/12/2018    Pneumococcal Polysaccharide ([id number]) 04/11/2017    Tdap (Boostrix, Adacel) 06/12/2019    Zoster Recombinant (Shingrix) 06/12/2019, 11/20/2019          PAST MEDICAL HISTORY:       Diagnosis Date    Abnormal CT of the chest 4/21/2021    Adenocarcinoma, lung, left ([address] 5/14/2021    Arthritis     Community acquired pneumonia 12/23/2019    COPD (chronic obstructive pulmonary disease) ([address] 8/14/2019    Deep venous thrombosis of left profunda femoris vein ([address] 4/16/2020    Depression     DVT (deep venous thrombosis) ([address] 2014    b/l     GERD (gastroesophageal reflux disease)     History of DVT in adulthood 11/12/2018    Treated , recent scan normal     History of pulmonary embolism 5/27/2021    History of thyroid nodule 12/23/2019    Hx of blood clots     Hypertension     Major depressive disorder, recurrent, moderate (HCC) 11/12/2018    Nodule of lower lobe of left lung 4/23/2021    Obesity, Class II, BMI 35-39.9 11/12/2018    Obesity, Class III, BMI 40-49.9 (morbid obesity) ([address] 5/27/2021    On anticoagulant therapy 5/27/2021    On methotrexate therapy 5/27/2021    Prediabetes 11/12/2018    Rheumatoid arthritis ([address]     Thyroid nodule 1/9/2020    Tobacco abuse 11/12/2018         Family History:       Problem Relation Age of Onset    Cancer Mother         Uterine and breast    Diabetes Mother     Heart Disease Mother     Cancer Father         lung    Cancer Brother         lung    Cancer Brother         lung       SURGICAL HISTORY:   Past Surgical History:   Procedure Laterality Date    APPENDECTOMY  1982    BRONCHOSCOPY N/A 12/27/2019    BRONCHOSCOPY ALVEOLAR LAVAGE performed by Duy Brown MD at [address] [place] [address] N/A 2/12/2020    COLONOSCOPY POLYPECTOMIES HOT SNARE, COLD BIOPSY POLYPECTOMIES performed by Chuck Watson MD at [facility]  5/13/2021    CT NEEDLE BIOPSY LUNG PERCUTANEOUS 5/13/2021 [facility] CT SCAN    ENDOSCOPY, COLON, DIAGNOSTIC  [id number]    gastritis, sm. bowel lipoma, biopsies           SOCIAL AND OCCUPATIONAL HEALTH:    No history of tuberculosis   no occupational exposure  Pets -cats Yes, dogsNo  Birds No    Occupational history - heavy duty cleaning     TOBACCO:   reports that she has been smoking cigarettes. She has a 8.75 pack-year smoking history. She has never used smokeless tobacco.  ETOH:   reports current alcohol use. ALLERGIES:      No Known Allergies      Home Meds:   Prior to Admission medications    Medication Sig Start Date End Date Taking? Authorizing Provider   albuterol (PROVENTIL) (2.5 MG/3ML) 0.083% nebulizer solution Take 3 mLs by nebulization every 6 hours as needed for Wheezing or Shortness of Breath 6/2/21  Yes Vj Thapa MD   albuterol sulfate  (90 Base) MCG/ACT inhaler inhale 2 puffs by mouth and INTO THE LUNGS every 4 hours for 7 days 6/1/21  Yes Vj Thapa MD   SPIRIVA RESPIMAT 2.5 MCG/ACT AERS inhaler inhale 2 puffs INTO THE LUNGS once daily 5/24/21  Yes Vj Thapa MD   pantoprazole (PROTONIX) 40 MG tablet take 1 tablet by mouth once daily 5/18/21  Yes Vj Thapa MD   Blood Pressure KIT Dx: HTN. Needs automatic blood pressure machine to monitor her blood pressure.  5/18/21  Yes Vj Thapa MD   sertraline (ZOLOFT) 100 MG tablet Take 1 tablet by mouth daily 5/18/21  Yes Vj Thapa MD   varenicline (CHANTIX STARTING MONTH PAK) 0.5 MG X 11 & 1 MG X 42 tablet Take by mouth as directed 4/23/21  Yes Myra Yoon MD   amLODIPine (NORVASC) 10 MG tablet take 1 tablet by mouth once daily 4/15/21  Yes Vj Thapa MD   folic acid (FOLVITE) 1 MG tablet take 1 tablet by mouth once daily 4/12/21  Yes Vj Thapa MD   ELIQUIS 5 MG TABS tablet take 1 tablet by mouth twice a day 7/6/20  Yes Vj Thapa MD   albuterol (PROVENTIL) (2.5 MG/3ML) 0.083% nebulizer solution Take 3 mLs by nebulization every 6 hours as needed for Wheezing or Shortness of Breath 12/30/19  Yes Vj Thapa MD   methotrexate (RHEUMATREX) 2.5 MG chemo tablet Take 20 mg by mouth once a week Indications: takes 8 tablets on saturdays    Yes Historical Provider, MD              Review of Systems -  General ROS: negative for - chills, fatigue, fever or weight loss  ENT ROS: negative for - headaches, oral lesions or sore throat  Cardiovascular ROS: no chest pain , orthopnea or pnd   Gastrointestinal ROS: no abdominal pain, change in bowel habits, or black or bloody stools  Skin - no rash   Neuro - no blurry vision , no loc . No focal weakness   msk - no jt tenderness or swelling    Vascular - no claudication , rest completed and negative   Lymphatic - complete and negative   Hematology - oncology - complete and negative   Allergy immunology - complete and negative    no burning or hematuria    Vitals:  /71 (Site: Right Upper Arm, Position: Sitting, Cuff Size: Large Adult)   Pulse 65   Temp 98.2 °F (36.8 °C) (Temporal)   Resp 18   Ht 5' 5\" (1.651 m)   Wt 239 lb (108.4 kg)   SpO2 98% Comment: room air at rest  BMI 39.77 kg/m²     PHYSICAL EXAM:  Head and neck atraumatic, normocephalic    Lymph nodes-no cervical, supraclavicular lymphadenopathy    Neck-no JVP elevation    Lungs - Ventilating all lobes without any rales, rhonchi, wheezes or dullness. No bronchial breath sounds. Chest expansion equal bilaterally    CVS- S1, S2 regular. No S3 no S4, no murmurs    Abdomen-nontender, nondistended. Bowel sounds are present. No organomegaly    Lower extremity-no edema    Upper extremity-no edema    Neurological-grossly normal cranial nerves. No overt motor deficit     CBC: No results for input(s): WBC, HGB, PLT in the last 72 hours. BMP:  No results for input(s): NA, K, CL, CO2, BUN, CREATININE, GLUCOSE in the last 72 hours. Hepatic: No results for input(s): AST, ALT, ALB, BILITOT, ALKPHOS in the last 72 hours.   Amylase:   Lab Results   Component Value Date AMYLASE 18 05/01/2014     Lipase:   Lab Results   Component Value Date    LIPASE 21 05/01/2014     Troponin: No results for input(s): TROPONINI in the last 72 hours. BNP: No results for input(s): BNP in the last 72 hours. Lipids: No results for input(s): CHOL, HDL in the last 72 hours. Invalid input(s): LDLCALCU  ABGs:   Lab Results   Component Value Date    PHART 7.428 05/28/2021    PO2ART 55.4 05/28/2021    FQR2OJJ 41.2 05/28/2021     INR: No results for input(s): INR in the last 72 hours. Thyroid:   Lab Results   Component Value Date    TSH 0.18 12/23/2019     Urinalysis: No results for input(s): BACTERIA, BLOODU, CLARITYU, COLORU, PHUR, PROTEINU, RBCUA, SPECGRAV, BILIRUBINUR, NITRU, WBCUA, LEUKOCYTESUR, GLUCOSEU in the last 72 hours. IMPRESSION:     Diagnosis Orders   1. COPD, severity to be determined ([facility]  Full PFT Study With Bronchodilator   2. Encounter for screening for COVID-19  COVID-19   3. Pneumonia of left upper lobe due to infectious organism  XR CHEST (2 VW)   4. Adenocarcinoma of left lung ([address]     5. Rheumatoid arthritis involving multiple sites, unspecified whether rheumatoid factor present ([facility]     6. Nodule of lower lobe of left lung          :                PLAN:      Patient is not vaccinated for COVID-19. Will obtain COVID-19 testing today and schedule PFT this Thursday. PFTs required for preoperative evaluation for left lower lobe lobectomy by Dr. Penny Sandhu. This is scheduled July 12, 2021. In May patient was admitted with left upper lobe pneumonia. On exam no bronchial breath sounds or rales are appreciated. Will obtain chest x-ray PA and lateral today to assess resolution of consolidation. Advised  patient to stop smoking  Continue Spiriva and use albuterol as needed. Follow-up 1 month after surgery. Requested Prescriptions      No prescriptions requested or ordered in this encounter       There are no discontinued medications.     Ketan Dunne received counseling on the following healthy behaviors: nutrition, exercise and medication adherence    Patient given educational materials : see patient instruction       Discussed use, benefit, and side effects of prescribed medications. Barriers to medication compliance addressed. All patient questions answered. Pt voiced understanding. I hope this updates you on my evaluation and clinical thinking. Thank you for allowing me to participate in his care. Sincerely,    Electronically signed by Debora Aguirre MD on 6/21/2021 at 2:52 PM       Please note that this chart was generated using voice recognition Dragon dictation software. Although every effort was made to ensure the accuracy of this automated transcription, some errors in transcription may have occurred.

## 2021-06-21 NOTE — PATIENT INSTRUCTIONS
Central Scheduling currently has an outage. Will call Later on 06/21/21 or 06/22/21 to schedule patient testings.

## 2021-06-21 NOTE — TELEPHONE ENCOUNTER
Name: Sandra Nelson  : 1955  MRN: [id number]    Oncology Navigation Follow-Up Note  Navigator reaching out to pt. And ofering assistance if needed. All testing scheduled, but pt. Asking if needing to see Dr. Guillermo Moy or can wait until post op? Plan to clarify.   Electronically signed by Vipin Harkins RN on 2021 at 1:15 PM

## 2021-06-23 LAB
SARS-COV-2: NORMAL
SARS-COV-2: NOT DETECTED
SOURCE: NORMAL

## 2021-06-25 ENCOUNTER — OFFICE VISIT (OUTPATIENT)
Dept: PULMONOLOGY | Age: 66
End: 2021-06-25
Payer: COMMERCIAL

## 2021-06-25 VITALS — HEART RATE: 77 BPM | BODY MASS INDEX: 40.48 KG/M2 | HEIGHT: 65 IN | WEIGHT: 243 LBS | OXYGEN SATURATION: 95 %

## 2021-06-25 DIAGNOSIS — J43.1 PANLOBULAR EMPHYSEMA (HCC): Primary | ICD-10-CM

## 2021-06-25 PROCEDURE — 94375 RESPIRATORY FLOW VOLUME LOOP: CPT | Performed by: INTERNAL MEDICINE

## 2021-06-25 PROCEDURE — 94726 PLETHYSMOGRAPHY LUNG VOLUMES: CPT | Performed by: INTERNAL MEDICINE

## 2021-06-25 PROCEDURE — 94729 DIFFUSING CAPACITY: CPT | Performed by: INTERNAL MEDICINE

## 2021-06-25 RX ORDER — SODIUM CHLORIDE, SODIUM LACTATE, POTASSIUM CHLORIDE, CALCIUM CHLORIDE 600; 310; 30; 20 MG/100ML; MG/100ML; MG/100ML; MG/100ML
1000 INJECTION, SOLUTION INTRAVENOUS CONTINUOUS
Status: CANCELLED | OUTPATIENT
Start: 2021-06-25

## 2021-06-25 NOTE — PROGRESS NOTES
Pulmonary function test  6/25/2021     Spirometry  Flow volume loop shows obstructive disease  FEV1 73% predicted, FVC 79% predicted  FEV1 FVC ratio 73    No bronchodilator challenge performed. Lung volumes by body box    Total lung capacity 172% predicted  Residual volume 326% predicted    Diffusion capacity uncorrected is 92% predicted. Final impression    The study shows a moderate obstructive and restrictive ventilatory dysfunction with significant air trapping and hyperinflation and normal diffusion capacity. Clinical correlation advised.     Electronically signed by Dmitri Oliver MD on 6/25/2021 at 11:38 AM

## 2021-06-25 NOTE — PROGRESS NOTES
Good effort and understanding. Transcutaneous Hb 13.4 . No post testing patient had inhaler before testing.

## 2021-06-29 ENCOUNTER — HOSPITAL ENCOUNTER (OUTPATIENT)
Dept: NON INVASIVE DIAGNOSTICS | Age: 66
Discharge: HOME | End: 2021-06-29
Payer: COMMERCIAL

## 2021-06-29 ENCOUNTER — HOSPITAL ENCOUNTER (OUTPATIENT)
Dept: GENERAL RADIOLOGY | Age: 66
Discharge: HOME | End: 2021-07-01
Payer: COMMERCIAL

## 2021-06-29 ENCOUNTER — HOSPITAL ENCOUNTER (OUTPATIENT)
Dept: PREADMISSION TESTING | Age: 66
Discharge: HOME | End: 2021-07-03
Payer: COMMERCIAL

## 2021-06-29 VITALS
WEIGHT: 240 LBS | HEART RATE: 79 BPM | BODY MASS INDEX: 39.99 KG/M2 | SYSTOLIC BLOOD PRESSURE: 144 MMHG | TEMPERATURE: 98.4 F | RESPIRATION RATE: 18 BRPM | OXYGEN SATURATION: 97 % | HEIGHT: 65 IN | DIASTOLIC BLOOD PRESSURE: 72 MMHG

## 2021-06-29 DIAGNOSIS — C34.92 ADENOCARCINOMA, LUNG, LEFT (HCC): ICD-10-CM

## 2021-06-29 LAB
-: ABNORMAL
ABSOLUTE EOS #: 0.12 K/UL (ref 0–0.4)
ABSOLUTE IMMATURE GRANULOCYTE: 0.12 K/UL (ref 0–0.3)
ABSOLUTE LYMPH #: 1.8 K/UL (ref 1–4.8)
ABSOLUTE MONO #: 0.42 K/UL (ref 0.1–0.8)
ALBUMIN SERPL-MCNC: 3.9 G/DL (ref 3.5–5.2)
ALBUMIN/GLOBULIN RATIO: 0.7 (ref 1–2.5)
ALP BLD-CCNC: 88 U/L (ref 35–104)
ALT SERPL-CCNC: 20 U/L (ref 5–33)
AMORPHOUS: ABNORMAL
ANION GAP SERPL CALCULATED.3IONS-SCNC: 10 MMOL/L (ref 9–17)
AST SERPL-CCNC: 21 U/L
BACTERIA: ABNORMAL
BASOPHILS # BLD: 1 % (ref 0–2)
BASOPHILS ABSOLUTE: 0.06 K/UL (ref 0–0.2)
BILIRUB SERPL-MCNC: 0.27 MG/DL (ref 0.3–1.2)
BILIRUBIN URINE: NEGATIVE
BUN BLDV-MCNC: 11 MG/DL (ref 8–23)
BUN/CREAT BLD: ABNORMAL (ref 9–20)
CALCIUM SERPL-MCNC: 9.3 MG/DL (ref 8.6–10.4)
CASTS UA: ABNORMAL /LPF (ref 0–8)
CHLORIDE BLD-SCNC: 105 MMOL/L (ref 98–107)
CO2: 24 MMOL/L (ref 20–31)
COLOR: YELLOW
COMMENT UA: ABNORMAL
CREAT SERPL-MCNC: 0.59 MG/DL (ref 0.5–0.9)
CRYSTALS, UA: ABNORMAL /HPF
DIFFERENTIAL TYPE: ABNORMAL
EOSINOPHILS RELATIVE PERCENT: 2 % (ref 1–4)
EPITHELIAL CELLS UA: ABNORMAL /HPF (ref 0–5)
GFR AFRICAN AMERICAN: >60 ML/MIN
GFR NON-AFRICAN AMERICAN: >60 ML/MIN
GFR SERPL CREATININE-BSD FRML MDRD: ABNORMAL ML/MIN/{1.73_M2}
GFR SERPL CREATININE-BSD FRML MDRD: ABNORMAL ML/MIN/{1.73_M2}
GLUCOSE BLD-MCNC: 117 MG/DL (ref 70–99)
GLUCOSE URINE: NEGATIVE
HCT VFR BLD CALC: 36.8 % (ref 36.3–47.1)
HEMOGLOBIN: 11.4 G/DL (ref 11.9–15.1)
IMMATURE GRANULOCYTES: 2 %
INR BLD: 1
KETONES, URINE: NEGATIVE
LEUKOCYTE ESTERASE, URINE: ABNORMAL
LV EF: 53 %
LVEF MODALITY: NORMAL
LYMPHOCYTES # BLD: 30 % (ref 24–44)
MCH RBC QN AUTO: 31.4 PG (ref 25.2–33.5)
MCHC RBC AUTO-ENTMCNC: 31 G/DL (ref 28.4–34.8)
MCV RBC AUTO: 101.4 FL (ref 82.6–102.9)
MONOCYTES # BLD: 7 % (ref 1–7)
MORPHOLOGY: ABNORMAL
MUCUS: ABNORMAL
NITRITE, URINE: NEGATIVE
NRBC AUTOMATED: 0 PER 100 WBC
OTHER OBSERVATIONS UA: ABNORMAL
PARTIAL THROMBOPLASTIN TIME: 22.4 SEC (ref 20.5–30.5)
PDW BLD-RTO: 14.8 % (ref 11.8–14.4)
PH UA: 5.5 (ref 5–8)
PLATELET # BLD: 265 K/UL (ref 138–453)
PLATELET ESTIMATE: ABNORMAL
PMV BLD AUTO: 9.1 FL (ref 8.1–13.5)
POTASSIUM SERPL-SCNC: 4 MMOL/L (ref 3.7–5.3)
PROTEIN UA: ABNORMAL
PROTHROMBIN TIME: 10.6 SEC (ref 9.1–12.3)
RBC # BLD: 3.63 M/UL (ref 3.95–5.11)
RBC # BLD: ABNORMAL 10*6/UL
RBC UA: ABNORMAL /HPF (ref 0–4)
RENAL EPITHELIAL, UA: ABNORMAL /HPF
SEG NEUTROPHILS: 58 % (ref 36–66)
SEGMENTED NEUTROPHILS ABSOLUTE COUNT: 3.48 K/UL (ref 1.8–7.7)
SODIUM BLD-SCNC: 139 MMOL/L (ref 135–144)
SPECIFIC GRAVITY UA: 1.02 (ref 1–1.03)
TOTAL PROTEIN: 9.8 G/DL (ref 6.4–8.3)
TRICHOMONAS: ABNORMAL
TURBIDITY: CLEAR
URINE HGB: NEGATIVE
UROBILINOGEN, URINE: NORMAL
WBC # BLD: 6 K/UL (ref 3.5–11.3)
WBC # BLD: ABNORMAL 10*3/UL
WBC UA: ABNORMAL /HPF (ref 0–5)
YEAST: ABNORMAL

## 2021-06-29 PROCEDURE — 36415 COLL VENOUS BLD VENIPUNCTURE: CPT

## 2021-06-29 PROCEDURE — 93306 TTE W/DOPPLER COMPLETE: CPT

## 2021-06-29 PROCEDURE — 87086 URINE CULTURE/COLONY COUNT: CPT

## 2021-06-29 PROCEDURE — 85610 PROTHROMBIN TIME: CPT

## 2021-06-29 PROCEDURE — 80053 COMPREHEN METABOLIC PANEL: CPT

## 2021-06-29 PROCEDURE — 86901 BLOOD TYPING SEROLOGIC RH(D): CPT

## 2021-06-29 PROCEDURE — 71046 X-RAY EXAM CHEST 2 VIEWS: CPT

## 2021-06-29 PROCEDURE — 87641 MR-STAPH DNA AMP PROBE: CPT

## 2021-06-29 PROCEDURE — 85730 THROMBOPLASTIN TIME PARTIAL: CPT

## 2021-06-29 PROCEDURE — 93005 ELECTROCARDIOGRAM TRACING: CPT | Performed by: THORACIC SURGERY (CARDIOTHORACIC VASCULAR SURGERY)

## 2021-06-29 PROCEDURE — 81001 URINALYSIS AUTO W/SCOPE: CPT

## 2021-06-29 PROCEDURE — 85025 COMPLETE CBC W/AUTO DIFF WBC: CPT

## 2021-06-29 PROCEDURE — 86900 BLOOD TYPING SEROLOGIC ABO: CPT

## 2021-06-29 PROCEDURE — 86850 RBC ANTIBODY SCREEN: CPT

## 2021-06-29 PROCEDURE — 87186 SC STD MICRODIL/AGAR DIL: CPT

## 2021-06-29 PROCEDURE — 87077 CULTURE AEROBIC IDENTIFY: CPT

## 2021-06-29 NOTE — PROGRESS NOTES
Anesthesia Focused Assessment    Has patient ever tested positive for COVID? No    STOP-BANG Sleep Apnea Questionnaire    SNORE loudly (heard through closed doors)? Yes  TIRED, fatigued, sleepy during daytime? No  OBSERVED stopping breathing during sleep? No  High blood PRESSURE being treated? Yes    BMI over 35? Yes  AGE over 48? Yes  NECK circumference over 16\"? No  GENDER (male)? No             Total 4  High risk 5-8  Intermediate risk 3-4  Low risk 0-2    Obstructive Sleep Apnea: snores but denies apnea  If YES, machine used: no     Type 1 DM:   no  T2DM:  prediabetes    Coronary Artery Disease:  no  Hypertension:  yes    Active smoker:  Smoker for 35+ years, 1/2 ppd+  Drinks Alcohol:  socially    Dentition: benign    Defib / AICD / Pacemaker: no      Renal Failure/dialysis:  no    Patient was evaluated in PAT & anesthesia guidelines were applied. NPO guidelines, medication instructions and scheduled arrival time were reviewed with patient. Hx of anesthesia complications:  no  Family hx of anesthesia complications:  no                                                                                                                     Anesthesia contacted:     Medical or cardiac clearance ordered: cardiac clearance pending from [facility]. She has appointment for clearance with Dr. Chalo Mcfadden in [place] office on 7/6/21.     Vince Mohan PA-C  6/29/21  10:37 AM

## 2021-06-30 ENCOUNTER — TELEPHONE (OUTPATIENT)
Dept: CARDIOTHORACIC SURGERY | Age: 66
End: 2021-06-30

## 2021-06-30 DIAGNOSIS — N30.00 ACUTE CYSTITIS WITHOUT HEMATURIA: Primary | ICD-10-CM

## 2021-06-30 LAB
CULTURE: ABNORMAL
EKG ATRIAL RATE: 64 BPM
EKG P AXIS: 45 DEGREES
EKG P-R INTERVAL: 156 MS
EKG Q-T INTERVAL: 428 MS
EKG QRS DURATION: 96 MS
EKG QTC CALCULATION (BAZETT): 441 MS
EKG R AXIS: -26 DEGREES
EKG T AXIS: 9 DEGREES
EKG VENTRICULAR RATE: 64 BPM
Lab: ABNORMAL
MRSA, DNA, NASAL: NORMAL
SPECIMEN DESCRIPTION: ABNORMAL
SPECIMEN DESCRIPTION: NORMAL

## 2021-06-30 RX ORDER — CIPROFLOXACIN 500 MG/1
500 TABLET, FILM COATED ORAL 2 TIMES DAILY
Qty: 20 TABLET | Refills: 0 | Status: SHIPPED | OUTPATIENT
Start: 2021-06-30 | End: 2021-07-10

## 2021-06-30 NOTE — TELEPHONE ENCOUNTER
Patient called and notified of positive urinalysis results. Denies symptoms of UTI at this time. States that she has had urinary tract infections in the past without symptoms. Agreeable to antibiotic course. Ciprofloxacin sent to pharmacy. Plan of care discussed and questions reviewed/answered. Patient encouraged to call with any further concerns.     Bernardino Rose, ILIR - CNP

## 2021-07-02 NOTE — TELEPHONE ENCOUNTER
Culture and sensitivity results reviewed. Urine culture was positive for Klebsiella oxytoca and found to be sensitive for Ciprofloxacin. No further orders needed at this time.      Maxine Nina, APRN - CNP

## 2021-07-08 ENCOUNTER — HOSPITAL ENCOUNTER (OUTPATIENT)
Dept: LAB | Age: 66
Setting detail: SPECIMEN
Discharge: HOME | End: 2021-07-08
Payer: COMMERCIAL

## 2021-07-08 DIAGNOSIS — Z20.822 COVID-19 RULED OUT BY LABORATORY TESTING: Primary | ICD-10-CM

## 2021-07-08 PROCEDURE — U0003 INFECTIOUS AGENT DETECTION BY NUCLEIC ACID (DNA OR RNA); SEVERE ACUTE RESPIRATORY SYNDROME CORONAVIRUS 2 (SARS-COV-2) (CORONAVIRUS DISEASE [COVID-19]), AMPLIFIED PROBE TECHNIQUE, MAKING USE OF HIGH THROUGHPUT TECHNOLOGIES AS DESCRIBED BY CMS-2020-01-R: HCPCS

## 2021-07-08 PROCEDURE — U0005 INFEC AGEN DETEC AMPLI PROBE: HCPCS

## 2021-07-09 LAB
SARS-COV-2: NORMAL
SARS-COV-2: NOT DETECTED
SOURCE: NORMAL

## 2021-07-11 ENCOUNTER — TELEPHONE (OUTPATIENT)
Dept: CARDIOTHORACIC SURGERY | Age: 66
End: 2021-07-11

## 2021-07-11 NOTE — TELEPHONE ENCOUNTER
I called patient about how we have to cancel her surgery tomorrow. I explained the acuity of the other patients having surgery. She was very understanding. I told her she will get a call back tomorrow with rescheduled date.

## 2021-07-13 NOTE — TELEPHONE ENCOUNTER
Called patient to notify her of RS date and time, and new COVID test date and time. Patient has COVID testing on Thurs. 7/15/21 @ 10:30am at [facility], and her surgery is RS to [facility]. 7/19/21 @ 8:30am with a 6:30am arrival to the main OR. Patient verbalized understanding.

## 2021-07-15 ENCOUNTER — HOSPITAL ENCOUNTER (OUTPATIENT)
Dept: LAB | Age: 66
Setting detail: SPECIMEN
Discharge: HOME | End: 2021-07-15
Payer: COMMERCIAL

## 2021-07-15 DIAGNOSIS — Z20.822 COVID-19 RULED OUT BY LABORATORY TESTING: Primary | ICD-10-CM

## 2021-07-15 PROCEDURE — U0005 INFEC AGEN DETEC AMPLI PROBE: HCPCS

## 2021-07-15 PROCEDURE — U0003 INFECTIOUS AGENT DETECTION BY NUCLEIC ACID (DNA OR RNA); SEVERE ACUTE RESPIRATORY SYNDROME CORONAVIRUS 2 (SARS-COV-2) (CORONAVIRUS DISEASE [COVID-19]), AMPLIFIED PROBE TECHNIQUE, MAKING USE OF HIGH THROUGHPUT TECHNOLOGIES AS DESCRIBED BY CMS-2020-01-R: HCPCS

## 2021-07-16 LAB
SARS-COV-2: NORMAL
SARS-COV-2: NOT DETECTED
SOURCE: NORMAL

## 2021-07-16 RX ORDER — APIXABAN 5 MG/1
TABLET, FILM COATED ORAL
Qty: 180 TABLET | Refills: 3 | Status: SHIPPED | OUTPATIENT
Start: 2021-07-16

## 2021-07-19 ENCOUNTER — ANESTHESIA (OUTPATIENT)
Dept: OPERATING ROOM | Age: 66
DRG: 120 | End: 2021-07-19
Payer: COMMERCIAL

## 2021-07-19 ENCOUNTER — HOSPITAL ENCOUNTER (INPATIENT)
Age: 66
LOS: 4 days | Discharge: HOME | DRG: 120 | End: 2021-07-23
Attending: THORACIC SURGERY (CARDIOTHORACIC VASCULAR SURGERY) | Admitting: THORACIC SURGERY (CARDIOTHORACIC VASCULAR SURGERY)
Payer: COMMERCIAL

## 2021-07-19 ENCOUNTER — APPOINTMENT (OUTPATIENT)
Dept: GENERAL RADIOLOGY | Age: 66
DRG: 120 | End: 2021-07-19
Attending: THORACIC SURGERY (CARDIOTHORACIC VASCULAR SURGERY)
Payer: COMMERCIAL

## 2021-07-19 ENCOUNTER — ANESTHESIA EVENT (OUTPATIENT)
Dept: OPERATING ROOM | Age: 66
DRG: 120 | End: 2021-07-19
Payer: COMMERCIAL

## 2021-07-19 VITALS — TEMPERATURE: 97.8 F | OXYGEN SATURATION: 96 % | DIASTOLIC BLOOD PRESSURE: 52 MMHG | SYSTOLIC BLOOD PRESSURE: 92 MMHG

## 2021-07-19 DIAGNOSIS — Z90.2 S/P LOBECTOMY OF LUNG: Primary | ICD-10-CM

## 2021-07-19 LAB
ALLEN TEST: ABNORMAL
ANION GAP SERPL CALCULATED.3IONS-SCNC: 10 MMOL/L (ref 9–17)
BUN BLDV-MCNC: 14 MG/DL (ref 8–23)
BUN/CREAT BLD: ABNORMAL (ref 9–20)
CALCIUM SERPL-MCNC: 8.4 MG/DL (ref 8.6–10.4)
CHLORIDE BLD-SCNC: 107 MMOL/L (ref 98–107)
CO2: 21 MMOL/L (ref 20–31)
CREAT SERPL-MCNC: 0.65 MG/DL (ref 0.5–0.9)
FIO2: ABNORMAL
GFR AFRICAN AMERICAN: >60 ML/MIN
GFR NON-AFRICAN AMERICAN: >60 ML/MIN
GFR SERPL CREATININE-BSD FRML MDRD: ABNORMAL ML/MIN/{1.73_M2}
GFR SERPL CREATININE-BSD FRML MDRD: ABNORMAL ML/MIN/{1.73_M2}
GLUCOSE BLD-MCNC: 142 MG/DL (ref 70–99)
GLUCOSE BLD-MCNC: 149 MG/DL (ref 65–105)
GLUCOSE BLD-MCNC: 160 MG/DL (ref 65–105)
GLUCOSE BLD-MCNC: 189 MG/DL (ref 74–100)
GLUCOSE BLD-MCNC: 191 MG/DL (ref 74–100)
GLUCOSE BLD-MCNC: 204 MG/DL (ref 74–100)
GLUCOSE BLD-MCNC: 214 MG/DL (ref 74–100)
GLUCOSE BLD-MCNC: 239 MG/DL (ref 74–100)
HCO3 VENOUS: 24 MMOL/L (ref 22–29)
HCT VFR BLD CALC: 33.8 % (ref 36.3–47.1)
HEMOGLOBIN: 10.5 G/DL (ref 11.9–15.1)
MODE: ABNORMAL
NEGATIVE BASE EXCESS, ART: 1 (ref 0–2)
NEGATIVE BASE EXCESS, ART: 2 (ref 0–2)
NEGATIVE BASE EXCESS, VEN: 3 (ref 0–2)
O2 DEVICE/FLOW/%: ABNORMAL
O2 SAT, VEN: 96 % (ref 60–85)
PATIENT TEMP: ABNORMAL
PCO2, VEN: 50.3 MM HG (ref 41–51)
PH VENOUS: 7.29 (ref 7.32–7.43)
PO2, VEN: 90.5 MM HG (ref 30–50)
POC HCO3: 24.4 MMOL/L (ref 21–28)
POC HCO3: 24.4 MMOL/L (ref 21–28)
POC HCO3: 25.1 MMOL/L (ref 21–28)
POC HCO3: 26.2 MMOL/L (ref 21–28)
POC HEMATOCRIT: 34 % (ref 36–46)
POC HEMATOCRIT: 34 % (ref 36–46)
POC HEMATOCRIT: 35 % (ref 36–46)
POC HEMATOCRIT: 35 % (ref 36–46)
POC HEMATOCRIT: 36 % (ref 36–46)
POC HEMOGLOBIN: 11.6 G/DL (ref 12–16)
POC HEMOGLOBIN: 11.7 G/DL (ref 12–16)
POC HEMOGLOBIN: 11.9 G/DL (ref 12–16)
POC HEMOGLOBIN: 11.9 G/DL (ref 12–16)
POC HEMOGLOBIN: 12.2 G/DL (ref 12–16)
POC IONIZED CALCIUM: 1.22 MMOL/L (ref 1.15–1.33)
POC IONIZED CALCIUM: 1.22 MMOL/L (ref 1.15–1.33)
POC IONIZED CALCIUM: 1.23 MMOL/L (ref 1.15–1.33)
POC IONIZED CALCIUM: 1.25 MMOL/L (ref 1.15–1.33)
POC O2 SATURATION: 100 % (ref 94–98)
POC O2 SATURATION: 100 % (ref 94–98)
POC O2 SATURATION: 96 % (ref 94–98)
POC O2 SATURATION: 99 % (ref 94–98)
POC PCO2 TEMP: ABNORMAL MM HG
POC PCO2: 48.3 MM HG (ref 35–48)
POC PCO2: 49.1 MM HG (ref 35–48)
POC PCO2: 50 MM HG (ref 35–48)
POC PCO2: 54.1 MM HG (ref 35–48)
POC PH TEMP: ABNORMAL
POC PH: 7.29 (ref 7.35–7.45)
POC PH: 7.3 (ref 7.35–7.45)
POC PH: 7.31 (ref 7.35–7.45)
POC PH: 7.31 (ref 7.35–7.45)
POC PO2 TEMP: ABNORMAL MM HG
POC PO2: 148.8 MM HG (ref 83–108)
POC PO2: 206.6 MM HG (ref 83–108)
POC PO2: 373.3 MM HG (ref 83–108)
POC PO2: 91.8 MM HG (ref 83–108)
POC POTASSIUM: 3.5 MMOL/L (ref 3.5–4.5)
POC POTASSIUM: 3.8 MMOL/L (ref 3.5–4.5)
POC POTASSIUM: 3.9 MMOL/L (ref 3.5–4.5)
POC POTASSIUM: 3.9 MMOL/L (ref 3.5–4.5)
POC SODIUM: 141 MMOL/L (ref 138–146)
POC SODIUM: 142 MMOL/L (ref 138–146)
POC SODIUM: 144 MMOL/L (ref 138–146)
POC TCO2: 25 MMOL/L (ref 22–30)
POC TCO2: 25 MMOL/L (ref 22–30)
POC TCO2: 26 MMOL/L (ref 22–30)
POC TCO2: 27 MMOL/L (ref 22–30)
POSITIVE BASE EXCESS, ART: ABNORMAL (ref 0–3)
POSITIVE BASE EXCESS, VEN: ABNORMAL (ref 0–3)
POTASSIUM SERPL-SCNC: 3.7 MMOL/L (ref 3.7–5.3)
SAMPLE SITE: ABNORMAL
SODIUM BLD-SCNC: 138 MMOL/L (ref 135–144)
TCO2 (CALC), ART: ABNORMAL MMOL/L (ref 22–29)
TOTAL CO2, VENOUS: ABNORMAL MMOL/L (ref 23–30)

## 2021-07-19 PROCEDURE — 3700000000 HC ANESTHESIA ATTENDED CARE: Performed by: THORACIC SURGERY (CARDIOTHORACIC VASCULAR SURGERY)

## 2021-07-19 PROCEDURE — 0BTJ0ZZ RESECTION OF LEFT LOWER LUNG LOBE, OPEN APPROACH: ICD-10-PCS | Performed by: THORACIC SURGERY (CARDIOTHORACIC VASCULAR SURGERY)

## 2021-07-19 PROCEDURE — 2580000003 HC RX 258: Performed by: SPECIALIST

## 2021-07-19 PROCEDURE — 6360000002 HC RX W HCPCS: Performed by: ANESTHESIOLOGY

## 2021-07-19 PROCEDURE — 88305 TISSUE EXAM BY PATHOLOGIST: CPT

## 2021-07-19 PROCEDURE — 2580000003 HC RX 258: Performed by: NURSE PRACTITIONER

## 2021-07-19 PROCEDURE — 85014 HEMATOCRIT: CPT

## 2021-07-19 PROCEDURE — S2900 ROBOTIC SURGICAL SYSTEM: HCPCS | Performed by: THORACIC SURGERY (CARDIOTHORACIC VASCULAR SURGERY)

## 2021-07-19 PROCEDURE — 6370000000 HC RX 637 (ALT 250 FOR IP): Performed by: NURSE PRACTITIONER

## 2021-07-19 PROCEDURE — 88342 IMHCHEM/IMCYTCHM 1ST ANTB: CPT

## 2021-07-19 PROCEDURE — 85018 HEMOGLOBIN: CPT

## 2021-07-19 PROCEDURE — C1768 GRAFT, VASCULAR: HCPCS | Performed by: THORACIC SURGERY (CARDIOTHORACIC VASCULAR SURGERY)

## 2021-07-19 PROCEDURE — 84295 ASSAY OF SERUM SODIUM: CPT

## 2021-07-19 PROCEDURE — 6360000002 HC RX W HCPCS: Performed by: NURSE PRACTITIONER

## 2021-07-19 PROCEDURE — 6370000000 HC RX 637 (ALT 250 FOR IP): Performed by: SPECIALIST

## 2021-07-19 PROCEDURE — 82330 ASSAY OF CALCIUM: CPT

## 2021-07-19 PROCEDURE — 82803 BLOOD GASES ANY COMBINATION: CPT

## 2021-07-19 PROCEDURE — 2720000010 HC SURG SUPPLY STERILE: Performed by: THORACIC SURGERY (CARDIOTHORACIC VASCULAR SURGERY)

## 2021-07-19 PROCEDURE — 7100000000 HC PACU RECOVERY - FIRST 15 MIN: Performed by: THORACIC SURGERY (CARDIOTHORACIC VASCULAR SURGERY)

## 2021-07-19 PROCEDURE — 94660 CPAP INITIATION&MGMT: CPT

## 2021-07-19 PROCEDURE — 2580000003 HC RX 258: Performed by: THORACIC SURGERY (CARDIOTHORACIC VASCULAR SURGERY)

## 2021-07-19 PROCEDURE — 2500000003 HC RX 250 WO HCPCS: Performed by: NURSE PRACTITIONER

## 2021-07-19 PROCEDURE — 84132 ASSAY OF SERUM POTASSIUM: CPT

## 2021-07-19 PROCEDURE — C1729 CATH, DRAINAGE: HCPCS | Performed by: THORACIC SURGERY (CARDIOTHORACIC VASCULAR SURGERY)

## 2021-07-19 PROCEDURE — 2100000001 HC CVICU R&B

## 2021-07-19 PROCEDURE — 88341 IMHCHEM/IMCYTCHM EA ADD ANTB: CPT

## 2021-07-19 PROCEDURE — 38746 REMOVE THORACIC LYMPH NODES: CPT | Performed by: THORACIC SURGERY (CARDIOTHORACIC VASCULAR SURGERY)

## 2021-07-19 PROCEDURE — 7100000001 HC PACU RECOVERY - ADDTL 15 MIN: Performed by: THORACIC SURGERY (CARDIOTHORACIC VASCULAR SURGERY)

## 2021-07-19 PROCEDURE — 99221 1ST HOSP IP/OBS SF/LOW 40: CPT | Performed by: NURSE PRACTITIONER

## 2021-07-19 PROCEDURE — 82374 ASSAY BLOOD CARBON DIOXIDE: CPT

## 2021-07-19 PROCEDURE — 71045 X-RAY EXAM CHEST 1 VIEW: CPT

## 2021-07-19 PROCEDURE — 82947 ASSAY GLUCOSE BLOOD QUANT: CPT

## 2021-07-19 PROCEDURE — 3600000019 HC SURGERY ROBOT ADDTL 15MIN: Performed by: THORACIC SURGERY (CARDIOTHORACIC VASCULAR SURGERY)

## 2021-07-19 PROCEDURE — 2709999900 HC NON-CHARGEABLE SUPPLY: Performed by: THORACIC SURGERY (CARDIOTHORACIC VASCULAR SURGERY)

## 2021-07-19 PROCEDURE — 3700000001 HC ADD 15 MINUTES (ANESTHESIA): Performed by: THORACIC SURGERY (CARDIOTHORACIC VASCULAR SURGERY)

## 2021-07-19 PROCEDURE — 6360000002 HC RX W HCPCS: Performed by: SPECIALIST

## 2021-07-19 PROCEDURE — 32480 PARTIAL REMOVAL OF LUNG: CPT | Performed by: THORACIC SURGERY (CARDIOTHORACIC VASCULAR SURGERY)

## 2021-07-19 PROCEDURE — 80048 BASIC METABOLIC PNL TOTAL CA: CPT

## 2021-07-19 PROCEDURE — 2780000010 HC IMPLANT OTHER: Performed by: THORACIC SURGERY (CARDIOTHORACIC VASCULAR SURGERY)

## 2021-07-19 PROCEDURE — 2700000000 HC OXYGEN THERAPY PER DAY

## 2021-07-19 PROCEDURE — 88313 SPECIAL STAINS GROUP 2: CPT

## 2021-07-19 PROCEDURE — 94002 VENT MGMT INPAT INIT DAY: CPT

## 2021-07-19 PROCEDURE — 94761 N-INVAS EAR/PLS OXIMETRY MLT: CPT

## 2021-07-19 PROCEDURE — 3600000009 HC SURGERY ROBOT BASE: Performed by: THORACIC SURGERY (CARDIOTHORACIC VASCULAR SURGERY)

## 2021-07-19 PROCEDURE — 88309 TISSUE EXAM BY PATHOLOGIST: CPT

## 2021-07-19 PROCEDURE — 2500000003 HC RX 250 WO HCPCS: Performed by: SPECIALIST

## 2021-07-19 PROCEDURE — 07B70ZZ EXCISION OF THORAX LYMPHATIC, OPEN APPROACH: ICD-10-PCS | Performed by: THORACIC SURGERY (CARDIOTHORACIC VASCULAR SURGERY)

## 2021-07-19 PROCEDURE — 88331 PATH CONSLTJ SURG 1 BLK 1SPC: CPT

## 2021-07-19 PROCEDURE — 2500000003 HC RX 250 WO HCPCS: Performed by: THORACIC SURGERY (CARDIOTHORACIC VASCULAR SURGERY)

## 2021-07-19 RX ORDER — SODIUM CHLORIDE, SODIUM LACTATE, POTASSIUM CHLORIDE, CALCIUM CHLORIDE 600; 310; 30; 20 MG/100ML; MG/100ML; MG/100ML; MG/100ML
INJECTION, SOLUTION INTRAVENOUS CONTINUOUS
Status: DISCONTINUED | OUTPATIENT
Start: 2021-07-19 | End: 2021-07-19

## 2021-07-19 RX ORDER — SODIUM CHLORIDE, SODIUM LACTATE, POTASSIUM CHLORIDE, CALCIUM CHLORIDE 600; 310; 30; 20 MG/100ML; MG/100ML; MG/100ML; MG/100ML
INJECTION, SOLUTION INTRAVENOUS CONTINUOUS PRN
Status: DISCONTINUED | OUTPATIENT
Start: 2021-07-19 | End: 2021-07-19 | Stop reason: SDUPTHER

## 2021-07-19 RX ORDER — ONDANSETRON 2 MG/ML
4 INJECTION INTRAMUSCULAR; INTRAVENOUS
Status: DISCONTINUED | OUTPATIENT
Start: 2021-07-19 | End: 2021-07-19 | Stop reason: HOSPADM

## 2021-07-19 RX ORDER — FENTANYL CITRATE 50 UG/ML
INJECTION, SOLUTION INTRAMUSCULAR; INTRAVENOUS PRN
Status: DISCONTINUED | OUTPATIENT
Start: 2021-07-19 | End: 2021-07-19 | Stop reason: SDUPTHER

## 2021-07-19 RX ORDER — MAGNESIUM HYDROXIDE 1200 MG/15ML
LIQUID ORAL CONTINUOUS PRN
Status: DISCONTINUED | OUTPATIENT
Start: 2021-07-19 | End: 2021-07-19 | Stop reason: HOSPADM

## 2021-07-19 RX ORDER — FENTANYL CITRATE 50 UG/ML
25 INJECTION, SOLUTION INTRAMUSCULAR; INTRAVENOUS EVERY 5 MIN PRN
Status: DISCONTINUED | OUTPATIENT
Start: 2021-07-19 | End: 2021-07-19 | Stop reason: HOSPADM

## 2021-07-19 RX ORDER — ROCURONIUM BROMIDE 10 MG/ML
INJECTION, SOLUTION INTRAVENOUS PRN
Status: DISCONTINUED | OUTPATIENT
Start: 2021-07-19 | End: 2021-07-19 | Stop reason: SDUPTHER

## 2021-07-19 RX ORDER — SODIUM CHLORIDE 0.9 % (FLUSH) 0.9 %
10 SYRINGE (ML) INJECTION EVERY 12 HOURS SCHEDULED
Status: DISCONTINUED | OUTPATIENT
Start: 2021-07-19 | End: 2021-07-23 | Stop reason: HOSPADM

## 2021-07-19 RX ORDER — FAMOTIDINE 20 MG/1
20 TABLET, FILM COATED ORAL 2 TIMES DAILY
Status: DISCONTINUED | OUTPATIENT
Start: 2021-07-19 | End: 2021-07-23 | Stop reason: HOSPADM

## 2021-07-19 RX ORDER — OXYCODONE HYDROCHLORIDE AND ACETAMINOPHEN 5; 325 MG/1; MG/1
1 TABLET ORAL EVERY 4 HOURS PRN
Status: DISCONTINUED | OUTPATIENT
Start: 2021-07-19 | End: 2021-07-19 | Stop reason: HOSPADM

## 2021-07-19 RX ORDER — LIDOCAINE HYDROCHLORIDE 10 MG/ML
INJECTION, SOLUTION EPIDURAL; INFILTRATION; INTRACAUDAL; PERINEURAL PRN
Status: DISCONTINUED | OUTPATIENT
Start: 2021-07-19 | End: 2021-07-19 | Stop reason: SDUPTHER

## 2021-07-19 RX ORDER — LIDOCAINE HYDROCHLORIDE 10 MG/ML
1 INJECTION, SOLUTION EPIDURAL; INFILTRATION; INTRACAUDAL; PERINEURAL
Status: DISCONTINUED | OUTPATIENT
Start: 2021-07-19 | End: 2021-07-19 | Stop reason: HOSPADM

## 2021-07-19 RX ORDER — HYDRALAZINE HYDROCHLORIDE 20 MG/ML
5 INJECTION INTRAMUSCULAR; INTRAVENOUS EVERY 10 MIN PRN
Status: DISCONTINUED | OUTPATIENT
Start: 2021-07-19 | End: 2021-07-19 | Stop reason: HOSPADM

## 2021-07-19 RX ORDER — ACETAMINOPHEN 500 MG
1000 TABLET ORAL EVERY 6 HOURS PRN
COMMUNITY

## 2021-07-19 RX ORDER — PHENYLEPHRINE HYDROCHLORIDE 10 MG/ML
INJECTION INTRAVENOUS PRN
Status: DISCONTINUED | OUTPATIENT
Start: 2021-07-19 | End: 2021-07-19 | Stop reason: SDUPTHER

## 2021-07-19 RX ORDER — CHLORHEXIDINE GLUCONATE 0.12 MG/ML
15 RINSE ORAL ONCE
Status: COMPLETED | OUTPATIENT
Start: 2021-07-19 | End: 2021-07-19

## 2021-07-19 RX ORDER — 0.9 % SODIUM CHLORIDE 0.9 %
500 INTRAVENOUS SOLUTION INTRAVENOUS
Status: DISCONTINUED | OUTPATIENT
Start: 2021-07-19 | End: 2021-07-19 | Stop reason: HOSPADM

## 2021-07-19 RX ORDER — PHENYLEPHRINE HYDROCHLORIDE 10 MG/ML
INJECTION INTRAVENOUS PRN
Status: DISCONTINUED | OUTPATIENT
Start: 2021-07-19 | End: 2021-07-19

## 2021-07-19 RX ORDER — MORPHINE SULFATE 2 MG/ML
2 INJECTION, SOLUTION INTRAMUSCULAR; INTRAVENOUS
Status: DISCONTINUED | OUTPATIENT
Start: 2021-07-19 | End: 2021-07-23 | Stop reason: HOSPADM

## 2021-07-19 RX ORDER — SCOLOPAMINE TRANSDERMAL SYSTEM 1 MG/1
1 PATCH, EXTENDED RELEASE TRANSDERMAL ONCE
Status: DISCONTINUED | OUTPATIENT
Start: 2021-07-19 | End: 2021-07-19 | Stop reason: HOSPADM

## 2021-07-19 RX ORDER — OXYCODONE HYDROCHLORIDE 5 MG/1
10 TABLET ORAL EVERY 4 HOURS PRN
Status: DISCONTINUED | OUTPATIENT
Start: 2021-07-19 | End: 2021-07-23 | Stop reason: HOSPADM

## 2021-07-19 RX ORDER — FENTANYL CITRATE 50 UG/ML
50 INJECTION, SOLUTION INTRAMUSCULAR; INTRAVENOUS EVERY 5 MIN PRN
Status: DISCONTINUED | OUTPATIENT
Start: 2021-07-19 | End: 2021-07-19 | Stop reason: HOSPADM

## 2021-07-19 RX ORDER — SODIUM CHLORIDE 9 MG/ML
25 INJECTION, SOLUTION INTRAVENOUS PRN
Status: DISCONTINUED | OUTPATIENT
Start: 2021-07-19 | End: 2021-07-19

## 2021-07-19 RX ORDER — MIDAZOLAM HYDROCHLORIDE 1 MG/ML
INJECTION INTRAMUSCULAR; INTRAVENOUS PRN
Status: DISCONTINUED | OUTPATIENT
Start: 2021-07-19 | End: 2021-07-19 | Stop reason: SDUPTHER

## 2021-07-19 RX ORDER — SODIUM CHLORIDE 9 MG/ML
25 INJECTION, SOLUTION INTRAVENOUS PRN
Status: DISCONTINUED | OUTPATIENT
Start: 2021-07-19 | End: 2021-07-23 | Stop reason: HOSPADM

## 2021-07-19 RX ORDER — SODIUM CHLORIDE 0.9 % (FLUSH) 0.9 %
5-40 SYRINGE (ML) INJECTION EVERY 12 HOURS SCHEDULED
Status: DISCONTINUED | OUTPATIENT
Start: 2021-07-19 | End: 2021-07-19

## 2021-07-19 RX ORDER — BISACODYL 10 MG
10 SUPPOSITORY, RECTAL RECTAL DAILY PRN
Status: DISCONTINUED | OUTPATIENT
Start: 2021-07-19 | End: 2021-07-23

## 2021-07-19 RX ORDER — ONDANSETRON 2 MG/ML
4 INJECTION INTRAMUSCULAR; INTRAVENOUS DAILY PRN
Status: DISCONTINUED | OUTPATIENT
Start: 2021-07-19 | End: 2021-07-19 | Stop reason: HOSPADM

## 2021-07-19 RX ORDER — LABETALOL HYDROCHLORIDE 5 MG/ML
5 INJECTION, SOLUTION INTRAVENOUS EVERY 10 MIN PRN
Status: DISCONTINUED | OUTPATIENT
Start: 2021-07-19 | End: 2021-07-19 | Stop reason: HOSPADM

## 2021-07-19 RX ORDER — POLYETHYLENE GLYCOL 3350 17 G/17G
17 POWDER, FOR SOLUTION ORAL DAILY
Status: DISCONTINUED | OUTPATIENT
Start: 2021-07-19 | End: 2021-07-23 | Stop reason: HOSPADM

## 2021-07-19 RX ORDER — MIDAZOLAM HYDROCHLORIDE 2 MG/2ML
1 INJECTION, SOLUTION INTRAMUSCULAR; INTRAVENOUS EVERY 10 MIN PRN
Status: DISCONTINUED | OUTPATIENT
Start: 2021-07-19 | End: 2021-07-19 | Stop reason: HOSPADM

## 2021-07-19 RX ORDER — CHLORHEXIDINE GLUCONATE 4 G/100ML
SOLUTION TOPICAL SEE ADMIN INSTRUCTIONS
Status: DISCONTINUED | OUTPATIENT
Start: 2021-07-19 | End: 2021-07-19 | Stop reason: HOSPADM

## 2021-07-19 RX ORDER — ACETAMINOPHEN 500 MG
1000 TABLET ORAL EVERY 4 HOURS PRN
Status: DISCONTINUED | OUTPATIENT
Start: 2021-07-19 | End: 2021-07-23 | Stop reason: HOSPADM

## 2021-07-19 RX ORDER — SODIUM CHLORIDE 0.9 % (FLUSH) 0.9 %
10 SYRINGE (ML) INJECTION PRN
Status: DISCONTINUED | OUTPATIENT
Start: 2021-07-19 | End: 2021-07-19

## 2021-07-19 RX ORDER — PROMETHAZINE HYDROCHLORIDE 25 MG/ML
6.25 INJECTION, SOLUTION INTRAMUSCULAR; INTRAVENOUS
Status: DISCONTINUED | OUTPATIENT
Start: 2021-07-19 | End: 2021-07-19 | Stop reason: HOSPADM

## 2021-07-19 RX ORDER — DIPHENHYDRAMINE HYDROCHLORIDE 50 MG/ML
12.5 INJECTION INTRAMUSCULAR; INTRAVENOUS
Status: DISCONTINUED | OUTPATIENT
Start: 2021-07-19 | End: 2021-07-19 | Stop reason: HOSPADM

## 2021-07-19 RX ORDER — BUPIVACAINE HYDROCHLORIDE AND EPINEPHRINE 5; 5 MG/ML; UG/ML
INJECTION, SOLUTION EPIDURAL; INTRACAUDAL; PERINEURAL PRN
Status: DISCONTINUED | OUTPATIENT
Start: 2021-07-19 | End: 2021-07-19 | Stop reason: HOSPADM

## 2021-07-19 RX ORDER — SODIUM CHLORIDE 0.9 % (FLUSH) 0.9 %
10 SYRINGE (ML) INJECTION PRN
Status: DISCONTINUED | OUTPATIENT
Start: 2021-07-19 | End: 2021-07-23 | Stop reason: HOSPADM

## 2021-07-19 RX ORDER — OXYCODONE HYDROCHLORIDE 5 MG/1
5 TABLET ORAL EVERY 4 HOURS PRN
Status: DISCONTINUED | OUTPATIENT
Start: 2021-07-19 | End: 2021-07-23 | Stop reason: HOSPADM

## 2021-07-19 RX ORDER — PROPOFOL 10 MG/ML
INJECTION, EMULSION INTRAVENOUS PRN
Status: DISCONTINUED | OUTPATIENT
Start: 2021-07-19 | End: 2021-07-19 | Stop reason: SDUPTHER

## 2021-07-19 RX ADMIN — FENTANYL CITRATE 50 MCG: 50 INJECTION, SOLUTION INTRAMUSCULAR; INTRAVENOUS at 12:22

## 2021-07-19 RX ADMIN — LIDOCAINE HYDROCHLORIDE 50 MG: 10 INJECTION, SOLUTION EPIDURAL; INFILTRATION; INTRACAUDAL; PERINEURAL at 08:44

## 2021-07-19 RX ADMIN — FENTANYL CITRATE 25 MCG: 50 INJECTION, SOLUTION INTRAMUSCULAR; INTRAVENOUS at 14:47

## 2021-07-19 RX ADMIN — FENTANYL CITRATE 100 MCG: 50 INJECTION, SOLUTION INTRAMUSCULAR; INTRAVENOUS at 08:44

## 2021-07-19 RX ADMIN — CEFAZOLIN SODIUM 2000 MG: 10 INJECTION, POWDER, FOR SOLUTION INTRAVENOUS at 12:56

## 2021-07-19 RX ADMIN — CEFAZOLIN SODIUM 2000 MG: 10 INJECTION, POWDER, FOR SOLUTION INTRAVENOUS at 09:02

## 2021-07-19 RX ADMIN — MIDAZOLAM HYDROCHLORIDE 2 MG: 1 INJECTION, SOLUTION INTRAMUSCULAR; INTRAVENOUS at 08:41

## 2021-07-19 RX ADMIN — INSULIN LISPRO 1 UNITS: 100 INJECTION, SOLUTION INTRAVENOUS; SUBCUTANEOUS at 21:00

## 2021-07-19 RX ADMIN — ROCURONIUM BROMIDE 20 MG: 10 INJECTION INTRAVENOUS at 13:20

## 2021-07-19 RX ADMIN — INSULIN LISPRO 2 UNITS: 100 INJECTION, SOLUTION INTRAVENOUS; SUBCUTANEOUS at 17:30

## 2021-07-19 RX ADMIN — FENTANYL CITRATE 50 MCG: 50 INJECTION, SOLUTION INTRAMUSCULAR; INTRAVENOUS at 15:18

## 2021-07-19 RX ADMIN — VANCOMYCIN HYDROCHLORIDE 1500 MG: 10 INJECTION, POWDER, LYOPHILIZED, FOR SOLUTION INTRAVENOUS at 21:36

## 2021-07-19 RX ADMIN — ROCURONIUM BROMIDE 20 MG: 10 INJECTION INTRAVENOUS at 10:09

## 2021-07-19 RX ADMIN — Medication 1500 MG: at 09:33

## 2021-07-19 RX ADMIN — FENTANYL CITRATE 50 MCG: 50 INJECTION, SOLUTION INTRAMUSCULAR; INTRAVENOUS at 15:30

## 2021-07-19 RX ADMIN — SODIUM CHLORIDE, POTASSIUM CHLORIDE, SODIUM LACTATE AND CALCIUM CHLORIDE: 600; 310; 30; 20 INJECTION, SOLUTION INTRAVENOUS at 07:58

## 2021-07-19 RX ADMIN — PHENYLEPHRINE HYDROCHLORIDE 100 MCG: 10 INJECTION INTRAVENOUS at 10:46

## 2021-07-19 RX ADMIN — CEFAZOLIN 2000 MG: 10 INJECTION, POWDER, FOR SOLUTION INTRAVENOUS at 20:44

## 2021-07-19 RX ADMIN — SUGAMMADEX 400 MG: 100 INJECTION, SOLUTION INTRAVENOUS at 14:13

## 2021-07-19 RX ADMIN — OXYCODONE HYDROCHLORIDE 10 MG: 5 TABLET ORAL at 23:00

## 2021-07-19 RX ADMIN — SODIUM CHLORIDE, PRESERVATIVE FREE 10 ML: 5 INJECTION INTRAVENOUS at 20:26

## 2021-07-19 RX ADMIN — FENTANYL CITRATE 50 MCG: 50 INJECTION, SOLUTION INTRAMUSCULAR; INTRAVENOUS at 11:11

## 2021-07-19 RX ADMIN — FENTANYL CITRATE 50 MCG: 50 INJECTION, SOLUTION INTRAMUSCULAR; INTRAVENOUS at 08:41

## 2021-07-19 RX ADMIN — PHENYLEPHRINE HYDROCHLORIDE 100 MCG: 10 INJECTION INTRAVENOUS at 10:18

## 2021-07-19 RX ADMIN — ROCURONIUM BROMIDE 20 MG: 10 INJECTION INTRAVENOUS at 11:10

## 2021-07-19 RX ADMIN — FENTANYL CITRATE 50 MCG: 50 INJECTION, SOLUTION INTRAMUSCULAR; INTRAVENOUS at 09:42

## 2021-07-19 RX ADMIN — ROCURONIUM BROMIDE 50 MG: 10 INJECTION INTRAVENOUS at 08:44

## 2021-07-19 RX ADMIN — BUPIVACAINE HYDROCHLORIDE 400 ML: 5 INJECTION, SOLUTION PERINEURAL at 16:26

## 2021-07-19 RX ADMIN — FENTANYL CITRATE 25 MCG: 50 INJECTION, SOLUTION INTRAMUSCULAR; INTRAVENOUS at 14:54

## 2021-07-19 RX ADMIN — SODIUM CHLORIDE 2 UNITS/HR: 9 INJECTION, SOLUTION INTRAVENOUS at 11:40

## 2021-07-19 RX ADMIN — FENTANYL CITRATE 50 MCG: 50 INJECTION, SOLUTION INTRAMUSCULAR; INTRAVENOUS at 15:12

## 2021-07-19 RX ADMIN — MUPIROCIN: 20 OINTMENT TOPICAL at 07:58

## 2021-07-19 RX ADMIN — OXYCODONE HYDROCHLORIDE 10 MG: 5 TABLET ORAL at 18:25

## 2021-07-19 RX ADMIN — FAMOTIDINE 20 MG: 20 TABLET, FILM COATED ORAL at 21:53

## 2021-07-19 RX ADMIN — ROCURONIUM BROMIDE 30 MG: 10 INJECTION INTRAVENOUS at 09:40

## 2021-07-19 RX ADMIN — INSULIN HUMAN 3 UNITS: 100 INJECTION, SOLUTION PARENTERAL at 11:44

## 2021-07-19 RX ADMIN — PHENYLEPHRINE HYDROCHLORIDE 25 MCG/MIN: 10 INJECTION INTRAVENOUS at 10:45

## 2021-07-19 RX ADMIN — SODIUM CHLORIDE, POTASSIUM CHLORIDE, SODIUM LACTATE AND CALCIUM CHLORIDE: 600; 310; 30; 20 INJECTION, SOLUTION INTRAVENOUS at 08:37

## 2021-07-19 RX ADMIN — PHENYLEPHRINE HYDROCHLORIDE 100 MCG: 10 INJECTION INTRAVENOUS at 10:11

## 2021-07-19 RX ADMIN — CHLORHEXIDINE GLUCONATE 0.12% ORAL RINSE 15 ML: 1.2 LIQUID ORAL at 07:57

## 2021-07-19 RX ADMIN — PROPOFOL INJECTABLE EMULSION 200 MG: 10 INJECTION, EMULSION INTRAVENOUS at 08:44

## 2021-07-19 RX ADMIN — MORPHINE SULFATE 2 MG: 2 INJECTION, SOLUTION INTRAMUSCULAR; INTRAVENOUS at 20:23

## 2021-07-19 RX ADMIN — MORPHINE SULFATE 2 MG: 2 INJECTION, SOLUTION INTRAMUSCULAR; INTRAVENOUS at 17:34

## 2021-07-19 RX ADMIN — ROCURONIUM BROMIDE 20 MG: 10 INJECTION INTRAVENOUS at 12:22

## 2021-07-19 ASSESSMENT — PULMONARY FUNCTION TESTS
PIF_VALUE: 23
PIF_VALUE: 27
PIF_VALUE: 28
PIF_VALUE: 21
PIF_VALUE: 29
PIF_VALUE: 25
PIF_VALUE: 25
PIF_VALUE: 21
PIF_VALUE: 28
PIF_VALUE: 35
PIF_VALUE: 31
PIF_VALUE: 28
PIF_VALUE: 27
PIF_VALUE: 27
PIF_VALUE: 28
PIF_VALUE: 27
PIF_VALUE: 29
PIF_VALUE: 29
PIF_VALUE: 12
PIF_VALUE: 28
PIF_VALUE: 26
PIF_VALUE: 20
PIF_VALUE: 31
PIF_VALUE: 23
PIF_VALUE: 28
PIF_VALUE: 29
PIF_VALUE: 21
PIF_VALUE: 25
PIF_VALUE: 26
PIF_VALUE: 27
PIF_VALUE: 26
PIF_VALUE: 27
PIF_VALUE: 20
PIF_VALUE: 28
PIF_VALUE: 29
PIF_VALUE: 27
PIF_VALUE: 29
PIF_VALUE: 26
PIF_VALUE: 28
PIF_VALUE: 27
PIF_VALUE: 27
PIF_VALUE: 28
PIF_VALUE: 28
PIF_VALUE: 27
PIF_VALUE: 28
PIF_VALUE: 27
PIF_VALUE: 24
PIF_VALUE: 28
PIF_VALUE: 28
PIF_VALUE: 21
PIF_VALUE: 23
PIF_VALUE: 29
PIF_VALUE: 28
PIF_VALUE: 25
PIF_VALUE: 32
PIF_VALUE: 28
PIF_VALUE: 3
PIF_VALUE: 28
PIF_VALUE: 27
PIF_VALUE: 1
PIF_VALUE: 29
PIF_VALUE: 28
PIF_VALUE: 26
PIF_VALUE: 27
PIF_VALUE: 26
PIF_VALUE: 28
PIF_VALUE: 26
PIF_VALUE: 27
PIF_VALUE: 24
PIF_VALUE: 29
PIF_VALUE: 26
PIF_VALUE: 29
PIF_VALUE: 21
PIF_VALUE: 28
PIF_VALUE: 28
PIF_VALUE: 0
PIF_VALUE: 26
PIF_VALUE: 30
PIF_VALUE: 24
PIF_VALUE: 27
PIF_VALUE: 27
PIF_VALUE: 13
PIF_VALUE: 27
PIF_VALUE: 23
PIF_VALUE: 28
PIF_VALUE: 29
PIF_VALUE: 24
PIF_VALUE: 28
PIF_VALUE: 26
PIF_VALUE: 24
PIF_VALUE: 23
PIF_VALUE: 29
PIF_VALUE: 31
PIF_VALUE: 25
PIF_VALUE: 29
PIF_VALUE: 9
PIF_VALUE: 26
PIF_VALUE: 27
PIF_VALUE: 28
PIF_VALUE: 24
PIF_VALUE: 28
PIF_VALUE: 24
PIF_VALUE: 29
PIF_VALUE: 31
PIF_VALUE: 24
PIF_VALUE: 27
PIF_VALUE: 1
PIF_VALUE: 28
PIF_VALUE: 27
PIF_VALUE: 29
PIF_VALUE: 29
PIF_VALUE: 1
PIF_VALUE: 27
PIF_VALUE: 26
PIF_VALUE: 24
PIF_VALUE: 5
PIF_VALUE: 31
PIF_VALUE: 27
PIF_VALUE: 27
PIF_VALUE: 26
PIF_VALUE: 6
PIF_VALUE: 28
PIF_VALUE: 31
PIF_VALUE: 28
PIF_VALUE: 29
PIF_VALUE: 24
PIF_VALUE: 26
PIF_VALUE: 23
PIF_VALUE: 25
PIF_VALUE: 26
PIF_VALUE: 21
PIF_VALUE: 23
PIF_VALUE: 28
PIF_VALUE: 20
PIF_VALUE: 25
PIF_VALUE: 24
PIF_VALUE: 28
PIF_VALUE: 26
PIF_VALUE: 28
PIF_VALUE: 26
PIF_VALUE: 31
PIF_VALUE: 26
PIF_VALUE: 29
PIF_VALUE: 21
PIF_VALUE: 24
PIF_VALUE: 29
PIF_VALUE: 27
PIF_VALUE: 31
PIF_VALUE: 28
PIF_VALUE: 28
PIF_VALUE: 24
PIF_VALUE: 21
PIF_VALUE: 28
PIF_VALUE: 23
PIF_VALUE: 27
PIF_VALUE: 23
PIF_VALUE: 25
PIF_VALUE: 0
PIF_VALUE: 28
PIF_VALUE: 20
PIF_VALUE: 29
PIF_VALUE: 24
PIF_VALUE: 25
PIF_VALUE: 27
PIF_VALUE: 24
PIF_VALUE: 8
PIF_VALUE: 12
PIF_VALUE: 28
PIF_VALUE: 38
PIF_VALUE: 27
PIF_VALUE: 26
PIF_VALUE: 24
PIF_VALUE: 26
PIF_VALUE: 27
PIF_VALUE: 29
PIF_VALUE: 27
PIF_VALUE: 28
PIF_VALUE: 28
PIF_VALUE: 26
PIF_VALUE: 25
PIF_VALUE: 29
PIF_VALUE: 28
PIF_VALUE: 27
PIF_VALUE: 28
PIF_VALUE: 6
PIF_VALUE: 28
PIF_VALUE: 25
PIF_VALUE: 28
PIF_VALUE: 28
PIF_VALUE: 1
PIF_VALUE: 25
PIF_VALUE: 28
PIF_VALUE: 27
PIF_VALUE: 27
PIF_VALUE: 29
PIF_VALUE: 33
PIF_VALUE: 29
PIF_VALUE: 26
PIF_VALUE: 28
PIF_VALUE: 27
PIF_VALUE: 26
PIF_VALUE: 28
PIF_VALUE: 10
PIF_VALUE: 21
PIF_VALUE: 27
PIF_VALUE: 24
PIF_VALUE: 26
PIF_VALUE: 28
PIF_VALUE: 20
PIF_VALUE: 25
PIF_VALUE: 29
PIF_VALUE: 20
PIF_VALUE: 32
PIF_VALUE: 29
PIF_VALUE: 24
PIF_VALUE: 28
PIF_VALUE: 31
PIF_VALUE: 29
PIF_VALUE: 28
PIF_VALUE: 29
PIF_VALUE: 28
PIF_VALUE: 25
PIF_VALUE: 26
PIF_VALUE: 32
PIF_VALUE: 36
PIF_VALUE: 28
PIF_VALUE: 29
PIF_VALUE: 24
PIF_VALUE: 24
PIF_VALUE: 25
PIF_VALUE: 27
PIF_VALUE: 29
PIF_VALUE: 25
PIF_VALUE: 24
PIF_VALUE: 24
PIF_VALUE: 21
PIF_VALUE: 2
PIF_VALUE: 27
PIF_VALUE: 30
PIF_VALUE: 27
PIF_VALUE: 29
PIF_VALUE: 1
PIF_VALUE: 29
PIF_VALUE: 28
PIF_VALUE: 24
PIF_VALUE: 26
PIF_VALUE: 24
PIF_VALUE: 28
PIF_VALUE: 29
PIF_VALUE: 26
PIF_VALUE: 26
PIF_VALUE: 27
PIF_VALUE: 28
PIF_VALUE: 31
PIF_VALUE: 32
PIF_VALUE: 24
PIF_VALUE: 27
PIF_VALUE: 32
PIF_VALUE: 24
PIF_VALUE: 28
PIF_VALUE: 26
PIF_VALUE: 23
PIF_VALUE: 28
PIF_VALUE: 5
PIF_VALUE: 29
PIF_VALUE: 28
PIF_VALUE: 28
PIF_VALUE: 31
PIF_VALUE: 26
PIF_VALUE: 27
PIF_VALUE: 28
PIF_VALUE: 31
PIF_VALUE: 28
PIF_VALUE: 28
PIF_VALUE: 27
PIF_VALUE: 47
PIF_VALUE: 28
PIF_VALUE: 24
PIF_VALUE: 21
PIF_VALUE: 29
PIF_VALUE: 26
PIF_VALUE: 26
PIF_VALUE: 28
PIF_VALUE: 28
PIF_VALUE: 25
PIF_VALUE: 28
PIF_VALUE: 27
PIF_VALUE: 21
PIF_VALUE: 24
PIF_VALUE: 29
PIF_VALUE: 26
PIF_VALUE: 29
PIF_VALUE: 26
PIF_VALUE: 26
PIF_VALUE: 42
PIF_VALUE: 26
PIF_VALUE: 27
PIF_VALUE: 33
PIF_VALUE: 28
PIF_VALUE: 29
PIF_VALUE: 29
PIF_VALUE: 27
PIF_VALUE: 26
PIF_VALUE: 23
PIF_VALUE: 27
PIF_VALUE: 24
PIF_VALUE: 29
PIF_VALUE: 26
PIF_VALUE: 30
PIF_VALUE: 33
PIF_VALUE: 8
PIF_VALUE: 28
PIF_VALUE: 4
PIF_VALUE: 31
PIF_VALUE: 27
PIF_VALUE: 1
PIF_VALUE: 28
PIF_VALUE: 29
PIF_VALUE: 29
PIF_VALUE: 8
PIF_VALUE: 26
PIF_VALUE: 27
PIF_VALUE: 50
PIF_VALUE: 28
PIF_VALUE: 27
PIF_VALUE: 24
PIF_VALUE: 27
PIF_VALUE: 1
PIF_VALUE: 28
PIF_VALUE: 29
PIF_VALUE: 21
PIF_VALUE: 23
PIF_VALUE: 26
PIF_VALUE: 32
PIF_VALUE: 28
PIF_VALUE: 42
PIF_VALUE: 31
PIF_VALUE: 25
PIF_VALUE: 29
PIF_VALUE: 26
PIF_VALUE: 20
PIF_VALUE: 20
PIF_VALUE: 21
PIF_VALUE: 24
PIF_VALUE: 28
PIF_VALUE: 29
PIF_VALUE: 1
PIF_VALUE: 29
PIF_VALUE: 26
PIF_VALUE: 32
PIF_VALUE: 26
PIF_VALUE: 24
PIF_VALUE: 24

## 2021-07-19 ASSESSMENT — PAIN SCALES - GENERAL
PAINLEVEL_OUTOF10: 7
PAINLEVEL_OUTOF10: 10
PAINLEVEL_OUTOF10: 8
PAINLEVEL_OUTOF10: 10
PAINLEVEL_OUTOF10: 8
PAINLEVEL_OUTOF10: 10
PAINLEVEL_OUTOF10: 9
PAINLEVEL_OUTOF10: 6
PAINLEVEL_OUTOF10: 8
PAINLEVEL_OUTOF10: 7
PAINLEVEL_OUTOF10: 8
PAINLEVEL_OUTOF10: 10

## 2021-07-19 ASSESSMENT — PAIN DESCRIPTION - LOCATION
LOCATION: ABDOMEN
LOCATION: ABDOMEN

## 2021-07-19 ASSESSMENT — ENCOUNTER SYMPTOMS
VOMITING: 0
NAUSEA: 0
GASTROINTESTINAL NEGATIVE: 1
DIARRHEA: 0
SHORTNESS OF BREATH: 1

## 2021-07-19 ASSESSMENT — PAIN DESCRIPTION - ORIENTATION
ORIENTATION: LEFT
ORIENTATION: LEFT

## 2021-07-19 ASSESSMENT — PAIN - FUNCTIONAL ASSESSMENT: PAIN_FUNCTIONAL_ASSESSMENT: 0-10

## 2021-07-19 ASSESSMENT — PAIN DESCRIPTION - DESCRIPTORS: DESCRIPTORS: DISCOMFORT

## 2021-07-19 ASSESSMENT — PAIN DESCRIPTION - PAIN TYPE: TYPE: SURGICAL PAIN

## 2021-07-19 NOTE — CARE COORDINATION
Case Management Initial Discharge Plan  Amira Castañeda,             Met with:patient's son, Armando Yang, 1 of 7 children to discuss discharge plans. Information verified: address, contacts, phone number, , insurance Yes  Insurance Provider: [facility]    Emergency Contact/Next of Kin name & number: laura Urbina at [phone]  Who are involved in patient's support system? 3 Son and 4 daughters    PMD: Norene Schirmer, MD  Date of last visit: within past 3 months      Discharge Planning    Living Arrangements:  [address]  3 stairs to climb to get into front door, 12stairs to climb to reach second floor  Location of bedroom/bathroom in home 2nd  (no plans for pt to be on main floor per son at discharge, states will get his mom up the stairs with family members assistance)    Patient able to perform ADL's:Independent    Current Services (outpatient & in home) none  DME equipment: nebulizer  DME provider: unknown    Is patient receiving oral anticoagulation therapy? No    If indicated:   Physician managing anticoagulation treatment: PMD  Where does patient obtain lab work for ATC treatment? unknown      Potential Assistance Needed:  Home Care    Patient agreeable to home care: Yes  Freedom of choice provided:  yes    Prior SNF/Rehab Placement and Facility: none  Agreeable to SNF/Rehab: Yes  [place] of choice provided: no     Evaluation: no    Expected Discharge date:       Patient expects to be discharged to:        If home: is the family and/or caregiver wiling & able to provide support at home? yes  Who will be providing this support? 7 adult children    Follow Up Appointment: Best Day/ Time:      Transportation provider: Armando Mallory  Transportation arrangements needed for discharge: No    Readmission Risk              Risk of Unplanned Readmission:  16             Does patient have a readmission risk score greater than 14?: Yes  If yes, follow-up appointment must be made within 7 days of discharge. Goals of Care:       Educated son, Enrique Chamorro on transitional options, provided freedom of choice and are agreeable with plan      Discharge Plan: Given [facility] [place], [facility]. choice list. Home with family. May need DME assistance. Monitor for needs.    Added his brother, Bren Mane to emergency contact per Uriah's request.         Electronically signed by Avery Lynch RN on 7/19/21 at 5:01 PM EDT

## 2021-07-19 NOTE — ANESTHESIA POSTPROCEDURE EVALUATION
Department of Anesthesiology  Postprocedure Note    Patient: Najma Stack  MRN: [id number]  YOB: 1955  Date of evaluation: 7/19/2021  Time:  4:26 PM     Procedure Summary     Date: 07/19/21 Room / Location: [address]    Anesthesia Start: 8960 Anesthesia Stop: 4802    Procedure: XI ROBOTIC LEFT LOWER LOBECTOMY CONVERTED TO OPEN THORACTOMY LEFT LOWER LOBECTOMY (Left ) Diagnosis: (ADENOCARCINOMA)    Surgeons: Luis M Zee MD Responsible Provider: Matilde Tracey MD    Anesthesia Type: general ASA Status: 3          Anesthesia Type: general    Vikas Phase I: Vikas Score: 9    Vikas Phase II:      Last vitals: Reviewed and per EMR flowsheets.        Anesthesia Post Evaluation    Patient location during evaluation: ICU  Patient participation: complete - patient cannot participate  Level of consciousness: sleepy but conscious  Pain score: 0  Airway patency: patent  Nausea & Vomiting: no nausea and no vomiting  Complications: no  Cardiovascular status: blood pressure returned to baseline  Respiratory status: CPAP  Hydration status: euvolemic  Comments:   Mechanical Ventilation Data  SETTINGS (Comprehensive)  Vent Information  $Ventilation: $Initial Day  Skin Assessment: Clean, dry, & intact  Vent Type: Servo i  Vent Mode: NIV/PC  Pressure Ordered: 6  Rate Set: 15 bmp  FiO2 : 40 %  SpO2: 96 %  SpO2/FiO2 ratio: 240  PEEP/CPAP: 5  I Time/ I Time %: 0.9 s  Additional Respiratory  Assessments  Pulse: 83  Resp: 17  SpO2: 96 %    ABG   No results found for: PH, PCO2, PO2, HCO3, O2SAT  Lab Results       Component                Value               Date                       MODE                     NOT REPORTED        07/19/2021

## 2021-07-19 NOTE — ANESTHESIA PROCEDURE NOTES
Arterial Line:    An arterial line was placed using surface landmarks, in the pre-op for the following indication(s): continuous blood pressure monitoring. A 20 gauge (size), 1 and 3/4 inch (length), Arrow (type) catheter was placed, Seldinger technique used, into the right radial artery, secured by Tegaderm and tape. Anesthesia type: General    Events:  patient tolerated procedure well with no complications.   7/19/2021 8:40 AM7/19/2021 8:45 AM  Anesthesiologist: Urszula Coyle MD  Resident/CRNA: ILIR Carcamo - CRNA  Performed: Anesthesiologist   Preanesthetic Checklist  Completed: patient identified, IV checked, site marked, risks and benefits discussed, surgical consent, monitors and equipment checked, pre-op evaluation, timeout performed, anesthesia consent given, oxygen available and patient being monitored

## 2021-07-19 NOTE — ANESTHESIA PRE PROCEDURE
Department of Anesthesiology  Preprocedure Note       Name:  Sophia Spence   Age:  61 y.o.  :  1955                                          MRN:  [id number]         Date:  2021      Surgeon: Bee Montero):  Oscar Mccrary MD    Procedure: XI ROBOTIC LOWER LOBE LOBECTOMY (Left )    Medications prior to admission:   Prior to Admission medications    Medication Sig Start Date End Date Taking? Authorizing Provider   acetaminophen (TYLENOL) 500 MG tablet Take 1,000 mg by mouth every 6 hours as needed for Pain    Historical Provider, MD   ELIQUIS 5 MG TABS tablet take 1 tablet by mouth twice a day 21   Dari Rice MD   albuterol (PROVENTIL) (2.5 MG/3ML) 0.083% nebulizer solution Take 3 mLs by nebulization every 6 hours as needed for Wheezing or Shortness of Breath 21   Dari Rice MD   albuterol sulfate  (90 Base) MCG/ACT inhaler inhale 2 puffs by mouth and INTO THE LUNGS every 4 hours for 7 days 21   Dari Rice MD   SPIRIVA RESPIMAT 2.5 MCG/ACT AERS inhaler inhale 2 puffs INTO THE LUNGS once daily 21   Dari Rice MD   pantoprazole (PROTONIX) 40 MG tablet take 1 tablet by mouth once daily 21   Drai Rice MD   Blood Pressure KIT Dx: HTN. Needs automatic blood pressure machine to monitor her blood pressure.  21   Dari Rice MD   sertraline (ZOLOFT) 100 MG tablet Take 1 tablet by mouth daily 21   Dari Rice MD   amLODIPine (NORVASC) 10 MG tablet take 1 tablet by mouth once daily 4/15/21   Dari Rice MD   folic acid (FOLVITE) 1 MG tablet take 1 tablet by mouth once daily 21   Dari Rice MD   albuterol (PROVENTIL) (2.5 MG/3ML) 0.083% nebulizer solution Take 3 mLs by nebulization every 6 hours as needed for Wheezing or Shortness of Breath 19   Dari Rice MD   methotrexate (RHEUMATREX) 2.5 MG chemo tablet Take 20 mg by mouth once a week Indications: takes 8 tablets on      Historical Provider, MD       Current medications:    No current facility-administered medications for this visit. No current outpatient medications on file.      Facility-Administered Medications Ordered in Other Visits   Medication Dose Route Frequency Provider Last Rate Last Admin    lactated ringers infusion   Intravenous Continuous Derryl Iron, APRN - NP        sodium chloride flush 0.9 % injection 5-40 mL  5-40 mL Intravenous 2 times per day Derryl Iron, APRN - NP        sodium chloride flush 0.9 % injection 10 mL  10 mL Intravenous PRN Derryl Iron, APRN - NP        0.9 % sodium chloride infusion  25 mL Intravenous PRN Derryl Iron, APRN - NP        ceFAZolin (ANCEF) 2000 mg in dextrose 5 % 50 mL IVPB  2,000 mg Intravenous On Call to ILIR Pierce NP        mupirocin (BACTROBAN) 2 % ointment   Nasal BID Derryl Iron, APRN - NP        chlorhexidine (PERIDEX) 0.12 % solution 15 mL  15 mL Mouth/Throat Once Derryl Iron, APRN - NP        chlorhexidine (HIBICLENS) 4 % liquid   Topical See Admin Instructions Derryl Iron, APRN - NP        vancomycin (VANCOCIN) 1500 mg in dextrose 5 % 250 mL IVPB  1,500 mg Intravenous On Call to ILIR Pierce NP           Allergies:  No Known Allergies    Problem List:    Patient Active Problem List   Diagnosis Code    Hypertension I10    GERD (gastroesophageal reflux disease) K21.9    Rheumatoid arthritis ([facility] M06.9    Obesity, Class II, BMI 35-39.9 E66.9    Major depressive disorder, recurrent, moderate (HCC) F33.1    Prediabetes R73.03    Tobacco abuse Z72.0    History of DVT in adulthood Z80.65    COPD (chronic obstructive pulmonary disease) (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    Adenocarcinoma, lung, left (HCC) C34.92    History of pulmonary embolism Z86.711    On anticoagulant therapy Z79.01    On methotrexate therapy Z79.899    Obesity, Class III, BMI 40-49.9 (morbid obesity) ([facility] E66.01    S/P lobectomy of lung Z90.2       Past Medical History:        Diagnosis Date    Abnormal CT of the chest 04/21/2021    Adenocarcinoma, lung, left ([address] 05/14/2021    Arthritis     Community acquired pneumonia 12/23/2019    COPD (chronic obstructive pulmonary disease) ([address] 08/14/2019    Deep venous thrombosis of left profunda femoris vein ([address] 04/16/2020    Depression     DVT (deep venous thrombosis) ([facility] 2014    b/l     GERD (gastroesophageal reflux disease)     History of pulmonary embolism 05/27/2021    History of thyroid nodule 12/23/2019    Hypertension     Major depressive disorder, recurrent, moderate ([address] 11/12/2018    Nodule of lower lobe of left lung 04/23/2021    Obesity, Class II, BMI 35-39.9 11/12/2018    On anticoagulant therapy 05/27/2021    On methotrexate therapy 05/27/2021    Prediabetes 11/12/2018    Rheumatoid arthritis ([address]     Snores     denies apnea    Thyroid nodule 01/09/2020    Tobacco abuse 11/12/2018    Under care of team 06/29/2021    [facility]    Under care of team 06/29/2021    oncology-[facility]-last visit june 2021    Under care of team     Dr. Edita Cross clearance appointment 7/6/21, stress test 7/9/2021, clearance obtained and placed on chart    Wellness examination 06/29/2021    pcp-Dr Ricky Carlos office-last visit may 2021       Past Surgical History:        Procedure Laterality Date   [address] N/A 12/27/2019    BRONCHOSCOPY ALVEOLAR LAVAGE performed by Ephraim Zhong MD at [address] ALKPHOS 88 06/29/2021    AST 21 06/29/2021    ALT 20 06/29/2021       POC Tests: No results for input(s): POCGLU, POCNA, POCK, POCCL, POCBUN, POCHEMO, POCHCT in the last 72 hours. Coags:   Lab Results   Component Value Date    PROTIME 10.6 06/29/2021    PROTIME 64.1 04/03/2015    INR 1.0 06/29/2021    APTT 22.4 06/29/2021       HCG (If Applicable): No results found for: PREGTESTUR, PREGSERUM, HCG, HCGQUANT     ABGs:   Lab Results   Component Value Date    PHART 7.428 05/28/2021    PO2ART 55.4 05/28/2021    SHU3HAT 41.2 05/28/2021    XOG6HSK 27.2 05/28/2021    P8JHUGID 88.4 05/28/2021        Type & Screen (If Applicable):  No results found for: LABABO, [address]    Anesthesia Evaluation  Patient summary reviewed and Nursing notes reviewed no history of anesthetic complications:   Airway: Mallampati: III  TM distance: >3 FB   Neck ROM: full  Mouth opening: > = 3 FB Dental:      Comment: Many missing teeth    Pulmonary:normal exam  breath sounds clear to auscultation  (+) pneumonia: resolved,  COPD:                             Cardiovascular:    (+) hypertension:,         Rhythm: regular  Rate: normal           Beta Blocker:  Not on Beta Blocker         Neuro/Psych:   (+) psychiatric history:depression/anxiety             GI/Hepatic/Renal:   (+) GERD:, bowel prep, morbid obesity          Endo/Other:    (+) : arthritis: rheumatoid. , malignancy/cancer. Abdominal:             Vascular:   + DVT, . Other Findings:               Anesthesia Plan      general     ASA 3       Induction: intravenous. arterial line and CVP  MIPS: Postoperative opioids intended, Prophylactic antiemetics administered and Postoperative trial extubation. Anesthetic plan and risks discussed with patient. Plan discussed with CRNA. CONCLUSIONS     Summary  Left ventricle is normal in size. Global left ventricular systolic function  is normal.  Visually estimated EF 50-55%.   Grade II (moderate) left ventricular diastolic dysfunction. Left atrium is mildly dilated. Right atrial dilatation. Normal right ventricular function. Moderately dilated right ventricular  cavity. Trivial mitral regurgitation.       Ashleigh Ram MD   7/19/2021

## 2021-07-19 NOTE — BRIEF OP NOTE
Brief Postoperative Note      Patient: Nicol Sandoval  YOB: 1955  MRN: [id number]    Date of Procedure: 7/19/2021    Pre-Op Diagnosis: ADENOCARCINOMA    Post-Op Diagnosis: Same       Procedure(s):  XI ROBOTIC LEFT LOWER LOBECTOMY CONVERTED TO OPEN THORACTOMY LEFT LOWER LOBECTOMY    Surgeon(s):  Destinee Oliveira MD    Assistant:  First Assistant: Julito Johnson RN    Anesthesia: General    Estimated Blood Loss (mL): 458    Complications: None    Specimens:   ID Type Source Tests Collected by Time Destination   A : Lymph node station  #9 Tissue Lymph Node SURGICAL PATHOLOGY Destinee Oliveira MD 7/19/2021 1025    B : station #9 Tissue Lymph Node SURGICAL PATHOLOGY Destinee Oliveira MD 7/19/2021 1037    C : Lymph node #7 Tissue Lymph Node SURGICAL PATHOLOGY Destinee Oliveira MD 7/19/2021 1104    D : left lower lobe with margins Tissue Lung SURGICAL PATHOLOGY Destinee Oliveira MD 7/19/2021 1318            Drains:   Chest Tube 1 Left Pleural 36 Macanese (Active)       Chest Tube 2 Left Pleural 36 Macanese (Active)       Urethral Catheter Temperature probe 16 fr (Active)       Findings: Robotic failure with need to convert to open lower lobectomy    Electronically signed by Kamilah Morris MD on 7/19/2021 at 2:18 PM

## 2021-07-19 NOTE — OP NOTE
[facility]                                OPERATIVE REPORT    PATIENT NAME: Marybel Cantrell                :        1955  MED REC NO:   [id number]                             ROOM:       [address] [place] [address] NO:   [de-identified]                           ADMIT DATE: 2021  PROVIDER:     Jessica Loyd MD    DATE OF PROCEDURE:  2021    PREOPERATIVE DIAGNOSIS:  Left lower lobe lung cancer. POSTOPERATIVE DIAGNOSIS:  Left lower lobe lung cancer. PROCEDURES PERFORMED:  1.  Robotic left-sided lymph node dissection. 2.  Conversion to open left lower lobectomy. 3.  Platelet gel. 4.  Pain ball. SURGEON:  Jessica Loyd MD    ASSISTANT:  Shea Baird CSA    ANESTHESIA:  General anesthesia. ESTIMATED BLOOD LOSS:  400 mL    SPECIMENS:  1. Left lower lobe. 2.  Lymph node. DRAINS:  Two 36-German chest tubes. OPERATIVE PROCEDURE:  The patient had the risks, benefits, and  intentions fully discussed. The risks include but are not limited to  bleeding, infection, pain, anesthesia risks, and damage to lungs. She  understands the risks, signed the informed consent, was taken back to  the operating room. She was placed in a supine position. Anesthesia  was induced. At this time, the patient has a central line, double-lumen  endotracheal tube, and Gonzalez placed by Anesthesia. She was placed in a  right lateral decubitus position. All the appropriate padding and  precautions were taken. Four Thoracoports were done in the following  fashion in the eighth intercostal space. Marcaine was injected 5 mL at  that time. The four Thoracoports were placed in a 12, 8, 12, 8 pattern  in the eighth intercostal space, one hand apart. Under direct  visualization, the Bhumi clamp was used to enter the thorax and then the  ports were placed. The robot was docked. The robot was brought up and  connected.   A 0-degree scope was placed using Cadiere in the left hand  and tip-up fenestrated and the bipolar on the right. Lymph nodes #9 and  lymph nodes #7 were harvested. There were no #6 or #5 lymph nodes  noted. At this time, the robot did fail. It locked up and there was  non-recoverable failure twice. At this time, the robot was abandoned. It should be noted that the pulmonary vein to the lower lobe was  dissected free circumferentially and stable with a white robotic staple  load prior to abandoning the robot. At this time, a conversion was  performed. An open thoracotomy was done by protecting the two middle Thoracoport  sites. They were continued in depth with electrocautery. The seventh  intercostal space was entered using the electrocautery. The patient had  the muscles divided, latissimus and serratus. Intercostal muscles were  divided. A chest retractor was placed. The patient then had the  pulmonary artery dissected free circumferentially. It was tied using 0  silk ties and large hemoclips. The artery was divided using sharp  dissection and two branches were divided. The bronchus was then cleaned  up. The parenchyma of the fissure was stapled using 45 green Ethicon  staple loads. The bronchus was then stapled using a TL30 heavy wire  stapler. It was divided using sharp dissection. The lobe was removed  and handed off the field as specimen. Progel was placed on the cut  surface of the lung. Any air leaks oversewn using 3-0 Prolene running  suture. Platelet gel was applied to the cut surfaces and the chest  wall. The closure was undertaken. The ribs were re-approximated using #1 Vicryl figure-of-eight sutures. The 2, 3, and 4-0 Vicryl were used for the superficial layers. The  chest tubes were then placed through the remaining two Thoracoport sites  and sewn in place using 0 silk sutures.   The patient was extubated in  the operating room and taken in stable condition to the recovery area. This procedure could not have been performed without the assistance of  Hanna Walker who was invaluable at the chest and as the robotic  assistant.         Tracie Chowdhury MD    D: 07/19/2021 14:53:15       T: 07/19/2021 17:11:02     [email or web address]  Job#: [id number]     Doc#: [id number]    CC:

## 2021-07-19 NOTE — H&P
[facility] Cardiothoracic Surgery  History andPhysical    Patient's Name/Date of Birth: Najma Stack / 1955 (63 y.o.)    Date: July 19, 2021     Chief Complaint: Adenocarcinoma of the left upper lung     HPI: Ms. Arvin Dawn is a 61 y.o.  female who presents with recently diagnosed adenocarcinoma of the left lower lung. In April patient underwent annual CT lung screening due to history of smoking which revealed an increase in size of a previously noted pulmonary nodule to the left lower lobe. Nodule measured 1.7 x 1.6 cm and was suspicious for malignancy but negative on subsequent PET scan. CT guided needle biopsy was completed on 5/132021 and found to be positive for adenocarcinoma. Pertinent medical history includes COPD, diabetes, history of PE/DVT with chronic anticoagulation on Eliquis, history of thyroid nodule, hypertension and obesity. She is a current everyday smoker (now down to 10 cigarettes per day; previously 2 ppd x 30 years) and was recently admitted for community-acquired pneumonia with discharge home on 5/30/2021. Patient admits to SOB at baseline and can ambulate approximately 1-2 blocks before becoming SOB and able to walk up 1 flight of stairs with ease but does not think she could make 2 without feeling winded. Denies any fevers, chills, chest pain, nausea, vomiting, diarrhea, dysuria, hematuria. She has been seen for left lobectomy for adenocarcinoma.      ROS:  Review of Systems   Constitutional: Negative. Negative for chills and fever. HENT: Negative. Respiratory: Positive for shortness of breath. Cardiovascular: Negative. Negative for chest pain. Gastrointestinal: Negative. Negative for diarrhea, nausea and vomiting. Genitourinary: Negative. Negative for dysuria and hematuria. Musculoskeletal: Negative. Skin: Negative. Neurological: Negative. Psychiatric/Behavioral: Negative.         Past Medical History:   Diagnosis Date    Abnormal CT of the chest 04/21/2021    Adenocarcinoma, lung, left ([facility] 05/14/2021    Arthritis     Community acquired pneumonia 12/23/2019    COPD (chronic obstructive pulmonary disease) ([facility] 08/14/2019    Deep venous thrombosis of left profunda femoris vein [email or web address] 04/16/2020    Depression     DVT (deep venous thrombosis) ([facility] 2014    b/l     GERD (gastroesophageal reflux disease)     History of pulmonary embolism 05/27/2021    History of thyroid nodule 12/23/2019    Hypertension     Major depressive disorder, recurrent, moderate ([facility] 11/12/2018    Nodule of lower lobe of left lung 04/23/2021    Obesity, Class II, BMI 35-39.9 11/12/2018    On anticoagulant therapy 05/27/2021    On methotrexate therapy 05/27/2021    Prediabetes 11/12/2018    Rheumatoid arthritis ([facility]     Snores     denies apnea    Thyroid nodule 01/09/2020    Tobacco abuse 11/12/2018    Under care of team 06/29/2021    pulmonology-Dr [facility]-last visit june 2021    Under care of team 06/29/2021    oncology-Dr [facility]-last visit june 2021    Under care of team     Dr. Rosario Wiggins clearance appointment 7/6/21, stress test 7/9/2021, clearance obtained and placed on chart    Wellness examination 06/29/2021    pcp-Dr Salima Jenkins office-last visit may 2021     Past Surgical History:   Procedure Laterality Date    APPENDECTOMY  1982    BRONCHOSCOPY N/A 12/27/2019    BRONCHOSCOPY ALVEOLAR LAVAGE performed by Zaira Munson MD at [facility] N/A 2/12/2020    COLONOSCOPY POLYPECTOMIES HOT SNARE, COLD BIOPSY POLYPECTOMIES performed by Asher Lancaster MD at [facility]  5/13/2021    CT NEEDLE BIOPSY LUNG PERCUTANEOUS 5/13/2021 [facility] CT SCAN    ENDOSCOPY, COLON, DIAGNOSTIC  [id number]    gastritis, sm. bowel lipoma, biopsies     No Known Allergies  Family History   Problem Relation Age of Onset    Cancer Mother         Uterine and breast    Diabetes Mother     Heart Disease Mother     Cancer Father         lung    Cancer Brother         lung    Cancer Brother         lung     Social History     Socioeconomic History    Marital status: Single     Spouse name: Not on file    Number of children: Not on file    Years of education: Not on file    Highest education level: Not on file   Occupational History     Employer: N/A   Tobacco Use    Smoking status: Current Every Day Smoker     Packs/day: 0.25     Years: 35.00     Pack years: 8.75     Types: Cigarettes     Last attempt to quit: 1986     Years since quittin.5    Smokeless tobacco: Never Used    Tobacco comment: restarted smoking 2019   Vaping Use    Vaping Use: Never used   Substance and Sexual Activity    Alcohol use: Not Currently     Alcohol/week: 0.0 standard drinks    Drug use: No    Sexual activity: Not Currently   Other Topics Concern    Not on file   Social History Narrative    Not on file     Social Determinants of Health     Financial Resource Strain: Low Risk     Difficulty of Paying Living Expenses: Not hard at all   Food Insecurity: No Food Insecurity    Worried About Running Out of Food in the Last Year: Never true    Emelyn of Food in the Last Year: Never true   Transportation Needs: No Transportation Needs    Lack of Transportation (Medical): No    Lack of Transportation (Non-Medical):  No   Physical Activity:     Days of Exercise per Week:     Minutes of Exercise per Session:    Stress:     Feeling of Stress :    Social Connections:     Frequency of Communication with Friends and Family:     Frequency of Social Gatherings with Friends and Family:     Attends Synagogue Services:     Active Member of Clubs or Organizations:     Attends Club or Organization Meetings:     Marital Status:    Intimate Partner Violence:     Fear of Current or Ex-Partner:     Emotionally Abused:     Physically Abused:     Sexually Abused:        No current facility-administered medications for this encounter. Physical Exam:  There were no vitals filed for this visit. Weight:           No intake/output data recorded. Physical Exam  Constitutional:       General: She is not in acute distress. Appearance: Normal appearance. HENT:      Head: Normocephalic. Eyes:      Pupils: Pupils are equal, round, and reactive to light. Cardiovascular:      Rate and Rhythm: Normal rate and regular rhythm. Pulses: Normal pulses. Heart sounds: No murmur heard. Pulmonary:      Effort: Pulmonary effort is normal. No respiratory distress. Breath sounds: Normal breath sounds. Abdominal:      General: Bowel sounds are normal.   Musculoskeletal:         General: Normal range of motion. Skin:     General: Skin is warm and dry. Neurological:      Mental Status: She is alert. Psychiatric:         Mood and Affect: Mood normal.         Data:    CBC: No results for input(s): WBC, HGB, HCT, MCV, PLT in the last 72 hours. BMP: No results for input(s): NA, K, CL, CO2, PHOS, BUN, CREATININE, MG in the last 72 hours. Invalid input(s): CA  Accucheck Glucoses:   No results for input(s): POCGLU in the last 72 hours. Cardiac Enzymes: No results for input(s): CKTOTAL, CKMB, CKMBINDEX, TROPONINI in the last 72 hours. PTT/PT/INR:  No results for input(s): PROTIME, INR in the last 72 hours. No results for input(s): APTT in the last 72 hours.   Liver Profile:  Lab Results   Component Value Date    AST 21 06/29/2021    ALT 20 06/29/2021    BILIDIR 0.22 12/22/2019    BILITOT 0.27 06/29/2021    ALKPHOS 88 06/29/2021     Lab Results   Component Value Date    CHOL 192 01/30/2015    HDL 77 01/30/2015    TRIG 84 01/30/2015     TSH:   Lab Results   Component Value Date    TSH 0.18 12/23/2019     UA:  Lab Results   Component Value Date    COLORU YELLOW 06/29/2021    PHUR 5.5 06/29/2021    WBCUA 10 TO 20 06/29/2021    RBCUA 0 TO 2 06/29/2021    MUCUS NOT REPORTED 06/29/2021    TRICHOMONAS NOT REPORTED 06/29/2021 YEAST NOT REPORTED 06/29/2021    BACTERIA MANY 06/29/2021    SPECGRAV 1.017 06/29/2021    LEUKOCYTESUR SMALL 06/29/2021    UROBILINOGEN Normal 06/29/2021    BILIRUBINUR NEGATIVE 06/29/2021    GLUCOSEU NEGATIVE 06/29/2021    AMORPHOUS NOT REPORTED 06/29/2021       Imaging Studies:   Echo: 6/29/2021  Summary  Left ventricle is normal in size. Global left ventricular systolic function  is normal.  Visually estimated EF 50-55%. Grade II (moderate) left ventricular diastolic dysfunction. Left atrium is mildly dilated. Right atrial dilatation. Normal right ventricular function. Moderately dilated right ventricular  cavity. Trivial mitral regurgitation.     Signature  ----------------------------------------------------------------------------   Electronically signed by Mamta Reyes(Sonographer) on 06/29/2021   10:24 AM  ----------------------------------------------------------------------------     ----------------------------------------------------------------------------   Electronically signed by Mikel Trujillo(Interpreting physician) on 06/29/2021   11:02 AM  ----------------------------------------------------------------------------  FINDINGS  Left Atrium  Left atrium is mildly dilated. Left Ventricle  Left ventricle is normal in size. Global left ventricular systolic function  is normal.  Visually estimated EF 50-55%. Grade II (moderate) left ventricular diastolic dysfunction. Right Atrium  Right atrial dilatation. Right Ventricle  Normal right ventricular function. Moderately dilated right ventricular cavity. TAPSE value of 3.13cm noted. Mitral Valve  Normal mitral valve structure. Trivial mitral regurgitation. Aortic Valve  Normal aortic valve structure and function without stenosis or  regurgitation. Tricuspid Valve  Normal tricuspid valve structure and function. No tricuspid regurgitation. Pulmonic Valve  The pulmonic valve is normal in structure. No pulmonic insufficiency.   Pericardial Effusion  No pericardial effusion seen. CXR: 6/29/2021  FINDINGS:   Mild bibasilar atelectasis/fibrosis. The cardiac size is normal. No acute   infiltrates or pleural effusions are seen. Pulmonary vascularity appears   normal. There is mild ectasia of the thoracic aorta. There are degenerative   changes in the spine . No acute bony abnormalities. The hilar structures are   normal.       Pathology:   Final Diagnosis     LUNG, LEFT LOWER LOBE, NODULE, 1.7 CM, CT-GUIDED CORE BIOPSY:     LUNG ADENOCARCINOMA, ACINAR-PREDOMINANT GROWTH PATTERN (SEE   COMMENT)     Diagnosis Comment   For , representative slides were reviewed by a second   pathologist.       Graham Leon M.D.   **Electronically Signed Out**   clj/5/14/2021   Frozen Section Diagnosis   Rapid Cytologic Evaluation: Lung, LLL, nodule, 1.7 cm, CT guided core   bx (one evaluation event): Satisfactory. The result is discussed   with Dr. Erickson Pimentel by Dr. Rebecca Guillen at 10:15 a.m. on 5/13/2021       PET:   WHOLE BODY PET/CT       4/30/2021       TECHNIQUE:   Following IV injection of 13.165 mCi of F 18 -FDG, PET tumor imaging was   acquired from the base of the skull to the mid thighs. Computed tomography   was used for purposes of attenuation correction and anatomic localization. Fusion imaging was utilized for interpretation.       Uptake time 57 mins. Glucose level 92 mg/dl.       COMPARISON:   Lung screening CT April 20, 2021.       HISTORY:   ORDERING SYSTEM PROVIDED HISTORY: Abnormal CT of the chest   TECHNOLOGIST PROVIDED HISTORY:   increase in lung nodule   Reason for Exam: Abnormal CT of chest, increase in lung nodule   Acuity: Acute   Type of Exam: Initial       FINDINGS:   HEAD/NECK: No abnormal FDG activity is identified.       CHEST: The previously identified nodule involving the left lower lobe is not   FDG avid. SUV max of 1.7.  No abnormal FDG activity is identified involving   the chest.       ABDOMEN/PELVIS: No abnormal FDG activity is identified.       BONES/SOFT TISSUE: No focal abnormal FDG activity is identified.       INCIDENTAL CT FINDINGS: No pneumothorax. No pericardial or pleural   effusions. No free air or free fluid. Cholelithiasis. Questionable   punctate nonobstructing calculus left kidney. Sigmoid diverticulosis.         Impression   Negative PET-CT. The previously identified nodule involving the left lower   lobe is not FDG avid. Tissue sampling is still recommended given the nodules   morphology is well as reported increase in size. If no tissue sampling is   performed, CT follow-up is recommended.       CXR:   ONE XRAY VIEW OF THE CHEST       5/28/2021 11:36 am       COMPARISON:   AP chest from 05/13/2021       HISTORY:   ORDERING SYSTEM PROVIDED HISTORY: Short of breath   TECHNOLOGIST PROVIDED HISTORY:   Short of breath   Reason for Exam: dyspnea   Acuity: Acute   Type of Exam: Initial       History of hypertension, GERD and rheumatoid arthritis.       FINDINGS:   Overlying ECG monitor leads and gown snaps.       Left heart border obscured by extensive dense confluent airspace opacity   involving ~ 2/3 mid-lower left hemithorax with a few UL air bronchograms   and obscuration left hemidiaphragm. Right lung similar in appearance with   minimal basilar atelectasis but no localized pulmonary opacity or large   pleural effusion. Somewhat engorged right vasculature.       Bones unchanged.           Impression   Extensive left-sided airspace pneumonia with probable pleural effusion and   some degree of atelectasis right lung unchanged.       CT scan:   CTA OF THE CHEST 5/27/2021 9:14 am       TECHNIQUE:   CTA of the chest was performed after the administration of intravenous   contrast. Multiplanar reformatted images are provided for review. MIP   images are provided for review.  Dose modulation, iterative reconstruction,   and/or weight based adjustment of the mA/kV was utilized to reduce the   radiation dose to as low as reasonably achievable.       COMPARISON:   Chest radiograph 05/13/2021. PET-CT 04/30/2021. Chest CT 04/20/2021.       HISTORY:   ORDERING SYSTEM PROVIDED HISTORY: pleuritic chest pain, known lung malignancy   TECHNOLOGIST PROVIDED HISTORY:   pleuritic chest pain, known lung malignancy   Decision Support Exception - unselect if not a suspected or confirmed   emergency medical condition->Emergency Medical Condition (MA)   Reason for Exam: chest pain, body aches   Acuity: Unknown   Type of Exam: Unknown       FINDINGS:   Pulmonary Arteries: Pulmonary arteries are adequately opacified for   evaluation. No evidence of intraluminal filling defect to suggest pulmonary   embolism. Main pulmonary artery is normal in caliber.       Mediastinum: No evidence of mediastinal lymphadenopathy. The heart and   pericardium demonstrate no acute abnormality. There is no acute abnormality   of the thoracic aorta.       Lungs/pleura: Dense consolidation is now present in the left upper lobe with   air bronchograms. Known site of malignancy with irregular nodule in the   posterior left lower lobe is again demonstrated measuring 1.6 cm on axial   image 73. No pneumothorax. No effusion. The central airway is patent.       Upper Abdomen: Findings suggestive of hepatic steatosis. Cholelithiasis. Punctate left nephrolithiasis. Mild thickening of the left adrenal gland   again demonstrated, which demonstrated no abnormal metabolic activity.       Soft Tissues/Bones: No acute bone or soft tissue abnormality.           Impression   1. No evidence of pulmonary embolism.       2. Left upper lobe consolidation, likely representing infection. No imaging   evidence for aspiration.       3. No significant interval change in biopsied malignancy in the left lower   lobe.       4. Cholelithiasis.       5. Punctate left nephrolithiasis.       6. Findings suggestive of hepatic steatosis.               PFT's  Pulmonary function test  6/25/2021    Spirometry  Flow volume loop shows obstructive disease  FEV1 73% predicted, FVC 79% predicted  FEV1 FVC ratio 73     No bronchodilator challenge performed.     Lung volumes by body box     Total lung capacity 172% predicted  Residual volume 326% predicted     Diffusion capacity uncorrected is 92% predicted.     Final impression     The study shows a moderate obstructive and restrictive ventilatory dysfunction with significant air trapping and hyperinflation and normal diffusion capacity.     Clinical correlation advised.     Electronically signed by Eloisa Lentz MD on 6/25/2021 at 11:38 AM     Imaging Studies: I have personally reviewed the testing/imaging above with Dr Dmitry Watters, he is in agreement with the findings listed above.      In summary, Ms. Bola Bhatt is a 61y.o. year-old female with adenocarcinoma of the left lower lobe.    Problem List:   · Adenocarcinoma of the left lower lobe   · COPD with possible BRAYAN   · Rheumatoid arthritis   · On chronic methotrexate therapy  · History of pulmonary embolism and DVT   · Chronic anticoagulation secondary above on Eliquis   · Hypertension   · Major depressive disorder   · Tobacco abuse   · Obesity, BMI 44.6 kg/m²    Assessment & Plan:  Patient Active Problem List   Diagnosis    Hypertension    GERD (gastroesophageal reflux disease)    Rheumatoid arthritis (HCC)    Obesity, Class II, BMI 35-39.9    Major depressive disorder, recurrent, moderate (HCC)    Prediabetes    Tobacco abuse    History of DVT in adulthood    COPD (chronic obstructive pulmonary disease) ([address]    Community acquired pneumonia    History of thyroid nodule    Thyroid nodule    Positive colorectal cancer screening using Cologuard test    Deep venous thrombosis of left profunda femoris vein (HCC)    Abnormal CT of the chest    Encounter for smoking cessation counseling    Nodule of lower lobe of left lung    Primary mucinous adenocarcinoma of lung ([address]    [facility]    History of pulmonary embolism    On anticoagulant therapy    On methotrexate therapy    Obesity, Class III, BMI 40-49.9 (morbid obesity) ([address]         The above recommendations including medications and orders were discussed and agreed upon with Dr. Tanja Moffett the attending on service for thecardiothoracic surgery group today.      Christa Estevez, APRN - CNP, APRN CNP

## 2021-07-20 ENCOUNTER — APPOINTMENT (OUTPATIENT)
Dept: GENERAL RADIOLOGY | Age: 66
DRG: 120 | End: 2021-07-20
Attending: THORACIC SURGERY (CARDIOTHORACIC VASCULAR SURGERY)
Payer: COMMERCIAL

## 2021-07-20 LAB
ABSOLUTE EOS #: <0.03 K/UL (ref 0–0.44)
ABSOLUTE IMMATURE GRANULOCYTE: 0.31 K/UL (ref 0–0.3)
ABSOLUTE LYMPH #: 1.5 K/UL (ref 1.1–3.7)
ABSOLUTE MONO #: 0.91 K/UL (ref 0.1–1.2)
ANION GAP SERPL CALCULATED.3IONS-SCNC: 10 MMOL/L (ref 9–17)
BASOPHILS # BLD: 1 % (ref 0–2)
BASOPHILS ABSOLUTE: 0.07 K/UL (ref 0–0.2)
BUN BLDV-MCNC: 11 MG/DL (ref 8–23)
BUN/CREAT BLD: ABNORMAL (ref 9–20)
CALCIUM SERPL-MCNC: 7.8 MG/DL (ref 8.6–10.4)
CHLORIDE BLD-SCNC: 105 MMOL/L (ref 98–107)
CO2: 21 MMOL/L (ref 20–31)
CREAT SERPL-MCNC: 0.62 MG/DL (ref 0.5–0.9)
DIFFERENTIAL TYPE: ABNORMAL
EOSINOPHILS RELATIVE PERCENT: 0 % (ref 1–4)
GFR AFRICAN AMERICAN: >60 ML/MIN
GFR NON-AFRICAN AMERICAN: >60 ML/MIN
GFR SERPL CREATININE-BSD FRML MDRD: ABNORMAL ML/MIN/{1.73_M2}
GFR SERPL CREATININE-BSD FRML MDRD: ABNORMAL ML/MIN/{1.73_M2}
GLUCOSE BLD-MCNC: 118 MG/DL (ref 65–105)
GLUCOSE BLD-MCNC: 131 MG/DL (ref 70–99)
HCT VFR BLD CALC: 34.7 % (ref 36.3–47.1)
HEMOGLOBIN: 10.7 G/DL (ref 11.9–15.1)
IMMATURE GRANULOCYTES: 2 %
LYMPHOCYTES # BLD: 10 % (ref 24–43)
MCH RBC QN AUTO: 31.3 PG (ref 25.2–33.5)
MCHC RBC AUTO-ENTMCNC: 30.8 G/DL (ref 28.4–34.8)
MCV RBC AUTO: 101.5 FL (ref 82.6–102.9)
MONOCYTES # BLD: 6 % (ref 3–12)
NRBC AUTOMATED: 0 PER 100 WBC
PDW BLD-RTO: 15 % (ref 11.8–14.4)
PLATELET # BLD: 262 K/UL (ref 138–453)
PLATELET ESTIMATE: ABNORMAL
PMV BLD AUTO: 9.1 FL (ref 8.1–13.5)
POTASSIUM SERPL-SCNC: 3.9 MMOL/L (ref 3.7–5.3)
RBC # BLD: 3.42 M/UL (ref 3.95–5.11)
RBC # BLD: ABNORMAL 10*6/UL
SEG NEUTROPHILS: 81 % (ref 36–65)
SEGMENTED NEUTROPHILS ABSOLUTE COUNT: 12.17 K/UL (ref 1.5–8.1)
SODIUM BLD-SCNC: 136 MMOL/L (ref 135–144)
WBC # BLD: 15 K/UL (ref 3.5–11.3)
WBC # BLD: ABNORMAL 10*3/UL

## 2021-07-20 PROCEDURE — 6360000002 HC RX W HCPCS

## 2021-07-20 PROCEDURE — 2700000000 HC OXYGEN THERAPY PER DAY

## 2021-07-20 PROCEDURE — 82947 ASSAY GLUCOSE BLOOD QUANT: CPT

## 2021-07-20 PROCEDURE — 6370000000 HC RX 637 (ALT 250 FOR IP): Performed by: NURSE PRACTITIONER

## 2021-07-20 PROCEDURE — 80048 BASIC METABOLIC PNL TOTAL CA: CPT

## 2021-07-20 PROCEDURE — 97162 PT EVAL MOD COMPLEX 30 MIN: CPT

## 2021-07-20 PROCEDURE — 99024 POSTOP FOLLOW-UP VISIT: CPT | Performed by: NURSE PRACTITIONER

## 2021-07-20 PROCEDURE — 97535 SELF CARE MNGMENT TRAINING: CPT

## 2021-07-20 PROCEDURE — 97116 GAIT TRAINING THERAPY: CPT

## 2021-07-20 PROCEDURE — 97166 OT EVAL MOD COMPLEX 45 MIN: CPT

## 2021-07-20 PROCEDURE — 94761 N-INVAS EAR/PLS OXIMETRY MLT: CPT

## 2021-07-20 PROCEDURE — 6360000002 HC RX W HCPCS: Performed by: NURSE PRACTITIONER

## 2021-07-20 PROCEDURE — 2100000001 HC CVICU R&B

## 2021-07-20 PROCEDURE — 85025 COMPLETE CBC W/AUTO DIFF WBC: CPT

## 2021-07-20 PROCEDURE — 2580000003 HC RX 258: Performed by: NURSE PRACTITIONER

## 2021-07-20 PROCEDURE — 71045 X-RAY EXAM CHEST 1 VIEW: CPT

## 2021-07-20 RX ORDER — LIDOCAINE 4 G/G
1 PATCH TOPICAL DAILY
Status: DISCONTINUED | OUTPATIENT
Start: 2021-07-20 | End: 2021-07-23 | Stop reason: HOSPADM

## 2021-07-20 RX ORDER — KETOROLAC TROMETHAMINE 15 MG/ML
15 INJECTION, SOLUTION INTRAMUSCULAR; INTRAVENOUS EVERY 6 HOURS PRN
Status: DISCONTINUED | OUTPATIENT
Start: 2021-07-20 | End: 2021-07-23 | Stop reason: HOSPADM

## 2021-07-20 RX ORDER — FUROSEMIDE 10 MG/ML
INJECTION INTRAMUSCULAR; INTRAVENOUS
Status: COMPLETED
Start: 2021-07-20 | End: 2021-07-20

## 2021-07-20 RX ORDER — FUROSEMIDE 10 MG/ML
20 INJECTION INTRAMUSCULAR; INTRAVENOUS 2 TIMES DAILY
Status: DISCONTINUED | OUTPATIENT
Start: 2021-07-20 | End: 2021-07-23

## 2021-07-20 RX ORDER — KETOROLAC TROMETHAMINE 30 MG/ML
30 INJECTION, SOLUTION INTRAMUSCULAR; INTRAVENOUS ONCE
Status: COMPLETED | OUTPATIENT
Start: 2021-07-20 | End: 2021-07-20

## 2021-07-20 RX ADMIN — OXYCODONE HYDROCHLORIDE 10 MG: 5 TABLET ORAL at 20:48

## 2021-07-20 RX ADMIN — KETOROLAC TROMETHAMINE 15 MG: 15 INJECTION, SOLUTION INTRAMUSCULAR; INTRAVENOUS at 15:20

## 2021-07-20 RX ADMIN — KETOROLAC TROMETHAMINE 30 MG: 30 INJECTION, SOLUTION INTRAMUSCULAR; INTRAVENOUS at 00:51

## 2021-07-20 RX ADMIN — MORPHINE SULFATE 2 MG: 2 INJECTION, SOLUTION INTRAMUSCULAR; INTRAVENOUS at 10:21

## 2021-07-20 RX ADMIN — SODIUM CHLORIDE, PRESERVATIVE FREE 10 ML: 5 INJECTION INTRAVENOUS at 08:21

## 2021-07-20 RX ADMIN — MORPHINE SULFATE 2 MG: 2 INJECTION, SOLUTION INTRAMUSCULAR; INTRAVENOUS at 19:57

## 2021-07-20 RX ADMIN — FUROSEMIDE 20 MG: 10 INJECTION, SOLUTION INTRAMUSCULAR; INTRAVENOUS at 18:19

## 2021-07-20 RX ADMIN — OXYCODONE HYDROCHLORIDE 10 MG: 5 TABLET ORAL at 08:20

## 2021-07-20 RX ADMIN — FUROSEMIDE 20 MG: 10 INJECTION, SOLUTION INTRAMUSCULAR; INTRAVENOUS at 08:19

## 2021-07-20 RX ADMIN — OXYCODONE HYDROCHLORIDE 10 MG: 5 TABLET ORAL at 13:40

## 2021-07-20 RX ADMIN — MORPHINE SULFATE 2 MG: 2 INJECTION, SOLUTION INTRAMUSCULAR; INTRAVENOUS at 15:20

## 2021-07-20 RX ADMIN — KETOROLAC TROMETHAMINE 15 MG: 15 INJECTION, SOLUTION INTRAMUSCULAR; INTRAVENOUS at 08:20

## 2021-07-20 RX ADMIN — POLYETHYLENE GLYCOL 3350 17 G: 17 POWDER, FOR SOLUTION ORAL at 08:20

## 2021-07-20 RX ADMIN — FAMOTIDINE 20 MG: 20 TABLET, FILM COATED ORAL at 20:02

## 2021-07-20 RX ADMIN — MORPHINE SULFATE 2 MG: 2 INJECTION, SOLUTION INTRAMUSCULAR; INTRAVENOUS at 06:09

## 2021-07-20 RX ADMIN — MORPHINE SULFATE 2 MG: 2 INJECTION, SOLUTION INTRAMUSCULAR; INTRAVENOUS at 23:37

## 2021-07-20 RX ADMIN — CEFAZOLIN 2000 MG: 10 INJECTION, POWDER, FOR SOLUTION INTRAVENOUS at 04:07

## 2021-07-20 RX ADMIN — FAMOTIDINE 20 MG: 20 TABLET, FILM COATED ORAL at 08:20

## 2021-07-20 RX ADMIN — KETOROLAC TROMETHAMINE 15 MG: 15 INJECTION, SOLUTION INTRAMUSCULAR; INTRAVENOUS at 23:37

## 2021-07-20 RX ADMIN — SODIUM CHLORIDE, PRESERVATIVE FREE 10 ML: 5 INJECTION INTRAVENOUS at 20:02

## 2021-07-20 RX ADMIN — INSULIN LISPRO 2 UNITS: 100 INJECTION, SOLUTION INTRAVENOUS; SUBCUTANEOUS at 18:16

## 2021-07-20 ASSESSMENT — PAIN SCALES - GENERAL
PAINLEVEL_OUTOF10: 0
PAINLEVEL_OUTOF10: 6
PAINLEVEL_OUTOF10: 9
PAINLEVEL_OUTOF10: 0
PAINLEVEL_OUTOF10: 10
PAINLEVEL_OUTOF10: 7
PAINLEVEL_OUTOF10: 8
PAINLEVEL_OUTOF10: 10
PAINLEVEL_OUTOF10: 0
PAINLEVEL_OUTOF10: 10
PAINLEVEL_OUTOF10: 8
PAINLEVEL_OUTOF10: 9
PAINLEVEL_OUTOF10: 9
PAINLEVEL_OUTOF10: 10
PAINLEVEL_OUTOF10: 0
PAINLEVEL_OUTOF10: 9

## 2021-07-20 ASSESSMENT — PAIN DESCRIPTION - PAIN TYPE: TYPE: SURGICAL PAIN

## 2021-07-20 ASSESSMENT — PAIN DESCRIPTION - LOCATION
LOCATION: RIB CAGE
LOCATION: CHEST;ARM

## 2021-07-20 ASSESSMENT — PAIN DESCRIPTION - ORIENTATION: ORIENTATION: LEFT

## 2021-07-20 NOTE — PLAN OF CARE
Problem: Pain:  Goal: Pain level will decrease  Description: Pain level will decrease  7/20/2021 0437 by Laisha Ibarra RN  Outcome: Ongoing  7/19/2021 1706 by Danie Gifford RN  Outcome: Ongoing  Goal: Control of acute pain  Description: Control of acute pain  7/20/2021 0437 by Laisha Ibarra RN  Outcome: Ongoing  7/19/2021 1706 by Danie Gifford RN  Outcome: Ongoing  Goal: Control of chronic pain  Description: Control of chronic pain  7/20/2021 0437 by Laisha Ibarra RN  Outcome: Ongoing  7/19/2021 1706 by Danie Gifford RN  Outcome: Ongoing     Problem: SAFETY  Goal: Free from accidental physical injury  7/20/2021 0437 by Laisha Ibarra RN  Outcome: Ongoing  7/19/2021 1706 by Danie Gifford RN  Outcome: Ongoing  Goal: Free from intentional harm  7/20/2021 0437 by Laisha Ibarra RN  Outcome: Ongoing  7/19/2021 1706 by Danie Gifford RN  Outcome: Ongoing     Problem: DAILY CARE  Goal: Daily care needs are met  7/20/2021 0437 by Laisha Ibarra RN  Outcome: Ongoing  7/19/2021 1706 by Danie Gifford RN  Outcome: Ongoing     Problem: PAIN  Goal: Patient's pain/discomfort is manageable  7/20/2021 0437 by Laisha Ibarra RN  Outcome: Ongoing  7/19/2021 1706 by Danie Gifford RN  Outcome: Ongoing     Problem: SKIN INTEGRITY  Goal: Skin integrity is maintained or improved  7/20/2021 0437 by Laisha Ibarra RN  Outcome: Ongoing  7/19/2021 1706 by Danie Gifford RN  Outcome: Ongoing     Problem: KNOWLEDGE DEFICIT  Goal: Patient/S.O. demonstrates understanding of disease process, treatment plan, medications, and discharge instructions.   7/20/2021 0437 by Laisha Ibarra RN  Outcome: Ongoing  7/19/2021 1706 by Danie Gifford RN  Outcome: Ongoing     Problem: DISCHARGE BARRIERS  Goal: Patient's continuum of care needs are met  7/20/2021 0437 by Laisha Ibarra RN  Outcome: Ongoing  7/19/2021 1706 by Danie Gifford RN  Outcome: Ongoing     Problem: Falls - Risk of:  Goal: Will remain free from falls  Description: Will remain free from falls  Outcome: Ongoing  Goal: Absence of physical injury  Description: Absence of physical injury  Outcome: Ongoing

## 2021-07-20 NOTE — PROGRESS NOTES
Taking over for this patient at this time. Patient was ambulated to bathroom. Voided. Up to chair. PRN meds were given.

## 2021-07-20 NOTE — PROGRESS NOTES
Physical Therapy    Facility/Department: [facility]  Initial Assessment    NAME: Daryle Greaves  : 1955  MRN: [id number]  Ms. Del Real is a 61 y.o.  female who presents with recently diagnosed adenocarcinoma of the left lower lung. In April patient underwent annual CT lung screening due to history of smoking which revealed an increase in size of a previously noted pulmonary nodule to the left lower lobe. Nodule measured 1.7 x 1.6 cm and was suspicious for malignancy but negative on subsequent PET scan. CT guided needle biopsy was completed on  and found to be positive for adenocarcinoma. Pertinent medical history includes COPD, diabetes, history of PE/DVT with chronic anticoagulation on Eliquis, history of thyroid nodule, hypertension and obesity. She is a current everyday smoker (now down to 10 cigarettes per day; previously 2 ppd x 30 years)   Date of Service: 2021    Discharge Recommendations:  Patient would benefit from continued therapy after discharge   PT Equipment Recommendations  Equipment Needed: Yes  Mobility Devices: Ben Da Silva: Rolling    Assessment   Body structures, Functions, Activity limitations: Decreased functional mobility ; Decreased endurance;Decreased coordination;Decreased strength; Increased pain  Assessment: Patient in 9/10 pain under arms/at ribs. She chose to attempt ambulation in hallway, demonstrating ability to push herself despite her pain. Oxygen saturation good, 95% with exertion. She will need to do stair training tomorrow. Patient needs further PT to regain functional independence. Prognosis: Good  Decision Making: Medium Complexity  Clinical Presentation: evolving  PT Education: Goals; General Safety;Gait Training;Plan of Care; Functional Mobility Training;Transfer Training;PT Role;Equipment; Energy Conservation  REQUIRES PT FOLLOW UP: Yes  Activity Tolerance  Activity Tolerance: Patient limited by fatigue;Patient limited by pain       Patient Diagnosis(es): There were no encounter diagnoses. has a past medical history of Abnormal CT of the chest, Adenocarcinoma, lung, left ([address] Arthritis, Community acquired pneumonia, COPD (chronic obstructive pulmonary disease) ([address] Deep venous thrombosis of left profunda femoris vein ([address] Depression, DVT (deep venous thrombosis) ([address] GERD (gastroesophageal reflux disease), History of pulmonary embolism, History of thyroid nodule, Hypertension, Major depressive disorder, recurrent, moderate ([address] Nodule of lower lobe of left lung, Obesity, Class II, BMI 35-39.9, On anticoagulant therapy, On methotrexate therapy, Prediabetes, Rheumatoid arthritis ([address] Snores, Thyroid nodule, Tobacco abuse, Under care of team, Under care of team, Under care of team, and Wellness examination. has a past surgical history that includes Appendectomy (1982); Endoscopy, colon, diagnostic (703820); bronchoscopy (N/A, 12/27/2019); Colonoscopy (N/A, 2/12/2020); CT NEEDLE BIOPSY LUNG PERCUTANEOUS (5/13/2021); and lobectomy (Left, 07/19/2021). Restrictions  Restrictions/Precautions  Restrictions/Precautions: Up as Tolerated  Position Activity Restriction  Other position/activity restrictions: Ambulate 3x/day, Up as tolerated.   Thoracotomy, chest tube, On-Q.  Vision/Hearing  Vision: Within Functional Limits (with her glasses)  Hearing: Within functional limits     Subjective  General  Chart Reviewed: Yes  Patient assessed for rehabilitation services?: Yes  Family / Caregiver Present: Yes (son)  Follows Commands: Within Functional Limits  Pain Screening  Patient Currently in Pain: Yes  Pain Assessment  Pain Assessment: 0-10  Pain Level: 9  Pain Type: Surgical pain  Pain Location: Rib cage  Vital Signs  Patient Currently in Pain: Yes  Pre Treatment Pain Screening  Comments / Details: Pain \"under arms\"    Orientation  Orientation  Overall Orientation Status: Within Functional Limits  Social/Functional History  Social/Functional History  Lives With: Son (Lives with son and granddtr)  Type of Home: House  Home Layout: Two level  Home Access: Stairs to enter with rails, Level entry  Entrance Stairs - Number of Steps: 7, 3 once inside the home  Entrance Stairs - Rails: Left  ADL Assistance: Independent  Homemaking Assistance: Independent  Ambulation Assistance: Independent  Transfer Assistance: Independent  Additional Comments: Community ambulator. Recently went fishing. No device prior and owns no devices. No history of falls. Cognition   Cognition  Overall Cognitive Status: WFL    Objective     Observation/Palpation  Observation: Has chest tube left side, On-Q with entry on left side thoracic back, back incision above On-Q entry. Strength RLE  Strength RLE: WFL  Comment: Strength is functional with quick fatigue during tasks. Strength LLE  Strength LLE: WFL           Transfers  Sit to Stand: Contact guard assistance  Stand to sit: Contact guard assistance  Comment: Cued to mostly use her thigh muscles to rise and lower. She was able to do a slow, controlled descent into the chair. Ambulation  Ambulation?: Yes  Ambulation 1  Surface: level tile  Device: Rolling Walker  Assistance: Contact guard assistance  Gait Deviations: Slow Caprice  Distance: 75'  Comments: On supplemental oxygen. Very fatigued, pain \"9\"/10 in ribs/under arms. Balance  Sitting - Static: Good  Sitting - Dynamic: Good  Standing - Static: Fair  Standing - Dynamic: Fair;-        Plan   Plan  Times per week: 6-7x/wk  Current Treatment Recommendations: Strengthening, Home Exercise Program, Safety Education & Training, Functional Mobility Training, Transfer Training, Gait Training, Stair training, Endurance Training, Patient/Caregiver Education & Training, Pain Management, Equipment Evaluation, Education, & procurement  Safety Devices  Type of devices:  All fall risk precautions in place, Call light within reach, Left in chair, Gait belt, Nurse notified AM-PAC Score  AM-PAC Inpatient Mobility Raw Score : 16 (07/20/21 1017)  AM-PAC Inpatient T-Scale Score : 40.78 (07/20/21 1017)  Mobility Inpatient CMS 0-100% Score: 54.16 (07/20/21 1017)  Mobility Inpatient CMS G-Code Modifier : CK (07/20/21 1017)          Goals  Short term goals  Time Frame for Short term goals: 14 visits  Short term goal 1: Sit to/from stand with supervision. Short term goal 2: Sit to/from supine with SBA. Short term goal 3: Ambulate 200' with wheeled walker SBA. Short term goal 4: Negotiate up and down 10 steps with one railing with CGA.        Therapy Time   Individual Concurrent Group Co-treatment   Time In 0929         Time Out 1004         Minutes 35         Timed Code Treatment Minutes: 8 Minutes       Sandhya Raphael, PT

## 2021-07-20 NOTE — PROGRESS NOTES
Occupational Therapy   Occupational Therapy Initial Assessment  Date: 2021   Patient Name: Scott Alex  MRN: [id number]     : 1955    Date of Service: 2021     Copied from chart:  Riki Devi a 61 y.o.  female who presents with recently diagnosed adenocarcinoma of the left lower lung. In April patient underwent annual CT lung screening due to history of smoking which revealed an increase in size of a previously noted pulmonary nodule to the left lower lobe. Nodule measured 1.7 x 1.6 cm and was suspicious for malignancy but negative on subsequent PET scan. CT guided needle biopsy was completed on  and found to be positive for adenocarcinoma. Pertinent medical history includes COPD, diabetes, history of PE/DVT with chronic anticoagulation on Eliquis, history of thyroid nodule, hypertension and obesity. She is a current everyday smoker (now down to 10 cigarettes per day; previously 2 ppd x 30 years    Discharge Recommendations:  Patient would benefit from continued therapy after discharge  OT Equipment Recommendations  Equipment Needed: Yes  Mobility Devices: Arvil Richland; ADL Assistive Devices  Walker: Rolling  ADL Assistive Devices: Shower Chair with back; Reacher;Sock-Aid Hard;Grab Bars - shower;Grab Bars - toilet    Assessment   Performance deficits / Impairments: Decreased functional mobility ; Decreased endurance;Decreased ADL status; Decreased high-level IADLs  Prognosis: Good  Decision Making: Medium Complexity  OT Education: OT Role;Plan of Care;Equipment;ADL Adaptive Strategies;Transfer Training  Patient Education: Pt educated on incentive spirometer, pt educated on adaptive dressing technique, pt educated on use of pillow during coughing- good return  Barriers to Learning: none  REQUIRES OT FOLLOW UP: Yes  Activity Tolerance  Activity Tolerance: Patient Tolerated treatment well;Patient limited by fatigue;Patient limited by pain  Safety Devices  Safety Devices in place: Yes  Type of devices: Left in chair;Call light within reach;Gait belt;Patient at risk for falls  Restraints  Initially in place: No           Patient Diagnosis(es): There were no encounter diagnoses. has a past medical history of Abnormal CT of the chest, Adenocarcinoma, lung, left ([address] Arthritis, Community acquired pneumonia, COPD (chronic obstructive pulmonary disease) ([address] Deep venous thrombosis of left profunda femoris vein ([address] Depression, DVT (deep venous thrombosis) ([address] GERD (gastroesophageal reflux disease), History of pulmonary embolism, History of thyroid nodule, Hypertension, Major depressive disorder, recurrent, moderate ([address] Nodule of lower lobe of left lung, Obesity, Class II, BMI 35-39.9, On anticoagulant therapy, On methotrexate therapy, Prediabetes, Rheumatoid arthritis ([address] Snores, Thyroid nodule, Tobacco abuse, Under care of team, Under care of team, Under care of team, and Wellness examination. has a past surgical history that includes Appendectomy (1982); Endoscopy, colon, diagnostic (505777); bronchoscopy (N/A, 12/27/2019); Colonoscopy (N/A, 2/12/2020); CT NEEDLE BIOPSY LUNG PERCUTANEOUS (5/13/2021); and lobectomy (Left, 07/19/2021).            Restrictions  Restrictions/Precautions  Restrictions/Precautions: Up as Tolerated, General Precautions  Required Braces or Orthoses?: No  Position Activity Restriction  Other position/activity restrictions: Ambulate pt, left lobectomy, chest tube, On-Q pain pump     Subjective   General  Patient assessed for rehabilitation services?: Yes  Family / Caregiver Present: Yes (Son present upon arrival but left for the duration of the session)  General Comment  Comments: RN ok'd for therapy, pt agreeable to session, pleasant/cooperative throughout  Patient Currently in Pain: Yes  Pain Assessment  Pain Assessment: 0-10  Pain Level: 9 (During activity)  Pain Type: Surgical pain  Pain Location: Chest;Arm  Pain Orientation: Left  Non-Pharmaceutical Pain Intervention(s): Distraction; Ambulation/Increased Activity    Social/Functional History  Social/Functional History  Lives With: Son (Lives with son and granddaughter)  Type of Home: House  Home Layout: Two level  Home Access: Level entry  Entrance Stairs - Number of Steps: 7 + 3 to second level once inside the home  Entrance Stairs - Rails: Left  Bathroom Shower/Tub: Tub/Shower unit  Bathroom Toilet: Handicap height  Bathroom Equipment:  (No AE)  Home Equipment:  (No AE at baseline)  ADL Assistance: [address]: Independent  Homemaking Responsibilities: Yes  Ambulation Assistance: Independent  Transfer Assistance: Independent  Active : No  Patient's  Info: Son  Occupation: Retired  Leisure & Hobbies: gardening  Additional Comments: Community ambulator. Recently went fishing.        Objective   Vision: Impaired  Vision Exceptions: Wears glasses at all times  Hearing: Within functional limits    Orientation  Overall Orientation Status: Within Functional Limits    Balance  Sitting Balance: Modified independent  (Pt sat unsupported in recliner with arm prop for ~4 minutes prior to ambulation and during rest break)  Standing Balance: Stand by assistance  Standing Balance  Time: ~10 minutes  Activity: Func mob and standing sinkside for func tasks  Comment: SBA d/t to pain and fatigue; no LOB  Functional Mobility  Functional - Mobility Device: Rolling Walker  Activity: To/from bathroom (In hallway with PT for community distances)  Assist Level: Stand by assistance  Functional Mobility Comments: Pt required SBA and increased time for func mob d/t pain and fatigue; no LOB  Toilet Transfers  Toilet - Technique: Ambulating  Equipment Used: Standard toilet  Toilet Transfer: Contact guard assistance  Toilet Transfers Comments: Pt ambulated with RW with CGA for safety only; pt IND during RW management  ADL  Feeding: Modified independent ;Setup  Grooming: Stand by assistance (Pt performed oral hygiene and washed face standing sinkside with SBA to ensure no unsteadiness. Pt used non-dominant hand on counter for support)  UE Bathing: Setup;Minimal assistance  LE Bathing: Minimal assistance;Setup  UE Dressing: Minimal assistance (Pt able to don and doff sleeve of gown with Min A for gown management)  LE Dressing:  Moderate assistance (Pt able to doff sock IND and attempted to don sock but unsuccessful requiring Mod A to complete task)  Toileting: Contact guard assistance (Pt able to sit and void successfully, pt able to complete pericare IND and required CGA for RW management and to ensure no unsteadiness)  Tone RUE  RUE Tone: Normotonic  Tone LUE  LUE Tone: Normotonic  Coordination  Movements Are Fluid And Coordinated: Yes     Bed mobility  Scooting: Supervision (SUP for scooting to edge of recliner; limited by pain)  Comment: Did not assess d/t pt in recliner upon arrival and at end of session  Transfers  Sit to stand: Contact guard assistance  Stand to sit: Contact guard assistance  Transfer Comments: CGA d/t pain and safety     Cognition  Overall Cognitive Status: WFL        Sensation  Overall Sensation Status: WFL        LUE PROM (degrees)  LUE PROM: Exceptions  LUE AROM (degrees)  LUE AROM : Exceptions  L Shoulder Flexion 0-180: 0-100 d/t pain  L Elbow Flexion 0-145: WFL  L Elbow Extension 145-0: WFL  Left Hand AROM (degrees)  Left Hand AROM: WFL  RUE AROM (degrees)  RUE AROM : Exceptions  R Shoulder Flexion 0-180: 0-100 d/t pain  R Elbow Flexion 0-145: WFL  R Elbow Extension 145-0: WFL  Right Hand AROM (degrees)  Right Hand AROM: WFL  LUE Strength  Gross LUE Strength: Exceptions to [facility]  L Shoulder Flex: NT  L Elbow Flex: 4+/5  L Elbow Ext: 4+/5  L Hand General: 4+/5  LUE Strength Comment: NT shoulder d/t to pain  RUE Strength  Gross RUE Strength: Exceptions to [facility]  R Shoulder Flex: NT  R Elbow Flex: 4+/5  R Elbow Ext: 4+/5  R Hand General: 4+/5  RUE Strength Comment: NT shoulder d/t to pain           Plan   Plan  Times per week: 3-5x/wk  Current Treatment Recommendations: Gait Training, Patient/Caregiver Education & Training, Functional Mobility Training, Endurance Training, Pain Management, Safety Education & Training, Self-Care / ADL      AM-PAC Score        AM-PAC Inpatient Daily Activity Raw Score: 18 (07/20/21 1159)  AM-PAC Inpatient ADL T-Scale Score : 38.66 (07/20/21 1159)  ADL Inpatient CMS 0-100% Score: 46.65 (07/20/21 1159)  ADL Inpatient CMS G-Code Modifier : CK (07/20/21 1159)    Goals  Short term goals  Time Frame for Short term goals: By discharge, pt will be able to:  Short term goal 1: Demo func transfers, including EOB, recliner, and toilet, with Mod I and use of LRD PRN  Short term goal 2: Demo func mobility with Mod I, use of LRD PRN, and 0 rest breaks  Short term goal 3: Demo dynamic standing balance during ADL tasks for 15+ minutes with SUP and 0 rest breaks  Short term goal 4: Demo UB ADLs with SBA  Short term goal 5: Demo LB ADLs with CGA, adpative techniques/ adaptive equipment PRN, and 0 rest breaks  Short term goal 6: Demo increase activity tolerance during ADL tasks for 45+ minutes with SUP and 0 rest breaks       Therapy Time   Individual Concurrent Group Co-treatment   Time In 0930         Time Out 1007         Minutes 37         Timed Code Treatment Minutes: [address] [facility] [place], S/OT

## 2021-07-20 NOTE — PROGRESS NOTES
[id number] [facility] Cardiothoracic Surgery  Progress Note    7/20/2021 7:38 AM  Surgeon: Melissa Wen MD  POD # 1  S/P: Left Lower lobectomy     Subjective:  Ms. Brendan Esquivel   Pt shows no acute distress. Moderate pain while resting in chair. Objective:  /65   Pulse 83   Temp 98 °F (36.7 °C) (Oral)   Resp 22   Ht 5' 5\" (1.651 m)   Wt 242 lb 1 oz (109.8 kg)   SpO2 97%   BMI 40.28 kg/m²   Chest: pacing wires: no, chest tubes:yes, air leak yes, 1 +  CV: no murmur noted, Normal S1, S2,  Lungs: rhonchi/transmitted tones heard while inhaling and exhaling. Abd: normal bowel sounds   Lower Extremities: Trace edema    CXR-mild pulm edema noted. No appreciated pneumothorax noted. Mild to moderate left lower effusion    Labs:   CBC:   Recent Labs     07/19/21  1506 07/20/21  0324   WBC  --  15.0*   HGB 10.5* 10.7*   HCT 33.8* 34.7*   MCV  --  101.5   PLT  --  262     BMP:   Recent Labs     07/19/21  1939 07/20/21  0324    136   K 3.7 3.9    105   CO2 21 21   BUN 14 11   CREATININE 0.65 0.62       I/O: I/O last 3 completed shifts:   In: 3480 [I.V.:1283; IV Piggyback:350]  Out: 4577 [Urine:1840; Blood:400; Chest Tube:800]  Scheduled Meds:   furosemide  20 mg Intravenous BID    sodium chloride flush  10 mL Intravenous 2 times per day    famotidine  20 mg Oral BID    polyethylene glycol  17 g Oral Daily    insulin lispro  0-12 Units Subcutaneous TID     insulin lispro  0-6 Units Subcutaneous Nightly     Continuous Infusions:   sodium chloride      bupivacaine 0.5% 400 mL (07/19/21 1626)     PRN Meds:sodium chloride flush, sodium chloride, acetaminophen, oxyCODONE **OR** oxyCODONE, morphine, magnesium hydroxide, bisacodyl        Assessment/ Plan:  Please have patient use incentive spirometry and acapella around the clock today to prevent plugging  Lasix 20mg BID  Please limit IV fluids  Keep ahead of pain without oversedating  Keep chest tubes in place with strict output monitoring keep to -20 suction  Ambulate TID with PT mario.  Dc planning beginning    On this date 7/13/2021 I have spent 30 minutes reviewing previous notes, test results and face to face with the patient discussing the diagnosis and importance of compliance with the treatment plan as well as documenting on the day of the visit.   Norita Mortimer, APRN - NP

## 2021-07-21 ENCOUNTER — APPOINTMENT (OUTPATIENT)
Dept: GENERAL RADIOLOGY | Age: 66
DRG: 120 | End: 2021-07-21
Attending: THORACIC SURGERY (CARDIOTHORACIC VASCULAR SURGERY)
Payer: COMMERCIAL

## 2021-07-21 LAB
ABO/RH: NORMAL
ABSOLUTE EOS #: 0.13 K/UL (ref 0–0.44)
ABSOLUTE IMMATURE GRANULOCYTE: 0.26 K/UL (ref 0–0.3)
ABSOLUTE LYMPH #: 1.43 K/UL (ref 1.1–3.7)
ABSOLUTE MONO #: 1.04 K/UL (ref 0.1–1.2)
ANTIBODY SCREEN: NEGATIVE
ARM BAND NUMBER: NORMAL
BASOPHILS # BLD: 1 % (ref 0–2)
BASOPHILS ABSOLUTE: 0.13 K/UL (ref 0–0.2)
BLD PROD TYP BPU: NORMAL
CROSSMATCH RESULT: NORMAL
DIFFERENTIAL TYPE: ABNORMAL
DISPENSE STATUS BLOOD BANK: NORMAL
EOSINOPHILS RELATIVE PERCENT: 1 % (ref 1–4)
EXPIRATION DATE: NORMAL
GLUCOSE BLD-MCNC: 126 MG/DL (ref 65–105)
GLUCOSE BLD-MCNC: 133 MG/DL (ref 65–105)
GLUCOSE BLD-MCNC: 139 MG/DL (ref 65–105)
GLUCOSE BLD-MCNC: 143 MG/DL (ref 65–105)
HCT VFR BLD CALC: 33.4 % (ref 36.3–47.1)
HEMOGLOBIN: 10.1 G/DL (ref 11.9–15.1)
IMMATURE GRANULOCYTES: 2 %
LYMPHOCYTES # BLD: 11 % (ref 24–43)
MCH RBC QN AUTO: 31.3 PG (ref 25.2–33.5)
MCHC RBC AUTO-ENTMCNC: 30.2 G/DL (ref 28.4–34.8)
MCV RBC AUTO: 103.4 FL (ref 82.6–102.9)
MONOCYTES # BLD: 8 % (ref 3–12)
MORPHOLOGY: ABNORMAL
MORPHOLOGY: ABNORMAL
NRBC AUTOMATED: 0.2 PER 100 WBC
PDW BLD-RTO: 15.7 % (ref 11.8–14.4)
PLATELET # BLD: 268 K/UL (ref 138–453)
PLATELET ESTIMATE: ABNORMAL
PMV BLD AUTO: 9.5 FL (ref 8.1–13.5)
RBC # BLD: 3.23 M/UL (ref 3.95–5.11)
RBC # BLD: ABNORMAL 10*6/UL
SEG NEUTROPHILS: 77 % (ref 36–65)
SEGMENTED NEUTROPHILS ABSOLUTE COUNT: 10.01 K/UL (ref 1.5–8.1)
SURGICAL PATHOLOGY REPORT: NORMAL
TRANSFUSION STATUS: NORMAL
UNIT DIVISION: 0
UNIT NUMBER: NORMAL
WBC # BLD: 13 K/UL (ref 3.5–11.3)
WBC # BLD: ABNORMAL 10*3/UL

## 2021-07-21 PROCEDURE — 6370000000 HC RX 637 (ALT 250 FOR IP): Performed by: NURSE PRACTITIONER

## 2021-07-21 PROCEDURE — 97116 GAIT TRAINING THERAPY: CPT

## 2021-07-21 PROCEDURE — 82947 ASSAY GLUCOSE BLOOD QUANT: CPT

## 2021-07-21 PROCEDURE — 85025 COMPLETE CBC W/AUTO DIFF WBC: CPT

## 2021-07-21 PROCEDURE — 6360000002 HC RX W HCPCS: Performed by: NURSE PRACTITIONER

## 2021-07-21 PROCEDURE — 71045 X-RAY EXAM CHEST 1 VIEW: CPT

## 2021-07-21 PROCEDURE — 2580000003 HC RX 258: Performed by: NURSE PRACTITIONER

## 2021-07-21 PROCEDURE — 99024 POSTOP FOLLOW-UP VISIT: CPT | Performed by: NURSE PRACTITIONER

## 2021-07-21 PROCEDURE — 94761 N-INVAS EAR/PLS OXIMETRY MLT: CPT

## 2021-07-21 PROCEDURE — 2140000001 HC CVICU INTERMEDIATE R&B

## 2021-07-21 PROCEDURE — 2700000000 HC OXYGEN THERAPY PER DAY

## 2021-07-21 PROCEDURE — 97535 SELF CARE MNGMENT TRAINING: CPT

## 2021-07-21 PROCEDURE — 97110 THERAPEUTIC EXERCISES: CPT

## 2021-07-21 RX ORDER — POTASSIUM CHLORIDE 20 MEQ/1
20 TABLET, EXTENDED RELEASE ORAL 2 TIMES DAILY WITH MEALS
Status: DISCONTINUED | OUTPATIENT
Start: 2021-07-21 | End: 2021-07-23 | Stop reason: HOSPADM

## 2021-07-21 RX ADMIN — KETOROLAC TROMETHAMINE 15 MG: 15 INJECTION, SOLUTION INTRAMUSCULAR; INTRAVENOUS at 15:51

## 2021-07-21 RX ADMIN — MORPHINE SULFATE 2 MG: 2 INJECTION, SOLUTION INTRAMUSCULAR; INTRAVENOUS at 20:21

## 2021-07-21 RX ADMIN — OXYCODONE HYDROCHLORIDE 10 MG: 5 TABLET ORAL at 03:20

## 2021-07-21 RX ADMIN — POTASSIUM CHLORIDE 20 MEQ: 1500 TABLET, EXTENDED RELEASE ORAL at 20:23

## 2021-07-21 RX ADMIN — INSULIN LISPRO 1 UNITS: 100 INJECTION, SOLUTION INTRAVENOUS; SUBCUTANEOUS at 22:51

## 2021-07-21 RX ADMIN — MORPHINE SULFATE 2 MG: 2 INJECTION, SOLUTION INTRAMUSCULAR; INTRAVENOUS at 04:57

## 2021-07-21 RX ADMIN — SODIUM CHLORIDE, PRESERVATIVE FREE 10 ML: 5 INJECTION INTRAVENOUS at 20:22

## 2021-07-21 RX ADMIN — KETOROLAC TROMETHAMINE 15 MG: 15 INJECTION, SOLUTION INTRAMUSCULAR; INTRAVENOUS at 07:59

## 2021-07-21 RX ADMIN — FUROSEMIDE 20 MG: 10 INJECTION, SOLUTION INTRAMUSCULAR; INTRAVENOUS at 20:23

## 2021-07-21 RX ADMIN — FAMOTIDINE 20 MG: 20 TABLET, FILM COATED ORAL at 20:22

## 2021-07-21 RX ADMIN — SODIUM CHLORIDE, PRESERVATIVE FREE 10 ML: 5 INJECTION INTRAVENOUS at 08:02

## 2021-07-21 RX ADMIN — POTASSIUM CHLORIDE 20 MEQ: 1500 TABLET, EXTENDED RELEASE ORAL at 10:27

## 2021-07-21 RX ADMIN — KETOROLAC TROMETHAMINE 15 MG: 15 INJECTION, SOLUTION INTRAMUSCULAR; INTRAVENOUS at 22:30

## 2021-07-21 RX ADMIN — FUROSEMIDE 20 MG: 10 INJECTION, SOLUTION INTRAMUSCULAR; INTRAVENOUS at 08:31

## 2021-07-21 RX ADMIN — MORPHINE SULFATE 2 MG: 2 INJECTION, SOLUTION INTRAMUSCULAR; INTRAVENOUS at 15:51

## 2021-07-21 RX ADMIN — POLYETHYLENE GLYCOL 3350 17 G: 17 POWDER, FOR SOLUTION ORAL at 08:33

## 2021-07-21 RX ADMIN — OXYCODONE HYDROCHLORIDE 10 MG: 5 TABLET ORAL at 17:24

## 2021-07-21 RX ADMIN — FAMOTIDINE 20 MG: 20 TABLET, FILM COATED ORAL at 08:31

## 2021-07-21 RX ADMIN — OXYCODONE HYDROCHLORIDE 10 MG: 5 TABLET ORAL at 07:59

## 2021-07-21 RX ADMIN — OXYCODONE HYDROCHLORIDE 10 MG: 5 TABLET ORAL at 12:27

## 2021-07-21 ASSESSMENT — PAIN SCALES - GENERAL
PAINLEVEL_OUTOF10: 4
PAINLEVEL_OUTOF10: 6
PAINLEVEL_OUTOF10: 6
PAINLEVEL_OUTOF10: 5
PAINLEVEL_OUTOF10: 7
PAINLEVEL_OUTOF10: 7
PAINLEVEL_OUTOF10: 8
PAINLEVEL_OUTOF10: 8
PAINLEVEL_OUTOF10: 4
PAINLEVEL_OUTOF10: 8
PAINLEVEL_OUTOF10: 5
PAINLEVEL_OUTOF10: 10
PAINLEVEL_OUTOF10: 7

## 2021-07-21 ASSESSMENT — PAIN DESCRIPTION - DESCRIPTORS
DESCRIPTORS: ACHING;DISCOMFORT
DESCRIPTORS: ACHING;DISCOMFORT

## 2021-07-21 ASSESSMENT — PAIN DESCRIPTION - FREQUENCY
FREQUENCY: CONTINUOUS
FREQUENCY: CONTINUOUS

## 2021-07-21 ASSESSMENT — PAIN DESCRIPTION - ONSET: ONSET: ON-GOING

## 2021-07-21 ASSESSMENT — PAIN DESCRIPTION - PAIN TYPE
TYPE: SURGICAL PAIN

## 2021-07-21 ASSESSMENT — PAIN DESCRIPTION - ORIENTATION
ORIENTATION: LEFT

## 2021-07-21 ASSESSMENT — PAIN DESCRIPTION - LOCATION
LOCATION: RIB CAGE

## 2021-07-21 ASSESSMENT — PAIN DESCRIPTION - PROGRESSION
CLINICAL_PROGRESSION: GRADUALLY IMPROVING
CLINICAL_PROGRESSION: NOT CHANGED

## 2021-07-21 NOTE — PLAN OF CARE
Problem: Pain:  Goal: Pain level will decrease  Description: Pain level will decrease  Outcome: Ongoing  Goal: Control of acute pain  Description: Control of acute pain  Outcome: Ongoing  Goal: Control of chronic pain  Description: Control of chronic pain  Outcome: Ongoing     Problem: SAFETY  Goal: Free from accidental physical injury  Outcome: Ongoing  Goal: Free from intentional harm  Outcome: Ongoing     Problem: DAILY CARE  Goal: Daily care needs are met  Outcome: Ongoing     Problem: PAIN  Goal: Patient's pain/discomfort is manageable  Outcome: Ongoing     Problem: SKIN INTEGRITY  Goal: Skin integrity is maintained or improved  Outcome: Ongoing     Problem: KNOWLEDGE DEFICIT  Goal: Patient/S.O. demonstrates understanding of disease process, treatment plan, medications, and discharge instructions. Outcome: Ongoing     Problem: DISCHARGE BARRIERS  Goal: Patient's continuum of care needs are met  Outcome: Ongoing     Problem: Falls - Risk of:  Goal: Will remain free from falls  Description: Will remain free from falls  Outcome: Ongoing  Goal: Absence of physical injury  Description: Absence of physical injury  Outcome: Ongoing     Problem: IP BALANCE  Goal: BALANCE EDUCATION  Description: Educate patients on maintaining dynamic/static standing/sitting balance, with/without upper extremity support.   Outcome: Ongoing     Problem: IP MOBILITY  Goal: LTG - patient will ambulate community distance  Outcome: Ongoing

## 2021-07-21 NOTE — PLAN OF CARE
Problem: Pain:  Goal: Pain level will decrease  Description: Pain level will decrease  Outcome: Ongoing  Goal: Control of acute pain  Description: Control of acute pain  Outcome: Ongoing  Goal: Control of chronic pain  Description: Control of chronic pain  Outcome: Ongoing     Problem: Pain:  Goal: Pain level will decrease  Description: Pain level will decrease  7/21/2021 1838 by Allyssa Gee RN  Outcome: Ongoing  7/21/2021 1838 by Allyssa Gee RN  Outcome: Ongoing  Goal: Control of acute pain  Description: Control of acute pain  7/21/2021 1838 by Allyssa Gee RN  Outcome: Ongoing  7/21/2021 1838 by Allyssa Gee RN  Outcome: Ongoing  Goal: Control of chronic pain  Description: Control of chronic pain  7/21/2021 1838 by Allyssa Gee RN  Outcome: Ongoing  7/21/2021 1838 by Allyssa Gee RN  Outcome: Ongoing     Problem: SAFETY  Goal: Free from accidental physical injury  7/21/2021 1838 by Allyssa Gee RN  Outcome: Ongoing  7/21/2021 1838 by Allyssa Gee RN  Outcome: Ongoing  Goal: Free from intentional harm  7/21/2021 1838 by Allyssa Gee RN  Outcome: Ongoing  7/21/2021 1838 by Allyssa Gee RN  Outcome: Ongoing     Problem: DAILY CARE  Goal: Daily care needs are met  7/21/2021 1838 by Allyssa Gee RN  Outcome: Ongoing  7/21/2021 1838 by Allyssa Gee RN  Outcome: Ongoing     Problem: PAIN  Goal: Patient's pain/discomfort is manageable  7/21/2021 1838 by Allyssa Gee RN  Outcome: Ongoing  7/21/2021 1838 by Allyssa Gee RN  Outcome: Ongoing     Problem: SKIN INTEGRITY  Goal: Skin integrity is maintained or improved  7/21/2021 1838 by Allyssa Gee RN  Outcome: Ongoing  7/21/2021 1838 by Allyssa Gee RN  Outcome: Ongoing     Problem: KNOWLEDGE DEFICIT  Goal: Patient/S.O. demonstrates understanding of disease process, treatment plan, medications, and discharge instructions.   7/21/2021 1838 by Allyssa Gee RN  Outcome: Ongoing  7/21/2021 1838 by Allyssa Gee RN  Outcome: Ongoing Problem: DISCHARGE BARRIERS  Goal: Patient's continuum of care needs are met  7/21/2021 1838 by Drew Aguayo RN  Outcome: Ongoing  7/21/2021 1838 by Drew Aguayo RN  Outcome: Ongoing     Problem: Falls - Risk of:  Goal: Will remain free from falls  Description: Will remain free from falls  7/21/2021 1838 by Drew Aguayo RN  Outcome: Ongoing  7/21/2021 1838 by Drew Aguayo RN  Outcome: Ongoing  Goal: Absence of physical injury  Description: Absence of physical injury  7/21/2021 1838 by Drew Aguayo RN  Outcome: Ongoing  7/21/2021 1838 by Drew Aguayo RN  Outcome: Ongoing     Problem: IP BALANCE  Goal: BALANCE EDUCATION  Description: Educate patients on maintaining dynamic/static standing/sitting balance, with/without upper extremity support.   7/21/2021 1838 by Drew Aguayo RN  Outcome: Ongoing  7/21/2021 1838 by Drew Aguayo RN  Outcome: Ongoing     Problem: IP MOBILITY  Goal: LTG - patient will ambulate community distance  7/21/2021 1838 by Drew Aguayo RN  Outcome: Ongoing  7/21/2021 1838 by Drew Aguayo RN  Outcome: Ongoing

## 2021-07-21 NOTE — PROGRESS NOTES
Physical Therapy  Facility/Department: [facility] CAR 1  Daily Treatment Note  NAME: Aniya Escamilla  : 1955  MRN: [id number]    Date of Service: 2021    Discharge Recommendations:  Patient would benefit from continued therapy after discharge   PT Equipment Recommendations  Equipment Needed: Yes  Mobility Devices: Rasheeda Retort: Rolling    Assessment   Body structures, Functions, Activity limitations: Decreased functional mobility ; Decreased endurance;Decreased coordination;Decreased strength; Increased pain  Assessment: Pt required CGA for all mobility including ambulating 120 ft with RW. Pt is limited by dizziness, decrerased endurance and fatigue. Pt would benefit from continued therapy after d/c  Prognosis: Good  REQUIRES PT FOLLOW UP: Yes  Activity Tolerance  Activity Tolerance: Patient limited by fatigue;Patient limited by pain     Patient Diagnosis(es): There were no encounter diagnoses. has a past medical history of Abnormal CT of the chest, Adenocarcinoma, lung, left ([address] Arthritis, Community acquired pneumonia, COPD (chronic obstructive pulmonary disease) ([address] Deep venous thrombosis of left profunda femoris vein ([address] Depression, DVT (deep venous thrombosis) ([address] GERD (gastroesophageal reflux disease), History of pulmonary embolism, History of thyroid nodule, Hypertension, Major depressive disorder, recurrent, moderate ([address] Nodule of lower lobe of left lung, Obesity, Class II, BMI 35-39.9, On anticoagulant therapy, On methotrexate therapy, Prediabetes, Rheumatoid arthritis ([address] Snores, Thyroid nodule, Tobacco abuse, Under care of team, Under care of team, Under care of team, and Wellness examination. has a past surgical history that includes Appendectomy (); Endoscopy, colon, diagnostic (534639); bronchoscopy (N/A, 2019);  Colonoscopy (N/A, 2020); CT NEEDLE BIOPSY LUNG PERCUTANEOUS (2021); lobectomy (Left, 2021); and Lung removal, total (Left, SBA.  Short term goal 3: Ambulate 200' with wheeled walker SBA. Short term goal 4: Negotiate up and down 10 steps with one railing with CGA. Plan    Plan  Times per week: 6-7x/wk  Current Treatment Recommendations: Strengthening, Home Exercise Program, Safety Education & Training, Functional Mobility Training, Transfer Training, Gait Training, Stair training, Endurance Training, Patient/Caregiver Education & Training, Pain Management, Equipment Evaluation, Education, & procurement  Safety Devices  Type of devices:  All fall risk precautions in place, Call light within reach, Left in chair, Gait belt, Nurse notified     Therapy Time   Individual Concurrent Group Co-treatment   Time In 1028         Time Out 1055         Minutes 27         Timed Code Treatment Minutes: [address] [facility]

## 2021-07-21 NOTE — PROGRESS NOTES
Occupational [address] [employer]  Occupational Therapy Not Seen Note    DATE: 2021  Name: Alex Mcqueen  : 1955  MRN: [id number]    Patient not available for Occupational Therapy due to:    [] Testing:    [] Hemodialysis    [] Blood Transfusion in Progress    []Refusal by Patient:    [] Surgery/Procedure:    [] Strict Bedrest    [] Sedation    [] Spine Precautions     [] Pt with medical decline and not appropriate for continued therapy services. Spoke with pt/family and OT services to be defered. [] Pt independent with functional mobility and functional tasks. Pt with no OT acute care needs at this time, will defer OT eval.    [x] Other, RN wanting pt. To rest this am d/t restless night.      Next Scheduled Treatment:    SHILO Garcia/FELTON

## 2021-07-21 NOTE — CARE COORDINATION
Transitional planning-talked with patient. Plan is to go home with her son and granddaughter. Not wanting any home care at this time. Has ride. May need O2. Still has Chest Tube.

## 2021-07-21 NOTE — PROGRESS NOTES
[facility] Cardiothoracic Surgery  Progress Note    7/21/2021 7:56 AM  Surgeon: Miguel Corral MD  POD # 2  S/P: Left Lower lobectomy     Subjective:  Ms. Horace Phillips   Pt shows no acute distress. Moderate pain while resting in chair. Objective:  BP (!) 104/57   Pulse 81   Temp 98.3 °F (36.8 °C) (Oral)   Resp 22   Ht 5' 5\" (1.651 m)   Wt 237 lb 7 oz (107.7 kg)   SpO2 92%   BMI 39.51 kg/m²   Chest: pacing wires: no, chest tubes:yes, air leak yes, 1 +  CV: no murmur noted, Normal S1, S2,  Lungs: rhonchi/transmitted tones heard while inhaling and exhaling. Abd: normal bowel sounds   Lower Extremities: Trace edema    CXR-pending AM CXR at this time    Labs:   CBC:   Recent Labs     07/19/21  1506 07/20/21  0324 07/21/21  0606   WBC  --  15.0* 13.0*   HGB 10.5* 10.7* 10.1*   HCT 33.8* 34.7* 33.4*   MCV  --  101.5 103.4*   PLT  --  262 268     BMP:   Recent Labs     07/19/21  1939 07/20/21  0324    136   K 3.7 3.9    105   CO2 21 21   BUN 14 11   CREATININE 0.65 0.62       I/O: I/O last 3 completed shifts: In: 600 [P.O.:600]  Out: 2600 [Urine:2090;  Chest Tube:510]  Scheduled Meds:   potassium chloride  20 mEq Oral BID WC    furosemide  20 mg Intravenous BID    lidocaine  1 patch Transdermal Daily    sodium chloride flush  10 mL Intravenous 2 times per day    famotidine  20 mg Oral BID    polyethylene glycol  17 g Oral Daily    insulin lispro  0-12 Units Subcutaneous TID WC    insulin lispro  0-6 Units Subcutaneous Nightly     Continuous Infusions:   sodium chloride      bupivacaine 0.5% 400 mL (07/19/21 1626)     PRN Meds:ketorolac, sodium chloride flush, sodium chloride, acetaminophen, oxyCODONE **OR** oxyCODONE, morphine, magnesium hydroxide, bisacodyl        Assessment/ Plan:  Pending AM CXR  Please have patient use incentive spirometry and acapella around the clock today to prevent plugging  Lasix 20mg BID-added daily potassium to prevent hypokalemia  Please limit IV fluids  Keep ahead of pain without oversedating  Keep chest tubes in place with strict output monitoring-place to waterseal  Remove centraline today  Ambulate TID with PT eval.  Dc planning beginning today    On this date 7/13/2021 I have spent 30 minutes reviewing previous notes, test results and face to face with the patient discussing the diagnosis and importance of compliance with the treatment plan as well as documenting on the day of the visit.   ILIR Moser - NP

## 2021-07-21 NOTE — PROGRESS NOTES
Occupational Therapy  Facility/Department: [facility] CAR 1  Daily Treatment Note  NAME: Ashok Gabriel  : 1955  MRN: [id number]    Date of Service: 2021    Discharge Recommendations: Pt. Would benefit from further skilled OT services to enhance functional outcomes. Patient would benefit from continued therapy after discharge  OT Equipment Recommendations  Equipment Needed: Yes  Mobility Devices: Jannet Brooking: Rolling  ADL Assistive Devices: Shower Chair with back; Reacher;Sock-Aid Hard;Grab Bars - shower;Grab Bars - toilet    Assessment   Performance deficits / Impairments: Decreased functional mobility ; Decreased endurance;Decreased ADL status; Decreased high-level IADLs  Prognosis: Good  Decision Making: Medium Complexity  OT Education: OT Role;Plan of Care;Equipment;ADL Adaptive Strategies;Transfer Training  Patient Education: EC/WS tech during slef care,  figure tech, pt educated on adaptive dressing technique, pt educated on use of pillow during coughing- good return  Barriers to Learning: none  REQUIRES OT FOLLOW UP: Yes  Activity Tolerance  Activity Tolerance: Patient Tolerated treatment well;Patient limited by fatigue;Patient limited by pain  Safety Devices  Safety Devices in place: Yes         Patient Diagnosis(es): There were no encounter diagnoses.       has a past medical history of Abnormal CT of the chest, Adenocarcinoma, lung, left ([address] Arthritis, Community acquired pneumonia, COPD (chronic obstructive pulmonary disease) ([address] Deep venous thrombosis of left profunda femoris vein ([address] Depression, DVT (deep venous thrombosis) ([address] GERD (gastroesophageal reflux disease), History of pulmonary embolism, History of thyroid nodule, Hypertension, Major depressive disorder, recurrent, moderate ([address] Nodule of lower lobe of left lung, Obesity, Class II, BMI 35-39.9, On anticoagulant therapy, On methotrexate therapy, Prediabetes, Rheumatoid arthritis ([address] Snores, Thyroid nodule, Tobacco abuse, Under care of team, Under care of team, Under care of team, and Wellness examination. has a past surgical history that includes Appendectomy (1982); Endoscopy, colon, diagnostic (694437); bronchoscopy (N/A, 12/27/2019); Colonoscopy (N/A, 2/12/2020); CT NEEDLE BIOPSY LUNG PERCUTANEOUS (5/13/2021); lobectomy (Left, 07/19/2021); and Lung removal, total (Left, 7/19/2021). Restrictions  Restrictions/Precautions  Restrictions/Precautions: Up as Tolerated, General Precautions  Required Braces or Orthoses?: No  Position Activity Restriction  Other position/activity restrictions: Ambulate pt, left lobectomy, chest tube  Subjective   General  Patient assessed for rehabilitation services?: Yes  Family / Caregiver Present: No  General Comment  Comments: RN ok'd for therapy, pt agreeable to session, pleasant/cooperative throughout  Pain Assessment  Pain Level: 4  Pain Type: Surgical pain  Pain Location: Rib cage  Pain Orientation: Left  Non-Pharmaceutical Pain Intervention(s): Distraction; Emotional support; Ambulation/Increased Activity  Vital Signs  Patient Currently in Pain: Yes   Orientation  Orientation  Overall Orientation Status: Within Functional Limits  Objective    ADL  Grooming: Stand by assistance;Setup; Increased time to complete  Toileting: Stand by assistance  Additional Comments: Pt. in supine position upon entering room. Pt. agreeable to OT services. Toilet transfer- SBA, frontal hygiene- SBA, Grooming standing at sink (handwashing/face washing/brushing teeth) at SBA + increased time d/t SOB.  Pt. declined further ADLs d/t completing this am.        Balance  Sitting Balance: Independent  Standing Balance: Stand by assistance  Standing Balance  Time: ~15 minutes  Activity: Func mob and standing sinkside for func tasks  Comment: SBA d/t to pain and fatigue; no LOB  Functional Mobility  Functional - Mobility Device: Rolling Walker  Activity: To/from bathroom  Assist Level: Stand by assistance  Functional Mobility Comments: Pt required SBA and increased time for func mob d/t pain and fatigue; no LOB, RW  Toilet Transfers  Toilet - Technique: Ambulating  Equipment Used: Standard toilet  Toilet Transfer: Stand by assistance  Toilet Transfers Comments: Pt ambulated with RW with CGA-SBA for safety only; pt IND during RW management  Bed mobility  Supine to Sit: Stand by assistance  Scooting: Supervision  Comment: bed rail.   Transfers  Sit to stand: Contact guard assistance;Stand by assistance  Stand to sit: Contact guard assistance;Stand by assistance  Transfer Comments: CGA-SBA d/t pain and safety                       Cognition  Overall Cognitive Status: [facility]                                         Plan   Plan  Times per week: 3-5x/wk  Current Treatment Recommendations: Gait Training, Patient/Caregiver Education & Training, Functional Mobility Training, Endurance Training, Pain Management, Safety Education & Training, Self-Care / ADL          AM-[facility] Inpatient Daily Activity Raw Score: 19 (07/21/21 1548)  AM-[facility] Inpatient ADL T-Scale Score : 40.22 (07/21/21 1548)  ADL Inpatient CMS 0-100% Score: 42.8 (07/21/21 1548)  ADL Inpatient CMS G-Code Modifier : CK (07/21/21 1548)    Goals  Short term goals  Time Frame for Short term goals: By discharge, pt will be able to:  Short term goal 1: Demo func transfers, including EOB, recliner, and toilet, with Mod I and use of LRD PRN  Short term goal 2: Demo func mobility with Mod I, use of LRD PRN, and 0 rest breaks  Short term goal 3: Demo dynamic standing balance during ADL tasks for 15+ minutes with SUP and 0 rest breaks  Short term goal 4: Demo UB ADLs with SBA  Short term goal 5: Demo LB ADLs with CGA, adpative techniques/ adaptive equipment PRN, and 0 rest breaks  Short term goal 6: Demo increase activity tolerance during ADL tasks for 45+ minutes with SUP and 0 rest breaks       Therapy Time   Individual Concurrent Group Co-treatment   Time In 8843         Time Out 1419         Minutes 25         Timed Code Treatment Minutes: [address], LAO/L

## 2021-07-22 ENCOUNTER — APPOINTMENT (OUTPATIENT)
Dept: GENERAL RADIOLOGY | Age: 66
DRG: 120 | End: 2021-07-22
Attending: THORACIC SURGERY (CARDIOTHORACIC VASCULAR SURGERY)
Payer: COMMERCIAL

## 2021-07-22 DIAGNOSIS — Z09 S/P LUNG SURGERY, FOLLOW-UP EXAM: Primary | ICD-10-CM

## 2021-07-22 LAB
ABSOLUTE EOS #: 0.22 K/UL (ref 0–0.4)
ABSOLUTE IMMATURE GRANULOCYTE: 0.67 K/UL (ref 0–0.3)
ABSOLUTE LYMPH #: 1.46 K/UL (ref 1–4.8)
ABSOLUTE MONO #: 0.67 K/UL (ref 0.1–0.8)
ANION GAP SERPL CALCULATED.3IONS-SCNC: 6 MMOL/L (ref 9–17)
BASOPHILS # BLD: 0 % (ref 0–2)
BASOPHILS ABSOLUTE: 0 K/UL (ref 0–0.2)
BUN BLDV-MCNC: 18 MG/DL (ref 8–23)
BUN/CREAT BLD: ABNORMAL (ref 9–20)
CALCIUM SERPL-MCNC: 9 MG/DL (ref 8.6–10.4)
CHLORIDE BLD-SCNC: 100 MMOL/L (ref 98–107)
CO2: 27 MMOL/L (ref 20–31)
CREAT SERPL-MCNC: 0.85 MG/DL (ref 0.5–0.9)
DIFFERENTIAL TYPE: ABNORMAL
EOSINOPHILS RELATIVE PERCENT: 2 % (ref 1–4)
GFR AFRICAN AMERICAN: >60 ML/MIN
GFR NON-AFRICAN AMERICAN: >60 ML/MIN
GFR SERPL CREATININE-BSD FRML MDRD: ABNORMAL ML/MIN/{1.73_M2}
GFR SERPL CREATININE-BSD FRML MDRD: ABNORMAL ML/MIN/{1.73_M2}
GLUCOSE BLD-MCNC: 128 MG/DL (ref 65–105)
GLUCOSE BLD-MCNC: 129 MG/DL (ref 65–105)
GLUCOSE BLD-MCNC: 137 MG/DL (ref 70–99)
GLUCOSE BLD-MCNC: 147 MG/DL (ref 65–105)
HCT VFR BLD CALC: 33.2 % (ref 36.3–47.1)
HEMOGLOBIN: 9.7 G/DL (ref 11.9–15.1)
IMMATURE GRANULOCYTES: 6 %
LYMPHOCYTES # BLD: 13 % (ref 24–44)
MAGNESIUM: 2.2 MG/DL (ref 1.6–2.6)
MCH RBC QN AUTO: 31.2 PG (ref 25.2–33.5)
MCHC RBC AUTO-ENTMCNC: 29.2 G/DL (ref 28.4–34.8)
MCV RBC AUTO: 106.8 FL (ref 82.6–102.9)
MONOCYTES # BLD: 6 % (ref 1–7)
MORPHOLOGY: ABNORMAL
MORPHOLOGY: ABNORMAL
NRBC AUTOMATED: 0.4 PER 100 WBC
PDW BLD-RTO: 15.7 % (ref 11.8–14.4)
PLATELET # BLD: 231 K/UL (ref 138–453)
PLATELET ESTIMATE: ABNORMAL
PMV BLD AUTO: 10 FL (ref 8.1–13.5)
POTASSIUM SERPL-SCNC: 4.3 MMOL/L (ref 3.7–5.3)
RBC # BLD: 3.11 M/UL (ref 3.95–5.11)
RBC # BLD: ABNORMAL 10*6/UL
SEG NEUTROPHILS: 73 % (ref 36–66)
SEGMENTED NEUTROPHILS ABSOLUTE COUNT: 8.18 K/UL (ref 1.8–7.7)
SODIUM BLD-SCNC: 133 MMOL/L (ref 135–144)
WBC # BLD: 11.2 K/UL (ref 3.5–11.3)
WBC # BLD: ABNORMAL 10*3/UL

## 2021-07-22 PROCEDURE — 6370000000 HC RX 637 (ALT 250 FOR IP): Performed by: NURSE PRACTITIONER

## 2021-07-22 PROCEDURE — 99024 POSTOP FOLLOW-UP VISIT: CPT | Performed by: NURSE PRACTITIONER

## 2021-07-22 PROCEDURE — 71045 X-RAY EXAM CHEST 1 VIEW: CPT

## 2021-07-22 PROCEDURE — 36415 COLL VENOUS BLD VENIPUNCTURE: CPT

## 2021-07-22 PROCEDURE — 6360000002 HC RX W HCPCS: Performed by: NURSE PRACTITIONER

## 2021-07-22 PROCEDURE — 97110 THERAPEUTIC EXERCISES: CPT

## 2021-07-22 PROCEDURE — 97116 GAIT TRAINING THERAPY: CPT

## 2021-07-22 PROCEDURE — 82947 ASSAY GLUCOSE BLOOD QUANT: CPT

## 2021-07-22 PROCEDURE — 85025 COMPLETE CBC W/AUTO DIFF WBC: CPT

## 2021-07-22 PROCEDURE — 2580000003 HC RX 258: Performed by: NURSE PRACTITIONER

## 2021-07-22 PROCEDURE — 2140000001 HC CVICU INTERMEDIATE R&B

## 2021-07-22 PROCEDURE — 80048 BASIC METABOLIC PNL TOTAL CA: CPT

## 2021-07-22 PROCEDURE — 83735 ASSAY OF MAGNESIUM: CPT

## 2021-07-22 PROCEDURE — 94640 AIRWAY INHALATION TREATMENT: CPT

## 2021-07-22 RX ORDER — ALBUTEROL SULFATE 2.5 MG/3ML
2.5 SOLUTION RESPIRATORY (INHALATION) EVERY 6 HOURS PRN
Status: DISCONTINUED | OUTPATIENT
Start: 2021-07-22 | End: 2021-07-23 | Stop reason: HOSPADM

## 2021-07-22 RX ORDER — FOLIC ACID 1 MG/1
1 TABLET ORAL DAILY
Status: DISCONTINUED | OUTPATIENT
Start: 2021-07-22 | End: 2021-07-23 | Stop reason: HOSPADM

## 2021-07-22 RX ORDER — PANTOPRAZOLE SODIUM 20 MG/1
20 TABLET, DELAYED RELEASE ORAL
Status: DISCONTINUED | OUTPATIENT
Start: 2021-07-23 | End: 2021-07-23 | Stop reason: HOSPADM

## 2021-07-22 RX ADMIN — FAMOTIDINE 20 MG: 20 TABLET, FILM COATED ORAL at 09:07

## 2021-07-22 RX ADMIN — POTASSIUM CHLORIDE 20 MEQ: 1500 TABLET, EXTENDED RELEASE ORAL at 16:51

## 2021-07-22 RX ADMIN — OXYCODONE HYDROCHLORIDE 10 MG: 5 TABLET ORAL at 00:09

## 2021-07-22 RX ADMIN — FUROSEMIDE 20 MG: 10 INJECTION, SOLUTION INTRAMUSCULAR; INTRAVENOUS at 09:07

## 2021-07-22 RX ADMIN — ACETAMINOPHEN 1000 MG: 500 TABLET ORAL at 03:33

## 2021-07-22 RX ADMIN — APIXABAN 5 MG: 5 TABLET, FILM COATED ORAL at 21:59

## 2021-07-22 RX ADMIN — OXYCODONE HYDROCHLORIDE 10 MG: 5 TABLET ORAL at 10:54

## 2021-07-22 RX ADMIN — ALBUTEROL SULFATE 2.5 MG: 2.5 SOLUTION RESPIRATORY (INHALATION) at 22:14

## 2021-07-22 RX ADMIN — POTASSIUM CHLORIDE 20 MEQ: 1500 TABLET, EXTENDED RELEASE ORAL at 09:07

## 2021-07-22 RX ADMIN — ACETAMINOPHEN 1000 MG: 500 TABLET ORAL at 16:51

## 2021-07-22 RX ADMIN — FAMOTIDINE 20 MG: 20 TABLET, FILM COATED ORAL at 20:17

## 2021-07-22 RX ADMIN — SODIUM CHLORIDE, PRESERVATIVE FREE 10 ML: 5 INJECTION INTRAVENOUS at 20:17

## 2021-07-22 RX ADMIN — KETOROLAC TROMETHAMINE 15 MG: 15 INJECTION, SOLUTION INTRAMUSCULAR; INTRAVENOUS at 06:37

## 2021-07-22 RX ADMIN — FUROSEMIDE 20 MG: 10 INJECTION, SOLUTION INTRAMUSCULAR; INTRAVENOUS at 18:28

## 2021-07-22 RX ADMIN — SODIUM CHLORIDE, PRESERVATIVE FREE 10 ML: 5 INJECTION INTRAVENOUS at 09:08

## 2021-07-22 RX ADMIN — MORPHINE SULFATE 2 MG: 2 INJECTION, SOLUTION INTRAMUSCULAR; INTRAVENOUS at 03:33

## 2021-07-22 RX ADMIN — SERTRALINE 100 MG: 50 TABLET, FILM COATED ORAL at 21:59

## 2021-07-22 RX ADMIN — POLYETHYLENE GLYCOL 3350 17 G: 17 POWDER, FOR SOLUTION ORAL at 11:11

## 2021-07-22 RX ADMIN — OXYCODONE HYDROCHLORIDE 10 MG: 5 TABLET ORAL at 19:38

## 2021-07-22 RX ADMIN — FOLIC ACID 1 MG: 1 TABLET ORAL at 21:59

## 2021-07-22 RX ADMIN — KETOROLAC TROMETHAMINE 15 MG: 15 INJECTION, SOLUTION INTRAMUSCULAR; INTRAVENOUS at 19:38

## 2021-07-22 ASSESSMENT — PAIN DESCRIPTION - FREQUENCY
FREQUENCY: CONTINUOUS

## 2021-07-22 ASSESSMENT — PAIN DESCRIPTION - PAIN TYPE
TYPE: SURGICAL PAIN

## 2021-07-22 ASSESSMENT — PAIN DESCRIPTION - PROGRESSION
CLINICAL_PROGRESSION: GRADUALLY IMPROVING
CLINICAL_PROGRESSION: NOT CHANGED

## 2021-07-22 ASSESSMENT — PAIN DESCRIPTION - ORIENTATION
ORIENTATION: LEFT

## 2021-07-22 ASSESSMENT — PAIN SCALES - GENERAL
PAINLEVEL_OUTOF10: 7
PAINLEVEL_OUTOF10: 7
PAINLEVEL_OUTOF10: 8
PAINLEVEL_OUTOF10: 8
PAINLEVEL_OUTOF10: 7
PAINLEVEL_OUTOF10: 5
PAINLEVEL_OUTOF10: 10
PAINLEVEL_OUTOF10: 8
PAINLEVEL_OUTOF10: 9
PAINLEVEL_OUTOF10: 0
PAINLEVEL_OUTOF10: 0

## 2021-07-22 ASSESSMENT — PAIN DESCRIPTION - LOCATION
LOCATION: RIB CAGE

## 2021-07-22 ASSESSMENT — PAIN DESCRIPTION - ONSET
ONSET: ON-GOING
ONSET: AWAKENED FROM SLEEP
ONSET: ON-GOING

## 2021-07-22 ASSESSMENT — PAIN DESCRIPTION - DESCRIPTORS
DESCRIPTORS: ACHING
DESCRIPTORS: ACHING;DISCOMFORT
DESCRIPTORS: ACHING

## 2021-07-22 NOTE — PROGRESS NOTES
Physical Therapy  Facility/Department: [facility] CAR 1  Daily Treatment Note  NAME: Aniya Escamilla  : 1955  MRN: [id number]    Date of Service: 2021    Discharge Recommendations:  Patient would benefit from continued therapy after discharge     PT Equipment Recommendations  Equipment Needed: No  Assessment     Body structures, Functions, Activity limitations: Decreased functional mobility ; Decreased endurance;Decreased coordination;Decreased strength; Increased pain  Assessment: Pt required CGA for all mobility including ambulating 300 ft with HHA. Pt is limited by SOB, decrerased endurance and fatigue. Pt would benefit from continued therapy after d/c  Prognosis: Good  REQUIRES PT FOLLOW UP: Yes  Activity Tolerance  Activity Tolerance: Patient limited by fatigue;Patient limited by pain         Patient Diagnosis(es): There were no encounter diagnoses. has a past medical history of Abnormal CT of the chest, Adenocarcinoma, lung, left ([address] Arthritis, Community acquired pneumonia, COPD (chronic obstructive pulmonary disease) ([address] Deep venous thrombosis of left profunda femoris vein ([address] Depression, DVT (deep venous thrombosis) ([address] GERD (gastroesophageal reflux disease), History of pulmonary embolism, History of thyroid nodule, Hypertension, Major depressive disorder, recurrent, moderate ([address] Nodule of lower lobe of left lung, Obesity, Class II, BMI 35-39.9, On anticoagulant therapy, On methotrexate therapy, Prediabetes, Rheumatoid arthritis ([address] Snores, Thyroid nodule, Tobacco abuse, Under care of team, Under care of team, Under care of team, and Wellness examination. has a past surgical history that includes Appendectomy (); Endoscopy, colon, diagnostic (001919); bronchoscopy (N/A, 2019);  Colonoscopy (N/A, 2020); CT NEEDLE BIOPSY LUNG PERCUTANEOUS (2021); lobectomy (Left, 2021); and Lung removal, total (Left, 7/19/2021). Restrictions  Restrictions/Precautions  Restrictions/Precautions: Up as Tolerated, General Precautions  Required Braces or Orthoses?: No  Position Activity Restriction  Other position/activity restrictions: Ambulate pt, left lobectomy, chest tube  Subjective   Pt sitting up in her chair. Pt eager to go for a walk. Pt has no c/o pain. Orientation    Overall Orientation Status: Within Functional Limits     Objective     Transfers  Sit to Stand: Contact guard assistance  Stand to sit: Contact guard assistance  Ambulation 1  Surface: level tile  Device: HHA  Other Apparatus: O2 2L  Assistance: Contact guard assistance  Gait Deviations: Slow Caprice  Distance: 300 ft  Comments: Pt took one standing and one seated rest break. Stairs/Curb  Stairs?: No  Balance  Posture: Fair  Sitting - Static: Good  Sitting - Dynamic: Good  Standing - Static: Fair  Standing - Dynamic: Fair;-  Comments: standing balance assessed with RW   Exercises  Seated LE exercise program: [employer], heel/toe raises, and marches. Reps: x20 with 2 lb wt on B LE's. Pt educated on energy conservation. Pt states she understands and has no further questions. Goals  Short term goals  Time Frame for Short term goals: 14 visits  Short term goal 1: Sit to/from stand with supervision. Short term goal 2: Sit to/from supine with SBA. Short term goal 3: Ambulate 200' with wheeled walker SBA. Short term goal 4: Negotiate up and down 10 steps with one railing with CGA. Plan    Plan  Times per week: 6-7x/wk  Current Treatment Recommendations: Strengthening, Home Exercise Program, Safety Education & Training, Functional Mobility Training, Transfer Training, Gait Training, Stair training, Endurance Training, Patient/Caregiver Education & Training, Pain Management, Equipment Evaluation, Education, & procurement  Safety Devices  Type of devices:  All fall risk precautions in place, Call light within reach, Left in chair, Gait belt, Nurse notified     Therapy Time   Individual Concurrent Group Co-treatment   Time In  800         Time Out  830         Minutes  [address]

## 2021-07-22 NOTE — CARE COORDINATION
Met with patient to discuss transitional planning. Patient states plan is home with son. She is not agreeable to rehab (additionally, she is walking 300 ft with PT, would likely not qualify for rehab). Patient relates she has a large family that will be assisting when she goes home.   Patient states she may be agreeable to home care, provided list for review and freedom of choice

## 2021-07-22 NOTE — PLAN OF CARE
Problem: Pain:  Goal: Pain level will decrease  Description: Pain level will decrease  7/21/2021 2351 by David Lake RN  Outcome: Ongoing  7/21/2021 1838 by Shanique Barrera RN  Outcome: Ongoing  7/21/2021 1838 by Shanique Barrera RN  Outcome: Ongoing  Goal: Control of acute pain  Description: Control of acute pain  7/21/2021 2351 by David Lake RN  Outcome: Ongoing  7/21/2021 1838 by Shanique Barrera RN  Outcome: Ongoing  7/21/2021 1838 by Shanique Barrera RN  Outcome: Ongoing  Goal: Control of chronic pain  Description: Control of chronic pain  7/21/2021 1838 by Shanique Barrera RN  Outcome: Ongoing  7/21/2021 1838 by Shanique Barrera RN  Outcome: Ongoing     Problem: SAFETY  Goal: Free from accidental physical injury  7/21/2021 1838 by Shanique Barrera RN  Outcome: Ongoing  7/21/2021 1838 by Shanique Barrera RN  Outcome: Ongoing  Goal: Free from intentional harm  7/21/2021 1838 by Shanique Barrera RN  Outcome: Ongoing  7/21/2021 1838 by Shanique Barrera RN  Outcome: Ongoing     Problem: DAILY CARE  Goal: Daily care needs are met  7/21/2021 1838 by Shanique Barrera RN  Outcome: Ongoing  7/21/2021 1838 by Shanique Barrera RN  Outcome: Ongoing     Problem: PAIN  Goal: Patient's pain/discomfort is manageable  7/21/2021 1838 by Shanique Barrera RN  Outcome: Ongoing  7/21/2021 1838 by Shanique Barrera RN  Outcome: Ongoing     Problem: SKIN INTEGRITY  Goal: Skin integrity is maintained or improved  7/21/2021 2351 by David Lake RN  Outcome: Ongoing  7/21/2021 1838 by Shanique Barrera RN  Outcome: Ongoing  7/21/2021 1838 by Shanique Barrera RN  Outcome: Ongoing     Problem: KNOWLEDGE DEFICIT  Goal: Patient/S.O. demonstrates understanding of disease process, treatment plan, medications, and discharge instructions.   7/21/2021 2351 by David Lake RN  Outcome: Ongoing  7/21/2021 1838 by Shanique Barrera RN  Outcome: Ongoing  7/21/202121/2021 1838 by Shanique Barrera, RN  Outcome: Ongoing     Problem: DISCHARGE BARRIERS  Goal: Patient's continuum of care needs are met  7/21/2021 1838 by Dat rBito RN  Outcome: Ongoing  7/21/2021 1838 by Dat Brito RN  Outcome: Ongoing     Problem: Falls - Risk of:  Goal: Will remain free from falls  Description: Will remain free from falls  7/21/2021 1838 by Dat Brito RN  Outcome: Ongoing  7/21/2021 1838 by Dat Brito RN  Outcome: Ongoing  Goal: Absence of physical injury  Description: Absence of physical injury  7/21/2021 1838 by Dat Brito RN  Outcome: Ongoing  7/21/2021 1838 by Dat Brito RN  Outcome: Ongoing     Problem: IP BALANCE  Goal: BALANCE EDUCATION  Description: Educate patients on maintaining dynamic/static standing/sitting balance, with/without upper extremity support.   7/21/2021 1838 by Dat Brito RN  Outcome: Ongoing  7/21/2021 1838 by Dat Brito RN  Outcome: Ongoing     Problem: IP MOBILITY  Goal: LTG - patient will ambulate community distance  7/21/2021 1838 by Dat Brito RN  Outcome: Ongoing  7/21/2021 1838 by Dat Brito RN  Outcome: Ongoing

## 2021-07-22 NOTE — PLAN OF CARE
Problem: Pain:  Goal: Pain level will decrease  Description: Pain level will decrease  7/22/2021 1008 by Ciro De La Rosa RN  Outcome: Ongoing  7/21/2021 2351 by Jake Brandon RN  Outcome: Ongoing  Goal: Control of acute pain  Description: Control of acute pain  7/22/2021 1008 by Ciro De La Rosa RN  Outcome: Ongoing  7/21/2021 2351 by Jake Brandon RN  Outcome: Ongoing  Goal: Control of chronic pain  Description: Control of chronic pain  Outcome: Ongoing     Problem: SAFETY  Goal: Free from accidental physical injury  Outcome: Ongoing  Goal: Free from intentional harm  Outcome: Ongoing     Problem: DAILY CARE  Goal: Daily care needs are met  Outcome: Ongoing     Problem: PAIN  Goal: Patient's pain/discomfort is manageable  Outcome: Ongoing     Problem: SKIN INTEGRITY  Goal: Skin integrity is maintained or improved  7/22/2021 1008 by Ciro De La Rosa RN  Outcome: Ongoing  7/21/2021 2351 by Jake Brandon RN  Outcome: Ongoing     Problem: KNOWLEDGE DEFICIT  Goal: Patient/S.O. demonstrates understanding of disease process, treatment plan, medications, and discharge instructions. 7/22/2021 1008 by Ciro De La Rosa RN  Outcome: Ongoing  7/21/2021 2351 by Jake Brandon RN  Outcome: Ongoing     Problem: DISCHARGE BARRIERS  Goal: Patient's continuum of care needs are met  Outcome: Ongoing     Problem: Falls - Risk of:  Goal: Will remain free from falls  Description: Will remain free from falls  Outcome: Ongoing  Goal: Absence of physical injury  Description: Absence of physical injury  Outcome: Ongoing     Problem: IP BALANCE  Goal: BALANCE EDUCATION  Description: Educate patients on maintaining dynamic/static standing/sitting balance, with/without upper extremity support.   Outcome: Ongoing     Problem: IP MOBILITY  Goal: LTG - patient will ambulate community distance  Outcome: Ongoing

## 2021-07-22 NOTE — PROGRESS NOTES
[facility] Cardiothoracic Surgery  Progress Note    7/22/2021 8:22 AM  Surgeon: Concepcion Bailey MD  POD # 2  S/P: Left Lower lobectomy     Subjective:  Ms. Ronnie Nixon   Pt shows no acute distress. Moderate pain while resting in chair. Objective:  /61   Pulse 78   Temp 98.4 °F (36.9 °C) (Oral)   Resp 20   Ht 5' 5\" (1.651 m)   Wt 236 lb 15.9 oz (107.5 kg)   SpO2 96%   BMI 39.44 kg/m²   Chest: pacing wires: no, chest tubes:yes, air leak yes, 1 +  CV: no murmur noted, Normal S1, S2,  Lungs: rhonchi/transmitted tones heard while inhaling and exhaling. Abd: normal bowel sounds   Lower Extremities: Trace edema    CXR-diffuse atelectasis, no appreciated pneumothorax,    Labs:   CBC:   Recent Labs     07/20/21  0324 07/21/21  0606 07/22/21  0431   WBC 15.0* 13.0* 11.2   HGB 10.7* 10.1* 9.7*   HCT 34.7* 33.4* 33.2*   .5 103.4* 106.8*    268 231     BMP:   Recent Labs     07/19/21  1939 07/20/21  0324    136   K 3.7 3.9    105   CO2 21 21   BUN 14 11   CREATININE 0.65 0.62       I/O: I/O last 3 completed shifts: In: 250 [P.O.:250]  Out: 9698 [Urine:1100;  Chest Tube:190]  Scheduled Meds:   potassium chloride  20 mEq Oral BID WC    furosemide  20 mg Intravenous BID    lidocaine  1 patch Transdermal Daily    sodium chloride flush  10 mL Intravenous 2 times per day    famotidine  20 mg Oral BID    polyethylene glycol  17 g Oral Daily    insulin lispro  0-12 Units Subcutaneous TID WC    insulin lispro  0-6 Units Subcutaneous Nightly     Continuous Infusions:   sodium chloride      bupivacaine 0.5% 400 mL (07/19/21 1626)     PRN Meds:ketorolac, sodium chloride flush, sodium chloride, acetaminophen, oxyCODONE **OR** oxyCODONE, morphine, magnesium hydroxide, bisacodyl        Assessment/ Plan:  Doing BMP every other day  No pneumothorax present  Please have patient use incentive spirometry and acapella around the clock today to prevent plugging  Lasix 20mg BID-added daily potassium to prevent hypokalemia  Please continue to limit IV fluids  Keep ahead of pain without oversedating  Chest tubes have been on water seal for 2 days no pneumothorax present  Ambulate TID with PT mario.  Dc planning beginning today-home with family and son. Plan on DC tomorrow    On this date 7/13/2021 I have spent 30 minutes reviewing previous notes, test results and face to face with the patient discussing the diagnosis and importance of compliance with the treatment plan as well as documenting on the day of the visit.   Carrie , APRN - NP

## 2021-07-23 ENCOUNTER — APPOINTMENT (OUTPATIENT)
Dept: GENERAL RADIOLOGY | Age: 66
DRG: 120 | End: 2021-07-23
Attending: THORACIC SURGERY (CARDIOTHORACIC VASCULAR SURGERY)
Payer: COMMERCIAL

## 2021-07-23 VITALS
RESPIRATION RATE: 16 BRPM | HEIGHT: 65 IN | HEART RATE: 75 BPM | SYSTOLIC BLOOD PRESSURE: 121 MMHG | WEIGHT: 236.99 LBS | DIASTOLIC BLOOD PRESSURE: 65 MMHG | OXYGEN SATURATION: 96 % | TEMPERATURE: 98.2 F | BODY MASS INDEX: 39.49 KG/M2

## 2021-07-23 LAB
ABSOLUTE EOS #: 0.11 K/UL (ref 0–0.4)
ABSOLUTE IMMATURE GRANULOCYTE: 0.33 K/UL (ref 0–0.3)
ABSOLUTE LYMPH #: 2 K/UL (ref 1–4.8)
ABSOLUTE MONO #: 0.89 K/UL (ref 0.1–0.8)
ANION GAP SERPL CALCULATED.3IONS-SCNC: 8 MMOL/L (ref 9–17)
BASOPHILS # BLD: 0 % (ref 0–2)
BASOPHILS ABSOLUTE: 0 K/UL (ref 0–0.2)
BUN BLDV-MCNC: 15 MG/DL (ref 8–23)
BUN/CREAT BLD: ABNORMAL (ref 9–20)
CALCIUM SERPL-MCNC: 8.8 MG/DL (ref 8.6–10.4)
CHLORIDE BLD-SCNC: 100 MMOL/L (ref 98–107)
CO2: 28 MMOL/L (ref 20–31)
CREAT SERPL-MCNC: 0.66 MG/DL (ref 0.5–0.9)
DIFFERENTIAL TYPE: ABNORMAL
EOSINOPHILS RELATIVE PERCENT: 1 % (ref 1–4)
GFR AFRICAN AMERICAN: >60 ML/MIN
GFR NON-AFRICAN AMERICAN: >60 ML/MIN
GFR SERPL CREATININE-BSD FRML MDRD: ABNORMAL ML/MIN/{1.73_M2}
GFR SERPL CREATININE-BSD FRML MDRD: ABNORMAL ML/MIN/{1.73_M2}
GLUCOSE BLD-MCNC: 116 MG/DL (ref 70–99)
HCT VFR BLD CALC: 33.9 % (ref 36.3–47.1)
HEMOGLOBIN: 10.1 G/DL (ref 11.9–15.1)
IMMATURE GRANULOCYTES: 3 %
LYMPHOCYTES # BLD: 18 % (ref 24–44)
MCH RBC QN AUTO: 31.5 PG (ref 25.2–33.5)
MCHC RBC AUTO-ENTMCNC: 29.8 G/DL (ref 28.4–34.8)
MCV RBC AUTO: 105.6 FL (ref 82.6–102.9)
MONOCYTES # BLD: 8 % (ref 1–7)
MORPHOLOGY: ABNORMAL
MORPHOLOGY: ABNORMAL
NRBC AUTOMATED: 0.4 PER 100 WBC
PDW BLD-RTO: 15.3 % (ref 11.8–14.4)
PLATELET # BLD: 265 K/UL (ref 138–453)
PLATELET ESTIMATE: ABNORMAL
PMV BLD AUTO: 9.8 FL (ref 8.1–13.5)
POTASSIUM SERPL-SCNC: 4 MMOL/L (ref 3.7–5.3)
RBC # BLD: 3.21 M/UL (ref 3.95–5.11)
RBC # BLD: ABNORMAL 10*6/UL
SEG NEUTROPHILS: 70 % (ref 36–66)
SEGMENTED NEUTROPHILS ABSOLUTE COUNT: 7.77 K/UL (ref 1.8–7.7)
SODIUM BLD-SCNC: 136 MMOL/L (ref 135–144)
WBC # BLD: 11.1 K/UL (ref 3.5–11.3)
WBC # BLD: ABNORMAL 10*3/UL

## 2021-07-23 PROCEDURE — 85025 COMPLETE CBC W/AUTO DIFF WBC: CPT

## 2021-07-23 PROCEDURE — 6360000002 HC RX W HCPCS: Performed by: NURSE PRACTITIONER

## 2021-07-23 PROCEDURE — 99024 POSTOP FOLLOW-UP VISIT: CPT | Performed by: NURSE PRACTITIONER

## 2021-07-23 PROCEDURE — 94761 N-INVAS EAR/PLS OXIMETRY MLT: CPT

## 2021-07-23 PROCEDURE — 2580000003 HC RX 258: Performed by: NURSE PRACTITIONER

## 2021-07-23 PROCEDURE — 94640 AIRWAY INHALATION TREATMENT: CPT

## 2021-07-23 PROCEDURE — 36415 COLL VENOUS BLD VENIPUNCTURE: CPT

## 2021-07-23 PROCEDURE — 6370000000 HC RX 637 (ALT 250 FOR IP): Performed by: NURSE PRACTITIONER

## 2021-07-23 PROCEDURE — 80048 BASIC METABOLIC PNL TOTAL CA: CPT

## 2021-07-23 PROCEDURE — 71045 X-RAY EXAM CHEST 1 VIEW: CPT

## 2021-07-23 PROCEDURE — 97535 SELF CARE MNGMENT TRAINING: CPT

## 2021-07-23 PROCEDURE — 97116 GAIT TRAINING THERAPY: CPT

## 2021-07-23 PROCEDURE — 97530 THERAPEUTIC ACTIVITIES: CPT

## 2021-07-23 PROCEDURE — 2700000000 HC OXYGEN THERAPY PER DAY

## 2021-07-23 RX ORDER — FUROSEMIDE 20 MG/1
20 TABLET ORAL 2 TIMES DAILY
Status: DISCONTINUED | OUTPATIENT
Start: 2021-07-23 | End: 2021-07-23 | Stop reason: HOSPADM

## 2021-07-23 RX ORDER — LIDOCAINE 4 G/G
1 PATCH TOPICAL DAILY
Qty: 30 PATCH | Refills: 0 | Status: SHIPPED | OUTPATIENT
Start: 2021-07-24 | End: 2021-01-01 | Stop reason: ALTCHOICE

## 2021-07-23 RX ORDER — OXYCODONE HYDROCHLORIDE 10 MG/1
10 TABLET ORAL EVERY 8 HOURS PRN
Qty: 21 TABLET | Refills: 0 | Status: SHIPPED | OUTPATIENT
Start: 2021-07-23 | End: 2021-07-29 | Stop reason: SDUPTHER

## 2021-07-23 RX ORDER — SODIUM PHOSPHATE, DIBASIC AND SODIUM PHOSPHATE, MONOBASIC 7; 19 G/133ML; G/133ML
1 ENEMA RECTAL
Status: DISCONTINUED | OUTPATIENT
Start: 2021-07-23 | End: 2021-07-23 | Stop reason: HOSPADM

## 2021-07-23 RX ORDER — POTASSIUM CHLORIDE 20 MEQ/1
20 TABLET, EXTENDED RELEASE ORAL 2 TIMES DAILY
Qty: 90 TABLET | Refills: 1 | Status: SHIPPED | OUTPATIENT
Start: 2021-07-23 | End: 2022-01-01

## 2021-07-23 RX ORDER — FUROSEMIDE 20 MG/1
20 TABLET ORAL 2 TIMES DAILY
Qty: 60 TABLET | Refills: 3 | Status: ON HOLD | OUTPATIENT
Start: 2021-07-23 | End: 2021-01-01 | Stop reason: HOSPADM

## 2021-07-23 RX ORDER — BISACODYL 10 MG
10 SUPPOSITORY, RECTAL RECTAL DAILY PRN
Status: DISCONTINUED | OUTPATIENT
Start: 2021-07-23 | End: 2021-07-23 | Stop reason: HOSPADM

## 2021-07-23 RX ADMIN — SODIUM CHLORIDE, PRESERVATIVE FREE 10 ML: 5 INJECTION INTRAVENOUS at 08:02

## 2021-07-23 RX ADMIN — TIOTROPIUM BROMIDE INHALATION SPRAY 2 PUFF: 3.12 SPRAY, METERED RESPIRATORY (INHALATION) at 08:59

## 2021-07-23 RX ADMIN — FAMOTIDINE 20 MG: 20 TABLET, FILM COATED ORAL at 08:01

## 2021-07-23 RX ADMIN — POLYETHYLENE GLYCOL 3350 17 G: 17 POWDER, FOR SOLUTION ORAL at 08:00

## 2021-07-23 RX ADMIN — ACETAMINOPHEN 1000 MG: 500 TABLET ORAL at 12:56

## 2021-07-23 RX ADMIN — SERTRALINE 100 MG: 50 TABLET, FILM COATED ORAL at 08:01

## 2021-07-23 RX ADMIN — PANTOPRAZOLE SODIUM 20 MG: 40 TABLET, DELAYED RELEASE ORAL at 06:04

## 2021-07-23 RX ADMIN — KETOROLAC TROMETHAMINE 15 MG: 15 INJECTION, SOLUTION INTRAMUSCULAR; INTRAVENOUS at 08:12

## 2021-07-23 RX ADMIN — OXYCODONE HYDROCHLORIDE 10 MG: 5 TABLET ORAL at 08:00

## 2021-07-23 RX ADMIN — APIXABAN 5 MG: 5 TABLET, FILM COATED ORAL at 08:01

## 2021-07-23 RX ADMIN — POTASSIUM CHLORIDE 20 MEQ: 1500 TABLET, EXTENDED RELEASE ORAL at 08:01

## 2021-07-23 RX ADMIN — FOLIC ACID 1 MG: 1 TABLET ORAL at 08:01

## 2021-07-23 RX ADMIN — OXYCODONE HYDROCHLORIDE 10 MG: 5 TABLET ORAL at 01:53

## 2021-07-23 RX ADMIN — MORPHINE SULFATE 2 MG: 2 INJECTION, SOLUTION INTRAMUSCULAR; INTRAVENOUS at 08:00

## 2021-07-23 RX ADMIN — MAGNESIUM HYDROXIDE 30 ML: 400 SUSPENSION ORAL at 08:00

## 2021-07-23 RX ADMIN — OXYCODONE HYDROCHLORIDE 10 MG: 5 TABLET ORAL at 12:56

## 2021-07-23 ASSESSMENT — PAIN SCALES - GENERAL
PAINLEVEL_OUTOF10: 8
PAINLEVEL_OUTOF10: 0
PAINLEVEL_OUTOF10: 7
PAINLEVEL_OUTOF10: 6
PAINLEVEL_OUTOF10: 8
PAINLEVEL_OUTOF10: 7
PAINLEVEL_OUTOF10: 7

## 2021-07-23 NOTE — PROGRESS NOTES
[facility] Cardiothoracic Surgery  Progress Note    7/23/2021 7:25 AM  Surgeon: Selma Rodriges MD  POD # 4  S/P: Left Lower lobectomy     Subjective:  Ms. Roxanne Rivera   Pt shows no acute distress. Moderate pain while resting in chair. Has had BM    Objective:  /61   Pulse 79   Temp 97.8 °F (36.6 °C) (Oral)   Resp 16   Ht 5' 5\" (1.651 m)   Wt 236 lb 15.9 oz (107.5 kg)   SpO2 96%   BMI 39.44 kg/m²   Chest: pacing wires: no, chest tubes:yes, air leak yes, 1 +  CV: no murmur noted, Normal S1, S2,  Lungs: rhonchi/transmitted tones heard while inhaling and exhaling.    Abd: normal bowel sounds   Lower Extremities: Trace edema    CXR-diffuse atelectasis, no appreciated pneumothorax,    Labs:   CBC:   Recent Labs     07/21/21  0606 07/22/21  0431 07/23/21  0607   WBC 13.0* 11.2 PENDING   HGB 10.1* 9.7* 10.1*   HCT 33.4* 33.2* 33.9*   .4* 106.8* 105.6*    231 PENDING     BMP:   Recent Labs     07/22/21  0836 07/23/21  0607   * 136   K 4.3 4.0    100   CO2 27 28   BUN 18 15   CREATININE 0.85 0.66       I/O: I/O last 3 completed shifts:  In: -   Out: 895 [Urine:650; Chest Tube:245]  Scheduled Meds:   sertraline  100 mg Oral Daily    tiotropium  2 puff Inhalation Daily    apixaban  5 mg Oral BID    folic acid  1 mg Oral Daily    pantoprazole  20 mg Oral QAM AC    potassium chloride  20 mEq Oral BID WC    furosemide  20 mg Intravenous BID    lidocaine  1 patch Transdermal Daily    sodium chloride flush  10 mL Intravenous 2 times per day    famotidine  20 mg Oral BID    polyethylene glycol  17 g Oral Daily    insulin lispro  0-12 Units Subcutaneous TID WC    insulin lispro  0-6 Units Subcutaneous Nightly     Continuous Infusions:   sodium chloride      bupivacaine 0.5% 400 mL (07/19/21 1626)     PRN Meds:albuterol, ketorolac, sodium chloride flush, sodium chloride, acetaminophen, oxyCODONE **OR** oxyCODONE, morphine, magnesium hydroxide, bisacodyl        Assessment/ Plan:  Pt doing well  Home 02 eval today   Please DC chest tubes followed by CXR 4 hrs  Please remove pain ball  Please continue to ambulate patient today   Pt is back on her home medications  Plan on DC this evening with her family after chest xray  Pt will follow up in clinic on scheduled date  On this date 7/13/2021 I have spent 30 minutes reviewing previous notes, test results and face to face with the patient discussing the diagnosis and importance of compliance with the treatment plan as well as documenting on the day of the visit.   Carrie Pham, APRN - NP

## 2021-07-23 NOTE — PROGRESS NOTES
Home oxygen evaluation: Patient ambulated without oxygen, O2 saturation after 250 feet dropped to 62%, patient recovered on 4Ll nasal cannula after 1.5 minutes resting to  92%. Patient tolerated ambulating with 2 L of oxygen without dropping her saturation. Patient without oxygen at rest is 86%, patient oxygen at rest with 2L.  is 96%

## 2021-07-23 NOTE — DISCHARGE INSTR - COC
Continuity of Care Form    Patient Name: Aaliyah Hauser   :  1955  MRN:  [id number]    Admit date:  2021  Discharge date:  ***    Code Status Order: Full Code   Advance Directives:   Advance Care Flowsheet Documentation       Date/Time Healthcare Directive Type of Healthcare Directive Copy in [address] Agent's Name Healthcare Agent's Phone Number    [phone]  No, patient does not have an advance directive for healthcare treatment                      Admitting Physician:  Guadalupe Hill MD  PCP: Aidan Manning MD    Discharging Nurse: [facility] Unit/Room#: 1011/1011-01  Discharging Unit Phone Number: ***    Emergency Contact:   Extended Emergency Contact Information  Primary Emergency Contact: Kim Mcgraw, [address] [place] [address] Phone: [phone]  Mobile Phone: [phone]  Relation: Child  Secondary Emergency Contact: Nataliya Hardikvidal [address] Phone: [phone]  Mobile Phone: [phone]  Relation: Brother/Sister    Past Surgical History:  Past Surgical History:   Procedure Laterality Date    APPENDECTOMY  1982    BRONCHOSCOPY N/A 2019    BRONCHOSCOPY ALVEOLAR LAVAGE performed by Gill Damon MD at [facility]    COLONOSCOPY N/A 2020    COLONOSCOPY POLYPECTOMIES HOT SNARE, COLD BIOPSY POLYPECTOMIES performed by Sherly Molina MD at [address] [place] [address]  2021    CT NEEDLE BIOPSY LUNG PERCUTANEOUS 2021 ST CT SCAN    ENDOSCOPY, COLON, DIAGNOSTIC  456113    gastritis, sm. bowel lipoma, biopsies    LOBECTOMY Left 2021    XI ROBOTIC LEFT LOWER LOBECTOMY CONVERTED TO OPEN THORACTOMY LEFT LOWER LOBECTOMY (Left )    LUNG REMOVAL, TOTAL Left 2021    XI ROBOTIC LEFT LOWER LOBECTOMY CONVERTED TO OPEN THORACTOMY LEFT LOWER LOBECTOMY performed by Guadalupe Hill MD at [place] [address]       Immunization History:   Immunization History   Administered Date(s) Administered    Influenza Virus Vaccine 01/26/2016    Influenza, Quadv, IM, PF (6 mo and older Fluzone, Flulaval, Fluarix, and 3 yrs and older Afluria) 10/28/2016, 11/12/2018    Pneumococcal Polysaccharide ([id number]) 04/11/2017    Tdap (Boostrix, Adacel) 06/12/2019    Zoster Recombinant (Shingrix) 06/12/2019, 11/20/2019       Active Problems:  Patient Active Problem List   Diagnosis Code    Hypertension I10    GERD (gastroesophageal reflux disease) K21.9    Rheumatoid arthritis ([facility] M06.9    Obesity, Class II, BMI 35-39.9 E66.9    Major depressive disorder, recurrent, moderate (HCC) F33.1    Prediabetes R73.03    Tobacco abuse Z72.0    History of DVT in adulthood Z86.718    COPD (chronic obstructive pulmonary disease) ([facility] J44.9    Community acquired pneumonia J18.9    History of thyroid nodule Z86.39    Thyroid nodule E04.1    Positive colorectal cancer screening using Cologuard test R19.5    Deep venous thrombosis of left profunda femoris vein (HCC) I82.412    Abnormal CT of the chest R93.89    Encounter for smoking cessation counseling Z71.6    Nodule of lower lobe of left lung R91.1    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    Adenocarcinoma, lung, left (HCC) C34.92    History of pulmonary embolism Z86.711    On anticoagulant therapy Z79.01    On methotrexate therapy Z79.899    Obesity, Class III, BMI 40-49.9 (morbid obesity) ([facility] E66.01    S/P lobectomy of lung Z90.2       Isolation/Infection:   Isolation            No Isolation          Patient Infection Status       Infection Onset Added Last Indicated Last Indicated By Review Planned Expiration Resolved Resolved By    None active    Resolved    COVID-19 Rule Out 07/15/21 07/15/21 07/15/21 COVID-19 (Ordered)   07/16/21 Rule-Out Test Resulted    COVID-19 Rule Out 07/08/21 07/08/21 07/08/21 COVID-19 (Ordered)   07/09/21 Rule-Out Test Resulted    COVID-19 Rule Out 06/21/21 06/23/21 06/21/21 COVID-19 (Ordered)   06/23/21 Rule-Out Test Resulted    COVID-19 Rule Out 05/27/21 Feedings:633882780:::0}  Liquids: {Slp liquid thickness:86964}  Daily Fluid Restriction: {CHP DME Yes amt example:345406097:::0}  Last Modified Barium Swallow with Video (Video Swallowing Test): {Done Not Done JTCA:439390591:::7}    Treatments at the Time of Hospital Discharge:   Respiratory Treatments: ***  Oxygen Therapy:  {Therapy; copd oxygen:82539:::0}  Ventilator:    {[facility] Vent List:673255962:::0}    Rehab Therapies: {THERAPEUTIC INTERVENTION:7634123065}  Weight Bearing Status/Restrictions: {[facility] Weight Bearin:::0}  Other Medical Equipment (for information only, NOT a DME order):  {EQUIPMENT:531219084}  Other Treatments: ***    Patient's personal belongings (please select all that are sent with patient):  {CHP DME Belongings:424886475:::0}    RN SIGNATURE:  {Esignature:078922140:::0}    CASE MANAGEMENT/SOCIAL WORK SECTION    Inpatient Status Date: ***    Readmission Risk Assessment Score:  Readmission Risk              Risk of Unplanned Readmission:  12           Discharging to Facility/ Agency   Name:   Address:  Phone:  Fax:    Dialysis Facility (if applicable)   Name:  Address:  Dialysis Schedule:  Phone:  Fax:    / signature: {Esignature:436855611:::0}    PHYSICIAN SECTION    Prognosis: Good    Condition at Discharge: Stable    Rehab Potential (if transferring to Rehab): Good    Recommended Labs or Other Treatments After Discharge:   Monitor home oxygen saturation due to being on [facility]  Please monitor BP with lasix  Please monitor for pneumothorax and pneumonia       Physician Certification: I certify the above information and transfer of Amira Castañeda  is necessary for the continuing treatment of the diagnosis listed and that she requires Home Care for less 30 days.      Update Admission H&P: No change in H&P    PHYSICIAN SIGNATURE:  Electronically signed by Micheal June MD on 21 at 11:17 AM EDT

## 2021-07-23 NOTE — PROGRESS NOTES
Occupational Therapy  Facility/Department: [facility] CAR 1  Daily Treatment Note  NAME: Aaliyah Hauser  : 1955  MRN: [id number]    Date of Service: 2021    Discharge Recommendations:  Patient would benefit from continued therapy after discharge  OT Equipment Recommendations  Walker: Rolling  ADL Assistive Devices: Shower Chair with back; Reacher;Sock-Aid Hard;Grab Bars - shower;Grab Bars - toilet    Assessment   Performance deficits / Impairments: Decreased functional mobility ; Decreased endurance;Decreased ADL status; Decreased high-level IADLs  Prognosis: Good  Patient Education: Pt ed on use of home O2, EC/WS, pursed lip breathing, and benefits of continued therapy- Good return  Barriers to Learning: none  REQUIRES OT FOLLOW UP: Yes  Activity Tolerance  Activity Tolerance: Patient Tolerated treatment well;Patient limited by fatigue  Safety Devices  Safety Devices in place: Yes  Type of devices: Left in chair;Call light within reach;Gait belt;Patient at risk for falls  Restraints  Initially in place: No         Patient Diagnosis(es): The encounter diagnosis was S/P lobectomy of lung.      has a past medical history of Abnormal CT of the chest, Adenocarcinoma, lung, left ([address] Arthritis, Community acquired pneumonia, COPD (chronic obstructive pulmonary disease) ([address] Deep venous thrombosis of left profunda femoris vein ([address] Depression, DVT (deep venous thrombosis) ([address] GERD (gastroesophageal reflux disease), History of pulmonary embolism, History of thyroid nodule, Hypertension, Major depressive disorder, recurrent, moderate ([address] Nodule of lower lobe of left lung, Obesity, Class II, BMI 35-39.9, On anticoagulant therapy, On methotrexate therapy, Prediabetes, Rheumatoid arthritis ([address] Snores, Thyroid nodule, Tobacco abuse, Under care of team, Under care of team, Under care of team, and Wellness examination. has a past surgical history that includes Appendectomy ();  Endoscopy, colon, diagnostic (697247); bronchoscopy (N/A, 12/27/2019); Colonoscopy (N/A, 2/12/2020); CT NEEDLE BIOPSY LUNG PERCUTANEOUS (5/13/2021); lobectomy (Left, 07/19/2021); and Lung removal, total (Left, 7/19/2021). Restrictions  Restrictions/Precautions  Restrictions/Precautions: Up as Tolerated, General Precautions  Required Braces or Orthoses?: No  Position Activity Restriction  Other position/activity restrictions: Ambulate pt, left lobectomy, chest tube  Subjective   General  Chart Reviewed: Yes  Patient assessed for rehabilitation services?: Yes  Family / Caregiver Present: No  General Comment  Comments: RN ok'd for therapy, pt agreeable to session, pleasant/cooperative throughout  Vital Signs  Patient Currently in Pain: No   Orientation  Orientation  Overall Orientation Status: Within Functional Limits  Objective    ADL  Feeding: Modified independent ;Setup  Grooming: Stand by assistance;Setup; Increased time to complete (Pt washed face sitting in chair)        Balance  Sitting Balance: Independent  Functional Mobility  Functional Mobility Comments: Pt politely declined func mobility and standing this date.  Pt reports preparing to leave this date and is tired from working with PT  Bed mobility  Scooting: Supervision  Comment: Pt in chair upon arrival and departure  Transfers  Transfer Comments: Pt declined standing and func mobility this date     Cognition  Overall Cognitive Status: [address]  Times per week: 3-5x/wk  Current Treatment Recommendations: Gait Training, Patient/Caregiver Education & Training, Functional Mobility Training, Endurance Training, Pain Management, Safety Education & Training, Self-Care / ADL  AM-PAC Score        AM-PAC Inpatient Daily Activity Raw Score: 19 (07/23/21 1450)  AM-PAC Inpatient ADL T-Scale Score : 40.22 (07/23/21 1450)  ADL Inpatient CMS 0-100% Score: 42.8 (07/23/21 1450)  ADL Inpatient CMS G-Code Modifier : CK (07/23/21 1450)    Goals  Short term goals  Time Frame for Short term

## 2021-07-23 NOTE — PROGRESS NOTES
Dr. Tung Hickey at bedside, writer informed physician and care team patient chest tubes and pain ball have been removed and follow up chest xray ordered at [phone]. Also informed Dr. Tung Hickey patient has not had a bowel movement since admission, writer gave patient miralax and milk of magnesium. No further orders at current time. Also informed treatment team incision was left open to air and asked them to look at incision as some gauze was still stuck to incision despite using normal saline to remove. Treatment team stated they will look at it after they are finishing rounding.

## 2021-07-23 NOTE — PROGRESS NOTES
Gave discharge instructions to both patient and son. Gave verbal and written discharge instructions. Went into great detail or incision care and gave patient Hibiclens soap. Also went over safety of home oxygen and not to allow smoking near O2 tanks or in the home. Patient taken down in wheelchair, and reminded to call home oxygen delivery when she arrived home. Went over the importance of wearing her home oxygen.

## 2021-07-23 NOTE — CARE COORDINATION
Reviewed CXR. No pneumos. We will get cxr outpatient to eval for improved atelectasis.  Pt is ready for DC

## 2021-07-23 NOTE — PLAN OF CARE
Problem: Pain:  Goal: Pain level will decrease  Description: Pain level will decrease  Outcome: Completed  Goal: Control of acute pain  Description: Control of acute pain  Outcome: Completed  Goal: Control of chronic pain  Description: Control of chronic pain  Outcome: Completed     Problem: SAFETY  Goal: Free from accidental physical injury  Outcome: Completed  Goal: Free from intentional harm  Outcome: Completed     Problem: DAILY CARE  Goal: Daily care needs are met  Outcome: Completed     Problem: SKIN INTEGRITY  Goal: Skin integrity is maintained or improved  Outcome: Completed     Problem: PAIN  Goal: Patient's pain/discomfort is manageable  Outcome: Completed     Problem: KNOWLEDGE DEFICIT  Goal: Patient/S.O. demonstrates understanding of disease process, treatment plan, medications, and discharge instructions. Outcome: Completed     Problem: IP MOBILITY  Goal: LTG - patient will ambulate community distance  Outcome: Completed     Problem: IP BALANCE  Goal: BALANCE EDUCATION  Description: Educate patients on maintaining dynamic/static standing/sitting balance, with/without upper extremity support.   Outcome: Completed

## 2021-07-23 NOTE — PROGRESS NOTES
Patient stated she had a small bowel movement, however writer did not witness if patient actually had one.

## 2021-07-23 NOTE — PROGRESS NOTES
Physical Therapy  Facility/Department: [facility] CAR 1  Daily Treatment Note  NAME: Nicol Sandoval  : 1955  MRN: [id number]    Date of Service: 2021    Discharge Recommendations:  Patient would benefit from continued therapy after discharge     PT Equipment Recommendations  Equipment Needed: No  Assessment     Body structures, Functions, Activity limitations: Decreased functional mobility ; Decreased endurance;Decreased coordination;Decreased strength; Increased pain  Assessment: Pt required CGA for all mobility including ambulating 300 ft with HHA. Pt ascended/descended 4 steps using one HR with a reciprocal pattern going up and nonreciprocal going down steps. Pt is limited by SOB, decrerased endurance and fatigue. Pt would benefit from continued therapy after d/c  Prognosis: Good  REQUIRES PT FOLLOW UP: Yes  Activity Tolerance  Activity Tolerance: Patient limited by fatigue;Patient limited by pain         Patient Diagnosis(es): There were no encounter diagnoses. has a past medical history of Abnormal CT of the chest, Adenocarcinoma, lung, left ([address] Arthritis, Community acquired pneumonia, COPD (chronic obstructive pulmonary disease) ([address] Deep venous thrombosis of left profunda femoris vein ([address] Depression, DVT (deep venous thrombosis) ([address] GERD (gastroesophageal reflux disease), History of pulmonary embolism, History of thyroid nodule, Hypertension, Major depressive disorder, recurrent, moderate ([address] Nodule of lower lobe of left lung, Obesity, Class II, BMI 35-39.9, On anticoagulant therapy, On methotrexate therapy, Prediabetes, Rheumatoid arthritis ([address] Snores, Thyroid nodule, Tobacco abuse, Under care of team, Under care of team, Under care of team, and Wellness examination. has a past surgical history that includes Appendectomy (); Endoscopy, colon, diagnostic (789208); bronchoscopy (N/A, 2019);  Colonoscopy (N/A, 2020); CT NEEDLE BIOPSY LUNG PERCUTANEOUS (2021); lobectomy (Left, 07/19/2021); and Lung removal, total (Left, 7/19/2021). Restrictions  Restrictions/Precautions  Restrictions/Precautions: Up as Tolerated, General Precautions  Required Braces or Orthoses?: No  Position Activity Restriction  Other position/activity restrictions: Ambulate pt, left lobectomy, chest tube  Subjective   Pt sitting up in her chair. Pt eager to go for a walk. Pt going home today. Pt c/o 3/10 incisional pain. .   Orientation    Overall Orientation Status: Within Functional Limits     Objective     Transfers  Sit to Stand: Contact guard assistance  Stand to sit: Contact guard assistance  Ambulation 1  Surface: level tile  Device: HHA  Comment: Ambulated pt w/o 02. Pt dropped down to SP 02 68%. RN informed. Assistance: Contact guard assistance  Gait Deviations: Slow Caprice  Distance: 300 ft  Comments: Pt took two standing rest break. Stairs/Curb  Stairs?: No  Balance  Posture: Fair  Sitting - Static: Good  Sitting - Dynamic: Good  Standing - Static: Fair  Standing - Dynamic: Fair;-  Comments: standing balance assessed with RW   Goals  Short term goals  Time Frame for Short term goals: 14 visits  Short term goal 1: Sit to/from stand with supervision. Short term goal 2: Sit to/from supine with SBA. Short term goal 3: Ambulate 200' with wheeled walker SBA. Short term goal 4: Negotiate up and down 10 steps with one railing with CGA. Plan    Plan  Times per week: 6-7x/wk  Current Treatment Recommendations: Strengthening, Home Exercise Program, Safety Education & Training, Functional Mobility Training, Transfer Training, Gait Training, Stair training, Endurance Training, Patient/Caregiver Education & Training, Pain Management, Equipment Evaluation, Education, & procurement  Safety Devices  Type of devices:  All fall risk precautions in place, Call light within reach, Left in chair, Gait belt, Nurse notified     Therapy Time   Individual Concurrent Group Co-treatment   Time In   1000 Time Out   1025         Minutes   [address] [place], [place]

## 2021-07-23 NOTE — CARE COORDINATION
Patient was evaluated today for the diagnosis of hypoxia from surgery and lobectomy. I entered a DME order for home oxygen because the diagnosis and testing requires the patient to have supplemental oxygen. Condition will improve or be benefited by oxygen use. The patient is  able to perform good mobility in a home setting and therefore does require the use of a portable oxygen system. The need for this equipment was discussed with the patient and she understands and is in agreement.

## 2021-07-23 NOTE — CARE COORDINATION
Met with patient to discuss transitional planning. Plan is home with son, daughter will stay with her as well. Patient declining home care. Patient has qualified for home O2 @ 2L. She'd like referral to [facility].   Referral faxed to [facility] and called to liaison Silvia Dyer    9171 O2 has been delivered to patient's room     Discharge Report    [facility] Case Management Department  Written by: Joshua Sánchez RN    Patient Name: Lennie Sawyer  Attending Provider: Iris Flores MD  Admit Date: 2021  6:35 AM  MRN: [id number]  Account: [de-identified]                     : 1955  Discharge Date:  2021        Disposition: home    Joshua Sánchez RN

## 2021-07-26 ENCOUNTER — TELEPHONE (OUTPATIENT)
Dept: FAMILY MEDICINE CLINIC | Age: 66
End: 2021-07-26

## 2021-07-26 ASSESSMENT — ENCOUNTER SYMPTOMS
GASTROINTESTINAL NEGATIVE: 1
ALLERGIC/IMMUNOLOGIC NEGATIVE: 1
RESPIRATORY NEGATIVE: 1
EYES NEGATIVE: 1

## 2021-07-26 NOTE — TELEPHONE ENCOUNTER
[facility] Transitions Initial Follow Up Call    Outreach made within 2 business days of discharge: Yes    Patient: Najma Stack Patient : 1955   MRN: [id number]  Reason for Admission: There are no discharge diagnoses documented for the most recent discharge. Discharge Date: 21       Spoke with: Alyssa Most    Discharge department/facility: [facility]    TCM Interactive Patient Contact:  Was patient able to fill all prescriptions: Yes  Was patient instructed to bring all medications to the follow-up visit: Yes  Is patient taking all medications as directed in the discharge summary?  Yes  Does patient understand their discharge instructions: Yes  Does patient have questions or concerns that need addressed prior to 7-14 day follow up office visit: no    Scheduled appointment with PCP within 7-14 days    Follow Up  Future Appointments   Date Time Provider Kuldeep Sun   2021  1:15 PM Luis M Zee MD  CT Surg [facility]   2021 12:00 PM [facility]   2021 10:30 AM Paige Fischer MD Resp Spec [address]

## 2021-07-26 NOTE — DISCHARGE SUMMARY
[address] [facility] Cardiothoracic Surgery  Discharge Summary    Patient's Name/Date of Birth: Irma Patient / 1955 (87 y.o.)    Admission Date: 7/19/2021  6:35 AM  Discharge Date: 7/23/2021  2:30 PM  Discharge Physician: Dr. Elisha Agustin  Discharge Unit: [facility]  Discharge condition: stable  [facility]      Reason For Admission: No chief complaint on file. HPI: Irma Patient is a 61 y.o. who presented outpatient after recent diagnosis of adenocarcinoma of the left lower lung. Patient began a work-up in April with CT lung screening. 1.7 x 1.6 cm nodule was noted. Patient presented for removal of nodule by robotic left lower lobectomy with. During surgery patient had to be converted to traditional thoracotomy for removal of left lower lung. Patient had uneventful postop recovery. Chest tubes removed on appropriate day once output was minimal and no effusions were noted on chest x-ray. After chest tubes removed no appreciated pneumothorax. Patient was sent home with home care. Procedures completed in hospital:1. Robotic left-sided lymph node dissection. 2.  Conversion to open left lower lobectomy. 3.  Platelet gel. 4.  Pain ball. This      Review of Systems   Constitutional: Negative. HENT: Negative. Eyes: Negative. Respiratory: Negative. Cardiovascular: Negative. Gastrointestinal: Negative. Endocrine: Negative. Genitourinary: Negative. Musculoskeletal: Negative. Allergic/Immunologic: Negative. Neurological: Negative. Hematological: Negative. Psychiatric/Behavioral: Negative. Physical Exam:  Vitals:    07/23/21 1200   BP:    Pulse:    Resp:    Temp: 98.2 °F (36.8 °C)   SpO2:      Weight: Weight: 236 lb 15.9 oz (107.5 kg)    Weight: 240 lb (108.9 kg)    No intake/output data recorded. General: alert and oriented to person, place and time, well-developed and well-nourished, in no acute distress.  Up in chair, No apparent distress. Heart:Normal S1 and S2.  Regular rhythm. No murmurs, gallops, or rubs. Pacing Wires No   Lungs: clear to auscultation bilaterally  Abdomen: soft, non tender, non distended, BSx4  Extremities: negative  Wounds: clean and dry, healing appropriately.      Past Medical History:   Diagnosis Date    Abnormal CT of the chest 04/21/2021    Adenocarcinoma, lung, left ([facility] [address] 05/14/2021    Arthritis     Community acquired pneumonia 12/23/2019    COPD (chronic obstructive pulmonary disease) ([facility] [address] 08/14/2019    Deep venous thrombosis of left profunda femoris vein ([facility] [address] 04/16/2020    Depression     DVT (deep venous thrombosis) ([facility] [address] 2014    b/l     GERD (gastroesophageal reflux disease)     History of pulmonary embolism 05/27/2021    History of thyroid nodule 12/23/2019    Hypertension     Major depressive disorder, recurrent, moderate ([facility] [address] 11/12/2018    Nodule of lower lobe of left lung 04/23/2021    Obesity, Class II, BMI 35-39.9 11/12/2018    On anticoagulant therapy 05/27/2021    On methotrexate therapy 05/27/2021    Prediabetes 11/12/2018    Rheumatoid arthritis ([facility] [address]     Snores     denies apnea    Thyroid nodule 01/09/2020    Tobacco abuse 11/12/2018    Under care of team 06/29/2021    pulmonology-[facility]-last visit june 2021    Under care of team 06/29/2021    [facility]    Under care of team     Dr. Lauren Kuo clearance appointment 7/6/21, stress test 7/9/2021, clearance obtained and placed on chart    Wellness examination 06/29/2021    pcp-Dr Umesh Hendricks office-last visit may 2021     Past Surgical History:   Procedure Laterality Date    [address] N/A 12/27/2019    BRONCHOSCOPY ALVEOLAR LAVAGE performed by Manny Canseco MD at [address] [facility] N/A 2/12/2020    COLONOSCOPY POLYPECTOMIES HOT SNARE, COLD BIOPSY POLYPECTOMIES performed by Jaclyn Negrete MD at [address] [facility] [facility] (Non-Medical): No   Physical Activity:     Days of Exercise per Week:     Minutes of Exercise per Session:    Stress:     Feeling of Stress :    Social Connections:     Frequency of Communication with Friends and Family:     Frequency of Social Gatherings with Friends and Family:     Attends [employer] Services:     Active Member of Clubs or Organizations:     Attends Club or Organization Meetings:     Marital Status:    Intimate Partner Violence:     Fear of Current or Ex-Partner:     Emotionally Abused:     Physically Abused:     Sexually Abused:           Medication List      START taking these medications    furosemide 20 MG tablet  Commonly known as: LASIX  Take 1 tablet by mouth 2 times daily     lidocaine 4 % external patch  Place 1 patch onto the skin daily     oxyCODONE HCl 10 MG immediate release tablet  Commonly known as: OXY-IR  Take 1 tablet by mouth every 8 hours as needed for Pain for up to 7 days. potassium chloride 20 MEQ extended release tablet  Commonly known as: KLOR-CON M  Take 1 tablet by mouth 2 times daily        CONTINUE taking these medications    acetaminophen 500 MG tablet  Commonly known as: TYLENOL     * albuterol (2.5 MG/3ML) 0.083% nebulizer solution  Commonly known as: PROVENTIL  Take 3 mLs by nebulization every 6 hours as needed for Wheezing or Shortness of Breath     * albuterol sulfate  (90 Base) MCG/ACT inhaler  inhale 2 puffs by mouth and INTO THE LUNGS every 4 hours for 7 days     * albuterol (2.5 MG/3ML) 0.083% nebulizer solution  Commonly known as: PROVENTIL  Take 3 mLs by nebulization every 6 hours as needed for Wheezing or Shortness of Breath     amLODIPine 10 MG tablet  Commonly known as: NORVASC  take 1 tablet by mouth once daily  Notes to patient: IF BLOOD PRESSURE IS LESS THAN 100/60 PLEASE DO NOT TAKE THAT DAY. Blood Pressure Kit  Dx: HTN. Needs automatic blood pressure machine to monitor her blood pressure.      Eliquis 5 MG Tabs tablet  Generic drug: apixaban  take 1 tablet by mouth twice a day     folic acid 1 MG tablet  Commonly known as: FOLVITE  take 1 tablet by mouth once daily     methotrexate 2.5 MG chemo tablet  Commonly known as: RHEUMATREX     pantoprazole 40 MG tablet  Commonly known as: PROTONIX  take 1 tablet by mouth once daily     sertraline 100 MG tablet  Commonly known as: Zoloft  Take 1 tablet by mouth daily     Spiriva Respimat 2.5 MCG/ACT Aers inhaler  Generic drug: tiotropium  inhale 2 puffs INTO THE LUNGS once daily         * This list has 3 medication(s) that are the same as other medications prescribed for you. Read the directions carefully, and ask your doctor or other care provider to review them with you. Where to Get Your Medications      These medications were sent to [address] [phone] - F [phone]  [address] [facility] [address]    Phone: [phone]   · furosemide 20 MG tablet  · lidocaine 4 % external patch  · oxyCODONE HCl 10 MG immediate release tablet  · potassium chloride 20 MEQ extended release tablet           Data:    CBC: No results for input(s): WBC, HGB, HCT, MCV, PLT in the last 72 hours. BMP: No results for input(s): NA, K, CL, CO2, PHOS, BUN, CREATININE, MG in the last 72 hours. Invalid input(s): CA  Accucheck Glucoses:   No results for input(s): POCGLU in the last 72 hours. Cardiac Enzymes: No results for input(s): CKTOTAL, CKMB, CKMBINDEX, TROPONINI in the last 72 hours. PTT/PT/INR:   No results for input(s): PROTIME, INR in the last 72 hours. No results for input(s): APTT in the last 72 hours.   Liver Profile:  Lab Results   Component Value Date    AST 21 06/29/2021    ALT 20 06/29/2021    BILIDIR 0.22 12/22/2019    BILITOT 0.27 06/29/2021    ALKPHOS 88 06/29/2021     Lab Results   Component Value Date    CHOL 192 01/30/2015    HDL 77 01/30/2015    TRIG 84 01/30/2015     TSH:   Lab Results Component Value Date    TSH 0.18 12/23/2019     UA:   Lab Results   Component Value Date    COLORU YELLOW 06/29/2021    PHUR 5.5 06/29/2021    WBCUA 10 TO 20 06/29/2021    RBCUA 0 TO 2 06/29/2021    MUCUS NOT REPORTED 06/29/2021    TRICHOMONAS NOT REPORTED 06/29/2021    YEAST NOT REPORTED 06/29/2021    BACTERIA MANY 06/29/2021    SPECGRAV 1.017 06/29/2021    LEUKOCYTESUR SMALL 06/29/2021    UROBILINOGEN Normal 06/29/2021    BILIRUBINUR NEGATIVE 06/29/2021    GLUCOSEU NEGATIVE 06/29/2021    AMORPHOUS NOT REPORTED 06/29/2021       Problem List Items Addressed This Visit     S/P lobectomy of lung - Primary    Relevant Medications    oxyCODONE (OXY-IR) 10 MG immediate release tablet    Other Relevant Orders    XR CHEST STANDARD (2 VW)              Discharge Plan:  Follow up with CT Surgery  in 1 week. Call office at [phone] for any problems. Follow up with PCP and cardiology in 1-2 weeks.           ILIR Moser NP, CNP  Phone: [phone]

## 2021-07-27 ENCOUNTER — TELEPHONE (OUTPATIENT)
Dept: ONCOLOGY | Age: 66
End: 2021-07-27

## 2021-07-29 ENCOUNTER — OFFICE VISIT (OUTPATIENT)
Dept: CARDIOTHORACIC SURGERY | Age: 66
End: 2021-07-29

## 2021-07-29 VITALS
SYSTOLIC BLOOD PRESSURE: 118 MMHG | HEART RATE: 89 BPM | DIASTOLIC BLOOD PRESSURE: 64 MMHG | TEMPERATURE: 98.1 F | HEIGHT: 65 IN | RESPIRATION RATE: 16 BRPM | BODY MASS INDEX: 38.82 KG/M2 | OXYGEN SATURATION: 96 % | WEIGHT: 233 LBS

## 2021-07-29 DIAGNOSIS — Z90.2 S/P LOBECTOMY OF LUNG: ICD-10-CM

## 2021-07-29 PROCEDURE — 99024 POSTOP FOLLOW-UP VISIT: CPT | Performed by: NURSE PRACTITIONER

## 2021-07-29 RX ORDER — OXYCODONE HYDROCHLORIDE 10 MG/1
10 TABLET ORAL EVERY 8 HOURS PRN
Qty: 21 TABLET | Refills: 0 | Status: SHIPPED | OUTPATIENT
Start: 2021-07-29 | End: 2021-08-05

## 2021-07-30 NOTE — ONCOLOGY
Pt with a 1.8 cm adenocarcinoma with a T1b tumor. Multiple hilar nodes sent that were negative for metastatic dse. Stage I tumor will refer back to pulmonary and oncology for further evaluation.

## 2021-08-06 ENCOUNTER — TELEPHONE (OUTPATIENT)
Dept: CASE MANAGEMENT | Age: 66
End: 2021-08-06

## 2021-08-06 NOTE — TELEPHONE ENCOUNTER
Name: Natividad Goncalves  : 1955  MRN: [id number]    Oncology Navigation Follow-Up Note  Navigator spoke with pt. And confirmed upcoming appts. MO/Pulmonary. Pt. C/o severe nausea, pt. Sharing may be from pain medication? Pt. Has stopped pain medication. Plan to follow.   Electronically signed by Jose M Redding RN on 2021 at 8:40 AM

## 2021-08-23 ENCOUNTER — VIRTUAL VISIT (OUTPATIENT)
Dept: FAMILY MEDICINE CLINIC | Age: 66
End: 2021-08-23
Payer: COMMERCIAL

## 2021-08-23 DIAGNOSIS — R68.89 FLU-LIKE SYMPTOMS: ICD-10-CM

## 2021-08-23 DIAGNOSIS — C34.92 ADENOCARCINOMA, LUNG, LEFT (HCC): Primary | ICD-10-CM

## 2021-08-23 DIAGNOSIS — Z90.2 S/P LOBECTOMY OF LUNG: ICD-10-CM

## 2021-08-23 DIAGNOSIS — J43.1 PANLOBULAR EMPHYSEMA (HCC): ICD-10-CM

## 2021-08-23 DIAGNOSIS — R09.82 POSTNASAL DRIP: ICD-10-CM

## 2021-08-23 DIAGNOSIS — R11.0 NAUSEA: ICD-10-CM

## 2021-08-23 PROCEDURE — G8427 DOCREV CUR MEDS BY ELIG CLIN: HCPCS | Performed by: FAMILY MEDICINE

## 2021-08-23 PROCEDURE — 3017F COLORECTAL CA SCREEN DOC REV: CPT | Performed by: FAMILY MEDICINE

## 2021-08-23 PROCEDURE — 99214 OFFICE O/P EST MOD 30 MIN: CPT | Performed by: FAMILY MEDICINE

## 2021-08-23 RX ORDER — AZITHROMYCIN 250 MG/1
TABLET, FILM COATED ORAL
Qty: 6 TABLET | Refills: 0 | Status: SHIPPED | OUTPATIENT
Start: 2021-08-23 | End: 2021-01-01

## 2021-08-23 RX ORDER — ALBUTEROL SULFATE 2.5 MG/3ML
2.5 SOLUTION RESPIRATORY (INHALATION) EVERY 6 HOURS PRN
Qty: 125 VIAL | Refills: 0 | Status: ON HOLD | OUTPATIENT
Start: 2021-08-23 | End: 2021-01-01 | Stop reason: SDUPTHER

## 2021-08-23 RX ORDER — ONDANSETRON 4 MG/1
4 TABLET, FILM COATED ORAL EVERY 6 HOURS PRN
Qty: 24 TABLET | Refills: 0 | Status: SHIPPED | OUTPATIENT
Start: 2021-08-23 | End: 2021-01-01

## 2021-08-23 ASSESSMENT — ENCOUNTER SYMPTOMS
ABDOMINAL PAIN: 0
SINUS PRESSURE: 1
RHINORRHEA: 0
CHEST TIGHTNESS: 0
WHEEZING: 0
CONSTIPATION: 0
COLOR CHANGE: 0
NAUSEA: 1
VOMITING: 0
DIARRHEA: 0
BACK PAIN: 1
SINUS PAIN: 0
COUGH: 0
ABDOMINAL DISTENTION: 0
SHORTNESS OF BREATH: 0
BLOOD IN STOOL: 0
SORE THROAT: 1

## 2021-08-23 NOTE — PROGRESS NOTES
[address] [address]  Phone: [phone], Fax: [phone]    TELEHEALTH EVALUATION -- Audio/Visual (During OSVZR-75 public health emergency)    Patient ID verified by me prior to start of this visit    Aniya Escamilla (:  1955) has requested an audio/video evaluation for the following concern(s):  Chief Complaint   Patient presents with    Nausea    Other     post nasal drainage     Headache      HPI:  Aniya Escamilla is an established patient of Jose Daniel Quijano MD  . Patient was scheduled for hospital follow-up for left lobectomy for history of adenocarcinoma. Patient has to change appointment to work soon due to continuous nausea and postnasal drip and headaches. Patient reports she had surgery done on , has lobectomy done did well after surgery. Patient denies any symptoms has appointment with CT surgeon next week. Patient reports she started having persistent nausea and mild headaches postnasal drip since last 2 days. She denies any fever shortness of breath cough chills. Patient is not vaccinated against COVID-19, reports she was waiting for surgery. Patient denies any sick contact but reports her grandkids are going back to school did not have any symptoms. Her last Covid test was done in hospital.    Patient is using her regular inhalers and also needs refill on albuterol nebulizations. []Negative depression screening. [x]1-4 = Minimal depression   []5-9 = Mild depression   []10-14 = Moderate depression   []15-19 = Moderately severe depression   []20-27 = Severe depression  PHQ Scores 2021 3/3/2021 2020 2019 2018   PHQ2 Score 1 1 2 0 0   PHQ9 Score 1 1 2 0 0     Review of Systems   Constitutional: Negative for activity change, appetite change, diaphoresis, fatigue, fever and unexpected weight change. HENT: Positive for congestion, postnasal drip, sinus pressure and sore throat. Negative for rhinorrhea and sinus pain. Eyes: Negative for visual disturbance. Respiratory: Negative for cough, chest tightness, shortness of breath and wheezing. Cardiovascular: Negative for chest pain, palpitations and leg swelling. Gastrointestinal: Positive for nausea. Negative for abdominal distention, abdominal pain, blood in stool, constipation, diarrhea and vomiting. Genitourinary: Negative for difficulty urinating, flank pain, hematuria, pelvic pain, urgency and vaginal pain. Musculoskeletal: Positive for arthralgias and back pain. Negative for gait problem, joint swelling, neck pain and neck stiffness. Skin: Negative for color change, rash and wound. Neurological: Positive for headaches. Negative for dizziness, seizures, speech difficulty, weakness and light-headedness. Psychiatric/Behavioral: Negative for agitation, decreased concentration, dysphoric mood, hallucinations, self-injury and sleep disturbance. The patient is nervous/anxious.         Patient Active Problem List    Diagnosis Date Noted    S/P lobectomy of lung 07/19/2021    History of pulmonary embolism 05/27/2021    On anticoagulant therapy 05/27/2021    On methotrexate therapy 05/27/2021    Obesity, Class III, BMI 40-49.9 (morbid obesity) ([address] 05/27/2021    Primary mucinous adenocarcinoma of lung ([address] 05/14/2021    Adenocarcinoma, lung, left ([address] 05/14/2021    Encounter for smoking cessation counseling 04/23/2021    Nodule of lower lobe of left lung 04/23/2021    Abnormal CT of the chest 04/21/2021    Deep venous thrombosis of left profunda femoris vein ([address] 04/16/2020    Positive colorectal cancer screening using Cologuard test 02/01/2020    Thyroid nodule 01/09/2020    Community acquired pneumonia 12/23/2019    History of thyroid nodule 12/23/2019    COPD (chronic obstructive pulmonary disease) ([address] 08/14/2019    Obesity, Class II, BMI 35-39.9 11/12/2018    Major depressive disorder, recurrent, moderate ([facility] 11/12/2018    Prediabetes 11/12/2018    Tobacco abuse 11/12/2018    History of DVT in adulthood 11/12/2018    Rheumatoid arthritis ([facility] 07/14/2014    Hypertension 09/16/2013    GERD (gastroesophageal reflux disease) 09/16/2013        Past Surgical History:   Procedure Laterality Date    APPENDECTOMY  1982    BRONCHOSCOPY N/A 12/27/2019    BRONCHOSCOPY ALVEOLAR LAVAGE performed by Vicky Pickering MD at [facility] COLONOSCOPY N/A 2/12/2020    COLONOSCOPY POLYPECTOMIES HOT SNARE, COLD BIOPSY POLYPECTOMIES performed by Haresh Rivers MD at [facility]  5/13/2021    CT NEEDLE BIOPSY LUNG PERCUTANEOUS 5/13/2021 [facility] CT SCAN    ENDOSCOPY, COLON, DIAGNOSTIC  [id number]    gastritis, sm. bowel lipoma, biopsies    LOBECTOMY Left 07/19/2021    XI ROBOTIC LEFT LOWER LOBECTOMY CONVERTED TO OPEN THORACTOMY LEFT LOWER LOBECTOMY (Left )    LUNG REMOVAL, TOTAL Left 7/19/2021    XI ROBOTIC LEFT LOWER LOBECTOMY CONVERTED TO OPEN THORACTOMY LEFT LOWER LOBECTOMY performed by Yehuda Jiang MD at [facility] History   Problem Relation Age of Onset    Cancer Mother         Uterine and breast    Diabetes Mother     Heart Disease Mother     Cancer Father         lung    Cancer Brother         lung    Cancer Brother         lung     Current Outpatient Medications   Medication Sig Dispense Refill    albuterol (PROVENTIL) (2.5 MG/3ML) 0.083% nebulizer solution Take 3 mLs by nebulization every 6 hours as needed for Wheezing or Shortness of Breath 125 vial 0    ondansetron (ZOFRAN) 4 MG tablet Take 1 tablet by mouth every 6 hours as needed for Nausea or Vomiting 24 tablet 0    azithromycin (ZITHROMAX) 250 MG tablet 500 mg orally on day one followed by 250 mg daily on days two through five 6 tablet 0    furosemide (LASIX) 20 MG tablet Take 1 tablet by mouth 2 times daily 60 tablet 3    potassium chloride (KLOR-CON M) 20 MEQ extended release tablet Take 1 tablet by mouth 2 times daily 90 tablet 1    acetaminophen (TYLENOL) 500 MG tablet Take 1,000 mg by mouth every 6 hours as needed for Pain      ELIQUIS 5 MG TABS tablet take 1 tablet by mouth twice a day 180 tablet 3    albuterol sulfate  (90 Base) MCG/ACT inhaler inhale 2 puffs by mouth and INTO THE LUNGS every 4 hours for 7 days 18 g 2    SPIRIVA RESPIMAT 2.5 MCG/ACT AERS inhaler inhale 2 puffs INTO THE LUNGS once daily 4 g 3    pantoprazole (PROTONIX) 40 MG tablet take 1 tablet by mouth once daily 90 tablet 2    Blood Pressure KIT Dx: HTN. Needs automatic blood pressure machine to monitor her blood pressure. 1 kit 0    sertraline (ZOLOFT) 100 MG tablet Take 1 tablet by mouth daily 60 tablet 1    amLODIPine (NORVASC) 10 MG tablet take 1 tablet by mouth once daily 90 tablet 1    folic acid (FOLVITE) 1 MG tablet take 1 tablet by mouth once daily 30 tablet 0    methotrexate (RHEUMATREX) 2.5 MG chemo tablet Take 20 mg by mouth once a week Indications: takes 8 tablets on        lidocaine 4 % external patch Place 1 patch onto the skin daily (Patient not taking: Reported on 2021) 30 patch 0     No current facility-administered medications for this visit.        No Known Allergies     Social History     Tobacco Use    Smoking status: Current Every Day Smoker     Packs/day: 0.25     Years: 35.00     Pack years: 8.75     Types: Cigarettes     Last attempt to quit: 1986     Years since quittin.6    Smokeless tobacco: Never Used    Tobacco comment: restarted smoking 2019   Vaping Use    Vaping Use: Never used   Substance Use Topics    Alcohol use: Not Currently     Alcohol/week: 0.0 standard drinks    Drug use: No        PHYSICAL EXAMINATION:  Vital Signs: (As obtained by patient/caregiver or practitioner observation)  Patient-Reported Vitals 2021   Patient-Reported Weight 233   Patient-Reported Height 5 5   Patient-Reported Systolic -   Patient-Reported Diastolic - Constitutional: [x] Appears well-developed and well-nourished [x] No apparent distress      Patient-Reported Vitals 8/23/2021   Patient-Reported Weight 233   Patient-Reported Height 5 5   Patient-Reported Systolic -   Patient-Reported Diastolic -          [] Abnormal-   Mental status  [x] Alert and awake  [x] Oriented to person/place/time [x]Able to follow commands      Eyes:  EOM    [x]  Normal  [] Abnormal-  Sclera  [x]  Normal  [] Abnormal -         Discharge [x]  None visible  [] Abnormal -    HENT:   [x] Normocephalic, atraumatic.   [] Abnormal   [x] Mouth/Throat: Mucous membranes are moist.     External Ears [x] Normal  [] Abnormal-     Neck: [x] No visualized mass     Pulmonary/Chest: [x] Respiratory effort normal.  [x] No visualized signs of difficulty breathing or respiratory distress        [] Abnormal     Musculoskeletal:   [x] Normal gait with no signs of ataxia         [x] Normal range of motion of neck        [] Abnormal-     Neurological:        [x] No Facial Asymmetry (Cranial nerve 7 motor function) (limited exam to video visit)          [x] No gaze palsy        [] Abnormal-     Skin:        [x] No significant exanthematous lesions or discoloration noted on facial skin         [] Abnormal-     Psychiatric:       [x] Normal Affect [x] No Hallucinations        [x] Abnormal- Anxious, with pressured speech    Other pertinent observable physical exam findings-   Lab Results   Component Value Date    WBC 11.1 07/23/2021    HGB 10.1 (L) 07/23/2021    HCT 33.9 (L) 07/23/2021    .6 (H) 07/23/2021     07/23/2021     Lab Results   Component Value Date     07/23/2021    K 4.0 07/23/2021     07/23/2021    CO2 28 07/23/2021    BUN 15 07/23/2021    CREATININE 0.66 07/23/2021    GLUCOSE 116 07/23/2021    GLUCOSE 105 12/05/2011    CALCIUM 8.8 07/23/2021        Due to this being a TeleHealth encounter, evaluation of the following organ systems is limited: Vitals/Constitutional/EENT/Resp/CV/GI//MS/Neuro/Skin/Heme-Lymph-Imm. ASSESSMENT/PLAN:  1. Adenocarcinoma, lung, left ([address]  Status post lobectomy doing well. Follow-up with pulmonologist and CT surgeon    2. Panlobular emphysema (HCC)  Refill medications  - albuterol (PROVENTIL) (2.5 MG/3ML) 0.083% nebulizer solution; Take 3 mLs by nebulization every 6 hours as needed for Wheezing or Shortness of Breath  Dispense: 125 vial; Refill: 0    3. S/P lobectomy of lung  Doing well after surgery    4. Nausea  Symptomatic treatment discussed to quit tomato juice  - ondansetron (ZOFRAN) 4 MG tablet; Take 1 tablet by mouth every 6 hours as needed for Nausea or Vomiting  Dispense: 24 tablet; Refill: 0    5. Postnasal drip  Antibiotic check for Covid test  - azithromycin (ZITHROMAX) 250 MG tablet; 500 mg orally on day one followed by 250 mg daily on days two through five  Dispense: 6 tablet; Refill: 0    6. Flu-like symptoms    - COVID-19; Future    Controlled Substance Monitoring:  Acute and Chronic Pain Monitoring:   No flowsheet data found. Orders Placed This Encounter   Procedures    COVID-19     Standing Status:   Future     Standing Expiration Date:   8/23/2022     Scheduling Instructions:      1) Due to current limited availability of the COVID-19 test, tests will be prioritized based on responses to questions above. Testing may be delayed due to volume. 2) Print and instruct patient to adhere to CDC home isolation program. (Link Above)              3) Set up or refer patient for a monitoring program.              4) Have patient sign up for and leverage [employer] (if not previously done). Order Specific Question:   Is this test for diagnosis or screening? Answer:   Diagnosis of ill patient     Order Specific Question:   Symptomatic for COVID-19 as defined by CDC?      Answer:   Yes     Order Specific Question:   Date of Symptom Onset     Answer:   8/20/2021     Order Specific Question: Hospitalized for COVID-19? Answer:   No     Order Specific Question:   Admitted to ICU for COVID-19? Answer:   No     Order Specific Question:   Employed in healthcare setting? Answer:   No     Order Specific Question:   Resident in a congregate (group) care setting? Answer:   No     Order Specific Question:   Pregnant? Answer:   No     Order Specific Question:   Previously tested for COVID-19? Answer:   Yes      Orders Placed This Encounter   Medications    albuterol (PROVENTIL) (2.5 MG/3ML) 0.083% nebulizer solution     Sig: Take 3 mLs by nebulization every 6 hours as needed for Wheezing or Shortness of Breath     Dispense:  125 vial     Refill:  0    ondansetron (ZOFRAN) 4 MG tablet     Sig: Take 1 tablet by mouth every 6 hours as needed for Nausea or Vomiting     Dispense:  24 tablet     Refill:  0    azithromycin (ZITHROMAX) 250 MG tablet     Si mg orally on day one followed by 250 mg daily on days two through five     Dispense:  6 tablet     Refill:  0      Medications Discontinued During This Encounter   Medication Reason    albuterol (PROVENTIL) (2.5 MG/3ML) 0.083% nebulizer solution REORDER      Iris received counseling on the following healthy behaviors: nutrition, exercise and medication adherence  Reviewed prior labs and health maintenance. Continue current medications, diet and exercise. Discussed use, benefit, and side effects of prescribed medications. Barriers to medication compliance addressed. Patient given educational materials - see patient instructions. All patient questions answered. Patient voiced understanding. No follow-ups on file. Aaliyah Hauser is a 61 y.o. female patient  being evaluated by a Virtual Visit (video visit) encounter to address concerns as mentioned above. A caregiver was present when appropriate.  Due to this being a TeleHealth encounter (During [facility]- public health emergency), evaluation of the following organ systems was limited:Vitals/Constitutional/EENT/Resp/CV/GI//MS/Neuro/Skin/Heme-Lymph-Imm. Services were provided through a video synchronous discussion virtually to substitute for in-person clinic visit. This is a telehealth visit that was performed with the originating site at Patient Location: home and provider Location of [place], [place]. Verbal consent to participate in video visit was obtained. Patient ID verified by me prior to start of this visit  I discussed with the patient the nature of our telehealth visits via interactive/real-time audio/video that:  - I would evaluate the patient and recommend diagnostics and treatments based on my assessment  - Our sessions are not being recorded and that personal health information is protected  - Our team would provide follow up care in person if/when the patient needs it. Pursuant to the emergency declaration under the [address] [place] [address], [address] [place] [address] authority and the [facility] and [employer] Act, this Virtual Visit was conducted with patient's (and/or legal guardian's) consent, to reduce the patient's risk of exposure to COVID-19 and provide necessary medical care. The patient (and/or legal guardian) has also been advised to contact this office for worsening conditions or problems, and seek emergency medical treatment and/or call 911 if deemed necessary. This note was completed by using the assistance of a speech-recognition program. However, inadvertent computerized transcription errors may be present. Although every effort was made to ensure accuracy, no guarantees can be provided that every mistake has been identified and corrected by editing.   Electronically signed by Lucia Issa MD on 8/23/21 at 12:09 PM EDT

## 2021-08-24 ENCOUNTER — HOSPITAL ENCOUNTER (OUTPATIENT)
Facility: MEDICAL CENTER | Age: 66
End: 2021-08-24
Payer: COMMERCIAL

## 2021-08-30 NOTE — TELEPHONE ENCOUNTER
Name: Amanda Morales  : 1955  MRN: [id number]    Oncology Navigation Follow-Up Note  Navigator received call from pt. And pt. Cancelling [place] appt. For tomorrow due to cough/Covid? Pt. Has seen PCP and on ATB, but c/o nausea and cough and PCP maybe wanting to get Covid testing . Pt. Will keep navigator informed. Plan to follow. Pt. Has not been vaccinated, Triage updated.   Electronically signed by Justin Talley RN on 2021 at 8:44 AM

## 2021-09-08 NOTE — TELEPHONE ENCOUNTER
Name: Irma Patient  : 1955  MRN: [id number]    Oncology Navigation Follow-Up Note  Navigator spoke with pt. And writer rescheduling MO appt. Pt. C/o of back pain since the surgery and areas of numbness. Writer spoke with pt. About Oncology Rehab and pt. Willing, but not sure if insurance would cover it ? Writer sending message to Dr. Marilyn Estrada for referral if appropriate.  Pt. Rescheduled with Dr. Keyon Elizondo for  12:30  Electronically signed by Sofia Bowden RN on 2021 at 2:00 PM

## 2021-09-09 NOTE — TELEPHONE ENCOUNTER
Name: Natividad Goncalves  : 1955  MRN: [id number]    Oncology Navigation Follow-Up Note  Navigator spoke with pt. And informed of upcoming MO appt. And also infomred of Onc Rehab referral , letting pt. Know they will reach out to her to schedule, plan to follow.    Electronically signed by Jose M Redding RN on 2021 at 9:38 AM

## 2021-09-19 PROBLEM — R09.02 HYPOXIA: Status: ACTIVE | Noted: 2021-01-01

## 2021-09-19 NOTE — FLOWSHEET NOTE
09/19/21 0322   Provider Notification   Reason for Communication Review case   Provider Name Dr. Wilmer Mays   Provider Notification Physician   Method of Communication Call   Response No new orders   Notification Time [phone]     RN updated Dr Wilmer Mays of pt's new admission with Na 134, K 3.3, and Mg 1.7. No new orders no labs to be drawn in the morning.

## 2021-09-19 NOTE — H&P
History and Physical Service  [facility] Internal Medicine    HISTORY AND PHYSICAL EXAMINATION            Date:   9/19/2021  Patient name:  Olimpia Schwartz  MRN:   [id number]  Account:  [de-identified]  YOB: 1955  PCP:    Shelva Cabot, MD  Code Status:    Full Code    Chief Complaint:   Increasing dyspnea cough and chills    History Obtained From:     Patient ,medical record ,nursing staff    History of Present Illnes       The patient is a 61 y.o.   Non- / non  female who presents   53-year-old lady who was not vaccinated for COVID-19 recently diagnosed with adenocarcinoma lung left lower lobe status post lobectomy at [place] month of August complains of 5-day history of cough cold symptoms with chills increasing dyspnea associated with sputum production patient has history of COPD on as needed home oxygen  Patient also has history of rheumatoid arthritis on methotrexate    Past Medical History:   Diagnosis Date    Abnormal CT of the chest 04/21/2021    Adenocarcinoma, lung, left ([address] 05/14/2021    Arthritis     Community acquired pneumonia 12/23/2019    COPD (chronic obstructive pulmonary disease) ([address] 08/14/2019    Deep venous thrombosis of left profunda femoris vein ([address] 04/16/2020    Depression     DVT (deep venous thrombosis) ([address] 2014    b/l     GERD (gastroesophageal reflux disease)     History of pulmonary embolism 05/27/2021    History of thyroid nodule 12/23/2019    Hypertension     Major depressive disorder, recurrent, moderate ([address] 11/12/2018    Nodule of lower lobe of left lung 04/23/2021    Obesity, Class II, BMI 35-39.9 11/12/2018    On anticoagulant therapy 05/27/2021    On methotrexate therapy 05/27/2021    Prediabetes 11/12/2018    Rheumatoid arthritis ([address]     Snores     denies apnea    Thyroid nodule 01/09/2020    Tobacco abuse 11/12/2018    Under care of team 06/29/2021    pulmonology-Dr [facility] Pain   Yes Historical Provider, MD   ELIQUIS 5 MG TABS tablet take 1 tablet by mouth twice a day 7/16/21  Yes Augusto Patel MD   SPIRIVA RESPIMAT 2.5 MCG/ACT AERS inhaler inhale 2 puffs INTO THE LUNGS once daily 5/24/21  Yes Augusto Patel MD   pantoprazole (PROTONIX) 40 MG tablet take 1 tablet by mouth once daily 5/18/21  Yes Augusto Patel MD   amLODIPine (NORVASC) 10 MG tablet take 1 tablet by mouth once daily 4/15/21  Yes Augusto Patel MD   folic acid (FOLVITE) 1 MG tablet take 1 tablet by mouth once daily 4/12/21  Yes Augusto Patel MD   albuterol sulfate  (90 Base) MCG/ACT inhaler inhale 2 puffs by mouth and INTO THE LUNGS every 4 hours for 7 days 6/1/21   Augusto Patel MD   Blood Pressure KIT Dx: HTN. Needs automatic blood pressure machine to monitor her blood pressure. 5/18/21   Augusto Patel MD   methotrexate (RHEUMATREX) 2.5 MG chemo tablet Take 20 mg by mouth once a week Indications: takes 8 tablets on saturdays     Historical Provider, MD        Allergies:     Patient has no known allergies. Social History:     Tobacco:    reports that she quit smoking about 35 years ago. Her smoking use included cigarettes. She has a 8.75 pack-year smoking history. She has never used smokeless tobacco.  Alcohol:      reports previous alcohol use. Drug Use:  reports no history of drug use. Family History:     Family History   Problem Relation Age of Onset    Cancer Mother         Uterine and breast    Diabetes Mother     Heart Disease Mother     Cancer Father         lung    Cancer Brother         lung    Cancer Brother         lung       Review of Systems:     Positive and Negative as described in HPI. CONSTITUTIONAL: Positive for chills  HEENT:  negative for vision, hearing changes, runny nose, throat pain  RESPIRATORY: Positive for cough dyspnea. CARDIOVASCULAR:  negative for chest pain, palpitations.   GASTROINTESTINAL:  negative for nausea, vomiting, diarrhea, constipation, change in bowel habits, abdominal pain   GENITOURINARY:  negative for difficulty of urination, burning with urination, frequency   INTEGUMENT:  negative for rash, skin lesions, easy bruising   HEMATOLOGIC/LYMPHATIC:  negative for swelling/edema   ALLERGIC/IMMUNOLOGIC:  negative for urticaria , itching  ENDOCRINE:  negative increase in drinking, increase in urination, hot or cold intolerance  MUSCULOSKELETAL:  negative joint pains, muscle aches, swelling of joints  NEUROLOGICAL:  negative for headaches, dizziness, lightheadedness, numbness, pain, tingling extremities  BEHAVIOR/PSYCH:  negative for depression, anxiety    Physical Exam:   BP (!) 142/71   Pulse 87   Temp 98.1 °F (36.7 °C) (Oral)   Resp 20   Ht 5' 5\" (1.651 m)   Wt 221 lb 12.5 oz (100.6 kg)   SpO2 92%   BMI 36.91 kg/m²   No results for input(s): POCGLU in the last 72 hours. Thoracotomy scar noted in the left back of the chest wall   General Appearance:  alert, well appearing, and in no acute distress  Mental status: oriented to person, place, and time with normal affect  Head:  normocephalic, atraumatic. Eye: no icterus, redness, pupils equal and reactive, extraocular eye movements intact, conjunctiva clear  Ear: normal external ear, no discharge, hearing intact  Nose:  no drainage noted  Mouth: mucous membranes moist  Neck: supple, no carotid bruits, thyroid not palpable  Lungs: Diffuse decreased air entry with rales and rhonchi's cardiovascular: normal rate, regular rhythm, no murmur, gallop, rub.   Abdomen: Soft, nontender, nondistended, normal bowel sounds, no hepatomegaly or splenomegaly  Neurologic: There are no new focal motor or sensory deficits, normal muscle tone and bulk, no abnormal sensation, normal speech, cranial nerves II through XII grossly intact  Skin: No gross lesions, rashes, bruising or bleeding on exposed skin area  Extremities:  peripheral pulses palpable, no pedal edema or calf pain with palpation  Psych: normal affect Investigations:      Laboratory Testing:  Recent Results (from the past 24 hour(s))   EKG 12 Lead    Collection Time: 09/18/21 10:35 PM   Result Value Ref Range    Ventricular Rate 86 BPM    Atrial Rate 86 BPM    P-R Interval 156 ms    QRS Duration 90 ms    Q-T Interval 398 ms    QTc Calculation (Bazett) 476 ms    P Axis 68 degrees    R Axis 7 degrees    T Axis 43 degrees   CBC Auto Differential    Collection Time: 09/18/21 10:45 PM   Result Value Ref Range    WBC 11.9 (H) 3.5 - 11.0 k/uL    RBC 3.07 (L) 4.0 - 5.2 m/uL    Hemoglobin 9.7 (L) 12.0 - 16.0 g/dL    Hematocrit 29.1 (L) 36 - 46 %    MCV 94.7 80 - 100 fL    MCH 31.5 26 - 34 pg    MCHC 33.3 31 - 37 g/dL    RDW 18.2 (H) 11.5 - 14.9 %    Platelets 073 218 - 087 k/uL    MPV 6.6 6.0 - 12.0 fL    NRBC Automated NOT REPORTED per 100 WBC    Differential Type NOT REPORTED     Immature Granulocytes NOT REPORTED 0 %    Absolute Immature Granulocyte NOT REPORTED 0.00 - 0.30 k/uL    WBC Morphology NOT REPORTED     RBC Morphology NOT REPORTED     Platelet Estimate NOT REPORTED     Seg Neutrophils 51 36 - 66 %    Lymphocytes 36 24 - 44 %    Monocytes 9 (H) 1 - 7 %    Eosinophils % 1 0 - 4 %    Basophils 0 0 - 2 %    Bands 1 0 - 10 %    Metamyelocytes 2 (H) 0 %    nRBC 1 (H) 0 per 100 WBC    Segs Absolute 6.05 1.3 - 9.1 k/uL    Absolute Lymph # 4.28 1.0 - 4.8 k/uL    Absolute Mono # 1.07 0.1 - 1.3 k/uL    Absolute Eos # 0.12 0.0 - 0.4 k/uL    Basophils Absolute 0.00 0.0 - 0.2 k/uL    Absolute Bands # 0.12 0.0 - 1.0 k/uL    Metamyelocytes Absolute 0.24 (H) 0 k/uL    Morphology ANISOCYTOSIS PRESENT     Morphology T [facility]    Basic Metabolic Panel    Collection Time: 09/18/21 10:45 PM   Result Value Ref Range    Glucose 140 (H) 70 - 99 mg/dL    BUN 12 8 - 23 mg/dL    CREATININE 0.73 0.50 - 0.90 mg/dL    Bun/Cre Ratio NOT REPORTED 9 - 20    Calcium 9.1 8.6 - 10.4 mg/dL    Sodium 134 (L) 135 - 144 mmol/L    Potassium 3.3 (L) 3.7 - 5.3 mmol/L    Chloride 102 98 - 107 PHENYTOIN, PHENYF, URICACID, POCGLU in the last 72 hours. Imaging/Diagnostics:       CT CHEST PULMONARY EMBOLISM W CONTRAST    Result Date: 9/19/2021  EXAMINATION: CTA OF THE CHEST 9/18/2021 11:20 pm TECHNIQUE: CTA of the chest was performed after the administration of intravenous contrast.  Multiplanar reformatted images are provided for review. MIP images are provided for review. Dose modulation, iterative reconstruction, and/or weight based adjustment of the mA/kV was utilized to reduce the radiation dose to as low as reasonably achievable. COMPARISON: None. HISTORY: ORDERING SYSTEM PROVIDED HISTORY: Post lobectomy SOB TECHNOLOGIST PROVIDED HISTORY: Post lobectomy SOB Decision Support Exception - unselect if not a suspected or confirmed emergency medical condition->Emergency Medical Condition (MA) Reason for Exam: patient c/o sob for a couple days Additional signs and symptoms: patient c/o sob for a couple days FINDINGS: Pulmonary Arteries: Pulmonary arteries are adequately opacified for evaluation. No evidence of intraluminal filling defect to suggest pulmonary embolism. Main pulmonary artery is normal in caliber. Mediastinum: No evidence of mediastinal lymphadenopathy. The heart and pericardium demonstrate no acute abnormality. There is no acute abnormality of the thoracic aorta. Lungs/pleura: Small to moderate left-sided pleural effusion with associated compressive atelectasis. Mild scattered areas of patchy and ground-glass opacities are noted throughout the bilateral lungs. These are nonspecific but could indicate an atypical/viral pneumonia, inclusive of COVID-19. Upper Abdomen: Limited images of the upper abdomen are unremarkable. Soft Tissues/Bones: Subacute mildly offset fracture of the anterior and lateral left 8th rib. No evidence of pulmonary embolism. Small to moderate left-sided pleural effusion with associated compressive atelectasis.  Mild scattered areas of patchy and ground-glass opacities are noted throughout the bilateral lungs. These are nonspecific but could indicate an atypical/viral pneumonia, inclusive of COVID-19. Current Facility-Administered Medications   Medication Dose Route Frequency Provider Last Rate Last Admin    sodium chloride flush 0.9 % injection 5-40 mL  5-40 mL IntraVENous 2 times per day iAmee Green MD        sodium chloride flush 0.9 % injection 5-40 mL  5-40 mL IntraVENous PRN Aimee Green MD        0.9 % sodium chloride infusion  25 mL IntraVENous PRN Aimee Green MD        ondansetron (ZOFRAN-ODT) disintegrating tablet 4 mg  4 mg Oral Q8H PRN Reji Marsh MD        Or    ondansetron (ZOFRAN) injection 4 mg  4 mg IntraVENous Q6H PRN Reji Marsh MD        acetaminophen (TYLENOL) tablet 1,000 mg  1,000 mg Oral Q6H PRN Reji Marsh MD        albuterol (PROVENTIL) nebulizer solution 2.5 mg  2.5 mg Nebulization Q6H PRN Reji Marsh MD        albuterol sulfate  (90 Base) MCG/ACT inhaler 2 puff  2 puff Inhalation Q6H PRN Reji Marsh MD        amLODIPine (NORVASC) tablet 10 mg  10 mg Oral Daily Reji Marsh MD        apixaban (ELIQUIS) tablet 5 mg  5 mg Oral BID Aimee Green MD        folic acid (FOLVITE) tablet 1 mg  1 mg Oral Daily Reji Marsh MD        methotrexate (RHEUMATREX) chemo tablet 20 mg  20 mg Oral Weekly Aimee Green MD       [facility] [place] ON 9/20/2021] pantoprazole (PROTONIX) tablet 20 mg  20 mg Oral QAM AC Reji Marsh MD        tiotropium (SPIRIVA RESPIMAT) 2.5 MCG/ACT inhaler 2 puff  2 puff Inhalation Daily Reji Marsh MD        sodium chloride flush 0.9 % injection 10 mL  10 mL IntraVENous PRN Kisha Jack MD   10 mL at 09/19/21 0236     Impressions :      1. Active Problems:    Hypoxia  Resolved Problems:    * No resolved hospital problems.  *        2.  has a past medical history of Abnormal CT of the chest (04/21/2021), Adenocarcinoma, lung, left ([facility] (05/14/2021), Arthritis, Community acquired pneumonia (12/23/2019), COPD (chronic obstructive pulmonary disease) ([facility] (08/14/2019), Deep venous thrombosis of left profunda femoris vein ([address] (04/16/2020), Depression, DVT (deep venous thrombosis) ([address] (2014), GERD (gastroesophageal reflux disease), History of pulmonary embolism (05/27/2021), History of thyroid nodule (12/23/2019), Hypertension, Major depressive disorder, recurrent, moderate ([address] (11/12/2018), Nodule of lower lobe of left lung (04/23/2021), Obesity, Class II, BMI 35-39.9 (11/12/2018), On anticoagulant therapy (05/27/2021), On methotrexate therapy (05/27/2021), Prediabetes (11/12/2018), Rheumatoid arthritis ([address] Snores, Thyroid nodule (01/09/2020), Tobacco abuse (11/12/2018), Under care of team (06/29/2021), Under care of team (06/29/2021), Under care of team, and Wellness examination (06/29/2021).      Plans:     72-year-old lady with recent left lung lobectomy for adenocarcinoma lungs  Moderate ex-smoker COPD on home oxygen  5-day history of cough cold flulike illness  Rapid Covid is negative in ER  Will transfer to Covid isolation room due to Covid PCR testing  Also other respiratory virus including rhinovirus panel  Acute exacerbation of COPD IV steroids DuoNeb  Pulmonary consult  DVT prophylaxis  Patient is not vaccinated for COVID-19 I encouraged her to get vaccine if the PCR is negative  Immunocompromised on methotrexate for mid arthritis      Michelle Chahal MD  9/19/2021  9:33 AM

## 2021-09-19 NOTE — PLAN OF CARE
Problem: Airway Clearance - Ineffective:  Goal: Ability to maintain a clear airway will improve  Description: Ability to maintain a clear airway will improve  Outcome: Ongoing  Note: Patient resp rate 20 breath/min; pulse ox 91% on 2L oxygen via nasal canula; lung sounds bialteral inspiratory wheezing and rhonchi; will continue to monitor airway clearance.

## 2021-09-19 NOTE — ED PROVIDER NOTES
EMERGENCY DEPARTMENT ENCOUNTER   ATTENDING ATTESTATION     Pt Name: Shannan Emery  MRN: [id number]  [place] 1955  Date of evaluation: 9/19/21       Shannan Emery is a 61 y.o. female who presents with Shortness of Breath, Cough, and Headache      MDM:   This is a 61-year-old female with a history of adenocarcinoma of the lung status post lobectomy in August who comes in today with difficulty in breathing found to be hypoxic 88% on room air on 4 L nasal cannula she does have a history of COPD she was given steroids. Potassium and magnesium are low both of these were replaced. Her CT PE is negative for pulmonary embolism but has layering effusion most likely the etiology for her hypoxia I did speak to Dr. Wilfredo Courtney who did recommend placing the patient on IV Levaquin and having pulmonology see her in the morning. CRITICAL CARE: There was a high probability of clinically significant/life threatening deterioration in this patient's condition which required my urgent intervention. Total critical care time was 10 minutes. This excludes any time for separately reportable procedures. Vitals:   Vitals:    09/18/21 2205 09/18/21 2214 09/19/21 0101   BP: 128/69     Pulse: 91  93   Resp: 18     Temp: 98 °F (36.7 °C)     TempSrc: Oral     SpO2: (!) 88% 94%    Weight: 230 lb (104.3 kg)     Height: 5' 5\" (1.651 m)           I personally evaluated and examined the patient in conjunction with the resident and agree with the assessment, treatment plan, and disposition of the patient as recorded by the resident. I performed a history and physical examination of the patient and discussed management with the resident. I reviewed the residents note and agree with the documented findings and plan of care. Any areas of disagreement are noted on the chart. I was personally present for the key portions of any procedures.  I have documented in the chart those procedures where I was not present during the davis portions. I have personally reviewed all images and agree with the resident's interpretation. I have reviewed the emergency nurses triage note. I agree with the chief complaint, past medical history, past surgical history, allergies, medications, social and family history as documented unless otherwise noted.     Huy Duong MD  Attending Emergency Physician            Huy Duong MD  09/19/21 [id number]

## 2021-09-19 NOTE — CONSULTS
[address] [facility] PULMONARY, CRITICAL CARE & SLEEP  MD Lisbeth Boles MD Janita Oscar MD Richardo Nice MD  [address] APRN   CONSULT/H&P NOTE      Date of Admission: 9/18/2021 10:05 PM    Chief complaint: Shortness of breath    Referring Physician: * No referring provider recorded for this case *  PCP: Marlys Vargas MD     History of Present Illness: Jayashree Bunch is a 61 y.o. White (non-)   female who presents with complaints of shortness of breath over a period of several days. The patient reports that she recently came in contact with her grandchildren who had been having an upper respiratory infection. Patient is known to our service and has a history of COPD and adenocarcinoma of the lung status post lobectomy earlier this year. She reports that her symptoms reached the point where she could no longer manage them at home. She presented to the emergency department where she was found to be hypoxic with a few mild electrolyte abnormalities and a mild leukocytosis. Patient had not been vaccinated against COVID-19 and was swab for COVID-19 which was negative. CT angiogram of the chest did reveal evidence of occasional scattered groundglass opacities. Respiratory pathogen panel did reveal a positive RSV. The patient was started on glucocorticoids, bronchodilators and admitted to the floor for further evaluation. Patient informs me that she does have oxygen at home but typically does not require it. She was in her usual state of health up until this past 1 week.     Problem:  Principal Problem: Acute exacerbation of COPD secondary to RSV infection    PMH:   Past Medical History:   Diagnosis Date    Abnormal CT of the chest 04/21/2021    Adenocarcinoma, lung, left ([facility] [address] 05/14/2021    Arthritis     Community acquired pneumonia 12/23/2019    COPD (chronic obstructive pulmonary disease) ([facility] [address] 08/14/2019    Deep venous thrombosis of left profunda femoris vein ([facility] [address] 04/16/2020    Depression     DVT (deep venous thrombosis) ([facility] [address] 2014    b/l     GERD (gastroesophageal reflux disease)     History of pulmonary embolism 05/27/2021    History of thyroid nodule 12/23/2019    Hypertension     Major depressive disorder, recurrent, moderate ([facility] [address] 11/12/2018    Nodule of lower lobe of left lung 04/23/2021    Obesity, Class II, BMI 35-39.9 11/12/2018    On anticoagulant therapy 05/27/2021    On methotrexate therapy 05/27/2021    Prediabetes 11/12/2018    Rheumatoid arthritis ([facility] [address]     Snores     denies apnea    Thyroid nodule 01/09/2020    Tobacco abuse 11/12/2018    Under care of team 06/29/2021    pulmonology-Dr Blank- gareth-last visit june 2021    Under care of team 06/29/2021    oncology-Dr Vences Kristen-last visit june 2021    Under care of team     Dr. Tim Pires clearance appointment 7/6/21, stress test 7/9/2021, clearance obtained and placed on chart    Wellness examination 06/29/2021    pcp-Dr Emmie Steen office-last visit may 2021       [address]:   Past Surgical History:   Procedure Laterality Date   [facility] N/A 12/27/2019    BRONCHOSCOPY ALVEOLAR LAVAGE performed by Toby Oppenheim, MD at [facility] N/A 2/12/2020    COLONOSCOPY POLYPECTOMIES HOT SNARE, COLD BIOPSY POLYPECTOMIES performed by Devang Torres MD at [facility]  5/13/2021    CT NEEDLE BIOPSY LUNG PERCUTANEOUS 5/13/2021 [facility] CT SCAN    ENDOSCOPY, COLON, DIAGNOSTIC  [id number]    gastritis, sm. bowel lipoma, biopsies    LOBECTOMY Left 07/19/2021    XI ROBOTIC LEFT LOWER LOBECTOMY CONVERTED TO OPEN THORACTOMY LEFT LOWER LOBECTOMY (Left )    LUNG REMOVAL, TOTAL Left 7/19/2021    XI ROBOTIC LEFT LOWER LOBECTOMY CONVERTED TO OPEN THORACTOMY LEFT LOWER LOBECTOMY performed by Iris Flores MD at [address] [place] [address] Rd Ne: No Known Allergies    Home Meds:  Medications Prior to Admission: albuterol (PROVENTIL) (2.5 MG/3ML) 0.083% nebulizer solution, Take 3 mLs by nebulization every 6 hours as needed for Wheezing or Shortness of Breath  furosemide (LASIX) 20 MG tablet, Take 1 tablet by mouth 2 times daily  lidocaine 4 % external patch, Place 1 patch onto the skin daily  potassium chloride (KLOR-CON M) 20 MEQ extended release tablet, Take 1 tablet by mouth 2 times daily  acetaminophen (TYLENOL) 500 MG tablet, Take 1,000 mg by mouth every 6 hours as needed for Pain  ELIQUIS 5 MG TABS tablet, take 1 tablet by mouth twice a day  SPIRIVA RESPIMAT 2.5 MCG/ACT AERS inhaler, inhale 2 puffs INTO THE LUNGS once daily  pantoprazole (PROTONIX) 40 MG tablet, take 1 tablet by mouth once daily  amLODIPine (NORVASC) 10 MG tablet, take 1 tablet by mouth once daily  folic acid (FOLVITE) 1 MG tablet, take 1 tablet by mouth once daily  methotrexate (RHEUMATREX) 2.5 MG chemo tablet, Take 20 mg by mouth once a week Indications: takes 8 tablets on    [DISCONTINUED] sertraline (ZOLOFT) 100 MG tablet, take 1 tablet by mouth once daily  albuterol sulfate  (90 Base) MCG/ACT inhaler, inhale 2 puffs by mouth and INTO THE LUNGS every 4 hours for 7 days  Blood Pressure KIT, Dx: HTN. Needs automatic blood pressure machine to monitor her blood pressure.     Social History:   Social History     Socioeconomic History    Marital status: Single     Spouse name: Not on file    Number of children: 6    Years of education: Not on file    Highest education level: Not on file   Occupational History     Employer: N/A   Tobacco Use    Smoking status: Former Smoker     Packs/day: 0.25     Years: 35.00     Pack years: 8.75     Types: Cigarettes     Quit date: 1986     Years since quittin.7    Smokeless tobacco: Never Used    Tobacco comment: restarted smoking 2019   Vaping Use    Vaping Use: Never used   Substance and Sexual Activity    Alcohol use: Not Currently Alcohol/week: 0.0 standard drinks    Drug use: No    Sexual activity: Not Currently   Other Topics Concern    Not on file   Social History Narrative    Not on file     Social Determinants of Health     Financial Resource Strain: Low Risk     Difficulty of Paying Living Expenses: Not hard at all   Food Insecurity: No Food Insecurity    Worried About Running Out of Food in the Last Year: Never true    Emelyn of Food in the Last Year: Never true   Transportation Needs: No Transportation Needs    Lack of Transportation (Medical): No    Lack of Transportation (Non-Medical): No   Physical Activity:     Days of Exercise per Week:     Minutes of Exercise per Session:    Stress:     Feeling of Stress :    Social Connections:     Frequency of Communication with Friends and Family:     Frequency of Social Gatherings with Friends and Family:     Attends Restoration Services:     Active Member of Clubs or Organizations:     Attends Club or Organization Meetings:     Marital Status:    Intimate Partner Violence:     Fear of Current or Ex-Partner:     Emotionally Abused:     Physically Abused:     Sexually Abused:        Family History:   Family History   Problem Relation Age of Onset    Cancer Mother         Uterine and breast    Diabetes Mother     Heart Disease Mother     Cancer Father         lung    Cancer Brother         lung    Cancer Brother         lung       Review of Systems  Review of Systems   Constitutional: Positive for fatigue. Negative for appetite change, chills and fever. HENT: Negative for congestion, ear pain, sore throat and trouble swallowing. Respiratory: Positive for cough, shortness of breath and wheezing. Negative for chest tightness. Cardiovascular: Negative for chest pain, palpitations and leg swelling. Gastrointestinal: Negative for abdominal distention, abdominal pain, blood in stool, constipation, diarrhea, nausea and vomiting.    Endocrine: Negative for polydipsia and polyuria. Genitourinary: Negative for difficulty urinating and hematuria. Musculoskeletal: Negative for arthralgias, back pain and myalgias. Skin: Negative for color change, rash and wound. Allergic/Immunologic: Negative for environmental allergies. Neurological: Negative for dizziness, weakness, light-headedness and headaches. Hematological: Does not bruise/bleed easily. Psychiatric/Behavioral: Negative for confusion. The patient is not nervous/anxious. Physical Exam  Vital Signs: Blood pressure (!) 134/51, pulse 78, temperature 98.2 °F (36.8 °C), temperature source Oral, resp. rate 20, height 5' 5\" (1.651 m), weight 221 lb 12.5 oz (100.6 kg), SpO2 95 %, not currently breastfeeding. Respiratory Source: [unfilled]    Admission Weight: Weight: 230 lb (104.3 kg)    Physical Exam  Constitutional:       General: She is awake. She is not in acute distress. Appearance: Normal appearance. She is obese. She is not ill-appearing or diaphoretic. Interventions: Nasal cannula in place. HENT:      Head: Normocephalic and atraumatic. Right Ear: External ear normal.      Left Ear: External ear normal.      Nose: Nose normal.      Mouth/Throat:      Mouth: Mucous membranes are moist.      Pharynx: No oropharyngeal exudate. Eyes:      Extraocular Movements: Extraocular movements intact. Conjunctiva/sclera: Conjunctivae normal.   Neck:      Thyroid: No thyroid mass or thyromegaly. Trachea: Trachea and phonation normal.   Cardiovascular:      Rate and Rhythm: Normal rate and regular rhythm. Heart sounds: Normal heart sounds. No murmur heard. No gallop. Pulmonary:      Effort: Pulmonary effort is normal. No accessory muscle usage or respiratory distress. Breath sounds: Normal air entry. Examination of the left-lower field reveals decreased breath sounds. Decreased breath sounds, wheezing and rales present. No rhonchi.    Chest:      Comments: Thoracotomy scar well-healed  Abdominal:      General: Bowel sounds are normal. There is no distension. Palpations: Abdomen is soft. Tenderness: There is no abdominal tenderness. Musculoskeletal:      Cervical back: Normal range of motion and neck supple. Right lower leg: No edema. Left lower leg: No edema. Skin:     General: Skin is warm. Capillary Refill: Capillary refill takes less than 2 seconds. Neurological:      General: No focal deficit present. Mental Status: She is alert, oriented to person, place, and time and easily aroused. GCS: GCS eye subscore is 4. GCS verbal subscore is 5. GCS motor subscore is 6. Cranial Nerves: Cranial nerves are intact. Sensory: Sensation is intact. Motor: Motor function is intact. Deep Tendon Reflexes: Reflexes normal.   Psychiatric:         Behavior: Behavior normal. Behavior is cooperative. Cognition and Memory: Cognition and memory normal.         Judgment: Judgment normal.         CT CHEST PULMONARY EMBOLISM W CONTRAST    Result Date: 9/19/2021  EXAMINATION: CTA OF THE CHEST 9/18/2021 11:20 pm TECHNIQUE: CTA of the chest was performed after the administration of intravenous contrast.  Multiplanar reformatted images are provided for review. MIP images are provided for review. Dose modulation, iterative reconstruction, and/or weight based adjustment of the mA/kV was utilized to reduce the radiation dose to as low as reasonably achievable. COMPARISON: None. HISTORY: ORDERING SYSTEM PROVIDED HISTORY: Post lobectomy SOB TECHNOLOGIST PROVIDED HISTORY: Post lobectomy SOB Decision Support Exception - unselect if not a suspected or confirmed emergency medical condition->Emergency Medical Condition (MA) Reason for Exam: patient c/o sob for a couple days Additional signs and symptoms: patient c/o sob for a couple days FINDINGS: Pulmonary Arteries: Pulmonary arteries are adequately opacified for evaluation.   No evidence of

## 2021-09-19 NOTE — FLOWSHEET NOTE
09/19/21 0317   Provider Notification   Reason for Communication Review case   Provider Name Dr. Lyle Ventura   Provider Notification Physician   Method of Communication Secure Message   Response No new orders   Notification Time [phone]     RN updated Dr. Lyle Ventura of new admission with CT chest results. No new orders at this time.

## 2021-09-19 NOTE — PLAN OF CARE
Safety maintained, call light is within reach, no s/s or c/o distress, bed is low/locked, side rails are up x2  Problem: Airway Clearance - Ineffective:  Goal: Ability to maintain a clear airway will improve  Description: Ability to maintain a clear airway will improve  9/19/2021 1727 by Do Sanders RN  Outcome: Ongoing  9/19/2021 0407 by Alexa Russo RN  Outcome: Ongoing  Note: Patient resp rate 20 breath/min; pulse ox 91% on 2L oxygen via nasal canula; lung sounds bialteral inspiratory wheezing and rhonchi; will continue to monitor airway clearance.

## 2021-09-19 NOTE — ED PROVIDER NOTES
[address] [facility]  Emergency Department Encounter  Emergency Medicine Resident     Pt Name: Scott Alex  JGU:[id number]  Birthdate 1955  Date of evaluation: 9/19/21  PCP:  Mila Dia MD    [address] [place] [address]       Chief Complaint   Patient presents with    Shortness of Breath    Cough    Headache       HISTORY OF PRESENT ILLNESS  (Location/Symptom, Timing/Onset, Context/Setting, Quality, Duration, ModifyingFactors, Severity.)      Scott Alex is a 61 y.o. female with PMH of lung cancer status post lobectomy presents to the emergency department for shortness of breath cough and a mild headache. Patient states that she feels that she caught a cold about a week ago and it progressed into her lungs. Patient initially saturating in the low 90s high 80s on arrival. States that she has pain in the left side of her chest and lung area over where she had her lobectomy. The pain is not any different than her normal pain in the area since her surgery. PAST MEDICAL / SURGICAL / SOCIAL /FAMILY HISTORY      has a past medical history of Abnormal CT of the chest, Adenocarcinoma, lung, left ([address] Arthritis, Community acquired pneumonia, COPD (chronic obstructive pulmonary disease) ([address] Deep venous thrombosis of left profunda femoris vein ([address] Depression, DVT (deep venous thrombosis) ([address] GERD (gastroesophageal reflux disease), History of pulmonary embolism, History of thyroid nodule, Hypertension, Major depressive disorder, recurrent, moderate ([address] Nodule of lower lobe of left lung, Obesity, Class II, BMI 35-39.9, On anticoagulant therapy, On methotrexate therapy, Prediabetes, Rheumatoid arthritis ([address] Snores, Thyroid nodule, Tobacco abuse, Under care of team, Under care of team, Under care of team, and Wellness examination. No other pertinent PMH on review with patient/guardian. has a past surgical history that includes Appendectomy (1982);  Endoscopy, colon, diagnostic (567132); bronchoscopy (N/A, 2019); Colonoscopy (N/A, 2020); CT NEEDLE BIOPSY LUNG PERCUTANEOUS (2021); lobectomy (Left, 2021); and Lung removal, total (Left, 2021). No other pertinent PSH on review with patient/guardian. Social History     Socioeconomic History    Marital status: Single     Spouse name: Not on file    Number of children: 6    Years of education: Not on file    Highest education level: Not on file   Occupational History     Employer: N/A   Tobacco Use    Smoking status: Former Smoker     Packs/day: 0.25     Years: 35.00     Pack years: 8.75     Types: Cigarettes     Quit date: 1986     Years since quittin.7    Smokeless tobacco: Never Used    Tobacco comment: restarted smoking    Vaping Use    Vaping Use: Never used   Substance and Sexual Activity    Alcohol use: Not Currently     Alcohol/week: 0.0 standard drinks    Drug use: No    Sexual activity: Not Currently   Other Topics Concern    Not on file   Social History Narrative    Not on file     Social Determinants of Health     Financial Resource Strain: Low Risk     Difficulty of Paying Living Expenses: Not hard at all   Food Insecurity: No Food Insecurity    Worried About Running Out of Food in the Last Year: Never true    Emelyn of Food in the Last Year: Never true   Transportation Needs: No Transportation Needs    Lack of Transportation (Medical): No    Lack of Transportation (Non-Medical):  No   Physical Activity:     Days of Exercise per Week:     Minutes of Exercise per Session:    Stress:     Feeling of Stress :    Social Connections:     Frequency of Communication with Friends and Family:     Frequency of Social Gatherings with Friends and Family:     Attends Alevism Services:     Active Member of Clubs or Organizations:     Attends Club or Organization Meetings:     Marital Status:    Intimate Partner Violence:     Fear of Current or Ex-Partner:     Emotionally Abused:     Physically Abused:     Sexually Abused:        I counseled the patient against using tobacco products. Family History   Problem Relation Age of Onset    Cancer Mother         Uterine and breast    Diabetes Mother     Heart Disease Mother     Cancer Father         lung    Cancer Brother         lung    Cancer Brother         lung     No other pertinent FamHx on review with patient/guardian. Allergies:  Patient has no known allergies. Home Medications:  Prior to Admission medications    Medication Sig Start Date End Date Taking?  Authorizing Provider   albuterol (PROVENTIL) (2.5 MG/3ML) 0.083% nebulizer solution Take 3 mLs by nebulization every 6 hours as needed for Wheezing or Shortness of Breath 8/23/21  Yes Massiel Cardona MD   furosemide (LASIX) 20 MG tablet Take 1 tablet by mouth 2 times daily 7/23/21  Yes ILIR Porras NP   lidocaine 4 % external patch Place 1 patch onto the skin daily 7/24/21  Yes ILIR Porras NP   potassium chloride (KLOR-CON M) 20 MEQ extended release tablet Take 1 tablet by mouth 2 times daily 7/23/21  Yes ILIR Porras NP   acetaminophen (TYLENOL) 500 MG tablet Take 1,000 mg by mouth every 6 hours as needed for Pain   Yes Historical Provider, MD   ELIQUIS 5 MG TABS tablet take 1 tablet by mouth twice a day 7/16/21  Yes Massiel Cardona MD   SPIRIVA RESPIMAT 2.5 MCG/ACT AERS inhaler inhale 2 puffs INTO THE LUNGS once daily 5/24/21  Yes Massiel Cardona MD   pantoprazole (PROTONIX) 40 MG tablet take 1 tablet by mouth once daily 5/18/21  Yes Massiel Cardona MD   amLODIPine (NORVASC) 10 MG tablet take 1 tablet by mouth once daily 4/15/21  Yes Massiel Cardona MD   folic acid (FOLVITE) 1 MG tablet take 1 tablet by mouth once daily 4/12/21  Yes Massiel Cardona MD   methotrexate (RHEUMATREX) 2.5 MG chemo tablet Take 20 mg by mouth once a week Indications: takes 8 tablets on saturdays    Yes Historical Provider, MD   albuterol sulfate  (90 Base) MCG/ACT inhaler inhale 2 puffs by mouth and INTO THE LUNGS every 4 hours for 7 days 6/1/21   Shelva Cabot, MD   Blood Pressure KIT Dx: HTN. Needs automatic blood pressure machine to monitor her blood pressure. 5/18/21   Shelva Cabot, MD       REVIEW OF SYSTEMS    (2-9 systems for level 4, 10 ormore for level 5)      Review of Systems   Constitutional: Positive for activity change, fatigue and fever. Negative for appetite change and chills. HENT: Negative for congestion, sinus pressure, sinus pain, trouble swallowing and voice change. Eyes: Negative. Respiratory: Positive for cough and shortness of breath. Negative for apnea, choking, chest tightness, wheezing and stridor. Cardiovascular: Negative. Gastrointestinal: Negative. Genitourinary: Negative. Musculoskeletal: Positive for arthralgias and myalgias. Negative for neck pain and neck stiffness. Skin: Negative. Neurological: Negative for dizziness, light-headedness and headaches. Psychiatric/Behavioral: Negative for agitation. PHYSICAL EXAM   (up to 7 for level 4, 8 or more for level 5)      INITIAL VITALS:   BP (!) 134/51   Pulse 78   Temp 98.2 °F (36.8 °C) (Oral)   Resp 20   Ht 5' 5\" (1.651 m)   Wt 221 lb 12.5 oz (100.6 kg)   SpO2 94%   BMI 36.91 kg/m²     Physical Exam  Vitals reviewed. Constitutional:       Appearance: She is well-developed. She is ill-appearing. HENT:      Head: Normocephalic and atraumatic. Mouth/Throat:      Mouth: Mucous membranes are moist.   Eyes:      Extraocular Movements: Extraocular movements intact. Pupils: Pupils are equal, round, and reactive to light. Cardiovascular:      Rate and Rhythm: Normal rate and regular rhythm. No extrasystoles are present. Heart sounds: No murmur heard. Pulmonary:      Effort: Tachypnea present. Breath sounds: Examination of the right-upper field reveals rhonchi. Examination of the left-upper field reveals rhonchi.  Examination of the right-middle field reveals rhonchi. Examination of the left-middle field reveals rhonchi. Rhonchi present. No wheezing. Chest:      Chest wall: No mass. Abdominal:      Palpations: Abdomen is soft. There is no hepatomegaly or mass. Musculoskeletal:         General: Normal range of motion. Cervical back: Normal range of motion and neck supple. Right lower leg: No edema. Left lower leg: No edema. Skin:     General: Skin is warm and dry. Capillary Refill: Capillary refill takes less than 2 seconds. Neurological:      General: No focal deficit present. Mental Status: She is alert. She is disoriented. Psychiatric:         Mood and Affect: Mood normal.         DIFFERENTIAL  DIAGNOSIS     DDX: COVID-19, pneumonia, pulmonary embolism    PLAN (LABS / IMAGING / EKG):  Orders Placed This Encounter   Procedures    COVID-19, Rapid    Respiratory Panel, Molecular, with COVID-19 (Restricted: peds pts or suitable admitted adults)    COVID-19 & Influenza Combo    CT CHEST PULMONARY EMBOLISM W CONTRAST    CBC Auto Differential    Basic Metabolic Panel    Brain Natriuretic Peptide    Arterial Blood Gases    Troponin    Magnesium    ADULT DIET;  Regular    Telemetry monitoring - continuous duration    Vital signs per unit routine    Notify physician    Notify physician    Up as tolerated    Full Code    Inpatient consult to [facility]    Inpatient consult to Pulmonology    Adult Oral Nutrition Supplement; Standard High Calorie/High Protein Oral Supplement    EKG 12 Lead    PATIENT STATUS (FROM ED OR OR/PROCEDURAL) Inpatient       MEDICATIONS ORDERED:  Orders Placed This Encounter   Medications    dexamethasone (PF) (DECADRON) injection 6 mg    iopamidol (ISOVUE-370) 76 % injection 75 mL    sodium chloride flush 0.9 % injection 10 mL    0.9 % sodium chloride bolus    potassium bicarbonate (K-LYTE) disintegrating tablet 50 mEq    magnesium oxide (MAG-OX) tablet 400 mg  levoFLOXacin (LEVAQUIN) 500 MG/100ML infusion 500 mg     Order Specific Question:   Antimicrobial Indications     Answer:   Pneumonia (CAP)    sodium chloride flush 0.9 % injection 5-40 mL    sodium chloride flush 0.9 % injection 5-40 mL    0.9 % sodium chloride infusion    DISCONTD: enoxaparin (LOVENOX) injection 30 mg    OR Linked Order Group     ondansetron (ZOFRAN-ODT) disintegrating tablet 4 mg     ondansetron (ZOFRAN) injection 4 mg    acetaminophen (TYLENOL) tablet 1,000 mg    albuterol (PROVENTIL) nebulizer solution 2.5 mg     Order Specific Question:   Initiate RT Bronchodilator Protocol     Answer: Yes    DISCONTD: albuterol sulfate  (90 Base) MCG/ACT inhaler 2 puff     Order Specific Question:   Initiate RT Bronchodilator Protocol     Answer: Yes    amLODIPine (NORVASC) tablet 10 mg    apixaban (ELIQUIS) tablet 5 mg    folic acid (FOLVITE) tablet 1 mg    methotrexate (RHEUMATREX) chemo tablet 20 mg    pantoprazole (PROTONIX) tablet 20 mg    DISCONTD: tiotropium (SPIRIVA RESPIMAT) 2.5 MCG/ACT inhaler 2 puff    methylPREDNISolone sodium (SOLU-MEDROL) injection 60 mg    ipratropium-albuterol (DUONEB) nebulizer solution 1 ampule     Order Specific Question:   Initiate RT Bronchodilator Protocol     Answer:   Yes           DIAGNOSTIC RESULTS / EMERGENCY DEPARTMENT COURSE / MDM     LABS:  Results for orders placed or performed during the hospital encounter of 09/18/21   COVID-19, Rapid    Specimen: Nasopharyngeal Swab   Result Value Ref Range    Specimen Description . NASOPHARYNGEAL SWAB     SARS-CoV-2, Rapid Not Detected Not Detected   COVID-19 & Influenza Combo    Specimen: Nasopharyngeal Swab   Result Value Ref Range    Specimen Description . NASOPHARYNGEAL SWAB     Source . NASOPHARYNGEAL SWAB     SARS-CoV-2 RNA, RT PCR Not Detected Not Detected    INFLUENZA A NEGATIVE NEGATIVE    INFLUENZA B NEGATIVE NEGATIVE   CBC Auto Differential   Result Value Ref Range    WBC 11.9 (H) 3.5 - 11.0 k/uL    RBC 3.07 (L) 4.0 - 5.2 m/uL    Hemoglobin 9.7 (L) 12.0 - 16.0 g/dL    Hematocrit 29.1 (L) 36 - 46 %    MCV 94.7 80 - 100 fL    MCH 31.5 26 - 34 pg    MCHC 33.3 31 - 37 g/dL    RDW 18.2 (H) 11.5 - 14.9 %    Platelets 071 870 - 788 k/uL    MPV 6.6 6.0 - 12.0 fL    NRBC Automated NOT REPORTED per 100 WBC    Differential Type NOT REPORTED     Immature Granulocytes NOT REPORTED 0 %    Absolute Immature Granulocyte NOT REPORTED 0.00 - 0.30 k/uL    WBC Morphology NOT REPORTED     RBC Morphology NOT REPORTED     Platelet Estimate NOT REPORTED     Seg Neutrophils 51 36 - 66 %    Lymphocytes 36 24 - 44 %    Monocytes 9 (H) 1 - 7 %    Eosinophils % 1 0 - 4 %    Basophils 0 0 - 2 %    Bands 1 0 - 10 %    Metamyelocytes 2 (H) 0 %    nRBC 1 (H) 0 per 100 WBC    Segs Absolute 6.05 1.3 - 9.1 k/uL    Absolute Lymph # 4.28 1.0 - 4.8 k/uL    Absolute Mono # 1.07 0.1 - 1.3 k/uL    Absolute Eos # 0.12 0.0 - 0.4 k/uL    Basophils Absolute 0.00 0.0 - 0.2 k/uL    Absolute Bands # 0.12 0.0 - 1.0 k/uL    Metamyelocytes Absolute 0.24 (H) 0 k/uL    Morphology ANISOCYTOSIS PRESENT     Morphology T [facility]    Basic Metabolic Panel   Result Value Ref Range    Glucose 140 (H) 70 - 99 mg/dL    BUN 12 8 - 23 mg/dL    CREATININE 0.73 0.50 - 0.90 mg/dL    Bun/Cre Ratio NOT REPORTED 9 - 20    Calcium 9.1 8.6 - 10.4 mg/dL    Sodium 134 (L) 135 - 144 mmol/L    Potassium 3.3 (L) 3.7 - 5.3 mmol/L    Chloride 102 98 - 107 mmol/L    CO2 24 20 - 31 mmol/L    Anion Gap 8 (L) 9 - 17 mmol/L    GFR Non-African American >60 >60 mL/min    GFR African American >60 >60 mL/min    GFR Comment          GFR Staging NOT REPORTED    Brain Natriuretic Peptide   Result Value Ref Range    Pro- <300 pg/mL    BNP Interpretation Pro-BNP Reference Range:    Arterial Blood Gases   Result Value Ref Range    pH, Arterial 7.442 7.350 - 7.450    pCO2, Arterial 39.2 35.0 - 45.0 mmHg    pO2, Arterial 38.2 (LL) 80.0 - 100.0 mmHg    HCO3, Arterial 26.7 (H) 22.0 - 26.0 mmol/L    Positive Base Excess, Art 2.6 (H) 0.0 - 2.0 mmol/L    Negative Base Excess, Art NOT REPORTED 0.0 - 2.0 mmol/L    O2 Sat, Arterial 71.1 (LL) 95 - 98 %    Total Hb NOT REPORTED 12.0 - 16.0 g/dl    Oxyhemoglobin NOT REPORTED 95.0 - 98.0 %    Carboxyhemoglobin 4.7 0 - 5 %    Methemoglobin 0.4 0.0 - 1.9 %    Pt Temp 37     pH, Art, Temp Adj NOT REPORTED 7.350 - 7.450    pCO2, Art, Temp Adj NOT REPORTED 35.0 - 45.0    pO2, Art, Temp Adj NOT REPORTED 80.0 - 100.0 mmHg    O2 Device/Flow/% Cannula     Respiratory Rate 22     Lex Test PASS     Sample Site Right Radial Artery     Pt. Position SEMI-FOWLERS     Mode NOT REPORTED     Set Rate NOT REPORTED     Total Rate NOT REPORTED     VT NOT REPORTED     FIO2 NOT REPORTED     Peep/Cpap NOT REPORTED     PSV NOT REPORTED     Text for Respiratory RESUTLS GIVEN TO RESIDENT     NOTIFICATION NOT REPORTED     NOTIFICATION TIME NOT REPORTED    Troponin   Result Value Ref Range    Troponin, High Sensitivity 21 (H) 0 - 14 ng/L    Troponin T NOT REPORTED <0.03 ng/mL    Troponin Interp NOT REPORTED    Troponin   Result Value Ref Range    Troponin, High Sensitivity 17 (H) 0 - 14 ng/L    Troponin T NOT REPORTED <0.03 ng/mL    Troponin Interp NOT REPORTED    Magnesium   Result Value Ref Range    Magnesium 1.7 1.6 - 2.6 mg/dL   EKG 12 Lead   Result Value Ref Range    Ventricular Rate 86 BPM    Atrial Rate 86 BPM    P-R Interval 156 ms    QRS Duration 90 ms    Q-T Interval 398 ms    QTc Calculation (Bazett) 476 ms    P Axis 68 degrees    R Axis 7 degrees    T Axis 43 degrees         IMPRESSION/MDM/ED COURSE:  61 y.o. female presented with shortness of breath and cough status post lobectomy. Given the patient is a cancer patient high concern at this time for pulmonary embolism. Will get CT PE rule out. Also swab for Covid has basic labs to assess for a white count. CT PE will also show evidence of pneumonia if there is one.  We'll treat appropriately and admit to the hospital           RADIOLOGY:  CT CHEST PULMONARY EMBOLISM W CONTRAST   Final Result   No evidence of pulmonary embolism. Small to moderate left-sided pleural effusion with associated compressive   atelectasis. Mild scattered areas of patchy and ground-glass opacities are noted   throughout the bilateral lungs. These are nonspecific but could indicate an   atypical/viral pneumonia, inclusive of COVID-19. EKG  EKG Interpretation    Interpreted by me    Rhythm: normal sinus   Rate: normal  Axis: normal  Ectopy: none  Conduction: normal  ST Segments: no acute change  T Waves: no acute change  Q Waves: none    Clinical Impression: no acute changes and normal EKG    All EKG's are interpreted by the Emergency Department Physician who either signs or Co-signs this chart in the absence of a cardiologist.      PROCEDURES:  None    CONSULTS:  IP CONSULT TO FAMILY MEDICINE  IP CONSULT TO PULMONOLOGY        FINAL IMPRESSION      1. Hypoxia          DISPOSITION / PLAN     DISPOSITION Admitted 09/19/2021 01:27:02 AM      PATIENT REFERREDTO:  No follow-up provider specified.     DISCHARGE MEDICATIONS:  Current Discharge Medication List          Red Howard MD  PGY 2  Resident Physician Emergency Medicine  09/19/21 2:54 PM        (Please note that portions of this note were completed with a voice recognition program.Efforts were made to edit the dictations but occasionally words are mis-transcribed.)       Red Howard MD  Resident  09/19/21 [id number]

## 2021-09-19 NOTE — CARE COORDINATION
CASE MANAGEMENT NOTE:    Admission Date:  9/18/2021 Jayashree Bunch is a 61 y.o.  female    Admitted for : Hypoxia [R09.02]    Met with:  Patient    PCP:  Dr Salomon Juarez:  Manjinder Mclaughlin       Is patient alert and oriented at time of discussion:  Yes    Current Residence/ Living Arrangements:  independently at home             Current Services PTA:  No    Does patient go to outpatient dialysis: No  If yes, location and chair time: NA    Is patient agreeable to VNS: No    Freedom of choice provided:  Yes    List of [address] provided: NA    VNS chosen:  NA    DME:  Nebulizer and 2L NC([facility])- stated has not been needing/using     Home Oxygen: Yes    Nebulizer: Yes    CPAP/BIPAP: No    Supplier: [employer]     Potential Assistance Needed: Will follow     SNF needed: No    Freedom of choice and list provided: NA    Pharmacy:  [address] [facility]        Does Patient want to use MEDS to BEDS? No    Is patient currently receiving oral anticoagulation therapy? Yes    [facility] with [facility] Risk Score greater than 14%? No  If yes, pt needs a follow up appointment made within 7 days. Family Members/Caregivers that pt would like involved in their care:    Yes    If yes, list name here:  Jorge Miranda and Melissa Elise     Transportation Provider:  Family             Discharge Plan:  9/19/2021 Manjinder Mclaughlin; From 2 story home with son and grandchild- stated independent and family transports; DME-Nebulizer and 2L NC([facility])- stated has not been needing/using; Declines VNS; Eliquis PTA; Pulmonology consulted and 60 Q6 solu-medrol//JOSE                   Electronically signed by:  Klaudia Wilkerson RN on 9/19/2021 at 11:08 AM

## 2021-09-19 NOTE — ED NOTES
Report called to [facility] on PCU     Summer Reynolds RN  09/19/21 [id number]

## 2021-09-20 PROBLEM — Z85.118 HISTORY OF ADENOCARCINOMA OF LUNG: Status: ACTIVE | Noted: 2021-01-01

## 2021-09-20 PROBLEM — B33.8 RSV INFECTION: Status: ACTIVE | Noted: 2021-01-01

## 2021-09-20 PROBLEM — E44.0 MODERATE MALNUTRITION (HCC): Status: ACTIVE | Noted: 2021-01-01

## 2021-09-20 NOTE — PROGRESS NOTES
Comprehensive Nutrition Assessment    Type and Reason for Visit:  Initial, Positive Nutrition Screen (poor appetite)    Nutrition Recommendations/Plan:   Will continue to provide Regular diet. Will discontinue Ensure Enlive and monitor intake. Will add back alternative supplement if intake is less than 50%    Nutrition Assessment:  Pt admitted due to hypoxia. She states she does not like Ensure and won't drink it. She states she is eating better now but poor intake prior to admit is documented. Malnutrition Assessment:  Malnutrition Status: Moderate malnutrition    Context:  Chronic Illness     Findings of the 6 clinical characteristics of malnutrition:  Energy Intake:  7 - 75% or less estimated energy requirements for 1 month or longer  Weight Loss:   (6.4% wt loss over 2 months)     Body Fat Loss:  No significant body fat loss     Muscle Mass Loss:  No significant muscle mass loss    Fluid Accumulation:  No significant fluid accumulation     Strength:  Not Performed    Estimated Daily Nutrient Needs:  Energy (kcal): [place] x1.2= 1900 kcal; Weight Used for Energy Requirements:  Current     Protein (g):  1.5g/kg= 85 g protein or more; Weight Used for Protein Requirements:  Ideal          Nutrition Related Findings:  no edema, labs: Reviewed, Meds: Solumedrol, Folic Acid, PMH: COPD, Lung Ca, 7/21 s/p lobectomy      Wounds:  None       Current Nutrition Therapies:    ADULT DIET;  Regular    Anthropometric Measures:  · Height: 5' 5\" (165.1 cm)  · Current Body Weight: 221 lb (100.2 kg)   · Admission Body Weight: 221 lb (100.2 kg)    · Usual Body Weight: 236 lb (107 kg) (7/21)     · Ideal Body Weight: 125 lbs; BMI: 36.8  · BMI Categories: Obese Class 2 (BMI 35.0 -39.9)       Nutrition Diagnosis:   · Moderate malnutrition related to catabolic illness, inadequate protein-energy intake as evidenced by poor intake prior to admission (6.4 % wt loss over the past 2 months)    Nutrition Interventions:   Food and/or Nutrient Delivery:  Continue Current Diet, Discontinue Oral Nutrition Supplement  Nutrition Education/Counseling:  No recommendation at this time   Coordination of Nutrition Care:  Continue to monitor while inpatient    Goals:  po intake greater than 50%       Nutrition Monitoring and Evaluation:   Food/Nutrient Intake Outcomes:  Food and Nutrient Intake  Physical Signs/Symptoms Outcomes:  Biochemical Data, GI Status, Skin, Weight, Fluid Status or Edema     Discharge Planning:    Continue current diet     Electronically signed by Antoine Brown RD, LD on 9/20/21 at 1:54 PM EDT    Contact: [phone]

## 2021-09-20 NOTE — PROGRESS NOTES
[place] [id number] Internal Medicine    Progress Note     9/20/2021    3:02 PM    Name:   Sang Hernandez  MRN:     [id number]     Acct:      [de-identified]   Room:   2101/2101-01  IP Day:  1  Admit Date:  9/18/2021 10:05 PM    PCP:   Radha Brower MD  Code Status:  Full Code    Subjective:     C/C:   Chief Complaint   Patient presents with    Shortness of Breath    Cough    Headache     Active Problems:    Hypoxia    Moderate malnutrition ([address]  Resolved Problems:    * No resolved hospital problems. *      Patient has history of COPD, history of rheumatoid arthritis on methotrexate adenocarcinoma lung status post left lower lobectomy, admitted with cough, shortness of breath  Found positive for RSV pneumonia  She was around her grandkids  Patient feeling much better today, still feels short of breath with minimal exertion on 2 L of oxygen           Significant last 24 hr data reviewed ;   Vitals:    09/20/21 0727 09/20/21 0745 09/20/21 1115 09/20/21 1200   BP:  133/72  (!) 143/70   Pulse:  83  97   Resp:  20  20   Temp:  97.8 °F (36.6 °C)  98.1 °F (36.7 °C)   TempSrc:  Oral  Oral   SpO2: 94% 93% 94% 97%   Weight:       Height:          No results found for this or any previous visit (from the past 24 hour(s)). No results for input(s): POCGLU in the last 72 hours. CT CHEST PULMONARY EMBOLISM W CONTRAST    Result Date: 9/19/2021  EXAMINATION: CTA OF THE CHEST 9/18/2021 11:20 pm TECHNIQUE: CTA of the chest was performed after the administration of intravenous contrast.  Multiplanar reformatted images are provided for review. MIP images are provided for review. Dose modulation, iterative reconstruction, and/or weight based adjustment of the mA/kV was utilized to reduce the radiation dose to as low as reasonably achievable. COMPARISON: None.  HISTORY: ORDERING SYSTEM PROVIDED HISTORY: Post lobectomy SOB TECHNOLOGIST PROVIDED HISTORY: Post lobectomy SOB Decision Support Exception - unselect if not a suspected or confirmed emergency medical condition->Emergency Medical Condition (MA) Reason for Exam: patient c/o sob for a couple days Additional signs and symptoms: patient c/o sob for a couple days FINDINGS: Pulmonary Arteries: Pulmonary arteries are adequately opacified for evaluation. No evidence of intraluminal filling defect to suggest pulmonary embolism. Main pulmonary artery is normal in caliber. Mediastinum: No evidence of mediastinal lymphadenopathy. The heart and pericardium demonstrate no acute abnormality. There is no acute abnormality of the thoracic aorta. Lungs/pleura: Small to moderate left-sided pleural effusion with associated compressive atelectasis. Mild scattered areas of patchy and ground-glass opacities are noted throughout the bilateral lungs. These are nonspecific but could indicate an atypical/viral pneumonia, inclusive of COVID-19. Upper Abdomen: Limited images of the upper abdomen are unremarkable. Soft Tissues/Bones: Subacute mildly offset fracture of the anterior and lateral left 8th rib. No evidence of pulmonary embolism. Small to moderate left-sided pleural effusion with associated compressive atelectasis. Mild scattered areas of patchy and ground-glass opacities are noted throughout the bilateral lungs. These are nonspecific but could indicate an atypical/viral pneumonia, inclusive of COVID-19. On admission         Review of Systems:     Constitutional:  negative for chills, fevers, sweats  Respiratory: Shortness of breath, cough which is improving  Cardiovascular:  negative for chest pain, chest pressure/discomfort, lower extremity edema, palpitations  Gastrointestinal:  negative for abdominal pain, constipation, diarrhea, nausea, vomiting  Neurological:  negative for dizziness, headache  Data:     Past Medical History:  no change     Social History:  no change    Family History: @no change    Vitals:      I/O (24Hr):     Intake/Output There are no new focal motor or sensory deficits,   Skin: No gross lesions, rashes, bruising or bleeding on exposed skin area  Extremities:  peripheral pulses palpable, no pedal edema or calf pain with palpation  Psych:             Assessment:        Primary Problem  <principal problem not specified>    Active Problems:    Hypoxia    Moderate malnutrition (HCC)  Resolved Problems:    * No resolved hospital problems. *       Plan:          9/20/21    RSV pneumonia in immunocompromised patient with history of rheumatoid arthritis  On IV steroids  Hypoxemia due to pneumonia, on home oxygen of 2 L  History of COPD and lung cancer  On Eliquis with history of PE      . Hospital Problems         Last Modified POA    Hypoxia 9/19/2021 Yes    Moderate malnutrition ([address] 9/20/2021 Yes                         Thanks for consulting us . Will monitor vitals and clinical course , and  Optimize therapy  as needed .            Nick Cool MD

## 2021-09-20 NOTE — PROGRESS NOTES
Pulmonary Progress Note  [facility]      Patient - Najma Stack,  Age - 61 y.o.    - 1955      Room Number - 2101/210-01   N -  [id number]   [facility]  Date of Admission -  2021 10:05 PM        Consulting Oliva Hammond MD  Primary Care Physician - Gabe Boxer, MD     SUBJECTIVE   Planes of pain at the incision site but otherwise feels better    OBJECTIVE   VITALS    height is 5' 5\" (1.651 m) and weight is 221 lb 12.5 oz (100.6 kg). Her oral temperature is 98.1 °F (36.7 °C). Her blood pressure is 143/70 (abnormal) and her pulse is 97. Her respiration is 20 and oxygen saturation is 93%. Body mass index is 36.91 kg/m². Temperature Range: Temp: 98.1 °F (36.7 °C) Temp  Av °F (36.7 °C)  Min: 97.4 °F (36.3 °C)  Max: 98.6 °F (37 °C)  BP Range:  Systolic (78YLA), ZPE:349 , Min:133 , LKH:547     Diastolic (07SJM), LJF:83, Min:53, Max:75    Pulse Range: Pulse  Av.5  Min: 83  Max: 97  Respiration Range: Resp  Av  Min: 20  Max: 20  Current Pulse Ox[de-identified]  SpO2: 93 %  24HR Pulse Ox Range:  SpO2  Av.6 %  Min: 93 %  Max: 97 %  Oxygen Amount and Delivery: O2 Flow Rate (L/min): 2 L/min    Wt Readings from Last 3 Encounters:   21 221 lb 12.5 oz (100.6 kg)   21 233 lb (105.7 kg)   21 236 lb 15.9 oz (107.5 kg)       I/O (24 Hours)    Intake/Output Summary (Last 24 hours) at 2021 2034  Last data filed at 2021 1608  Gross per 24 hour   Intake 997 ml   Output --   Net 997 ml       EXAM     General Appearance  Awake, alert, oriented, in no acute distress  HEENT - normocephalic, atraumatic.  []  Mallampati  [] Crowded airway   [] Macroglossia  []  Retrognathia  [] Micrognathia  []  Normal tongue size []  Normal Bite  [] Kingman sign positive    Neck - Supple,  trachea midline   Lungs -scattered wheezes  Cardiovascular - Heart sounds are normal.  Regular rate and rhythm   Abdomen - Soft, nontender, nondistended, no masses or organomegaly  Neurologic - There are no focal motor or sensory deficits  Skin - No bruising or bleeding  Extremities - No clubbing, cyanosis, edema    MEDS      furosemide  20 mg Oral BID    sodium chloride flush  5-40 mL IntraVENous 2 times per day    amLODIPine  10 mg Oral Daily    apixaban  5 mg Oral BID    folic acid  1 mg Oral Daily    methotrexate  20 mg Oral Weekly    pantoprazole  20 mg Oral QAM AC    methylPREDNISolone  60 mg IntraVENous Q6H    ipratropium-albuterol  1 ampule Inhalation Q4H WA    benzonatate  200 mg Oral TID      sodium chloride       sodium chloride flush, sodium chloride, ondansetron **OR** ondansetron, acetaminophen, albuterol, dextromethorphan, sodium chloride flush    LABS   CBC   Recent Labs     09/18/21  2245   WBC 11.9*   HGB 9.7*   HCT 29.1*   MCV 94.7        BMP:   Lab Results   Component Value Date     09/18/2021    K 3.3 09/18/2021     09/18/2021    CO2 24 09/18/2021    BUN 12 09/18/2021    LABALBU 3.9 06/29/2021    LABALBU 4.0 12/05/2011    CREATININE 0.73 09/18/2021    CALCIUM 9.1 09/18/2021    GFRAA >60 09/18/2021    LABGLOM >60 09/18/2021     ABGs:  Lab Results   Component Value Date    PHART 7.442 09/18/2021    PO2ART 38.2 09/18/2021    UIG0TAI 39.2 09/18/2021      Lab Results   Component Value Date    MODE NOT REPORTED 09/18/2021     Ionized Calcium:  No results found for: IONCA  Magnesium:    Lab Results   Component Value Date    MG 1.7 09/18/2021     Phosphorus:    Lab Results   Component Value Date    PHOS 3.0 08/28/2016        LIVER PROFILE No results for input(s): AST, ALT, LIPASE, BILIDIR, BILITOT, ALKPHOS in the last 72 hours. Invalid input(s): AMYLASE,  ALB  INR No results for input(s): INR in the last 72 hours.   PTT   Lab Results   Component Value Date    APTT 22.4 06/29/2021         RADIOLOGY     (See actual reports for details)    ASSESSMENT/PLAN   Active Problems: History of DVT in adulthood    COPD exacerbation (HCC)    Deep venous thrombosis of left profunda femoris vein (HCC)    History of pulmonary embolism    Hypoxia    Moderate malnutrition (HCC)    RSV infection    History of adenocarcinoma of lung  Resolved Problems:    * No resolved hospital problems.  *    Wean Solu-Medrol to 40 every 8  Mobilize her  Add Acapella  New DuoNeb aerosols  Transfer to medical surgical floor  Electronically signed by Cris Grimm MD on 9/20/2021 at 4:14 PM

## 2021-09-20 NOTE — FLOWSHEET NOTE
Patient spoke of wanting to get home as she has custody of 7 yo grandchild whose mom (pt's daughter)  2 years ago. Patient shared about that situation and her regrets/grief. She has 7 other children and 15 grandchild who are all supportive and involved and help patient with granddaughter. Patient also shared her medical update and fact she hadn't been sick until earlier this summer and her fears/frustration surrounding her health. Writer provided listening presence and emotional support for which patient was appreciative. She also welcomed prayer. 21 1104   Encounter Summary   Services provided to: Patient   Referral/Consult From: [address] [place] [address] Visiting   (21)   Complexity of Encounter Moderate   Length of Encounter 30 minutes   Spiritual Assessment Completed Yes   Spiritual/Roman Catholic   Type Spiritual support   Assessment Approachable;Tearful   Intervention Active listening;Explored feelings, thoughts, concerns;Explored coping resources;Nurtured hope;Prayer;Sustaining presence/ Ministry of presence; Discussed illness/injury and it's impact; Discussed meaning/purpose;Grief care   Outcome Expressed gratitude;Engaged in conversation;Comfort;Expressed feelings/needs/concerns; Less anxious, less agitated;Receptive;Venting emotion;Expressed regrets

## 2021-09-20 NOTE — PLAN OF CARE
Nutrition Problem #1: Moderate malnutrition  Intervention: Food and/or Nutrient Delivery: Continue Current Diet, Discontinue Oral Nutrition Supplement  Nutritional Goals: po intake greater than 50%

## 2021-09-20 NOTE — CARE COORDINATION
ONGOING DISCHARGE PLAN:    Patient is alert and oriented x4. Spoke with patient regarding discharge plan and patient confirms that plan is still to DC to home w/ Son & Granddaughter. Pt. Denies VNS. Pt. Remains on IV Steroids, 60MG, Q6.     Pt. Currently sating at 97% on 2LNC. Has been taking on/off at home. Has Oxygen already at home. PT/OT on board, will follow for any rec/needs. Will continue to follow for additional discharge needs.     Electronically signed by Guillaume Hubbard RN on 9/20/2021 at 4:10 PM

## 2021-09-20 NOTE — PLAN OF CARE
Problem: Airway Clearance - Ineffective:  Goal: Ability to maintain a clear airway will improve  Description: Ability to maintain a clear airway will improve  9/20/2021 0321 by Rod Zuluaga RN  Outcome: Ongoing  9/19/2021 1727 by Serena Reveles RN  Outcome: Ongoing     Problem: Safety:  Goal: Free from accidental physical injury  Description: Free from accidental physical injury  Outcome: Ongoing     Problem: Daily Care:  Goal: Daily care needs are met  Description: Daily care needs are met  Outcome: Ongoing     Problem: Pain:  Goal: Patient's pain/discomfort is manageable  Description: Patient's pain/discomfort is manageable  Outcome: Ongoing     Problem: Skin Integrity:  Goal: Skin integrity will stabilize  Description: Skin integrity will stabilize  Outcome: Ongoing

## 2021-09-20 NOTE — PLAN OF CARE
Safety maintained, call light is within reach, no s/s or c/o distress, bed is low/locked, side rails are up x2  Problem: Airway Clearance - Ineffective:  Goal: Ability to maintain a clear airway will improve  Description: Ability to maintain a clear airway will improve  9/20/2021 1408 by Faizan Estevez RN  Outcome: Ongoing  9/20/2021 0321 by Rosa Dunne RN  Outcome: Ongoing     Problem: Safety:  Goal: Free from accidental physical injury  Description: Free from accidental physical injury  9/20/2021 1408 by Faizan Estevez RN  Outcome: Ongoing  9/20/2021 0323 by Rosa Dunne RN  Outcome: Ongoing  Goal: Free from intentional harm  Description: Free from intentional harm  Outcome: Ongoing     Problem: Pain:  Goal: Patient's pain/discomfort is manageable  Description: Patient's pain/discomfort is manageable  9/20/2021 1408 by Faizan Estevez RN  Outcome: Ongoing  9/20/2021 0323 by Rosa Dunne RN  Outcome: Ongoing

## 2021-09-20 NOTE — PROGRESS NOTES
Patient transferred to [facility] via wheelchair with belongings. No distress noted.

## 2021-09-21 NOTE — PLAN OF CARE
Problem: Airway Clearance - Ineffective:  Goal: Ability to maintain a clear airway will improve  Description: Ability to maintain a clear airway will improve  9/21/2021 1123 by Thea Bradshaw RN  Outcome: Completed  9/21/2021 0635 by Kath Morales RN  Outcome: Ongoing     Problem: Safety:  Goal: Free from accidental physical injury  Description: Free from accidental physical injury  9/21/2021 1123 by Thea Bradshaw RN  Outcome: Completed  9/21/2021 0635 by Kath Morales RN  Outcome: Ongoing  Goal: Free from intentional harm  Description: Free from intentional harm  9/21/2021 1123 by Thea Bradshaw RN  Outcome: Completed  9/21/2021 0635 by Kath Morales RN  Outcome: Ongoing     Problem: Daily Care:  Goal: Daily care needs are met  Description: Daily care needs are met  9/21/2021 1123 by Thea Bradshaw RN  Outcome: Completed  9/21/2021 0635 by Kath Morales RN  Outcome: Ongoing     Problem: Pain:  Goal: Patient's pain/discomfort is manageable  Description: Patient's pain/discomfort is manageable  9/21/2021 1123 by Thea Bradshaw RN  Outcome: Completed  9/21/2021 0635 by Kath Morales RN  Outcome: Ongoing  Goal: Pain level will decrease  Description: Pain level will decrease  Outcome: Completed  Goal: Control of acute pain  Description: Control of acute pain  Outcome: Completed  Goal: Control of chronic pain  Description: Control of chronic pain  Outcome: Completed     Problem: Skin Integrity:  Goal: Skin integrity will stabilize  Description: Skin integrity will stabilize  9/21/2021 1123 by Thea Bradshaw RN  Outcome: Completed  9/21/2021 0635 by Kath oMrales RN  Outcome: Ongoing     Problem: Nutrition  Goal: Optimal nutrition therapy  9/21/2021 1123 by Thea Bradshaw RN  Outcome: Completed  9/21/2021 0635 by Kath Morales RN  Outcome: Ongoing

## 2021-09-21 NOTE — PROGRESS NOTES
Discharge teaching and instructions for diagnosis of RSV  completed with patient using teachback method. AVS reviewed. Printed prescriptions given to patient. Patient voiced understanding regarding prescriptions, follow up appointments, and care of self at home. Discharged ambulatory and in a wheelchair to  home with support per family. Patient getting dressed and waiting on ride.

## 2021-09-21 NOTE — PROGRESS NOTES
Physical Therapy    Facility/Department: [facility] MED SURG  Initial Assessment    NAME: Compa Kraus  : 1955  MRN: [id number]    Date of Service: 2021    Discharge Recommendations:  Patient would benefit from continued therapy after discharge        Assessment   Body structures, Functions, Activity limitations: Decreased strength;Decreased functional mobility ; Decreased balance  Assessment: Pt needs SBA for mobility and is limited by shortness of breath and pain in L side at the site of left  lower lobectomy. Treatment Diagnosis: impaired strength and mobility   Specific instructions for Next Treatment: ther exs and ther activities as tolerated  Prognosis: Fair  Decision Making: Medium Complexity  History: 42-year-old lady who was not vaccinated for COVID-19 recently diagnosed with adenocarcinoma lung left lower lobe status post lobectomy at [place] month of August complains of 5-day history of cough cold symptoms with chills increasing dyspnea associated with sputum production patient has history of COPD on as needed home oxygen  Exam: ROM,MMT,Balance, sensations and functional mobility testing  Clinical Presentation: Pt is alert, cooperative and motivated, needed CGA for ambulation at this time  Barriers to Learning: pain and decreased endurance  REQUIRES PT FOLLOW UP: Yes  Activity Tolerance  Activity Tolerance: Patient limited by endurance; Patient limited by pain       Patient Diagnosis(es): The encounter diagnosis was Hypoxia.      has a past medical history of Abnormal CT of the chest, Adenocarcinoma, lung, left ([address] Arthritis, Community acquired pneumonia, COPD (chronic obstructive pulmonary disease) ([address] Deep venous thrombosis of left profunda femoris vein ([address] Depression, DVT (deep venous thrombosis) ([address] GERD (gastroesophageal reflux disease), History of pulmonary embolism, History of thyroid nodule, Hypertension, Major depressive disorder, recurrent, moderate ([address] Nodule of lower lobe of left lung, Obesity, Class II, BMI 35-39.9, On anticoagulant therapy, On methotrexate therapy, Prediabetes, Rheumatoid arthritis ([facility] Snores, Thyroid nodule, Tobacco abuse, Under care of team, Under care of team, Under care of team, and Wellness examination. has a past surgical history that includes Appendectomy (1982); Endoscopy, colon, diagnostic (523447); bronchoscopy (N/A, 12/27/2019); Colonoscopy (N/A, 2/12/2020); CT NEEDLE BIOPSY LUNG PERCUTANEOUS (5/13/2021); lobectomy (Left, 07/19/2021); and Lung removal, total (Left, 7/19/2021). Restrictions  Restrictions/Precautions  Restrictions/Precautions: Up as Tolerated, General Precautions  Required Braces or Orthoses?: No  Implants present? :  (Pt denies)  Vision/Hearing  Vision: Impaired  Vision Exceptions: Wears glasses at all times  Hearing: Within functional limits     Subjective  General  Chart Reviewed: Yes  Patient assessed for rehabilitation services?: Yes  Additional Pertinent Hx:  (admitted due to Increasing dyspnea cough and chills)  Response To Previous Treatment: Not applicable (PT eval done today)  Family / Caregiver Present: No  Referring Practitioner: Dr Carley Almeida  Referral Date : 09/20/21  Diagnosis: Hypoxia,RSV infection   Follows Commands: Within Functional Limits  General Comment  Comments: Pt seen sitting in bedside chair. Subjective  Subjective: Pt stated, \" Right now I am anxious to go home, feel happy that I can breathe better. Pain Screening  Patient Currently in Pain: Yes  Pain Assessment  Pain Assessment: 0-10  Pain Level: 8  Patient's Stated Pain Goal: No pain  Pain Type:  (soreness that I pulled something)  Pain Location: Chest;Incision  Pain Orientation: Left;Posterior  Pain Frequency: Continuous  Vital Signs  Patient Currently in Pain: Yes  Patient Observation  Observations: Pt seems comfortable and shakes her R or L leg from time to time due to anxiety per pt.   Pre Treatment Pain Screening  Pain at present: 8  Scale Used: Numeric Score  Intervention List: Patient able to continue with treatment    Orientation  Orientation  Overall Orientation Status: Within Normal Limits  Social/Functional History  Social/Functional History  Lives With: Son (grand daughter( 6 yrs old))  Type of Home: House  Home Layout: Two level, Bed/Bath upstairs  Home Access: Stairs to enter with rails  Entrance Stairs - Number of Steps: 2 (15 steps to bedroom on 2nd floor with a landing)  Entrance Stairs - Rails: Left  Bathroom Shower/Tub: Tub/Shower unit, Curtain  Bathroom Toilet: Handicap height  Bathroom Equipment: Hand-held shower  Bathroom Accessibility: Wheelchair accessible  Home Equipment: Oxygen (oxygen as needed, bedside commode)  Receives Help From:  (son)  ADL Assistance: Independent  Homemaking Assistance: Needs assistance (needs assistance carrying things from one place to another)  Ambulation Assistance: Independent  Transfer Assistance: Independent  Active : No  Patient's  Info: Son  Mode of Transportation: Car  Occupation: Retired ([employer] jobs)  Leisure & Hobbies: play cards, do puzzles  Cognition   Cognition  Overall Cognitive Status: WNL    Objective          AROM RLE (degrees)  RLE AROM: WFL  AROM LLE (degrees)  LLE AROM : WFL  AROM RUE (degrees)  RUE AROM :  (see OT eval for details)  AROM LUE (degrees)  LUE AROM :  (see OT eval for details)  Strength RLE  Strength RLE: WFL  Comment: grossly 4+/5  Strength LLE  Strength LLE: WFL  Comment: grossly4+/5  Strength RUE  Strength RUE:  (see OT eval for details)  Strength LUE  Comment: see OT eval for details     Sensation  Overall Sensation Status: WFL     Transfers  Sit to Stand: Independent  Stand to sit:  Independent  Bed to Chair: Independent  Ambulation  Ambulation?: Yes  Ambulation 1  Surface: level tile  Device: No Device;Hand-Held Assist  Assistance: Contact guard assistance  Gait Deviations: Decreased step length  Distance: 20 ft  Comments: short of breath after walking but felt better upon resting  Stairs/Curb  Stairs?: No     Balance  Posture: Fair  Sitting - Static: Good  Sitting - Dynamic: Good  Standing - Static: Fair  Standing - Dynamic: Fair;-  Single Leg Stance R Leg:  (able to stand 2-3 secs with support)  Single Leg Stance L Leg:  (able to stand 2-3 secs with support)  Exercises  Knee Long Arc Quad: 10 reps  Ankle Pumps: 20 reps  Comments: seated marching - 20 reps     Plan   Plan  Times per week: 5-7  Times per day: Daily  Plan weeks: 2  Specific instructions for Next Treatment: ther exs and ther activities as tolerated  Current Treatment Recommendations: Strengthening, Balance Training, Functional Mobility Training, Gait Training  Safety Devices  Type of devices:  All fall risk precautions in place, Nurse notified, Call light within reach, Left in chair                        AM-PAC Score     AM-PAC Inpatient Mobility without Stair Climbing Raw Score : 18 (09/21/21 1219)  AM-PAC Inpatient without Stair Climbing T-Scale Score : 51.97 (09/21/21 1219)  Mobility Inpatient CMS 0-100% Score: 23.26 (09/21/21 1219)  Mobility Inpatient without Stair CMS G-Code Modifier : [place] (09/21/21 1219)       Goals  Short term goals  Time Frame for Short term goals: 5 sessions  Short term goal 1: Improve strength in both lower extremities to 5/5  Short term goal 2: Improve standing balance to good  Short term goal 3: Ambulate 40 ft with RW and SBA  Long term goals  Time Frame for Long term goals : 10 sessions  Long term goal 1: Ambulate independently 120 ft with RW  Patient Goals   Patient goals : Return home       Therapy Time   Individual Concurrent Group Co-treatment   Time In 0940         Time Out 1030         Minutes [address], PT

## 2021-09-21 NOTE — PROGRESS NOTES
CLINICAL PHARMACY NOTE: MEDS TO BEDS    Total # of Prescriptions Filled: 2   The following medications were delivered to the patient:  · Dextromethorphan Polistirex 30  · Benzonatate 200mg    Additional Documentation:  Delivered Medication to Patients Room

## 2021-09-21 NOTE — CARE COORDINATION
ONGOING DISCHARGE PLAN:    Plan remains for patient to be discharged home today without needs. Declined VNS. Active order for IV steroids 40mg every 8 hours. Per primary, pt can be discharged home today. Will continue to follow for additional discharge needs.     Electronically signed by Shefali Coombs RN on 9/21/2021 at 10:44 AM

## 2021-09-21 NOTE — PLAN OF CARE
Problem: Airway Clearance - Ineffective:  Goal: Ability to maintain a clear airway will improve  Description: Ability to maintain a clear airway will improve  Outcome: Ongoing     Problem: Safety:  Goal: Free from accidental physical injury  Description: Free from accidental physical injury  Outcome: Ongoing  Goal: Free from intentional harm  Description: Free from intentional harm  Outcome: Ongoing     Problem: Daily Care:  Goal: Daily care needs are met  Description: Daily care needs are met  Outcome: Ongoing     Problem: Pain:  Goal: Patient's pain/discomfort is manageable  Description: Patient's pain/discomfort is manageable  Outcome: Ongoing     Problem: Skin Integrity:  Goal: Skin integrity will stabilize  Description: Skin integrity will stabilize  Outcome: Ongoing     Problem: Nutrition  Goal: Optimal nutrition therapy  Outcome: Ongoing

## 2021-09-21 NOTE — DISCHARGE SUMMARY
[place] [address] Internal Medicine    Discharge Summary     Patient ID: Scott Alex  :  1955   MRN: [id number]     ACCOUNT:  [de-identified]   Patient's PCP: Mila Dia MD  Admit Date: 2021   Discharge Date: 2021    Length of Stay: 2  Code Status:  Full Code  Admitting Physician: Meseret Gonzalez MD  Discharge Physician: Lali Feliciano MD     Active Discharge Diagnoses:     Primary Problem  <principal problem not specified>      [place] Problems    Diagnosis Date Noted    Moderate malnutrition ([facility] [E44.0] 2021    RSV infection [B97.4] 2021    History of adenocarcinoma of lung [Z85.118] 2021    Hypoxia [R09.02] 2021    History of pulmonary embolism [Z86.711] 2021    Deep venous thrombosis of left profunda femoris vein ([facility] [I82.412] 2020    COPD exacerbation ([facility] [J44.1] 2019    History of DVT in adulthood [Z86.718] 2018       Admission Condition:  Poor     Discharged Condition: fair    Hospital Stay:     Hospital Course:  Scott Alex is a 61 y.o. female who was admitted for the management of <principal problem not specified> , presented to ER with Shortness of Breath, Cough, and Headache  Patient has history of COPD, history of rheumatoid arthritis on methotrexate adenocarcinoma lung status post left lower lobectomy, admitted with cough, shortness of breath  Found positive for RSV pneumonia  She was around her grandkids, her Covid was negative  Patient on the day of discharge was saturating well on room air  Patient desperately wanted to go home, she wanted to change her pulmonologist Dr. Debbie Loya,          Significant therapeutic interventions:     Significant Diagnostic Studies:   Labs / Micro:        ,     Radiology:    CT CHEST PULMONARY EMBOLISM W CONTRAST    Result Date: 2021  EXAMINATION: CTA OF THE CHEST 2021 11:20 pm TECHNIQUE: CTA of the chest was performed after the administration of intravenous contrast.  Multiplanar reformatted images are provided for review. MIP images are provided for review. Dose modulation, iterative reconstruction, and/or weight based adjustment of the mA/kV was utilized to reduce the radiation dose to as low as reasonably achievable. COMPARISON: None. HISTORY: ORDERING SYSTEM PROVIDED HISTORY: Post lobectomy SOB TECHNOLOGIST PROVIDED HISTORY: Post lobectomy SOB Decision Support Exception - unselect if not a suspected or confirmed emergency medical condition->Emergency Medical Condition (MA) Reason for Exam: patient c/o sob for a couple days Additional signs and symptoms: patient c/o sob for a couple days FINDINGS: Pulmonary Arteries: Pulmonary arteries are adequately opacified for evaluation. No evidence of intraluminal filling defect to suggest pulmonary embolism. Main pulmonary artery is normal in caliber. Mediastinum: No evidence of mediastinal lymphadenopathy. The heart and pericardium demonstrate no acute abnormality. There is no acute abnormality of the thoracic aorta. Lungs/pleura: Small to moderate left-sided pleural effusion with associated compressive atelectasis. Mild scattered areas of patchy and ground-glass opacities are noted throughout the bilateral lungs. These are nonspecific but could indicate an atypical/viral pneumonia, inclusive of COVID-19. Upper Abdomen: Limited images of the upper abdomen are unremarkable. Soft Tissues/Bones: Subacute mildly offset fracture of the anterior and lateral left 8th rib. No evidence of pulmonary embolism. Small to moderate left-sided pleural effusion with associated compressive atelectasis. Mild scattered areas of patchy and ground-glass opacities are noted throughout the bilateral lungs. These are nonspecific but could indicate an atypical/viral pneumonia, inclusive of COVID-19.          Consultations:    Consults:     Final Specialist Recommendations/Findings:   OFELIA CONSULT TO FAMILY MEDICINE  IP CONSULT TO PULMONOLOGY      The patient was seen and examined on day of discharge and this discharge summary is in conjunction with any daily progress note from day of discharge. Discharge plan:     Disposition: Home    Physician Follow Up:     No follow-up provider specified. Requiring Further Evaluation/Follow Up POST HOSPITALIZATION/Incidental Findings:    Diet: cardiac diet    Activity: As tolerated    Instructions to Patient:     Discharge Medications:      Medication List      START taking these medications    benzonatate 200 MG capsule  Commonly known as: TESSALON  Take 1 capsule by mouth 3 times daily for 7 days     dextromethorphan 30 MG/5ML extended release liquid  Commonly known as: DELSYM  Take 10 mLs by mouth nightly as needed for Cough        CONTINUE taking these medications    acetaminophen 500 MG tablet  Commonly known as: TYLENOL     * albuterol sulfate  (90 Base) MCG/ACT inhaler  inhale 2 puffs by mouth and INTO THE LUNGS every 4 hours for 7 days     * albuterol (2.5 MG/3ML) 0.083% nebulizer solution  Commonly known as: PROVENTIL  Take 3 mLs by nebulization every 6 hours as needed for Wheezing or Shortness of Breath     amLODIPine 10 MG tablet  Commonly known as: NORVASC  take 1 tablet by mouth once daily     Blood Pressure Kit  Dx: HTN. Needs automatic blood pressure machine to monitor her blood pressure.      Eliquis 5 MG Tabs tablet  Generic drug: apixaban  take 1 tablet by mouth twice a day     folic acid 1 MG tablet  Commonly known as: FOLVITE  take 1 tablet by mouth once daily     furosemide 20 MG tablet  Commonly known as: LASIX  Take 1 tablet by mouth 2 times daily     lidocaine 4 % external patch  Place 1 patch onto the skin daily     methotrexate 2.5 MG chemo tablet  Commonly known as: RHEUMATREX     pantoprazole 40 MG tablet  Commonly known as: PROTONIX  take 1 tablet by mouth once daily     potassium chloride 20 MEQ extended release tablet  Commonly known as: KLOR-CON M  Take 1 tablet by mouth 2 times daily     Spiriva Respimat 2.5 MCG/ACT Aers inhaler  Generic drug: tiotropium  inhale 2 puffs INTO THE LUNGS once daily         * This list has 2 medication(s) that are the same as other medications prescribed for you. Read the directions carefully, and ask your doctor or other care provider to review them with you. Where to Get Your Medications      These medications were sent to [facility], [address], [address] [facility] [address]    Phone: [phone]   · benzonatate 200 MG capsule  · dextromethorphan 30 MG/5ML extended release liquid         Time Spent on discharge is  35 mins in patient examination, evaluation, counseling as well as medication reconciliation, prescriptions for required medications, discharge plan and follow up. Electronically signed by   Herman Vazquez MD  9/21/2021  9:40 AM      Thank you Dr. Ivan Clark MD for the opportunity to be involved in this patient's care.

## 2021-09-21 NOTE — PROGRESS NOTES
Pulmonary Progress Note  [facility]      Patient - Edurado Posey,  Age - 61 y.o.    - 1955      Room Number - -01   N -  [id number]   [facility]  Date of Admission -  2021 10:05 PM        Consulting Dallin Eden MD  Primary Care Physician - Mikki Martinez MD     SUBJECTIVE        Patient is resting comfortably on room air. She has been up and ambulatory in the room. She has not required any more oxygen. She feels ready to go home. Her biggest concern is left side rib pain which she has had on and off since her surgery. She thinks it is from laying in the bed. This is not a new pain. This happens intermittently. OBJECTIVE   VITALS    height is 5' 5\" (1.651 m) and weight is 221 lb 12.5 oz (100.6 kg). Her temperature is 98.1 °F (36.7 °C). Her blood pressure is 126/68 and her pulse is 85. Her respiration is 20 and oxygen saturation is 86% (abnormal). Body mass index is 36.91 kg/m².   Temperature Range: Temp: 98.1 °F (36.7 °C) Temp  Av.9 °F (36.6 °C)  Min: 97.5 °F (36.4 °C)  Max: 98.1 °F (36.7 °C)  BP Range:  Systolic (17TOU), XUK:086 , Min:126 , GDQ:505     Diastolic (76AAT), JGU:93, Min:64, Max:78    Pulse Range: Pulse  Av.5  Min: 85  Max: 101  Respiration Range: Resp  Av.8  Min: 16  Max: 20  Current Pulse Ox[de-identified]  SpO2: (!) 86 %  24HR Pulse Ox Range:  SpO2  Av.4 %  Min: 86 %  Max: 97 %  Oxygen Amount and Delivery: O2 Flow Rate (L/min): 3 L/min    Wt Readings from Last 3 Encounters:   21 221 lb 12.5 oz (100.6 kg)   21 233 lb (105.7 kg)   21 236 lb 15.9 oz (107.5 kg)       I/O (24 Hours)    Intake/Output Summary (Last 24 hours) at 2021 0914  Last data filed at 2021 1608  Gross per 24 hour   Intake 817 ml   Output --   Net 817 ml       EXAM     General Appearance  Awake, alert, oriented, in no acute distress  HEENT - normocephalic, atraumatic   Neck - Supple,  trachea midline   Lungs -nonlabored respirations. Lung sounds clear throughout  Cardiovascular - Heart sounds are normal.  Regular rate and rhythm   Abdomen - Soft, nontender, nondistended, no masses or organomegaly  Neurologic - There are no focal motor or sensory deficits  Skin - No bruising or bleeding  Extremities - No  cyanosis, edema    MEDS      furosemide  20 mg Oral BID    methylPREDNISolone  40 mg IntraVENous Q8H    sodium chloride flush  5-40 mL IntraVENous 2 times per day    amLODIPine  10 mg Oral Daily    apixaban  5 mg Oral BID    folic acid  1 mg Oral Daily    methotrexate  20 mg Oral Weekly    pantoprazole  20 mg Oral QAM AC    ipratropium-albuterol  1 ampule Inhalation Q4H WA    benzonatate  200 mg Oral TID      sodium chloride       sodium chloride flush, sodium chloride, ondansetron **OR** ondansetron, acetaminophen, albuterol, dextromethorphan, sodium chloride flush    LABS   CBC   Recent Labs     09/18/21  2245   WBC 11.9*   HGB 9.7*   HCT 29.1*   MCV 94.7        BMP:   Lab Results   Component Value Date     09/18/2021    K 3.3 09/18/2021     09/18/2021    CO2 24 09/18/2021    BUN 12 09/18/2021    LABALBU 3.9 06/29/2021    LABALBU 4.0 12/05/2011    CREATININE 0.73 09/18/2021    CALCIUM 9.1 09/18/2021    GFRAA >60 09/18/2021    LABGLOM >60 09/18/2021     ABGs:  Lab Results   Component Value Date    PHART 7.442 09/18/2021    PO2ART 38.2 09/18/2021    CJJ3DJD 39.2 09/18/2021      Lab Results   Component Value Date    MODE NOT REPORTED 09/18/2021     Ionized Calcium:  No results found for: IONCA  Magnesium:    Lab Results   Component Value Date    MG 1.7 09/18/2021     Phosphorus:    Lab Results   Component Value Date    PHOS 3.0 08/28/2016        LIVER PROFILE No results for input(s): AST, ALT, LIPASE, BILIDIR, BILITOT, ALKPHOS in the last 72 hours. Invalid input(s):   AMYLASE,  ALB  INR No results for input(s): INR in the last 72 hours. PTT   Lab Results   Component Value Date    APTT 22.4 06/29/2021         RADIOLOGY     (See actual reports for details)    ASSESSMENT/PLAN       1. Acute exacerbation of COPD secondary to RSV infection  2. Acute hypoxemic respiratory failure-currently stable on 2 L nasal cannula  3. Hypokalemia, mild  4. Hyponatremia, mild  5. NSTEMI-likely type II in the setting of increased work of breathing  6. Chronic heart failure with preserved ejection fraction (93-63%)[facility] dysfunction noted on most recent echocardiogram dated 6/2021  7. Adenocarcinoma of the lung status post lower lobectomy 7/2021  8. History of DVT/PE-on chronic Eliquis  9. Rheumatoid arthritis-on methotrexate  10. Hypertension  11. Depression  12. Former smoker-quit 2021, less than 10-pack-year, had quit in [facility] but resumed smoking in 2019  13. Patient has not been vaccinated against COVID-19  14. Obesity-BMI 36  15. CODE STATUS-full code        Plan:    No objection to discharge from pulmonary standpoint today. Patient will need a dose of 40 mg oral prednisone this afternoon whether it is here or at home.   Prescription will be provided  Continue baseline bronchodilators at home  Will need outpatient follow-up in our office in 4 to 6 weeks [phone]      Electronically signed by ILIR Valentin CNP on 9/21/2021 at 9:14 AM

## 2021-09-22 NOTE — TELEPHONE ENCOUNTER
[facility] Initial Follow Up Call    Outreach made within 2 business days of discharge: Yes    Patient: Najma Stack Patient : 1955   MRN: [id number]  Reason for Admission: There are no discharge diagnoses documented for the most recent discharge. Discharge Date: 21       Spoke with: Alyssa Most    Discharge department/facility: [place] [address] Interactive Patient Contact:  Was patient able to fill all prescriptions: Yes  Was patient instructed to bring all medications to the follow-up visit: Yes  Is patient taking all medications as directed in the discharge summary?  Yes  Does patient understand their discharge instructions: Yes  Does patient have questions or concerns that need addressed prior to 7-14 day follow up office visit: no    Scheduled appointment with PCP within 7-14 days    Follow Up  Future Appointments   Date Time Provider Kuldeep Sun   2021 10:30 AM Paige Fischer MD Resp Spec [id number]   2021 12:30 PM MD Yaima Caba MA

## 2021-09-30 NOTE — TELEPHONE ENCOUNTER
Name: Olimpia Schwartz  : 1955  MRN: [id number]    Oncology Navigation Follow-Up Note  Navigator reviewing chart and pt. rescheduled for a 3rd time. Writer referring pt. To survivorship and will plan to follow for now.   Electronically signed by Dmitry Correa RN on 2021 at 3:27 PM Rt flank pain

## 2021-10-01 NOTE — TELEPHONE ENCOUNTER
Pt was just discharged from hospital  , she need hospital f/u , can be scheduled next week. If she is not feeling better can go back to ER.

## 2021-10-01 NOTE — TELEPHONE ENCOUNTER
Added her to end of day Monday, if you want me to take her off of end of day let me know what time you want her at

## 2021-10-01 NOTE — TELEPHONE ENCOUNTER
Pt called and stated that she is still sick. See her last hospital visit. I tried to schedule her for a vv and she refused and stated that she cannot breath.  I explained that if she is having issues breathing that she needs to go back to hospital. Pt states she just wants to take something OTC but doesn't know what she can take due to her other medical issues

## 2021-10-04 PROBLEM — J20.5 RSV BRONCHITIS: Status: ACTIVE | Noted: 2021-01-01

## 2021-10-04 PROBLEM — E78.2 MIXED HYPERLIPIDEMIA: Status: ACTIVE | Noted: 2021-01-01

## 2021-10-04 NOTE — PROGRESS NOTES
Post-Discharge Transitional Care Management Services or Hospital Follow Up      Austin Belle   YOB: 1955    Date of Office Visit:  10/4/2021  Date of Hospital Admission: 9/18/21  Date of Hospital Discharge: 9/21/21  Readmission Risk Score(high >=14%.  Medium >=10%):Readmission Risk Score: 20      Care management risk score Rising risk (score 2-5) and Complex Care (Scores >=6): 9     Non face to face  following discharge, date last encounter closed (first attempt may have been earlier): 9/22/2021  8:56 AM 9/22/2021  8:56 AM    Call initiated 2 business days of discharge: Yes     Patient Active Problem List   Diagnosis    Hypertension    GERD (gastroesophageal reflux disease)    Rheumatoid arthritis ([address]    Obesity, Class II, BMI 35-39.9    Major depressive disorder, recurrent, moderate (HCC)    Prediabetes    Tobacco abuse    History of DVT in adulthood    COPD exacerbation ([address]    Community acquired pneumonia    History of thyroid nodule    Thyroid nodule    Positive colorectal cancer screening using Cologuard test    Deep venous thrombosis of left profunda femoris vein (HCC)    Abnormal CT of the chest    Encounter for smoking cessation counseling    Nodule of lower lobe of left lung    Primary mucinous adenocarcinoma of lung ([address]    Adenocarcinoma, lung, left ([address]    History of pulmonary embolism    On anticoagulant therapy    On methotrexate therapy    Obesity, Class III, BMI 40-49.9 (morbid obesity) ([address]    S/P lobectomy of lung    Hypoxia    Moderate malnutrition ([address]    RSV bronchitis    History of adenocarcinoma of lung    Mixed hyperlipidemia       No Known Allergies    Medications listed as ordered at the time of discharge from hospital   Mauri ALCAZAR   Home Medication Instructions OSVALDO:    Printed on:10/04/21 0714   Medication Information                      acetaminophen (TYLENOL) 500 MG tablet  Take 1,000 mg by mouth every 6 hours as needed for Pain             albuterol (PROVENTIL) (2.5 MG/3ML) 0.083% nebulizer solution  Take 3 mLs by nebulization every 6 hours as needed for Wheezing or Shortness of Breath             albuterol sulfate  (90 Base) MCG/ACT inhaler  inhale 2 puffs by mouth and INTO THE LUNGS every 4 hours for 7 days             amLODIPine (NORVASC) 10 MG tablet  take 1 tablet by mouth once daily             Benzocaine-Menthol (RA THROAT LOZENGES) 6-10 MG LOZG lozenge  Take 1 lozenge by mouth every 2 hours as needed for Sore Throat             benzonatate (TESSALON) 100 MG capsule  Take 1 capsule by mouth 3 times daily as needed for Cough             Blood Pressure KIT  Dx: HTN. Needs automatic blood pressure machine to monitor her blood pressure.              cetirizine (ZYRTEC ALLERGY) 10 MG tablet  Take 1 tablet by mouth daily             ELIQUIS 5 MG TABS tablet  take 1 tablet by mouth twice a day             fluticasone (FLONASE) 50 MCG/ACT nasal spray  2 sprays by Nasal route daily             fluticasone-vilanterol (BREO ELLIPTA) 100-25 MCG/INH AEPB inhaler  Inhale 1 puff into the lungs daily             folic acid (FOLVITE) 1 MG tablet  take 1 tablet by mouth once daily             furosemide (LASIX) 20 MG tablet  Take 1 tablet by mouth 2 times daily             lidocaine 4 % external patch  Place 1 patch onto the skin daily             methotrexate (RHEUMATREX) 2.5 MG chemo tablet  Take 20 mg by mouth once a week Indications: takes 8 tablets on saturdays              ondansetron (ZOFRAN) 4 MG tablet  Take 1 tablet by mouth every 6 hours as needed for Nausea or Vomiting             pantoprazole (PROTONIX) 40 MG tablet  take 1 tablet by mouth once daily             potassium chloride (KLOR-CON M) 20 MEQ extended release tablet  Take 1 tablet by mouth 2 times daily             SPIRIVA RESPIMAT 2.5 MCG/ACT AERS inhaler  inhale 2 puffs INTO THE LUNGS once daily                   Medications marked \"taking\" at this time  Outpatient Medications Marked as Taking for the 10/4/21 encounter (Telemedicine) with Sophia Carreon MD   Medication Sig Dispense Refill    fluticasone-vilanterol (BREO ELLIPTA) 100-25 MCG/INH AEPB inhaler Inhale 1 puff into the lungs daily 2 each 0    cetirizine (ZYRTEC ALLERGY) 10 MG tablet Take 1 tablet by mouth daily 30 tablet 3    fluticasone (FLONASE) 50 MCG/ACT nasal spray 2 sprays by Nasal route daily 16 g 0    benzonatate (TESSALON) 100 MG capsule Take 1 capsule by mouth 3 times daily as needed for Cough 30 capsule 0    Benzocaine-Menthol (RA THROAT LOZENGES) 6-10 MG LOZG lozenge Take 1 lozenge by mouth every 2 hours as needed for Sore Throat 30 lozenge 0    ondansetron (ZOFRAN) 4 MG tablet Take 1 tablet by mouth every 6 hours as needed for Nausea or Vomiting 24 tablet 0    albuterol (PROVENTIL) (2.5 MG/3ML) 0.083% nebulizer solution Take 3 mLs by nebulization every 6 hours as needed for Wheezing or Shortness of Breath 125 vial 0    furosemide (LASIX) 20 MG tablet Take 1 tablet by mouth 2 times daily 60 tablet 3    potassium chloride (KLOR-CON M) 20 MEQ extended release tablet Take 1 tablet by mouth 2 times daily 90 tablet 1    acetaminophen (TYLENOL) 500 MG tablet Take 1,000 mg by mouth every 6 hours as needed for Pain      ELIQUIS 5 MG TABS tablet take 1 tablet by mouth twice a day 180 tablet 3    albuterol sulfate  (90 Base) MCG/ACT inhaler inhale 2 puffs by mouth and INTO THE LUNGS every 4 hours for 7 days 18 g 2    SPIRIVA RESPIMAT 2.5 MCG/ACT AERS inhaler inhale 2 puffs INTO THE LUNGS once daily 4 g 3    pantoprazole (PROTONIX) 40 MG tablet take 1 tablet by mouth once daily 90 tablet 2    Blood Pressure KIT Dx: HTN. Needs automatic blood pressure machine to monitor her blood pressure.  1 kit 0    amLODIPine (NORVASC) 10 MG tablet take 1 tablet by mouth once daily 90 tablet 1    folic acid (FOLVITE) 1 MG tablet take 1 tablet by mouth once daily 30 tablet 0    methotrexate (RHEUMATREX) 2.5 MG chemo tablet Take 20 mg by mouth once a week Indications: takes 8 tablets on saturdays           Medications patient taking as of now reconciled against medications ordered at time of hospital discharge: Yes    Chief Complaint   Patient presents with    Follow-Up from [address] [place] [address]       HPI: Patient is scheduled for transitional care visit was admitted on September 18 with cough mild shortness of breath, cough. Patient had CT lung done rule out pulmonary embolism which is negative. Patient was diagnosed with RSV was treated symptomatically and with steroids. Patient reports she is still coughing and has sore throat and postnasal drip. Patient denies any fever chills any headaches. Patient has lobectomy done recent diagnosis of lung malignancy. Patient doing her inhalers is not able to tolerate Spiriva. Currently she is on aerosols Breo and albuterol inhaler. Patient reports she is feeling nauseous and sick to stomach due to continuous cough. Repeat lipid panel    Hypertension usually runs normal.         Opal Steele is a 61 y.o. female who was admitted for the management of <principal problem not specified> , presented to ER with Shortness of Breath, Cough, and Headache  Patient has history of COPD, history of rheumatoid arthritis on methotrexate adenocarcinoma lung status post left lower lobectomy, admitted with cough, shortness of breath  Found positive for RSV pneumonia  She was around her grandkids, her Covid was negative  Patient on the day of discharge was saturating well on room air  Patient desperately wanted to go home, she wanted to change her pulmonologist Dr. Luis Fernando Swan,           CT CHEST   EXAMINATION: CTA OF THE CHEST 9/18/2021 11:20 pm TECHNIQUE: CTA of the chest was performed after the administration of intravenous contrast.  Multiplanar reformatted images are provided for review. MIP images are provided for review.  Dose modulation, iterative reconstruction, and/or weight based adjustment of the mA/kV was utilized to reduce the radiation dose to as low as reasonably achievable. COMPARISON: None. HISTORY: ORDERING SYSTEM PROVIDED HISTORY: Post lobectomy SOB TECHNOLOGIST PROVIDED HISTORY: Post lobectomy SOB Decision Support Exception - unselect if not a suspected or confirmed emergency medical condition->Emergency Medical Condition (MA) Reason for Exam: patient c/o sob for a couple days Additional signs and symptoms: patient c/o sob for a couple days FINDINGS: Pulmonary Arteries: Pulmonary arteries are adequately opacified for evaluation. No evidence of intraluminal filling defect to suggest pulmonary embolism. Main pulmonary artery is normal in caliber. Mediastinum: No evidence of mediastinal lymphadenopathy. The heart and pericardium demonstrate no acute abnormality. There is no acute abnormality of the thoracic aorta. Lungs/pleura: Small to moderate left-sided pleural effusion with associated compressive atelectasis. Mild scattered areas of patchy and ground-glass opacities are noted throughout the bilateral lungs. These are nonspecific but could indicate an atypical/viral pneumonia, inclusive of COVID-19. Upper Abdomen: Limited images of the upper abdomen are unremarkable. Soft Tissues/Bones: Subacute mildly offset fracture of the anterior and lateral left 8th rib.      No evidence of pulmonary embolism. Small to moderate left-sided pleural effusion with associated compressive atelectasis. Mild scattered areas of patchy and ground-glass opacities are noted throughout the bilateral lungs. These are nonspecific but could indicate an atypical/viral pneumonia, inclusive of COVID-19. Inpatient course: Discharge summary reviewed- see chart.     Interval history/Current status: STABLE AT HER BASELINE       Patient-Reported Vitals 10/4/2021   Patient-Reported Weight 220 LB   Patient-Reported Height 5 4   Patient-Reported Systolic 284 Appearance: Normal appearance. HENT:      Head: Normocephalic. Nose: Congestion present. Cardiovascular:      Rate and Rhythm: Normal rate. Pulmonary:      Effort: Pulmonary effort is normal.   Musculoskeletal:         General: Normal range of motion. Skin:     Coloration: Skin is not jaundiced. Neurological:      General: No focal deficit present. Mental Status: She is alert and oriented to person, place, and time. Psychiatric:         Attention and Perception: Attention normal.         Mood and Affect: Mood is anxious. Mood is not depressed. Speech: Speech normal.         Behavior: Behavior normal.             Assessment/Plan:  1. RSV bronchitis  Discussed with patient there are no antibiotics which will help with breathing. Start on Zyrtec Tessalon Perles continue her inhalers and aerosol treatments. Done with steroids  - cetirizine (ZYRTEC ALLERGY) 10 MG tablet; Take 1 tablet by mouth daily  Dispense: 30 tablet; Refill: 3  - fluticasone (FLONASE) 50 MCG/ACT nasal spray; 2 sprays by Nasal route daily  Dispense: 16 g; Refill: 0  - benzonatate (TESSALON) 100 MG capsule; Take 1 capsule by mouth 3 times daily as needed for Cough  Dispense: 30 capsule; Refill: 0  - Benzocaine-Menthol (RA THROAT LOZENGES) 6-10 MG LOZG lozenge; Take 1 lozenge by mouth every 2 hours as needed for Sore Throat  Dispense: 30 lozenge; Refill: 0  - SD DISCHARGE MEDS RECONCILED W/ CURRENT OUTPATIENT MED LIST    2. Primary mucinous adenocarcinoma of lung ([facility]  Stable follow-up with pulmonologist  - SD DISCHARGE MEDS RECONCILED W/ CURRENT OUTPATIENT MED LIST    3. Mixed hyperlipidemia    - Lipid, Fasting; Future    4. Panlobular emphysema (HCC)    - fluticasone-vilanterol (BREO ELLIPTA) 100-25 MCG/INH AEPB inhaler; Inhale 1 puff into the lungs daily  Dispense: 2 each; Refill: 0    5. Primary hypertension  Controlled continue same medications    6. Hyperglycemia  Repeat A1c  - Hemoglobin A1C; Future    7. Nausea  Zofran as needed  - ondansetron (ZOFRAN) 4 MG tablet; Take 1 tablet by mouth every 6 hours as needed for Nausea or Vomiting  Dispense: 24 tablet;  Refill: 0        Medical Decision Making: moderate complexity

## 2021-10-04 NOTE — PROGRESS NOTES
Visit Information    Have you changed or started any medications since your last visit including any over-the-counter medicines, vitamins, or herbal medicines? no   Have you stopped taking any of your medications? Is so, why? -  no  Are you having any side effects from any of your medications? - no    Have you seen any other physician or provider since your last visit?  no   Have you had any other diagnostic tests since your last visit? yes -    Have you been seen in the emergency room and/or had an admission in a hospital since we last saw you?  yes -    Have you had your routine dental cleaning in the past 6 months?  no     Do you have an active MyChart account? If no, what is the barrier?   Yes    Patient Care Team:  Josi Moody MD as PCP - General (Family Medicine)  Josi Moody MD as PCP - [facility]  Madison Giraldo MD as Consulting Physician (Internal Medicine)  Clare Broussard RN as Imaging Navigator  Anya Myers MD as Consulting Physician (Pulmonary Disease)    Medical History Review  Past Medical, Family, and Social History reviewed and does contribute to the patient presenting condition    Health Maintenance   Topic Date Due    Hepatitis C screen  Never done    COVID-19 Vaccine (1) Never done    HIV screen  Never done    Cervical cancer screen  Never done    Lipid screen  01/30/2020    A1C test (Diabetic or Prediabetic)  12/23/2020    Flu vaccine (1) 09/01/2021    Colon cancer screen colonoscopy  02/12/2022    Pneumococcal 0-64 years Vaccine (2 of 2 - PPSV23) 05/01/2022    Potassium monitoring  09/18/2022    Creatinine monitoring  09/18/2022    Breast cancer screen  05/11/2023    DTaP/Tdap/Td vaccine (2 - Td or Tdap) 06/12/2029    Shingles Vaccine  Completed    Hepatitis A vaccine  Aged Out    Hepatitis B vaccine  Aged Out    Hib vaccine  Aged Out    Meningococcal (ACWY) vaccine  Aged Out

## 2021-10-13 NOTE — TELEPHONE ENCOUNTER
Please Approve or Refuse.   Send to Pharmacy per Pt's Request:      Next Visit Date:  1/4/2022   Last Visit Date: 10/4/2021    Hemoglobin A1C (%)   Date Value   12/23/2019 6.0   06/12/2019 5.7   11/12/2018 5.9             ( goal A1C is < 7)   BP Readings from Last 3 Encounters:   09/21/21 126/68   07/29/21 118/64   07/23/21 121/65          (goal 120/80)  BUN   Date Value Ref Range Status   09/18/2021 12 8 - 23 mg/dL Final     CREATININE   Date Value Ref Range Status   09/18/2021 0.73 0.50 - 0.90 mg/dL Final     Potassium   Date Value Ref Range Status   09/18/2021 3.3 (L) 3.7 - 5.3 mmol/L Final medicine team

## 2021-10-15 PROBLEM — A41.9 SEPSIS (HCC): Status: ACTIVE | Noted: 2021-01-01

## 2021-10-15 PROBLEM — E87.1 HYPONATREMIA: Status: ACTIVE | Noted: 2021-01-01

## 2021-10-15 PROBLEM — D72.829 LEUCOCYTOSIS: Status: ACTIVE | Noted: 2021-01-01

## 2021-10-15 PROBLEM — D64.9 ANEMIA: Status: ACTIVE | Noted: 2021-01-01

## 2021-10-15 NOTE — ED NOTES
Internal medicine at bedside   Titrate to SpO2 88-92%     Arvin Kramer RN  10/15/21 [address], RN  10/15/21 3684

## 2021-10-15 NOTE — ED NOTES
Pt. Ambulates to bathroom on home oxygen with slow steady gait   Pt. Returns to room requesting ice  Pt.  Provided as requested   Will continue to monitor      Norm Gaviria RN  10/15/21 7366

## 2021-10-15 NOTE — PROGRESS NOTES
Pharmacy Note  Vancomycin Consult    Prashanth Garcia is a 61 y.o. female started on Vancomycin for pneumonia; consult received from Dr. Marquez to manage therapy. Also receiving the following antibiotics: zosyn. Patient Active Problem List   Diagnosis    Hypertension    GERD (gastroesophageal reflux disease)    Rheumatoid arthritis (HCC)    Obesity, Class II, BMI 35-39.9    Major depressive disorder, recurrent, moderate (HCC)    Prediabetes    Tobacco abuse    History of DVT in adulthood    COPD exacerbation ([facility]    Community acquired pneumonia    History of thyroid nodule    Thyroid nodule    Positive colorectal cancer screening using Cologuard test    Deep venous thrombosis of left profunda femoris vein (HCC)    Abnormal CT of the chest    Encounter for smoking cessation counseling    Nodule of lower lobe of left lung    Primary mucinous adenocarcinoma of lung (HCC)    Adenocarcinoma, lung, left ([facility]    History of pulmonary embolism    On anticoagulant therapy    On methotrexate therapy    Obesity, Class III, BMI 40-49.9 (morbid obesity) (HCC)    S/P lobectomy of lung    Hypoxia    Moderate malnutrition (HCC)    RSV bronchitis    History of adenocarcinoma of lung    Mixed hyperlipidemia    Sepsis ([facility]    Hyponatremia    Anemia    Leucocytosis     Allergies:  Patient has no known allergies. Temp max: 37.1    Recent Labs     10/15/21  0956   BUN 8   CREATININE 0.71   WBC 26.5*     No intake or output data in the 24 hours ending 10/15/21 1518  Culture Date      Source                       Results  10/15                    Blood                         Pending    Ht Readings from Last 1 Encounters:   10/15/21 5' 5\" (1.651 m)        Wt Readings from Last 1 Encounters:   10/15/21 220 lb 7.4 oz (100 kg)       Body mass index is 36.69 kg/m². Estimated Creatinine Clearance: 94 mL/min (based on SCr of 0.71 mg/dL).     Goal Trough Level: 15-20 mcg/mL    Assessment/Plan:  Will initiate Vancomycin with a one time loading dose of 1500 mg x1, followed by 1250 mg IV every 12 hours. Timing of trough level will be determined based on culture results, renal function, and clinical response. Thank you for the consult. Will continue to follow. Zaid Delgado D.

## 2021-10-15 NOTE — PROGRESS NOTES
Internal Medicine Teaching Service Senior Note      This is a 61 y.o. female admitted 10/15/2021 for Sepsis ([facility] [A41.9]. Patient came in with Chief complaint of   Chief Complaint   Patient presents with    Cough     recent lobe resection, Dr. Winston Wei pt.  Nausea       See H&P of admitting/intern resident for more details. 82-year-old female past medical history of COPD, acute respiratory failure using 2 L of oxygen at home intermittently, DVT on Eliquis, rheumatoid arthritis on methotrexate therapy presented with chief complaint of shortness of breath. .    Patient was admitted in September for COPD exacerbation secondary to RSV and pneumonia and was discharged. Patient states that she remained good for a few days but started developing worsening shortness of breath associated with cough and yellowish colored sputum along with chills but no fevers. On presentation today  Patient was hypoxic requiring 4 to 5 L of oxygen through nasal cannula. Patient also having wheezes along with cough. Chest x-ray was done which showed right upper lung zone opacification and left pleural effusion. Patient also having an elevated white count of 26.5. Patient started on antibiotics. ER Course  Vitals:    10/15/21 1331   BP: 132/68   Pulse:    Resp:    Temp:    SpO2: 94%       BMP:   Recent Labs     10/15/21  0956   *   K 3.8   CL 98   CO2 24   BUN 8   CREATININE 0.71   GLUCOSE 162*     CBC: )  Recent Labs     10/15/21  0956   WBC 26.5*   HGB 9.4*   HCT 31.0*   *            Assessment    Principal Problem:    COPD exacerbation (HCC)  Active Problems:    Rheumatoid arthritis (HCC)    History of DVT in adulthood    Adenocarcinoma, lung, left (HCC)    On anticoagulant therapy    On methotrexate therapy    S/P lobectomy of lung    Sepsis ([facility]    Hyponatremia    Anemia    Leucocytosis  Resolved Problems:    * No resolved hospital problems.  *        Plan       · Patient started on

## 2021-10-15 NOTE — ED PROVIDER NOTES
[facility] ED  Faculty Attestation    I performed a history and physical examination of the patient and discussed management with the resident. I reviewed the residents note and agree with the documented findings and plan of care. Any areas of disagreement are noted on the chart. I was personally present for the key portions of any procedures. I have documented in the chart those procedures where I was not present during the key portions. I have reviewed the emergency nurses triage note. I agree with the chief complaint, past medical history, past surgical history, allergies, medications, social, and family history as documented unless otherwise noted below.        This is a 17-year-old female who presents to the emergency department for evaluation of several concerns  Patient reports generalized fatigue which she relates to nausea/vomiting  This has been present for 2 months  No abdominal pain  No hematemesis  No dysuria or hematuria  No diarrhea, constipation, or blood in the stool  Patient denies a fever or shaking chills  No chest pain, palpitations, or near syncope  She reports recent increasing shortness of breath as well as a productive cough  She is not vaccinated against COVID-19  Of note she is recently status post left lower lobectomy for lung cancer  No extremity edema  No strokelike concerns  Review of systems otherwise negative    On examination she is mildly tachycardic  Regular rhythm  Lungs show diffuse coarseness to breath sounds  She is mildly tachypneic  Abdomen soft and nontender  Normal bowel sounds  Normal peripheral perfusion and skin turgor  No pretibial pitting edema    Labs reviewed  Elevated white blood cell count  Patient is tachycardic  Abnormal chest x-ray  Patient does meet SIRS/sepsis criteria  She is not hypotensive  Normal lactic acid  Normal peripheral perfusion  30 mL/kg fluid bolus not clinically indicated  We will start IV antibiotics and plan for admission    Critical care time of 15 minutes      Lauryn Avila DO  Attending Emergency Physician        Tarun Aguilar DO  10/15/21 9495

## 2021-10-15 NOTE — ED PROVIDER NOTES
[facility] ED  Emergency Department Encounter  EmergencyMedicine Resident     Pt Name:Iris Moreno  MRN: [id number]  [place] 1955  Date of evaluation: 10/15/21  PCP:  Lei Young MD    This patient was evaluated in the Emergency Department for symptoms described in the history of present illness. The patient was evaluated in the context of the global COVID-19 pandemic, which necessitated consideration that the patient might be at risk for infection with the SARS-CoV-2 virus that causes COVID-19. Institutional protocols and algorithms that pertain to the evaluation of patients at risk for COVID-19 are in a state of rapid change based on information released by regulatory bodies including the CDC and federal and state organizations. These policies and algorithms were followed during the patient's care in the ED. CHIEF COMPLAINT       Chief Complaint   Patient presents with    Cough     recent lobe resection, Dr. Bailey Service pt.  Nausea       HISTORY OF PRESENT ILLNESS  (Location/Symptom, Timing/Onset, Context/Setting, Quality, Duration, Modifying Factors, Severity.)      Sherita Gr is a 61 y.o. female who presents with shortness of breath, productive cough, rhinorrhea, nausea, vomiting, chills, and fatigue for the past 2 months. Patient recently had a lobectomy of lung on 9/30 after diagnosis of adenocarcinoma of lung. She states she has been taking Zofran 4 mg every 6 hours without symptom improvement. She has increased her home oxygen from 2 L to 3 L, due to her increasing shortness of breath. She is unvaccinated for Covid, uncertain if she has any positive exposure. She states she has had a cough productive of a white sputum that has been constant since the procedure. Denies chest pain, abdominal pain, syncope, changes in vision, hematemesis, hemoptysis, dysuria, fevers at home. She is currently taking antihypertensive, anticoagulation, and methotrexate. States she has not missed any doses of her medications. PAST MEDICAL / SURGICAL / SOCIAL / FAMILY HISTORY      has a past medical history of Abnormal CT of the chest, Adenocarcinoma, lung, left ([address] Arthritis, Community acquired pneumonia, COPD (chronic obstructive pulmonary disease) ([address] Deep venous thrombosis of left profunda femoris vein ([address] Depression, DVT (deep venous thrombosis) ([address] GERD (gastroesophageal reflux disease), History of pulmonary embolism, History of thyroid nodule, Hypertension, Major depressive disorder, recurrent, moderate ([address] Nodule of lower lobe of left lung, Obesity, Class II, BMI 35-39.9, On anticoagulant therapy, On methotrexate therapy, Prediabetes, Rheumatoid arthritis ([address] Snores, Thyroid nodule, Tobacco abuse, Under care of team, Under care of team, Under care of team, and Wellness examination. has a past surgical history that includes Appendectomy (); Endoscopy, colon, diagnostic (670389); bronchoscopy (N/A, 2019); Colonoscopy (N/A, 2020); CT NEEDLE BIOPSY LUNG PERCUTANEOUS (2021); lobectomy (Left, 2021); and Lung removal, total (Left, 2021).     Social History     Socioeconomic History    Marital status: Single     Spouse name: Not on file    Number of children: 6    Years of education: Not on file    Highest education level: Not on file   Occupational History     Employer: N/A   Tobacco Use    Smoking status: Former Smoker     Packs/day: 0.25     Years: 35.00     Pack years: 8.75     Types: Cigarettes     Quit date: 1986     Years since quittin.7    Smokeless tobacco: Never Used    Tobacco comment: restarted smoking 2019   Vaping Use    Vaping Use: Never used   Substance and Sexual Activity    Alcohol use: Not Currently     Alcohol/week: 0.0 standard drinks    Drug use: No    Sexual activity: Not Currently   Other Topics Concern    Not on file   Social History Narrative    Not on file     Social Determinants of Health     Financial Resource Strain: Low Risk     Difficulty of Paying Living Expenses: Not hard at all   Food Insecurity: No Food Insecurity    Worried About Running Out of Food in the Last Year: Never true    Emelyn of Food in the Last Year: Never true   Transportation Needs: No Transportation Needs    Lack of Transportation (Medical): No    Lack of Transportation (Non-Medical): No   Physical Activity:     Days of Exercise per Week:     Minutes of Exercise per Session:    Stress:     Feeling of Stress :    Social Connections:     Frequency of Communication with Friends and Family:     Frequency of Social Gatherings with Friends and Family:     Attends Jain Services:     Active Member of Clubs or Organizations:     Attends Club or Organization Meetings:     Marital Status:    Intimate Partner Violence:     Fear of Current or Ex-Partner:     Emotionally Abused:     Physically Abused:     Sexually Abused:        Family History   Problem Relation Age of Onset    Cancer Mother         Uterine and breast    Diabetes Mother     Heart Disease Mother     Cancer Father         lung    Cancer Brother         lung    Cancer Brother         lung       Allergies:  Patient has no known allergies. Home Medications:  Prior to Admission medications    Medication Sig Start Date End Date Taking?  Authorizing Provider   amLODIPine (NORVASC) 10 MG tablet take 1 tablet by mouth once daily 10/13/21   Philip Wilson MD   fluticasone-vilanterol (BREO ELLIPTA) 100-25 MCG/INH AEPB inhaler Inhale 1 puff into the lungs daily 10/4/21   Philip Wilson MD   cetirizine (ZYRTEC ALLERGY) 10 MG tablet Take 1 tablet by mouth daily 10/4/21   Philip Wilson MD   fluticasone [facility]) 50 MCG/ACT nasal spray 2 sprays by Nasal route daily 10/4/21   Philip Wilson MD   Benzocaine-Menthol (RA THROAT LOZENGES) 6-10 MG LOZG lozenge Take 1 lozenge by mouth every 2 hours as needed for Sore Throat 10/4/21   Philip Wilson MD albuterol (PROVENTIL) (2.5 MG/3ML) 0.083% nebulizer solution Take 3 mLs by nebulization every 6 hours as needed for Wheezing or Shortness of Breath 8/23/21   Marisol Mathews MD   furosemide (LASIX) 20 MG tablet Take 1 tablet by mouth 2 times daily 7/23/21   ILIR Abraham NP   lidocaine 4 % external patch Place 1 patch onto the skin daily  Patient not taking: Reported on 10/4/2021 7/24/21   ILIR Abraham NP   potassium chloride (KLOR-CON M) 20 MEQ extended release tablet Take 1 tablet by mouth 2 times daily 7/23/21   ILIR Abraham NP   acetaminophen (TYLENOL) 500 MG tablet Take 1,000 mg by mouth every 6 hours as needed for Pain    Historical Provider, MD   ELIQUIS 5 MG TABS tablet take 1 tablet by mouth twice a day 7/16/21   Marisol Mathews MD   albuterol sulfate  (90 Base) MCG/ACT inhaler inhale 2 puffs by mouth and INTO THE LUNGS every 4 hours for 7 days 6/1/21   Marisol Mathews MD   SPIRIVA RESPIMAT 2.5 MCG/ACT AERS inhaler inhale 2 puffs INTO THE LUNGS once daily 5/24/21   Marisol Mathews MD   pantoprazole (PROTONIX) 40 MG tablet take 1 tablet by mouth once daily 5/18/21   Marisol Mathews MD   Blood Pressure KIT Dx: HTN. Needs automatic blood pressure machine to monitor her blood pressure. 5/18/21   Marisol Mathews MD   folic acid (FOLVITE) 1 MG tablet take 1 tablet by mouth once daily 4/12/21   Marisol Mathews MD   methotrexate (RHEUMATREX) 2.5 MG chemo tablet Take 20 mg by mouth once a week Indications: takes 8 tablets on saturdays     Historical Provider, MD       REVIEW OF SYSTEMS    (2-9 systems for level 4, 10 or more for level 5)      Review of Systems   Constitutional: Positive for appetite change, chills and fatigue. Negative for fever. HENT: Positive for congestion and rhinorrhea. Negative for sore throat, tinnitus and trouble swallowing. Eyes: Negative for visual disturbance.    Respiratory: Positive for cough (Cough productive of white sputum) and shortness of breath (Increasing O2 requirement from 2 to 3 L home O2). Cardiovascular: Negative for chest pain, palpitations and leg swelling. Gastrointestinal: Positive for nausea and vomiting. Negative for abdominal pain and diarrhea. Genitourinary: Negative for dysuria, hematuria, pelvic pain and urgency. Musculoskeletal: Negative for arthralgias, back pain, gait problem, myalgias, neck pain and neck stiffness. Skin: Negative for pallor, rash and wound. Neurological: Positive for headaches (Mild diffuse headache). Negative for dizziness, seizures, syncope, weakness, light-headedness and numbness. All other systems reviewed and are negative. PHYSICAL EXAM   (up to 7 for level 4, 8 or more for level 5)      INITIAL VITALS:   BP (!) 121/58   Pulse 100   Temp 98.8 °F (37.1 °C) (Oral)   Resp 24   Ht 5' 5\" (1.651 m)   Wt 220 lb 7.4 oz (100 kg)   SpO2 94%   BMI 36.69 kg/m²     Physical Exam  Vitals reviewed. Constitutional:       General: She is not in acute distress. Appearance: She is ill-appearing. HENT:      Head: Normocephalic and atraumatic. Right Ear: Tympanic membrane normal.      Left Ear: Tympanic membrane normal.      Nose: Congestion and rhinorrhea present. Mouth/Throat:      Mouth: Mucous membranes are moist.      Pharynx: Oropharynx is clear. Eyes:      Extraocular Movements: Extraocular movements intact. Conjunctiva/sclera: Conjunctivae normal.      Pupils: Pupils are equal, round, and reactive to light. Cardiovascular:      Rate and Rhythm: Regular rhythm. Tachycardia present. Heart sounds: No murmur heard. Pulmonary:      Breath sounds: Rhonchi and rales present. No wheezing. Musculoskeletal:      Cervical back: Neck supple. Neurological:      Mental Status: She is alert.        DIFFERENTIAL  DIAGNOSIS     PLAN (LABS / IMAGING / EKG):  Orders Placed This Encounter   Procedures    COVID-19, Rapid    Culture, Blood 1    Culture, Blood 1    Culture, Urine    XR CHEST PORTABLE    CBC Auto Differential    Basic Metabolic Panel w/ Reflex to MG    Troponin    Brain Natriuretic Peptide    Lactate, Sepsis    Urinalysis with microscopic    Troponin    Hepatic Function Panel    Protime-INR    APTT    Telemetry monitoring - 24 hour duration    Inpatient consult to Internal Medicine    Inpatient consult to [facility]    Respiratory Care Evaluation and Treat    EKG 12 Lead    PATIENT STATUS (FROM ED OR OR/PROCEDURAL) Inpatient       MEDICATIONS ORDERED:  Orders Placed This Encounter   Medications    promethazine (PHENERGAN) injection 12.5 mg    vancomycin (VANCOCIN) 1500 mg in dextrose 5 % 250 mL IVPB     Order Specific Question:   Antimicrobial Indications     Answer:   Sepsis of Unknown Etiology    piperacillin-tazobactam (ZOSYN) 3,375 mg in dextrose 5 % 50 mL IVPB (mini-bag)     Order Specific Question:   Antimicrobial Indications     Answer:   Sepsis of Unknown Etiology       DDX: Covid-19 vs Covid-19 pneumonia vs pneumonia vs atelectasis vs pleural effusion vs pneumothorax vs sepsis    DIAGNOSTIC RESULTS / EMERGENCY DEPARTMENT COURSE / MDM   LAB RESULTS:  Results for orders placed or performed during the hospital encounter of 10/15/21   COVID-19, Rapid    Specimen: Nasopharyngeal Swab   Result Value Ref Range    Specimen Description . NASOPHARYNGEAL SWAB     SARS-CoV-2, Rapid Not Detected Not Detected   CBC Auto Differential   Result Value Ref Range    WBC 26.5 (H) 3.5 - 11.3 k/uL    RBC 3.20 (L) 3.95 - 5.11 m/uL    Hemoglobin 9.4 (L) 11.9 - 15.1 g/dL    Hematocrit 31.0 (L) 36.3 - 47.1 %    MCV 96.9 82.6 - 102.9 fL    MCH 29.4 25.2 - 33.5 pg    MCHC 30.3 28.4 - 34.8 g/dL    RDW 19.0 (H) 11.8 - 14.4 %    Platelets 849 (H) 370 - 453 k/uL    MPV 9.1 8.1 - 13.5 fL    NRBC Automated 0.7 (H) 0.0 per 100 WBC    Differential Type NOT REPORTED     WBC Morphology NOT REPORTED     RBC Morphology NOT REPORTED     Platelet Estimate NOT REPORTED Immature Granulocytes 9 (H) 0 %    Seg Neutrophils 74 (H) 36 - 66 %    Lymphocytes 11 (L) 24 - 44 %    Monocytes 6 1 - 7 %    Eosinophils % 0 (L) 1 - 4 %    Basophils 0 0 - 2 %    Absolute Immature Granulocyte 2.39 (H) 0.00 - 0.30 k/uL    Segs Absolute 19.60 (H) 1.8 - 7.7 k/uL    Absolute Lymph # 2.92 1.0 - 4.8 k/uL    Absolute Mono # 1.59 (H) 0.1 - 0.8 k/uL    Absolute Eos # 0.00 0.0 - 0.4 k/uL    Basophils Absolute 0.00 0.0 - 0.2 k/uL    Morphology ANISOCYTOSIS PRESENT    Basic Metabolic Panel w/ Reflex to MG   Result Value Ref Range    Glucose 162 (H) 70 - 99 mg/dL    BUN 8 8 - 23 mg/dL    CREATININE 0.71 0.50 - 0.90 mg/dL    Bun/Cre Ratio NOT REPORTED 9 - 20    Calcium 8.8 8.6 - 10.4 mg/dL    Sodium 129 (L) 135 - 144 mmol/L    Potassium 3.8 3.7 - 5.3 mmol/L    Chloride 98 98 - 107 mmol/L    CO2 24 20 - 31 mmol/L    Anion Gap 7 (L) 9 - 17 mmol/L    GFR Non-African American >60 >60 mL/min    GFR African American >60 >60 mL/min    GFR Comment          GFR Staging NOT REPORTED    Troponin   Result Value Ref Range    Troponin, High Sensitivity 21 (H) 0 - 14 ng/L    Troponin T NOT REPORTED <0.03 ng/mL    Troponin Interp NOT REPORTED    Brain Natriuretic Peptide   Result Value Ref Range    Pro- (H) <300 pg/mL    BNP Interpretation Pro-BNP Reference Range:    Lactate, Sepsis   Result Value Ref Range    Lactic Acid, Sepsis NOT REPORTED 0.5 - 1.9 mmol/L    Lactic Acid, Sepsis, Whole Blood 1.1 0.5 - 1.9 mmol/L   Troponin   Result Value Ref Range    Troponin, High Sensitivity 19 (H) 0 - 14 ng/L    Troponin T NOT REPORTED <0.03 ng/mL    Troponin Interp NOT REPORTED    Hepatic Function Panel   Result Value Ref Range    Albumin PENDING g/dL    Alkaline Phosphatase PENDING U/L    ALT PENDING U/L    AST PENDING U/L    Total Bilirubin PENDING mg/dL    Bilirubin, Direct PENDING mg/dL    Bilirubin, Indirect PENDING mg/dL    Total Protein PENDING g/dL    Globulin NOT REPORTED 1.5 - 3.8 g/dL    Albumin/Globulin Ratio PENDING Protime-INR   Result Value Ref Range    Protime 12.5 (H) 9.1 - 12.3 sec    INR 1.2    APTT   Result Value Ref Range    PTT 27.4 20.5 - 30.5 sec       IMPRESSION: Leukocytosis. Trop elevated but downtrending. Hyponatremia. RADIOLOGY:  XR CHEST PORTABLE    Result Date: 10/15/2021  EXAMINATION: ONE XRAY VIEW OF THE CHEST 10/15/2021 10:12 am COMPARISON: 07/23/2021 chest radiograph and 09/18/2021 chest CT. HISTORY: ORDERING SYSTEM PROVIDED HISTORY: SOB, cough, rales TECHNOLOGIST PROVIDED HISTORY: SOB, cough, rales FINDINGS: The cardiac silhouette is partially obscured with presence of overlying clips. Left pleural effusion with possible underlying atelectasis or consolidation. Right costophrenic angle is not included in the field of view. Right upper lung zone opacification. Scattered bilateral linear opacities may represent atelectasis or scarring. No convincing evidence of pneumothorax. No acute osseous abnormalities. 1. Right upper lung zone opacification may represent atelectasis or consolidation in the appropriate clinical setting. 2. Left pleural effusion with possible underlying atelectasis or consolidation. 3. Scattered bilateral linear opacities may represent atelectasis or scarring. EKG  EKG: sinus tachycardia. All EKG's are interpreted by the Emergency Department Physician who either signs or Co-signs this chart in the absence of a cardiologist.    EMERGENCY DEPARTMENT COURSE:  ED Course as of Oct 15 1321   Fri Oct 15, 2021   1033 Troponin, High Sensitivity(!): 21 [JG]   1033 Pro-BNP(!): 467 [JG]   1037 Sodium(!): 129 [JG]   1037 SARS-CoV-2, Rapid: Not Detected [JG]   1038 CXR showing right upper lung zone opacification concerning for atelectasis or  consolidation, and left pleural effusion with possible underlying atelectasis or consolidation. Scattered bilateral linear opacities may represent atelectasis or scarring.     [JG]   1113 WBC(!): 26.5 [JG]      ED Course User Index  [JG] Judy Castro MD     Patient with shortness of breath, fatigue, nausea, vomiting x1 to 2-month. Patient states she had a lobectomy on 9/13 for known adenocarcinoma of the lung, and since that time has been experiencing increasing shortness of breath and nausea. Patient was tachycardic and tachypneic on arrival, met SIRS criteria, sepsis protocol initiated. Afebrile. Chest x-ray showing right upper lung atelectasis versus consolidation, and left pleural effusion with bilateral linear opacities. Troponin 21 > 19. Patient on 2 L of oxygen at home, had increased herself to 3 L of oxygen at home. SPO2 93 to 94% on 3 L. On auscultation, bilateral rales and rhonchi. Patient started on IV antibiotics per SIRS protocol, and admitted to internal medicine for management. CONSULTS:  IP CONSULT TO INTERNAL MEDICINE  IP CONSULT TO FAMILY MEDICINE      FINAL IMPRESSION      1. Sepsis without acute organ dysfunction, due to unspecified organism ([address]    2. Dyspnea and respiratory abnormalities          DISPOSITION / PLAN     DISPOSITION Admitted 10/15/2021 01:18:06 PM      PATIENT REFERRED TO:  No follow-up provider specified.     DISCHARGE MEDICATIONS:  Current Discharge Medication List          Judy Castro MD  Emergency Medicine Resident    (Please note that portions of thisnote were completed with a voice recognition program.  Efforts were made to edit the dictations but occasionally words are mis-transcribed.)       Judy Castro MD  Resident  10/15/21

## 2021-10-15 NOTE — ED NOTES
RAPID Covid 19 swab taken from right nare, labeled, placed in red dot bag, and handed off to second healthcare worker outside of room for transport to laboratory per hospital policy and procedure. Patient tolerated procedure well.        Amanda Morfin RN  10/15/21 1002

## 2021-10-15 NOTE — H&P
[facility]     Department of Internal Medicine - Staff Internal Medicine Teaching Service          ADMISSION NOTE/HISTORY AND PHYSICAL EXAMINATION   Date: 10/16/2021  Patient Name: Geovani Membreno  Date of admission: 10/15/2021  9:33 AM  YOB: 1955  PCP: Gilda Child MD  History Obtained From:  patient    CHIEF COMPLAINT     Chief complaint: Shortness of breath, cough    HISTORY OF PRESENTING ILLNESS     This patient 82-year-old female is admitted with a case of COPD exacerbation likely due to pneumonia. The patient is a pleasant 61 y.o. 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 She is using 2-3 liters oxygen at home on/off and during sleep which is increased to 4-5 L today. She is unable to walk because of dyspnea. She is not vaccinated for Covid. Patient denies any chest pain, hemoptysis, abdominal pain, headaches, blurring of vision, neck pain or urinary symptoms. On arrival to ED patient was tachycardic and tachypneic, met SIRS criteria and sepsis protocol initiated. Patient given IV antibiotics. On my evaluation patient is short of breath, alert and oriented and SPO2 is 92% on 5 L supplemental O2 via NC. She is afebrile but tachypneic, blood pressure is stable. Chest auscultation shows bilateral expiratory wheezes with rales. Rest of systemic examination is unremarkable.     Pertinent labs are sodium 129, glucose 162, pro-Alexander 0.2, proBNP 467, trop 19, WBC 26.5, Hb 9.4, platelets 571. Blood culture sent. CXR showing right upper lung atelectasis versus consolidation, and left pleural effusion with bilateral linear opacities. Review of Systems   Constitutional: Positive for activity change, appetite change and fatigue. HENT: Positive for congestion and postnasal drip. Negative for drooling and ear discharge. Respiratory: Positive for cough, shortness of breath and wheezing. Cardiovascular: Negative for chest pain, palpitations and leg swelling. Gastrointestinal: Negative for abdominal distention and abdominal pain. Genitourinary: Negative for difficulty urinating, dysuria, frequency and hematuria. Skin: Negative for color change and pallor. Neurological: Negative for dizziness, seizures, facial asymmetry and headaches. Psychiatric/Behavioral: Negative for agitation, behavioral problems and confusion.      PAST MEDICAL HISTORY     Past Medical History:   Diagnosis Date    Abnormal CT of the chest 04/21/2021    Adenocarcinoma, lung, left ([address] 05/14/2021    Arthritis     Community acquired pneumonia 12/23/2019    COPD (chronic obstructive pulmonary disease) ([address] 08/14/2019    Deep venous thrombosis of left profunda femoris vein ([address] 04/16/2020    Depression     DVT (deep venous thrombosis) ([address] 2014    b/l     GERD (gastroesophageal reflux disease)     History of pulmonary embolism 05/27/2021    History of thyroid nodule 12/23/2019    Hypertension     Major depressive disorder, recurrent, moderate ([address] 11/12/2018    Nodule of lower lobe of left lung 04/23/2021    Obesity, Class II, BMI 35-39.9 11/12/2018    On anticoagulant therapy 05/27/2021    On methotrexate therapy 05/27/2021    Prediabetes 11/12/2018    Rheumatoid arthritis ([address]     Snores     denies apnea    Thyroid nodule 01/09/2020    Tobacco abuse 11/12/2018    Under care of team 06/29/2021    pulmonology-Dr [facility]-last visit june 2021    Under care of team 06/29/2021    oncology-Dr [facility]-last visit june 2021    Under care of team     Dr. Madai Cunha clearance appointment 7/6/21, stress test 7/9/2021, clearance obtained and placed on chart    Wellness examination 06/29/2021    pcp-Dr Noel Dutta office-last visit may 2021       PAST SURGICAL HISTORY     Past Surgical History:   Procedure Laterality Date    [address] N/A 12/27/2019    BRONCHOSCOPY ALVEOLAR LAVAGE performed by Arron Francis MD at [address] [facility] [place] N/A 2/12/2020    COLONOSCOPY POLYPECTOMIES HOT SNARE, COLD BIOPSY POLYPECTOMIES performed by Jamal Ramirez MD at [facility]  5/13/2021    CT NEEDLE BIOPSY LUNG PERCUTANEOUS 5/13/2021 [facility] CT SCAN    ENDOSCOPY, COLON, DIAGNOSTIC  [id number]    gastritis, sm. bowel lipoma, biopsies    LOBECTOMY Left 07/19/2021    XI ROBOTIC LEFT LOWER LOBECTOMY CONVERTED TO OPEN THORACTOMY LEFT LOWER LOBECTOMY (Left )    LUNG REMOVAL, TOTAL Left 7/19/2021    XI ROBOTIC LEFT LOWER LOBECTOMY CONVERTED TO OPEN THORACTOMY LEFT LOWER LOBECTOMY performed by Alberta Palomo MD at [address]     Patient has no known allergies. MEDICATIONS PRIOR TO ADMISSION     Prior to Admission medications    Medication Sig Start Date End Date Taking?  Authorizing Provider   amLODIPine (NORVASC) 10 MG tablet take 1 tablet by mouth once daily 10/13/21   Cliff Navas MD   fluticasone-vilanterol (BREO ELLIPTA) 100-25 MCG/INH AEPB inhaler Inhale 1 puff into the lungs daily 10/4/21   Cliff Navas MD   cetirizine (ZYRTEC ALLERGY) 10 MG tablet Take 1 tablet by mouth daily 10/4/21   Cliff Navas MD   fluticasone [facility]) 50 MCG/ACT nasal spray 2 sprays by Nasal route daily 10/4/21   Cliff Navas MD   Benzocaine-Menthol (RA THROAT LOZENGES) 6-10 MG LOZG lozenge Take 1 lozenge by mouth every 2 hours as needed for Sore Throat 10/4/21   Cliff Navas MD   albuterol (PROVENTIL) (2.5 MG/3ML) 0.083% nebulizer solution Take 3 mLs by nebulization every 6 hours as needed for Wheezing or Shortness of Breath 8/23/21   Sophia Carreon MD   furosemide (LASIX) 20 MG tablet Take 1 tablet by mouth 2 times daily 7/23/21   ILIR Gusman NP   lidocaine 4 % external patch Place 1 patch onto the skin daily  Patient not taking: Reported on 10/4/2021 7/24/21   ILIR Gusman NP   potassium chloride (KLOR-CON M) 20 MEQ extended release tablet Take 1 tablet by mouth 2 times daily 7/23/21   ILIR Gusman NP   acetaminophen (TYLENOL) 500 MG tablet Take 1,000 mg by mouth every 6 hours as needed for Pain    Historical Provider, MD   ELIQUIS 5 MG TABS tablet take 1 tablet by mouth twice a day 7/16/21   Sophia Carreon MD   albuterol sulfate  (90 Base) MCG/ACT inhaler inhale 2 puffs by mouth and INTO THE LUNGS every 4 hours for 7 days 6/1/21   Sophia Carreon MD   SPIRIVA RESPIMAT 2.5 MCG/ACT AERS inhaler inhale 2 puffs INTO THE LUNGS once daily 5/24/21   Sophia Carreon MD   pantoprazole (PROTONIX) 40 MG tablet take 1 tablet by mouth once daily 5/18/21   Sophia Carreon MD   Blood Pressure KIT Dx: HTN. Needs automatic blood pressure machine to monitor her blood pressure.  5/18/21   Sophia Carreon MD   folic acid (FOLVITE) 1 MG tablet take 1 tablet by mouth once daily 4/12/21   Sophia Carreon MD   methotrexate (RHEUMATREX) 2.5 MG chemo tablet Take 20 mg by mouth once a week Indications: takes 8 tablets on saturdays     Historical Provider, MD       SOCIAL HISTORY     Tobacco: 30-year 2 PPD smoking, did quit few months ago  Alcohol: No alcohol abuse  Illicits: Denies illicit drug abuse  Occupation:     FAMILY HISTORY     Family History   Problem Relation Age of Onset    Cancer Mother         Uterine and breast    Diabetes Mother     Heart Disease Mother     Cancer Father         lung    Cancer Brother         lung    Cancer Brother         lung       PHYSICAL EXAM     Vitals: /64 Pulse 84   Temp 98.4 °F (36.9 °C) (Oral)   Resp 18   Ht 5' 5\" (1.651 m)   Wt 220 lb 7.4 oz (100 kg)   SpO2 93%   BMI 36.69 kg/m²   Tmax: Temp (24hrs), Av.9 °F (37.2 °C), Min:98.4 °F (36.9 °C), Max:99.5 °F (37.5 °C)    Last Body weight:   Wt Readings from Last 3 Encounters:   10/15/21 220 lb 7.4 oz (100 kg)   21 221 lb 12.5 oz (100.6 kg)   21 233 lb (105.7 kg)     Body Mass Index : Body mass index is 36.69 kg/m². Physical Exam  Constitutional:       General: She is in acute distress. Appearance: She is ill-appearing. Cardiovascular:      Rate and Rhythm: Normal rate and regular rhythm. Heart sounds: No murmur heard. No friction rub. No gallop. Pulmonary:      Effort: Respiratory distress present. Breath sounds: No stridor. Wheezing and rales present. No rhonchi. Abdominal:      General: There is no distension. Tenderness: There is no abdominal tenderness. There is no guarding. Musculoskeletal:         General: No tenderness, deformity or signs of injury. Cervical back: Normal range of motion and neck supple. Skin:     Coloration: Skin is not jaundiced or pale. Findings: No erythema. Neurological:      Cranial Nerves: No cranial nerve deficit. Sensory: No sensory deficit.        INVESTIGATIONS     Laboratory Testing:     Recent Results (from the past 24 hour(s))   CBC Auto Differential    Collection Time: 10/15/21  9:56 AM   Result Value Ref Range    WBC 26.5 (H) 3.5 - 11.3 k/uL    RBC 3.20 (L) 3.95 - 5.11 m/uL    Hemoglobin 9.4 (L) 11.9 - 15.1 g/dL    Hematocrit 31.0 (L) 36.3 - 47.1 %    MCV 96.9 82.6 - 102.9 fL    MCH 29.4 25.2 - 33.5 pg    MCHC 30.3 28.4 - 34.8 g/dL    RDW 19.0 (H) 11.8 - 14.4 %    Platelets 151 (H) 684 - 453 k/uL    MPV 9.1 8.1 - 13.5 fL    NRBC Automated 0.7 (H) 0.0 per 100 WBC    Differential Type NOT REPORTED     WBC Morphology NOT REPORTED     RBC Morphology NOT REPORTED     Platelet Estimate NOT REPORTED     Immature sec    INR 1.2    APTT    Collection Time: 10/15/21  9:56 AM   Result Value Ref Range    PTT 27.4 20.5 - 30.5 sec   COVID-19, Rapid    Collection Time: 10/15/21  9:57 AM    Specimen: Nasopharyngeal Swab   Result Value Ref Range    Specimen Description . NASOPHARYNGEAL SWAB     SARS-CoV-2, Rapid Not Detected Not Detected   EKG 12 Lead    Collection Time: 10/15/21 10:48 AM   Result Value Ref Range    Ventricular Rate 93 BPM    Atrial Rate 93 BPM    P-R Interval 136 ms    QRS Duration 84 ms    Q-T Interval 364 ms    QTc Calculation (Bazett) 452 ms    P Axis 54 degrees    R Axis -9 degrees    T Axis 38 degrees   Culture, Blood 1    Collection Time: 10/15/21 11:10 AM    Specimen: Blood   Result Value Ref Range    Specimen Description . BLOOD     Special Requests 10 ML RFA     Culture (A)      POSITIVE Blood Culture Results called to and read back by: BRENDA Pickering 10/16/21 0325    Culture       DIRECT GRAM STAIN FROM BOTTLE: GRAM POSITIVE COCCI IN CLUSTERS    Culture (A)      Detected: Coagulase negative Staphylococcus species (not  S.epidermidis, or S. lugdunensis) Methodology- Polymerase Chain Reaction (PCR) Culture Results to Follow   Lactate, Sepsis    Collection Time: 10/15/21 11:34 AM   Result Value Ref Range    Lactic Acid, Sepsis NOT REPORTED 0.5 - 1.9 mmol/L    Lactic Acid, Sepsis, Whole Blood 1.1 0.5 - 1.9 mmol/L   Troponin    Collection Time: 10/15/21 11:34 AM   Result Value Ref Range    Troponin, High Sensitivity 19 (H) 0 - 14 ng/L    Troponin T NOT REPORTED <0.03 ng/mL    Troponin Interp NOT REPORTED    Procalcitonin    Collection Time: 10/15/21 11:34 AM   Result Value Ref Range    Procalcitonin 0.20 (H) <0.09 ng/mL   Urinalysis with microscopic    Collection Time: 10/15/21 10:04 PM   Result Value Ref Range    Color, UA Yellow Yellow    Turbidity UA Clear Clear    Glucose, Ur NEGATIVE NEGATIVE    Bilirubin Urine NEGATIVE NEGATIVE    Ketones, Urine TRACE (A) NEGATIVE    Specific Gravity, UA 1.022 1.005 - 1.030 Urine Hgb NEGATIVE NEGATIVE    pH, UA 5.5 5.0 - 8.0    Protein, UA 1+ (A) NEGATIVE    Urobilinogen, Urine Normal Normal    Nitrite, Urine NEGATIVE NEGATIVE    Leukocyte Esterase, Urine TRACE (A) NEGATIVE    -          WBC, UA 2 TO 5 0 - 5 /HPF    RBC, UA 0 TO 2 0 - 4 /HPF    Casts UA  0 - 8 /LPF     2 TO 5 HYALINE Reference range defined for non-centrifuged specimen. Crystals, UA NOT REPORTED None /HPF    Epithelial Cells UA 5 TO 10 0 - 5 /HPF    Renal Epithelial, UA NOT REPORTED 0 /HPF    Bacteria, UA NOT REPORTED None    Mucus, UA NOT REPORTED None    Trichomonas, UA NOT REPORTED None    Amorphous, UA NOT REPORTED None    Other Observations UA NOT REPORTED NOT REQ.     Yeast, UA NOT REPORTED None   Basic Metabolic Panel w/ Reflex to MG    Collection Time: 10/16/21  5:04 AM   Result Value Ref Range    Glucose 196 (H) 70 - 99 mg/dL    BUN 12 8 - 23 mg/dL    CREATININE 0.74 0.50 - 0.90 mg/dL    Bun/Cre Ratio NOT REPORTED 9 - 20    Calcium 8.7 8.6 - 10.4 mg/dL    Sodium 132 (L) 135 - 144 mmol/L    Potassium 3.9 3.7 - 5.3 mmol/L    Chloride 100 98 - 107 mmol/L    CO2 25 20 - 31 mmol/L    Anion Gap 7 (L) 9 - 17 mmol/L    GFR Non-African American >60 >60 mL/min    GFR African American >60 >60 mL/min    GFR Comment          GFR Staging NOT REPORTED    CBC auto differential    Collection Time: 10/16/21  5:04 AM   Result Value Ref Range    WBC 26.7 (H) 3.5 - 11.3 k/uL    RBC 3.13 (L) 3.95 - 5.11 m/uL    Hemoglobin 9.2 (L) 11.9 - 15.1 g/dL    Hematocrit 30.8 (L) 36.3 - 47.1 %    MCV 98.4 82.6 - 102.9 fL    MCH 29.4 25.2 - 33.5 pg    MCHC 29.9 28.4 - 34.8 g/dL    RDW 19.2 (H) 11.8 - 14.4 %    Platelets 252 (H) 277 - 453 k/uL    MPV 9.3 8.1 - 13.5 fL    NRBC Automated 0.3 (H) 0.0 per 100 WBC    Differential Type NOT REPORTED     WBC Morphology NOT REPORTED     RBC Morphology NOT REPORTED     Platelet Estimate NOT REPORTED     Immature Granulocytes 8 (H) 0 %    Seg Neutrophils 76 (H) 36 - 66 %    Lymphocytes 11 (L) 24 - 44 % Monocytes 5 1 - 7 %    Eosinophils % 0 (L) 1 - 4 %    Basophils 0 0 - 2 %    nRBC 1 (H) 0 per 100 WBC    Absolute Immature Granulocyte 2.14 (H) 0.00 - 0.30 k/uL    Segs Absolute 20.28 (H) 1.8 - 7.7 k/uL    Absolute Lymph # 2.94 1.0 - 4.8 k/uL    Absolute Mono # 1.34 (H) 0.1 - 0.8 k/uL    Absolute Eos # 0.00 0.0 - 0.4 k/uL    Basophils Absolute 0.00 0.0 - 0.2 k/uL    Morphology ANISOCYTOSIS PRESENT        Imaging:   XR CHEST PORTABLE    Result Date: 10/15/2021  1. Right upper lung zone opacification may represent atelectasis or consolidation in the appropriate clinical setting. 2. Left pleural effusion with possible underlying atelectasis or consolidation. 3. Scattered bilateral linear opacities may represent atelectasis or scarring. ASSESSMENT & PLAN     1. COPD exacerbation-continue supplemental oxygen via NC 5 L, IV antibiotics. Breathing treatment, IV Solu-Medrol 60 every 8, monitor SPO2.  2. Rheumatoid arthritis-currently stable, continue methotrexate  3. History of DVT-currently stable, continue Eliquis  4. Adenocarcinoma of lung-s/p left lower  lobectomy 7/19/2021. Stable  5. Hyponatremia-fluid restriction, normal saline  6. Concern for sepsis-IV antibiotics, monitor vitals. Monitor BMP  7. Leukocytosis-monitor, likely due to sepsis. Will give IV antibiotics  8. Anemia-stable currently. OP follow-up  9. Hypertension-currently stable  10. Tobacco abuse-counseling regarding cessation    DVT ppx: On Eliquis  GI ppx: Protonix 40    PT/OT/SW  Discharge Planning:    Mckenzie Knight MD  Internal Medicine Resident, PGY-1  [address] [facility];  [place], [place]  10/16/2021, 7:15 AM

## 2021-10-15 NOTE — PROGRESS NOTES
Respiratory evaluation completed. Scattered rhonchi throughout. Pt. States she is occasionally SOB but feels okay right now. States she takes Tx's at home, Spiriva, and has Home O2 (PRN use). Pt. Does not want a treatment now. Will continue to monitor.

## 2021-10-16 NOTE — PLAN OF CARE
Problem: Pain:  Goal: Pain level will decrease  Description: Pain level will decrease  10/16/2021 0854 by Binnie Harada, RN  Outcome: Ongoing  10/16/2021 0507 by Jamari Becerra RN  Outcome: Ongoing     Problem: Falls - Risk of:  Goal: Will remain free from falls  Description: Will remain free from falls  10/16/2021 0854 by Binnie Harada, RN  Outcome: Ongoing  10/16/2021 0507 by Jamari Becerra RN  Outcome: Met This Shift

## 2021-10-16 NOTE — DISCHARGE INSTR - COC
Continuity of Care Form    Patient Name: Merary Arriaga   :  1955  MRN:  [id number]    Admit date:  10/15/2021  Discharge date:  ***    Code Status Order: Full Code   Advance Directives:      Admitting Physician:  Milagros Arora MD  PCP: Noelle Moffett MD    Discharging Nurse: [facility] Care Unit/Room#: 0332/2114-53  Discharging Unit Phone Number: ***    Emergency Contact:   Extended Emergency Contact Information  Primary Emergency Contact: Debby Kat, [address] [place] [address] Phone: [phone]  Mobile Phone: [phone]  Relation: Child  Secondary Emergency Contact: Reanna Salas  Mobile Phone: [phone]  Relation: Child  Preferred language: English   needed?  No    Past Surgical History:  Past Surgical History:   Procedure Laterality Date    APPENDECTOMY  1982    BRONCHOSCOPY N/A 2019    BRONCHOSCOPY ALVEOLAR LAVAGE performed by Benito Mirza MD at [facility]    COLONOSCOPY N/A 2020    COLONOSCOPY POLYPECTOMIES HOT SNARE, COLD BIOPSY POLYPECTOMIES performed by Karina Peterson MD at [address] [place] [address]  2021    CT NEEDLE BIOPSY LUNG PERCUTANEOUS 2021 [facility] CT SCAN    ENDOSCOPY, COLON, DIAGNOSTIC  [id number]    gastritis, sm. bowel lipoma, biopsies    LOBECTOMY Left 2021    XI ROBOTIC LEFT LOWER LOBECTOMY CONVERTED TO OPEN THORACTOMY LEFT LOWER LOBECTOMY (Left )    LUNG REMOVAL, TOTAL Left 2021    XI ROBOTIC LEFT LOWER LOBECTOMY CONVERTED TO OPEN THORACTOMY LEFT LOWER LOBECTOMY performed by Benjamin Pérez MD at [address] [place] [address] History:   Immunization History   Administered Date(s) Administered    Influenza Virus Vaccine 2016    Influenza, Gerda Cuna, IM, PF (6 mo and older Fluzone, Flulaval, Fluarix, and 3 yrs and older Afluria) 10/28/2016, 2018    Pneumococcal Polysaccharide ([id number]) 2017    Tdap (Boostrix, Adacel) 2019    Zoster Recombinant (Shingrix) 2019, 2019       Active Test Resulted    COVID-19 Rule Out 07/08/21 07/08/21 07/08/21 COVID-19 (Ordered)   07/09/21 Rule-Out Test Resulted    COVID-19 Rule Out 06/21/21 06/23/21 06/21/21 COVID-19 (Ordered)   06/23/21 Rule-Out Test Resulted    COVID-19 Rule Out 05/27/21 05/27/21 05/27/21 COVID-19 & Influenza Combo (Ordered)   05/27/21 Rule-Out Test Resulted    COVID-19 Rule Out 05/10/21 05/10/21 05/10/21 COVID-19 (Ordered)   05/11/21 Rule-Out Test Resulted    C-diff Rule Out  12/23/19 12/23/19 C DIFF TOXIN/ANTIGEN (Ordered)   12/26/19 Markus Jiang RN            Nurse Assessment:  Last Vital Signs: BP 92/74   Pulse 107   Temp 97.6 °F (36.4 °C) (Oral)   Resp 17   Ht 5' 5\" (1.651 m)   Wt 220 lb 7.4 oz (100 kg)   SpO2 97%   BMI 36.69 kg/m²     Last documented pain score (0-10 scale): Pain Level: 2  Last Weight:   Wt Readings from Last 1 Encounters:   10/15/21 220 lb 7.4 oz (100 kg)     Mental Status:  {IP PT MENTAL STATUS:20030:::0}    IV Access:  { VERNELL IV ACCESS:534634729:::0}    Nursing Mobility/ADLs:  Walking   {CHP DME ADLs:543326069:::0}  Transfer  {CHP DME ADLs:078572497:::0}  Bathing  {CHP DME ADLs:858111311:::0}  Dressing  {CHP DME ADLs:728466039:::0}  Toileting  {CHP DME ADLs:757954473:::0}  Feeding  {CHP DME ADLs:193346108:::0}  Med Admin  {CHP DME ADLs:547762304:::0}  Med Delivery   { VERNELL MED Delivery:490575488:::0}    Wound Care Documentation and Therapy:        Elimination:  Continence: Bowel: {YES / MP:23946}  Bladder: {YES / TA:33933}  Urinary Catheter: {Urinary Catheter:339015946:::0}   Colostomy/Ileostomy/Ileal Conduit: {YES / TE:56978}       Date of Last BM: ***    Intake/Output Summary (Last 24 hours) at 10/16/2021 1345  Last data filed at 10/16/2021 0640  Gross per 24 hour   Intake 800 ml   Output    Net 800 ml     I/O last 3 completed shifts:   In: 800 [P.O.:450; IV Piggyback:350]  Out: -     Safety Concerns:     [address] [facility] Safety Concerns:151214775:::0}    Impairments/Disabilities:      [address] Impairments/Disabilities:723357958:::0}    Nutrition Therapy:  Current Nutrition Therapy:   [address] VERNELL Diet List:566086173:::0}    Routes of Feeding: {[facility] DME Other Feedings:215267024:::0}  Liquids: {Slp liquid thickness:66452}  Daily Fluid Restriction: {CHP DME Yes amt example:844906800:::0}  Last Modified Barium Swallow with Video (Video Swallowing Test): {Done Not Done AOJW:833660283:::2}    Treatments at the Time of Hospital Discharge:   Respiratory Treatments: ***  Oxygen Therapy:  {Therapy; copd oxygen:47220:::0}  Ventilator:    {[facility] Vent List:756318799:::0}    Rehab Therapies: {THERAPEUTIC INTERVENTION:0321755311}  Weight Bearing Status/Restrictions: {[facility] Weight Bearin:::0}  Other Medical Equipment (for information only, NOT a DME order):  {EQUIPMENT:598978811}  Other Treatments: ***    Patient's personal belongings (please select all that are sent with patient):  {[facility] DME Belongings:460331730:::0}    RN SIGNATURE:  {Esignature:238830328:::0}    CASE MANAGEMENT/SOCIAL WORK SECTION    Inpatient Status Date: ***    Readmission Risk Assessment Score:  Readmission Risk              Risk of Unplanned Readmission:  27           Discharging to Facility/ Agency   Name:   Address:  Phone:  Fax:    Dialysis Facility (if applicable)   Name:  Address:  Dialysis Schedule:  Phone:  Fax:    / signature: {Esignature:978964992:::0}    PHYSICIAN SECTION    Prognosis: {Prognosis:9162572160:::0}    Condition at Discharge: [address] Patient Condition:875685340:::0}    Rehab Potential (if transferring to Rehab): {Prognosis:0164901757:::0}    Recommended Labs or Other Treatments After Discharge: ***    Physician Certification: I certify the above information and transfer of Patrice Winston  is necessary for the continuing treatment of the diagnosis listed and that she requires {Admit to Appropriate Level of Care:34955:::0} for {GREATER/LESS:535462361} 30 days.      Update Admission H&P: {[facility] DME Changes in HandP:257289749:::0}    PHYSICIAN SIGNATURE:  {Esignature:260342316:::0}

## 2021-10-16 NOTE — PROGRESS NOTES
Physical Therapy    Facility/Department: [facility] ONC/MED SURG  Initial Assessment    NAME: Shwetha Lopez  : 1955  MRN: [id number]    Date of Service: 10/16/2021    Discharge Recommendations:  Patient would benefit from continued therapy after discharge        Assessment   Body structures, Functions, Activity limitations: Decreased endurance;Decreased functional mobility   Assessment: Pt presetns with decreased tolerance to activity, educated in pacing skills, will benefit from continued Home care to improve endruance. Pt has bed/bath on 2nd floor, lately has been staying on main floor due to shortness of breath. Specific instructions for Next Treatment: Monitor sao2 and HR with activity  Prognosis: Fair  Decision Making: Medium Complexity  History: recent Lobectomy-Lung CA, COPD  PT Education: Functional Mobility Training;General Safety;Precautions  Patient Education: pacing skills  REQUIRES PT FOLLOW UP: Yes  Activity Tolerance  Activity Tolerance: Patient limited by fatigue;Patient limited by endurance       Patient Diagnosis(es): The primary encounter diagnosis was Sepsis without acute organ dysfunction, due to unspecified organism ([address] Diagnoses of Dyspnea and respiratory abnormalities and Leukocytosis, unspecified type were also pertinent to this visit.      has a past medical history of Abnormal CT of the chest, Adenocarcinoma, lung, left ([address] Arthritis, Community acquired pneumonia, COPD (chronic obstructive pulmonary disease) ([address] Deep venous thrombosis of left profunda femoris vein ([address] Depression, DVT (deep venous thrombosis) ([address] GERD (gastroesophageal reflux disease), History of pulmonary embolism, History of thyroid nodule, Hypertension, Major depressive disorder, recurrent, moderate ([address] Nodule of lower lobe of left lung, Obesity, Class II, BMI 35-39.9, On anticoagulant therapy, On methotrexate therapy, Prediabetes, Rheumatoid arthritis ([address] Snores, Thyroid nodule, Tobacco abuse, Under care of team, Under care of team, Under care of team, and Wellness examination. has a past surgical history that includes Appendectomy (1982); Endoscopy, colon, diagnostic (438835); bronchoscopy (N/A, 12/27/2019); Colonoscopy (N/A, 2/12/2020); CT NEEDLE BIOPSY LUNG PERCUTANEOUS (5/13/2021); lobectomy (Left, 07/19/2021); and Lung removal, total (Left, 7/19/2021). Restrictions  Restrictions/Precautions  Restrictions/Precautions: Fall Risk  Vision/Hearing  Vision: Impaired  Vision Exceptions: Wears glasses at all times  Hearing: Within functional limits     Subjective  General  Patient assessed for rehabilitation services?: Yes  Additional Pertinent Hx: Valerie Mishra is a 61 y.o. female who presents with shortness of breath, productive cough, rhinorrhea, nausea, vomiting, chills, and fatigue for the past 2 months. Patient recently had a lobectomy of lung on 7/30 after diagnosis of adenocarcinoma of lung. She states she has been taking Zofran 4 mg every 6 hours without symptom improvement. She has increased her home oxygen from 2 L to 3 L, due to her increasing shortness of breath. She is unvaccinated for Covid, uncertain if she has any positive exposure. She states she has had a cough productive of a white sputum that has been constant since the procedure. Denies chest pain, abdominal pain, syncope, changes in vision, hematemesis, hemoptysis, dysuria, fevers at home.   Referral Date : 10/15/21  Diagnosis: COPD exacerbation, sepsis  Follows Commands: Within Functional Limits  Pain Screening  Patient Currently in Pain: Denies  Vital Signs  Patient Currently in Pain: Denies  Oxygen Therapy  SpO2: 100 %  O2 Device: Nasal cannula  O2 Flow Rate (L/min): 3 L/min       Orientation  Orientation  Overall Orientation Status: Within Functional Limits  Social/Functional History  Social/Functional History  Lives With:  (Son and grand daughter)  Type of Home: House  Home Layout: Two level, Bed/Bath upstairs  Home Access: Stairs to enter with rails, Stairs to enter without rails  Entrance Stairs - Number of Steps: 2 steps at entrance with no rail, 11 steps wtih 1 HR with a landing  Bathroom Shower/Tub: Walk-in shower, Curtain  Bathroom Toilet: Handicap height  Bathroom Equipment: Hand-held shower, Commode (Sink next to toilet)  Bathroom Accessibility: Accessible  Home Equipment:  (Home o2 at 2 lt, pr pt uss PRN/with xertion.)  ADL Assistance: Independent  Ambulation Assistance: Independent  Transfer Assistance: Independent  Active : No  Patient's  Info: Son  Mode of Transportation: Car  Additional Comments: Son does cleaning, pt does laundry (main floor), pt sleeps in a couch on main floor due to difficulty with breathing. Cognition        Objective          AROM RLE (degrees)  RLE AROM: WFL  RLE General AROM: slight swelling noted  AROM LLE (degrees)  LLE AROM : WFL  LLE General AROM: Slight swelling noted  AROM RUE (degrees)  RUE General AROM: See OT eval  AROM LUE (degrees)  LUE General AROM: See OT eval  Strength RLE  Comment: 3+ to 4-/5  Strength LLE  Comment: 3+ to 4-/5     Sensation  Overall Sensation Status: WFL (No deficts per pt.)  Bed mobility  Rolling to Left: Supervision  Rolling to Right: Supervision  Supine to Sit: Supervision  Sit to Supine: Supervision  Scooting: Supervision  Transfers  Sit to Stand: Independent  Stand to sit: Independent  Ambulation  Ambulation?: Yes  Ambulation 1  Surface: level tile  Device: No Device  Other Apparatus:  (Room air)  Assistance: Supervision  Quality of Gait: Educated in ambulation at slow pace, sao2 100 % prior to activity, pt took nasal canula off, reports at home she  takes her o2 off at time and puts it back on when exerted. Sao2 lowest 86%, comes back to 92% with seated rest and deep breaths and reapplying supplemental o2. HR in high 110's with activity.   Distance: 45 ft, 15 ft  Comments: Pt educated in pacing skilled pt ahs steps to 2nd

## 2021-10-16 NOTE — PLAN OF CARE
Problem: Falls - Risk of:  Goal: Will remain free from falls  Description: Will remain free from falls  Outcome: Met This Shift  Goal: Absence of physical injury  Description: Absence of physical injury  Outcome: Met This Shift     Problem: Pain:  Goal: Pain level will decrease  Description: Pain level will decrease  Outcome: Ongoing  Goal: Control of acute pain  Description: Control of acute pain  Outcome: Ongoing  Goal: Control of chronic pain  Description: Control of chronic pain  Outcome: Ongoing     Problem:  Activity Intolerance:  Goal: Ability to tolerate increased activity will improve  Description: Ability to tolerate increased activity will improve  Outcome: Ongoing     Problem: Airway Clearance - Ineffective:  Goal: Ability to maintain a clear airway will improve  Description: Ability to maintain a clear airway will improve  Outcome: Ongoing     Problem: Breathing Pattern - Ineffective:  Goal: Ability to achieve and maintain a regular respiratory rate will improve  Description: Ability to achieve and maintain a regular respiratory rate will improve  Outcome: Ongoing     Problem: Gas Exchange - Impaired:  Goal: Levels of oxygenation will improve  Description: Levels of oxygenation will improve  Outcome: Ongoing

## 2021-10-16 NOTE — PROGRESS NOTES
Patient mentioned that she is able to walk to the bathroom by herself. However, she has not worked with physical therapy today. Discharge pending physical therapy and case management disposition.     Soto Gregory MD  PGY-3, Internal Medicine Resident  [facility]  10/16/2021 1:44 PM

## 2021-10-16 NOTE — PROGRESS NOTES
neck pain or urinary symptoms.     On arrival to ED patient was tachycardic and tachypneic, met SIRS criteria and sepsis protocol initiated. Patient given IV antibiotics.     On my evaluation patient is short of breath, alert and oriented and SPO2 is 92% on 5 L supplemental O2 via NC. She is afebrile but tachypneic, blood pressure is stable. Chest auscultation shows bilateral expiratory wheezes with rales. Rest of systemic examination is unremarkable.     Pertinent labs are sodium 129, glucose 162, pro-Alexander 0.2, proBNP 467, trop 19, WBC 26.5, Hb 9.4, platelets 322. Blood culture sent. CXR showing right upper lung atelectasis versus consolidation, and left pleural effusion with bilateral linear opacities. OBJECTIVE     Vital Signs:  BP (!) 115/57   Pulse 80   Temp 98 °F (36.7 °C) (Oral)   Resp 20   Ht 5' 5\" (1.651 m)   Wt 220 lb 7.4 oz (100 kg)   SpO2 99%   BMI 36.69 kg/m²     Temp (24hrs), Av.6 °F (37 °C), Min:98 °F (36.7 °C), Max:99.5 °F (37.5 °C)    In: 800   Out: -     Physical Exam:  Constitutional: This is a well developed, well nourished,  61y.o. year old female who is alert, oriented, cooperative and in no apparent distress. Head:normocephalic and atraumatic. EENT:  PERRLA. No conjunctival injections. Septum was midline, mucosa was without erythema, exudates or cobblestoning. No thrush was noted. Neck: Supple without thyromegaly. No elevated JVP. Trachea was midline. Respiratory: Chest was symmetrical without dullness to percussion. Breath sounds bilaterally were clear to auscultation. There were no wheezes, rhonchi or rales. There is no intercostal retraction or use of accessory muscles. No egophony noted. Cardiovascular: Regular without murmur, clicks, gallops or rubs. Abdomen: Slightly rounded and soft without organomegaly. No rebound, rigidity or guarding was appreciated. Lymphatic: No lymphadenopathy. Musculoskeletal: Normal curvature of the spine.   No gross muscle weakness. Extremities:  No lower extremity edema, ulcerations, tenderness, varicosities or erythema. Muscle size, tone and strength are normal.  No involuntary movements are noted. Skin:  Warm and dry. Good color, turgor and pigmentation. No lesions or scars. No cyanosis or clubbing  Neurological/Psychiatric: The patient's general behavior, level of consciousness, thought content and emotional status is normal.        Medications:  Scheduled Medications:    methylPREDNISolone  60 mg IntraVENous Q8H    levoFLOXacin  750 mg Oral Daily    pantoprazole  40 mg Oral QAM AC    fluticasone  2 spray Nasal Daily    ipratropium-albuterol  1 ampule Inhalation Q4H WA    sodium chloride flush  5-40 mL IntraVENous 2 times per day    apixaban  5 mg Oral BID    methotrexate  20 mg Oral Weekly     Continuous Infusions:    sodium chloride       PRN Medicationsalbuterol, 2.5 mg, As Directed RT PRN  sodium chloride flush, 5-40 mL, PRN  sodium chloride, 25 mL, PRN  ondansetron, 4 mg, Q8H PRN   Or  ondansetron, 4 mg, Q6H PRN  polyethylene glycol, 17 g, Daily PRN  acetaminophen, 650 mg, Q6H PRN   Or  acetaminophen, 650 mg, Q6H PRN  potassium chloride, 40 mEq, PRN   Or  potassium alternative oral replacement, 40 mEq, PRN   Or  potassium chloride, 10 mEq, PRN        Diagnostic Labs:  CBC:   Recent Labs     10/15/21  0956 10/16/21  0504   WBC 26.5* 26.7*   RBC 3.20* 3.13*   HGB 9.4* 9.2*   HCT 31.0* 30.8*   MCV 96.9 98.4   RDW 19.0* 19.2*   * 477*     BMP:   Recent Labs     10/15/21  0956 10/16/21  0504   * 132*   K 3.8 3.9   CL 98 100   CO2 24 25   BUN 8 12   CREATININE 0.71 0.74     BNP: No results for input(s): BNP in the last 72 hours. PT/INR:   Recent Labs     10/15/21  0956   PROTIME 12.5*   INR 1.2     APTT:   Recent Labs     10/15/21  0956   APTT 27.4     CARDIAC ENZYMES: No results for input(s): CKMB, CKMBINDEX, TROPONINI in the last 72 hours.     Invalid input(s): CKTOTAL;3  FASTING LIPID PANEL:  Lab Results   Component Value Date    CHOL 192 01/30/2015    HDL 77 01/30/2015    TRIG 84 01/30/2015     LIVER PROFILE:   Recent Labs     10/15/21  0956   AST 15   ALT 6   BILIDIR 0.09   BILITOT 0.60   ALKPHOS 106*      MICROBIOLOGY:   Lab Results   Component Value Date/Time    CULTURE (A) 10/15/2021 11:10 AM     POSITIVE Blood Culture Results called to and read back by: BRENDA Selby 10/16/21 0325    CULTURE  10/15/2021 11:10 AM     DIRECT GRAM STAIN FROM BOTTLE: GRAM POSITIVE COCCI IN CLUSTERS    CULTURE (A) 10/15/2021 11:10 AM     Detected: Coagulase negative Staphylococcus species (not  S.epidermidis, or S. lugdunensis) Methodology- Polymerase Chain Reaction (PCR) Culture Results to Follow       Imaging:    XR CHEST PORTABLE    Result Date: 10/15/2021  1. Right upper lung zone opacification may represent atelectasis or consolidation in the appropriate clinical setting. 2. Left pleural effusion with possible underlying atelectasis or consolidation. 3. Scattered bilateral linear opacities may represent atelectasis or scarring. ASSESSMENT & PLAN     1. COPD exacerbation-continue supplemental oxygen via NC 3 L, IV antibiotics. Breathing treatment, IV Solu-Medrol 60 every 8, monitor SPO2.  2. Rheumatoid arthritis-currently stable, continue methotrexate  3. History of DVT-currently stable, continue Eliquis  4. Adenocarcinoma of lung-s/p left lower  lobectomy 7/19/2021. Stable  5. Hyponatremia-fluid restriction, normal saline  6. Concern for sepsis-IV antibiotics, monitor vitals. Monitor BMP  7. Leukocytosis-monitor, likely due to sepsis. Will give IV antibiotics  8. Anemia-stable currently. OP follow-up  9. Hypertension-currently stable  10. Tobacco abuse-counseling regarding cessation     DVT ppx: On Eliquis  GI ppx: Protonix 40     PT/OT/SW  Discharge Planning:    Jonnie Rodrigues MD  Internal Medicine Resident, PGY-1  [address] [place], [place]  10/16/2021, 11:16 AM

## 2021-10-17 NOTE — PROGRESS NOTES
CLINICAL PHARMACY NOTE: MEDS TO BEDS    Total # of Prescriptions Filled: 2   The following medications were delivered to the patient:  · Prednisone 20 mg  · Levofloxacin 750 mg    Additional Documentation:

## 2021-10-17 NOTE — DISCHARGE SUMMARY
[facility]     Department of Internal Medicine - Staff Internal Medicine Teaching Service    INPATIENT DISCHARGE SUMMARY      Patient Identification:  German Salazar is a 61 y.o. female. :  1955  MRN: [id number]     Acct: [de-identified]   PCP: Dena Tolentino MD  Admit Date:  10/15/2021  Discharge date and time: 10/16/2021  5:20 PM   Attending Provider: No att. providers found                                     [address] [facility] Problem Lists:  Principal Problem:    COPD exacerbation ([address]  Active Problems:    Rheumatoid arthritis ([address]    History of DVT in adulthood    Pneumonia due to infectious organism    Adenocarcinoma, lung, left ([address]    On anticoagulant therapy    On methotrexate therapy    S/P lobectomy of lung    Sepsis ([address]    Hyponatremia    Anemia    Leucocytosis  Resolved Problems:    * No resolved hospital problems. *      HOSPITAL STAY     Brief Inpatient course:   German Salazar is a 61 y.o. female PMH of COPD smoking, DVT and PE on long-term anticoagulation and adenocarcinoma lung s/p open left lower lobectomy who was admitted for the management of COPD exacerbation ([address] presented to the emergency department with the worsening of dyspnea and productive cough with whitish sputum production. Patient reported nausea and vomiting with postnasal drip. Her oxygen requirement is 4 to 5 L today up from 2 to 3 L at home. Patient is treated with IV antibiotics, steroids and supplemental oxygen. She is advised to take her home medication. Rheumatoid arthritis, anemia and hypertension are stable. Patient received tobacco cessation counseling. Patient is improved, back to her baseline oxygen requirement and discharged.     Procedures/ Significant Interventions:        Consults:     Consults:     Final Specialist Recommendations/Findings:   IP CONSULT TO INTERNAL MEDICINE  IP CONSULT TO FAMILY MEDICINE  PHARMACY TO DOSE VANCOMYCIN  IP CONSULT TO CASE MANAGEMENT      Any Hospital Acquired Infections: none    Discharge Functional Status:  stable    DISCHARGE PLAN     Disposition: home    Patient Instructions:   Discharge Medication List as of 10/16/2021  4:14 PM      START taking these medications    Details   predniSONE (DELTASONE) 20 MG tablet Take 2 tablets by mouth daily for 5 days, Disp-10 tablet, R-0Normal         CONTINUE these medications which have CHANGED    Details   levoFLOXacin (LEVAQUIN) 750 MG tablet Take 1 tablet by mouth daily for 5 days, Disp-5 tablet, R-0Normal         CONTINUE these medications which have NOT CHANGED    Details   amLODIPine (NORVASC) 10 MG tablet take 1 tablet by mouth once daily, Disp-90 tablet, R-1Normal      fluticasone-vilanterol (BREO ELLIPTA) 100-25 MCG/INH AEPB inhaler Inhale 1 puff into the lungs daily, Disp-2 each, R-0Normal      cetirizine (ZYRTEC ALLERGY) 10 MG tablet Take 1 tablet by mouth daily, Disp-30 tablet, R-3Normal      fluticasone (FLONASE) 50 MCG/ACT nasal spray 2 sprays by Nasal route daily, Disp-16 g, R-0Normal      Benzocaine-Menthol (RA THROAT LOZENGES) 6-10 MG LOZG lozenge Take 1 lozenge by mouth every 2 hours as needed for Sore Throat, Disp-30 lozenge, R-0Normal      albuterol (PROVENTIL) (2.5 MG/3ML) 0.083% nebulizer solution Take 3 mLs by nebulization every 6 hours as needed for Wheezing or Shortness of Breath, Disp-125 vial, R-0Normal      furosemide (LASIX) 20 MG tablet Take 1 tablet by mouth 2 times daily, Disp-60 tablet, R-3Normal      lidocaine 4 % external patch Place 1 patch onto the skin daily, Transdermal, DAILY Starting Sat 7/24/2021, Disp-30 patch, R-0, Normal      potassium chloride (KLOR-CON M) 20 MEQ extended release tablet Take 1 tablet by mouth 2 times daily, Disp-90 tablet, R-1Normal      acetaminophen (TYLENOL) 500 MG tablet Take 1,000 mg by mouth every 6 hours as needed for PainHistorical Med      ELIQUIS 5 MG TABS tablet take 1 tablet by mouth twice a day, Disp-180 tablet, R-3Normal      albuterol sulfate  (90 Base) MCG/ACT inhaler inhale 2 puffs by mouth and INTO THE LUNGS every 4 hours for 7 days, Disp-18 g, R-2Normal      SPIRIVA RESPIMAT 2.5 MCG/ACT AERS inhaler inhale 2 puffs INTO THE LUNGS once daily, Disp-4 g, R-3Normal      pantoprazole (PROTONIX) 40 MG tablet take 1 tablet by mouth once daily, Disp-90 tablet, R-2Normal      Blood Pressure KIT Disp-1 kit, R-0, PrintDx: HTN. Needs automatic blood pressure machine to monitor her blood pressure. folic acid (FOLVITE) 1 MG tablet take 1 tablet by mouth once daily, Disp-30 tablet, R-0Normal      methotrexate (RHEUMATREX) 2.5 MG chemo tablet Take 20 mg by mouth once a week Indications: takes 8 tablets on saturdays              Activity: activity as tolerated    Diet: regular diet    Follow-up:    Selma Orta MD  [address]  [address] [facility] [address] [phone]    Schedule an appointment as soon as possible for a visit in 1 week      Herminia Chow MD  [address]  [phone]      COPD exacerbation      Patient Instructions: Smoking cessation, continue taking your medication. Follow-up with PCP  Follow up labs: Follow up imaging:     Note that over 30 minutes was spent in preparing discharge papers, discussing discharge with patient, medication review, etc.      Rob Rios MD,  Internal Medicine Resident, PGY-1  [address] [place], [place]  10/17/2021, 12:25 PM

## 2021-10-18 NOTE — TELEPHONE ENCOUNTER
[facility] Transitions Initial Follow Up Call    Outreach made within 2 business days of discharge: Yes    Patient: Brie Ochoa Patient : 1955   MRN: [id number]  Reason for Admission: There are no discharge diagnoses documented for the most recent discharge. Discharge Date: 10/16/21       Spoke with: Misti Sethi    Discharge department/facility: [facility]. gareth    [facility] Interactive Patient Contact:  Was patient able to fill all prescriptions: Yes  Was patient instructed to bring all medications to the follow-up visit: Yes  Is patient taking all medications as directed in the discharge summary?  Yes  Does patient understand their discharge instructions: Yes  Does patient have questions or concerns that need addressed prior to 7-14 day follow up office visit: no    Scheduled appointment with PCP within 7-14 days    Follow Up  Future Appointments   Date Time Provider Kuldeep Sun   10/28/2021 11:15 AM ILIR Lennon - CNP fp sc [address], [place]

## 2021-10-18 NOTE — CARE COORDINATION
[facility] Transitions Initial Follow Up Call    Call within 2 business days of discharge: Yes    Patient: Ubaldo Springer Patient : 1955   MRN: [id number]  Reason for Admission: cough  Discharge Date: 10/16/21 RARS: Readmission Risk Score: 28      Last Discharge [facility]       Complaint Diagnosis Description Type Department Provider    10/15/21 Cough; Nausea Sepsis without acute organ dysfunction, due to unspecified organism ([facility] . .. ED to Hosp-Admission (Discharged) (ADMITTED) [address] Beth Mckeon MD; Anthony De La Cruz . .. Spoke with: Ike: [facility]    Non-face-to-face services provided:  Scheduled appointment with PCP-HFU on 10/28/21  Obtained and reviewed discharge summary and/or continuity of care documents  Assessment and support for treatment adherence and medication management-writer reviewed discharge instructions and medications with patient, 1111F order completed, no vne, patient has HFU on 10/28/21, reviewed covid precautions and reviewed healthcare decision makers and ACP planning, explained role of CTN, provided contact information, will continue to follow//JU  Writer spoke to patient, she is doing well, stated she will be starting outpatient therapy once she is done taking the antibiotic and the steroid, said steroid was causing her to have insomnia, only has to take for a couple mores days, has HFU scheduled, no vns, will follow//JU  Transitions of Care Initial Call    Was this an external facility discharge?  No Discharge Facility: [facility]  Challenges to be reviewed by the provider   Additional needs identified to be addressed with provider: No  none             Method of communication with provider : none      Advance Care Planning:   Does patient have an Advance Directive: reviewed and current, reviewed and needs to be updated, not on file; education provided, not on file, patient declined education, decision maker updated and referral to internal ACP routine      Care Transitions 24 Hour Call    Schedule Follow Up Appointment with PCP: Completed  Do you have a copy of your discharge instructions?: Yes  Do you have all of your prescriptions and are they filled?: Yes  Have you scheduled your follow up appointment?: Yes  How are you going to get to your appointment?: Car - family or friend to transport  Were you discharged with any Home Care or Post Acute Services: No  Do you feel like you have everything you need to keep you well at home?: Yes  Care Transitions Interventions         Follow Up  Future Appointments   Date Time Provider Kuldeep Sun   10/28/2021 11:15 AM Steven Drew, APRN - CNP fp sc Josefina Jimenez, RN show

## 2021-10-18 NOTE — TELEPHONE ENCOUNTER
Please Approve or Refuse.   Send to Pharmacy per Pt's Request:    Next Visit Date:  10/28/2021   Last Visit Date: 10/4/2021    Hemoglobin A1C (%)   Date Value   12/23/2019 6.0   06/12/2019 5.7   11/12/2018 5.9             ( goal A1C is < 7)   BP Readings from Last 3 Encounters:   10/16/21 92/74   09/21/21 126/68   07/29/21 118/64          (goal 120/80)  BUN   Date Value Ref Range Status   10/16/2021 12 8 - 23 mg/dL Final     CREATININE   Date Value Ref Range Status   10/16/2021 0.74 0.50 - 0.90 mg/dL Final     Potassium   Date Value Ref Range Status   10/16/2021 3.9 3.7 - 5.3 mmol/L Final

## 2021-10-27 NOTE — PROGRESS NOTES
Visit Information    Have you changed or started any medications since your last visit including any over-the-counter medicines, vitamins, or herbal medicines? no   Have you stopped taking any of your medications? Is so, why? -  no  Are you having any side effects from any of your medications? - no    Have you seen any other physician or provider since your last visit?  no   Have you had any other diagnostic tests since your last visit?  no   Have you been seen in the emergency room and/or had an admission in a hospital since we last saw you?  yes -    Have you had your routine dental cleaning in the past 6 months?  no     Do you have an active MyChart account? If no, what is the barrier?   No:     Patient Care Team:  Aimee Baez MD as PCP - General (Family Medicine)  Aimee Baez MD as PCP - [facility] Provider  Sabra Greenfield MD as Consulting Physician (Internal Medicine)  Debbie Broussard RN as Imaging Navigator  Carter Perez MD as Consulting Physician (Pulmonary Disease)    Medical History Review  Past Medical, Family, and Social History reviewed and does contribute to the patient presenting condition    Health Maintenance   Topic Date Due    Hepatitis C screen  Never done    COVID-19 Vaccine (1) Never done    HIV screen  Never done    Cervical cancer screen  Never done    Lipid screen  01/30/2020    A1C test (Diabetic or Prediabetic)  12/23/2020    Flu vaccine (1) 09/01/2021    Colon cancer screen colonoscopy  02/12/2022    Pneumococcal 0-64 years Vaccine (2 of 2 - PPSV23) 05/01/2022    Potassium monitoring  10/16/2022    Creatinine monitoring  10/16/2022    Breast cancer screen  05/11/2023    DTaP/Tdap/Td vaccine (2 - Td or Tdap) 06/12/2029    Shingles Vaccine  Completed    Hepatitis A vaccine  Aged Out    Hepatitis B vaccine  Aged Out    Hib vaccine  Aged Out    Meningococcal (ACWY) vaccine  Aged Out

## 2021-10-28 PROBLEM — J31.0 CHRONIC RHINITIS: Status: ACTIVE | Noted: 2021-01-01

## 2021-10-28 NOTE — PATIENT INSTRUCTIONS
Patient Education        budesonide inhalation  Pronunciation:  [place]  Brand:  Pulmicort Flexhaler, Pulmicort Respules  What is the most important information I should know about budesonide inhalation? Budesonide is not a rescue medicine for asthma attacks. Seek medical attention if your breathing problems get worse quickly, or if you think your asthma medications are not working as well. What is budesonide inhalation? Budesonide inhalation is a steroid that is used to prevent asthma attacks. Kennis Sprung  is for use in adults and children at least 10years old. Pulmicort Respules  is for use in children 12 months to 6years old. Budesonide may also be used for purposes not listed in this medication guide. What should I discuss with my healthcare provider before using budesonide inhalation? You should not use budesonide if you are allergic to it, or if:  · you have a severe allergy to milk proteins; or  · you are having an asthma attack. Tell your doctor if you have ever had:  · food or drug allergies;  · liver disease;  · osteoporosis, or low bone mineral density;  · glaucoma, cataracts, or herpes infection of the eyes;  · tuberculosis; or  · any type of infection caused by bacteria, fungus, virus, or parasite. Long-term use of steroids may lead to bone loss (osteoporosis), especially if you smoke, if you do not exercise, if you do not get enough vitamin D or calcium in your diet, if you have a family history of osteoporosis, or if you are a woman going through menopause. Tell your doctor if you are pregnant or breast-feeding. How should I use budesonide inhalation? Follow all directions on your prescription label and read all medication guides or instruction sheets. Your doctor may occasionally change your dose. Use the medicine exactly as directed. Budesonide inhalation is not a rescue medicine for asthma attacks. Use only fast-acting inhalation medicine for an attack.  Seek medical facial hair, menstrual problems, impotence, or loss of interest in sex. What should I avoid while using budesonide inhalation? Avoid getting this medicine in your eyes. Avoid being near people who are sick or have infections. Call your doctor for preventive treatment if you are exposed to chickenpox or measles. These conditions can be serious or even fatal in people who are using steroid medicine. What are the possible side effects of budesonide inhalation? Get emergency medical help if you have signs of an allergic reaction: hives, rash, severe itching; chest pain, difficult breathing, feeling anxious; swelling of your face, lips, tongue, or throat. Call your doctor at once if you have:  · worsening asthma symptoms;  · wheezing, choking, or other breathing problems after using this medication;  · white patches or sores inside your mouth or on your lips;  · blurred vision, tunnel vision, eye pain or swelling, or seeing halos around lights;  · signs of infection --fever, chills, body aches, ear pain, nausea, vomiting; or  · signs of low adrenal gland hormones --worsening tiredness or muscle weakness, feeling light-headed, nausea, vomiting. Budesonide inhalation can affect growth in children. Tell your doctor if your child is not growing at a normal rate while using this medicine. Common side effects may include:  · runny or stuffy nose, sneezing;  · red, itchy, and watery eyes;  · fever, sore throat, cough;  · nausea, vomiting, diarrhea, stomach pain, loss of appetite;  · nosebleed; or  · headache, back pain. This is not a complete list of side effects and others may occur. Call your doctor for medical advice about side effects. You may report side effects to FDA at 5-198-FDA-8426. What other drugs will affect budesonide inhalation? Sometimes it is not safe to use certain medications at the same time.  Some drugs can affect your blood levels of other drugs you take, which may increase side effects or make the medications less effective. Tell your doctor about all your other medicines, especially:  · antifungal medicine (such as ketoconazole);  · drugs that weaken your immune system;  · seizure medication; or  · other steroid medicine (flunisolide, fluticasone, mometasone, and others). This list is not complete. Other drugs may affect budesonide, including prescription and over-the-counter medicines, vitamins, and herbal products. Not all possible drug interactions are listed here. Where can I get more information? Your pharmacist can provide more information about budesonide inhalation. Remember, keep this and all other medicines out of the reach of children, never share your medicines with others, and use this medication only for the indication prescribed. Every effort has been made to ensure that the information provided by [address] is accurate, up-to-date, and complete, but no guarantee is made to that effect. Drug information contained herein may be time sensitive. [facility] information has been compiled for use by healthcare practitioners and consumers in the [place] and therefore [facility] does not warrant that uses outside of the [place] are appropriate, unless specifically indicated otherwise. [facility]'s drug information does not endorse drugs, diagnose patients or recommend therapy. [facility]'s drug information is an informational resource designed to assist licensed healthcare practitioners in caring for their patients and/or to serve consumers viewing this service as a supplement to, and not a substitute for, the expertise, skill, knowledge and judgment of healthcare practitioners. The absence of a warning for a given drug or drug combination in no way should be construed to indicate that the drug or drug combination is safe, effective or appropriate for any given patient.  [facility] does not assume any responsibility for any aspect of healthcare administered with the aid of information [employer] provides. The information contained herein is not intended to cover all possible uses, directions, precautions, warnings, drug interactions, allergic reactions, or adverse effects. If you have questions about the drugs you are taking, check with your doctor, nurse or pharmacist.  Copyright 7534-1463 [address]. Version: 10.01. Revision date: 4/5/2019. Care instructions adapted under license by [facility] ([facility]). If you have questions about a medical condition or this instruction, always ask your healthcare professional. [place] [address] any warranty or liability for your use of this information.

## 2021-10-28 NOTE — PROGRESS NOTES
[facility]  [facility]  [place]  [address] [facility] [address]  [address] [address]  Dept: [phone]    Post-Discharge Transitional Care Management Services or Hospital Follow Up      Moises Diaz   YOB: 1955    Date of Office Visit:  10/28/2021  Date of Hospital Admission: 10/15/21  Date of Hospital Discharge: 10/16/21  Readmission Risk Score(high >=14%.  Medium >=10%):Readmission Risk Score: 28      Care management risk score Rising risk (score 2-5) and Complex Care (Scores >=6): 9     Non face to face  following discharge, date last encounter closed (first attempt may have been earlier): 10/18/2021  4:17 PM 10/18/2021  4:17 PM    Call initiated 2 business days of discharge: Yes     Patient Active Problem List   Diagnosis    Hypertension    GERD (gastroesophageal reflux disease)    Rheumatoid arthritis ([address]    Essential hypertension    Obesity, Class II, BMI 35-39.9    Major depressive disorder, recurrent, moderate (HCC)    Prediabetes    Tobacco abuse    History of DVT in adulthood    COPD exacerbation ([address]    Pneumonia due to infectious organism    History of thyroid nodule    Thyroid nodule    Positive colorectal cancer screening using Cologuard test    Deep venous thrombosis of left profunda femoris vein (HCC)    Abnormal CT of the chest    Encounter for smoking cessation counseling    Nodule of lower lobe of left lung    Primary mucinous adenocarcinoma of lung ([address]    Adenocarcinoma, lung, left ([address]    History of pulmonary embolism    On anticoagulant therapy    On methotrexate therapy    Obesity, Class III, BMI 40-49.9 (morbid obesity) ([address]    S/P lobectomy of lung    Hypoxia    Moderate malnutrition ([address]    RSV bronchitis    History of adenocarcinoma of lung    Mixed hyperlipidemia    Sepsis ([address]    Hyponatremia    Anemia    Leucocytosis       No Known Allergies    Medications listed as ordered at the time of discharge LUNGS once daily             zinc 50 MG CAPS  Take 50 mg by mouth nightly                   Medications marked \"taking\" at this time  Outpatient Medications Marked as Taking for the 10/28/21 encounter (Virtual Visit) with ILIR Lennon CNP   Medication Sig Dispense Refill    budesonide (RINOCORT AQUA) 32 MCG/ACT nasal spray 1 spray by Each Nostril route daily 1 each 2    cetirizine (ZYRTEC ALLERGY) 10 MG tablet Take 1 tablet by mouth daily 90 tablet 2    zinc 50 MG CAPS Take 50 mg by mouth nightly 90 capsule 3    ondansetron (ZOFRAN) 4 MG tablet Take 1 tablet by mouth 3 times daily as needed for Nausea or Vomiting 30 tablet 2    SPIRIVA RESPIMAT 2.5 MCG/ACT AERS inhaler inhale 2 puffs INTO THE LUNGS once daily 4 g 3    amLODIPine (NORVASC) 10 MG tablet take 1 tablet by mouth once daily 90 tablet 1    fluticasone (FLONASE) 50 MCG/ACT nasal spray 2 sprays by Nasal route daily 16 g 0    albuterol (PROVENTIL) (2.5 MG/3ML) 0.083% nebulizer solution Take 3 mLs by nebulization every 6 hours as needed for Wheezing or Shortness of Breath 125 vial 0    furosemide (LASIX) 20 MG tablet Take 1 tablet by mouth 2 times daily 60 tablet 3    lidocaine 4 % external patch Place 1 patch onto the skin daily 30 patch 0    potassium chloride (KLOR-CON M) 20 MEQ extended release tablet Take 1 tablet by mouth 2 times daily 90 tablet 1    acetaminophen (TYLENOL) 500 MG tablet Take 1,000 mg by mouth every 6 hours as needed for Pain      ELIQUIS 5 MG TABS tablet take 1 tablet by mouth twice a day 180 tablet 3    albuterol sulfate  (90 Base) MCG/ACT inhaler inhale 2 puffs by mouth and INTO THE LUNGS every 4 hours for 7 days 18 g 2    pantoprazole (PROTONIX) 40 MG tablet take 1 tablet by mouth once daily 90 tablet 2    Blood Pressure KIT Dx: HTN. Needs automatic blood pressure machine to monitor her blood pressure.  1 kit 0    folic acid (FOLVITE) 1 MG tablet take 1 tablet by mouth once daily 30 tablet 0 Medications patient taking as of now reconciled against medications ordered at time of hospital discharge: Yes    Chief Complaint   Patient presents with    Follow[facility]    Inpatient course: Discharge summary reviewed- see chart. Interval history/Current status:   Opal Steele is a 61 y.o. female patient. She is an established patient of Dr. Cayden Melissa. Patient has a known history of COPD exacerbation, lung cancer, moderate malnutrition, rheumatoid arthritis, DVT, hypertension, chronic rhinitis, lobectomy, and nausea. Patient had a recent admission to the hospital for COPD exacerbation. Patient was kept in the hospital and was also treated with antibiotic and steroid. Patient was eventually was sent home with levofloxacin and prednisone. Patient great while on the medication in a few days after. However, patient started to feel nauseated again and also started having some postnasal drip and nonproductive cough. She denies any fever or chills. She denies any recent cardiac related COVID-19 virus. She does not go out very often. Patient denies any fever or chills. She denies any worsening shortness of breath nothing unusual than her usual dyspnea due to her known COPD. Patient is currently on Spiriva, albuterol, and Breo Ellipta. She is adherent to her therapy. Patient states that she has been using her inhalers appropriately. Patient is currently on Methotrexate due to her rheumatoid arthritis. She is also established with Dr. Sheron Salinas Who she sees on a regular basis. However due to her nausea she has not been taking her methotrexate lately. Patient is encouraged to take this medication on a regular basis. Patient also had partial lobectomy due to lung cancer. She is established with the oncologist Dr. Garcia Vega. She has been seeing them for a while now.   She sees them on a regular basis she does have an appointment coming up. She also is established with Dr. Rashid Rose as the pulmonologist.  However, she has not made an appointment with them recently. Patient is encouraged to do so. Patient with DVT bilateral, recurrent. Currently on Eliquis. She's been on this therapy for awhile. Patient have not been taking out of this medication since she does have some recurrent DVTs. She denies any blood in her stools or any other signs of bleeding. CHRONIC RHINITIS/NAUSEA  Keila Oates is a 61 y.o. female patient  has a known history of Common allergies. Symptoms include: clear rhinorrhea, nasal congestion, postnasal drip and Nausea and present in a seasonal pattern. Patient reports precipitants which includes: Pollen, dust, smoke, etc. Treatment currently includes Zyrtec, budesonide-we will send today. Patient was on Flonase in the past without any success. Patient is also on Tessalon Perles which was sent from the hospital which helps a little bit. Review of Systems   Constitutional: Positive for fatigue. Negative for chills and fever. HENT: Positive for congestion, postnasal drip and rhinorrhea. Respiratory: Positive for wheezing. Negative for shortness of breath. Cardiovascular: Negative. Negative for chest pain. Gastrointestinal: Positive for nausea and vomiting. Negative for abdominal pain. Endocrine: Negative. Musculoskeletal: Negative for arthralgias. Allergic/Immunologic: Positive for environmental allergies. Neurological: Negative for headaches. Psychiatric/Behavioral: Positive for dysphoric mood. Negative for sleep disturbance. The patient is nervous/anxious. There were no vitals filed for this visit. There is no height or weight on file to calculate BMI.    Wt Readings from Last 3 Encounters:   10/15/21 220 lb 7.4 oz (100 kg)   09/19/21 221 lb 12.5 oz (100.6 kg)   07/29/21 233 lb (105.7 kg)     BP Readings from Last 3 Encounters:   10/16/21 92/74   09/21/21 126/68 07/29/21 118/64       Physical Exam  HENT:      Mouth/Throat:      Comments: Voice hoarseness  Pulmonary:      Effort: Pulmonary effort is normal.      Comments: Occasional coughing  Neurological:      Mental Status: She is alert and oriented to person, place, and time. Psychiatric:         Mood and Affect: Mood is anxious. Speech: Speech is rapid and pressured. Thought Content: Thought content does not include suicidal ideation. XR CHEST PORTABLE  Narrative: EXAMINATION:  ONE XRAY VIEW OF THE CHEST    10/16/2021 1:43 pm    COMPARISON:  October 15, 2021    HISTORY:  ORDERING SYSTEM PROVIDED HISTORY: Internal follow up  TECHNOLOGIST PROVIDED HISTORY:  Internal follow up    FINDINGS:  Right upper lobe and left lower lobe airspace disease. Moderate edema. Moderate left pleural effusion. Heart and mediastinum normal.  Bony thorax  intact. Impression: Bilateral airspace disease and left effusion. This demonstrates interval  progression. Lab Results   Component Value Date    WBC 26.7 (H) 10/16/2021    HGB 9.2 (L) 10/16/2021    HCT 30.8 (L) 10/16/2021    MCV 98.4 10/16/2021     (H) 10/16/2021     Lab Results   Component Value Date     10/16/2021    K 3.9 10/16/2021     10/16/2021    CO2 25 10/16/2021    BUN 12 10/16/2021    CREATININE 0.74 10/16/2021    GLUCOSE 196 10/16/2021    GLUCOSE 105 12/05/2011    CALCIUM 8.7 10/16/2021      Lab Results   Component Value Date    ALT 6 10/15/2021    AST 15 10/15/2021    ALKPHOS 106 (H) 10/15/2021    BILITOT 0.60 10/15/2021       Assessment/Plan:  1. COPD exacerbation ([facility]  Improving  Current treatment plan is effective, no change in therapy. Reviewed use, techniques, schedule and side effects of all inhaled medications  Critical need for compliance with treatment plan to achieve optimal results  Very strongly urged to quit smoking to reduce pulmonary and CVD risk.     - WI DISCHARGE MEDS RECONCILED W/ CURRENT OUTPATIENT MED LIST    2. Primary mucinous adenocarcinoma of lung ([facility]  Stable  Continue to monitor    - DC DISCHARGE MEDS RECONCILED W/ CURRENT OUTPATIENT MED LIST    3. Moderate malnutrition ([facility]  Stable  Continue supplemen  - DC DISCHARGE MEDS RECONCILED W/ CURRENT OUTPATIENT MED LIST    4. Rheumatoid arthritis involving multiple sites with positive rheumatoid factor (HCC)  Failure to Improve  Courage to follow-up with the rheumatologist  Encouraged to continue methotrexate    - DC DISCHARGE MEDS RECONCILED W/ CURRENT OUTPATIENT MED LIST    5. Deep venous thrombosis of left profunda femoris vein (HCC)  Stable  Continue Eliquis  Continue to monitor    - DC DISCHARGE MEDS RECONCILED W/ CURRENT OUTPATIENT MED LIST    6. Essential hypertension  Stable  Continue current therapy. DISCUSSED AND ADVISED TO:  Cut down on your salt intake. Cut down on caffeinated drinks, sports drinks. Instructed to check BP at home regularly. Report for any chest pains, shortness of breath, headaches, and lightheadedness. Call the office if your blood pressure continue to be higher than 140/90 or 90/50.      - DC DISCHARGE MEDS RECONCILED W/ CURRENT OUTPATIENT MED LIST    7. Chronic rhinitis  Failure to Improve  Continue with the same therapy   ADVISED TO:  Avoid known allergens/irritants. Stop smoking or avoid second hand smoke. Stay hydrated. Report for worsening symptoms    - budesonide (RINOCORT AQUA) 32 MCG/ACT nasal spray; 1 spray by Each Nostril route daily  Dispense: 1 each; Refill: 2  - cetirizine (ZYRTEC ALLERGY) 10 MG tablet; Take 1 tablet by mouth daily  Dispense: 90 tablet; Refill: 2  - zinc 50 MG CAPS; Take 50 mg by mouth nightly  Dispense: 90 capsule; Refill: 3    8. Chronic nausea  Failure to Improve  DISCUSSED and ADVISED TO:  Drink plenty of fluids, enough until urine is light yellow or clear like water. Choose water and other caffeine-free clear liquids.   If you do not feel like eating or drinking, try taking small sips of water, sports drinks, or other rehydration drinks. Get plenty of rest.  Go to the Emergency Room if you are not able to tolerate any food or water. - ondansetron (ZOFRAN) 4 MG tablet; Take 1 tablet by mouth 3 times daily as needed for Nausea or Vomiting  Dispense: 30 tablet; Refill: 2    9. S/P lobectomy of lung  Stable    - IN DISCHARGE MEDS RECONCILED W/ CURRENT OUTPATIENT MED LIST        Medical Decision Making: moderate complexity  This note was completed by using the assistance of a speech-recognition program. However, inadvertent computerized transcription errors may be present. Although every effort was made to ensure accuracy, no guarantees can be provided that every mistake has been identified and corrected by editing.   Electronically signed by ILIR Crocker CNP on 10/28/21 at 11:41 AM EDT

## 2021-10-28 NOTE — TELEPHONE ENCOUNTER
Name: Valerie Mishra  : 1955  MRN: [id number]    Oncology Navigation Follow-Up Note  Navigator reviewing chart and spoke with MO today and pt. Needing surveillance only following surgical resection for her lung cancer . Pt. Already referred to Survivorship and needing MO appt.    Electronically signed by Shana Servin RN on 10/28/2021 at 4:24 PM

## 2021-10-29 NOTE — TELEPHONE ENCOUNTER
Name: Sin Johnson  : 1955  MRN: [id number]    Oncology Navigation Follow-Up Note    Navigator left message for Triage due to pt. Has not kept F/U . No show letter sent twice prior per CC. Writer needing to know if okay to reschedule? Navigation is completed, but pt. Will need F/U . Please advise.    Electronically signed by Debbie Broussard RN on 10/29/2021 at 1:52 PM

## 2021-10-31 PROBLEM — R06.03 RESPIRATORY DISTRESS: Status: ACTIVE | Noted: 2021-01-01

## 2021-10-31 PROBLEM — J44.9 COPD (CHRONIC OBSTRUCTIVE PULMONARY DISEASE) (HCC): Status: ACTIVE | Noted: 2021-01-01
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
## 2021-10-31 PROBLEM — J18.9 HOSPITAL-ACQUIRED PNEUMONIA: Status: ACTIVE | Noted: 2021-01-01

## 2021-10-31 PROBLEM — N17.9 AKI (ACUTE KIDNEY INJURY) (HCC): Status: ACTIVE | Noted: 2021-01-01

## 2021-10-31 PROBLEM — J96.20 ACUTE ON CHRONIC RESPIRATORY FAILURE (HCC): Status: ACTIVE | Noted: 2021-01-01

## 2021-10-31 PROBLEM — I50.30 DIASTOLIC HEART FAILURE (HCC): Status: ACTIVE | Noted: 2021-01-01

## 2021-10-31 NOTE — ED NOTES
Patient transported to CT on BIPAP with RT via stretcher     [facility]  10/31/21 7939

## 2021-10-31 NOTE — ED NOTES
The following labs labeled with pt sticker and tubed to lab:     [x] Blue     [x] Lavender   [] on ice  [] Green/yellow  [] Green/black [] on ice  [] Yellow  [] Red  [] Pink      [] COVID-19 swab    [] Rapid  [] PCR  [] Peds Viral Panel     [] Urine Sample  [] Pelvic Cultures  [x] Blood Cultures x1     Devora Vences, [address] [place] [address]  10/31/21 9789

## 2021-10-31 NOTE — ED NOTES
The following labs labeled with pt sticker and tubed to lab:     [] Blue     [] Lavender   [] on ice  [] Green/yellow  [] Green/black [] on ice  [] Yellow  [] Red  [] Pink      [] COVID-19 swab    [] Rapid  [] PCR  [x] Peds Viral Panel     [] Urine Sample  [] Pelvic Cultures  [] Blood Cultures      Sonia Birch RN  10/31/21 0585

## 2021-10-31 NOTE — ED PROVIDER NOTES
XRAY VIEW OF THE CHEST 10/31/2021 6:13 am COMPARISON: Chest portable October 16, 2021. HISTORY: ORDERING SYSTEM PROVIDED HISTORY: chest pain TECHNOLOGIST PROVIDED HISTORY: chest pain FINDINGS: The heart is moderately to severely enlarged with otherwise unremarkable configuration. The mediastinal contours are within normal limits. Pulmonary vasculature is prominent centrally. Near complete opacification of the left lung is seen suggesting presence of moderate to large pleural effusion with adjacent lung consolidation. Right lung is well aerated. . Bones and soft tissues are unremarkable. 1. Interval increased volume of now suspected moderate to large left-sided pleural effusion with adjacent lung consolidation related to pneumonia. 2. Pulmonary vascular congestion. 3. Moderate to severe cardiomegaly. XR CHEST PORTABLE    Result Date: 10/16/2021  EXAMINATION: ONE XRAY VIEW OF THE CHEST 10/16/2021 1:43 pm COMPARISON: October 15, 2021 HISTORY: ORDERING SYSTEM PROVIDED HISTORY: Internal follow up TECHNOLOGIST PROVIDED HISTORY: Internal follow up FINDINGS: Right upper lobe and left lower lobe airspace disease. Moderate edema. Moderate left pleural effusion. Heart and mediastinum normal.  Bony thorax intact. Bilateral airspace disease and left effusion. This demonstrates interval progression. XR CHEST PORTABLE    Result Date: 10/15/2021  EXAMINATION: ONE XRAY VIEW OF THE CHEST 10/15/2021 10:12 am COMPARISON: 07/23/2021 chest radiograph and 09/18/2021 chest CT. HISTORY: ORDERING SYSTEM PROVIDED HISTORY: SOB, cough, rales TECHNOLOGIST PROVIDED HISTORY: SOB, cough, rales FINDINGS: The cardiac silhouette is partially obscured with presence of overlying clips. Left pleural effusion with possible underlying atelectasis or consolidation. Right costophrenic angle is not included in the field of view. Right upper lung zone opacification.   Scattered bilateral linear opacities may represent atelectasis or scarring. No convincing evidence of pneumothorax. No acute osseous abnormalities. 1. Right upper lung zone opacification may represent atelectasis or consolidation in the appropriate clinical setting. 2. Left pleural effusion with possible underlying atelectasis or consolidation. 3. Scattered bilateral linear opacities may represent atelectasis or scarring. RECENT VITALS:     Temp: 98.2 °F (36.8 °C),  Pulse: 135, Resp: (!) 32, BP: 118/84, SpO2: 96 %    This patient is a 61 y.o. Female with shortness of breath. Patient with history of left lower lobe resection, recent admission for pneumonia. Patient currently on BiPAP due to respiratory distress. Patient also with history of A. fib, currently A. fib with RVR. Treated with IV fluids in emergency department. Chest x-ray with diffuse opacities of left lung. Awaiting CT chest rule out pulmonary embolism    Pulmonary embolism on CT. Patient does have significant left-sided opacification consistent with possible pneumonia versus bronchial plugging. Started on antibiotics, admitted to internal medicine service    OUTSTANDING TASKS / RECOMMENDATIONS:    1. Awaiting CT/PE, admission     FINAL IMPRESSION:   No diagnosis found. DISPOSITION:         DISPOSITION:  []  Discharge   []  Transfer -    [x]  Admission -  Internal medicine   []  Against Medical Advice   []  Eloped   FOLLOW-UP: No follow-up provider specified.    DISCHARGE MEDICATIONS: New Prescriptions    No medications on file          Lyndsey Burt DO  Emergency Medicine Resident  [facility]       [place]  Resident  10/31/21 [id number]

## 2021-10-31 NOTE — ED PROVIDER NOTES
[facility] ED  Emergency Department Encounter  EmergencyMedicine Resident     Pt Name:Iris Robles  MRN: [id number]  [place] 1955  Date of evaluation: 10/31/21  PCP:  Marisol Mathews MD    This patient was evaluated in the Emergency Department for symptoms described in the history of present illness. The patient was evaluated in the context of the global COVID-19 pandemic, which necessitated consideration that the patient might be at risk for infection with the SARS-CoV-2 virus that causes COVID-19. Institutional protocols and algorithms that pertain to the evaluation of patients at risk for COVID-19 are in a state of rapid change based on information released by regulatory bodies including the CDC and federal and state organizations. These policies and algorithms were followed during the patient's care in the ED. CHIEF COMPLAINT       Chief Complaint   Patient presents with    Shortness of Breath       HISTORY OF PRESENT ILLNESS  (Location/Symptom, Timing/Onset, Context/Setting, Quality, Duration, Modifying Factors, Severity.)      Valerie Mishra is a 61 y.o. female who presents with 4 days of nausea and vomiting. Patient states she is also had a cough. She is increasingly short of breath and having substernal chest pain. Patient was recently admitted for pneumonia. Patient is on methotrexate and is immunocompromise. She denies any fevers or chills. She has a history of lung cancer and lower lobe resection.     PAST MEDICAL / SURGICAL / SOCIAL / FAMILY HISTORY      has a past medical history of Abnormal CT of the chest, Adenocarcinoma, lung, left ([address] Arthritis, Community acquired pneumonia, COPD (chronic obstructive pulmonary disease) ([address] Deep venous thrombosis of left profunda femoris vein ([address] Depression, DVT (deep venous thrombosis) ([address] GERD (gastroesophageal reflux disease), History of pulmonary embolism, History of thyroid nodule, Hypertension, Major depressive disorder, recurrent, moderate ([facility] Nodule of lower lobe of left lung, Obesity, Class II, BMI 35-39.9, On anticoagulant therapy, On methotrexate therapy, Prediabetes, Rheumatoid arthritis ([facility] [address] [facility] Snores, Thyroid nodule, Tobacco abuse, Under care of team, Under care of team, Under care of team, and Wellness examination. has a past surgical history that includes Appendectomy (); Endoscopy, colon, diagnostic (126257); bronchoscopy (N/A, 2019); Colonoscopy (N/A, 2020); CT NEEDLE BIOPSY LUNG PERCUTANEOUS (2021); lobectomy (Left, 2021); and Lung removal, total (Left, 2021). Social History     Socioeconomic History    Marital status: Single     Spouse name: Not on file    Number of children: 6    Years of education: Not on file    Highest education level: Not on file   Occupational History     Employer: N/A   Tobacco Use    Smoking status: Former Smoker     Packs/day: 0.25     Years: 35.00     Pack years: 8.75     Types: Cigarettes     Quit date: 1986     Years since quittin.8    Smokeless tobacco: Never Used    Tobacco comment: restarted smoking    Vaping Use    Vaping Use: Never used   Substance and Sexual Activity    Alcohol use: Not Currently     Alcohol/week: 0.0 standard drinks    Drug use: No    Sexual activity: Not Currently   Other Topics Concern    Not on file   Social History Narrative    Not on file     Social Determinants of Health     Financial Resource Strain: Low Risk     Difficulty of Paying Living Expenses: Not hard at all   Food Insecurity: No Food Insecurity    Worried About Running Out of Food in the Last Year: Never true    Emelyn of Food in the Last Year: Never true   Transportation Needs: No Transportation Needs    Lack of Transportation (Medical): No    Lack of Transportation (Non-Medical):  No   Physical Activity:     Days of Exercise per Week:     Minutes of Exercise per Session:    Stress:     Feeling of Stress :    Social Connections:     Frequency of Communication with Friends and Family:     Frequency of Social Gatherings with Friends and Family:     Attends [employer] Services:     Active Member of Clubs or Organizations:     Attends Club or Organization Meetings:     Marital Status:    Intimate Partner Violence:     Fear of Current or Ex-Partner:     Emotionally Abused:     Physically Abused:     Sexually Abused:        Family History   Problem Relation Age of Onset    Cancer Mother         Uterine and breast    Diabetes Mother     Heart Disease Mother     Cancer Father         lung    Cancer Brother         lung    Cancer Brother         lung       Allergies:  Patient has no known allergies. Home Medications:  Prior to Admission medications    Medication Sig Start Date End Date Taking?  Authorizing Provider   budesonide (RINOCORT AQUA) 32 MCG/ACT nasal spray 1 spray by Each Nostril route daily 10/28/21 11/27/21  ILIR Lennon CNP   cetirizine (ZYRTEC ALLERGY) 10 MG tablet Take 1 tablet by mouth daily 10/28/21   ILIR Lennon CNP   zinc 50 MG CAPS Take 50 mg by mouth nightly 10/28/21   ILIR Lennon CNP   ondansetron (ZOFRAN) 4 MG tablet Take 1 tablet by mouth 3 times daily as needed for Nausea or Vomiting 10/28/21   ILIR Lennon CNP   SPIRIVA RESPIMAT 2.5 MCG/ACT AERS inhaler inhale 2 puffs INTO THE LUNGS once daily 10/25/21   Selma Orta MD   amLODIPine (NORVASC) 10 MG tablet take 1 tablet by mouth once daily 10/13/21   Selma Orta MD   fluticasone-vilanterol (BREO ELLIPTA) 100-25 MCG/INH AEPB inhaler Inhale 1 puff into the lungs daily 10/4/21   Selma Orta MD   fluticasone [facility]) 50 MCG/ACT nasal spray 2 sprays by Nasal route daily 10/4/21   Selma Orta MD   albuterol (PROVENTIL) (2.5 MG/3ML) 0.083% nebulizer solution Take 3 mLs by nebulization every 6 hours as needed for Wheezing or Shortness of Breath 8/23/21   Selma Orta MD   furosemide (LASIX) 20 MG tablet Take 1 tablet by mouth 2 times daily 7/23/21   ILIR Abbott NP   lidocaine 4 % external patch Place 1 patch onto the skin daily 7/24/21   ILIR Abbott NP   potassium chloride (KLOR-CON M) 20 MEQ extended release tablet Take 1 tablet by mouth 2 times daily 7/23/21   ILIR Abbott NP   acetaminophen (TYLENOL) 500 MG tablet Take 1,000 mg by mouth every 6 hours as needed for Pain    Historical Provider, MD   ELIQUIS 5 MG TABS tablet take 1 tablet by mouth twice a day 7/16/21   Jessie Walls MD   albuterol sulfate  (90 Base) MCG/ACT inhaler inhale 2 puffs by mouth and INTO THE LUNGS every 4 hours for 7 days 6/1/21   Jessie Walls MD   pantoprazole (PROTONIX) 40 MG tablet take 1 tablet by mouth once daily 5/18/21   Jessie Walls MD   Blood Pressure KIT Dx: HTN. Needs automatic blood pressure machine to monitor her blood pressure. 5/18/21   Jessie Walls MD   folic acid (FOLVITE) 1 MG tablet take 1 tablet by mouth once daily 4/12/21   Jessie Walls MD   methotrexate (RHEUMATREX) 2.5 MG chemo tablet Take 20 mg by mouth once a week Indications: takes 8 tablets on saturdays   Patient not taking: Reported on 10/27/2021    Historical Provider, MD       REVIEW OF SYSTEMS    (2-9 systems for level 4, 10 or more for level 5)      Review of Systems   Constitutional: Negative for chills and fever. HENT: Negative for ear pain, postnasal drip, sore throat and trouble swallowing. Eyes: Negative for visual disturbance. Respiratory: Positive for cough and shortness of breath. Negative for chest tightness. Cardiovascular: Positive for chest pain. Gastrointestinal: Positive for nausea and vomiting. Negative for abdominal distention, abdominal pain, constipation and diarrhea. Genitourinary: Negative for difficulty urinating, dysuria, flank pain and vaginal discharge. Musculoskeletal: Positive for neck pain. Negative for back pain. Neurological: Negative for dizziness, weakness, light-headedness, numbness and headaches. Psychiatric/Behavioral: Negative for confusion. PHYSICAL EXAM   (up to 7 for level 4, 8 or more for level 5)      INITIAL VITALS:   /65   Pulse 70   Temp 98.2 °F (36.8 °C) (Oral)   Resp (!) 31   Ht 5' 5\" (1.651 m)   Wt 190 lb (86.2 kg)   SpO2 94%   BMI 31.62 kg/m²     Physical Exam  Constitutional:       General: She is in acute distress. HENT:      Head: Normocephalic and atraumatic. Mouth/Throat:      Mouth: Mucous membranes are moist.   Eyes:      Extraocular Movements: Extraocular movements intact. Pupils: Pupils are equal, round, and reactive to light. Cardiovascular:      Rate and Rhythm: Normal rate. Pulses: Normal pulses. Pulmonary:      Effort: Tachypnea present. Comments: Coarse breath sounds on the right. Decreased breath sounds on the left. Patient is only able to speak a few words at a time. Musculoskeletal:      Cervical back: Normal range of motion. Comments: Patient is moving all extremities equally. Skin:     General: Skin is warm. Capillary Refill: Capillary refill takes less than 2 seconds. Neurological:      Mental Status: She is alert. GCS: GCS eye subscore is 4. GCS verbal subscore is 5. GCS motor subscore is 6. Sensory: Sensation is intact. Motor: Motor function is intact.          DIFFERENTIAL  DIAGNOSIS     PLAN (LABS / IMAGING / EKG):  Orders Placed This Encounter   Procedures    COVID-19, Rapid    Culture, Blood 1    Culture, Blood 1    Respiratory Panel, Molecular, with COVID-19 (Restricted: peds pts or suitable admitted adults)    LEGIONELLA ANTIGEN, URINE    Strep Pneumoniae Antigen    XR CHEST PORTABLE    CT CHEST PULMONARY EMBOLISM W CONTRAST    CBC Auto Differential    Basic Metabolic Panel w/ Reflex to MG    Troponin    Brain Natriuretic Peptide    D-Dimer, Quantitative    Troponin    LACTIC ACID, WHOLE BLOOD    Basic Metabolic Panel w/ Reflex to MG    CBC auto differential    BLOOD GAS, VENOUS    MYCOPLASMA PNEUMONIAE ANTIBODY, IGM    CBC    APTT    VANCOMYCIN, RANDOM    Procalcitonin    ADULT DIET; Regular; Low Sodium (2 gm); 1800 ml    Vital signs per unit routine    Notify physician    Up with assistance    Telemetry monitoring - continuous duration    Full Code    Inpatient consult to Internal Medicine    Inpatient consult to Case Management    Pharmacy to Dose: Vancomycin    Inpatient consult to Infectious Diseases    OT eval and treat    PT evaluation and treat    Initiate Oxygen Therapy Protocol    BIPAP    EKG 12 Lead    PATIENT STATUS (FROM ED OR OR/PROCEDURAL) Inpatient    PATIENT STATUS (FROM ED OR OR/PROCEDURAL) Inpatient       MEDICATIONS ORDERED:  Orders Placed This Encounter   Medications    morphine injection 4 mg    DISCONTD: methylPREDNISolone sodium (SOLU-MEDROL) injection 125 mg    0.9 % sodium chloride bolus    methylPREDNISolone sodium (SOLU-MEDROL) injection 125 mg    ondansetron (ZOFRAN) injection 4 mg    iopamidol (ISOVUE-370) 76 % injection 85 mL    vancomycin (VANCOCIN) 1250 mg in dextrose 5 % 250 mL IVPB     Order Specific Question:   Antimicrobial Indications     Answer:   Pneumonia (CAP)    piperacillin-tazobactam (ZOSYN) 4,500 mg in dextrose 5 % 100 mL IVPB (mini-bag)     Order Specific Question:   Antimicrobial Indications     Answer:   Pneumonia (CAP)    0.9 % sodium chloride bolus    DISCONTD: albuterol (PROVENTIL) nebulizer solution 2.5 mg     Order Specific Question:   Initiate RT Bronchodilator Protocol     Answer:    Yes    fluticasone (FLONASE) 50 MCG/ACT nasal spray 2 spray    budesonide-formoterol (SYMBICORT) 160-4.5 MCG/ACT inhaler 2 puff    DISCONTD: furosemide (LASIX) tablet 40 mg    pantoprazole (PROTONIX) tablet 40 mg    tiotropium (SPIRIVA RESPIMAT) 2.5 MCG/ACT inhaler 2 puff    sodium chloride flush 0.9 % injection 10 mL  sodium chloride flush 0.9 % injection 10 mL    0.9 % sodium chloride infusion    OR Linked Order Group     promethazine (PHENERGAN) tablet 12.5 mg     ondansetron (ZOFRAN) injection 4 mg    polyethylene glycol (GLYCOLAX) packet 17 g    OR Linked Order Group     acetaminophen (TYLENOL) tablet 650 mg     acetaminophen (TYLENOL) suppository 650 mg    heparin (porcine) injection 6,900 Units    DISCONTD: heparin (porcine) injection 6,900 Units    DISCONTD: heparin (porcine) injection 3,450 Units    DISCONTD: heparin 25,000 units in dextrose 5% 250 mL (premix) infusion    DISCONTD: cefepime (MAXIPIME) 2000 mg IVPB minibag     Order Specific Question:   Antimicrobial Indications     Answer:   Pneumonia (HAP)    DISCONTD: vancomycin (VANCOCIN) 1250 mg in dextrose 5 % 250 mL IVPB     Order Specific Question:   Antimicrobial Indications     Answer:   Pneumonia (HAP)    DISCONTD: methylPREDNISolone sodium (SOLU-MEDROL) injection 40 mg    vancomycin (VANCOCIN) intermittent dosing (placeholder)     Order Specific Question:   Antimicrobial Indications     Answer:   Pneumonia (HAP)    furosemide (LASIX) injection 40 mg    methylPREDNISolone sodium (SOLU-MEDROL) injection 40 mg    cefepime (MAXIPIME) 2000 mg IVPB minibag     Order Specific Question:   Antimicrobial Indications     Answer:   Pneumonia (HAP)    DISCONTD: ipratropium-albuterol (DUONEB) nebulizer solution 1 ampule     Order Specific Question:   Initiate RT Bronchodilator Protocol     Answer: Yes    apixaban (ELIQUIS) tablet 5 mg    ipratropium-albuterol (DUONEB) nebulizer solution 1 ampule     Order Specific Question:   Initiate RT Bronchodilator Protocol     Answer:   Yes       DDX: Pneumonia, atelectasis, Covid, COPD exacerbation    MDM: 61 y.o. female presents today with few days of nausea and vomiting. Patient is increasingly short of breath and is unable to speak a few words at a time. Patient was advised on BiPAP.   Patient's EKG shows A. fib with RVR. CBC, Covid, BMP, BNP, D-dimer troponin, EKG and chest x-ray ordered. CBC showed an elevated white count but is at patient's baseline. BMP showed an elevated BUN and creatinine. And a decreased sodium. Patient's BNP is elevated. CTA PE is ordered. DIAGNOSTIC RESULTS / EMERGENCY DEPARTMENT COURSE / MDM   LAB RESULTS:  Results for orders placed or performed during the hospital encounter of 10/31/21   COVID-19, Rapid    Specimen: Nasopharyngeal Swab   Result Value Ref Range    Specimen Description . NASOPHARYNGEAL SWAB     SARS-CoV-2, Rapid Not Detected Not Detected   Culture, Blood 1    Specimen: Blood   Result Value Ref Range    Specimen Description . BLOOD     Special Requests L FA 10ML     Culture (A)      POSITIVE Blood Culture Results called to and read back by: VAZQUEZ LOONEY AT 2240 10/31/21    Culture       DIRECT GRAM STAIN FROM BOTTLE: GRAM POSITIVE COCCI IN PAIRS GRAM POSITIVE COCCI IN CHAINS    Culture (A)      Streptococcus pneumoniae Detected: Methodology- Polymerase Chain Reaction (PCR)   Culture, Blood 1    Specimen: Blood   Result Value Ref Range    Specimen Description . BLOOD     Special Requests R FA 10ML     Culture (A)      POSITIVE Blood Culture Results called to and read back by: VAZQUEZ LOONEY AT 2245    Culture       DIRECT GRAM STAIN FROM BOTTLE: GRAM POSITIVE COCCI IN CHAINS GRAM POSITIVE COCCI IN PAIRS   Respiratory Panel, Molecular, with COVID-19 (Restricted: peds pts or suitable admitted adults)    Specimen: Nasopharyngeal Swab   Result Value Ref Range    Specimen Description . NASOPHARYNGEAL SWAB     Adenovirus PCR Not Detected Not Detected    Coronavirus 229E PCR Not Detected Not Detected    Coronavirus HKU1 PCR Not Detected Not Detected    Coronavirus NL63 PCR Not Detected Not Detected    Coronavirus OC43 PCR Not Detected Not Detected    SARS-CoV-2, PCR Not Detected Not Detected    Human Metapneumovirus PCR Not Detected Not Detected    Rhino/Enterovirus PCR DETECTED (A) Not Detected    Influenza A by PCR Not Detected Not Detected    Influenza A H1 PCR NOT REPORTED Not Detected    Influenza A H1 (2009) PCR NOT REPORTED Not Detected    Influenza A H3 PCR NOT REPORTED Not Detected    Influenza B by PCR Not Detected Not Detected    Parainfluenza 1 PCR Not Detected Not Detected    Parainfluenza 2 PCR Not Detected Not Detected    Parainfluenza 3 PCR Not Detected Not Detected    Parainfluenza 4 PCR Not Detected Not Detected    Resp Syncytial Virus PCR Not Detected Not Detected    Bordetella Parapertussis Not Detected Not Detected    B Pertussis by PCR Not Detected Not Detected    Chlamydia pneumoniae By PCR Not Detected Not Detected    Mycoplasma pneumo by PCR Not Detected Not Detected   LEGIONELLA ANTIGEN, URINE    Specimen: Urine, clean catch   Result Value Ref Range    Legionella Pneumophilia Ag, Urine NEGATIVE    Strep Pneumoniae Antigen    Specimen: Urine, clean catch   Result Value Ref Range    Source . URINE     Strep pneumo Ag NEGATIVE    CBC Auto Differential   Result Value Ref Range    WBC 25.9 (H) 3.5 - 11.3 k/uL    RBC 3.55 (L) 3.95 - 5.11 m/uL    Hemoglobin 10.5 (L) 11.9 - 15.1 g/dL    Hematocrit 32.5 (L) 36.3 - 47.1 %    MCV 91.5 82.6 - 102.9 fL    MCH 29.6 25.2 - 33.5 pg    MCHC 32.3 28.4 - 34.8 g/dL    RDW 20.2 (H) 11.8 - 14.4 %    Platelets 385 113 - 961 k/uL    MPV 9.8 8.1 - 13.5 fL    NRBC Automated 0.3 (H) 0.0 per 100 WBC    Differential Type NOT REPORTED     WBC Morphology NOT REPORTED     RBC Morphology NOT REPORTED     Platelet Estimate NOT REPORTED     Immature Granulocytes 6 (H) 0 %    Seg Neutrophils 78 (H) 36 - 66 %    Lymphocytes 11 (L) 24 - 44 %    Monocytes 4 1 - 7 %    Eosinophils % 1 1 - 4 %    Basophils 0 0 - 2 %    Absolute Immature Granulocyte 1.55 (H) 0.00 - 0.30 k/uL    Segs Absolute 20.20 (H) 1.8 - 7.7 k/uL    Absolute Lymph # 2.85 1.0 - 4.8 k/uL    Absolute Mono # 1.04 (H) 0.1 - 0.8 k/uL    Absolute Eos # 0.26 0.0 - 0.4 k/uL    Basophils Absolute 0. 00 0.0 - 0.2 k/uL    Morphology ANISOCYTOSIS PRESENT    Basic Metabolic Panel w/ Reflex to MG   Result Value Ref Range    Glucose 145 (H) 70 - 99 mg/dL    BUN 42 (H) 8 - 23 mg/dL    CREATININE 1.32 (H) 0.50 - 0.90 mg/dL    Bun/Cre Ratio NOT REPORTED 9 - 20    Calcium 9.0 8.6 - 10.4 mg/dL    Sodium 129 (L) 135 - 144 mmol/L    Potassium 4.2 3.7 - 5.3 mmol/L    Chloride 95 (L) 98 - 107 mmol/L    CO2 22 20 - 31 mmol/L    Anion Gap 12 9 - 17 mmol/L    GFR Non-African American 41 (L) >60 mL/min    GFR  49 (L) >60 mL/min    GFR Comment          GFR Staging NOT REPORTED    Troponin   Result Value Ref Range    Troponin, High Sensitivity 24 (H) 0 - 14 ng/L    Troponin T NOT REPORTED <0.03 ng/mL    Troponin Interp NOT REPORTED    Brain Natriuretic Peptide   Result Value Ref Range    Pro-BNP 1,438 (H) <300 pg/mL    BNP Interpretation NOT REPORTED    D-Dimer, Quantitative   Result Value Ref Range    D-Dimer, Quant 1.97 mg/L FEU   Troponin   Result Value Ref Range    Troponin, High Sensitivity 19 (H) 0 - 14 ng/L    Troponin T NOT REPORTED <0.03 ng/mL    Troponin Interp NOT REPORTED    LACTIC ACID, WHOLE BLOOD   Result Value Ref Range    Lactic Acid, Whole Blood 1.0 0.7 - 2.1 mmol/L   BLOOD GAS, VENOUS   Result Value Ref Range    pH, Trevin 7.349 7.320 - 7.420    pCO2, Trevin 48.6 39 - 55    pO2, Trevin 71.0 (H) 30 - 50    HCO3, Venous 26.0 24 - 30 mmol/L    Positive Base Excess, Trevin 0.6 0.0 - 2.0 mmol/L    Negative Base Excess, Trevin NOT REPORTED 0.0 - 2.0 mmol/L    O2 Sat, Trevin 92.5 (H) 60.0 - 85.0 %    Total Hb NOT REPORTED 12.0 - 16.0 g/dl    Oxyhemoglobin NOT REPORTED 95.0 - 98.0 %    Carboxyhemoglobin 1.3 0 - 5 %    Methemoglobin NOT REPORTED 0.0 - 1.5 %    Pt Temp 37.0     pH, Trevin, Temp Adj NOT REPORTED 7.320 - 7.420    pCO2, Trevin, Temp Adj NOT REPORTED 39 - 55 mmHg    pO2, Trevin, Temp Adj NOT REPORTED 30 - 50 mmHg    O2 Device/Flow/% NOT REPORTED     Respiratory Rate NOT REPORTED     Lex Test NOT REPORTED     Sample Site NOT REPORTED     Pt.  Position NOT REPORTED     Mode NOT REPORTED     Set Rate NOT REPORTED     Total Rate NOT REPORTED     VT NOT REPORTED     FIO2 UNKNOWN     Peep/Cpap NOT REPORTED     PSV NOT REPORTED     Text for Respiratory NOT REPORTED     NOTIFICATION NOT REPORTED     NOTIFICATION TIME NOT REPORTED    CBC   Result Value Ref Range    WBC 27.5 (H) 3.5 - 11.3 k/uL    RBC 3.40 (L) 3.95 - 5.11 m/uL    Hemoglobin 9.9 (L) 11.9 - 15.1 g/dL    Hematocrit 31.3 (L) 36.3 - 47.1 %    MCV 92.1 82.6 - 102.9 fL    MCH 29.1 25.2 - 33.5 pg    MCHC 31.6 28.4 - 34.8 g/dL    RDW 20.1 (H) 11.8 - 14.4 %    Platelets 106 560 - 241 k/uL    MPV 10.2 8.1 - 13.5 fL    NRBC Automated 0.2 (H) 0.0 per 100 WBC   APTT   Result Value Ref Range    PTT 22.8 20.5 - 30.5 sec   Procalcitonin   Result Value Ref Range    Procalcitonin 3.47 (H) <0.09 ng/mL   Basic Metabolic Panel w/ Reflex to MG   Result Value Ref Range    Glucose 165 (H) 70 - 99 mg/dL    BUN 42 (H) 8 - 23 mg/dL    CREATININE 1.05 (H) 0.50 - 0.90 mg/dL    Bun/Cre Ratio NOT REPORTED 9 - 20    Calcium 9.1 8.6 - 10.4 mg/dL    Sodium 132 (L) 135 - 144 mmol/L    Potassium 3.6 (L) 3.7 - 5.3 mmol/L    Chloride 98 98 - 107 mmol/L    CO2 25 20 - 31 mmol/L    Anion Gap 9 9 - 17 mmol/L    GFR Non-African American 53 (L) >60 mL/min    GFR African American >60 >60 mL/min    GFR Comment          GFR Staging NOT REPORTED    CBC auto differential   Result Value Ref Range    WBC 20.7 (H) 3.5 - 11.3 k/uL    RBC 2.96 (L) 3.95 - 5.11 m/uL    Hemoglobin 8.4 (L) 11.9 - 15.1 g/dL    Hematocrit 26.8 (L) 36.3 - 47.1 %    MCV 90.5 82.6 - 102.9 fL    MCH 28.4 25.2 - 33.5 pg    MCHC 31.3 28.4 - 34.8 g/dL    RDW 20.3 (H) 11.8 - 14.4 %    Platelets 571 118 - 489 k/uL    MPV 9.9 8.1 - 13.5 fL    NRBC Automated 0.1 (H) 0.0 per 100 WBC    Differential Type NOT REPORTED     WBC Morphology NOT REPORTED     RBC Morphology NOT REPORTED     Platelet Estimate NOT REPORTED     Immature Granulocytes 7 (H) 0 %    Seg to neck pain. [SS]      ED Course User Index  [SS] Kasia Saleh MD        PROCEDURES:  None    CONSULTS:    CRITICAL CARE:  None    FINAL IMPRESSION      1. Consolidation of left lower lobe of lung ([address]          DISPOSITION / PLAN     DISPOSITION patient signed out to Dr. Bocanegra Asp:  No follow-up provider specified.     DISCHARGE MEDICATIONS:  New Prescriptions    No medications on file       Kasia Saleh MD  Emergency Medicine Resident    (Please note that portions of thisnote were completed with a voice recognition program.  Efforts were made to edit the dictations but occasionally words are mis-transcribed.)       Kasia Saleh MD  Resident  11/01/21 [id number]

## 2021-10-31 NOTE — ED NOTES
Spoke with Enid CHERY about transporting the patient to CT since she is on BIPAP.  States he will be here shortly     Frances Jimenes RN  10/31/21 1813

## 2021-10-31 NOTE — ED PROVIDER NOTES
Faculty Sign-Out Attestation  Handoff taken on the following patient from prior Attending Physician: Nima Burnett    I was available and discussed any additional care issues that arose and coordinated the management plans with the resident(s) caring for the patient during my duty period. Any areas of disagreement with residents documentation of care or procedures are noted on the chart. I was personally present for the key portions of any/all procedures during my duty period. I have documented in the chart those procedures where I was not present during the key portions. CT CHEST PULMONARY EMBOLISM W CONTRAST   Final Result   Negative for acute pulmonary embolism      Near total consolidation of the left lung could represent an infectious   process. The mainstem bronchi are not aerated but no obstructing mass is   appreciated. Left pleural thickening has increased. Bronchoscopy may be   helpful for further evaluation. XR CHEST PORTABLE   Final Result   1. Interval increased volume of now suspected moderate to large left-sided   pleural effusion with adjacent lung consolidation related to pneumonia. 2. Pulmonary vascular congestion. 3. Moderate to severe cardiomegaly.                Ben Carmona MD  Attending Physician       Ben Carmona MD  10/31/21 6240

## 2021-10-31 NOTE — H&P
examination is unremarkable. Labs on arrival are sodium 129, BUN 42, creatinine 1.32, pro-Alexander 3.47, proBNP 1438, Trop 24<19, WBC 27.5, Hb 9.9. Chest x-ray shows moderate to severe cardiomegaly, pulmonary vascular congestion and large left-sided pleural effusion. CT PE is negative for PE.  EKG showing AF with RVR. Review of Systems   Constitutional: Negative for activity change, appetite change, chills, fatigue and fever. Respiratory: Positive for cough, shortness of breath and wheezing. Cardiovascular: Negative for chest pain, palpitations and leg swelling. Gastrointestinal: Positive for nausea and vomiting. Negative for abdominal distention, abdominal pain, constipation and diarrhea. Genitourinary: Negative for difficulty urinating, dysuria, flank pain, frequency and urgency. Musculoskeletal: Negative for neck pain and neck stiffness. Skin: Negative for color change, pallor, rash and wound. Neurological: Negative for seizures, light-headedness, numbness and headaches. Psychiatric/Behavioral: Negative for agitation, behavioral problems and confusion.      PAST MEDICAL HISTORY     Past Medical History:   Diagnosis Date    Abnormal CT of the chest 04/21/2021    Adenocarcinoma, lung, left ([facility] [address] 05/14/2021    Arthritis     Community acquired pneumonia 12/23/2019    COPD (chronic obstructive pulmonary disease) ([facility] [address] 08/14/2019    Deep venous thrombosis of left profunda femoris vein ([address] 04/16/2020    Depression     DVT (deep venous thrombosis) ([facility] [address] 2014    b/l     GERD (gastroesophageal reflux disease)     History of pulmonary embolism 05/27/2021    History of thyroid nodule 12/23/2019    Hypertension     Major depressive disorder, recurrent, moderate ([address] 11/12/2018    Nodule of lower lobe of left lung 04/23/2021    Obesity, Class II, BMI 35-39.9 11/12/2018    On anticoagulant therapy 05/27/2021    On methotrexate therapy 05/27/2021    Prediabetes 11/12/2018    Rheumatoid arthritis ([facility] [address]     Snores     denies apnea    Thyroid nodule 01/09/2020    Tobacco abuse 11/12/2018    Under care of team 06/29/2021    pulmonology-Dr Blank- [facility]-last visit june 2021    Under care of team 06/29/2021    oncology-Dr Kelly-[facility]-last visit june 2021    Under care of team     Dr. Marlow Factor clearance appointment 7/6/21, stress test 7/9/2021, clearance obtained and placed on chart    Wellness examination 06/29/2021    pcp-Dr Bartolo Santacruz office-last visit may 2021       PAST SURGICAL HISTORY     Past Surgical History:   Procedure Laterality Date    [address] N/A 12/27/2019    BRONCHOSCOPY ALVEOLAR LAVAGE performed by Alanna Su MD at [facility] N/A 2/12/2020    COLONOSCOPY POLYPECTOMIES HOT SNARE, COLD BIOPSY POLYPECTOMIES performed by Josephine Hough MD at [facility]  5/13/2021    CT NEEDLE BIOPSY LUNG PERCUTANEOUS 5/13/2021 [facility] CT SCAN    ENDOSCOPY, COLON, DIAGNOSTIC  [id number]    gastritis, sm. bowel lipoma, biopsies    LOBECTOMY Left 07/19/2021    XI ROBOTIC LEFT LOWER LOBECTOMY CONVERTED TO OPEN THORACTOMY LEFT LOWER LOBECTOMY (Left )    LUNG REMOVAL, TOTAL Left 7/19/2021    XI ROBOTIC LEFT LOWER LOBECTOMY CONVERTED TO OPEN THORACTOMY LEFT LOWER LOBECTOMY performed by Justin Wills MD at [id number] [place] [address]     Patient has no known allergies. MEDICATIONS PRIOR TO ADMISSION     Prior to Admission medications    Medication Sig Start Date End Date Taking?  Authorizing Provider   budesonide (RINOCORT AQUA) 32 MCG/ACT nasal spray 1 spray by Each Nostril route daily 10/28/21 11/27/21  ILIR Lennon CNP   cetirizine (ZYRTEC ALLERGY) 10 MG tablet Take 1 tablet by mouth daily 10/28/21   ILIR Lennon CNP   zinc 50 MG CAPS Take 50 mg by mouth nightly 10/28/21   ILIR Lennon CNP   ondansetron (ZOFRAN) 4 MG tablet Take 1 tablet by mouth 3 times daily as needed for Nausea or Vomiting 10/28/21   ILIR Lennon - CNP   SPIRIVA RESPIMAT 2.5 MCG/ACT AERS inhaler inhale 2 puffs INTO THE LUNGS once daily 10/25/21   Noelle Moffett MD   amLODIPine (NORVASC) 10 MG tablet take 1 tablet by mouth once daily 10/13/21   Noelle Moffett MD   fluticasone-vilanterol (BREO ELLIPTA) 100-25 MCG/INH AEPB inhaler Inhale 1 puff into the lungs daily 10/4/21   Noelle Moffett MD   fluticasone [facility]) 50 MCG/ACT nasal spray 2 sprays by Nasal route daily 10/4/21   Noelle Moffett MD   albuterol (PROVENTIL) (2.5 MG/3ML) 0.083% nebulizer solution Take 3 mLs by nebulization every 6 hours as needed for Wheezing or Shortness of Breath 8/23/21   Noelle Moffett MD   furosemide (LASIX) 20 MG tablet Take 1 tablet by mouth 2 times daily 7/23/21   ILIR Thomas NP   lidocaine 4 % external patch Place 1 patch onto the skin daily 7/24/21   ILIR Thomas NP   potassium chloride (KLOR-CON M) 20 MEQ extended release tablet Take 1 tablet by mouth 2 times daily 7/23/21   ILIR Thomas NP   acetaminophen (TYLENOL) 500 MG tablet Take 1,000 mg by mouth every 6 hours as needed for Pain    Historical Provider, MD   ELIQUIS 5 MG TABS tablet take 1 tablet by mouth twice a day 7/16/21   Noelle Moffett MD   albuterol sulfate  (90 Base) MCG/ACT inhaler inhale 2 puffs by mouth and INTO THE LUNGS every 4 hours for 7 days 6/1/21   Noelle Moffett MD   pantoprazole (PROTONIX) 40 MG tablet take 1 tablet by mouth once daily 5/18/21   Noelle Moffett MD   Blood Pressure KIT Dx: HTN. Needs automatic blood pressure machine to monitor her blood pressure.  5/18/21   Noelle Moffett MD   folic acid (FOLVITE) 1 MG tablet take 1 tablet by mouth once daily 4/12/21   Noelle Moffett MD   methotrexate (RHEUMATREX) 2.5 MG chemo tablet Take 20 mg by mouth once a week Indications: takes 8 tablets on saturdays   Patient not taking: Reported on 10/27/2021    Historical Provider, MD       SOCIAL HISTORY     Tobacco: 2 PPD for 30 years,quit 2 months ago  Alcohol: Denies  Illicits: Denies illicit drug abuse  Occupation:     FAMILY HISTORY     Family History   Problem Relation Age of Onset    Cancer Mother         Uterine and breast    Diabetes Mother     Heart Disease Mother     Cancer Father         lung    Cancer Brother         lung    Cancer Brother         lung       PHYSICAL EXAM     Vitals: /65   Pulse 70   Temp 98.2 °F (36.8 °C) (Oral)   Resp 23   Ht 5' 5\" (1.651 m)   Wt 190 lb (86.2 kg)   SpO2 95%   BMI 31.62 kg/m²   Tmax: Temp (24hrs), Av.6 °F (36.4 °C), Min:97 °F (36.1 °C), Max:98.2 °F (36.8 °C)    Last Body weight:   Wt Readings from Last 3 Encounters:   10/31/21 190 lb (86.2 kg)   10/15/21 220 lb 7.4 oz (100 kg)   21 221 lb 12.5 oz (100.6 kg)     Body Mass Index : Body mass index is 31.62 kg/m². Physical Exam  Constitutional:       General: She is in acute distress. Appearance: She is ill-appearing. She is not toxic-appearing or diaphoretic. Cardiovascular:      Rate and Rhythm: Tachycardia present. Heart sounds: No murmur heard. No friction rub. No gallop. Pulmonary:      Effort: Respiratory distress present. Breath sounds: Wheezing and rales present. Abdominal:      General: There is no distension. Tenderness: There is no abdominal tenderness. There is no guarding or rebound. Musculoskeletal:         General: No swelling, tenderness, deformity or signs of injury. Right lower leg: Edema present. Left lower leg: Edema present. Skin:     General: Skin is warm and dry. Coloration: Skin is not jaundiced. Findings: No bruising. Neurological:      General: No focal deficit present. Mental Status: She is oriented to person, place, and time. Cranial Nerves: No cranial nerve deficit. Sensory: No sensory deficit. Motor: No weakness.       Coordination: Coordination normal.            INVESTIGATIONS Laboratory Testing:     Recent Results (from the past 24 hour(s))   COVID-19, Rapid    Collection Time: 10/31/21  6:12 AM    Specimen: Nasopharyngeal Swab   Result Value Ref Range    Specimen Description . NASOPHARYNGEAL SWAB     SARS-CoV-2, Rapid Not Detected Not Detected   CBC Auto Differential    Collection Time: 10/31/21  6:13 AM   Result Value Ref Range    WBC 25.9 (H) 3.5 - 11.3 k/uL    RBC 3.55 (L) 3.95 - 5.11 m/uL    Hemoglobin 10.5 (L) 11.9 - 15.1 g/dL    Hematocrit 32.5 (L) 36.3 - 47.1 %    MCV 91.5 82.6 - 102.9 fL    MCH 29.6 25.2 - 33.5 pg    MCHC 32.3 28.4 - 34.8 g/dL    RDW 20.2 (H) 11.8 - 14.4 %    Platelets 234 622 - 074 k/uL    MPV 9.8 8.1 - 13.5 fL    NRBC Automated 0.3 (H) 0.0 per 100 WBC    Differential Type NOT REPORTED     WBC Morphology NOT REPORTED     RBC Morphology NOT REPORTED     Platelet Estimate NOT REPORTED     Immature Granulocytes 6 (H) 0 %    Seg Neutrophils 78 (H) 36 - 66 %    Lymphocytes 11 (L) 24 - 44 %    Monocytes 4 1 - 7 %    Eosinophils % 1 1 - 4 %    Basophils 0 0 - 2 %    Absolute Immature Granulocyte 1.55 (H) 0.00 - 0.30 k/uL    Segs Absolute 20.20 (H) 1.8 - 7.7 k/uL    Absolute Lymph # 2.85 1.0 - 4.8 k/uL    Absolute Mono # 1.04 (H) 0.1 - 0.8 k/uL    Absolute Eos # 0.26 0.0 - 0.4 k/uL    Basophils Absolute 0.00 0.0 - 0.2 k/uL    Morphology ANISOCYTOSIS PRESENT    Basic Metabolic Panel w/ Reflex to MG    Collection Time: 10/31/21  6:13 AM   Result Value Ref Range    Glucose 145 (H) 70 - 99 mg/dL    BUN 42 (H) 8 - 23 mg/dL    CREATININE 1.32 (H) 0.50 - 0.90 mg/dL    Bun/Cre Ratio NOT REPORTED 9 - 20    Calcium 9.0 8.6 - 10.4 mg/dL    Sodium 129 (L) 135 - 144 mmol/L    Potassium 4.2 3.7 - 5.3 mmol/L    Chloride 95 (L) 98 - 107 mmol/L    CO2 22 20 - 31 mmol/L    Anion Gap 12 9 - 17 mmol/L    GFR Non-African American 41 (L) >60 mL/min    GFR  49 (L) >60 mL/min    GFR Comment          GFR Staging NOT REPORTED    Troponin    Collection Time: 10/31/21  6:13 AM Result Value Ref Range    Troponin, High Sensitivity 24 (H) 0 - 14 ng/L    Troponin T NOT REPORTED <0.03 ng/mL    Troponin Interp NOT REPORTED    Brain Natriuretic Peptide    Collection Time: 10/31/21  6:13 AM   Result Value Ref Range    Pro-BNP 1,438 (H) <300 pg/mL    BNP Interpretation NOT REPORTED    D-Dimer, Quantitative    Collection Time: 10/31/21  6:13 AM   Result Value Ref Range    D-Dimer, Quant 1.97 mg/L FEU   Troponin    Collection Time: 10/31/21  8:33 AM   Result Value Ref Range    Troponin, High Sensitivity 19 (H) 0 - 14 ng/L    Troponin T NOT REPORTED <0.03 ng/mL    Troponin Interp NOT REPORTED        Imaging:   XR CHEST PORTABLE    Result Date: 10/31/2021  1. Interval increased volume of now suspected moderate to large left-sided pleural effusion with adjacent lung consolidation related to pneumonia. 2. Pulmonary vascular congestion. 3. Moderate to severe cardiomegaly. CT CHEST PULMONARY EMBOLISM W CONTRAST    Result Date: 10/31/2021  Negative for acute pulmonary embolism Near total consolidation of the left lung could represent an infectious process. The mainstem bronchi are not aerated but no obstructing mass is appreciated. Left pleural thickening has increased. Bronchoscopy may be helpful for further evaluation. ASSESSMENT & PLAN     1. Pneumonia likely community-acquired. Rhino virus +. Continue on supplemental oxygen via NC 6 L to keep SPO2 88-92, IV cefepime and vancomycin. Monitor SPO2. Monitor vital sign  2. Sepsis likely due to #1. Continue on IV antibiotics as #1. Monitor vitals  3. COPD exacerbation likely due to #1 . Continue on IV antibiotics and supplemental oxygen. IV Solu-Medrol 40 every 8 hours. Continue Symbicort and Spiriva Respimat inhaler, Flonase nasal spray and DuoNeb nebulizer solution. 4. Acute on chronic hypoxic respiratory failure likely due to #1 & #3. Continue on supplemental oxygen. Monitor SPO2  5. Leukocytosis. Likely due to #1.   Continue IV antibiotics. Monitor WBC  6. ASHLEIGH-likely prerenal.  Encourage oral fluids. Monitor creatinine  7. Rheumatoid arthritis-currently stable, continue on methotrexate  8. History of DVT/PE-continue on Eliquis  9. Hyponatremia. Mild, likely chronic. Monitor  10. Anemia. Stable OP follow  11. Hypertension-stable    DVT prophylaxis: On Eliquis  GI prophylaxis: Protonix 40  PT OT/SW: Following  Discharge planning: CM to assist with      Geovani Doss MD  Internal Medicine Resident, PGY-1  [facility];  [place], [place]  10/31/2021, 10:50 AM

## 2021-10-31 NOTE — ED NOTES
The following labs labeled with pt sticker and tubed to lab:     [] Blue     [] Lavender   [] on ice  [] Green/yellow  [x] Green/black [] on ice  [] Yellow  [] Red  [] Pink  [x] VBG syringe      [] COVID-19 swab    [] Rapid  [] PCR  [] Peds Viral Panel     [] Urine Sample  [] Pelvic Cultures  [x] Blood Cultures x1     [facility]  10/31/21 6291

## 2021-10-31 NOTE — PLAN OF CARE
Problem: Respiratory  Intervention: Respiratory assessment  Note: BRONCHOSPASM/BRONCHOCONSTRICTION     [x]         IMPROVE AERATION/BREATH SOUNDS  [x]   ADMINISTER BRONCHODILATOR THERAPY AS APPROPRIATE  [x]   ASSESS BREATH SOUNDS  []   IMPLEMENT AEROSOL/MDI PROTOCOL  [x]   PATIENT EDUCATION AS NEEDED   PROVIDE ADEQUATE OXYGENATION WITH ACCEPTABLE SP02/ABG'S    [x]  IDENTIFY APPROPRIATE OXYGEN THERAPY  [x]   MONITOR SP02/ABG'S AS NEEDED   [x]   PATIENT EDUCATION AS NEEDED   PATIENT REFUSES TO WEAR BIPAP     [x] Risks and benefits explained to patient   [x] Patient refuses to wear Bipap stating \"I do not want to go back on that thing\"  [x] Patient verbalizes understanding of information presented.

## 2021-10-31 NOTE — CARE COORDINATION
Case Management Initial Discharge Plan  Hamilton Bruce,             Met with:patient to discuss discharge plans. Information verified: address, contacts, phone number, , insurance Yes  Insurance Provider: Jacqueline Kunz    Emergency Contact/Next of Kin name & number: son [facility] who lives with pt  Who are involved in patient's support system? son    PCP: Palmira Soriano MD  Date of last visit: yesterday virtual visit      Discharge Planning    Living Arrangements:    son lives with pt    Home has 2 stories  2 stairs to climb to get into front door, flight of stairs to climb to reach second floor  Location of bedroom/bathroom in home upper    Patient able to perform ADL's:Independent    Current Services (outpatient & in home) DME  DME equipment: O2 2L, nebulizer, concentrator  DME provider:     Is patient receiving oral anticoagulation therapy? Yes    If indicated: Eliquis  Physician managing anticoagulation treatment: Dr Alexandr Young  Where does patient obtain lab work for ATC treatment? Potential Assistance Needed:       Patient agreeable to home care: No  [place] of choice provided:  n/a    Prior SNF/Rehab Placement and Facility: none  Agreeable to SNF/Rehab: No  [place] of choice provided: n/a     Evaluation: n/a    Expected Discharge date:       Patient expects to be discharged to:   home    If home: is the family and/or caregiver wiling & able to provide support at home? yes  Who will be providing this support? son*    Follow Up Appointment: Best Day/ Time:      Transportation provider: laura  Transportation arrangements needed for discharge: No    Readmission Risk              Risk of Unplanned Readmission:  36             Does patient have a readmission risk score greater than 14?: Yes  If yes, follow-up appointment must be made within 7 days of discharge.      Goals of Care: self care      Educated pt on transitional options, provided freedom of choice and are agreeable with plan      Discharge Plan: goal is home independent, son to provide transportation home, has DME O2 2L          Electronically signed by Celia Busch RN on 10/31/21 at 6:46 PM EDT

## 2021-10-31 NOTE — ED PROVIDER NOTES
[facility]   Emergency Department  Faculty Attestation       I performed a history and physical examination of the patient and discussed management with the resident. I reviewed the residents note and agree with the documented findings including all diagnostic interpretations and plan of care. Any areas of disagreement are noted on the chart. I was personally present for the key portions of any procedures. I have documented in the chart those procedures where I was not present during the key portions. I have reviewed the emergency nurses triage note. I agree with the chief complaint, past medical history, past surgical history, allergies, medications, social and family history as documented unless otherwise noted below. Documentation of the HPI, Physical Exam and Medical Decision Making performed by valente is based on my personal performance of the HPI, PE and MDM. For Physician Assistant/ Nurse Practitioner cases/documentation I have personally evaluated this patient and have completed at least one if not all key elements of the E/M (history, physical exam, and MDM). Additional findings are as noted. Pertinent Comments     Primary Care Physician: Hellen Stern MD      ED Triage Vitals   BP Temp Temp Source Pulse Resp SpO2 Height Weight   10/31/21 0621 10/31/21 0537 10/31/21 0537 10/31/21 0621 10/31/21 0537 10/31/21 0621 -- --   120/70 97 °F (36.1 °C) Temporal 143 24 93 %          History/Physical: This is a 61 y.o. female who presents to the Emergency Department with complaint of shortness of breath. Having nausea and vomiting approximately 2 to 3 days ago. Now having worsening shortness of breath and pain. Has a history of lung cancer with lobectomy on the left. Also had recent admissions for RSV pneumonia at the end of September, and then was admitted for COPD exacerbation approximately 2 weeks ago. Exam, patient appears to be in significant distress.  She is tachypneic and tachycardic. Normocephalic atraumatic. On nonrebreather talking in 2-3 word sentences. Tachypneic. Increased work of breathing, difficult to auscultate any lung sounds on the left. And has coarse breath sounds on the right. Abdomen soft nontender. Extremities with mild pitting edema. She is alert and oriented, moving extremities. MDM/Plan:   Patient with shortness of breath. History of COPD. Also history of prior lobectomy. In acute respiratory distress. Patient has increased work of breathing, difficulty talking full sentences. Will place on BiPAP. Covid swab  Aerosols as needed  Solu-Medrol  Basic labs, EKG, chest x-ray shows a large effusion on the left. Will get CT scan  Patient is in A. fib with RVR, however she has increased work of breathing and this is likely catecholamine response. We will not treat the rate at this time. EKG A. fib with RVR with T wave inversions. Critical Care: There was significant risk of life threatening deterioration of patient's condition requiring my direct management. Critical care time 15 minutes, excluding any documented procedures.       Liz Padron MD  Attending Emergency Physician        Liz Padron MD  10/31/21 7963

## 2021-10-31 NOTE — PROGRESS NOTES
Pharmacy Note  Vancomycin Consult    Shwetha Lopez is a 61 y.o. female started on Vancomycin for pneumonia; consult received from Dr. Lamar Barry to manage therapy. Also receiving the following antibiotics: cefepime. Patient Active Problem List   Diagnosis    Hypertension    GERD (gastroesophageal reflux disease)    Rheumatoid arthritis ([facility] [address]    Essential hypertension    Obesity, Class II, BMI 35-39.9    Major depressive disorder, recurrent, moderate (HCC)    Prediabetes    Tobacco abuse    History of DVT in adulthood    COPD exacerbation (HCC)    Pneumonia due to infectious organism    History of thyroid nodule    Thyroid nodule    Positive colorectal cancer screening using Cologuard test    Deep venous thrombosis of left profunda femoris vein (HCC)    Abnormal CT of the chest    Encounter for smoking cessation counseling    Nodule of lower lobe of left lung    Primary mucinous adenocarcinoma of lung (HCC)    Adenocarcinoma, lung, left ([facility] [address]    History of pulmonary embolism    On anticoagulant therapy    On methotrexate therapy    Obesity, Class III, BMI 40-49.9 (morbid obesity) (HCC)    S/P lobectomy of lung    Hypoxia    Moderate malnutrition (HCC)    RSV bronchitis    History of adenocarcinoma of lung    Mixed hyperlipidemia    Sepsis (HCC)    Hyponatremia    Anemia    Leucocytosis    Chronic rhinitis    Respiratory distress    Acute on chronic respiratory failure (HCC)     Allergies:  Patient has no known allergies.      Temp max: Afebrile    Recent Labs     10/31/21  0613   BUN 42*   CREATININE 1.32*   WBC 25.9*       Intake/Output Summary (Last 24 hours) at 10/31/2021 1131  Last data filed at 10/31/2021 3769  Gross per 24 hour   Intake 500 ml   Output --   Net 500 ml     Culture Date      Source                       Results  Pending    Ht Readings from Last 1 Encounters:   10/31/21 5' 5\" (1.651 m)        Wt Readings from Last 1 Encounters:   10/31/21 190 lb (86.2 kg)       Body mass index is 31.62 kg/m². Estimated Creatinine Clearance: 47 mL/min (A) (based on SCr of 1.32 mg/dL (H)). Goal Trough Level: 15-20 mcg/mL    Assessment/Plan:  - In the ER, the patient is afebrile, BP and HR stable, requiring BiPAP at 80% FiO2  - WBC elevated to 25.9  - Scr above baseline at 1.32 (baseline ~0.7)  - Will initiate Vancomycin with a one time loading dose of 1250 mg x1 with intermittent dosing based on levels due to ASHLEIGH. Random level ordered for tomorrow with AM labs. Thank you for the consult. Will continue to follow.   Ellie Pizano, PharmD  10/31/2021  11:32 AM

## 2021-10-31 NOTE — ED NOTES
New bag 500mL 0.9% NS started per Dr. Hortencia Horta verbal order     Filiberto Hoff RN  10/31/21 2047

## 2021-10-31 NOTE — ED NOTES
Writer went into patient's room to complete blood cultures and lactic. Patient and sister at bedside state they are unaware of results and POC at this time. Requesting update prior to blood cultures and lactic being completed. Resident notified.      Fernando Lema RN  10/31/21 1027

## 2021-11-01 PROBLEM — A40.3 PNEUMOCOCCAL SEPTICEMIA (HCC): Status: ACTIVE | Noted: 2019-12-24

## 2021-11-01 NOTE — PLAN OF CARE
Paged by nurse that patient is saturating 88-91% on 6L via NC. Continue current regimen. Monitor SPO2.

## 2021-11-01 NOTE — ED NOTES
Pt lying on cot with eyes closed. Respirations equal and nonlabored with skin PWD and no signs or symptoms of acute distress.       Jesus Joyner RN  11/01/21 0195

## 2021-11-01 NOTE — ED NOTES
Pt resting quietly in bed with eyes closed. Respirations equal and nonlabored with skin PWD and no signs or symptoms of acute distress.        Miguel Cardona RN  11/01/21 2175

## 2021-11-01 NOTE — PROGRESS NOTES
Physical Therapy    Facility/Department: [address] STEPDOWN  Initial Assessment    NAME: Mercedez Carrera  : 1955  MRN: [id number]    Date of Service: 2021  The patient is a pleasant 61 y.o. female presents with a chief complaint of shortness of breath and epigastric discomfort for the last 4 days. Reported nausea and vomiting. Shortness of breath is slightly worsened from her baseline with productive cough. Whitish sputum production without blood or color change is noted.     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     Patient denies any chest pain, fever or chills, hemoptysis, abdominal pain, contact with a sick people around, change in sputum color and urinary symptoms. Patient is not vaccinated against COVID. Discharge Recommendations:  No further therapy required at discharge. PT Equipment Recommendations  Equipment Needed: No    Assessment    Pt cooperative, motivated, moves well but limited by SOB. Body structures, Functions, Activity limitations: Decreased functional mobility ; Decreased endurance  Prognosis: Good  Decision Making: Low Complexity  PT Education: Goals;PT Role;Plan of Care  REQUIRES PT FOLLOW UP: Yes  Activity Tolerance  Activity Tolerance: Patient limited by endurance       Patient Diagnosis(es): The encounter diagnosis was Consolidation of left lower lobe of lung ([address]      has a past medical history of Abnormal CT of the chest, Adenocarcinoma, lung, left ([address] Arthritis, Community acquired pneumonia, COPD (chronic obstructive pulmonary disease) ([address] Deep venous thrombosis of left profunda femoris vein ([address] Depression, DVT (deep venous thrombosis) ([address] GERD (gastroesophageal reflux disease), History of pulmonary embolism, History of thyroid nodule, Hypertension, Major depressive disorder, recurrent, moderate (HCC), Nodule of lower lobe of left lung, Obesity, Class II, BMI 35-39.9, On anticoagulant therapy, On methotrexate therapy, Prediabetes, Rheumatoid arthritis ([facility] Snores, Thyroid nodule, Tobacco abuse, Under care of team, Under care of team, Under care of team, and Wellness examination. has a past surgical history that includes Appendectomy (1982); Endoscopy, colon, diagnostic (413856); bronchoscopy (N/A, 12/27/2019); Colonoscopy (N/A, 2/12/2020); CT NEEDLE BIOPSY LUNG PERCUTANEOUS (5/13/2021); lobectomy (Left, 07/19/2021); and Lung removal, total (Left, 7/19/2021).     Restrictions  Restrictions/Precautions  Restrictions/Precautions: General Precautions, Fall Risk, Up as Tolerated  Required Braces or Orthoses?: No  Vision/Hearing  Vision: Impaired  Vision Exceptions: Wears glasses at all times  Hearing: Within functional limits     Subjective  General  Patient assessed for rehabilitation services?: Yes  Response To Previous Treatment: Not applicable  Family / Caregiver Present: Yes (sister)  Follows Commands: Within Functional Limits  Pain Screening  Patient Currently in Pain: Denies  Vital Signs  Patient Currently in Pain: Denies       Orientation  Orientation  Overall Orientation Status: Within Normal Limits  Social/Functional History  Social/Functional History  Lives With: Family (son and 8 yr granddtr)  Type of Home: House  Home Layout: Two level, Bed/Bath upstairs, Able to Live on Main level with bedroom/bathroom (pt has BSC on the main floor)  Home Access: Stairs to enter without rails  Entrance Stairs - Number of Steps: 2  Receives Help From: Family  ADL Assistance: Independent  Ambulation Assistance: Independent (pt states she only uses her rwalker when she's having a \"really bad day\")  Transfer Assistance: Independent  Active : No  Patient's  Info: family    Objective     Observation/Palpation  Posture: Good    AROM RLE (degrees)  RLE AROM: WFL  AROM LLE (degrees)  LLE AROM : WFL  AROM RUE (degrees)  RUE AROM : WFL  AROM LUE (degrees)  LUE AROM : WFL  Strength RLE  Strength RLE: WFL  Strength LLE  Strength LLE: WFL  Strength RUE  Strength RUE: WFL  Strength LUE  Strength LUE: WFL  Tone RLE  RLE Tone: Normotonic  Tone LLE  LLE Tone: Normotonic  Motor Control  Gross Motor?: WFL  Sensation  Overall Sensation Status: WFL  Bed mobility  Supine to Sit: Independent  Scooting: Independent  Transfers  Sit to Stand: Supervision  Stand to sit: Supervision  Bed to Chair: Supervision  Stand Pivot Transfers: Supervision  Comment: pt required supervision d/t numerous lines  Ambulation  Ambulation?: Yes  Ambulation 1  Device: No Device  Other Apparatus: O2  Assistance: Stand by assistance  Gait Deviations: Slow Caprice  Distance: 8 steps bed to chair  Stairs/Curb  Stairs?: No     Balance  Posture: Good  Sitting - Static: Good  Sitting - Dynamic: Good  Standing - Static: Good  Standing - Dynamic: Good        Plan   Plan  Times per week: 5 visits weekly  Times per day: Daily  Current Treatment Recommendations: Balance Training, Functional Mobility Training, Transfer Training, Endurance Training, Gait Training, Stair training  Safety Devices  Type of devices: Call light within reach, Patient at risk for falls, Left in chair  Restraints  Initially in place: No    AM-PAC Score  -PAC Inpatient Mobility Raw Score : 21 (11/01/21 1501)  AM-PAC Inpatient T-Scale Score : 50.25 (11/01/21 1501)  Mobility Inpatient CMS 0-100% Score: 28.97 (11/01/21 1501)  Mobility Inpatient CMS G-Code Modifier : Teresita Boyer (11/01/21 1501)          Goals  Short term goals  Time Frame for Short term goals: 6 visits  Short term goal 1: prevent loss of strength/mobility/independence while pt is in the hospital  Short term goal 2: independent bed mobility without use of bed rails  Short term goal 3: independent transfers  Short term goal 4: independent gait without device x 50'  Short term goal 5: independent stair ambulation x 2 steps without HR  Patient Goals   Patient goals : breathe better, go home       Therapy Time   Individual Concurrent Group Co-treatment   Time In [id number]         Time Out 1459         Minutes 39                 Kwaku Reynoso, PT

## 2021-11-01 NOTE — ED NOTES
Pt lyingon cot with eyes closed. Pt easily awaken with verbal stimulus. Respirations equal and unlabored and skin PW but pt sweaty.   Pt denies any current needs or concerns and medicated as ordered and charted     Sridhar Montano RN  10/31/21 2122

## 2021-11-01 NOTE — PLAN OF CARE
PROVIDE ADEQUATE OXYGENATION WITH ACCEPTABLE SP02/ABG'S    [x]  IDENTIFY APPROPRIATE OXYGEN THERAPY  [x]   MONITOR SP02/ABG'S AS NEEDED   [x]   PATIENT EDUCATION AS NEEDED   BRONCHOSPASM/BRONCHOCONSTRICTION     [x]         IMPROVE AERATION/BREATH SOUNDS  [x]   ADMINISTER BRONCHODILATOR THERAPY AS APPROPRIATE  [x]   ASSESS BREATH SOUNDS  [x]   IMPLEMENT AEROSOL/MDI PROTOCOL  PATIENT REFUSES TO WEAR BIPAP     [x] Risks and benefits explained to patient   [x] Patient refuses to wear Bipap stating \"I don't think that I need it\"  [x] Patient verbalizes understanding of information presented.        PATIENT EDUCATION AS NEEDED

## 2021-11-01 NOTE — CONSULTS
[facility] -   Infectious diseases evaluation  admission date 10/31/2021    reason for consultation:   Sepsis    Impression :   Current:  · Bandemia  · Streptococcus pneumonia septicemia  · COPD exacerbation  · Left lung diffuse pneumococcal pneumonia -multifocal  · immunosuppressed    Other:  · History DVT/PE Eliquis  · Rheumatoid arthritis methotrexate  · Pulmonary adenocarcinoma post left lobe lobectomy end July 2021  Discussion / summary of stay / plan of care   ·   Recommendations   Treating Streptococcus pneumonia pneumonia as well as septicemia, possible complicated left pleural effusion  · Aspirate the left pleural fluid look for complicated fluid  · Levaquin /day    Infection Control Recommendations   · [place] Precautions    Antimicrobial Stewardship Recommendations   · Simplification of therapy  · Targeted therapy    Coordination ofOutpatient Care:   · Estimated Length of IV antimicrobials:  · Patient will need Midline / picc Catheter Insertion:   · Patient will need SNF:  · Patient will need outpatient wound care:     History of Present Illness:   Initial history:  Judie Honeycutt is a 61y.o.-year-old female with recent admission to the hospital treated for COPD exacerbation. She had a left LL lobectomy for cancer end og July and never felt free of infection -  Keeps having copd exacerbation issues. Back with increasing shortness of breath- placed on BiPAP. Sp yellow thick - very SOB  No fever -  A. fib RVR.   Blood cultures growing Streptococcus pneumonia  WBC 27  CT scan of the chest showing near left lung opacification with associated pleural thickening, seems similar to past CT  Creatinine is slightly elevated, ASHLEIGH    Ox 3    Interval changes  11/1/2021   Patient Vitals for the past 8 hrs:   BP Pulse Resp SpO2   11/01/21 0819   20 93 %   11/01/21 0800 128/68   (!) 87 %   11/01/21 0700 119/69 78  100 %       Summary of relevant labs:  Labs:  WBC 2027  Micro:  Blood culture positive for strep pneumonia 11/1 asked to  Urine Streptococcus pneumonia negative  Urine Legionella antigen negative    Imaging:  CT chest negative for pulmonary emboli but near opacification of the left lung with left pleural thickening    Reviewing the CT scan, he may be having a left pleural effusion under the consolidation            I have personally reviewed the past medical history, past surgical history, medications, social history, and family history, and I haveupdated the database accordingly. Allergies:   Patient has no known allergies. Review of Systems:     Review of Systems   Constitutional: Positive for activity change. HENT: Negative for congestion. Eyes: Negative for discharge. Respiratory: Positive for cough, shortness of breath and wheezing. Cardiovascular: Negative for chest pain. Gastrointestinal: Negative for abdominal distention. Endocrine: Negative for polyuria. Genitourinary: Negative for dysuria. Musculoskeletal: Negative for arthralgias. Skin: Negative for color change. Allergic/Immunologic: Negative for immunocompromised state. Neurological: Negative for dizziness. Hematological: Negative for adenopathy. Psychiatric/Behavioral: Negative for agitation. Physical Examination :       Physical Exam  Constitutional:       Appearance: Normal appearance. HENT:      Head: Normocephalic and atraumatic. Nose: No congestion. Mouth/Throat:      Mouth: Mucous membranes are moist.   Eyes:      General: No scleral icterus. Conjunctiva/sclera: Conjunctivae normal.   Cardiovascular:      Rate and Rhythm: Normal rate and regular rhythm. Heart sounds: Normal heart sounds. No murmur heard. Pulmonary:      Breath sounds: Wheezing, rhonchi and rales present. Abdominal:      Palpations: There is no mass. Hernia: No hernia is present.    Genitourinary:     Comments: No salcedo  Musculoskeletal: General: No swelling or deformity. Cervical back: Neck supple. No rigidity. Skin:     General: Skin is dry. Coloration: Skin is not jaundiced. Neurological:      General: No focal deficit present. Mental Status: She is alert and oriented to person, place, and time. Psychiatric:         Mood and Affect: Mood normal.         Thought Content:  Thought content normal.         Past Medical History:     Past Medical History:   Diagnosis Date    Abnormal CT of the chest 04/21/2021    Adenocarcinoma, lung, left ([address] 05/14/2021    Arthritis     Community acquired pneumonia 12/23/2019    COPD (chronic obstructive pulmonary disease) ([address] 08/14/2019    Deep venous thrombosis of left profunda femoris vein ([address] 04/16/2020    Depression     DVT (deep venous thrombosis) ([address] 2014    b/l     GERD (gastroesophageal reflux disease)     History of pulmonary embolism 05/27/2021    History of thyroid nodule 12/23/2019    Hypertension     Major depressive disorder, recurrent, moderate ([address] 11/12/2018    Nodule of lower lobe of left lung 04/23/2021    Obesity, Class II, BMI 35-39.9 11/12/2018    On anticoagulant therapy 05/27/2021    On methotrexate therapy 05/27/2021    Prediabetes 11/12/2018    Rheumatoid arthritis ([address]     Snores     denies apnea    Thyroid nodule 01/09/2020    Tobacco abuse 11/12/2018    Under care of team 06/29/2021    pulmonology-Dr Blank-st servin-last visit june 2021    Under care of team 06/29/2021    oncology-Dr Vences Kristen-last visit june 2021    Under care of team     Dr. Avendaño Ket clearance appointment 7/6/21, stress test 7/9/2021, clearance obtained and placed on chart    Wellness examination 06/29/2021    pcp-Dr Wing Partida office-last visit may 2021       Past Surgical  History:     Past Surgical History:   Procedure Laterality Date    APPENDECTOMY  1982    BRONCHOSCOPY N/A 12/27/2019    BRONCHOSCOPY ALVEOLAR LAVAGE performed by Soha Bhagat MD at [facility] COLONOSCOPY N/A 2020    COLONOSCOPY POLYPECTOMIES HOT SNARE, COLD BIOPSY POLYPECTOMIES performed by Marie Hilton MD at [facility] CT NEEDLE BIOPSY LUNG PERCUTANEOUS  2021    CT NEEDLE BIOPSY LUNG PERCUTANEOUS 2021 [facility] CT SCAN    ENDOSCOPY, COLON, DIAGNOSTIC  [id number]    gastritis, sm. bowel lipoma, biopsies    LOBECTOMY Left 2021    XI ROBOTIC LEFT LOWER LOBECTOMY CONVERTED TO OPEN THORACTOMY LEFT LOWER LOBECTOMY (Left )    LUNG REMOVAL, TOTAL Left 2021    XI ROBOTIC LEFT LOWER LOBECTOMY CONVERTED TO OPEN THORACTOMY LEFT LOWER LOBECTOMY performed by Zeus Perdomo MD at [facility] OR       Medications:      [START ON 2021] vancomycin  1,500 mg IntraVENous Q24H    fluticasone  2 spray Nasal Daily    budesonide-formoterol  2 puff Inhalation BID    pantoprazole  40 mg Oral QAM AC    tiotropium  2 puff Inhalation Daily    sodium chloride flush  10 mL IntraVENous 2 times per day    vancomycin (VANCOCIN) intermittent dosing (placeholder)   Other RX Placeholder    furosemide  40 mg IntraVENous Daily    methylPREDNISolone  40 mg IntraVENous Q8H    cefepime  2,000 mg IntraVENous Q12H    apixaban  5 mg Oral BID    ipratropium-albuterol  1 ampule Inhalation Q4H While awake       Social History:     Social History     Socioeconomic History    Marital status: Single     Spouse name: Not on file    Number of children: 8    Years of education: Not on file    Highest education level: Not on file   Occupational History     Employer: N/A   Tobacco Use    Smoking status: Former Smoker     Packs/day: 0.25     Years: 35.00     Pack years: 8.75     Types: Cigarettes     Quit date: 1986     Years since quittin.8    Smokeless tobacco: Never Used    Tobacco comment: restarted smoking 2019   Vaping Use    Vaping Use: Never used   Substance and Sexual Activity    Alcohol use: Not Currently     Alcohol/week: 0.0 standard drinks    Drug for input(s): PROT, LABALBU, BILIDIR, IBILI, BILITOT, ALKPHOS, ALT, AST in the last 72 hours. No results for input(s): RPR in the last 72 hours. No results for input(s): HIV in the last 72 hours. No results for input(s): BC in the last 72 hours. Lab Results   Component Value Date    CREATININE 1.05 11/01/2021    GLUCOSE 165 11/01/2021    GLUCOSE 105 12/05/2011       Detailed results: Thank you for allowing us to participate in the care of this patient. Please call with questions. This note is created with the assistance of a speech recognition program.  While intending to generate adocument that actually reflects the content of the visit, the document can still have some errors including those of syntax and sound a like substitutions which may escape proof reading. It such instances, actual meaningcan be extrapolated by contextual diversion.     Beto Chen MD  Office: [phone]  Perfect serve / office [phone]

## 2021-11-01 NOTE — PROGRESS NOTES
Pharmacy Vancomycin Consult     Vancomycin Day: 2  Current Dosin mg given once 10/31 at 1608  Current indication: sepsis    Temp max:  36.8    Recent Labs     10/31/21  0613 10/31/21  0613 10/31/21  1140 21  0614   BUN 42*  --   --  42*   CREATININE 1.32*  --   --  1.05*   WBC 25.9*   < > 27.5* 20.7*    < > = values in this interval not displayed. Intake/Output Summary (Last 24 hours) at 2021 0935  Last data filed at 10/31/2021 1738  Gross per 24 hour   Intake 363.67 ml   Output --   Net 363.67 ml     Culture Date      Source                       Results  10/31                   Blood                          Strep pneumo by PCR  10/31                   Legionella                   Negative  10/31                   Strep pneumo urine    Negative  10/31                   Resp panel                  Rhino/Entero detected    Ht Readings from Last 1 Encounters:   10/31/21 5' 5\" (1.651 m)        Wt Readings from Last 1 Encounters:   10/31/21 190 lb (86.2 kg)       Body mass index is 31.62 kg/m². Estimated Creatinine Clearance: 59 mL/min (A) (based on SCr of 1.05 mg/dL (H)). Trough: Level drawn  at 0614 resulted as 9.9 mcg/mL    Assessment/Plan:  Will redose and begin regimen of 1500 mg IVPB q24h as ASHLEIGH is starting to resolve, SCr trending down from 1.3-1.0     Alexandre Dos Santos Pharm. D.

## 2021-11-01 NOTE — PROGRESS NOTES
[facility]  Internal Medicine Teaching Residency Program  Inpatient Daily Progress Note  ______________________________________________________________________________    Patient: Sherita Gr  YOB: 1955   ISU:[id number]    Acct: [de-identified]     Room: 30/31  Admit date: 10/31/2021  Today's date: 11/01/21  Number of days in the hospital: 1    SUBJECTIVE   Admitting Diagnosis: Hospital-acquired pneumonia  CC: Worsening shortness of breath and epigastric discomfort    Pt examined at bedside. Chart & results reviewed. No acute events overnight. Patient remained afebrile and hemodynamically stable. Patient was briefly desaturating to 88-91% on 6L NC oxygen overnight. She was able to come down to 5L NC oxygen this morning.   - ID is consulted for streptococcus pneumonia and septicemia, started on Levaquin 750 mg daily. (Start Date 11/1/21)  - Labs reviewed- Na 132<--129; Cr 1.05<--1.32; Hgb 8.4<--9.9; WBC 20.7<--27.5    ROS:  Constitutional:  negative for chills, fevers, sweats  Respiratory:  negative for cough, dyspnea on exertion, hemoptysis, shortness of breath, wheezing  Cardiovascular:  negative for chest pain, chest pressure/discomfort, lower extremity edema, palpitations  Gastrointestinal:  negative for abdominal pain, constipation, diarrhea, nausea, vomiting  Neurological:  negative for dizziness, headache    BRIEF HISTORY     This patient is admitted as a case of COPD exacerbation likely due to pneumonia. Recently discharged from hospital 10/15/2021.     The patient is a pleasant 61 y.o. female presents with a chief complaint of shortness of breath and epigastric discomfort for the last 4 days. Reported nausea and vomiting. Shortness of breath is slightly worsened from her baseline with productive cough.   Whitish sputum production without blood or color change is noted.     Patient has past medical history of COPD , smoking 2 PPD for 30 years, did quit smoking 2 months ago, rheumatoid arthritis on methotrexate,  adenocarcinoma left lung s/p open left lower lobectomy 7/19/2021, DVT and PE on long-term Eliquis, hypertension, obesity and hyperlipidemia.     Patient denies any chest pain, fever or chills, hemoptysis, abdominal pain, contact with a sick people around, change in sputum color and urinary symptoms. Patient is not vaccinated against COVID.     On arrival to ED patient was in significant respiratory distress, tachypneic and tachycardic and increased work of breathing noted. She is placed on BiPAP, given no 1 dose of Vanco and Zosyn and steroids. AF with RVR on EKG.    our evaluation shows patient is dyspneic, maintaining saturation on BiPAP. She is alert and oriented, responding to questions appropriately. She is afebrile. Chest examination shows right-sided crackles, bilateral diffuse wheezes and left-sided  decreased air entry. Rest of system examination is unremarkable.     Labs on arrival are sodium 129, BUN 42, creatinine 1.32, pro-Alexander 3.47, proBNP 1438, Trop 24<19, WBC 27.5, Hb 9.9. Chest x-ray shows moderate to severe cardiomegaly, pulmonary vascular congestion and large left-sided pleural effusion. CT PE is negative for PE.  EKG showing AF with RVR. OBJECTIVE     Vital Signs:  /68   Pulse 78   Temp 98.2 °F (36.8 °C) (Oral)   Resp 20   Ht 5' 5\" (1.651 m)   Wt 190 lb (86.2 kg)   SpO2 93%   BMI 31.62 kg/m²     No data recorded. In: 260.1   Out: -     Physical Exam:  Constitutional: This is a well developed, well nourished, 30-34.9 - Obesity Grade I 61y.o. year old female who is alert, oriented, cooperative and in no apparent distress. Head:normocephalic and atraumatic. EENT:  PERRLA. No conjunctival injections. Septum was midline, mucosa was without erythema, exudates or cobblestoning. No thrush was noted. Neck: Supple without thyromegaly. No elevated JVP. Trachea was midline.   Respiratory: Crackles present on left side. Cardiovascular: Regular without murmur, clicks, gallops or rubs. Abdomen: Slightly rounded and soft without organomegaly. No rebound, rigidity or guarding was appreciated. Lymphatic: No lymphadenopathy. Musculoskeletal: Normal curvature of the spine. No gross muscle weakness. Extremities:  Edema present. No ulcerations, tenderness, varicosities or erythema. Muscle size, tone and strength are normal.  No involuntary movements are noted. Skin:  Warm and dry. Good color, turgor and pigmentation. No lesions or scars.   No cyanosis or clubbing  Neurological/Psychiatric: The patient's general behavior, level of consciousness, thought content and emotional status is normal.        Medications:  Scheduled Medications:    levofloxacin  750 mg IntraVENous Q24H    fluticasone  2 spray Nasal Daily    budesonide-formoterol  2 puff Inhalation BID    pantoprazole  40 mg Oral QAM AC    tiotropium  2 puff Inhalation Daily    sodium chloride flush  10 mL IntraVENous 2 times per day    furosemide  40 mg IntraVENous Daily    methylPREDNISolone  40 mg IntraVENous Q8H    apixaban  5 mg Oral BID    ipratropium-albuterol  1 ampule Inhalation Q4H While awake     Continuous Infusions:    sodium chloride       PRN Medicationssodium chloride flush, 10 mL, PRN  sodium chloride, 25 mL, PRN  promethazine, 12.5 mg, Q6H PRN   Or  ondansetron, 4 mg, Q6H PRN  polyethylene glycol, 17 g, Daily PRN  acetaminophen, 650 mg, Q6H PRN   Or  acetaminophen, 650 mg, Q6H PRN        Diagnostic Labs:  CBC:   Recent Labs     10/31/21  0613 10/31/21  1140 11/01/21  0614   WBC 25.9* 27.5* 20.7*   RBC 3.55* 3.40* 2.96*   HGB 10.5* 9.9* 8.4*   HCT 32.5* 31.3* 26.8*   MCV 91.5 92.1 90.5   RDW 20.2* 20.1* 20.3*    387 353     BMP:   Recent Labs     10/31/21  0613 11/01/21  0614   * 132*   K 4.2 3.6*   CL 95* 98   CO2 22 25   BUN 42* 42*   CREATININE 1.32* 1.05*     BNP: No results for input(s): BNP in the last 72 hours. PT/INR: No results for input(s): PROTIME, INR in the last 72 hours. APTT:   Recent Labs     10/31/21  1140   APTT 22.8     CARDIAC ENZYMES: No results for input(s): CKMB, CKMBINDEX, TROPONINI in the last 72 hours. Invalid input(s): CKTOTAL;3  FASTING LIPID PANEL:  Lab Results   Component Value Date    CHOL 192 01/30/2015    HDL 77 01/30/2015    TRIG 84 01/30/2015     LIVER PROFILE: No results for input(s): AST, ALT, ALB, BILIDIR, BILITOT, ALKPHOS in the last 72 hours. MICROBIOLOGY:   Lab Results   Component Value Date/Time    CULTURE (A) 10/31/2021 11:30 AM     POSITIVE Blood Culture Results called to and read back by: VAZQUEZ LOONEY AT 2245    CULTURE  10/31/2021 11:30 AM     DIRECT GRAM STAIN FROM BOTTLE: GRAM POSITIVE COCCI IN CHAINS GRAM POSITIVE COCCI IN PAIRS       Imaging:    XR CHEST PORTABLE    Result Date: 10/31/2021  1. Interval increased volume of now suspected moderate to large left-sided pleural effusion with adjacent lung consolidation related to pneumonia. 2. Pulmonary vascular congestion. 3. Moderate to severe cardiomegaly. CT CHEST PULMONARY EMBOLISM W CONTRAST    Result Date: 10/31/2021  Negative for acute pulmonary embolism Near total consolidation of the left lung could represent an infectious process. The mainstem bronchi are not aerated but no obstructing mass is appreciated. Left pleural thickening has increased. Bronchoscopy may be helpful for further evaluation.        ASSESSMENT & PLAN     ASSESSMENT / PLAN:     Principal Problem:    Hospital-acquired pneumonia  Active Problems:    GERD (gastroesophageal reflux disease)    Rheumatoid arthritis ([facility] [address]    Essential hypertension    History of DVT in adulthood    COPD exacerbation (HCC)    Pneumococcal septicemia (HCC)    S/P lobectomy of lung    History of adenocarcinoma of lung    Respiratory distress    Acute on chronic respiratory failure (HCC)    COPD (chronic obstructive pulmonary disease) (HCC)    ASHLEIGH (acute kidney injury) ([facility] [address]    Diastolic heart failure ([facility] [address]    Multifocal pneumonia  Resolved Problems:    * No resolved hospital problems. *    1. Pneumonia Community acquired [facility] acquired  - Rhino virus + on respiratory panel   - Patient received Vancomycin and Cefepime on 10/31 and 11/1. Also received 1 dose of Zosyn on 10/31.   - Leukocytosis WBC 20.7<--27.5  - ID consulted, switched to Levaquin 750 mg IV daily; recommended to aspirate left pleural fluid - look for complicated fluid   - IR consulted for left pleural effusion aspirate   - Continue supplemental oxygen to maintain SpO2 >88%  - Urine legionella- negative  - Monitor oxygen saturations  - Monitor vitals    2. Sepsis likely secondary to pneumonia  - Blood cultures: gram positive cocci in chains and pairs  - Patient received Vancomycin and Cefepime on 10/31 and 11/1. Also received 1 dose of Zosyn on 10/31.   - ID consulted, switched to Levaquin 750 mg IV daily    3. COPD exacerbation likely secondary to pneumonia  - Continue IV Levaquin  - Continue supplemental oxygen  - Continue IV Solu-Medrol 40 mg IV q8 hours  - Continue Duoneb and Spiriva     4. Acute on Chronic hypoxic respiratory failure likely secondary to COPD exacerbation and CAP  - Continue supplemental oxygen to maintain SpO2 >88%  - Monitor oxygen saturations  - Monitor vitals    5. ASHLEIGH- likely pre-renal - improving  - Cr 1.05<--1.32  - Encourage oral fluids    6. Rheumatoid arthritis- stable  - Continue Methotrexate    7. History of PE/DVT  - Continue Eliquis    8. Hyponatremia- improving  - Na 132<--129  - Continue to monitor BMP    9. Anemia-stable  - Hgb 8.4<--9.9    10. Hypertension controlled      DVT ppx : Eliquis  GI ppx: Protonix    PT/OT: On board  Discharge Planning / SW:  on board- patient will go home independently on discharge. Nahum Alejandro MD  Internal Medicine Resident, PGY-1  [facility];  [place], [place]  11/1/2021, 12:19 PM   Attending Physician Statement  I have discussed the care of Keila Oates, including pertinent history and exam findings,  with the resident. I have seen and examined the patient and the key elements of all parts of the encounter have been performed by me. I agree with the assessment, plan and orders as documented by the resident with additions . Treatment plan Discussed with nursing staff in detail , all questions answered . Electronically signed by Kaley Trejo MD on   11/1/21 at 5:07 PM EDT    Please note that this chart was generated using voice recognition Dragon dictation software. Although every effort was made to ensure the accuracy of this automated transcription, some errors in transcription may have occurred.

## 2021-11-01 NOTE — PROGRESS NOTES
Senior Note:     62-year-old female with past medical history of adenocarcinoma of lung status post left lower lobe lobectomy, COPD on intermittent home oxygen, diastolic heart failure, history of DVT/PE on Eliquis, rheumatoid arthritis on methotrexate and essential hypertension who presented to hospital with worsening shortness of breath, cough. Patient also reported epigastric discomfort with nausea. Patient was tachypneic and descending on arrival.  Was placed on BiPAP with improvement in saturations. Initial EKG did show that patient has A. fib with RVR although on my evaluation patient was in normal sinus rhythm, rate controlled. No reported history of atrial fibrillation though. Chest x-ray and CT chest showed near total consolidation of left lung with associated pleural effusion. Patient was recently discharged from the hospital 2 weeks ago after treatment for COPD exacerbation. Labs with evidence of ASHLEIGH, creatinine 1.32, pro-Alexander elevated to 3.47, proBNP elevated to 1400, troponins 24> 19, elevated leukocyte count 25> 27. Venous blood gas 7.34/48/71/26. COVID-19 negative. Clinically patient has bilateral wheezing. Assessment:  Acute on chronic respiratory failure from community-acquired pneumonia on top of COPD exacerbation  Community-acquired pneumonia with suspected organism of MRSA/Pseudomonas  COPD exacerbation  History of lung cancer status post left lower lobe lobectomy  DVT/PE on Eliquis  New onset atrial fibrillation? Although rate controlled now. Already on anticoagulation for DVT/PE  Rheumatoid arthritis on methotrexate  Essential hypertension  Diastolic heart failure    Plan:  Plan to cover with vancomycin and cefepime at this point. Get sputum cultures, blood cultures, urine cultures. Check for Streptococcus, Legionella and mycoplasma. Check respiratory viral panel. Start Solu-Medrol 40 mg every 8 hours for now and reassess later. Initiate RT aerosol protocol.   Resume Symbicort and Spiriva. DuoNeb inhalation. Resume Eliquis for DVT/PE. Although initial EKG did show atrial fibrillation with RVR but now patient is in normal sinus rhythm, rate controlled. We will continue to monitor that. Patient takes Lasix 20 mg twice daily at home. Will start on Lasix 40 mg IV daily for now and reassess tomorrow.   Consider tapping pleural effusion no improvement with antibiotics and BiPAP

## 2021-11-02 NOTE — PROGRESS NOTES
COPD , smoking 2 PPD for 30 years, did quit smoking 2 months ago, rheumatoid arthritis on methotrexate,  adenocarcinoma left lung s/p open left lower lobectomy 2021, DVT and PE on long-term Eliquis, hypertension, obesity and hyperlipidemia.     Patient denies any chest pain, fever or chills, hemoptysis, abdominal pain, contact with a sick people around, change in sputum color and urinary symptoms. Patient is not vaccinated against COVID.     On arrival to ED patient was in significant respiratory distress, tachypneic and tachycardic and increased work of breathing noted. Brina Marr is placed on BiPAP, given no 1 dose of Vanco and Zosyn and steroids.  AF with RVR on EKG.    our evaluation shows patient is dyspneic, maintaining saturation on BiPAP.  She is alert and oriented, responding to questions appropriately.  She is afebrile.  Chest examination shows right-sided crackles, bilateral diffuse wheezes and left-sided  decreased air entry.  Rest of system examination is unremarkable.     Labs on arrival are sodium 129, BUN 42, creatinine 1.32, pro-Alexander 3.47, proBNP 1438, Trop 24<19, WBC 27.5, Hb 9.9.  Chest x-ray shows moderate to severe cardiomegaly, pulmonary vascular congestion and large left-sided pleural effusion.  CT PE is negative for PE.  EKG showing AF with RVR. OBJECTIVE     Vital Signs:  /80   Pulse 86   Temp 97.5 °F (36.4 °C) (Oral)   Resp 24   Ht 5' 5\" (1.651 m)   Wt 199 lb 8.3 oz (90.5 kg)   SpO2 93%   BMI 33.20 kg/m²     Temp (24hrs), Av.5 °F (36.4 °C), Min:97.2 °F (36.2 °C), Max:97.9 °F (36.6 °C)    In: 350   Out: -     Physical Exam:  Constitutional: This is a well developed, well nourished, 30-34.9 - Obesity Grade I 61y.o. year old female who is alert, oriented, cooperative and in no apparent distress. Head:normocephalic and atraumatic. EENT:  PERRLA. No conjunctival injections. Septum was midline, mucosa was without erythema, exudates or cobblestoning.   No thrush was noted.   Neck: Supple without thyromegaly. No elevated JVP. Trachea was midline. Respiratory: Chest was symmetrical without dullness to percussion. Breath sounds bilaterally were clear to auscultation. There were no wheezes, rhonchi or rales. There is no intercostal retraction or use of accessory muscles. No egophony noted. Cardiovascular: Regular without murmur, clicks, gallops or rubs. Abdomen: Slightly rounded and soft without organomegaly. No rebound, rigidity or guarding was appreciated. Lymphatic: No lymphadenopathy. Musculoskeletal: Normal curvature of the spine. No gross muscle weakness. Extremities:  No lower extremity edema, ulcerations, tenderness, varicosities or erythema. Muscle size, tone and strength are normal.  No involuntary movements are noted. Skin:  Warm and dry. Good color, turgor and pigmentation. No lesions or scars.   No cyanosis or clubbing  Neurological/Psychiatric: The patient's general behavior, level of consciousness, thought content and emotional status is normal.        Medications:  Scheduled Medications:    levofloxacin  750 mg IntraVENous Q24H    influenza virus vaccine  0.5 mL IntraMUSCular Prior to discharge    albuterol  2.5 mg Nebulization 4x daily    fluticasone  2 spray Nasal Daily    budesonide-formoterol  2 puff Inhalation BID    pantoprazole  40 mg Oral QAM AC    tiotropium  2 puff Inhalation Daily    sodium chloride flush  10 mL IntraVENous 2 times per day    furosemide  40 mg IntraVENous Daily    methylPREDNISolone  40 mg IntraVENous Q8H    apixaban  5 mg Oral BID     Continuous Infusions:    sodium chloride       PRN Medicationsalbuterol, 2.5 mg, Q4H PRN  sodium chloride flush, 10 mL, PRN  sodium chloride, 25 mL, PRN  promethazine, 12.5 mg, Q6H PRN   Or  ondansetron, 4 mg, Q6H PRN  polyethylene glycol, 17 g, Daily PRN  acetaminophen, 650 mg, Q6H PRN   Or  acetaminophen, 650 mg, Q6H PRN        Diagnostic Labs:  CBC:   Recent Labs 10/31/21  1140 11/01/21  0614 11/02/21  0618   WBC 27.5* 20.7* PENDING   RBC 3.40* 2.96* 3.03*   HGB 9.9* 8.4* 8.9*   HCT 31.3* 26.8* 28.6*   MCV 92.1 90.5 94.4   RDW 20.1* 20.3* 20.6*    353 PENDING     BMP:   Recent Labs     10/31/21  0613 11/01/21  0614 11/02/21  0618   * 132* 133*   K 4.2 3.6* 3.8   CL 95* 98 101   CO2 22 25 22   BUN 42* 42* 34*   CREATININE 1.32* 1.05* 0.84     BNP: No results for input(s): BNP in the last 72 hours. PT/INR:   Recent Labs     11/01/21  1441   PROTIME 11.1   INR 1.0     APTT:   Recent Labs     10/31/21  1140   APTT 22.8     CARDIAC ENZYMES: No results for input(s): CKMB, CKMBINDEX, TROPONINI in the last 72 hours. Invalid input(s): CKTOTAL;3  FASTING LIPID PANEL:  Lab Results   Component Value Date    CHOL 192 01/30/2015    HDL 77 01/30/2015    TRIG 84 01/30/2015     LIVER PROFILE: No results for input(s): AST, ALT, ALB, BILIDIR, BILITOT, ALKPHOS in the last 72 hours. MICROBIOLOGY:   Lab Results   Component Value Date/Time    CULTURE (A) 10/31/2021 11:30 AM     POSITIVE Blood Culture Results called to and read back by: VAZQUEZ LOONEY AT 2245    CULTURE  10/31/2021 11:30 AM     DIRECT GRAM STAIN FROM BOTTLE: GRAM POSITIVE COCCI IN CHAINS GRAM POSITIVE COCCI IN PAIRS       Imaging:    XR CHEST PORTABLE    Result Date: 10/31/2021  1. Interval increased volume of now suspected moderate to large left-sided pleural effusion with adjacent lung consolidation related to pneumonia. 2. Pulmonary vascular congestion. 3. Moderate to severe cardiomegaly. CT CHEST PULMONARY EMBOLISM W CONTRAST    Result Date: 10/31/2021  Negative for acute pulmonary embolism Near total consolidation of the left lung could represent an infectious process. The mainstem bronchi are not aerated but no obstructing mass is appreciated. Left pleural thickening has increased. Bronchoscopy may be helpful for further evaluation.        ASSESSMENT & PLAN     ASSESSMENT / PLAN:     Principal Problem: Hospital-acquired pneumonia  Active Problems:    GERD (gastroesophageal reflux disease)    Rheumatoid arthritis (HCC)    Essential hypertension    History of DVT in adulthood    COPD exacerbation ([facility])    Pneumococcal septicemia ([facility])    S/P lobectomy of lung    History of adenocarcinoma of lung    Respiratory distress    Acute on chronic respiratory failure ([facility])    COPD (chronic obstructive pulmonary disease) ([facility])    ASHLEIGH (acute kidney injury) ([facility])    Diastolic heart failure ([facility])    Multifocal pneumonia  Resolved Problems:    * No resolved hospital problems. *      1. Pneumonia Community acquired [facility] acquired  - Rhino virus + on respiratory panel   - Patient received Vancomycin and Cefepime on 10/31 and 11/1. Also received 1 dose of Zosyn on 10/31.   - Leukocytosis WBC 13.3<--20.7<--27.5  - ID consulted, switched to Levaquin 750 mg IV daily;   - IR consulted: s/p left pleural effusion aspirate this morning. She tolerated the procedure well. Less than 1mL scant bloody fluid obtained. - Continue supplemental oxygen to maintain SpO2 >88%  - Urine legionella- negative  - Monitor oxygen saturations  - Monitor vitals     2. Sepsis likely secondary to pneumonia  - Blood cultures: gram positive cocci in chains and pairs  - Patient received Vancomycin and Cefepime on 10/31 and 11/1. Also received 1 dose of Zosyn on 10/31.   - ID consulted, switched to Levaquin 750 mg IV daily     3. COPD exacerbation likely secondary to pneumonia  - Continue IV Levaquin  - Continue supplemental oxygen  - Continue IV Solu-Medrol 40 mg IV q8 hours  - Continue Duoneb and Spiriva   - Started Acapella  - Started Mucomyst for congestion      4. Acute on chronic hypoxic respiratory failure likely secondary to COPD exacerbation and CAP  - Continue supplemental oxygen to maintain SpO2 >88%  - Monitor oxygen saturations  - Monitor vitals     5. ASHLEIGH- likely pre-renal - resolved  - Cr 0.84<--1.05<--1.32     6.  Rheumatoid arthritis- stable  - Continue Methotrexate     7. History of PE/DVT  - Continue Eliquis     8. Hyponatremia- improving  - Na 133<--132<--129  - Continue to monitor BMP     9. Anemia-stable  - Hgb 8.9<--8.4<--9.9     10. Hypertension controlled      DVT ppx : Eliquis  GI ppx: Protonix    PT/OT: On board  Discharge Planning / SW: Patient will go home with son. Patient has DME O2 2L. Bishop Scott MD  Internal Medicine Resident, PGY-1  [address]  11/2/2021, 7:27 AM   Attending Physician Statement  I have discussed the care of Tasha Ball, including pertinent history and exam findings,  with the resident. I have seen and examined the patient and the key elements of all parts of the encounter have been performed by me. I agree with the assessment, plan and orders as documented by the resident with additions . Treatment plan Discussed with nursing staff in detail , all questions answered . Electronically signed by Karan Wilkerson MD on   11/2/21 at 4:36 PM EDT    Please note that this chart was generated using voice recognition Dragon dictation software. Although every effort was made to ensure the accuracy of this automated transcription, some errors in transcription may have occurred.

## 2021-11-02 NOTE — PROGRESS NOTES
Occupational Therapy   Occupational Therapy Initial Assessment  Date: 2021   Patient Name: Guillermo Hu  MRN: [id number]     : 1955    Date of Service: 2021  Chief Complaint   Patient presents with    Shortness of Breath       Discharge Recommendations: No therapy recommended at discharge. Defer OT at this time     OT Equipment Recommendations  Equipment Needed: Yes  Mobility Devices: ADL Assistive Devices  ADL Assistive Devices: Transfer Tub Bench    Assessment   Prognosis: Good  Decision Making: Low Complexity  OT Education: OT Role;Plan of Care;Equipment  Patient Education: Benefits of shower bench-good return  No Skilled OT: Independent with functional mobility; Independent with ADL's;At baseline function; Safe to return home  REQUIRES OT FOLLOW UP: No  Activity Tolerance  Activity Tolerance: Patient Tolerated treatment well  Activity Tolerance: Pt on 2L O2 entire session, however, SPO2 at 97% throughout session  Safety Devices  Safety Devices in place: Yes  Type of devices: Call light within reach;Gait belt;Nurse notified  Restraints  Initially in place: No         Patient Diagnosis(es): The encounter diagnosis was Consolidation of left lower lobe of lung ([address]      has a past medical history of Abnormal CT of the chest, Adenocarcinoma, lung, left ([address] Arthritis, Community acquired pneumonia, COPD (chronic obstructive pulmonary disease) ([address] Deep venous thrombosis of left profunda femoris vein ([address] Depression, DVT (deep venous thrombosis) ([address] GERD (gastroesophageal reflux disease), History of pulmonary embolism, History of thyroid nodule, Hypertension, Major depressive disorder, recurrent, moderate ([address] Nodule of lower lobe of left lung, Obesity, Class II, BMI 35-39.9, On anticoagulant therapy, On methotrexate therapy, Prediabetes, Rheumatoid arthritis ([address] Snores, Thyroid nodule, Tobacco abuse, Under care of team, Under care of team, Under care of team, and Wellness examination. has a past surgical history that includes Appendectomy (1982); Endoscopy, colon, diagnostic (301565); bronchoscopy (N/A, 12/27/2019); Colonoscopy (N/A, 2/12/2020); CT NEEDLE BIOPSY LUNG PERCUTANEOUS (5/13/2021); lobectomy (Left, 07/19/2021); and Lung removal, total (Left, 7/19/2021).        Restrictions  Restrictions/Precautions  Restrictions/Precautions: General Precautions, Fall Risk  Required Braces or Orthoses?: No  Position Activity Restriction  Other position/activity restrictions: Neg for PE, up with A    Subjective   General  Patient assessed for rehabilitation services?: Yes  Family / Caregiver Present: No  Diagnosis: SOB, N/V, L pleural effusion, COPD, RA  Patient Currently in Pain: Yes  Pain Assessment  Pain Assessment: 0-10  Pain Level: 8  Patient's Stated Pain Goal: 3  Pain Type: Chronic pain  Pain Location: Back  Pain Orientation: Mid;Lower  Patient Currently in Pain: Yes  Oxygen Therapy  SpO2: 91 %  O2 Device: None (Room air)  Social/Functional History  Social/Functional History  Lives With: Family (son and 8 yr granddtr)  Type of Home: House  Home Layout: Two level, Bed/Bath upstairs, Able to Live on Main level with bedroom/bathroom (pt has BSC on the main floor)  Home Access: Stairs to enter without rails  Entrance Stairs - Number of Steps: 2  Receives Help From: Family  ADL Assistance: Independent  Ambulation Assistance: Independent (pt states she only uses her rwalker when she's having a \"really bad day\")  Transfer Assistance: Independent  Active : No  Patient's  Info: family  Occupation: On disability  Type of occupation: Social Security  Leisure & Hobbies: get out of house  Additional Comments: Son works outside of home     Objective   Vision: Impaired  Vision Exceptions: Wears glasses at all times  Hearing: Within functional limits    Orientation  Overall Orientation Status: Within Functional Limits     Balance  Sitting Balance: Independent  Standing Balance: Independent  Standing Balance  Time: 8 min  Activity: Pt stood bedside, sinkside, func mob around room x2  Functional Mobility  Functional - Mobility Device: No device  Activity: To/from bathroom  Assist Level: Modified independent   Functional Mobility Comments: No LOB or safety concerns noted, pt able to bend over and pick objects up from floor at bedside  Toilet Transfers  Toilet - Technique: Ambulating  Equipment Used: Grab bars  Toilet Transfer: Modified independent  ADL  Feeding: Independent  Grooming: Independent  UE Bathing: Independent  LE Bathing: Modified independent   UE Dressing: Independent  LE Dressing: Modified independent   Toileting: Modified independent   Additional Comments: Pt on 2L O2 entire session, on O2 PRN at home, pt able to  object from floor while standing, donned B/L socks sitting EOB this date, stood sinkside for oral care activity, pt at baseline this date  Tone RUE  RUE Tone: Normotonic  Tone LUE  LUE Tone: Normotonic  Coordination  Movements Are Fluid And Coordinated: Yes     Bed mobility  Supine to Sit: Unable to assess  Sit to Supine: Unable to assess  Scooting: Independent  Comment: Pt sitting on EOB upon arrival, pt retired sitting on toilet as writer exited room, pt at baseline and OT taking pt off caseload  Transfers  Sit to stand: Independent  Stand to sit:  Independent     Cognition  Overall Cognitive Status: WFL        Sensation  Overall Sensation Status: WFL      LUE AROM (degrees)  LUE AROM : WFL  RUE AROM (degrees)  RUE AROM : WFL  LUE Strength  Gross LUE Strength: WFL  L Hand General: 5/5  RUE Strength  Gross RUE Strength: WFL  R Hand General: 5/5          AM-PAC Score        AM-[facility] Inpatient Daily Activity Raw Score: 24 (11/02/21 1207)  AM-PAC Inpatient ADL T-Scale Score : 57.54 (11/02/21 1207)  ADL Inpatient CMS 0-100% Score: 0 (11/02/21 1207)  ADL Inpatient CMS G-Code Modifier : [facility] (11/02/21 1207)    Goals  Short term goals  Time Frame for Short term goals: OT mario and d/c d/t (I)       Therapy Time   Individual Concurrent Group Co-treatment   Time In 1012         Time Out 1027         Minutes 15         Timed Code Treatment Minutes: 10 Minutes       Jaye Bunn OTR/L

## 2021-11-02 NOTE — CARE COORDINATION
11/02/21 1817   Readmission Assessment   Number of Days since last admission? 8-30 days   Previous Disposition Home with Family   Who is being Interviewed Patient   What was the patient's/caregiver's perception as to why they think they needed to return back to the hospital? Other (Comment)  (shortness of breath)   Did you visit your Primary Care Physician after you left the hospital, before you returned this time? Yes   Who advised the patient to return to the hospital? Self-referral   Does the patient report anything that got in the way of taking their medications?  No

## 2021-11-02 NOTE — PROGRESS NOTES
Patient here for ultrasound thoracentesis. Ramos Hanna Redfox PA in room and consent signed. Ultrasound of left  back done. Back prepped, draped and numbed with lidocaine. Needle in without difficulty and scant amount of red  fluid pulled off. Needle out and dressing on. Post scan has been completed. Specimen to be sent to lab. Patient tolerated well. Discharged to room.

## 2021-11-03 NOTE — TELEPHONE ENCOUNTER
When referral was first received, referral was reviewed with the Survivorship , Chloe HOLMAN. Per Griselda Chen, wait to call patient to scheduled until she is seen by Dr. Delma Kirk. Patient no showed for her appt with Dr. Delma Kirk. Per note on 10/29/21 by nurse navigator, Mohinder Marie, Dr. Goldie Shirley office has sent 2 letters for patient to reschedule. Since this time, patient now has been hospitalized. I re-reviewed the referral with Griselda Chen today, 11/3/21, and per Griselda Chen mail letter to patient to offer survivorship appt. Patient can call to scheduled if she chooses. Letter mailed.

## 2021-11-03 NOTE — DISCHARGE INSTR - COC
Continuity of Care Form    Patient Name: Sin Johnson   :  1955  MRN:  [id number]    Admit date:  10/31/2021  Discharge date:  11-3-2021    Code Status Order: Full Code   Advance Directives:      Admitting Physician:  Chela German MD  PCP: Aimee Baez MD    Discharging Nurse: [facility] Unit/Room#: 8966/6558-09  Discharging Unit Phone Number: [phone]    Emergency Contact:   Extended Emergency Contact Information  Primary Emergency Contact: Micheal Wolfe, [address] [place] [address] Phone: [phone]  Mobile Phone: [phone]  Relation: Child  Secondary Emergency Contact: Joby Cohen  Mobile Phone: [phone]  Relation: Child  Preferred language: English   needed?  No    Past Surgical History:  Past Surgical History:   Procedure Laterality Date    APPENDECTOMY  1982    BRONCHOSCOPY N/A 2019    BRONCHOSCOPY ALVEOLAR LAVAGE performed by Alanna Su MD at [address] COLONOSCOPY N/A 2020    COLONOSCOPY POLYPECTOMIES HOT SNARE, COLD BIOPSY POLYPECTOMIES performed by Josephine Hough MD at [facility] CT NEEDLE BIOPSY LUNG PERCUTANEOUS  2021    CT NEEDLE BIOPSY LUNG PERCUTANEOUS 2021 [facility] CT SCAN    ENDOSCOPY, COLON, DIAGNOSTIC  [id number]    gastritis, sm. bowel lipoma, biopsies    LOBECTOMY Left 2021    XI ROBOTIC LEFT LOWER LOBECTOMY CONVERTED TO OPEN THORACTOMY LEFT LOWER LOBECTOMY (Left )    LUNG REMOVAL, TOTAL Left 2021    XI ROBOTIC LEFT LOWER LOBECTOMY CONVERTED TO OPEN THORACTOMY LEFT LOWER LOBECTOMY performed by Justin Wills MD at [place] [address]       Immunization History:   Immunization History   Administered Date(s) Administered    Influenza Virus Vaccine 2016    Influenza, Harish Jacqui, IM, PF (6 mo and older Fluzone, Flulaval, Fluarix, and 3 yrs and older Afluria) 10/28/2016, 2018    Pneumococcal Polysaccharide ([id number]) 2017    Tdap (Boostrix, Adacel) 2019    Zoster Recombinant (Shingrix) 06/12/2019, 11/20/2019       Active Problems:  Patient Active Problem List   Diagnosis Code    Hypertension I10    GERD (gastroesophageal reflux disease) K21.9    Rheumatoid arthritis ([facility] M06.9    Essential hypertension I10    Obesity, Class II, BMI 35-39.9 E66.9    Major depressive disorder, recurrent, moderate ([facility]) F33.1    Prediabetes R73.03    Tobacco abuse Z72.0    History of DVT in adulthood Z80.65    COPD exacerbation ([facility]) J44.1    Pneumonia due to infectious organism J18.9    History of thyroid nodule Z86.39    Pneumococcal septicemia ([facility]) A40.3    Thyroid nodule E04.1    Positive colorectal cancer screening using Cologuard test R19.5    Deep venous thrombosis of left profunda femoris vein ([facility]) I82.412    Abnormal CT of the chest R93.89    Encounter for smoking cessation counseling Z71.6    Nodule of lower lobe of left lung R91.1    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 118    Mixed hyperlipidemia E78.2    Sepsis ([facility]) A41.9    Hyponatremia E87.1    Anemia D64.9    Leucocytosis D72.829    Chronic rhinitis J31.0    Respiratory distress R06.03    Acute on chronic respiratory failure ([facility]) J96.20    Hospital-acquired pneumonia J18.9, Y95    COPD (chronic obstructive pulmonary disease) ([facility]) J44.9    ASHLEIGH (acute kidney injury) ([facility]) E59.2    Diastolic heart failure ([facility]) I50.30    Multifocal pneumonia J18.9       Isolation/Infection:   Isolation            No Isolation          Patient Infection Status       Infection Onset Added Last Indicated Last Indicated By Review Planned Expiration Resolved Resolved By    None active    Resolved    COVID-19 Rule Out 10/31/21 10/31/21 10/31/21 Respiratory Panel, Molecular, with COVID-19 (Restricted: peds pts or suitable admitted adults) (Ordered)   10/31/21 Rule-Out Test Resulted    COVID-19 Rule Out 10/15/21 10/15/21 10/15/21 COVID-19, Rapid (Ordered)   10/15/21 Rule-Out Test Resulted    COVID-19 Rule Out 09/19/21 09/19/21 09/19/21 COVID-19 & Influenza Combo (Ordered)   09/19/21 Rule-Out Test Resulted    COVID-19 Rule Out 09/18/21 09/18/21 09/18/21 COVID-19, Rapid (Ordered)   09/18/21 Rule-Out Test Resulted    COVID-19 Rule Out 07/15/21 07/15/21 07/15/21 COVID-19 (Ordered)   07/16/21 Rule-Out Test Resulted    COVID-19 Rule Out 07/08/21 07/08/21 07/08/21 COVID-19 (Ordered)   07/09/21 Rule-Out Test Resulted    COVID-19 Rule Out 06/21/21 06/23/21 06/21/21 COVID-19 (Ordered)   06/23/21 Rule-Out Test Resulted    COVID-19 Rule Out 05/27/21 05/27/21 05/27/21 COVID-19 & Influenza Combo (Ordered)   05/27/21 Rule-Out Test Resulted    COVID-19 Rule Out 05/10/21 05/10/21 05/10/21 COVID-19 (Ordered)   05/11/21 Rule-Out Test Resulted    C-diff Rule Out  12/23/19 12/23/19 C DIFF TOXIN/ANTIGEN (Ordered)   12/26/19 Mohsen Hawkins RN            Nurse Assessment:  Last Vital Signs: /60   Pulse 67   Temp 97.5 °F (36.4 °C) (Oral)   Resp 20   Ht 5' 5\" (1.651 m)   Wt 199 lb 1.2 oz (90.3 kg)   SpO2 97%   BMI 33.13 kg/m²     Last documented pain score (0-10 scale): Pain Level: 3  Last Weight:   Wt Readings from Last 1 Encounters:   11/03/21 199 lb 1.2 oz (90.3 kg)     Mental Status:  oriented and alert    IV Access:  - PICC - site  {Anatomy; iv placement site:[id number]}, insertion date: ***    Nursing Mobility/ADLs:  Walking   Independent  Transfer  Independent  Bathing  Independent  Dressing  Independent  [address]  Med Delivery   whole    Wound Care Documentation and Therapy: Elimination:  Continence:   · Bowel: Yes  · Bladder: Yes  Urinary Catheter: None   Colostomy/Ileostomy/Ileal Conduit: No       Date of Last BM: 11-3-21    Intake/Output Summary (Last 24 hours) at 11/3/2021 1348  Last data filed at 11/2/2021 1945  Gross per 24 hour   Intake 240 ml   Output    Net 240 ml     I/O last 3 completed shifts: In: 240 [P.O.:240]  Out: -     Safety Concerns:     None    Impairments/Disabilities:      None    Nutrition Therapy:  Current Nutrition Therapy:   - Oral Diet:  General    Routes of Feeding: Oral  Liquids: No Restrictions  Daily Fluid Restriction: no  Last Modified Barium Swallow with Video (Video Swallowing Test): not done    Treatments at the Time of Hospital Discharge:   Respiratory Treatments: ***  Oxygen Therapy:  is on oxygen at 2 L/min per nasal cannula. Ventilator:    - No ventilator support    Rehab Therapies: {THERAPEUTIC INTERVENTION:[id number]}  Weight Bearing Status/Restrictions: No weight bearing restirctions  Other Medical Equipment (for information only, NOT a DME order):  {EQUIPMENT:[id number]}  Other Treatments: ***    Patient's personal belongings (please select all that are sent with patient):  None    RN SIGNATURE:  Electronically signed by Coy Contreras RN on 11/3/21 at 1:50 PM EDT    CASE MANAGEMENT/SOCIAL WORK SECTION    Inpatient Status Date: ***    Readmission Risk Assessment Score:  Readmission Risk              Risk of Unplanned Readmission:  45           Discharging to Facility/ Agency   · Name:   ·     Dialysis Facility (if applicable)   · Name:  ·     / signature: {Esignature:[id number]}    PHYSICIAN SECTION    Prognosis: Good    Condition at Discharge: Stable    Rehab Potential (if transferring to Rehab): Good    Recommended Labs or Other Treatments After Discharge:   Home with home care for IV antibiotics, medication compliance and outpatient follow-up.      Physician Certification: I certify the above information and transfer of Mary Lou Shoaib  is necessary for the continuing treatment of the diagnosis listed and that she requires [address] for greater 30 days.      Update Admission H&P: No change in H&P    PHYSICIAN SIGNATURE:  Electronically signed by Gino Manzano MD on 11/3/21 at 2:45 PM EDT

## 2021-11-03 NOTE — CARE COORDINATION
Transitional Planning    Referral sent to [place] for ceftriaxone, referrals sent to [facility], [address], and interim

## 2021-11-03 NOTE — PROGRESS NOTES
[facility] -   Infectious diseases evaluation  admission date 10/31/2021    reason for consultation:   Sepsis    Impression :   Current:  · Bandemia  · Streptococcus pneumonia septicemia  · COPD exacerbation  · Left lung diffuse pneumococcal pneumonia -multifocal  · immunosuppressed   · Rhinovirus infection, positive PCR    Other:  · History DVT/PE Eliquis  · Rheumatoid arthritis methotrexate  · Pulmonary adenocarcinoma post left lobe lobectomy end July 2021  Discussion / summary of stay / plan of care   · Patient was admitted recently to the hospital with a right upper lobe pneumonia, 2 weeks she is back with the left worsening pneumonia, she is concerned about an underlying immunity deficiency  · I would like to rule out endocarditis underlying leading to repeated pneumonia  Recommendations   Treating Streptococcus pneumonia pneumonia as well as septicemia, possible complicated left pleural effusion  · Aspirate of the left pleural fluid does not seem to be infectious  · Levaquin /day  · 11/2 Add ceftriaxone pending sensitivities of the pneumococcus due to concern of resistance since she had 2 back-to-back pneumonias  · Called lab - preliminary results suggest sensitivity to ceftriaxone and levofloxacin  · Will discharge patient with midline on ceftriaxone for 25 more days. · Discontinue levofloxacin 11/3. · Follow-up with Dr. Daisy Aleman as an outpatient in 2 weeks. · Get CXR in 2 weeks prior to outpatient follow-up.   · Echo to look for vegetation as a cause of relapsed pneumonia - negative for valvular vegetations    Infection Control Recommendations   · [place] Precautions    Antimicrobial Stewardship Recommendations   · Simplification of therapy  · Targeted therapy    Coordination ofOutpatient Care:   · Estimated Length of IV antimicrobials: 25 days  · Patient will need Midline / picc Catheter Insertion: midline  · Patient will need SNF:  · Patient will need outpatient wound care:     History of Present Illness:   Initial history:  Renetta Solo is a 61y.o.-year-old female with recent admission to the hospital treated for COPD exacerbation. She had a left LL lobectomy for cancer end of July and never felt free of infection - Keeps having COPD exacerbation issues. Back with increasing shortness of breath- placed on BiPAP. Sp yellow thick - very SOB  No fever -  A. fib RVR. Blood cultures growing Streptococcus pneumonia  WBC 27  CT scan of the chest showing near left lung opacification with associated pleural thickening, seems similar to past CT  Creatinine is slightly elevated, ASHLEIGH    Ox 3    Had a RUL pneumonia on 10/15 fully resolved. Now LLL pneumonia w/ bacteremia - pt concerned why the relapse - ?? Retained a resistant strain or pneumo or is it endocarditis? Await echo    Still nausea- feels better    11/2  ,  Thoracentesis attempted showed 1 cc of blood that was sent, pH 6.5, protein very low, culture pending, WBC 62, PMNs 48  She is on 2 L of nasal cannula,  She has no fever, on Levaquin, echo ordered      Interval changes  11/3/2021   Patient Vitals for the past 8 hrs:   BP Temp Temp src Pulse Resp SpO2 Weight   11/03/21 0730 132/70 97.5 °F (36.4 °C) Oral 70 20     11/03/21 0714 125/71   60 20     11/03/21 0533       199 lb 1.2 oz (90.3 kg)   11/03/21 0310 114/68 97.6 °F (36.4 °C) Oral 60 21 97 %      WBC 15.3  Echo performed today pending final result. Currently sitting in chair not on oxygen. She denies any fever, chills, V/D/C, abdominal pain, chest pain, SOB or trouble breathing today. Patient states that she is feeling better and would like to go home. She is currently on levofloxacin + ceftriaxone pending culture sensitivities.       Summary of relevant labs:  Labs:  WBC 25.9-27.5-20.7-13.3-15.3  .3    Micro:  10/31/21 Blood culture positive for strep pneumonia 11/1  Pneumonia PCR panel: Rhino/enterovirus positive  Urine Streptococcus pneumonia negative  Urine Legionella antigen negative    11/2/21 Thoracentesis fluid culture pending    Imaging:  CT chest negative for pulmonary emboli but near opacification of the left lung with left pleural thickening    TTE negative for valvular vegetations    Reviewing the CT scan, he may be having a left pleural effusion under the consolidation          cxr 11/1  Left worse as below      CXR 10/15 RUL pneumonia           I have personally reviewed the past medical history, past surgical history, medications, social history, and family history, and I haveupdated the database accordingly. Allergies:   Patient has no known allergies. Review of Systems:     Review of Systems   Constitutional: Positive for activity change. Negative for chills and fever. HENT: Negative for congestion. Eyes: Negative for discharge. Respiratory: Negative for cough, shortness of breath and wheezing. Cardiovascular: Negative for chest pain. Gastrointestinal: Positive for nausea. Negative for abdominal distention, constipation and diarrhea. Endocrine: Negative for polyuria. Genitourinary: Negative for dysuria. Musculoskeletal: Negative for arthralgias. Skin: Negative for color change. Allergic/Immunologic: Negative for immunocompromised state. Neurological: Negative for dizziness. Hematological: Negative for adenopathy. Psychiatric/Behavioral: Negative for agitation. Physical Examination :       Physical Exam  Constitutional:       Appearance: Normal appearance. She is not ill-appearing or diaphoretic. HENT:      Head: Normocephalic and atraumatic. Nose: No congestion. Mouth/Throat:      Mouth: Mucous membranes are moist.   Eyes:      General: No scleral icterus. Conjunctiva/sclera: Conjunctivae normal.   Cardiovascular:      Rate and Rhythm: Normal rate and regular rhythm. Heart sounds: Normal heart sounds. No murmur heard. No gallop. Pulmonary:      Breath sounds: Normal breath sounds. No wheezing, rhonchi or rales. Abdominal:      Palpations: There is no mass. Hernia: No hernia is present. Genitourinary:     Comments: No salcedo  Musculoskeletal:         General: No swelling or deformity. Cervical back: Neck supple. No rigidity or tenderness. Skin:     General: Skin is dry. Coloration: Skin is not jaundiced. Neurological:      General: No focal deficit present. Mental Status: She is alert and oriented to person, place, and time. Psychiatric:         Mood and Affect: Mood normal.         Thought Content:  Thought content normal.         Past Medical History:     Past Medical History:   Diagnosis Date    Abnormal CT of the chest 04/21/2021    Adenocarcinoma, lung, left ([address] 05/14/2021    Arthritis     Community acquired pneumonia 12/23/2019    COPD (chronic obstructive pulmonary disease) ([address] 08/14/2019    Deep venous thrombosis of left profunda femoris vein ([address] 04/16/2020    Depression     DVT (deep venous thrombosis) ([address] 2014    b/l     GERD (gastroesophageal reflux disease)     History of pulmonary embolism 05/27/2021    History of thyroid nodule 12/23/2019    Hypertension     Major depressive disorder, recurrent, moderate ([address] 11/12/2018    Nodule of lower lobe of left lung 04/23/2021    Obesity, Class II, BMI 35-39.9 11/12/2018    On anticoagulant therapy 05/27/2021    On methotrexate therapy 05/27/2021    Prediabetes 11/12/2018    Rheumatoid arthritis ([address]     Snores     denies apnea    Thyroid nodule 01/09/2020    Tobacco abuse 11/12/2018    Under care of team 06/29/2021    pulmonology-Dr Blank-st servin-last visit june 2021    Under care of team 06/29/2021    oncology-Dr David Ortize-last visit june 2021    Under care of team     Dr. Annel Skinner clearance appointment 7/6/21, stress test 7/9/2021, clearance obtained and placed on chart    Wellness examination 06/29/2021 0.25     Years: 35.00     Pack years: 8.75     Types: Cigarettes     Quit date: 1986     Years since quittin.8    Smokeless tobacco: Never Used    Tobacco comment: restarted smoking 2019   Vaping Use    Vaping Use: Never used   Substance and Sexual Activity    Alcohol use: Not Currently     Alcohol/week: 0.0 standard drinks    Drug use: No    Sexual activity: Not Currently   Other Topics Concern    Not on file   Social History Narrative    Not on file     Social Determinants of Health     Financial Resource Strain: Low Risk     Difficulty of Paying Living Expenses: Not hard at all   Food Insecurity: No Food Insecurity    Worried About Running Out of Food in the Last Year: Never true    Emelyn of Food in the Last Year: Never true   Transportation Needs: No Transportation Needs    Lack of Transportation (Medical): No    Lack of Transportation (Non-Medical):  No   Physical Activity:     Days of Exercise per Week:     Minutes of Exercise per Session:    Stress:     Feeling of Stress :    Social Connections:     Frequency of Communication with Friends and Family:     Frequency of Social Gatherings with Friends and Family:     Attends Tenriism Services:     Active Member of Clubs or Organizations:     Attends Club or Organization Meetings:     Marital Status:    Intimate Partner Violence:     Fear of Current or Ex-Partner:     Emotionally Abused:     Physically Abused:     Sexually Abused:        Family History:     Family History   Problem Relation Age of Onset    Cancer Mother         Uterine and breast    Diabetes Mother     Heart Disease Mother     Cancer Father         lung    Cancer Brother         lung    Cancer Brother         lung      Medical Decision Making:   I have independently reviewed/ordered the following labs:    CBC with Differential:   Recent Labs     21  0618 21  0619   WBC 13.3* 15.3*   HGB 8.9* 8.1*   HCT 28.6* 26.6*    467*   LYMPHOPCT 17* 11*   MONOPCT 3 4     BMP:  Recent Labs     11/02/21  0618 11/03/21  0619   * 135   K 3.8 4.2    102   CO2 22 23   BUN 34* 30*   CREATININE 0.84 0.65     Hepatic Function Panel: No results for input(s): PROT, LABALBU, BILIDIR, IBILI, BILITOT, ALKPHOS, ALT, AST in the last 72 hours. No results for input(s): RPR in the last 72 hours. No results for input(s): HIV in the last 72 hours. No results for input(s): BC in the last 72 hours. Lab Results   Component Value Date    CREATININE 0.65 11/03/2021    GLUCOSE 211 11/03/2021    GLUCOSE 105 12/05/2011       Detailed results: Thank you for allowing us to participate in the care of this patient. Please call with questions. This note is created with the assistance of a speech recognition program.  While intending to generate adocument that actually reflects the content of the visit, the document can still have some errors including those of syntax and sound a like substitutions which may escape proof reading. It such instances, actual meaningcan be extrapolated by contextual diversion. Moi Su  Office: [phone]  Perfect serve / office [phone]        I have discussed the care of the patient, including pertinent history and exam findings,  with the resident. I have seen and examined the patient and the key elements of all parts of the encounter have been performed by me. I agree with the assessment, plan and orders as documented by the resident.     Maria Garcia, Infectious Diseases

## 2021-11-03 NOTE — PROGRESS NOTES
[facility]  Internal Medicine Teaching Residency Program  Inpatient Daily Progress Note  ______________________________________________________________________________    Patient: Marcelo Mcghee  YOB: 1955   XLE:[id number]    Acct: [de-identified]     Room: [address] [place] [address]  Admit date: 10/31/2021  Today's date: 11/03/21  Number of days in the hospital: 3    SUBJECTIVE   Admitting Diagnosis: Hospital-acquired pneumonia  CC: Worsening shortness of breath and epigastric discomfort    Pt examined at bedside. Chart & results reviewed. No acute events overnight. Patient remained afebrile and hemodynamically stable. S/p left thoracentesis on 11/2: likely non-infectious, high RBC  She denies fever, chills, shortness of breath, chest pain, nausea, vomiting. Echo: no vegetations seen  ID following- Continue Ceftriaxone 2g IV- Stop date 11/28/21 for strep pneumo septicemia. - patient will need a midline  - Discharge planning today. ROS:  Constitutional:  negative for chills, fevers, sweats  Respiratory:  negative for cough, dyspnea on exertion, hemoptysis, shortness of breath, wheezing  Cardiovascular:  negative for chest pain, chest pressure/discomfort, lower extremity edema, palpitations  Gastrointestinal:  negative for abdominal pain, constipation, diarrhea, nausea, vomiting  Neurological:  negative for dizziness, headache  BRIEF HISTORY     This patient is admitted as a case of COPD exacerbation likely due to pneumonia. Recently discharged from hospital 10/15/2021.     The patient is a pleasant 66 y. o. female presents with a chief complaint of shortness of breath and epigastric discomfort for the last 4 days.  Reported nausea and vomiting.  Shortness of breath is slightly worsened from her baseline with productive cough.  Whitish sputum production without blood or color change is noted.     Patient has past medical history of COPD , smoking 2 PPD for 30 years, did quit smoking 2 months ago, rheumatoid arthritis on methotrexate, [place] left lung s/p open left lower lobectomy 2021, DVT and PE on long-term Eliquis, hypertension, obesity and hyperlipidemia.     Patient denies any chest pain, fever or chills, hemoptysis, abdominal pain, contact with a sick people around, change in sputum color and urinary symptoms. Patient is not vaccinated against COVID.     On arrival to ED patient was in significant respiratory distress, tachypneic and tachycardic and increased work of breathing noted. Maggie Lehman is placed on BiPAP, given no 1 dose of Vanco and Zosyn and steroids.  AF with RVR on EKG.    our evaluation shows patient is dyspneic, maintaining saturation on BiPAP.  She is alert and oriented, responding to questions appropriately.  She is afebrile.  Chest examination shows right-sided crackles, bilateral diffuse wheezes and left-sided  decreased air entry.  Rest of system examination is unremarkable.     Labs on arrival are sodium 129, BUN 42, creatinine 1.32, pro-Alexander 3.47, proBNP 1438, Trop 24<19, WBC 27.5, Hb 9.9.  Chest x-ray shows moderate to severe cardiomegaly, pulmonary vascular congestion and large left-sided pleural effusion.  CT PE is negative for PE.  EKG showing AF with RVR. OBJECTIVE     Vital Signs:  /70   Pulse 70   Temp 97.5 °F (36.4 °C) (Oral)   Resp 20   Ht 5' 5\" (1.651 m)   Wt 199 lb 1.2 oz (90.3 kg)   SpO2 97%   BMI 33.13 kg/m²     Temp (24hrs), Av.5 °F (36.4 °C), Min:95.7 °F (35.4 °C), Max:98.2 °F (36.8 °C)    In: 240   Out: -     Physical Exam:  Constitutional: This is a well developed, well nourished, 30-34.9 - Obesity Grade I 61y.o. year old female who is alert, oriented, cooperative and in no apparent distress. Head:normocephalic and atraumatic. EENT:  PERRLA. No conjunctival injections. Septum was midline, mucosa was without erythema, exudates or cobblestoning. No thrush was noted.    Neck: Supple without thyromegaly. No elevated JVP. Trachea was midline. Respiratory: Chest was symmetrical without dullness to percussion. Breath sounds bilaterally were clear to auscultation. There were no wheezes, rhonchi or rales. There is no intercostal retraction or use of accessory muscles. No egophony noted. Cardiovascular: Regular without murmur, clicks, gallops or rubs. Abdomen: Slightly rounded and soft without organomegaly. No rebound, rigidity or guarding was appreciated. Lymphatic: No lymphadenopathy. Musculoskeletal: Normal curvature of the spine. No gross muscle weakness. Extremities:  No lower extremity edema, ulcerations, tenderness, varicosities or erythema. Muscle size, tone and strength are normal.  No involuntary movements are noted. Skin:  Warm and dry. Good color, turgor and pigmentation. No lesions or scars.   No cyanosis or clubbing  Neurological/Psychiatric: The patient's general behavior, level of consciousness, thought content and emotional status is normal.        Medications:  Scheduled Medications:    insulin lispro  0-6 Units SubCUTAneous TID WC    insulin lispro  0-3 Units SubCUTAneous Nightly    methylPREDNISolone  40 mg IntraVENous Q12H    furosemide  20 mg IntraVENous Daily    acetylcysteine  600 mg Inhalation BID    cefTRIAXone (ROCEPHIN) IV  2,000 mg IntraVENous Q24H    levofloxacin  750 mg IntraVENous Q24H    influenza virus vaccine  0.5 mL IntraMUSCular Prior to discharge    albuterol  2.5 mg Nebulization 4x daily    fluticasone  2 spray Nasal Daily    budesonide-formoterol  2 puff Inhalation BID    pantoprazole  40 mg Oral QAM AC    tiotropium  2 puff Inhalation Daily    sodium chloride flush  10 mL IntraVENous 2 times per day    apixaban  5 mg Oral BID     Continuous Infusions:    dextrose      sodium chloride       PRN Medicationsglucose, 15 g, PRN  dextrose, 12.5 g, PRN  glucagon (rDNA), 1 mg, PRN  dextrose, 100 mL/hr, PRN  albuterol, 2.5 mg, Q4H PRN  sodium chloride flush, 10 mL, PRN  sodium chloride, 25 mL, PRN  promethazine, 12.5 mg, Q6H PRN   Or  ondansetron, 4 mg, Q6H PRN  polyethylene glycol, 17 g, Daily PRN  acetaminophen, 650 mg, Q6H PRN   Or  acetaminophen, 650 mg, Q6H PRN        Diagnostic Labs:  CBC:   Recent Labs     11/01/21 0614 11/02/21 0618 11/03/21  0619   WBC 20.7* 13.3* 15.3*   RBC 2.96* 3.03* 2.86*   HGB 8.4* 8.9* 8.1*   HCT 26.8* 28.6* 26.6*   MCV 90.5 94.4 93.0   RDW 20.3* 20.6* 20.2*    443 467*     BMP:   Recent Labs     11/01/21 0614 11/02/21 0618 11/03/21  0619   * 133* 135   K 3.6* 3.8 4.2   CL 98 101 102   CO2 25 22 23   BUN 42* 34* 30*   CREATININE 1.05* 0.84 0.65     BNP: No results for input(s): BNP in the last 72 hours. PT/INR:   Recent Labs     11/01/21  1441   PROTIME 11.1   INR 1.0     APTT:   Recent Labs     10/31/21  1140   APTT 22.8     CARDIAC ENZYMES: No results for input(s): CKMB, CKMBINDEX, TROPONINI in the last 72 hours. Invalid input(s): CKTOTAL;3  FASTING LIPID PANEL:  Lab Results   Component Value Date    CHOL 192 01/30/2015    HDL 77 01/30/2015    TRIG 84 01/30/2015     LIVER PROFILE: No results for input(s): AST, ALT, ALB, BILIDIR, BILITOT, ALKPHOS in the last 72 hours. MICROBIOLOGY:   Lab Results   Component Value Date/Time    CULTURE PENDING 11/02/2021 10:18 AM       Imaging:    XR CHEST PORTABLE    Result Date: 10/31/2021  1. Interval increased volume of now suspected moderate to large left-sided pleural effusion with adjacent lung consolidation related to pneumonia. 2. Pulmonary vascular congestion. 3. Moderate to severe cardiomegaly. CT CHEST PULMONARY EMBOLISM W CONTRAST    Result Date: 10/31/2021  Negative for acute pulmonary embolism Near total consolidation of the left lung could represent an infectious process. The mainstem bronchi are not aerated but no obstructing mass is appreciated. Left pleural thickening has increased. Bronchoscopy may be helpful for further evaluation.      [address] [facility] THORACENTESIS Which side should the procedure be performed? Left    Result Date: 11/2/2021  Successful ultrasound guided thoracentesis. ASSESSMENT & PLAN     ASSESSMENT / PLAN:     Principal Problem:    Hospital-acquired pneumonia  Active Problems:    GERD (gastroesophageal reflux disease)    Rheumatoid arthritis ([facility]    Essential hypertension    History of DVT in adulthood    COPD exacerbation ([facility])    Pneumococcal septicemia ([facility])    S/P lobectomy of lung    History of adenocarcinoma of lung    Respiratory distress    Acute on chronic respiratory failure (HCC)    COPD (chronic obstructive pulmonary disease) (HCC)    ASHLEIGH (acute kidney injury) ([facility])    Diastolic heart failure (HCC)    Multifocal pneumonia  Resolved Problems:    * No resolved hospital problems. *    1. Pneumonia Community acquired [facility] acquired  - Rhino virus + on respiratory panel   - Patient received Vancomycin and Cefepime on 10/31 and 11/1. Also received 1 dose of Zosyn on 10/31.   - Leukocytosis WBC 13.3<--20.7<--27.5  - ID following: Continue Levaquin 750 mg IV daily while inpatient; Ceftriaxone 2g IV daily (Stop Date- 11/28/21); patient will need midline  - IR -  s/p left pleural effusion aspirate this morning. She tolerated the procedure well. Less than 1mL scant bloody fluid obtained. Fluid- likely non-infectious   - Continue supplemental oxygen to maintain SpO2 >88%  - Urine legionella- negative  - Monitor oxygen saturations  - Monitor vitals     2. Sepsis likely secondary to pneumonia  - Blood cultures: gram positive cocci in chains and pairs  - Patient received Vancomycin and Cefepime on 10/31 and 11/1. Also received 1 dose of Zosyn on 10/31.   -  ID following: Continue Levaquin 750 mg IV daily while inpatient; Ceftriaxone 2g IV daily (Stop Date- 11/28/21); patient will need midline     3.  COPD exacerbation likely secondary to pneumonia  - Continue IV Levaquin  - Continue supplemental oxygen  - Continue IV Solu-Medrol 40

## 2021-11-03 NOTE — PROCEDURES
Product type [employer] Power Midline   History/Labs/Allergies Reviewed  Placed By ALESIA Aldridge RN  Assisted ByDAYNE Blancas RN  Time out Performed using Two Identifiers  Lot # [id number]  Expiration date 2/28/23  Catheter size 4  [place]  Trimmed at  13 cm  Total length 13 cm  External catheter length 1 cm  Location L BV  Number of attempts 1  Estimated blood loss 1 ml  Placement verified by- positive blood return & flushes easily  Special equipment used- ultrasound & micro-introducer technique   Catheter secured with statlock  Dressing applied- Tegaderm CHG  Lidocaine administered intradermally conc.1% 1 mL  Midline education:   [ X ] Discussed with patient/Family or POA prior to procedure. Risks and Benefits along with reason for procedure were discussed and teaching was reinforced with an education handout on Midline insertion. [facility] FAQ Catheter Associated Blood Stream Infections and [address] [id number] REV. 7/13 Nursing and Booklet left at bedside or in chart. Patient (Family or POA) acknowledged understanding of information taught and agreed to procedure. [  ] Was not discussed with patient/family or POA due to pts medical status at time of procedure. pts family or POA not available to discuss Midline education.  [facility] FAQ Catheter Associated Blood Stream Infections and [address] [id number] REV. 7/13 Nursing and Booklet left at bedside or in chart

## 2021-11-03 NOTE — PLAN OF CARE
Problem: Respiratory  Intervention: Respiratory assessment  11/2/2021 2331 by Josefina Dunn RCP  Note:   PROVIDE ADEQUATE OXYGENATION WITH ACCEPTABLE SP02/ABG'S    [x]  IDENTIFY APPROPRIATE OXYGEN THERAPY  [x]   MONITOR SP02/ABG'S AS NEEDED   [x]   PATIENT EDUCATION AS NEEDED        Problem: Respiratory  Intervention: Respiratory assessment  11/2/2021 2331 by Josefina Dunn RCP  Note: BRONCHOSPASM/BRONCHOCONSTRICTION     [x]         IMPROVE AERATION/BREATH SOUNDS  [x]   ADMINISTER BRONCHODILATOR THERAPY AS APPROPRIATE  [x]   ASSESS BREATH SOUNDS  []   IMPLEMENT AEROSOL/MDI PROTOCOL  [x]   PATIENT EDUCATION AS NEEDED

## 2021-11-03 NOTE — LETTER
[address] [place] [address]   [address], [facility] [address]   Office: [phone]  Fax: [phone] [facility] [address]    11/3/21    Dear Ms. Gt,     We are contacting you because your healthcare provider referred you for the Survivorship program. We are also aware that you have missed an appointment with Dr. Dmitri Mercado that you have not rescheduled. As patients complete treatment for cancer, they are referred to our Survivorship Clinic who is ran by our nurse practitioner, Savannah De Leon. If you decided to do a visit, it would be a one time visit with Ely Lights and lasts anywhere between 45 minutes to an hour depending on questions and such. You will receive a survivorship care plan that includes detailed information about all the treatments you have received and a follow up care plan of what to expect for follow up and testing over the next 5 years. You will receive a copy of this, and it will also be sent to your primary care doctor and all the providers involved in your cancer treatment. You will also receive education on signs and symptoms to report, late and long-term side effects, healthy lifestyle promotion and what other cancer screenings you may be due for. You will also receive an assessment to see if you have any lingering side effects from treatment and ways to improve your quality of life. Ely Taylor sees patients in person on Tuesdays at [address] [place] [address] S. or Wednesdays at the [facility], and also offers virtual visits as well. If you are interested in scheduling an appointment or have additional questions, please contact me at [phone] or my assistant, Ella Jacinto, at [phone]. Sincerely,     Savannah De Leon, MSN, APRN, NP-C  [facility]   [address]  P: [phone]  F: [phone]  [email or web address]

## 2021-11-03 NOTE — PROGRESS NOTES
PATIENT REFUSES TO WEAR BIPAP     [x] Risks and benefits explained to patient   [x] Patient refuses to wear Bipap stating \" I don't need it\"  [x] Patient verbalizes understanding of information presented.

## 2021-11-04 NOTE — TELEPHONE ENCOUNTER
[facility] Transitions Initial Follow Up Call    Outreach made within 2 business days of discharge: Yes    Patient: Antolin Lombardo Patient : 1955   MRN: [id number]  Reason for Admission: There are no discharge diagnoses documented for the most recent discharge. Discharge Date: 11/3/21       Spoke with: Denzel Perla    Discharge department/facility: [facility]    TCM Interactive Patient Contact:  Was patient able to fill all prescriptions: Yes  Was patient instructed to bring all medications to the follow-up visit: Yes  Is patient taking all medications as directed in the discharge summary?  Yes  Does patient understand their discharge instructions: Yes  Does patient have questions or concerns that need addressed prior to 7-14 day follow up office visit: no    Scheduled appointment with PCP within 7-14 days    Follow Up  Future Appointments   Date Time Provider Kuldeep Sun   11/15/2021  3:45 PM ILIR Lennon - CNP fp sc [id number]   2022 12:00 PM Iveth Lee MD fp sc [address], [place]

## 2021-11-04 NOTE — TELEPHONE ENCOUNTER
Pt has thrush. She has been gargling salt water with no relief, could you send something to pharmacy.

## 2021-11-04 NOTE — CARE COORDINATION
11//04/2, 0958 hrs. Received call from [address] [facility] from [place] asking if this pt has HC. No note seen. [address] [facility] will call pt back and call NCM back if can accept. [address] [facility] states VERNELL not completed. Nick [address]76 called back and states she spoke with the pt and she only needs a Alna nurse, she has no other home health care needs.

## 2021-11-04 NOTE — TELEPHONE ENCOUNTER
1. Oral thrush    - nystatin (MYCOSTATIN) 301516 UNIT/ML suspension;  Take 5 mLs by mouth 4 times daily Swish and swallow  Dispense: 240 mL; Refill: 0

## 2021-11-08 NOTE — TELEPHONE ENCOUNTER
Name: Tasha Ball  : 1955  MRN: [id number]    Oncology Navigation Follow-Up Note  Navigator spoke with pt. And reinforcing Medical Oncology F/U . Pt. Stating , \"I was in hospital both appt. Previously missed\" Pt. Does desire to reschedule with Dr. Dmitri Mercado and writer stressing to pt. Concerned about F/U for her Lung Cancer? Plan to follow.    Electronically signed by Howard Charles RN on 2021 at 3:54 PM

## 2021-11-09 NOTE — TELEPHONE ENCOUNTER
Name: German Salazar  : 1955  MRN: [id number]    Oncology Navigation Follow-Up Note  Navigator spoke with Frantz and writer requesting MO F/U appt. For pt. Luna Luis to call pt.    Electronically signed by Cullen Cruz RN on 2021 at 2:42 PM

## 2021-11-10 NOTE — DISCHARGE SUMMARY
[address]     Department of Internal Medicine - Staff Internal Medicine Teaching Service    INPATIENT DISCHARGE SUMMARY      Patient Identification:  Judie Honeycutt is a 61 y.o. female. :  1955  MRN: [id number]     Acct: [de-identified]   PCP: Deya Hernandez MD  Admit Date:  10/31/2021  Discharge date and time: 11/3/2021  4:36 PM   Attending Provider: Dr. Carmine Castano Problem Lists:  Principal Problem:    Hospital-acquired pneumonia  Active Problems:    GERD (gastroesophageal reflux disease)    Rheumatoid arthritis ([facility] [address]    Essential hypertension    History of DVT in adulthood    COPD exacerbation (HCC)    Pneumococcal septicemia ([facility] [address]    S/P lobectomy of lung    History of adenocarcinoma of lung    Respiratory distress    Acute on chronic respiratory failure (HCC)    COPD (chronic obstructive pulmonary disease) (HCC)    ASHLEIGH (acute kidney injury) ([facility] [address]    Diastolic heart failure ([facility] [address]    Multifocal pneumonia  Resolved Problems:    * No resolved hospital problems. *      HOSPITAL STAY     Brief Inpatient course:   Judie Honeycutt is a 61 y.o. female who was admitted for the management of Hospital-acquired pneumonia, presented to the emergency department with a chief complaint of shortness of breath and epigastric discomfort for the last 4 days. Reported nausea and vomiting. Shortness of breath is slightly worsened from her baseline with productive cough. Whitish sputum production without blood or color change is noted. 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   Patient was admitted for community-acquired pneumonia and was initially treated with vancomycin and cefepime. Blood culture was positive for staph aureus sensitive to Levaquin. infectious disease was consulted and his antibiotics was changed to Levaquin 750 mg IV until 11/28/2021. She was also treated for COPD exacerbation likely secondary to pneumonia with supplemental oxygen, IV steroids and DuoNeb and Spiriva. She had ASHLEIGH that resolved. Hyponatremia was also resolved. She was discharged with IV antibiotics and received a PICC line for longer treatment. Procedures/ Significant Interventions:    PICC line on 11/3    Consults:     Consults:     Final Specialist Recommendations/Findings:   IP CONSULT TO INTERNAL MEDICINE  IP CONSULT TO CASE MANAGEMENT  IP CONSULT TO INFECTIOUS DISEASES  IP CONSULT TO HOME CARE NEEDS      Any Hospital Acquired Infections: none    Discharge Functional Status:  stable    DISCHARGE PLAN     Disposition: long term care facility    Patient Instructions:   Discharge Medication List as of 11/3/2021  4:13 PM      START taking these medications    Details   cefTRIAXone (ROCEPHIN) infusion Infuse 2,000 mg intravenously every 24 hours for 25 days Stop after 11/28/21 and pull the line after that  CBc diff creat and LFT weekly x 4 - no line draws, Disp-50 g, R-0Print      !! predniSONE (DELTASONE) 20 MG tablet Take 2 tablets by mouth daily for 3 days, Disp-6 tablet, R-0Normal      !! predniSONE (DELTASONE) 10 MG tablet Take 3 tablets by mouth daily for 3 days, Disp-9 tablet, R-0Normal      !! predniSONE (DELTASONE) 20 MG tablet Take 1 tablet by mouth daily for 3 days, Disp-3 tablet, R-0Normal      !! predniSONE (DELTASONE) 10 MG tablet Take 1 tablet by mouth daily for 10 days, Disp-10 tablet, R-0Normal       !! - Potential duplicate medications found. Please discuss with provider.       CONTINUE these medications which have CHANGED    Details   fluticasone-vilanterol (BREO ELLIPTA) 100-25 MCG/INH AEPB inhaler Inhale 1 puff into the lungs daily, Disp-2 each, R-0Normal         CONTINUE these medications which have NOT CHANGED    Details   budesonide (RINOCORT AQUA) 32 MCG/ACT nasal spray 1 spray by Each Nostril route daily, Disp-1 each, R-2Normal      cetirizine (ZYRTEC ALLERGY) 10 MG tablet Take 1 tablet by mouth daily, Disp-90 tablet, R-2Normal      ondansetron (ZOFRAN) 4 MG tablet Take 1 tablet by mouth 3 times daily as needed for Nausea or Vomiting, Disp-30 tablet, R-2Normal      SPIRIVA RESPIMAT 2.5 MCG/ACT AERS inhaler inhale 2 puffs INTO THE LUNGS once daily, Disp-4 g, R-3Normal      amLODIPine (NORVASC) 10 MG tablet take 1 tablet by mouth once daily, Disp-90 tablet, R-1Normal      fluticasone (FLONASE) 50 MCG/ACT nasal spray 2 sprays by Nasal route daily, Disp-16 g, R-0Normal      albuterol (PROVENTIL) (2.5 MG/3ML) 0.083% nebulizer solution Take 3 mLs by nebulization every 6 hours as needed for Wheezing or Shortness of Breath, Disp-125 vial, R-0Normal      furosemide (LASIX) 20 MG tablet Take 1 tablet by mouth 2 times daily, Disp-60 tablet, R-3Normal      lidocaine 4 % external patch Place 1 patch onto the skin daily, Transdermal, DAILY Starting Sat 7/24/2021, Disp-30 patch, R-0, Normal      potassium chloride (KLOR-CON M) 20 MEQ extended release tablet Take 1 tablet by mouth 2 times daily, Disp-90 tablet, R-1Normal      acetaminophen (TYLENOL) 500 MG tablet Take 1,000 mg by mouth every 6 hours as needed for PainHistorical Med      ELIQUIS 5 MG TABS tablet take 1 tablet by mouth twice a day, Disp-180 tablet, R-3Normal      albuterol sulfate  (90 Base) MCG/ACT inhaler inhale 2 puffs by mouth and INTO THE LUNGS every 4 hours for 7 days, Disp-18 g, R-2Normal      pantoprazole (PROTONIX) 40 MG tablet take 1 tablet by mouth once daily, Disp-90 tablet, R-2Normal      Blood Pressure KIT Disp-1 kit, R-0, PrintDx: HTN. Needs automatic blood pressure machine to monitor her blood pressure.       folic acid (FOLVITE) 1 MG tablet take 1 tablet by mouth once daily, Disp-30 tablet, R-0Normal methotrexate (RHEUMATREX) 2.5 MG chemo tablet Take 20 mg by mouth once a week Indications: takes 8 tablets on saturdays          STOP taking these medications       zinc 50 MG CAPS Comments:   Reason for Stopping:               Activity: activity as tolerated    Diet: cardiac diet    Follow-up:    Edvin Sanchez MD  [address] [place] [address],  [address], [address] [facility] [address] [place]    Schedule an appointment as soon as possible for a visit in 2 weeks  infec diseases    Lucero Kirby MD  [address]  [address] [facility] [address] [phone]    Schedule an appointment as soon as possible for a visit  hosp  for pneumonia    Angela Child MD  [place] [address]  [address] [place] [address]  [phone]    Schedule an appointment as soon as possible for a visit  for PET scan to look for recurrence of lung cancer      Patient Instructions: You were admitted to hospital for left lung pneumonia with septicemia from Streptococcus pneumoniae. You are discharged home with home care, with IV antibiotics for 4 weeks. Please complete ceftriaxone as ordered. Also complete prednisone taper. Get chest x-ray in 2 weeks and follow with infectious disease (Dr. Malcolm Jalloh) in the office. Also follow-up with pulmonology in the office for consideration of PET scan to look for recurrence of lung cancer. Please make appointment with your PCP within 1 week. Follow up labs: N/A  Follow up imaging: Repeat CXR in 2 weeks      Marcus Jose MD,  Internal Medicine Resident, PGY-1  [address] [place], [place]  11/10/2021, 1:00 PM

## 2021-11-18 NOTE — TELEPHONE ENCOUNTER
Name: Caren Merlos  : 1955  MRN: [id number]    Oncology Navigation Follow-Up Note  Navigator reviewing chart and pt. Needing F/U with MO. KRISTI left on  for CC to reach out to pt.    Electronically signed by Maxine Arellano RN on 2021 at 12:50 PM

## 2021-11-19 NOTE — TELEPHONE ENCOUNTER
Name: Mercedez Carrera  : 1955  MRN: [id number]    Oncology Navigation Follow-Up Note  Navigator left VM for pt. With F./U appt.    Electronically signed by Geremias Ramirez RN on 2021 at 9:57 AM

## 2021-11-22 NOTE — TELEPHONE ENCOUNTER
Daughter in law called regarding Iris's PICC line  Following msg sent to Dr. Cong Ramos via PS. Pt: Isaura Samayoa : [id number]  You saw the pt on 11/3/21. Pts daughter-in-law called and stated her mom's PICC line has been pulled out about 2 inches and is leaking. Would you like PICC line replaced or would you like to switch to oral ABX? Please review and advise. The following is your note from 11/3/21. -Raya    Sepsis   Impression :  Current:  · Bandemia  · Streptococcus pneumonia septicemia  · COPD exacerbation  · Left lung diffuse pneumococcal pneumonia -multifocal  · immunosuppressed  · Rhinovirus infection, positive PCR   Other:  · History DVT/PE Eliquis  · Rheumatoid arthritis methotrexate  · Pulmonary adenocarcinoma post left lobe lobectomy end 2021  Discussion / summary of stay / plan of care  · Patient was admitted recently to the hospital with a right upper lobe pneumonia, 2 weeks she is back with the left worsening pneumonia, she is concerned about an underlying immunity deficiency  · I would like to rule out endocarditis underlying leading to repeated pneumonia  Recommendations  Treating Streptococcus pneumonia pneumonia as well as septicemia, possible complicated left pleural effusion  · Aspirate of the left pleural fluid does not seem to be infectious  · Levaquin /day  ·  Add ceftriaxone pending sensitivities of the pneumococcus due to concern of resistance since she had 2 back-to-back pneumonias  ? Called lab - preliminary results suggest sensitivity to ceftriaxone and levofloxacin  ? Will discharge patient with midline on ceftriaxone for 25 more days. ? Discontinue levofloxacin 11/3.  ? Follow-up with Dr. Cong Ramos as an outpatient in 2 weeks. ? Get CXR in 2 weeks prior to outpatient follow-up.   · Echo to look for vegetation as a cause of relapsed pneumonia - negative for valvular vegetations     Infection Control Recommendations  · [place] Precautions     Antimicrobial Stewardship Recommendations  · Simplification of therapy  · Targeted therapy     Coordination ofOutpatient Care:  · Estimated Length of IV antimicrobials: 25 days  · Patient will need Midline / picc Catheter Insertion: midline  · Patient will need SNF:  · Patient will need outpatient wound care:

## 2021-11-22 NOTE — TELEPHONE ENCOUNTER
Per Dr. Iliana BATISTA msg   11/22/2021 9:48 AM  Pull the line completely. Ordering Levaquin x another 4 days then stop.

## 2021-11-22 NOTE — TELEPHONE ENCOUNTER
Called pt and informed her. [address] [facility] -[phone] and informed her to pull line per Dr. Benedicto Samano. She stated she will pull it this afternoon.

## 2021-11-29 PROBLEM — I31.39 PERICARDIAL EFFUSION: Status: ACTIVE | Noted: 2021-01-01

## 2021-11-29 NOTE — ED PROVIDER NOTES
[address] [place] [address] ED  EMERGENCY DEPARTMENT ENCOUNTER      Pt Name: Ubaldo Springer  MRN: [id number]  [place] 1955  Date of evaluation: 11/29/2021  Provider: Paolo Chung MD    CHIEF COMPLAINT       Chief Complaint   Patient presents with    Shortness of Breath     d/c'd 11/3 from [facility]. V's with pneumonia. had PICC line until 11/22 with IV atbx. had PO atbx 11/22-11/25. worsening SOB.  Leg Swelling     onset 2 weeks ago. on PO lasix. HISTORY OF PRESENT ILLNESS  (Location/Symptom, Timing/Onset, Context/Setting, Quality, Duration, Modifying Factors, Severity.)   Ubaldo Springer is a 61 y.o. female who presents to the emergency department difficulty breathing and swelling in her feet and ankles. She had pneumonia about a month ago and was on IV Rocephin at home through PICC line for about 3 weeks. It was stopped a few days early because her PICC line malfunction and she was put on oral antibiotic instead, 4 days of Levaquin. Her feet and ankles have become swollen and she is feeling more short of breath. No fever or hemoptysis. She is not vaccinated for Covid. Nursing Notes were reviewed. ALLERGIES     Patient has no known allergies. CURRENT MEDICATIONS       Previous Medications    ACETAMINOPHEN (TYLENOL) 500 MG TABLET    Take 1,000 mg by mouth every 6 hours as needed for Pain    ALBUTEROL (PROVENTIL) (2.5 MG/3ML) 0.083% NEBULIZER SOLUTION    Take 3 mLs by nebulization every 6 hours as needed for Wheezing or Shortness of Breath    ALBUTEROL SULFATE  (90 BASE) MCG/ACT INHALER    inhale 2 puffs by mouth and INTO THE LUNGS every 4 hours for 7 days    AMLODIPINE (NORVASC) 10 MG TABLET    take 1 tablet by mouth once daily    BLOOD PRESSURE KIT    Dx: HTN. Needs automatic blood pressure machine to monitor her blood pressure.     BUDESONIDE (RINOCORT AQUA) 32 MCG/ACT NASAL SPRAY    1 spray by Each Nostril route daily    CETIRIZINE (ZYRTEC ALLERGY) 10 MG TABLET    Take 1 tablet by mouth daily    ELIQUIS 5 MG TABS TABLET    take 1 tablet by mouth twice a day    FLUTICASONE (FLONASE) 50 MCG/ACT NASAL SPRAY    instill 2 sprays into each nostril once daily    FLUTICASONE-VILANTEROL (BREO ELLIPTA) 100-25 MCG/INH AEPB INHALER    Inhale 1 puff into the lungs daily    FOLIC ACID (FOLVITE) 1 MG TABLET    take 1 tablet by mouth once daily    FUROSEMIDE (LASIX) 20 MG TABLET    Take 1 tablet by mouth 2 times daily    LIDOCAINE 4 % EXTERNAL PATCH    Place 1 patch onto the skin daily    METHOTREXATE (RHEUMATREX) 2.5 MG CHEMO TABLET    Take 20 mg by mouth once a week Indications: takes 8 tablets on saturdays     NYSTATIN (MYCOSTATIN) 855503 UNIT/ML SUSPENSION    Take 5 mLs by mouth 4 times daily Swish and swallow    ONDANSETRON (ZOFRAN) 4 MG TABLET    Take 1 tablet by mouth 3 times daily as needed for Nausea or Vomiting    PANTOPRAZOLE (PROTONIX) 40 MG TABLET    take 1 tablet by mouth once daily    POTASSIUM CHLORIDE (KLOR-CON M) 20 MEQ EXTENDED RELEASE TABLET    Take 1 tablet by mouth 2 times daily    SPIRIVA RESPIMAT 2.5 MCG/ACT AERS INHALER    inhale 2 puffs INTO THE LUNGS once daily    ZINC SULFATE (ZINCATE) 220 (50 ZN) MG CAPSULE    Take 1 capsule by mouth daily       PAST MEDICAL HISTORY         Diagnosis Date    Abnormal CT of the chest 04/21/2021    Adenocarcinoma, lung, left ([facility] 05/14/2021    Arthritis     Community acquired pneumonia 12/23/2019    COPD [facility]ch[facility] 08/14/2019    Deep venous thrombosis of left profunda femoris vein (HCC) 04/16/2020    Depression     DVT (deep venous thrombosis) ([facility] 2014    b/l     GERD (gastroesophageal reflux disease)     History of pulmonary embolism 05/27/2021    History of thyroid nodule 12/23/2019    Hypertension     Major depressive disorder, recurrent, moderate ([address] 11/12/2018    Nodule of lower lobe of left lung 04/23/2021    Obesity, Class II, BMI 35-39.9 11/12/2018    On anticoagulant therapy 2021    On methotrexate therapy 2021    Prediabetes 2018    Rheumatoid arthritis ([facility]     Snores     denies apnea    Thyroid nodule 2020    Tobacco abuse 2018    Under care of team 2021    pulmonology-Dr Blank- gareth-last visit 2021    Under care of team 2021    oncology-Dr Kelly- Kristen-last visit 2021    Under care of team     Dr. Marino Alexander clearance appointment 21, stress test 2021, clearance obtained and placed on chart    Wellness examination 2021    pcp-Dr Aditi Reeves office-last visit may 2021       SURGICAL HISTORY           Procedure Laterality Date   [facility]    BRONCHOSCOPY ALVEOLAR LAVAGE performed by Brody Laureano MD at [facility] N/A 2020    COLONOSCOPY POLYPECTOMIES HOT SNARE, COLD BIOPSY POLYPECTOMIES performed by Nena Cross MD at [facility]  2021    CT NEEDLE BIOPSY LUNG PERCUTANEOUS 2021 [facility] CT SCAN    ENDOSCOPY, COLON, DIAGNOSTIC  [id number]    gastritis, sm. bowel lipoma, biopsies    INSERT MIDLINE CATHETER  11/3/2021         LOBECTOMY Left 2021    XI ROBOTIC LEFT LOWER LOBECTOMY CONVERTED TO OPEN THORACTOMY LEFT LOWER LOBECTOMY (Left )    LUNG REMOVAL, TOTAL Left 2021    XI ROBOTIC LEFT LOWER LOBECTOMY CONVERTED TO OPEN THORACTOMY LEFT LOWER LOBECTOMY performed by Stephanie Ferris MD at [facility] HISTORY           Problem Relation Age of Onset    Cancer Mother         Uterine and breast    Diabetes Mother     Heart Disease Mother     Cancer Father         lung    Cancer Brother         lung    Cancer Brother         lung     Family Status   Relation Name Status    Mother      Father      Brother      Brother          SOCIAL HISTORY      reports that she quit smoking about 35 years ago.  Her smoking use included cigarettes. She has a 8.75 pack-year smoking history. She has never used smokeless tobacco. She reports previous alcohol use. She reports that she does not use drugs. REVIEW OF SYSTEMS    (2-9 systems for level 4, 10 or more for level 5)     Review of Systems   Constitutional: Negative for chills, fatigue and fever. HENT: Negative for congestion, ear discharge and facial swelling. Eyes: Negative for discharge and redness. Respiratory: Positive for cough and shortness of breath. Cardiovascular: Positive for leg swelling. Negative for chest pain. Gastrointestinal: Negative for abdominal pain, constipation, diarrhea and vomiting. Genitourinary: Negative for dysuria and hematuria. Musculoskeletal: Negative for arthralgias. Skin: Negative for color change and rash. Neurological: Negative for syncope, numbness and headaches. Hematological: Negative for adenopathy. Psychiatric/Behavioral: Negative for confusion. The patient is not nervous/anxious. Except as noted above the remainder of the review of systems was reviewed and negative. PHYSICAL EXAM    (up to 7 for level 4, 8 or more for level 5)     Vitals:    11/29/21 1258 11/29/21 1259 11/29/21 1430 11/29/21 1630   BP:   (!) 144/83 (!) 155/63   Pulse:   100 104   Resp: (!) 32  30 (!) 32   Temp: 99.1 °F (37.3 °C)      SpO2:  100% 99% 94%   Weight:       Height:           Physical Exam  Vitals reviewed. Constitutional:       General: She is not in acute distress. Appearance: She is well-developed. She is not diaphoretic. HENT:      Head: Normocephalic and atraumatic. Eyes:      General: No scleral icterus. Right eye: No discharge. Left eye: No discharge. Cardiovascular:      Rate and Rhythm: Normal rate and regular rhythm. Pulmonary:      Effort: Pulmonary effort is normal. No respiratory distress. Breath sounds: No stridor. Rhonchi present. No rales. Abdominal:      General: There is no distension. Palpations: Abdomen is soft. Tenderness: There is no abdominal tenderness. Musculoskeletal:         General: Normal range of motion. Cervical back: Neck supple. Right lower leg: Edema present. Left lower leg: Edema present. Comments: Both feet and ankles and lower legs are symmetrically edematous   Lymphadenopathy:      Cervical: No cervical adenopathy. Skin:     General: Skin is warm and dry. Findings: No erythema or rash. Neurological:      Mental Status: She is alert and oriented to person, place, and time. Psychiatric:         Behavior: Behavior normal.             DIAGNOSTIC RESULTS     EKG: All EKG's are interpreted by the Emergency Department Physician who either signs or Co-signs this chart in the absence of a cardiologist.    EKG my interpretation shows no acute findings    RADIOLOGY:   Non-plain film images such as CT, Ultrasound and MRI are read by the radiologist. Plain radiographic images are visualized and preliminarily interpreted by the emergency physician with the below findings:    Interpretation per the Radiologist below, if available at the time of this note:    XR CHEST PORTABLE    Result Date: 11/29/2021  EXAMINATION: [address] 11/29/2021 2:58 pm COMPARISON: 10/31/2021 HISTORY: ORDERING SYSTEM PROVIDED HISTORY: Shortness of breath TECHNOLOGIST PROVIDED HISTORY: Shortness of breath Reason for Exam: sob Acuity: Unknown Type of Exam: Unknown Relevant Medical/Surgical History: sob and swelling to both feet x 2 week, hx of copd FINDINGS: The cardiac silhouette is largely obscured. There is blunting of both costophrenic sulci. Improved aeration of the left lung when compared to the prior study. New airspace opacities in the right mid and lower lung when compared to the prior study. No measurable pneumothorax. Compared to prior chest radiograph from 10/31/2021, there are increased airspace opacities in the right mid and lower lung. Improved aeration of the left lung. Differential considerations include infection and atelectasis. Probable bilateral pleural effusions. CT CHEST PULMONARY EMBOLISM W CONTRAST    Result Date: 11/29/2021  EXAMINATION: CTA OF THE CHEST 11/29/2021 4:13 pm TECHNIQUE: CTA of the chest was performed after the administration of intravenous contrast.  Multiplanar reformatted images are provided for review. MIP images are provided for review. Dose modulation, iterative reconstruction, and/or weight based adjustment of the mA/kV was utilized to reduce the radiation dose to as low as reasonably achievable. COMPARISON: Chest x-ray dated 11/29/2021;  CT PE dated 10/31/2021 HISTORY: ORDERING SYSTEM PROVIDED HISTORY: Rule out PE TECHNOLOGIST PROVIDED HISTORY: Rule out PE Decision Support Exception - unselect if not a suspected or confirmed emergency medical condition->Emergency Medical Condition (MA) Reason for Exam: SOB x 5 days. R/O PE Acuity: Acute Type of Exam: Initial History of lung cancer with left lower lobectomy in July 2021. FINDINGS: Pulmonary Arteries: Pulmonary arteries are adequately opacified for evaluation. Subsegmental pulmonary arteries are mildly limited by respiratory motion. No evidence of intraluminal filling defect to suggest pulmonary embolism. Main pulmonary artery is normal in caliber. Mediastinum: Heart size is within normal limits. There is a large pericardial effusion, which is new from the previous study, measuring up to 2.8 cm in thickness. Thoracic aorta is normal in caliber. Multiple small mediastinal lymph nodes and mildly enlarged mediastinal lymph nodes are slightly increased in size from 10/31/2021. Prevascular lymph node measures up to 1.5 cm in short axis dimension. Lungs/pleura: There are small bilateral pleural effusions, more so on the right. There is volume loss in the left hemithorax, compatible with previous left lower lobectomy. There is mild centrilobular emphysema. Significant respiratory motion is limiting assessment. There is increased consolidation in the right middle lobe and new mild patchy opacities at the right base. When compared to 10/31/2021, there is improved aeration in the left upper lobe. Mild scattered patchy airspace opacities persist in the left upper lobe. The left lower lobe is surgically absent. No evidence of pneumothorax. Upper Abdomen: Mild thickening of the left adrenal gland is unchanged. No acute findings in the visualized upper abdomen. Soft Tissues/Bones: No acute bone or soft tissue abnormality. 1.  No central or segmental pulmonary embolus. Subsegmental pulmonary arteries are limited by respiratory motion. 2.  Large pericardial effusion, new from 10/31/2021. 3.  Small bilateral pleural effusions, new/increased from 10/31/2021. 4.  Increased consolidation in the right middle lobe and new mild patchy opacities at the right base, concerning for pneumonia. 5.  Improved aeration in the left upper lobe with mild residual scattered patchy opacities. Status post left lower lobectomy. 6.  Mildly increased mediastinal lymphadenopathy, possibly reactive.          LABS:  Labs Reviewed   BASIC METABOLIC PANEL - Abnormal; Notable for the following components:       Result Value    Anion Gap 4 (*)     All other components within normal limits   TROP/MYOGLOBIN - Abnormal; Notable for the following components:    Troponin, High Sensitivity 18 (*)     Myoglobin <21 (*)     All other components within normal limits   TROP/MYOGLOBIN - Abnormal; Notable for the following components:    Troponin, High Sensitivity 17 (*)     Myoglobin <21 (*)     All other components within normal limits   CBC WITH AUTO DIFFERENTIAL - Abnormal; Notable for the following components:    RBC 3.15 (*)     Hemoglobin 8.6 (*)     Hematocrit 29.8 (*)     RDW 19.8 (*)     NRBC Automated 0.5 (*)     Lymphocytes 22 (*)     Immature Granulocytes 6 (*)     Absolute Immature Granulocyte 0.66 (*)     All other components within normal limits   COVID-19, RAPID   CULTURE, BLOOD 1   CULTURE, BLOOD 1   BRAIN NATRIURETIC PEPTIDE   SPECIMEN REJECTION   LACTIC ACID   LACTIC ACID       All other labs were within normal range or not returned as of this dictation. EMERGENCY DEPARTMENT COURSE and DIFFERENTIAL DIAGNOSIS/MDM:   Vitals:    Vitals:    11/29/21 1258 11/29/21 1259 11/29/21 1430 11/29/21 1630   BP:   (!) 144/83 (!) 155/63   Pulse:   100 104   Resp: (!) 32  30 (!) 32   Temp: 99.1 °F (37.3 °C)      SpO2:  100% 99% 94%   Weight:       Height:           Orders Placed This Encounter   Medications    albuterol (PROVENTIL) nebulizer solution 2.5 mg     Order Specific Question:   Initiate RT Bronchodilator Protocol     Answer:   No    0.9 % sodium chloride bolus    sodium chloride flush 0.9 % injection 10 mL    iopamidol (ISOVUE-370) 76 % injection 75 mL    acetaminophen (TYLENOL) tablet 650 mg    cefTRIAXone (ROCEPHIN) 1000 mg IVPB in 50 mL D5W minibag     Order Specific Question:   Antimicrobial Indications     Answer:   Pneumonia (CAP)    azithromycin (ZITHROMAX) 500 mg in D5W 250ml addavial     Order Specific Question:   Antimicrobial Indications     Answer:   Pneumonia (CAP)       Medical Decision Making: She is found to have increased pneumonia. Blood cultures were obtained and she was given IV antibiotics. Also noted on the CAT scan is a large pericardial effusion and these findings were discussed thoroughly with the patient and her daughter and the patient is being admitted. CRITICAL CARE TIME     Due to the high probability of sudden and clinically significant deterioration in the patient's condition she required highest level of my preparedness to intervene urgently.  I provided critical care time including documentation time, medication orders and management, reevaluation, vital sign assessment, ordering and reviewing of of lab tests ordering and reviewing of x-ray studies, and admission orders. Aggregate critical care time is 35 minutes including only time during which I was engaged in work directly related to her care and did not include time spent treating other patients simultaneously. CONSULTS:  IP CONSULT TO HOSPITALIST    PROCEDURES:  None    FINAL IMPRESSION      1. Pneumonia of both lungs due to infectious organism, unspecified part of lung    2. Pericardial effusion          DISPOSITION/PLAN   DISPOSITION Decision To Admit 11/29/2021 05:04:27 PM      PATIENT REFERRED TO:   No follow-up provider specified. DISCHARGE MEDICATIONS:     New Prescriptions    No medications on file       The care is provided during an unprecedented national emergency due to the novel coronavirus, COVID-19.     (Please note that portions of this note were completed with a voice recognition program.  Efforts were made to edit the dictations but occasionally words are mis-transcribed.)    Paolo Chung MD  Attending Emergency Physician           Paolo Chung MD  11/29/21 [id number]

## 2021-11-29 NOTE — ED NOTES
[facility] called @ [phone] for STAT ECHO - no answer, page sent.      Bright Kaiser  11/29/21 1737       He Barreto  11/29/21 1735

## 2021-11-30 NOTE — ED NOTES
RN provided Pt with a cup of coffee per her request. Removed Pt breakfast tray. Pt denies any further needs. She is currently on the phone with her daughter. This RN updated daughter on physicians consulting with patient.       Mike [id number], RN  11/30/21 9129

## 2021-11-30 NOTE — PROGRESS NOTES
[facility]  Office: [address], DO, Yeyo Vega, DO, Connie Flood, DO, Benjamin Bran [facility], DO, Nina Orellana MD, So Young MD, Kennis Osler, MD, Sasha Lopez MD, Neftali De La Cruz MD, Belinda Flores MD, Meghan Quinn MD, Jarocho Lima, DO, Vaibhav Devine, DO, Do Gongora MD,  Boston Lopez DO, Xuan Ruth MD, Milad Valiente MD, Daryl Ashton MD, Melissa Mcneill MD, Katrina Smith MD, Ramiro Gaston MD, Addie Harley MD, Sana Young [facility], [facility], CNP, Jasmyne Hodge, CNP, Lenny Baez, CNS, Irvin Lane, CNP, Anamaria Juarez, CNP, Valerie Joy, CNP, Zak Gardiner, CNP, Jm Gamble, CNP, Mitra Brothers PA-C, Sesar العراقي, [facility], Wendy Escamilla, CNP, Puja Hernandez, CNP, Lidia Thomason, CNP, Luz Rodriguez, CNP, Cris Cortez, CNP, Yasmany Larsen, [facility], Barb Wilkerson, [facility]    Progress Note    11/30/2021    5:13 PM    Name:   Brie Ochoa  MRN:     [id number]     Acct:      [de-identified]   Room:   [address] Day:  0  Admit Date:  11/29/2021  1:42 PM    PCP:   Tatyana Cedeño MD  Code Status:  Prior    Subjective:     C/C:   Chief Complaint   Patient presents with    Shortness of Breath     d/c'd 11/3 from [facility]. V's with pneumonia. had PICC line until 11/22 with IV atbx. had PO atbx 11/22-11/25. worsening SOB.  Leg Swelling     onset 2 weeks ago. on PO lasix. Interval History Status: not changed. Pt seen and examined. Still on 6 L . Feels fatigued. Complaining of cough that's productive and overall not feeling well. Denies fevers. Brief History:     Brie Ochoa is a 61 y.o.  female who presented to [place] [address] ED on 11/29/2021 for evaluation of worsening shortness of breath.  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 She was recently discharged on 11/3 after a 4 day hospitalization for staph aureus bacteremia. She was discharged on a prolonged course of Levaquin for which she just completed yesterday. Of note, her PICC line was recently dislodged and she did complete the final several doses of her Levaquin orally. She began feeling worsening dyspnea over the last 2-3 days. She states that she feels \"terrible. \"  Laboratory studies in the emergency department were essentially unremarkable. Patient underwent CT imaging of the chest which revealed no pulmonary embolism, but she does have a new large pericardial effusion with bilateral pleural effusions which are new. She also has increased consolidation in the right middle lobe and right base. She underwent stat 2D echo to evaluate the pericardial effusion. This is noted to be a moderate pericardial effusion with no evidence of tamponade physiology. Cardiology was contacted regarding this. She remains on IV fluids overnight. She has been started on hospital-acquired pneumonia treatment. Infectious disease, pulmonology, and cardiology have been consulted. She has been admitted for further work-up and treatment.       Review of Systems:     Constitutional:  negative for chills, fevers, sweats admits to feeling unwell. Respiratory:  Admits to cough, dyspnea on exertion, shortness of breath, denies wheezing  Cardiovascular:  negative for chest pain, chest pressure/discomfort, admits to lower extremity edema, palpitations  Gastrointestinal:  negative for abdominal pain, constipation, diarrhea, nausea, vomiting  Neurological:  negative for dizziness, headache    Medications:      Allergies:  No Known Allergies    Current Meds:   Scheduled Meds:    ipratropium-albuterol  1 ampule Inhalation Q4H WA    vancomycin  1,250 mg IntraVENous Q12H    vancomycin (VANCOCIN) HOURS 11/29/2021 04:05 PM       Radiology:  XR CHEST PORTABLE    Result Date: 11/30/2021  Compared to prior chest radiograph from 10/31/2021, there are increased airspace opacities in the right mid and lower lung. Improved aeration of the left lung. Differential considerations include infection and atelectasis. Probable bilateral pleural effusions. CT CHEST PULMONARY EMBOLISM W CONTRAST    Result Date: 11/30/2021  1. No central or segmental pulmonary embolus. Subsegmental pulmonary arteries are limited by respiratory motion. 2.  Large pericardial effusion, new from 10/31/2021. 3.  Small bilateral pleural effusions, new/increased from 10/31/2021. 4.  Increased consolidation in the right middle lobe and new mild patchy opacities at the right base, concerning for pneumonia. 5.  Improved aeration in the left upper lobe with mild residual scattered patchy opacities. Status post left lower lobectomy. 6.  Mildly increased mediastinal lymphadenopathy, possibly reactive.        Physical Examination:        General appearance:  alert, cooperative and no distress  Mental Status:  oriented to person, place and time and normal affect  Lungs:  clear to auscultation bilaterally, normal effort  Heart:  regular rate and rhythm, no murmur  Abdomen:  soft, nontender, nondistended, normal bowel sounds, no masses, hepatomegaly, splenomegaly  Extremities:  no edema, redness, tenderness in the calves  Skin:  no gross lesions, rashes, induration    Assessment:        Hospital Problems           Last Modified POA    * (Principal) Pericardial effusion 11/29/2021 Yes    Essential hypertension 11/29/2021 Yes    History of DVT in adulthood 11/29/2021 Yes    Overview Signed 6/12/2019  1:18 PM by Palmira Soriano MD     Treated , recent scan normal          Deep venous thrombosis of left profunda femoris vein ([address] 11/29/2021 Yes    Adenocarcinoma, lung, left ([address] 11/29/2021 Yes    On methotrexate therapy 11/29/2021 Yes    S/P lobectomy of lung 11/29/2021 Yes    Acute on chronic respiratory failure ([facility] 11/29/2021 Yes    Hospital-acquired pneumonia 11/29/2021 Yes    COPD (chronic obstructive pulmonary disease) ([facility] 60/33/0471 Yes    Diastolic heart failure ([facility] 11/29/2021 Yes          Plan:        Acute hypoxic respiratory failure: Multifactorial 2/2 COPD exacerbation+viral pneumonia. Pt also has Lung cancer s/p lobectomy. She is requiring 6L via nasal cannula. Blood cultures negative. Legionella negative. Check strep urinary antigen. Continue Doxycycline, tx as below. Moderate pericardial effusion: ECHO  showed no diastolic collapse or evidence of tamponade. Discussed with Cardiology- they would like CTS consult to see if patient would need pericardial window given history of malignancy. If not, pt will need repeat Echo in 's office next week. Bilateral lower ext edema: Echo shows RV dilatation. Albumin 2.4. could be secondary to low protein state from cirrhosis. Could also be 2/2 Cor pulmonale from COPD/chronic lung disease. Check bili/INR  Viral Pneumonia 2/2 Parainfluenza 3 infection: Continue supportive care. Wean oxygen as able  Acute COPD exacerbation: Continue Steroids, nebulizer treatment, Doxycycline, pulmonary hygienes  Bilateral lower extremity edema: Continue IV lasix per cardiology. Stop Norvasc.   Labs, imaging, ecg reviewed  PT/OT      Alexandra Navarro DO  11/30/2021  5:13 PM

## 2021-11-30 NOTE — H&P
[facility]  Office: [address], DO, Giulia Sullivanmimi, DO, Cande Pierce, DO, Mira Broussardquan Blood, DO, Kendy Klein MD, Rich Martin MD, Jarad Leslie MD, Omaira Hernandez MD, Yonas Kraus MD, Lance Evans MD, Citlali Avendaño MD, Ivon Aguilar DO, Leodan Hart DO, Vania Rodriguez MD,  Nettie Mireles DO, Korey Castellano MD, Doe Lopez MD, Kristy Márquez MD, Susan Kuo MD, Rico Nicolas MD, Anabela Huizar MD, Sarah Cotto MD, Chapis Zambrano, [facility], [facility], CNP, Celina Tse, CNP, Jessica Kirk, CNS, Beti Lopez, CNP, Villa Sheriff, CNP, Jimena Jcakman, CNP, Allison Raza, CNP, Aris George, CNP, Denzel Flannery PA-C, Cleone Denver, TAMY, Esdras Catalan, CNP, Pricilla Angel, CNP, Ciro Spencer, CNP, Trey Aleman, CNP, Steven Lucia, CNP, Jesse Rosado, CNP, Italo Honeycutt, CNP         [address]    HISTORY AND PHYSICAL EXAMINATION            Date:   11/29/2021  Patient name:  Mercedez Carrera  Date of admission:  11/29/2021  1:42 PM  MRN:   [id number]  Account:  [de-identified]  YOB: 1955  PCP:    Lyric Rosado MD  Room:   [place] [address]  Code Status:    Prior    Chief Complaint:     Chief Complaint   Patient presents with    Shortness of Breath     d/c'd 11/3 from [facility]. V's with pneumonia. had PICC line until 11/22 with IV atbx. had PO atbx 11/22-11/25. worsening SOB.  Leg Swelling     onset 2 weeks ago. on PO lasix. History Obtained From:     patient, electronic medical record    History of Present Illness:     Mercedez Carrera is a 61 y.o.  female who [facility] to [facility] on 11/29/2021 for evaluation of worsening shortness of breath.  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 She was recently discharged on 11/3 after a 4 day hospitalization for staph aureus bacteremia. She was discharged on a prolonged course of Levaquin for which she just completed yesterday. Of note, her PICC line was recently dislodged and she did complete the final several doses of her Levaquin orally. She began feeling worsening dyspnea over the last 2-3 days. She states that she feels \"terrible. \"  Laboratory studies in the emergency department were essentially unremarkable. Patient underwent CT imaging of the chest which revealed no pulmonary embolism, but she does have a new large pericardial effusion with bilateral pleural effusions which are new. She also has increased consolidation in the right middle lobe and right base. She underwent stat 2D echo to evaluate the pericardial effusion. This is noted to be a moderate pericardial effusion with no evidence of tamponade physiology. Cardiology was contacted regarding this. She remains on IV fluids overnight. She has been started on hospital-acquired pneumonia treatment. Infectious disease, pulmonology, and cardiology have been consulted. She has been admitted for further work-up and treatment.       Past Medical History:     Past Medical History:   Diagnosis Date    Abnormal CT of the chest 04/21/2021    Adenocarcinoma, lung, left ([address] 05/14/2021    Arthritis     Community acquired pneumonia 12/23/2019    COPD (chronic obstructive pulmonary disease) ([address] 08/14/2019    Deep venous thrombosis of left profunda femoris vein ([address] 04/16/2020    Depression     DVT (deep venous thrombosis) ([address] 2014    b/l     GERD (gastroesophageal reflux disease)     History of pulmonary embolism 05/27/2021    History of thyroid nodule 12/23/2019    Hypertension     Major depressive disorder, recurrent, moderate ([address] 11/12/2018    Nodule of lower lobe of left lung 04/23/2021    Obesity, Class II, BMI 35-39.9 11/12/2018    On anticoagulant therapy 05/27/2021    On home oxygen therapy     02 2L NC PRN    On methotrexate therapy 05/27/2021    Prediabetes 11/12/2018    Rheumatoid arthritis ([address]     Snores     denies apnea    Thyroid nodule 01/09/2020    Tobacco abuse 11/12/2018    Under care of team 06/29/2021    pulmonology-Dr [facility]-last visit june 2021    Under care of team 06/29/2021    oncology-Dr [facility]-last visit june 2021    Under care of team     Dr. Dickson Escort clearance appointment 7/6/21, stress test 7/9/2021, clearance obtained and placed on chart    Wellness examination 06/29/2021    pcp-Dr Rachana Meadows office-last visit may 2021        Past Surgical History:     Past Surgical History:   Procedure Laterality Date   [facility] N/A 12/27/2019    BRONCHOSCOPY ALVEOLAR LAVAGE performed by Sahra Márquez MD at [address] [place] [address] N/A 2/12/2020    COLONOSCOPY POLYPECTOMIES HOT SNARE, COLD BIOPSY POLYPECTOMIES performed by Hattie Kraft MD at [facility]  5/13/2021    CT NEEDLE BIOPSY LUNG PERCUTANEOUS 5/13/2021 [facility] CT SCAN    ENDOSCOPY, COLON, DIAGNOSTIC  [id number]    gastritis, sm. bowel lipoma, biopsies    INSERT MIDLINE CATHETER  11/3/2021         LOBECTOMY Left 07/19/2021    XI ROBOTIC LEFT LOWER LOBECTOMY CONVERTED TO OPEN THORACTOMY LEFT LOWER LOBECTOMY (Left )    LUNG REMOVAL, TOTAL Left 7/19/2021    XI ROBOTIC LEFT LOWER LOBECTOMY CONVERTED TO OPEN THORACTOMY LEFT LOWER LOBECTOMY performed by Wing Marge MD at [place] [address]        Medications Prior to Admission:     Prior to Admission medications    Medication Sig Start Date End Date Taking?  Authorizing Provider   zinc sulfate (ZINCATE) 220 (50 Zn) MG capsule Take 1 capsule by mouth daily 10/28/21  Yes Historical Provider, MD   fluticasone (FLONASE) 50 MCG/ACT nasal spray instill 2 sprays into each nostril once daily 11/4/21  Yes Mitra Delvalle Brenda Leggett MD   cetirizine (ZYRTEC ALLERGY) 10 MG tablet Take 1 tablet by mouth daily 10/28/21  Yes ILIR Lennon CNP   ondansetron (ZOFRAN) 4 MG tablet Take 1 tablet by mouth 3 times daily as needed for Nausea or Vomiting 10/28/21  Yes ILIR Lennon CNP   SPIRIVA RESPIMAT 2.5 MCG/ACT AERS inhaler inhale 2 puffs INTO THE LUNGS once daily 10/25/21  Yes Hellen Stern MD   amLODIPine (NORVASC) 10 MG tablet take 1 tablet by mouth once daily 10/13/21  Yes Hellen Stern MD   albuterol (PROVENTIL) (2.5 MG/3ML) 0.083% nebulizer solution Take 3 mLs by nebulization every 6 hours as needed for Wheezing or Shortness of Breath 8/23/21  Yes Hellen Stern MD   furosemide (LASIX) 20 MG tablet Take 1 tablet by mouth 2 times daily 7/23/21  Yes ILIR Livingston NP   potassium chloride (KLOR-CON M) 20 MEQ extended release tablet Take 1 tablet by mouth 2 times daily 7/23/21  Yes ILIR Livingston NP   acetaminophen (TYLENOL) 500 MG tablet Take 1,000 mg by mouth every 6 hours as needed for Pain   Yes Historical Provider, MD   ELIQUWILFRID 5 MG TABS tablet take 1 tablet by mouth twice a day 7/16/21  Yes Hellen Stern MD   albuterol sulfate  (90 Base) MCG/ACT inhaler inhale 2 puffs by mouth and INTO THE LUNGS every 4 hours for 7 days 6/1/21  Yes Hellen Stern MD   pantoprazole (PROTONIX) 40 MG tablet take 1 tablet by mouth once daily 5/18/21  Yes Hellen Stern MD   folic acid (FOLVITE) 1 MG tablet take 1 tablet by mouth once daily 4/12/21  Yes Hellen Stern MD   methotrexate (RHEUMATREX) 2.5 MG chemo tablet Take 20 mg by mouth once a week Indications: takes 8 tablets on saturdays Pt takes medication on tuesdays   Yes Historical Provider, MD   nystatin (MYCOSTATIN) 371391 UNIT/ML suspension Take 5 mLs by mouth 4 times daily Swish and swallow  Patient not taking: Reported on 11/29/2021 11/4/21   Hellen Stern MD   fluticasone-vilanterol (BREO ELLIPTA) 100-25 MCG/INH AEPB inhaler Inhale 1 puff into the lungs daily  Patient not taking: Reported on 11/29/2021 11/3/21   Geronimo Lombardo MD   lidocaine 4 % external patch Place 1 patch onto the skin daily  Patient not taking: Reported on 11/29/2021 7/24/21   ILIR Russell NP        Allergies:     Patient has no known allergies. Social History:     Tobacco:    reports that she quit smoking about 35 years ago. Her smoking use included cigarettes. She has a 8.75 pack-year smoking history. She has never used smokeless tobacco.  Alcohol:      reports previous alcohol use. Drug Use:  reports no history of drug use. Family History:     Family History   Problem Relation Age of Onset    Cancer Mother         Uterine and breast    Diabetes Mother     Heart Disease Mother     Cancer Father         lung    Cancer Brother         lung    Cancer Brother         lung       Review of Systems:     Positive and Negative as described in HPI.     CONSTITUTIONAL: Reports feeling unwell; negative for fevers, chills, sweats, fatigue, weight loss  HEENT: Reports sinus congestion; negative for vision, hearing changes, runny nose, throat pain  RESPIRATORY: Reports significant shortness of breath at rest; negative for wheezing  CARDIOVASCULAR:  negative for chest pain, palpitations  GASTROINTESTINAL: Reports constantly feeling nauseated; negative for vomiting, diarrhea, constipation, change in bowel habits, abdominal pain   GENITOURINARY:  negative for difficulty of urination, burning with urination, frequency   INTEGUMENT:  negative for rash, skin lesions, easy bruising   HEMATOLOGIC/LYMPHATIC:  negative for swelling/edema   ALLERGIC/IMMUNOLOGIC:  negative for urticaria , itching  ENDOCRINE:  negative increase in drinking, increase in urination, hot or cold intolerance  MUSCULOSKELETAL:  negative joint pains, muscle aches, swelling of joints  NEUROLOGICAL:  negative for headaches, dizziness, lightheadedness, numbness, pain, tingling extremities  BEHAVIOR/PSYCH: negative for depression, anxiety    Physical Exam:   /65   Pulse 95   Temp 99.1 °F (37.3 °C)   Resp (!) 36   Ht 5' 5\" (1.651 m)   Wt 190 lb (86.2 kg)   SpO2 94%   BMI 31.62 kg/m²   Temp (24hrs), Av.1 °F (37.3 °C), Min:99.1 °F (37.3 °C), Max:99.1 °F (37.3 °C)    No results for input(s): POCGLU in the last 72 hours. Intake/Output Summary (Last 24 hours) at 2021 2334  Last data filed at 2021 1841  Gross per 24 hour   Intake 50 ml   Output --   Net 50 ml       General Appearance:  alert, unwell appearing, and in mild acute distress  Mental status: oriented to person, place, and time  Head:  normocephalic, atraumatic  Eye: no icterus, redness, pupils equal and reactive, extraocular eye movements intact, conjunctiva clear  Ear: normal external ear, no discharge, hearing intact  Nose:  no drainage noted, simple cannula present  Mouth: mucous membranes moist  Neck: supple, no carotid bruits, thyroid not palpable, no JVD noted  Lungs: Bilateral equal air entry, coarse breath sounds noted in bilateral lung fields, diminished breath sounds in the left lower lung field as compared to the right, increased effort  Cardiovascular: Tachycardia with regular rhythm, no murmur, gallop, rub. Abdomen: Soft, nontender, nondistended, normal bowel sounds, no hepatomegaly or splenomegaly  Neurologic: There are no new focal motor or sensory deficits, normal muscle tone and bulk, no abnormal sensation, normal speech, cranial nerves II through XII grossly intact  Skin: No gross lesions, rashes, bruising or bleeding on exposed skin area  Extremities:  peripheral pulses palpable, 2+ pitting pedal edema, no calf pain with palpation  Psych: normal affect     Investigations:      Laboratory Testing:  Recent Results (from the past 24 hour(s))   COVID-19, Rapid    Collection Time: 21  2:16 PM    Specimen: Nasopharyngeal Swab   Result Value Ref Range    Specimen Description . NASOPHARYNGEAL SWAB     SARS-CoV-2, Rapid Not Detected Not Detected   Basic Metabolic Panel    Collection Time: 11/29/21  2:26 PM   Result Value Ref Range    Glucose 99 70 - 99 mg/dL    BUN 10 8 - 23 mg/dL    CREATININE 0.63 0.50 - 0.90 mg/dL    Bun/Cre Ratio 16 9 - 20    Calcium 9.0 8.6 - 10.4 mg/dL    Sodium 135 135 - 144 mmol/L    Potassium 4.2 3.7 - 5.3 mmol/L    Chloride 101 98 - 107 mmol/L    CO2 30 20 - 31 mmol/L    Anion Gap 4 (L) 9 - 17 mmol/L    GFR Non-African American >60 >60 mL/min    GFR African American >60 >60 mL/min    GFR Comment          GFR Staging NOT REPORTED    Brain Natriuretic Peptide    Collection Time: 11/29/21  2:26 PM   Result Value Ref Range    Pro- <300 pg/mL    BNP Interpretation NOT REPORTED    TROP/MYOGLOBIN    Collection Time: 11/29/21  2:26 PM   Result Value Ref Range    Troponin, High Sensitivity 18 (H) 0 - 14 ng/L    Troponin T NOT REPORTED <0.03 ng/mL    Troponin Interp NOT REPORTED     Myoglobin <21 (L) 25 - 58 ng/mL   SPECIMEN REJECTION    Collection Time: 11/29/21  2:26 PM   Result Value Ref Range    Specimen Source . BLOOD     Ordered Test CDP     Reason for Rejection Unable to perform testing: Specimen clotted.      - NOT REPORTED    EKG 12 Lead    Collection Time: 11/29/21  2:35 PM   Result Value Ref Range    Ventricular Rate 95 BPM    Atrial Rate 95 BPM    P-R Interval 142 ms    QRS Duration 78 ms    Q-T Interval 340 ms    QTc Calculation (Bazett) 427 ms    P Axis 54 degrees    R Axis -17 degrees    T Axis 44 degrees   CBC Auto Differential    Collection Time: 11/29/21  2:41 PM   Result Value Ref Range    WBC 11.0 3.5 - 11.3 k/uL    RBC 3.15 (L) 3.95 - 5.11 m/uL    Hemoglobin 8.6 (L) 11.9 - 15.1 g/dL    Hematocrit 29.8 (L) 36.3 - 47.1 %    MCV 94.6 82.6 - 102.9 fL    MCH 27.3 25.2 - 33.5 pg    MCHC 28.9 28.4 - 34.8 g/dL    RDW 19.8 (H) 11.8 - 14.4 %    Platelets 096 611 - 600 k/uL    MPV 9.0 8.1 - 13.5 fL    NRBC Automated 0.5 (H) 0.0 per 100 WBC    Differential Type NOT REPORTED     WBC Morphology NOT REPORTED     RBC Morphology NOT REPORTED     Platelet Estimate NOT REPORTED     Seg Neutrophils 64 36 - 66 %    Lymphocytes 22 (L) 24 - 44 %    Atypical Lymphocytes 4 %    Monocytes 3 1 - 7 %    Eosinophils % 1 1 - 4 %    Basophils 0 %    Immature Granulocytes 6 (H) 0 %    Segs Absolute 7.04 1.8 - 7.7 k/uL    Absolute Lymph # 2.42 1.0 - 4.8 k/uL    Atypical Lymphocytes Absolute 0.44 k/uL    Absolute Mono # 0.33 0.2 - 0.8 k/uL    Absolute Eos # 0.11 0.0 - 0.4 k/uL    Basophils Absolute 0.00 0.0 - 0.2 k/uL    Absolute Immature Granulocyte 0.66 (H) 0.00 - 0.30 k/uL    Morphology HYPOCHROMIA PRESENT    TROP/MYOGLOBIN    Collection Time: 11/29/21  4:05 PM   Result Value Ref Range    Troponin, High Sensitivity 17 (H) 0 - 14 ng/L    Troponin T NOT REPORTED <0.03 ng/mL    Troponin Interp NOT REPORTED     Myoglobin <21 (L) 25 - 58 ng/mL   Lactic Acid    Collection Time: 11/29/21  4:05 PM   Result Value Ref Range    Lactic Acid 1.0 0.5 - 2.2 mmol/L       Imaging/Diagnostics:  XR CHEST PORTABLE    Result Date: 11/29/2021  Compared to prior chest radiograph from 10/31/2021, there are increased airspace opacities in the right mid and lower lung. Improved aeration of the left lung. Differential considerations include infection and atelectasis. Probable bilateral pleural effusions. CT CHEST PULMONARY EMBOLISM W CONTRAST    Result Date: 11/29/2021  1. No central or segmental pulmonary embolus. Subsegmental pulmonary arteries are limited by respiratory motion. 2.  Large pericardial effusion, new from 10/31/2021. 3.  Small bilateral pleural effusions, new/increased from 10/31/2021. 4.  Increased consolidation in the right middle lobe and new mild patchy opacities at the right base, concerning for pneumonia. 5.  Improved aeration in the left upper lobe with mild residual scattered patchy opacities. Status post left lower lobectomy. 6.  Mildly increased mediastinal lymphadenopathy, possibly reactive.        Assessment : Hospital Problems           Last Modified POA    * (Principal) Pericardial effusion 11/29/2021 Yes    Acute on chronic respiratory failure ([address] 11/29/2021 Yes    Essential hypertension 11/29/2021 Yes    History of DVT in adulthood 11/29/2021 Yes    Overview Signed 6/12/2019  1:18 PM by Chapo Acuna MD     Treated , recent scan normal          Deep venous thrombosis of left profunda femoris vein ([address] 11/29/2021 Yes    Adenocarcinoma, lung, left ([address] 11/29/2021 Yes    On methotrexate therapy 11/29/2021 Yes    S/P lobectomy of lung 11/29/2021 Yes    Hospital-acquired pneumonia 11/29/2021 Yes    COPD (chronic obstructive pulmonary disease) ([address] 54/55/8220 Yes    Diastolic heart failure ([address] 11/29/2021 Yes          Plan:     Patient status inpatient in the Progressive Unit/Step down    Admit to [facility]  Pericardial effusion-cardiology contacted. 2D echo reviewed. Continue IV fluids overnight to maintain filling pressures. Holding any antihypertensives in an attempt to avoid hypotension. Worsening pneumonia-recently admitted for presumed pneumonia but discovered to have staph aureus bacteremia. She just recently completed a 28-day course of Levaquin. Initiate vancomycin and cefepime. Infectious disease consultation. Pulmonology consultation. Appreciate recommendations  History of adenocarcinoma of the left lower lung  COPD with history of tobacco abuse-continue pulmonary updrafts. We will go ahead and initiate IV Solu-Medrol. Rheumatoid arthritis-hold methotrexate and folic acid  She did recently have a left pleural effusion thoracentesis/aspiration  History of DVT/PE continue Eliquis  As she has had her echo completed and there is no evidence of tamponade physiology, transfer to stepdown status.     Consultations:   IP CONSULT TO CARDIOLOGY  IP CONSULT TO CARDIOLOGY  PHARMACY TO DOSE VANCOMYCIN  IP CONSULT TO INFECTIOUS DISEASES  IP CONSULT TO PULMONOLOGY    Patient is admitted as inpatient status because of co-morbidities listed above, severity of signs and symptoms as outlined, requirement for current medical therapies and most importantly because of direct risk to patient if care not provided in a hospital setting. Expected length of stay > 48 hours.     Destinee Briggs DO  11/29/2021  11:34 PM    Copy sent to Dr. Moi Wolff MD

## 2021-11-30 NOTE — ED NOTES
RN rounded on Pt. Pt continues to sit on the side of her bed using her cell phone.  A cup of ice water given to patient per her request.      Jelani Fry RN  11/30/21 [id number]

## 2021-11-30 NOTE — CONSULTS
[address] [facility] Pharmacokinetic Monitoring Service - Vancomycin     Judie Honeycutt is a 61 y.o. female starting on vancomycin therapy for pneumonia. Pharmacy consulted by Naomy Venegas, for monitoring and adjustment. Target Concentration: Goal AUC/AMBER 400-600 mg*hr/L    Additional Antimicrobials: cefepime    Pertinent Laboratory Values: Wt Readings from Last 1 Encounters:   11/29/21 190 lb (86.2 kg)     Temp Readings from Last 1 Encounters:   11/29/21 99.1 °F (37.3 °C)     Procalcitonin   Date Value Ref Range Status   11/29/2021 0.04 <0.09 ng/mL Final     Comment:           Suspected Sepsis:  <0.50 ng/mL     Low likelihood of sepsis. 0.50-2.00 ng/mL     Increased likelihood of sepsis. Antibiotics encouraged. >2.00 ng/mL     High risk of sepsis/shock. Antibiotics strongly encouraged. Suspected Lower Resp Tract Infections:  <0.24 ng/mL     Low likelihood of bacterial infection. >0.24 ng/mL     Increased likelihood of bacterial infection. Antibiotics encouraged. With successful antibiotic therapy, PCT levels should decrease rapidly. (Half-life of 24 to   36 hours.)        Procalcitonin values from samples collected within the first 6 hours of systemic infection   may still be low. Retesting may be indicated. Values from day 1 and day 4 can be entered into the Change in Procalcitonin Calculator   (www.[employer]-pct-calculator. com) to determine the patient's Mortality Risk Prognosis        In healthy neonates, plasma Procalcitonin (PCT) concentrations increase gradually after   birth, reaching peak values at about 24 hours of age then decrease to normal values below   0.5 ng/mL by 48-72 hours of age. Estimated Creatinine Clearance: 98 mL/min (based on SCr of 0.63 mg/dL).   Recent Labs     11/29/21  1426 11/29/21  1441   CREATININE 0.63  --    WBC  --  11.0       Pertinent Cultures:  Culture Date Source Results   11/29/21 Blood x2 pending   MRSA Nasal Swab: not ordered. Order placed by pharmacy. .    Plan:  Dosing recommendations based on Bayesian software  Start vancomycin 2250 mg loading dose, followed by 1250 mg every 12 hours  Anticipated AUC of 545 and trough concentration of 16.9 at steady state  Renal labs as indicated   Vancomycin concentration ordered for 12/01/21 @ 0600   Pharmacy will continue to monitor patient and adjust therapy as indicated    Thank you for the consult,  Ronny Warner [facility]  11/30/2021 4:16 AM

## 2021-11-30 NOTE — CONSULTS
Section of Cardiology   Consult Note      Reason for Consult: Pericardial effusion  Requesting Physician: Celina Zhang DO    CHIEF COMPLAINT: Shortness of breath    History Obtained From:  electronic medical record, ER physician    HISTORY OF PRESENT ILLNESS:      The patient is a 61 y.o. female with significant past medical history of lung cancer who presents with worsening shortness of breath and lower extremities edema and was placed in isolation. She had CAT scan of the chest which reported to have pericardial effusion. Subsequently stat echocardiogram reported to have moderate pericardial effusion without tamponade. The patient is not known to have any specific cardiac issues in the past.  She had an echocardiogram at [facility] a month ago where she was admitted with sepsis and started on IV antibiotic and was discharged with IV line for IV antibiotics. Echocardiogram at that time showed small pericardial effusion. Her LV systolic function is normal without valvular disease. COVID-19 infection was ruled out during this admission however the swab showed presence of parainfluenza virus. At the time I saw the patient she appeared to be comfortable she is sitting at the edge of the bed. Denies chest pain. She has no fever or chills. For the last 2 weeks her lower extremities edema is increasing and according to her it is mainly in the evening. Patient was placed on oral Lasix without significant improvement. He denies any abdominal swelling there is no orthopnea. Denies palpitation or dizziness or syncope. Patient is on chronic anticoagulation due to remote history of DVT  Previous diagnostic testing for coronary artery disease includes: echocardiogram.   Previous history of cardiac disease includes: none.     Coronary artery disease risk factors include: advanced age (older than 54 for men, 72 for women), hypertension, obesity (BMI >= 30 kg/m2), sedentary lifestyle and smoking/ tobacco exposure.     Past Medical History:    Past Medical History:   Diagnosis Date    Abnormal CT of the chest 04/21/2021    Adenocarcinoma, lung, left ([facility] 05/14/2021    Arthritis     Community acquired pneumonia 12/23/2019    COPD (chronic obstructive pulmonary disease) ([facility] [address] 08/14/2019    Deep venous thrombosis of left profunda femoris vein ([facility] [address] 04/16/2020    Depression     DVT (deep venous thrombosis) ([facility] [address] 2014    b/l     GERD (gastroesophageal reflux disease)     History of pulmonary embolism 05/27/2021    History of thyroid nodule 12/23/2019    Hypertension     Major depressive disorder, recurrent, moderate ([facility] [address] 11/12/2018    Nodule of lower lobe of left lung 04/23/2021    Obesity, Class II, BMI 35-39.9 11/12/2018    On anticoagulant therapy 05/27/2021    On home oxygen therapy     02 2L NC PRN    On methotrexate therapy 05/27/2021    Prediabetes 11/12/2018    Rheumatoid arthritis ([facility] [address]     Snores     denies apnea    Thyroid nodule 01/09/2020    Tobacco abuse 11/12/2018    Under care of team 06/29/2021    pulmonology-Dr Blank- gareth-last visit june 2021    Under care of team 06/29/2021    [facility]    Under care of team     Dr. Logan Nevarez clearance appointment 7/6/21, stress test 7/9/2021, clearance obtained and placed on chart    Wellness examination 06/29/2021    pcp-Dr Belinda Rucker office-last visit may 2021     Past Surgical History:    Past Surgical History:   Procedure Laterality Date   [address] N/A 12/27/2019    BRONCHOSCOPY ALVEOLAR LAVAGE performed by Dave Gordon MD at [facility] N/A 2/12/2020    COLONOSCOPY POLYPECTOMIES HOT SNARE, COLD BIOPSY POLYPECTOMIES performed by Kalyani Ch MD at [facility]  5/13/2021    CT NEEDLE BIOPSY LUNG PERCUTANEOUS 5/13/2021 [facility] CT SCAN    ENDOSCOPY, COLON, DIAGNOSTIC  [id number]    gastritis, sm. bowel lipoma, biopsies    INSERT MIDLINE CATHETER  11/3/2021         LOBECTOMY Left 07/19/2021    XI ROBOTIC LEFT LOWER LOBECTOMY CONVERTED TO OPEN THORACTOMY LEFT LOWER LOBECTOMY (Left )    LUNG REMOVAL, TOTAL Left 7/19/2021    XI ROBOTIC LEFT LOWER LOBECTOMY CONVERTED TO OPEN THORACTOMY LEFT LOWER LOBECTOMY performed by Odette Davis MD at [facility] Medications:  Prior to Admission medications    Medication Sig Start Date End Date Taking?  Authorizing Provider   zinc sulfate (ZINCATE) 220 (50 Zn) MG capsule Take 1 capsule by mouth daily 10/28/21  Yes Historical Provider, MD   fluticasone (FLONASE) 50 MCG/ACT nasal spray instill 2 sprays into each nostril once daily 11/4/21  Yes John Farfan MD   cetirizine (ZYRTEC ALLERGY) 10 MG tablet Take 1 tablet by mouth daily 10/28/21  Yes ILIR Lennon CNP   ondansetron (ZOFRAN) 4 MG tablet Take 1 tablet by mouth 3 times daily as needed for Nausea or Vomiting 10/28/21  Yes ILIR Lennon CNP   SPIRIVA RESPIMAT 2.5 MCG/ACT AERS inhaler inhale 2 puffs INTO THE LUNGS once daily 10/25/21  Yes John Farfan MD   amLODIPine (NORVASC) 10 MG tablet take 1 tablet by mouth once daily 10/13/21  Yes John Farfan MD   albuterol (PROVENTIL) (2.5 MG/3ML) 0.083% nebulizer solution Take 3 mLs by nebulization every 6 hours as needed for Wheezing or Shortness of Breath 8/23/21  Yes John Farfan MD   furosemide (LASIX) 20 MG tablet Take 1 tablet by mouth 2 times daily 7/23/21  Yes Alvenia Fairly, APRN - NP   potassium chloride (KLOR-CON M) 20 MEQ extended release tablet Take 1 tablet by mouth 2 times daily 7/23/21  Yes Alvenia Fairly, APRN - NP   acetaminophen (TYLENOL) 500 MG tablet Take 1,000 mg by mouth every 6 hours as needed for Pain   Yes Historical Provider, MD   ELIQUIS 5 MG TABS tablet take 1 tablet by mouth twice a day 7/16/21  Yes John Farfan MD   albuterol sulfate  (90 Base) MCG/ACT inhaler inhale 2 puffs by mouth and INTO THE LUNGS every 4 hours for 7 days 6/1/21  Yes Josi Moody MD   pantoprazole (PROTONIX) 40 MG tablet take 1 tablet by mouth once daily 5/18/21  Yes Josi Moody MD   folic acid (FOLVITE) 1 MG tablet take 1 tablet by mouth once daily 4/12/21  Yes Josi Moody MD   methotrexate (RHEUMATREX) 2.5 MG chemo tablet Take 20 mg by mouth once a week Indications: takes 8 tablets on saturdays Pt takes medication on tuesdays   Yes Historical Provider, MD   nystatin (MYCOSTATIN) 730941 UNIT/ML suspension Take 5 mLs by mouth 4 times daily Swish and swallow  Patient not taking: Reported on 11/29/2021 11/4/21   Josi Moody MD   fluticasone-vilanterol (BREO ELLIPTA) 100-25 MCG/INH AEPB inhaler Inhale 1 puff into the lungs daily  Patient not taking: Reported on 11/29/2021 11/3/21   Tacho Raman MD   lidocaine 4 % external patch Place 1 patch onto the skin daily  Patient not taking: Reported on 11/29/2021 7/24/21   ILIR Velasquez NP     Current Medications:    Current Facility-Administered Medications   Medication Dose Route Frequency Provider Last Rate Last Admin    ipratropium-albuterol (DUONEB) nebulizer solution 1 ampule  1 ampule Inhalation Q4H WA ILIR Young NP   1 ampule at 11/30/21 1028    vancomycin (VANCOCIN) 1,250 mg in dextrose 5 % 250 mL IVPB  1,250 mg IntraVENous Q12H Ranjit Hughes DO        vancomycin (VANCOCIN) intermittent dosing (placeholder)   Other RX Placeholder En Hughes DO        sodium chloride flush 0.9 % injection 10 mL  10 mL IntraVENous PRN Angeles Mccormick MD   10 mL at 11/29/21 1619    [Held by provider] amLODIPine (NORVASC) tablet 10 mg  10 mg Oral Daily ILIR Young NP   10 mg at 11/29/21 2027    cetirizine (ZYRTEC) tablet 10 mg  10 mg Oral Daily ILIR Young NP        fluticasone (FLONASE) 50 MCG/ACT nasal spray 2 spray  2 spray Each Nostril Daily ILIR Young - NP        budesonide-formoterol (SYMBICORT) 80-4.5 MCG/ACT inhaler 2 puff  2 puff Inhalation BID ILIR Shah NP   2 puff at 95/23/37 8923    folic acid (FOLVITE) tablet 1 mg  1 mg Oral Daily ILIR Shah NP   1 mg at 11/29/21 2027    pantoprazole (PROTONIX) tablet 40 mg  40 mg Oral QAM AC ILIR Shah NP   40 mg at 11/30/21 0743    0.9 % sodium chloride infusion   IntraVENous Continuous ILIR Shah NP 75 mL/hr at 11/29/21 2016 New Bag at 11/29/21 2016    sodium chloride flush 0.9 % injection 5-40 mL  5-40 mL IntraVENous 2 times per day ILIR Shah NP        sodium chloride flush 0.9 % injection 10 mL  10 mL IntraVENous PRN ILIR Shah NP        0.9 % sodium chloride infusion  25 mL IntraVENous PRN ILIR Shah NP        ondansetron (ZOFRAN-ODT) disintegrating tablet 4 mg  4 mg Oral Q8H PRN ILIR Shah NP        Or    ondansetron (ZOFRAN) injection 4 mg  4 mg IntraVENous Q6H PRN ILIR Shah - SONAM   4 mg at 11/29/21 2247    magnesium hydroxide (MILK OF MAGNESIA) 400 MG/5ML suspension 30 mL  30 mL Oral Daily PRN ILIR Shah NP        acetaminophen (TYLENOL) tablet 650 mg  650 mg Oral Q6H PRN ILIR Shah - NP   650 mg at 11/30/21 0055    Or    acetaminophen (TYLENOL) suppository 650 mg  650 mg Rectal Q6H PRN ILIR Shah - NP        albuterol (PROVENTIL) nebulizer solution 2.5 mg  2.5 mg Nebulization Q2H PRN ILIR Shah - NP        cefepime (MAXIPIME) 2000 mg IVPB minibag  2,000 mg IntraVENous Q8H Marleen Fragar, DO 12.5 mL/hr at 11/30/21 0741 2,000 mg at 11/30/21 0741    methylPREDNISolone sodium (SOLU-MEDROL) injection 60 mg  60 mg IntraVENous Q8H En Fragar, DO   60 mg at 11/30/21 0747    apixaban (ELIQUIS) tablet 5 mg  5 mg Oral BID Adi Hughes DO   5 mg at 11/30/21 4723     Current Outpatient Medications   Medication Sig Dispense Refill    zinc sulfate (ZINCATE) 220 (50 Zn) MG capsule Take 1 capsule by mouth daily      fluticasone (FLONASE) 50 MCG/ACT nasal spray instill 2 sprays into each nostril once daily 16 g 0    cetirizine (ZYRTEC ALLERGY) 10 MG tablet Take 1 tablet by mouth daily 90 tablet 2    ondansetron (ZOFRAN) 4 MG tablet Take 1 tablet by mouth 3 times daily as needed for Nausea or Vomiting 30 tablet 2    SPIRIVA RESPIMAT 2.5 MCG/ACT AERS inhaler inhale 2 puffs INTO THE LUNGS once daily 4 g 3    amLODIPine (NORVASC) 10 MG tablet take 1 tablet by mouth once daily 90 tablet 1    albuterol (PROVENTIL) (2.5 MG/3ML) 0.083% nebulizer solution Take 3 mLs by nebulization every 6 hours as needed for Wheezing or Shortness of Breath 125 vial 0    furosemide (LASIX) 20 MG tablet Take 1 tablet by mouth 2 times daily 60 tablet 3    potassium chloride (KLOR-CON M) 20 MEQ extended release tablet Take 1 tablet by mouth 2 times daily 90 tablet 1    acetaminophen (TYLENOL) 500 MG tablet Take 1,000 mg by mouth every 6 hours as needed for Pain      ELIQUIS 5 MG TABS tablet take 1 tablet by mouth twice a day 180 tablet 3    albuterol sulfate  (90 Base) MCG/ACT inhaler inhale 2 puffs by mouth and INTO THE LUNGS every 4 hours for 7 days 18 g 2    pantoprazole (PROTONIX) 40 MG tablet take 1 tablet by mouth once daily 90 tablet 2    folic acid (FOLVITE) 1 MG tablet take 1 tablet by mouth once daily 30 tablet 0    methotrexate (RHEUMATREX) 2.5 MG chemo tablet Take 20 mg by mouth once a week Indications: takes 8 tablets on saturdays Pt takes medication on tuesdays      nystatin (MYCOSTATIN) 739079 UNIT/ML suspension Take 5 mLs by mouth 4 times daily Swish and swallow (Patient not taking: Reported on 11/29/2021) 240 mL 0    fluticasone-vilanterol (BREO ELLIPTA) 100-25 MCG/INH AEPB inhaler Inhale 1 puff into the lungs daily (Patient not taking: Reported on 11/29/2021) 2 each 0    lidocaine 4 % external patch Place 1 patch onto the skin daily (Patient not taking: Reported on 11/29/2021) 30 patch 0     Allergies: Patient has no known allergies. Social History:    Social History     Socioeconomic History    Marital status: Single     Spouse name: Not on file    Number of children: 6    Years of education: Not on file    Highest education level: Not on file   Occupational History     Employer: N/A   Tobacco Use    Smoking status: Former Smoker     Packs/day: 0.25     Years: 35.00     Pack years: 8.75     Types: Cigarettes     Quit date: 1986     Years since quittin.9    Smokeless tobacco: Never Used    Tobacco comment: restarted smoking 2019   Vaping Use    Vaping Use: Never used   Substance and Sexual Activity    Alcohol use: Not Currently     Alcohol/week: 0.0 standard drinks    Drug use: No    Sexual activity: Not Currently   Other Topics Concern    Not on file   Social History Narrative    Not on file     Social Determinants of Health     Financial Resource Strain: Low Risk     Difficulty of Paying Living Expenses: Not hard at all   Food Insecurity: No Food Insecurity    Worried About Running Out of Food in the Last Year: Never true    Emelyn of Food in the Last Year: Never true   Transportation Needs: No Transportation Needs    Lack of Transportation (Medical): No    Lack of Transportation (Non-Medical):  No   Physical Activity:     Days of Exercise per Week: Not on file    Minutes of Exercise per Session: Not on file   Stress:     Feeling of Stress : Not on file   Social Connections:     Frequency of Communication with Friends and Family: Not on file    Frequency of Social Gatherings with Friends and Family: Not on file    Attends Tenriism Services: Not on file    Active Member of Clubs or Organizations: Not on file    Attends Club or Organization Meetings: Not on file    Marital Status: Not on file   Intimate Partner Violence:     Fear of Current or Ex-Partner: Not on file    Emotionally Abused: Not on file    Physically Abused: Not on file    Sexually Abused: Not on file Housing Stability:     Unable to Pay for Housing in the Last Year: Not on file    Number of Places Lived in the Last Year: Not on file    Unstable Housing in the Last Year: Not on file     Family History:   Family History   Problem Relation Age of Onset    Cancer Mother         Uterine and breast    Diabetes Mother     Heart Disease Mother     Cancer Father         lung    Cancer Brother         lung    Cancer Brother         lung       · REVIEW OF SYSTEMS   Other than above negative    PHYSICAL EXAM:    Vitals:    VITALS:  BP (!) 129/53   Pulse 103   Temp 99.1 °F (37.3 °C)   Resp 20   Ht 5' 5\" (1.651 m)   Wt 190 lb (86.2 kg)   SpO2 97%   BMI 31.62 kg/m²   24HR INTAKE/OUTPUT:      Intake/Output Summary (Last 24 hours) at 11/30/2021 1147  Last data filed at 11/29/2021 1841  Gross per 24 hour   Intake 50 ml   Output --   Net 50 ml       CONSTITUTIONAL:  awake, alert, cooperative, no apparent distress, and appears stated age  EYES: Pupils equal, round and reactive to light, extra ocular muscles intact, sclera clear, conjunctiva normal  ENT:  normocepalic, without obvious abnormality  NECK:  supple, symmetrical, trachea midline, no carotid bruit ,   No  JVD  BACK:  symmetric  LUNGS: Non-labored, good air exchange, clear to auscultation bilaterally, no crackles or wheezing  CARDIOVASCULAR:  Normal apical impulse, regular rate and rhythm, normal S1 and S2, no S3 or S4, and no murmur noted, no rub.  radial and bilateralpresent 2+  ABDOMEN:  No scars, normal bowel sounds, soft, not tender. MUSCULOSKELETAL:  there is no redness, warmth, or swelling of the joints   2+ leg edema. NEUROLOGIC:  Awake, alert, oriented to name, place and time.   SKIN:  no bruising or bleeding, normal skin color, texture, turgor and no jaundice    DATA:   ECG: Admission EKG showed sinus rhythm without acute ST-T changes  ECHO: Date:  Stat echocardiogram was done yesterday evening in the emergency room and reported to have normal LV systolic function with moderate pericardial effusion. No tamponade  An echocardiogram was done a month ago which showed small pericardial effusion. Stress Test:  Not performed to date  Angiography:  Not performed to date    Cardiology Labs:  Recent Labs     11/29/21  1426 11/29/21  1605   MYOGLOBIN <21* <21*   TROPONINT NOT REPORTED NOT REPORTED     Warfarin PT/INR:  Lab Results   Component Value Date    PROTIME 11.1 11/01/2021    PROTIME 64.1 04/03/2015    INR 1.0 11/01/2021     CBC:  Lab Results   Component Value Date    WBC 10.1 11/30/2021    RBC 3.31 11/30/2021    HGB 8.9 11/30/2021    HCT 31.5 11/30/2021    MCV 95.2 11/30/2021    MCH 26.9 11/30/2021    MCHC 28.3 11/30/2021    RDW 19.4 11/30/2021     11/30/2021    MPV 9.3 11/30/2021     CMP:  Lab Results   Component Value Date     11/30/2021    K 4.3 11/30/2021     11/30/2021    CO2 27 11/30/2021    BUN 10 11/30/2021    CREATININE 0.69 11/30/2021    GFRAA >60 11/30/2021    LABGLOM >60 11/30/2021    GLUCOSE 204 11/30/2021    GLUCOSE 105 12/05/2011    CALCIUM 8.8 11/30/2021     Magnesium:    Lab Results   Component Value Date    MG 1.7 09/18/2021     PTT:    Lab Results   Component Value Date    APTT 22.8 10/31/2021     TSH:    Lab Results   Component Value Date    TSH 0.18 12/23/2019     BMP:  Lab Results   Component Value Date     11/30/2021    K 4.3 11/30/2021     11/30/2021    CO2 27 11/30/2021    BUN 10 11/30/2021    LABALBU 2.4 10/15/2021    LABALBU 4.0 12/05/2011    CREATININE 0.69 11/30/2021    CALCIUM 8.8 11/30/2021    GFRAA >60 11/30/2021    LABGLOM >60 11/30/2021    GLUCOSE 204 11/30/2021    GLUCOSE 105 12/05/2011     LIVER PROFILE:No results for input(s): AST, ALT, LABALBU, ALKPHOS, BILITOT, BILIDIR, IBILI, PROT, GLOB, ALBUMIN in the last 72 hours.   FLP:    Lab Results   Component Value Date    CHOL 192 01/30/2015    TRIG 84 01/30/2015    HDL 77 01/30/2015    LDLCHOLESTEROL 98 01/30/2015         IMPRESSION  1. pericardial effusion without tamponade, the etiology is not clear, most likely viral infection however with underlying lung cancer malignancy should be considered. Unlikely to be CHF related  2. Lungs cancer, status post left lower lobe resection  3.   New bilateral lower extremities edema, etiology is not clear    Patient Active Problem List   Diagnosis    Hypertension    GERD (gastroesophageal reflux disease)    Rheumatoid arthritis ([address]    Essential hypertension    Obesity, Class II, BMI 35-39.9    Major depressive disorder, recurrent, moderate (HCC)    Prediabetes    Tobacco abuse    History of DVT in adulthood    COPD exacerbation (HCC)    Pneumonia due to infectious organism    History of thyroid nodule    Pneumococcal septicemia (HCC)    Thyroid nodule    Positive colorectal cancer screening using Cologuard test    Deep venous thrombosis of left profunda femoris vein (HCC)    Abnormal CT of the chest    Encounter for smoking cessation counseling    Nodule of lower lobe of left lung    Primary mucinous adenocarcinoma of lung (HCC)    Adenocarcinoma, lung, left ([address]    History of pulmonary embolism    On anticoagulant therapy    On methotrexate therapy    Obesity, Class III, BMI 40-49.9 (morbid obesity) ([address]    S/P lobectomy of lung    Hypoxia    Moderate malnutrition (HCC)    RSV bronchitis    History of adenocarcinoma of lung    Mixed hyperlipidemia    Sepsis ([address]    Hyponatremia    Anemia    Leucocytosis    Chronic rhinitis    Respiratory distress    Acute on chronic respiratory failure (HCC)    Hospital-acquired pneumonia    COPD (chronic obstructive pulmonary disease) (HCC)    ASHLEIGH (acute kidney injury) (HCC)    Diastolic heart failure (HCC)    Multifocal pneumonia    Pericardial effusion           RECOMMENDATIONS:     Continue current medications  Management plan was discussed with patient     At this point in time for the pericardial effusion there is no need for intervention however recommend to repeat the echocardiogram in few days. If the size of the effusion started to increase steadily then we may need to consult her thoracic surgeon and drain the fluid and possibly place a pericardial window. I will give a dose of IV Lasix on a trial basis  Recommend stop Norvasc on a trial basis due to the remote possibility that it could be the reason for her lower extremities edema. Discussed with the ER physician. Explained to the patient in full details  Thank you for consultation.       Electronically signed by Jluis Moseley MD on 11/30/2021 at 11:47 AM     CC: Deya Hernandez MD

## 2021-11-30 NOTE — CONSULTS
Pulmonary Medicine and [address] Reza MD      Patient - Sherita Gr   MRN -  [id number]   [facility] # - [de-identified]   - 1955      Date of Admission -  2021  1:42 PM  Date of evaluation -  2021  Room - [facility] Day - [address], DO Primary Care Physician - Lei Young MD     Reason for Consult    Shortness of breath    Assessment   · Acute on chronic respiratory failure  · Right middle lobe pneumonia  · Bilateral pleural effusions, right greater than left  · Suspected obstructive sleep apnea/Obesity  · Left lung adenocarcinoma, status post left lower lobe lobectomy 21  · Pericardial effusion  · Mild pulmonary HTN    Recommendations   · Continue IV antibiotics, cefepime/vancomycin  · IV solu-medrol  · Albuterol and Ipratropium Q 4 hours and prn  · symbicort  · X-ray chest in am  · Labs: CBC and BMP in am  · Check BNP and procalcitonin  · BiPAP prn   · 6 liters/min via nasal cannula  · DVT prophylaxis, on eliquis  · Diuresis  · DC IV fluids  · Sleep apnea evaluation as out patient  · Will follow with you    HPI     Sherita Gr is 61 y.o. female with past medical history of lung cancer, and COPD presented to ER for worsening lower extremity edema and shortness of breath for 7-10 days. She has cough mostly dry with occasional production of sputum, which is pale yellow in color. Her dyspnea got worse over the last few days. She denies chest pain. She denies hemoptysis. She denies fever or chill but felt warm. She denies significant nasal congestion.      PMHx   Past Medical History      Diagnosis Date    Abnormal CT of the chest 2021    Adenocarcinoma, lung, left ([address] 2021    Arthritis     Community acquired pneumonia 2019    COPD (chronic obstructive pulmonary disease) ([address] 2019    Deep venous thrombosis of left profunda femoris vein ([address] 2020    Depression     DVT (deep venous thrombosis) Q4H WA    vancomycin  1,250 mg IntraVENous Q12H    vancomycin (VANCOCIN) intermittent dosing (placeholder)   Other RX Placeholder    furosemide  20 mg IntraVENous Once    cetirizine  10 mg Oral Daily    fluticasone  2 spray Each Nostril Daily    budesonide-formoterol  2 puff Inhalation BID    folic acid  1 mg Oral Daily    pantoprazole  40 mg Oral QAM AC    sodium chloride flush  5-40 mL IntraVENous 2 times per day    cefepime  2,000 mg IntraVENous Q8H    methylPREDNISolone  60 mg IntraVENous Q8H    apixaban  5 mg Oral BID     sodium chloride flush, sodium chloride flush, sodium chloride, ondansetron **OR** ondansetron, magnesium hydroxide, acetaminophen **OR** acetaminophen, albuterol  IV Drips/Infusions   sodium chloride 75 mL/hr at 21    sodium chloride       Home Medications  Not in a hospital admission. Allergies    Patient has no known allergies.   Social History     Social History     Tobacco Use    Smoking status: Former Smoker     Packs/day: 0.25     Years: 35.00     Pack years: 8.75     Types: Cigarettes     Quit date: 1986     Years since quittin.9    Smokeless tobacco: Never Used    Tobacco comment: restarted smoking    Substance Use Topics    Alcohol use: Not Currently     Alcohol/week: 0.0 standard drinks     Family History          Problem Relation Age of Onset    Cancer Mother         Uterine and breast    Diabetes Mother     Heart Disease Mother     Cancer Father         lung    Cancer Brother         lung    Cancer Brother         lung     ROS - 11 systems   General Denies any fever or chills  HEENT Denies any diplopia, tinnitus or vertigo  Resp positive for  dry cough and dyspnea  Cardiac Denies any chest pain, palpitations, claudication or edema  GI Denies any melena, hematochezia, hematemesis or pyrosis   Denies any frequency, urgency, hesitancy or incontinence  Heme Denies bruising or bleeding easily  Endocrine Denies any history of diabetes or thyroid disease  Neuro Denies any focal motor or sensory deficits  Psychiatric Denies anxiety, depression, suicidal ideation  Skin Denies rashes, itching, open sores    Vitals     height is 5' 5\" (1.651 m) and weight is 190 lb (86.2 kg). Her temperature is 99.1 °F (37.3 °C). Her blood pressure is 129/53 (abnormal) and her pulse is 103. Her respiration is 20 and oxygen saturation is 97%. Body mass index is 31.62 kg/m². I/O        Intake/Output Summary (Last 24 hours) at 11/30/2021 1248  Last data filed at 11/29/2021 1841  Gross per 24 hour   Intake 50 ml   Output --   Net 50 ml     I/O last 3 completed shifts: In: 48 [IV Piggyback:50]  Out: -    Patient Vitals for the past 96 hrs (Last 3 readings):   Weight   11/29/21 1255 190 lb (86.2 kg)     Exam   General Appearance  Awake, alert, oriented, in no acute distress  HEENT - Head is normocephalic, atraumatic. Pupil reactive to light  Neck - Supple, symmetrical, trachea midline and Soft, trachea midline and straight  Lungs - positive findings: prolonged expiration, wheezing bibasilar  Cardiovascular - Heart sounds are normal.  Regular rhythm normal rate without murmur, gallop or rub. Abdomen - Soft, nontender, nondistended, no masses or organomegaly  Neurologic - CN II-XII are grossly intact.  There are no focal motor or sensory deficits  Skin - No bruising or bleeding  Extremities - No cyanosis, clubbing, positive edema    Labs  - Old records and notes have been reviewed in [facility]   CBC     Lab Results   Component Value Date    WBC 10.1 11/30/2021    RBC 3.31 11/30/2021    HGB 8.9 11/30/2021    HCT 31.5 11/30/2021     11/30/2021    MCV 95.2 11/30/2021    MCH 26.9 11/30/2021    MCHC 28.3 11/30/2021    RDW 19.4 11/30/2021    NRBC 1 11/30/2021    METASPCT 2 09/18/2021    LYMPHOPCT 17 11/30/2021    MONOPCT 3 11/30/2021    MYELOPCT 1 04/13/2020    BASOPCT 0 11/30/2021    MONOSABS 0.30 11/30/2021    LYMPHSABS 1.72 11/30/2021    EOSABS 0.00 11/30/2021

## 2021-11-30 NOTE — SIGNIFICANT EVENT
Called regarding large pericardial effusion noted on CT scan that is new from 10/31  Pt was hemodynamically stable but tachycardic per reports    STAT echo was performed and read to exclude tamponade    EF 60% without valvular or structural dysfunction  A moderate sized effusion is seen circumferentially around the heart  No tamponade physiology was visualized. Borderline mitral inflow variation but otherwise no RA/RV diastolic collapse or other markers of tamponade aside from loss of respiratory variation of IVC    PLAN  - please give IV fluids overnight to maintain right ventricular filling  - if pt becomes hypotensive, give fluid boluses.  If no significant improvement in hemodynamics, call interventional cardiology stat overnight for possible pericardiocentesis  - pt will be seen in the am otherwise

## 2021-12-01 NOTE — CONSULTS
[facility]  Initial Consult Note  Date: 12/1/2021    Hospital day :1     Impression:   1. Acute on chronic respiratory failure requiring increased supplemental oxygen by nasal cannula  2. COPD with concerns for acute exacerbation  3. Parainfluenza virus infection  4. Small bilateral pleural effusions-increased from 10/31/2021 and new pericardial effusion  5. Right middle lobe and right basilar pneumonia  6. Recent pneumococcal sepsis related to a left upper lobe pneumonia with improved aeration though there remains a mild residual scattered opacities  7. History of lung cancer status post left lower lobe lobectomy  8. Rheumatoid arthritis on immunosuppressive therapy with methotrexate    Recommendations   · The clinical picture here is not entirely clear and the CT findings do raise concern for a bacterial process though the patient recently completed a 28-day course of antibiotics for the recent pneumococcal pneumonia  · The patient does have new pleural and pericardial effusions including bilateral lower extremity swelling raising concern for fluid retention  · The patient definitely has a viral infection as well which could cause worsening shortness of breath due to COPD exacerbation  · Continue antimicrobial therapy with cefepime and oral doxycycline  · Continue diuretic therapy for the fluid retention  · The patient is already on steroids per the pulmonary team  · If the patient does not improve clinically consideration for a bronchoscopy may be reasonable    Chief complaint/reason for consultation:   Pneumonia    History of Present Illness:   Prashanth Garcia is a 61y.o.-year-old female who was initially admitted on 11/29/2021.    Mary Pastor has a history of COPD, tobacco abuse smoked 2 packs a day for 30 years quit 2 to 3 months ago, and rheumatoid arthritis on methotrexate, left lung adenocarcinoma status post open left lower lobectomy 7/19/2021, DVT/PE on Eliquis, hypertension, obesity, hyperlipidemia. The patient has had multiple hospital stays 9/18/2021 through 9/21/2021, 10/15/2021 through 10/16/2021, 10/31/21 through 11/3/2021. During the last hospital stay the patient had left-sided ultrasound-guided thoracentesis with less than 1 mL of scant bloody fluid obtained on 11/2/21 and during that hospital stay the patient had Streptococcus pneumonia sepsis felt secondary to a left lung multifocal pneumonia mostly in the left upper lobe and the Streptococcus was susceptible to Levaquin and Rocephin. The patient was discharged on IV antimicrobial therapy with Rocephin for 25 more days after work-up for endocarditis was negative. The patient came back into the emergency department due to complaints of increased swelling in the legs over the past 2 weeks, increasing shortness of breath, slight increase in the cough and increasing dyspnea for 2 to 3 days. Work-up including a CT of the chest which revealed new large pericardial effusion with bilateral pleural effusions and increased consolidation in the right middle lobe and right base. She underwent a stat echocardiogram to work-up for tamponade in the effusion was noted to be moderate with no evidence of tamponade physiology. The patient has been admitted and she reports that she is on 2 L of oxygen by nasal cannula at home currently is on 5 L. She reports chronic nausea. I was asked to evaluate for pneumonia. I have personally reviewed the past medical history, past surgical history, medications, social history, and family history, and I have updated the database accordingly.   Past Medical History:     Past Medical History:   Diagnosis Date    Abnormal CT of the chest 04/21/2021    Adenocarcinoma, lung, left ([address] 05/14/2021    Arthritis     Community acquired pneumonia 12/23/2019    COPD (chronic obstructive pulmonary disease) [facility] 08/14/2019    Deep venous thrombosis of left profunda femoris vein ([facility] 04/16/2020    Depression     DVT (deep venous thrombosis) ([facility] 2014    b/l     GERD (gastroesophageal reflux disease)     History of pulmonary embolism 05/27/2021    History of thyroid nodule 12/23/2019    Hypertension     Major depressive disorder, recurrent, moderate ([facility] 11/12/2018    Nodule of lower lobe of left lung 04/23/2021    Obesity, Class II, BMI 35-39.9 11/12/2018    On anticoagulant therapy 05/27/2021    On home oxygen therapy     02 2L NC PRN    On methotrexate therapy 05/27/2021    Prediabetes 11/12/2018    Rheumatoid arthritis ([facility]     Snores     denies apnea    Thyroid nodule 01/09/2020    Tobacco abuse 11/12/2018    Under care of team 06/29/2021    pulmonology-Dr Blank- gareth-last visit june 2021    Under care of team 06/29/2021    oncology-Dr Vences Kristen-last visit june 2021    Under care of team     Dr. Oscar Galaviz clearance appointment 7/6/21, stress test 7/9/2021, clearance obtained and placed on chart    Wellness examination 06/29/2021    pcp-Dr Justus Cooper office-last visit may 2021     Past Surgical  History:     Past Surgical History:   Procedure Laterality Date   [address] N/A 12/27/2019    BRONCHOSCOPY ALVEOLAR LAVAGE performed by Anneliese Ward MD at . [facility] [place]note 2[place] N/A 2/12/2020    COLONOSCOPY POLYPECTOMIES HOT SNARE, COLD BIOPSY POLYPECTOMIES performed by Leoncio Mackey MD at [facility]  5/13/2021    CT NEEDLE BIOPSY LUNG PERCUTANEOUS 5/13/2021 [facility] CT SCAN    ENDOSCOPY, COLON, DIAGNOSTIC  [id number]    gastritis, sm. bowel lipoma, biopsies    INSERT MIDLINE CATHETER  11/3/2021         LOBECTOMY Left 07/19/2021    XI ROBOTIC LEFT LOWER LOBECTOMY CONVERTED TO OPEN THORACTOMY LEFT LOWER LOBECTOMY (Left )    LUNG REMOVAL, TOTAL Left 7/19/2021    XI ROBOTIC LEFT LOWER LOBECTOMY CONVERTED TO OPEN THORACTOMY LEFT LOWER LOBECTOMY performed by Lee Arce MD at [place] [address] Medications:      ipratropium-albuterol  1 ampule Inhalation Q4H WA    methylPREDNISolone  40 mg IntraVENous Q8H    guaiFENesin-dextromethorphan  5 mL Oral Q6H    doxycycline monohydrate  100 mg Oral 2 times per day    cetirizine  10 mg Oral Daily    fluticasone  2 spray Each Nostril Daily    budesonide-formoterol  2 puff Inhalation BID    folic acid  1 mg Oral Daily    pantoprazole  40 mg Oral QAM AC    sodium chloride flush  5-40 mL IntraVENous 2 times per day    apixaban  5 mg Oral BID     Social History:     Social History     Socioeconomic History    Marital status: Single     Spouse name: Not on file    Number of children: 6    Years of education: Not on file    Highest education level: Not on file   Occupational History     Employer: N/A   Tobacco Use    Smoking status: Former Smoker     Packs/day: 0.25     Years: 35.00     Pack years: 8.75     Types: Cigarettes     Quit date: 1986     Years since quittin.9    Smokeless tobacco: Never Used    Tobacco comment: restarted smoking 2019   Vaping Use    Vaping Use: Never used   Substance and Sexual Activity    Alcohol use: Not Currently     Alcohol/week: 0.0 standard drinks    Drug use: No    Sexual activity: Not Currently   Other Topics Concern    Not on file   Social History Narrative    Not on file     Social Determinants of Health     Financial Resource Strain: Low Risk     Difficulty of Paying Living Expenses: Not hard at all   Food Insecurity: No Food Insecurity    Worried About Running Out of Food in the Last Year: Never true    Emelyn of Food in the Last Year: Never true   Transportation Needs: No Transportation Needs    Lack of Transportation (Medical): No    Lack of Transportation (Non-Medical):  No   Physical Activity:     Days of Exercise per Week: Not on file    Minutes of Exercise per Session: Not on file   Stress:     Feeling of Stress : Not on file   Social Connections:     Frequency of Communication with Friends and Family: Not on file    Frequency of Social Gatherings with Friends and Family: Not on file    Attends Jain Services: Not on file    Active Member of Clubs or Organizations: Not on file    Attends Club or Organization Meetings: Not on file    Marital Status: Not on file   Intimate Partner Violence:     Fear of Current or Ex-Partner: Not on file    Emotionally Abused: Not on file    Physically Abused: Not on file    Sexually Abused: Not on file   Housing Stability:     Unable to Pay for Housing in the Last Year: Not on file    Number of [place] in the Last Year: Not on file    Unstable Housing in the Last Year: Not on file     Family History:     Family History   Problem Relation Age of Onset    Cancer Mother         Uterine and breast    Diabetes Mother     Heart Disease Mother     Cancer Father         lung    Cancer Brother         lung    Cancer Brother         lung      Allergies:   Patient has no known allergies. Review of Systems:   General: No fevers or chills. Eyes: No double vision or blurry vision. ENT: No sore throat or runny nose. Cardiovascular: No chest pain or palpitations. Lung: She does report a chronic cough and has had increasing shortness of breath/dyspnea  Abdomen: No nausea, vomiting, diarrhea, or abdominal pain. Genitourinary: No increased urinary frequency, or dysuria. Musculoskeletal: No muscle aches or pains. Hematologic: No bleeding or bruising. Neurologic: No headache, weakness, numbness, or tingling.     Physical Examination :   /67   Pulse 99   Temp 98.2 °F (36.8 °C) (Oral)   Resp 17   Ht 5' 5\" (1.651 m)   Wt 190 lb (86.2 kg)   SpO2 95%   BMI 31.62 kg/m²     Temperature Range: Temp: 98.2 °F (36.8 °C) Temp  Av °F (36.7 °C)  Min: 97.7 °F (36.5 °C)  Max: 98.2 °F (36.8 °C)  General Appearance: Awake, alert, and in no apparent distress  Head: Normocephalic, without obvious abnormality, atraumatic  Eyes: Pupils equal, round, reactive, to light and accommodation; extraocular movements intact; sclera anicteric; conjunctivae pink  ENT: Oropharynx clear, without erythema, exudate, or thrush. Neck: Supple, without lymphadenopathy. Pulmonary/Chest: The patient has scattered bibasilar rales with some inspiratory and expiratory wheezes appreciated  Cardiovascular: Regular rate and rhythm without murmurs, rubs, or gallops. Abdomen: Soft, nontender, nondistended. Extremities: no cyanosis, no clubbing and 1 + edema-  bilateral lower extremities  Neurologic: No gross sensory or motor deficits. Skin: Warm and dry with no rash. Medical Decision Making:   I have independently reviewed/ordered the following labs:  CBC with Differential:   Recent Labs     11/29/21  1441 11/30/21  0640   WBC 11.0 10.1   HGB 8.6* 8.9*   HCT 29.8* 31.5*    407   LYMPHOPCT 22* 17*   MONOPCT 3 3     BMP:   Recent Labs     11/29/21  1426 11/30/21  0640    135   K 4.2 4.3    100   CO2 30 27   BUN 10 10   CREATININE 0.63 0.69     Hepatic Function Panel: No results for input(s): PROT, LABALBU, BILIDIR, IBILI, BILITOT, ALKPHOS, ALT, AST in the last 72 hours.     Lab Results   Component Value Date    PROCAL 0.04 11/29/2021    PROCAL 3.47 10/31/2021    PROCAL 0.20 10/15/2021       Lab Results   Component Value Date    .3 11/02/2021    .1 04/13/2020    CRP 4.3 04/11/2017     No results found for: FERRITIN  No results found for: FIBRINOGEN  Lab Results   Component Value Date    DDIMER 1.97 10/31/2021    DDIMER 3.43 12/22/2019    DDIMER 0.19 11/18/2016     Lab Results   Component Value Date     04/13/2020     05/03/2014       Lab Results   Component Value Date    SEDRATE 15 04/11/2017       Lab Results   Component Value Date    COVID19 Not Detected 11/29/2021    COVID19 Not Detected 11/29/2021    COVID19 Not Detected 10/31/2021    COVID19 Not Detected 10/31/2021    COVID19 Not Detected 10/15/2021    COVID19 Not Detected 09/19/2021    COVID19 Not Detected 09/19/2021     No results found for requested labs within last 30 days. Imaging Studies:   TTE procedure:2D Echocardiogram, Date: 11/29/2021 Start: 08:00 PM CONCLUSIONS   Left ventricle is normal in size. Mild left ventricular hypertrophy. Global left ventricular systolic function is normal with an estimated ejection fraction of 60 % . No obvious wall motion abnormality seen. Left atrium is mildly dilated. Right atrium is mildly dilated . No obvious valvular abnormality. Mild pulmonary hypertension. Estimated right ventricular systolic pressure is 95CPTV. Moderate pericardial effusion. No clear tamponade physiology Mitral and tricuspid inflows are normal. Borderline mitral inflow variation. No right ventricular or right atrial diastolic collapse. The IVC is normal size without respiratory variation. Signature ----------------------------------------------------------------------------  Electronically signed by Hanny Maxwell(Sonographer) on 11/29/2021 08:23  PM ---------------------------------------------------------------------------- ----------------------------------------------------------------------------  Electronically signed by Radha Thomason MD(Interpreting physician) on  11/29/2021 08:48 PM         ONE XRAY VIEW OF THE CHEST 11/29/2021 2:58 pm   FINDINGS:   Compared to prior chest radiograph from 10/31/2021, there are increased airspace opacities in the right mid and lower lung. Improved aeration of the left lung. Differential considerations include infection and atelectasis. Probable bilateral pleural effusions. CTA OF THE CHEST 11/29/2021 4:13 pm     FINDINGS:   1. No central or segmental pulmonary embolus. Subsegmental pulmonary arteries are limited by respiratory motion.    2.  Large pericardial effusion, new from 10/31/2021.   3.  Small bilateral pleural effusions, new/increased from 10/31/2021.   4.  Increased consolidation in the right middle lobe and new mild patchy opacities at the right base, concerning for pneumonia. 5.  Improved aeration in the left upper lobe with mild residual scattered patchy opacities. Status post left lower lobectomy. 6.  Mildly increased mediastinal lymphadenopathy, possibly reactive. Cultures:     STREP PNEUMONIAE ANTIGEN [[id number]] Collected: 11/30/21 2045   Order Status: Sent Specimen: Urine voided Updated: 11/30/21 2114   Culture, Blood 1 [[id number]] Collected: 11/29/21 1550   Order Status: Completed Specimen: Blood Updated: 11/30/21 1940    Specimen Description . BLOOD    Special Requests NOT REPORTED    Culture NO GROWTH 1 DAY   Culture, Blood 1 [[id number]] Collected: 11/29/21 1605   Order Status: Completed Specimen: Blood Updated: 11/30/21 1939    Specimen Description . BLOOD    Special Requests NOT REPORTED    Culture NO GROWTH 1 DAY   MRSA DNA Probe, Nasal [[id number]]    Order Status: Sent Specimen: Nares    LEGIONELLA ANTIGEN, URINE [[id number]] Collected: 11/29/21 2042   Order Status: Completed Specimen: Urine voided Updated: 11/30/21 0416    Legionella Pneumophilia Ag, Urine NEGATIVE    Comment: L. pneumophila serogroup 1 antigen not detected. A negative result does not exclude infection with Leginella pnemophila serogroup 1 nor does   it rule out other microbial-caused respiratory infections of disease caused by other   serogroups of Legionella pneumophila. Respiratory Panel, Molecular, with COVID-19 (Restricted: peds pts or suitable admitted adults) [[id number]] (Abnormal) Collected: 11/29/21 2026   Order Status: Completed Specimen: Nasopharyngeal Swab Updated: 11/30/21 0358    Specimen Description . NASOPHARYNGEAL SWAB    Adenovirus PCR Not Detected    Coronavirus 229E PCR Not Detected    Coronavirus HKU1 PCR Not Detected    Coronavirus NL63 PCR Not Detected    Coronavirus OC43 PCR Not Detected    SARS-CoV-2, PCR Not Detected    Human Metapneumovirus PCR Not Detected    Rhino/Enterovirus PCR to participate in the care of this patient. Please call with questions. Electronically signed by Benjamin Ferris MD on 12/1/2021 at 5:56 AM      [facility]  Benjamin Ferris MD  [employer] messaging  OFFICE: [phone]      This note is created with the assistance of a speech recognition program.  While intending to generate a document that actually reflects the content of the visit, the document can still have some errors including those of syntax and sound a like substitutions which may escape proof reading. In such instances, actual meaning can be extrapolated by contextual diversion.

## 2021-12-01 NOTE — PROGRESS NOTES
Pulmonary Critical Care Progress Note  Jose A Randolph MD     Patient seen for the follow up of acute on chronic hypoxic respiratory failure, right middle lobe pneumonia, bilateral pleural effusions, mild pulmonary hypertension, parainfluenza, COPD exacerbation Pericardial effusion     Subjective:  She is sitting up at the edge of the bed, no distress. Her shortness of breath is better today but still present, not back to baseline. She is on oxygen 4 L nasal cannula at this time. She denies any chest pain. She has a productive cough with clear sputum.     Examination:  Vitals: /65   Pulse 103   Temp 97.7 °F (36.5 °C) (Oral)   Resp 16   Ht 5' 5\" (1.651 m)   Wt 190 lb (86.2 kg)   SpO2 98%   BMI 31.62 kg/m²   General appearance: alert and cooperative with exam, sitting up at the edge of the bed  Neck: No JVD  Lungs: Decreased air exchange, faint wheeze  Heart: regular rate and rhythm, S1, S2 normal, no gallop  Abdomen: Soft, non tender, + BS  Extremities: no cyanosis or clubbing. +++ edema    LABs:  CBC:   Recent Labs     11/30/21  0640 12/01/21  0540   WBC 10.1 12.0*   HGB 8.9* 7.6*   HCT 31.5* 26.3*    382     BMP:   Recent Labs     11/29/21  1426 11/30/21  0640    135   K 4.2 4.3   CO2 30 27   BUN 10 10   CREATININE 0.63 0.69   LABGLOM >60 >60   GLUCOSE 99 204*     ABG:  Lab Results   Component Value Date    PHART 7.442 09/18/2021    CJJ3WKA 39.2 09/18/2021    PO2ART 38.2 09/18/2021    KTD9MKC 26.7 09/18/2021    ACI1ADK NOT REPORTED 07/19/2021    S4WDQAWS 71.1 09/18/2021    FIO2 UNKNOWN 10/31/2021       Lab Results   Component Value Date    POCPH 7.313 07/19/2021    PHART 7.442 09/18/2021    POCPCO2 48.3 07/19/2021    DNY0UUI 39.2 09/18/2021    POCPO2 373.3 07/19/2021    PO2ART 38.2 09/18/2021    POCHCO3 24.4 07/19/2021    KUN6AEZ 26.7 09/18/2021    NBEA NOT REPORTED 09/18/2021    PBEA 2.6 09/18/2021    YSB7EAC NOT REPORTED 07/19/2021    CEHB9KEU 100 07/19/2021    I9HUDPUJ 71.1 09/18/2021 FIO2 UNKNOWN 10/31/2021     Radiology:  12/1/2021      Impression:  · Acute on chronic hypoxic respiratory failure  · Right middle lobe pneumonia  · Bilateral pleural effusions, right greater than left  · Suspected obstructive sleep apnea/Obesity  · Left lung adenocarcinoma, status post left lower lobe lobectomy 7/19/21  · Pericardial effusion  · Mild pulmonary HTN  · Parainfluenza viral infection  · COPD exacerbation    Recommendations:  · Continue via nasal cannula, keep SPO2 90% or greater currently on 4 L. She only wears 1 to 2 L at home  · Incentive spirometry every hour while awake  · BiPAP while asleep and as needed  · Continue IV antibiotics, cefepime/doxycycline. Infectious disease following.   · IV solu-medrol 40 mg every 12 hours  · Albuterol and Ipratropium Q 4 hours and prn  · Symbicort  · Continue diuresis, IV Lasix 40 mg daily  · Cardiology following  · X-ray chest in am  · Labs: CBC and BMP in am  · DVT prophylaxis, on eliquis  · GI prophylaxis Protonix  · Sleep apnea evaluation as out patient  · Follow-up CT chest as an outpatient  · Discussed with RN  · Will follow with you  Electronically signed by     Pb Valdes MD on 12/1/2021 at 6:30 PM  Pulmonary Critical Care and Sleep Medicine,  [facility]  Cell: [phone]  Office: [phone]

## 2021-12-01 NOTE — PROGRESS NOTES
5 mL Oral Q6H    doxycycline monohydrate  100 mg Oral 2 times per day    cetirizine  10 mg Oral Daily    fluticasone  2 spray Each Nostril Daily    budesonide-formoterol  2 puff Inhalation BID    folic acid  1 mg Oral Daily    pantoprazole  40 mg Oral QAM AC    sodium chloride flush  5-40 mL IntraVENous 2 times per day    apixaban  5 mg Oral BID       IV Infusions (if any):   sodium chloride         Diagnostics:   EKG: . ECHO: .   Ejection fraction: %  Stress Test: .  Cardiac Angiography: .    Labs:   CBC:   Recent Labs     11/30/21  0640 12/01/21  0540   WBC 10.1 12.0*   HGB 8.9* 7.6*   HCT 31.5* 26.3*    382     BMP:   Recent Labs     11/29/21  1426 11/30/21  0640    135   K 4.2 4.3   CO2 30 27   BUN 10 10   CREATININE 0.63 0.69   LABGLOM >60 >60   GLUCOSE 99 204*     No results found for: BNP  PT/INR: No results for input(s): PROTIME, INR in the last 72 hours. APTT:No results for input(s): APTT in the last 72 hours. CARDIAC ENZYMES:  Recent Labs     11/29/21  1426 11/29/21  1605   TROPONINT NOT REPORTED NOT REPORTED     FASTING LIPID PANEL:  Lab Results   Component Value Date    HDL 77 01/30/2015    TRIG 84 01/30/2015     LIVER PROFILE:No results for input(s): AST, ALT, LABALBU in the last 72 hours. ASSESSMENT:  1. pericardial effusion without tamponade, the etiology is not clear, most likely viral infection however with underlying lung cancer malignancy should be considered. Unlikely to be CHF related  2. Lungs cancer, status post left lower lobe resection  3. New bilateral lower extremities edema, etiology is not clear  4.   Parainfluenza infection    Patient Active Problem List   Diagnosis    Hypertension    GERD (gastroesophageal reflux disease)    Rheumatoid arthritis ([facility] [address]    Essential hypertension    Obesity, Class II, BMI 35-39.9    Major depressive disorder, recurrent, moderate (HCC)    Prediabetes    Tobacco abuse    History of DVT in adulthood    COPD

## 2021-12-01 NOTE — PROGRESS NOTES
Physical Therapy    Facility/Department: [facility] PROGRESSIVE CARE  Initial Assessment    NAME: Rich Liz  : 1955  MRN: [id number]    Date of Service: 2021    Discharge Recommendations:  Patient would benefit from continued therapy after discharge    Recommend home with assist as needed. Assessment   Body structures, Functions, Activity limitations: Decreased functional mobility ; Decreased strength; Decreased safe awareness; Decreased endurance; Decreased balance  Assessment: Patient demo decreased endurance and decreased gait quality and distance. Patient will benefit from skilled PT to improve gait, transfers, balance, and functional activity tolerance. Prognosis: Good  Decision Making: Medium Complexity  PT Education: Goals; PT Role; Plan of Care; Disease Specific Education; General Safety; Transfer Training; Gait Training  Patient Education: Patient educated to importance of weighing self daily and informing doctor if she gains more than 3 pounds in 1 day, notifiy doctor if she suddenly needs to use 02 more or increase amount of 02 needed, and importance of compliance of her medication (including lasix). REQUIRES PT FOLLOW UP: Yes  Activity Tolerance  Activity Tolerance: Patient limited by endurance       Patient Diagnosis(es): The primary encounter diagnosis was Pneumonia of both lungs due to infectious organism, unspecified part of lung. A diagnosis of Pericardial effusion was also pertinent to this visit.      has a past medical history of Abnormal CT of the chest, Adenocarcinoma, lung, left ([address] Arthritis, Community acquired pneumonia, COPD (chronic obstructive pulmonary disease) ([address] Deep venous thrombosis of left profunda femoris vein ([address] Depression, DVT (deep venous thrombosis) ([address] GERD (gastroesophageal reflux disease), History of pulmonary embolism, History of thyroid nodule, Hypertension, Major depressive disorder, recurrent, moderate ([address] Nodule of lower lobe of left lung, Obesity, Class II, BMI 35-39.9, On anticoagulant therapy, On home oxygen therapy, On methotrexate therapy, Prediabetes, Rheumatoid arthritis ([facility] [address] Snores, Thyroid nodule, Tobacco abuse, Under care of team, Under care of team, Under care of team, and Wellness examination. has a past surgical history that includes Appendectomy (1982); Endoscopy, colon, diagnostic (133109); bronchoscopy (N/A, 12/27/2019); Colonoscopy (N/A, 2/12/2020); CT NEEDLE BIOPSY LUNG PERCUTANEOUS (5/13/2021); lobectomy (Left, 07/19/2021); Lung removal, total (Left, 7/19/2021); and Insert Midline Catheter (11/3/2021). Restrictions  Restrictions/Precautions  Restrictions/Precautions: General Precautions, Fall Risk, Contact Precautions, Up as Tolerated  Position Activity Restriction  Other position/activity restrictions: telemetry, up as mary, 4L O2 per nc  Vision/Hearing        Subjective  General  Chart Reviewed: Yes  Patient assessed for rehabilitation services?: Yes  Additional Pertinent Hx: adenocarcinoma of the lung, COPD, depression, PE 5/21, lobectomy 7/21, RA. At [facility] 11/2 with pmeumonia, 2 liters home 02 as needed  Response To Previous Treatment: Not applicable  Family / Caregiver Present: No  Diagnosis: pericardial effusion, pneumonia, Hgb 7.6  Follows Commands: Within Functional Limits  General Comment  Comments: Azul Reynolds, RN reports patient medically appropriate for PT, currentlt on 4 liters of 02. Subjective  Subjective: Patient very pleasant and appreciative of care. Patient reports BLE edema makes legs heavy and difficult to walk. Upon questioning patient reports she does not weight herself regularly and is inconsistent with taking her Lasix.   Pain Screening  Patient Currently in Pain: Denies     Pre Treatment Pain Screening  Intervention List: Patient able to continue with treatment    Orientation  Orientation  Overall Orientation Status: Within Normal Limits  Social/Functional History  Social/Functional History  Lives With: Son (Adult son works in AM)  Type of Home: House  Home Layout: Two level, Able to Live on Main level with bedroom/bathroom (has been sleeping on main floor on couch bedroom upstairs)  Home Access: Stairs to enter without rails  Entrance Stairs - Number of Steps: 2  Bathroom Shower/Tub: Tub/Shower unit  Bathroom Toilet: Handicap height  Bathroom Equipment: Shower chair  Home Equipment: Oxygen (2L O2 PRN)  ADL Assistance: Independent  Homemaking Assistance: Independent  Homemaking Responsibilities: Yes  Ambulation Assistance: Independent  Transfer Assistance: Independent  Active : No  Occupation: Retired  Type of occupation: factories  Leisure & Hobbies: shopping, talking with daughter  Additional Comments: Pt denies any falls  Cognition   Cognition  Overall Cognitive Status: Exceptions  Arousal/Alertness: Appropriate responses to stimuli  Following Commands: Follows all commands without difficulty  Attention Span: Appears intact  Memory: Appears intact  Safety Judgement: Decreased awareness of need for assistance  Problem Solving: Decreased awareness of errors  Insights: Decreased awareness of deficits  Initiation: Does not require cues  Sequencing: Does not require cues    Objective     Observation/Palpation  Posture: Fair  Observation: Patient appears to be comfortable sitting at EOB. Edema: Moderate Edema B LE    AROM RLE (degrees)  RLE AROM: WNL  AROM LLE (degrees)  LLE AROM : WNL  Strength RLE  Comment: 4+/5  Strength LLE  Comment: 4+/5  Tone RLE  RLE Tone: Normotonic  Tone LLE  LLE Tone: Normotonic  Motor Control  Gross Motor?: WFL     Bed mobility  Comment: Patient sitting on EOB upon arrival and chose to sit EOB to eat breakfast at end of visit.   Transfers  Sit to Stand: Contact guard assistance  Stand to sit: Contact guard assistance  Bed to Chair: Contact guard assistance  Stand Pivot Transfers: Contact guard assistance  Comment: Steady with transfers  Ambulation  Ambulation?: Yes  Ambulation 1  Surface: level tile  Device: No Device  Other Apparatus: O2 (4 liters)  Assistance: Contact guard assistance  Quality of Gait: Mild increase in ZEINAB, but steady with gait. Gait Deviations: Slow Caprice; Increased ZEINAB  Distance: 40 feet  Comments: SOB with ambulation     Balance  Sitting - Static: Good  Sitting - Dynamic: Good  Standing - Static: Good; -  Standing - Dynamic: Fair; +        Plan   Plan  Times per week: 1x/d, 5-6 d/wk  Current Treatment Recommendations: Strengthening, Balance Training, Functional Mobility Training, Transfer Training, Endurance Training, Gait Training, Stair training, Safety Education & Training, Patient/Caregiver Education & Training, Home Exercise Program  Safety Devices  Type of devices: All fall risk precautions in place, Gait belt, Call light within reach, Left in bed, Nurse notified    OutComes Score    AM-PAC Score  AM-PAC Inpatient Mobility Raw Score : 21 (12/01/21 1149)  AM-PAC Inpatient T-Scale Score : 50.25 (12/01/21 1149)  Mobility Inpatient CMS 0-100% Score: 28.97 (12/01/21 1149)  Mobility Inpatient CMS G-Code Modifier : Nelli Nesbitt (12/01/21 1149)          Goals  Short term goals  Time Frame for Short term goals: 12 visits  Short term goal 1: Patient will be indep with transfers. Short term goal 2: Patient will amb 100 feet indep. Short term goal 3: Patient will negotiate 2 stairs indep. Short term goal 4: Patient will tolerate 30 minutes of ther-ex and ther-act. Short term goal 5: Patient will demo good understanding od education.   Patient Goals   Patient goals : Decrease LE edema       Therapy Time   Individual Concurrent Group Co-treatment   Time In 0852         Time Out 0930         Minutes 38         Timed Code Treatment Minutes: [address], PT

## 2021-12-01 NOTE — ACP (ADVANCE CARE PLANNING)
Advance Care Planning     Advance Care Planning Activator (Inpatient)  Conversation Note      Date of ACP Conversation: 12/1/2021     Conversation Conducted with: Patient with Decision Making Capacity    ACP Activator: Robert Potts RN        Health Care Decision Maker:     Current Designated Health Care Decision Maker:     Click here to complete Healthcare Decision Makers including section of the Healthcare Decision Maker Relationship (ie \"Primary\")  Today we documented Decision Maker(s) consistent with Legal Next of Kin hierarchy. Care Preferences    Ventilation: \"If you were in your present state of health and suddenly became very ill and were unable to breathe on your own, what would your preference be about the use of a ventilator (breathing machine) if it were available to you? \"      Would the patient desire the use of ventilator (breathing machine)?: yes    \"If your health worsens and it becomes clear that your chance of recovery is unlikely, what would your preference be about the use of a ventilator (breathing machine) if it were available to you? \"     Would the patient desire the use of ventilator (breathing machine)?: Yes      Resuscitation  \"CPR works best to restart the heart when there is a sudden event, like a heart attack, in someone who is otherwise healthy. Unfortunately, CPR does not typically restart the heart for people who have serious health conditions or who are very sick. \"    \"In the event your heart stopped as a result of an underlying serious health condition, would you want attempts to be made to restart your heart (answer \"yes\" for attempt to resuscitate) or would you prefer a natural death (answer \"no\" for do not attempt to resuscitate)? \" yes       [x] Yes   [] No   Educated Patient / Richard Meehan regarding differences between Advance Directives and portable DNR orders.     Length of ACP Conversation in minutes:      Conversation Outcomes:  [x] ACP discussion completed  [] Existing advance directive reviewed with patient; no changes to patient's previously recorded wishes  [] New Advance Directive completed  [] Portable Do Not Rescitate prepared for Provider review and signature  [] POLST/POST/MOLST/MOST prepared for Provider review and signature      Follow-up plan:    [] Schedule follow-up conversation to continue planning  [] Referred individual to Provider for additional questions/concerns   [] Advised patient/agent/surrogate to review completed ACP document and update if needed with changes in condition, patient preferences or care setting    [x] This note routed to one or more involved healthcare providers

## 2021-12-01 NOTE — PROGRESS NOTES
[facility]  Office: [address], DO, Tiara Whatley, DO, Donna Brooke, DO, Aleks Carmona Blood, DO, Mayi Reyna MD, Cameron Vick MD, Leida Judge MD, Nilton Pike MD, Kim Kamara MD, Baltazar Carrizales MD, Winston Becker MD, Joe Murphy, DO, Umang Rios, DO, Maulik Hutson MD,  Charmaine Dumont, DO, Dmitri Boo MD, Senait Brunner MD, Addy Boyle MD, Arpan Briones MD, Joe Patten MD, George Medley MD, Ester Combs MD, Alferd Felty, CNP, [facility], CNP, Pino Wray, CNP, Dinorah Londono, CNS, Mian Shearer, CNP, Kirti Delacruz, CNP, Kelsie Case, CNP, Ethyl Mercury, CNP, Kaylynn Patel, CNP, Sharon Brewer PA-C, Mell Reyes, [facility], Marco Keyes, CNP, [employer], CNP, Mihir Sal, CNP, Yamilet Hatfield, CNP, Garcia Kirkpatrick, CNP, Betsy Penn, CNP, Juany Angels, [facility]    Progress Note    12/1/2021    9:56 AM    Name:   Shwetha Lopez  MRN:     [id number]     Acct:      [de-identified]   Room:   [address] [place] [address] Day:  1  Admit Date:  11/29/2021  1:42 PM    PCP:   Rubina Billy MD  Code Status:  Full Code    Subjective:     C/C:   Chief Complaint   Patient presents with    Shortness of Breath     d/c'd 11/3 from [facility]. V's with pneumonia. had PICC line until 11/22 with IV atbx. had PO atbx 11/22-11/25. worsening SOB.  Leg Swelling     onset 2 weeks ago. on PO lasix. Interval History Status: not changed. Patient feeling slightly better after diuretics, still having orthopnea, lower extremity swelling. No sputum production, no wheezing on exam but still having shortness of breath with exertion. Discussed diuresis    Brief History:     Shwetha Lopez is a 61 y.o.  female who presented to [place] [address] ED on 11/29/2021 for evaluation of worsening shortness of breath.  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 She was recently discharged on 11/3 after a 4 day hospitalization for staph aureus bacteremia. She was discharged on a prolonged course of Levaquin for which she just completed yesterday. Of note, her PICC line was recently dislodged and she did complete the final several doses of her Levaquin orally. She began feeling worsening dyspnea over the last 2-3 days. She states that she feels \"terrible. \"  Laboratory studies in the emergency department were essentially unremarkable. Patient underwent CT imaging of the chest which revealed no pulmonary embolism, but she does have a new large pericardial effusion with bilateral pleural effusions which are new. She also has increased consolidation in the right middle lobe and right base. She underwent stat 2D echo to evaluate the pericardial effusion. This is noted to be a moderate pericardial effusion with no evidence of tamponade physiology. Cardiology was contacted regarding this. She remains on IV fluids overnight. She has been started on hospital-acquired pneumonia treatment. Infectious disease, pulmonology, and cardiology have been consulted. She has been admitted for further work-up and treatment.       Review of Systems:     Constitutional:  negative for chills, fevers, sweats admits to feeling unwell. Respiratory:  Admits to cough, dyspnea on exertion, shortness of breath, denies wheezing  Cardiovascular:  negative for chest pain, chest pressure/discomfort, admits to lower extremity edema, palpitations  Gastrointestinal:  negative for abdominal pain, constipation, diarrhea, nausea, vomiting  Neurological:  negative for dizziness, headache    Medications:      Allergies:  No Known Allergies    Current Meds:   Scheduled Meds:    cefepime  2,000 mg IntraVENous Q12H    furosemide  40 mg IntraVENous Daily    ipratropium-albuterol  1 ampule Inhalation Q4H WA    methylPREDNISolone  40 mg IntraVENous Q8H    guaiFENesin-dextromethorphan  5 mL Oral Q6H    doxycycline monohydrate  100 mg Oral 2 times per day    cetirizine  10 mg Oral Daily    fluticasone  2 spray Each Nostril Daily    budesonide-formoterol  2 puff Inhalation BID    folic acid  1 mg Oral Daily    pantoprazole  40 mg Oral QAM AC    sodium chloride flush  5-40 mL IntraVENous 2 times per day    apixaban  5 mg Oral BID     Continuous Infusions:    sodium chloride       PRN Meds: sodium chloride flush, sodium chloride flush, sodium chloride, ondansetron **OR** ondansetron, magnesium hydroxide, acetaminophen **OR** acetaminophen, albuterol    Data:     Past Medical History:   has a past medical history of Abnormal CT of the chest, Adenocarcinoma, lung, left ([address] Arthritis, Community acquired pneumonia, COPD (chronic obstructive pulmonary disease) ([facility] [address] Deep venous thrombosis of left profunda femoris vein ([facility] [address] Depression, DVT (deep venous thrombosis) ([address] GERD (gastroesophageal reflux disease), History of pulmonary embolism, History of thyroid nodule, Hypertension, Major depressive disorder, recurrent, moderate ([address] Nodule of lower lobe of left lung, Obesity, Class II, BMI 35-39.9, On anticoagulant therapy, On home oxygen therapy, On methotrexate therapy, Prediabetes, Rheumatoid arthritis ([address] Snores, Thyroid nodule, Tobacco abuse, Under care of team, Under care of team, Under care of team, and Wellness examination. Social History:   reports that she quit smoking about 35 years ago. Her smoking use included cigarettes. She has a 8.75 pack-year smoking history. She has never used smokeless tobacco. She reports previous alcohol use. She reports that she does not use drugs.      Family History:   Family History   Problem Relation Age of Onset    Cancer Mother         Uterine and breast    Diabetes Mother  Heart Disease Mother     Cancer Father         lung    Cancer Brother         lung    Cancer Brother         lung       Vitals:  /65   Pulse 107   Temp 97.7 °F (36.5 °C) (Oral)   Resp 16   Ht 5' 5\" (1.651 m)   Wt 190 lb (86.2 kg)   SpO2 95%   BMI 31.62 kg/m²   Temp (24hrs), Av.9 °F (36.6 °C), Min:97.7 °F (36.5 °C), Max:98.2 °F (36.8 °C)    No results for input(s): POCGLU in the last 72 hours. I/O (24Hr): Intake/Output Summary (Last 24 hours) at 2021 0956  Last data filed at 2021 0636  Gross per 24 hour   Intake 300 ml   Output --   Net 300 ml       Labs:  Hematology:  Recent Labs     21  1441 21  0640 21  0540   WBC 11.0 10.1 12.0*   RBC 3.15* 3.31* 2.79*   HGB 8.6* 8.9* 7.6*   HCT 29.8* 31.5* 26.3*   MCV 94.6 95.2 94.3   MCH 27.3 26.9 27.2   MCHC 28.9 28.3* 28.9   RDW 19.8* 19.4* 19.2*    407 382   MPV 9.0 9.3 9.4     Chemistry:  Recent Labs     21  1426 21  1605 21  0640 21  0540     --  135  --    K 4.2  --  4.3  --      --  100  --    CO2 30  --  27  --    GLUCOSE 99  --  204*  --    BUN 10  --  10  --    CREATININE 0.63  --  0.69  --    ANIONGAP 4*  --  8*  --    LABGLOM >60  --  >60  --    GFRAA >60  --  >60  --    CALCIUM 9.0  --  8.8  --    PROBNP 293  --   --  1,118*   TROPHS 18* 17*  --   --    MYOGLOBIN <21* <21*  --   --    No results for input(s): PROT, LABALBU, LABA1C, U9SZJPA, T5FJCJE, FT4, TSH, AST, ALT, LDH, GGT, ALKPHOS, LABGGT, BILITOT, BILIDIR, AMMONIA, AMYLASE, LIPASE, LACTATE, CHOL, HDL, LDLCHOLESTEROL, CHOLHDLRATIO, TRIG, VLDL, ODC15KX, PHENYTOIN, PHENYF, URICACID, POCGLU in the last 72 hours.   ABG:  Lab Results   Component Value Date    POCPH 7.313 2021    PHART 7.442 2021    POCPCO2 48.3 2021    GYG1MCT 39.2 2021    POCPO2 373.3 2021    PO2ART 38.2 2021    POCHCO3 24.4 2021    KCY4CHA 26.7 2021    NBEA NOT REPORTED 2021    PBEA 2.6 09/18/2021    VZL2XDB NOT REPORTED 07/19/2021    VMES2SEU 100 07/19/2021    G0JPWNOS 71.1 09/18/2021    FIO2 UNKNOWN 10/31/2021     Lab Results   Component Value Date/Time    SPECIAL NOT REPORTED 11/29/2021 04:05 PM     Lab Results   Component Value Date/Time    CULTURE NO GROWTH 1 DAY 11/29/2021 04:05 PM       Radiology:  XR CHEST PORTABLE    Result Date: 11/30/2021  Compared to prior chest radiograph from 10/31/2021, there are increased airspace opacities in the right mid and lower lung. Improved aeration of the left lung. Differential considerations include infection and atelectasis. Probable bilateral pleural effusions. CT CHEST PULMONARY EMBOLISM W CONTRAST    Result Date: 11/30/2021  1. No central or segmental pulmonary embolus. Subsegmental pulmonary arteries are limited by respiratory motion. 2.  Large pericardial effusion, new from 10/31/2021. 3.  Small bilateral pleural effusions, new/increased from 10/31/2021. 4.  Increased consolidation in the right middle lobe and new mild patchy opacities at the right base, concerning for pneumonia. 5.  Improved aeration in the left upper lobe with mild residual scattered patchy opacities. Status post left lower lobectomy. 6.  Mildly increased mediastinal lymphadenopathy, possibly reactive.        Physical Examination:        General appearance:  alert, cooperative and no distress  Mental Status:  oriented to person, place and time and normal affect  Lungs:  clear to auscultation bilaterally, normal effort  Heart:  regular rate and rhythm, no murmur  Abdomen:  soft, nontender, nondistended, normal bowel sounds, no masses, hepatomegaly, splenomegaly  Extremities:  no edema, redness, tenderness in the calves  Skin:  no gross lesions, rashes, induration    Assessment:        Hospital Problems           Last Modified POA    * (Principal) Pericardial effusion 11/29/2021 Yes    Essential hypertension 11/29/2021 Yes    History of DVT in adulthood 11/29/2021 Yes Overview Signed 6/12/2019  1:18 PM by Aimee Baez MD     Treated , recent scan normal          Deep venous thrombosis of left profunda femoris vein ([address] 11/29/2021 Yes    Adenocarcinoma, lung, left ([address] 11/29/2021 Yes    On methotrexate therapy 11/29/2021 Yes    S/P lobectomy of lung 11/29/2021 Yes    Acute on chronic respiratory failure ([address] 11/29/2021 Yes    Hospital-acquired pneumonia 11/29/2021 Yes    COPD (chronic obstructive pulmonary disease) ([address] 14/59/4504 Yes    Diastolic heart failure ([address] 11/29/2021 Yes          Plan:        Acute hypoxic respiratory failure: Multifactorial 2/2 COPD exacerbation+viral pneumonia. Pt also has Lung cancer s/p lobectomy. Currently on 4 L nasal cannula, continue diuresis as patient has good had good output. Patient states that her diuretics have not been impacting her urine output since she has started them. Moderate pericardial effusion: ECHO  showed no diastolic collapse or evidence of tamponade. pt will need repeat Echo in 's office next week. Bilateral lower ext edema: Echo shows RV dilatation. Albumin 2.4. could be secondary to low protein state from cirrhosis. Could also be 2/2 Cor pulmonale from COPD/chronic lung disease. Bilirubin normal, INR normal.   Viral Pneumonia 2/2 Parainfluenza 3 infection: Continue supportive care. Wean oxygen as able  Acute COPD exacerbation: Continue Steroids, decrease frequency today, only inspiratory wheezing.    ILD  Bilateral lower extremity edema: Continue IV lasix  Labs, imaging, ecg reviewed  PT/OT      Hilda Dooley DO  12/1/2021  9:56 AM

## 2021-12-01 NOTE — CARE COORDINATION
Case Management Initial Discharge Plan  Roxanne Bhatia,         Readmission Risk              Risk of Unplanned Readmission:  29             Met with:patient to discuss discharge plans. Information verified: address, contacts, phone number, , insurance Yes  PCP: Philip Wilson MD  Date of last visit: spoke on phone 2 weeks ago     Insurance Provider: [address]     Discharge Planning  Current Residence:  Private home   Living Arrangements:  Family Members       Home has 2 stories/2 stairs to climb to enter. Her bed and bath are on the main floor. Support Systems:  Family Members       Current Services PTA:  None   Agency: none      Patient able to perform ADL's:Independent  DME in home:  Nebulizer and home 02 thru [employer] ( 2 lites ). Also has shower chair and BSC   DME used to aid ambulation prior to admission:   None   DME used during admission:  None     Potential Assistance Needed:  N/A    Pharmacy: [facility] [employer] Medications:  No  Does patient want to participate in local refill/ meds to beds program?   yes    Patient agreeable to home care: No  [place] of choice provided:  n/a      Type of Home Care Services:  None  Patient expects to be discharged to:   home     Prior SNF/Rehab Placement and Facility: none   Agreeable to SNF/Rehab: No  [place] of choice provided: n/a   Evaluation: n/a    Expected Discharge date:  21  Follow Up Appointment: Best Day/ Time: Monday PM    Transportation provider: per family  Transportation arrangements needed for discharge: No    Discharge Plan:   Met with patient to complete a discharge assessment. Patient lives with adult son Mustapha Gaspar and has custody of her 6year old grand daughter. She is independent in ADL's but on  at 2 liters. She was s/p left lobectomy in July of this year for adenocarcinoma. She was to follow up with DR Juana Bro but unable to keep appointments.  ( admitted )  She is

## 2021-12-01 NOTE — PLAN OF CARE
Problem: Falls - Risk of:  Goal: Will remain free from falls  Description: Will remain free from falls  12/1/2021 1839 by Melquiades Garcia RN  Outcome: Ongoing     Problem: Falls - Risk of:  Goal: Absence of physical injury  Description: Absence of physical injury  12/1/2021 1839 by Melquiades Garcia RN  Outcome: Ongoing     Problem: Breathing Pattern - Ineffective:  Goal: Ability to achieve and maintain a regular respiratory rate will improve  Description: Ability to achieve and maintain a regular respiratory rate will improve  Outcome: Ongoing  Patient has been weaned down to 2L at rest and 3-4 with exertion

## 2021-12-01 NOTE — PROGRESS NOTES
Transitions of Care Pharmacy Service   Medication Review    The patient's list of current home medications was reviewed. Source(s) of information: patient (interviewed 11/30), [employer]s refill report, Epic    Medications that need to be addressed by a physician/nurse practitioner: none      Please feel free to call me with any questions about this encounter. Thank you.     Sarahi Carr [address] [place] [address]   Transitions [facility] Service  Phone:  [phone]  Fax: [phone]      Electronically signed by Sarahi Carr [address] [place] [address] on 12/1/2021 at 8:47 AM           Medications Prior to Admission:   sertraline (ZOLOFT) 100 MG tablet, Take 100 mg by mouth daily as needed (social settings)  zinc sulfate (ZINCATE) 220 (50 Zn) MG capsule, Take 1 capsule by mouth daily  fluticasone (FLONASE) 50 MCG/ACT nasal spray, instill 2 sprays into each nostril once daily  fluticasone-vilanterol (BREO ELLIPTA) 100-25 MCG/INH AEPB inhaler, Inhale 1 puff into the lungs daily  cetirizine (ZYRTEC ALLERGY) 10 MG tablet, Take 1 tablet by mouth daily  ondansetron (ZOFRAN) 4 MG tablet, Take 1 tablet by mouth 3 times daily as needed for Nausea or Vomiting  SPIRIVA RESPIMAT 2.5 MCG/ACT AERS inhaler, inhale 2 puffs INTO THE LUNGS once daily  amLODIPine (NORVASC) 10 MG tablet, take 1 tablet by mouth once daily  albuterol (PROVENTIL) (2.5 MG/3ML) 0.083% nebulizer solution, Take 3 mLs by nebulization every 6 hours as needed for Wheezing or Shortness of Breath  furosemide (LASIX) 20 MG tablet, Take 1 tablet by mouth 2 times daily  potassium chloride (KLOR-CON M) 20 MEQ extended release tablet, Take 1 tablet by mouth 2 times daily  acetaminophen (TYLENOL) 500 MG tablet, Take 1,000 mg by mouth every 6 hours as needed for Pain  ELIQUIS 5 MG TABS tablet, take 1 tablet by mouth twice a day  albuterol sulfate  (90 Base) MCG/ACT inhaler, inhale 2 puffs by mouth and INTO THE LUNGS every 4 hours for 7 days  pantoprazole (PROTONIX) 40 MG tablet, take 1 tablet by mouth once daily  folic acid (FOLVITE) 1 MG tablet, take 1 tablet by mouth once daily  methotrexate (RHEUMATREX) 2.5 MG chemo tablet, Take 15 mg by mouth once a week Indications:  Wednesdays Takes 6 tabs (=15mg) on Wednesdays

## 2021-12-01 NOTE — PROGRESS NOTES
Occupational Therapy   Occupational Therapy Initial Assessment  Date: 2021   Patient Name: Prashanth Garcia  MRN: [id number]     : 1955    RN Azul Reynolds reports patient is medically stable for therapy treatment this date. Chart reviewed prior to treatment and patient is agreeable for therapy. All lines intact and patient positioned comfortably at end of treatment. All patient needs addressed prior to ending therapy session. Date of Service: 2021    Discharge Recommendations:     OT Equipment Recommendations  Equipment Needed: Yes  Mobility Devices: ADL Assistive Devices  ADL Assistive Devices: Long-handled Shoe Horn; Long-handled Sponge; Reacher; Sock-Aid Hard    Assessment   Performance deficits / Impairments: Decreased functional mobility ; Decreased safe awareness; Decreased ADL status; Decreased endurance; Decreased strength; Decreased high-level IADLs; Decreased balance  Assessment: Pt would benefit from continued skilled OT services to increase endurance, I and safety during functional tasks to return home at prior level of function as able. Prognosis: Good  Decision Making: Medium Complexity  OT Education: OT Role; Transfer Training; Energy Conservation; Plan of Care; Precautions; ADL Adaptive Strategies  Patient Education: safety in function, fall prevention/call light use, benefits of being oob, recommendations for continued therapy, pursed lip breathing  REQUIRES OT FOLLOW UP: Yes  Activity Tolerance  Activity Tolerance: Patient Tolerated treatment well; Patient limited by fatigue  Activity Tolerance: Fair +  Safety Devices  Safety Devices in place: Yes  Type of devices: Nurse notified; Gait belt; Call light within reach; Patient at risk for falls; Left in bed (Pt sitting on EOB at beginning of session)           Patient Diagnosis(es): The primary encounter diagnosis was Pneumonia of both lungs due to infectious organism, unspecified part of lung.  A diagnosis of Pericardial effusion was also pertinent to this visit. has a past medical history of Abnormal CT of the chest, Adenocarcinoma, lung, left ([address] Arthritis, Community acquired pneumonia, COPD (chronic obstructive pulmonary disease) ([address] Deep venous thrombosis of left profunda femoris vein ([address] Depression, DVT (deep venous thrombosis) ([address] GERD (gastroesophageal reflux disease), History of pulmonary embolism, History of thyroid nodule, Hypertension, Major depressive disorder, recurrent, moderate ([address] Nodule of lower lobe of left lung, Obesity, Class II, BMI 35-39.9, On anticoagulant therapy, On home oxygen therapy, On methotrexate therapy, Prediabetes, Rheumatoid arthritis ([address] Snores, Thyroid nodule, Tobacco abuse, Under care of team, Under care of team, Under care of team, and Wellness examination. has a past surgical history that includes Appendectomy (1982); Endoscopy, colon, diagnostic (006068); bronchoscopy (N/A, 12/27/2019); Colonoscopy (N/A, 2/12/2020); CT NEEDLE BIOPSY LUNG PERCUTANEOUS (5/13/2021); lobectomy (Left, 07/19/2021); Lung removal, total (Left, 7/19/2021); and Insert Midline Catheter (11/3/2021). PER H&P: Marcelo Mcghee is a 61 y.o. female who presents to the emergency department difficulty breathing and swelling in her feet and ankles. She had pneumonia about a month ago and was on IV Rocephin at home through PICC line for about 3 weeks. It was stopped a few days early because her PICC line malfunction and she was put on oral antibiotic instead, 4 days of Levaquin. Her feet and ankles have become swollen and she is feeling more short of breath. No fever or hemoptysis. She is not vaccinated for Covid.       Restrictions  Restrictions/Precautions  Restrictions/Precautions: General Precautions, Fall Risk, Contact Precautions, Up as Tolerated  Position Activity Restriction  Other position/activity restrictions: telemetry, up as mary, 4L O2 per nc    Subjective   General  Chart Reviewed: Yes  Patient assessed for rehabilitation services?: Yes  Family / Caregiver Present: No  Subjective  Subjective: \"my legs just feel like weights. \"  General Comment  Comments: Pt sitting on EOB, pleasant and agreeable to OT eval  Patient Currently in Pain: Denies      Social/Functional History  Social/Functional History  Lives With: Son (Adult son works in AM)  Type of Home: House  Home Layout: Two level, Able to Live on Main level with bedroom/bathroom (has been sleeping on main floor on couch bedroom upstairs)  Home Access: Stairs to enter without rails  Entrance Stairs - Number of Steps: 2  Bathroom Shower/Tub: Tub/Shower unit  Bathroom Toilet: Handicap height  Bathroom Equipment: Shower chair  Home Equipment: Oxygen (2L O2 PRN)  ADL Assistance: Independent  Homemaking Assistance: Independent  Homemaking Responsibilities: Yes  Ambulation Assistance: Independent  Transfer Assistance: Independent  Active : No  Occupation: Retired  Type of occupation: factories  Leisure & Hobbies: shopping, talking with daughter  Additional Comments: Pt denies any falls       Objective   Vision: Impaired (Bifocals; No recent visual changes)  Vision Exceptions: Wears glasses at all times  Hearing: Within functional limits    Orientation  Overall Orientation Status: Within Functional Limits  Observation/Palpation  Posture: Fair (No AD)  Observation: resting on EOB  Edema: Moderate Edema B LE       Balance  Sitting Balance: Stand by assistance  Standing Balance: Contact guard assistance (No AD)  Standing Balance  Time: standing ~ 1-2 min  Activity: functional mobility  Functional Mobility  Functional - Mobility Device: No device  Activity:  (bed to door, door back to bed)  Assist Level: Contact guard assistance  Functional Mobility Comments: Pt given Min verbal cues for pacing self, line mgmt, pursed lip breathing and upright posture all to increase safety and reduce risk of falls.   Toilet Transfers  Toilet Transfer: Unable to assess  Toilet Transfers Comments: Pt with no needs at this time       ADL  Feeding: Setup  Grooming: Setup; Stand by assistance (seated)  UE Bathing: Setup; Stand by assistance  LE Bathing: Setup; Minimal assistance  UE Dressing: Setup; Minimal assistance (to tie/adjust hosp gown seated on EOB)  LE Dressing: Setup; Minimal assistance (with B socks)  Toileting: Contact guard assistance  Additional Comments: Pt is currently limited by SOB, fatigue and BLE edema       Tone RUE  RUE Tone: Normotonic  Tone LUE  LUE Tone: Normotonic  Coordination  Movements Are Fluid And Coordinated: Yes     Bed mobility  Supine to Sit: Unable to assess  Sit to Supine: Unable to assess  Scooting: Stand by assistance  Comment: Pt sitting on EOB at beginning and end of session       Transfers  Sit to stand: Contact guard assistance (No AD)  Stand to sit: Contact guard assistance  Transfer Comments: Pt required Min verbal cues for pacing self, line mgmt, pursed lip breathing, controlled stand to sit all to increase safety and reduce risk of falls. Cognition  Overall Cognitive Status: Exceptions  Arousal/Alertness: Appropriate responses to stimuli  Following Commands:  Follows all commands without difficulty  Attention Span: Appears intact  Memory: Appears intact  Safety Judgement: Decreased awareness of need for assistance  Problem Solving: Decreased awareness of errors  Insights: Decreased awareness of deficits  Initiation: Does not require cues  Sequencing: Does not require cues  Perception  Overall Perceptual Status: WFL     Sensation  Overall Sensation Status: WFL        LUE AROM (degrees)  LUE AROM : WFL  RUE AROM (degrees)  RUE AROM : WFL  LUE Strength  Gross LUE Strength: WFL  LUE Strength Comment: ~ 4/5  RUE Strength  Gross RUE Strength: WFL  RUE Strength Comment: ~ 4/5                   Plan   Plan  Times per week: 4-5x/wk 1x/day as mary  Current Treatment Recommendations: Strengthening, Endurance Training, Functional Mobility Training, Balance Training, Safety Education & Training, Home Management Training, Self-Care / ADL, Equipment Evaluation, Education, & procurement                                   AM-PAC Score: 19             Goals  Short term goals  Time Frame for Short term goals: By discharge, pt to demo  Short term goal 1: ADL transfers and functional mobility to Mod I with use of AD as needed. Short term goal 2: total body ADLs to Set up/Sup with use of AD/AE as needed. Short term goal 3: increased B UE strength by 1/2 grade to assist with functional tasks/I with B UE HEP with use of handouts as needed. Short term goal 4: bed mobility to Indep without use of bedrails. Short term goal 5: increased standing mary > 10 min with CGA using AD as needed to reduce risk of falls during functional tasks. Long term goals  Long term goal 1: Pt to be I with fall prevention education, EC/WS tech, recommendations for AE, and COPD education with use of handouts as needed. Patient Goals   Patient goals : To go home!        Therapy Time   Individual Concurrent Group Co-treatment   Time In 0818         Time Out 0856         Minutes 38          tx time: 24 min       Ivan Santos, OT

## 2021-12-01 NOTE — ED NOTES
Report called to Marella Galeazzi, RN. All questions answered at this time.      Radha Mirza RN  11/30/21 [id number]

## 2021-12-02 NOTE — PROGRESS NOTES
Reached out to ID NP to see what their recommendations are for discharge    Okay with all other services to D/C today       Update:  From Mary Kate Cost, NP  Patient okay to discharge on no antibiotics

## 2021-12-02 NOTE — CARE COORDINATION
DC Planning    Pt would like to go home today. Discussed vns-she declines. She has a dtr in law who is a nurse and children that help with anything she needs. She has home 02 thru [facility] she normally does not wear all the time but has been wearing it more often pta. She is currently on 3l nc. She does have a portable tank at home but running low. She does have a ride and relays her son knows to bring her tank. Called [facility] her original RX for 02 is 2l. She does NOT  need a new order per  Denise. She can just dial her concentrator up to 3. She can call [facility] when she gets home and they will bring her some more tanks. Pt also requested writer call Preston Quinn she may need a refill on her albuterol nebulizer solution. [facility] will need a refill she does not have any left. Perfect serve to attending.

## 2021-12-02 NOTE — PROGRESS NOTES
[facility]  Office: [address], DO, Amarjit Marialuisa, DO, Shayne Lucia, DO, Partha Duong Blood, DO, Avinash Wilder MD, Hi Rubio MD, Mally Valencia MD, Isabel Aguila MD, Logan Carbajal MD, Hilary Archibald MD, Kaylynn Escalante MD, Mary Colindres, DO, Luna Michael, DO, Murphy Jung MD,  Martha Partida, DO, You Lam MD, Caleb Neal MD, Gabriella Loyd MD, Jus Silva MD, Coty Pang MD, Lina Enrique MD, Jaswinder Stevenson MD, Maliha Aguilar, [facility], [facility], CNP, Murali Haas, CNP, Nicolasa Wilkerson, CNS, Gloria Sinha, CNP, Vitor Lopez, CNP, Meri Sal, CNP, Dario Portillo, CNP, Julia Vargas, CNP, Elizabeth Dunham PA-C, Charmel Osgood, [facility], Tracie Carpenter, CNP, Marilu Osorio, CNP, Karolina Partida, CNP, Tiney Schilder, CNP, January Machado, CNP, Marlyne Osgood, CNP, [facility]    Progress Note    12/2/2021    8:47 AM    Name:   Antolin Lombardo  MRN:     [id number]     Acct:      [de-identified]   Room:   [address] [place] [address] Day:  2  Admit Date:  11/29/2021  1:42 PM    PCP:   Iveth Lee MD  Code Status:  Full Code    Subjective:     C/C:   Chief Complaint   Patient presents with    Shortness of Breath     d/c'd 11/3 from [facility]. V's with pneumonia. had PICC line until 11/22 with IV atbx. had PO atbx 11/22-11/25. worsening SOB.  Leg Swelling     onset 2 weeks ago. on PO lasix. Interval History Status: not changed. Patient seen and examined, she is feeling much better, lower extremity swelling is also improved. She states that she started feeling lightheaded from the diuretics and constantly urinating. She inquired about going home. Will need to discuss with cardiology    Brief History:     Antolin Lombardo is a 61 y.o.  female who presented to [address] [place] [address] ED on 11/29/2021 for evaluation of worsening shortness of breath.  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 She was recently discharged on 11/3 after a 4 day hospitalization for staph aureus bacteremia. She was discharged on a prolonged course of Levaquin for which she just completed yesterday. Of note, her PICC line was recently dislodged and she did complete the final several doses of her Levaquin orally. She began feeling worsening dyspnea over the last 2-3 days. She states that she feels \"terrible. \"  Laboratory studies in the emergency department were essentially unremarkable. Patient underwent CT imaging of the chest which revealed no pulmonary embolism, but she does have a new large pericardial effusion with bilateral pleural effusions which are new. She also has increased consolidation in the right middle lobe and right base. She underwent stat 2D echo to evaluate the pericardial effusion. This is noted to be a moderate pericardial effusion with no evidence of tamponade physiology. Cardiology was contacted regarding this. She remains on IV fluids overnight. She has been started on hospital-acquired pneumonia treatment. Infectious disease, pulmonology, and cardiology have been consulted. She has been admitted for further work-up and treatment.       Review of Systems:     Constitutional:  negative for chills, fevers, sweats admits to feeling unwell. Respiratory:  Admits to cough, dyspnea on exertion, shortness of breath, denies wheezing  Cardiovascular:  negative for chest pain, chest pressure/discomfort, admits to lower extremity edema, palpitations  Gastrointestinal:  negative for abdominal pain, constipation, diarrhea, nausea, vomiting  Neurological:  negative for dizziness, headache    Medications:      Allergies:  No Known Allergies    Current Meds:   Scheduled Meds:    cefepime  2,000 mg IntraVENous Q12H    furosemide  40 mg IntraVENous Daily    methylPREDNISolone  40 mg IntraVENous Q12H    ipratropium-albuterol  1 ampule Inhalation Q4H WA    guaiFENesin-dextromethorphan  5 mL Oral Q6H    doxycycline monohydrate  100 mg Oral 2 times per day    cetirizine  10 mg Oral Daily    fluticasone  2 spray Each Nostril Daily    budesonide-formoterol  2 puff Inhalation BID    folic acid  1 mg Oral Daily    pantoprazole  40 mg Oral QAM AC    sodium chloride flush  5-40 mL IntraVENous 2 times per day    apixaban  5 mg Oral BID     Continuous Infusions:    sodium chloride       PRN Meds: sodium chloride flush, sodium chloride flush, sodium chloride, ondansetron **OR** ondansetron, magnesium hydroxide, acetaminophen **OR** acetaminophen, albuterol    Data:     Past Medical History:   has a past medical history of Abnormal CT of the chest, Adenocarcinoma, lung, left [address] Arthritis, Community acquired pneumonia, COPD (chronic obstructive pulmonary disease) ([address] Deep venous thrombosis of left profunda femoris vein ([facility]), Depression, DVT (deep venous thrombosis) ([address] GERD (gastroesophageal reflux disease), History of pulmonary embolism, History of thyroid nodule, Hypertension, Major depressive disorder, recurrent, moderate ([address] Nodule of lower lobe of left lung, Obesity, Class II, BMI 35-39.9, On anticoagulant therapy, On home oxygen therapy, On methotrexate therapy, Prediabetes, Rheumatoid arthritis ([address] Snores, Thyroid nodule, Tobacco abuse, Under care of team, Under care of team, Under care of team, and Wellness examination. Social History:   reports that she quit smoking about 35 years ago. Her smoking use included cigarettes. She has a 8.75 pack-year smoking history. She has never used smokeless tobacco. She reports previous alcohol use. She reports that she does not use drugs.      Family History:   Family History   Problem Relation Age of Onset    Cancer Mother         Uterine and breast    Diabetes Mother     Heart Disease Mother     Cancer Father         lung    Cancer Brother         lung    Cancer Brother         lung       Vitals:  BP (!) 141/87   Pulse 105   Temp 97.5 °F (36.4 °C) (Oral)   Resp 20   Ht 5' 5\" (1.651 m)   Wt 202 lb 8 oz (91.9 kg)   SpO2 96%   BMI 33.70 kg/m²   Temp (24hrs), Av.8 °F (36.6 °C), Min:97.5 °F (36.4 °C), Max:98.1 °F (36.7 °C)    No results for input(s): POCGLU in the last 72 hours. I/O (24Hr): Intake/Output Summary (Last 24 hours) at 2021 0847  Last data filed at 2021 1141  Gross per 24 hour   Intake 50 ml   Output --   Net 50 ml       Labs:  Hematology:  Recent Labs     21  1441 21  0640 21  0540   WBC 11.0 10.1 12.0*   RBC 3.15* 3.31* 2.79*   HGB 8.6* 8.9* 7.6*   HCT 29.8* 31.5* 26.3*   MCV 94.6 95.2 94.3   MCH 27.3 26.9 27.2   MCHC 28.9 28.3* 28.9   RDW 19.8* 19.4* 19.2*    407 382   MPV 9.0 9.3 9.4     Chemistry:  Recent Labs     21  1426 21  1605 21  0640 21  0540     --  135  --    K 4.2  --  4.3  --      --  100  --    CO2 30  --  27  --    GLUCOSE 99  --  204*  --    BUN 10  --  10  --    CREATININE 0.63  --  0.69  --    ANIONGAP 4*  --  8*  --    LABGLOM >60  --  >60  --    GFRAA >60  --  >60  --    CALCIUM 9.0  --  8.8  --    PROBNP 293  --   --  1,118*   TROPHS 18* 17*  --   --    MYOGLOBIN <21* <21*  --   --    No results for input(s): PROT, LABALBU, LABA1C, A1UFYRZ, X3ATNRL, FT4, TSH, AST, ALT, LDH, GGT, ALKPHOS, LABGGT, BILITOT, BILIDIR, AMMONIA, AMYLASE, LIPASE, LACTATE, CHOL, HDL, LDLCHOLESTEROL, CHOLHDLRATIO, TRIG, VLDL, ROP18GV, PHENYTOIN, PHENYF, URICACID, POCGLU in the last 72 hours.   ABG:  Lab Results   Component Value Date    POCPH 7.313 2021    PHART 7.442 2021    POCPCO2 48.3 2021    SRE3XRX 39.2 2021    POCPO2 373.3 2021    PO2ART 38.2 2021    POCHCO3 24.4 2021    FGW2KNK 26.7 09/18/2021    NBEA NOT REPORTED 09/18/2021    PBEA 2.6 09/18/2021    EIW5LKK NOT REPORTED 07/19/2021    ZRLZ4NNK 100 07/19/2021    L4DRILTZ 71.1 09/18/2021    FIO2 UNKNOWN 10/31/2021     Lab Results   Component Value Date/Time    SPECIAL NOT REPORTED 11/29/2021 04:05 PM     Lab Results   Component Value Date/Time    CULTURE NO GROWTH 3 DAYS 11/29/2021 04:05 PM       Radiology:  XR CHEST PORTABLE    Result Date: 11/30/2021  Compared to prior chest radiograph from 10/31/2021, there are increased airspace opacities in the right mid and lower lung. Improved aeration of the left lung. Differential considerations include infection and atelectasis. Probable bilateral pleural effusions. CT CHEST PULMONARY EMBOLISM W CONTRAST    Result Date: 11/30/2021  1. No central or segmental pulmonary embolus. Subsegmental pulmonary arteries are limited by respiratory motion. 2.  Large pericardial effusion, new from 10/31/2021. 3.  Small bilateral pleural effusions, new/increased from 10/31/2021. 4.  Increased consolidation in the right middle lobe and new mild patchy opacities at the right base, concerning for pneumonia. 5.  Improved aeration in the left upper lobe with mild residual scattered patchy opacities. Status post left lower lobectomy. 6.  Mildly increased mediastinal lymphadenopathy, possibly reactive.        Physical Examination:        General appearance:  alert, cooperative and no distress  Mental Status:  oriented to person, place and time and normal affect  Lungs:  clear to auscultation bilaterally, normal effort  Heart:  regular rate and rhythm, no murmur  Abdomen:  soft, nontender, nondistended, normal bowel sounds, no masses, hepatomegaly, splenomegaly  Extremities:  2+ edema, redness, tenderness in the calves  Skin:  no gross lesions, rashes, induration    Assessment:        Hospital Problems           Last Modified POA    * (Principal) Pericardial effusion 11/29/2021 Yes    Essential hypertension 11/29/2021 Yes    History of DVT in adulthood 11/29/2021 Yes    Overview Signed 6/12/2019  1:18 PM by Philip Wilson MD     Treated , recent scan normal          Deep venous thrombosis of left profunda femoris vein ([address] 11/29/2021 Yes    Adenocarcinoma, lung, left ([address] 11/29/2021 Yes    On methotrexate therapy 11/29/2021 Yes    S/P lobectomy of lung 11/29/2021 Yes    Acute on chronic respiratory failure ([address] 11/29/2021 Yes    Hospital-acquired pneumonia 11/29/2021 Yes    COPD (chronic obstructive pulmonary disease) ([address] 73/87/5961 Yes    Diastolic heart failure ([address] 11/29/2021 Yes          Plan:        Acute hypoxic respiratory failure: Multifactorial 2/2 COPD exacerbation+viral pneumonia. Pt also has Lung cancer s/p lobectomy. Patient continues to diurese well  Moderate pericardial effusion: ECHO  showed no diastolic collapse or evidence of tamponade. pt will need repeat Echo in 's office next week. Bilateral lower ext edema: Echo shows RV dilatation. Albumin 2.4. could be secondary to low protein state from cirrhosis. Could also be 2/2 Cor pulmonale from COPD/chronic lung disease. Bilirubin normal, INR normal.   Viral Pneumonia 2/2 Parainfluenza 3 infection: Continue supportive care. Wean oxygen as able  Acute COPD exacerbation: Continue Steroids, decrease frequency today, only inspiratory wheezing. ILD  Bilateral lower extremity edema: Continue IV lasix  Labs, imaging, ecg reviewed  PT/OT    Plan: We will need to speak with cardiology, will discuss discharging on 40 mg Lasix p.o. Check BMP before discharge.       Murphy Kearns DO  12/2/2021  8:47 AM

## 2021-12-02 NOTE — PLAN OF CARE
Problem: Falls - Risk of:  Goal: Will remain free from falls  Description: Will remain free from falls  12/2/2021 0543 by Carol Ann Zhong RN  Outcome: Ongoing  Note: Fall risk assessment completed. Patient instructed to use call light. Bed locked and in lowest position, side rails up 2/4, call light and bedside table within reach, clutter removed, and non-skid footwear on when pt out of bed. Hourly rounds will continue.       Problem: Falls - Risk of:  Goal: Absence of physical injury  Description: Absence of physical injury  12/2/2021 0543 by Carol Ann Zhong RN  Outcome: Ongoing     Problem: Breathing Pattern - Ineffective:  Goal: Ability to achieve and maintain a regular respiratory rate will improve  Description: Ability to achieve and maintain a regular respiratory rate will improve  12/2/2021 0543 by Carol Ann Zhong RN  Outcome: Ongoing

## 2021-12-02 NOTE — DISCHARGE SUMMARY
[facility]  Office: [address], DO, Nasim Armstrong, DO, Shabana Brady, DO, Ciarakeyana Earl Blood, DO, Gabriella Zuniga MD, Breana Henderson MD, Alyssa Meyers MD, Susan Chicas MD, Nancy Bush MD, Ximena Bro MD, Willard Reagan MD, Patricia Alberto, DO, Frank Simmonds, DO, Lg Cifuentes MD,  Rolanda Nuñez DO, Olive Reid MD, Shmuel Quinonez MD, Nichelle Luther MD, Camron Plasencia MD, Marciano Bernheim, MD, Conrad Warner MD, Julio C Kaiser MD, Emelina Golden, CNP, Lorne Berry, CNP, Trixie Sandy, CNP, Jaank Springer, CNS, Jarrett Malone, CNP, Margie Hanson, CNP, Edouard Duvall, CNP, Kate Elizalde, CNP, Enio Guillen, CNP, Donna Mason PA-C, Merary Nolen, [facility], Dominga Ricketts, CNP, Woodward Brittle, CNP, Ghada Pierson, CNP, Dilan Alva, CNP, Bita Lopez, CNP, Cherelle Palomo, [facility], Jemal Dunn, [facility]    Discharge Summary     Patient ID: Merary Arriaga  :  1955   MRN: [id number]     ACCOUNT:  [de-identified]   Patient's PCP: Noelle Moffett MD  Admit Date: 2021   Discharge Date: 2021     Length of Stay: 2  Code Status:  Full Code  Admitting Physician: Meryle Lees, DO  Discharge Physician: Frank Simmonds, DO     Active Discharge Diagnoses:     Hospital Problem Lists:  Principal Problem:    Pericardial effusion  Active Problems:    Essential hypertension    History of DVT in adulthood    Deep venous thrombosis of left profunda femoris vein (HCC)    Adenocarcinoma, lung, left (HCC)    On methotrexate therapy    S/P lobectomy of lung    Acute on chronic respiratory failure (HCC)    Hospital-acquired pneumonia    COPD (chronic obstructive pulmonary disease) (HCC)    Diastolic heart failure ([facility] [address]  Resolved Problems:    * No resolved hospital problems.  *      Admission Condition:  good     Discharged Condition: good    Hospital Stay:     Hospital Course:  Angeles Lopez a 61 y.o.  female who presented [facility]. [facility], [facility] PO 76/63/1896 NAF evaluation of worsening shortness of breath. 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 She was recently discharged on 11/3 after a 4 day hospitalization for staph aureus bacteremia. She was discharged on a prolonged course of Levaquin for which she just completed yesterday. Of note, her PICC line was recently dislodged and she did complete the final several doses of her Levaquin orally. She began feeling worsening dyspnea over the last 2-3 days. She states that she feels \"terrible. \"  Laboratory studies in the emergency department were essentially unremarkable.  Patient underwent CT imaging of the chest which revealed no pulmonary embolism, but she does have a new large pericardial effusion with bilateral pleural effusions which are new.  She also has increased consolidation in the right middle lobe and right base.  She underwent stat 2D echo to evaluate the pericardial effusion.  This is noted to be a moderate pericardial effusion with no evidence of tamponade physiology.  Cardiology was contacted regarding this.  She remains on IV fluids overnight.  She has been started on hospital-acquired pneumonia treatment.  Infectious disease, pulmonology, and cardiology have been consulted. Cornelia Nina has been admitted for further work-up and treatment. Patient's pericardial effusion remained stable, she was thought to be fluid overloaded and started on IV diuretics. 2D echocardiogram confirmed a large pericardial effusion and she was evaluated by infectious disease, cardiology, pulmonary medicine.   She was continued on antibiotics while inpatient but discontinued eventually as she had recently completed a 28-day course of IV antibiotics. She tolerated diuresis, ox requirements decreased, after speaking with consultants patient was discharged off antibiotics follow-up next week with cardiology to get a repeat 2D echocardiogram.  She was ordered twice daily 40 mg of Lasix for diuresis, instructed to monitor her weights, fluid intake.   Discharged home on 12/2/2021    Significant therapeutic interventions: none    Significant Diagnostic Studies:   Labs / Micro:  CBC:   Lab Results   Component Value Date    WBC 12.0 12/01/2021    RBC 2.79 12/01/2021    HGB 7.6 12/01/2021    HCT 26.3 12/01/2021    MCV 94.3 12/01/2021    MCH 27.2 12/01/2021    MCHC 28.9 12/01/2021    RDW 19.2 12/01/2021     12/01/2021     BMP:    Lab Results   Component Value Date    GLUCOSE 166 12/02/2021    GLUCOSE 105 12/05/2011     12/02/2021    K 3.7 12/02/2021     12/02/2021    CO2 31 12/02/2021    ANIONGAP 6 12/02/2021    BUN 17 12/02/2021    CREATININE 0.48 12/02/2021    BUNCRER 35 12/02/2021    CALCIUM 8.9 12/02/2021    LABGLOM >60 12/02/2021    GFRAA >60 12/02/2021    GFR      12/02/2021    GFR NOT REPORTED 12/02/2021     HFP:    Lab Results   Component Value Date    PROT 11.1 10/15/2021     CMP:    Lab Results   Component Value Date    GLUCOSE 166 12/02/2021    GLUCOSE 105 12/05/2011     12/02/2021    K 3.7 12/02/2021     12/02/2021    CO2 31 12/02/2021    BUN 17 12/02/2021    CREATININE 0.48 12/02/2021    ANIONGAP 6 12/02/2021    ALKPHOS 106 10/15/2021    ALT 6 10/15/2021    AST 15 10/15/2021    BILITOT 0.60 10/15/2021    LABALBU 2.4 10/15/2021    LABALBU 4.0 12/05/2011    ALBUMIN 0.3 10/15/2021    LABGLOM >60 12/02/2021    GFRAA >60 12/02/2021    GFR      12/02/2021    GFR NOT REPORTED 12/02/2021    PROT 11.1 10/15/2021    CALCIUM 8.9 12/02/2021     PT/INR:    Lab Results   Component Value Date    PROTIME 11.1 11/01/2021    PROTIME 64.1 04/03/2015    INR 1.0 11/01/2021     PTT:   Lab Results   Component Value Date    APTT 22.8 10/31/2021      Radiology:  XR CHEST PORTABLE    Result Date: 12/1/2021  Bilateral perihilar and bibasilar airspace disease and small bilateral pleural effusions, not significantly changed. XR CHEST PORTABLE    Result Date: 11/30/2021  Compared to prior chest radiograph from 10/31/2021, there are increased airspace opacities in the right mid and lower lung. Improved aeration of the left lung. Differential considerations include infection and atelectasis. Probable bilateral pleural effusions. CT CHEST PULMONARY EMBOLISM W CONTRAST    Result Date: 11/30/2021  1. No central or segmental pulmonary embolus. Subsegmental pulmonary arteries are limited by respiratory motion. 2.  Large pericardial effusion, new from 10/31/2021. 3.  Small bilateral pleural effusions, new/increased from 10/31/2021. 4.  Increased consolidation in the right middle lobe and new mild patchy opacities at the right base, concerning for pneumonia. 5.  Improved aeration in the left upper lobe with mild residual scattered patchy opacities. Status post left lower lobectomy. 6.  Mildly increased mediastinal lymphadenopathy, possibly reactive. Consultations:    Consults:     Final Specialist Recommendations/Findings:   IP CONSULT TO CARDIOLOGY  IP CONSULT TO CARDIOLOGY  IP CONSULT TO INFECTIOUS DISEASES  IP CONSULT TO PULMONOLOGY      The patient was seen and examined on day of discharge and this discharge summary is in conjunction with any daily progress note from day of discharge.     Discharge plan:     Disposition: Home    Physician Follow Up:   Emerson Mejia MD  [address] [facility] [address]  [address] [facility] [address] [phone]    Schedule an appointment as soon as possible for a visit in 1 week  hospital followup    Wild Guillen MD  [facility] [address] [facility]  [phone]    Schedule an appointment as soon as possible for a visit in 1 week  hospital followup, cardiology followup    Aaron Gamble MD  [address] [facility] [address]  [phone]    Schedule an appointment as soon as possible for a visit in 2 weeks  hospital followup, pulmonary medicine followup       Requiring Further Evaluation/Follow Up POST HOSPITALIZATION/Incidental Findings: none    Diet: regular diet    Activity: As tolerated    Instructions to Patient: none    Discharge Medications:      Medication List      CHANGE how you take these medications    furosemide 40 MG tablet  Commonly known as: Lasix  Take 1 tablet by mouth daily  What changed:   medication strength  how much to take  when to take this        CONTINUE taking these medications    acetaminophen 500 MG tablet  Commonly known as: TYLENOL     * albuterol sulfate  (90 Base) MCG/ACT inhaler  inhale 2 puffs by mouth and INTO THE LUNGS every 4 hours for 7 days     * albuterol (2.5 MG/3ML) 0.083% nebulizer solution  Commonly known as: PROVENTIL  Take 3 mLs by nebulization every 6 hours as needed for Wheezing or Shortness of Breath     cetirizine 10 MG tablet  Commonly known as: ZyrTEC Allergy  Take 1 tablet by mouth daily     Eliquis 5 MG Tabs tablet  Generic drug: apixaban  take 1 tablet by mouth twice a day     fluticasone 50 MCG/ACT nasal spray  Commonly known as: FLONASE  instill 2 sprays into each nostril once daily     fluticasone-vilanterol 100-25 MCG/INH Aepb inhaler  Commonly known as: BREO ELLIPTA  Inhale 1 puff into the lungs daily     folic acid 1 MG tablet  Commonly known as: FOLVITE  take 1 tablet by mouth once daily     methotrexate 2.5 MG chemo tablet  Commonly known as: RHEUMATREX     ondansetron 4 MG tablet  Commonly known as: ZOFRAN  Take 1 tablet by mouth 3 times daily as needed for Nausea or Vomiting     pantoprazole 40 MG tablet  Commonly known as: PROTONIX  take 1 tablet by mouth once daily     potassium chloride 20 MEQ extended release tablet  Commonly known as: KLOR-CON M  Take 1 tablet by mouth 2 times daily     sertraline 100 MG tablet  Commonly known as: ZOLOFT     Spiriva Respimat 2.5 MCG/ACT Aers inhaler  Generic drug: tiotropium  inhale 2 puffs INTO THE LUNGS once daily     zinc sulfate 220 (50 Zn) MG capsule  Commonly known as: ZINCATE         * This list has 2 medication(s) that are the same as other medications prescribed for you. Read the directions carefully, and ask your doctor or other care provider to review them with you. STOP taking these medications    amLODIPine 10 MG tablet  Commonly known as: NORVASC     lidocaine 4 % external patch     nystatin [id number] UNIT/ML suspension  Commonly known as: MYCOSTATIN           Where to Get Your Medications      These medications were sent to [place] [address]. [facility] [phone] [facility] [phone]  [address] [place] [address]., [address] [place] [id number]    Phone: [phone]   furosemide 40 MG tablet         No discharge procedures on file. Time Spent on discharge is  41 mins in patient examination, evaluation, counseling as well as medication reconciliation, prescriptions for required medications, discharge plan and follow up. Electronically signed by   Sid Gomes DO  12/2/2021  10:25 AM      Thank you Dr. Glendy Ramos MD for the opportunity to be involved in this patient's care.

## 2021-12-02 NOTE — PROGRESS NOTES
Pulmonary Critical Care Progress Note  Kathleen Gallo CNP/Arianna Jj MD     Patient seen for the follow up of acute on chronic hypoxic respiratory failure, right middle lobe pneumonia, bilateral pleural effusions, mild pulmonary hypertension, parainfluenza, COPD exacerbation Pericardial effusion     Subjective:  No significant overnight events noted. She is sitting up in the bed, no distress. Shortness of breath is better but still present, not back to her baseline yet. She remains on oxygen at 4 L nasal cannula. She denies chest pain. She has a productive cough with clear sputum. Examination:  Vitals: BP (!) 141/87   Pulse 105   Temp 97.5 °F (36.4 °C) (Oral)   Resp 20   Ht 5' 5\" (1.651 m)   Wt 202 lb 8 oz (91.9 kg)   SpO2 96%   BMI 33.70 kg/m²   General appearance: alert and cooperative with exam, sitting up at the edge of the bed  Neck: No JVD  Lungs: Decreased air exchange, faint wheeze  Heart: regular rate and rhythm, S1, S2 normal, no gallop  Abdomen: Soft, non tender, + BS  Extremities: no cyanosis or clubbing. +++ edema    LABs:  CBC:   Recent Labs     11/30/21  0640 12/01/21  0540   WBC 10.1 12.0*   HGB 8.9* 7.6*   HCT 31.5* 26.3*    382     BMP:   Recent Labs     11/29/21  1426 11/30/21  0640    135   K 4.2 4.3   CO2 30 27   BUN 10 10   CREATININE 0.63 0.69   LABGLOM >60 >60   GLUCOSE 99 204*     Radiology:  12/1/2021      Impression:  · Acute on chronic hypoxic respiratory failure  · Right middle lobe pneumonia  · Bilateral pleural effusions, right greater than left  · Suspected obstructive sleep apnea/Obesity  · Left lung adenocarcinoma, status post left lower lobe lobectomy 7/19/21  · Pericardial effusion  · Mild pulmonary HTN  · Parainfluenza viral infection  · COPD exacerbation    Recommendations:  · Continue via nasal cannula, keep SPO2 90% or greater currently on 4 L.   She only wears 1 to 2 L at home  · Incentive spirometry every hour while awake  · BiPAP while asleep and as needed  · Antibiotics per discretion of infectious disease, on cefepime/doxycycline  · IV Solu-Medrol 40 mg every 12 hours  · Albuterol and Ipratropium Q 4 hours and prn  · Symbicort  · Continue diuresis, IV Lasix 40 mg daily  · Cardiology following  · DVT prophylaxis, on eliquis  · GI prophylaxis Protonix  · Sleep apnea evaluation as out patient  · Follow-up CT chest as an outpatient  · Discussed with RN  · Discharge planning per primary team      Electronically signed by     ILIR Matt CNP on 12/2/2021 at 8:52 AM  Pulmonary Critical Care and Sleep Medicine,  [facility] [place]: [phone]

## 2021-12-02 NOTE — PROGRESS NOTES
Pt taken out per wheelchair. Patient discharged via private auto with son. Discharge paperwork and instructions given to patient. Patient  acknowledges understanding. Scripts e-scripted to patients pharmacy for prednisone, lasix and albuterol   New medications and side effects explained. Follow up appointments reviewed (directions to make follow up appointment given)  Discharge checklist completed   No VERNELL needed. New script was faxed to home [employer] company for increased oxygen needs. Any questions answered.      Telemetry monitor returned

## 2021-12-02 NOTE — PROGRESS NOTES
Section of Cardiology  Progress Note      Date:  12/2/2021  Patient: Sin Johnson  Admission:  11/29/2021  1:42 PM  Admit DX: Pericardial effusion [I31.3]  Pneumonia of both lungs due to infectious organism, unspecified part of lung [J18.9]  Age:  61 y.o., 1955     LOS: 2 days     Reason for evaluation:   Pericardial effusion      SUBJECTIVE:     The patient was seen and examined. Notes and labs reviewed. There were not complications over night. Patient seen and examined in her room. She was having FaceTime with her children. She keeps asking to be discharged. She is on diuretic however the input and output schedule is not accurate. The patient feels better. Denies any dizziness. No chest pain. No shortness of breath at rest.  She feels her edema is improving    OBJECTIVE:    Telemetry: Sinus  BP (!) 141/87   Pulse 105   Temp 97.5 °F (36.4 °C) (Oral)   Resp 20   Ht 5' 5\" (1.651 m)   Wt 202 lb 8 oz (91.9 kg)   SpO2 96%   BMI 33.70 kg/m²     Intake/Output Summary (Last 24 hours) at 12/2/2021 1041  Last data filed at 12/1/2021 1141  Gross per 24 hour   Intake 50 ml   Output --   Net 50 ml       EXAM:   CONSTITUTIONAL:  awake, alert, cooperative, no apparent distress, and appears stated age. HEENT: Normal jugular venous pulsations, no carotid bruits. Head is atraumatic, normocephalic. Eyes and oral mucosa are normal.  LUNGS: Good respiratory effort. No for increased work of breathing. On auscultation: clear to auscultation bilaterally  CARDIOVASCULAR:  Normal apical impulse, regular rate and rhythm, normal S1 and S2, no S3 or S4,   ABDOMEN: Soft, nontender, nondistended. Bowel sounds present. SKIN: Warm and dry. EXTREMITIES: 2+ lower extremity edema. Motor movement grossly intact. No cyanosis or clubbing.     Current Inpatient Medications:   cefepime  2,000 mg IntraVENous Q12H    furosemide  40 mg IntraVENous Daily    methylPREDNISolone  40 mg IntraVENous Q12H    ipratropium-albuterol  1 ampule Inhalation Q4H WA    guaiFENesin-dextromethorphan  5 mL Oral Q6H    doxycycline monohydrate  100 mg Oral 2 times per day    cetirizine  10 mg Oral Daily    fluticasone  2 spray Each Nostril Daily    budesonide-formoterol  2 puff Inhalation BID    folic acid  1 mg Oral Daily    pantoprazole  40 mg Oral QAM AC    sodium chloride flush  5-40 mL IntraVENous 2 times per day    apixaban  5 mg Oral BID       IV Infusions (if any):   sodium chloride         Diagnostics:   EKG: . ECHO: reviewed. Ejection fraction: %  Stress Test: not obtained. Cardiac Angiography: not obtained. Labs:   CBC:   Recent Labs     11/30/21  0640 12/01/21  0540   WBC 10.1 12.0*   HGB 8.9* 7.6*   HCT 31.5* 26.3*    382     BMP:   Recent Labs     11/30/21  0640 12/02/21  0918    137   K 4.3 3.7   CO2 27 31   BUN 10 17   CREATININE 0.69 0.48*   LABGLOM >60 >60   GLUCOSE 204* 166*     No results found for: BNP  PT/INR: No results for input(s): PROTIME, INR in the last 72 hours. APTT:No results for input(s): APTT in the last 72 hours. CARDIAC ENZYMES:  Recent Labs     11/29/21  1426 11/29/21  1605   TROPONINT NOT REPORTED NOT REPORTED     FASTING LIPID PANEL:  Lab Results   Component Value Date    HDL 77 01/30/2015    TRIG 84 01/30/2015     LIVER PROFILE:No results for input(s): AST, ALT, LABALBU in the last 72 hours. ASSESSMENT:  1. pericardial effusion without tamponade, the etiology is not clear, most likely viral infection however with underlying lung cancer malignancy should be considered.  Unlikely to be CHF related  2.  Lungs cancer, status post left lower lobe resection  3.  New bilateral lower extremities edema, etiology is not clear  4.   Parainfluenza infection  Patient Active Problem List   Diagnosis    Hypertension    GERD (gastroesophageal reflux disease)    Rheumatoid arthritis ([facility] [address]    Essential hypertension    Obesity, Class II, BMI 35-39.9    Major depressive disorder, recurrent, moderate (HCC)    Prediabetes    Tobacco abuse    History of DVT in adulthood    COPD exacerbation (HCC)    Pneumonia due to infectious organism    History of thyroid nodule    Pneumococcal septicemia (HCC)    Thyroid nodule    Positive colorectal cancer screening using Cologuard test    Deep venous thrombosis of left profunda femoris vein (HCC)    Abnormal CT of the chest    Encounter for smoking cessation counseling    Nodule of lower lobe of left lung    Primary mucinous adenocarcinoma of lung (HCC)    Adenocarcinoma, lung, left ([facility] [address]    History of pulmonary embolism    On anticoagulant therapy    On methotrexate therapy    Obesity, Class III, BMI 40-49.9 (morbid obesity) ([facility] [address]    S/P lobectomy of lung    Hypoxia    Moderate malnutrition (HCC)    RSV bronchitis    History of adenocarcinoma of lung    Mixed hyperlipidemia    Sepsis ([facility] [address]    Hyponatremia    Anemia    Leucocytosis    Chronic rhinitis    Respiratory distress    Acute on chronic respiratory failure (HCC)    Hospital-acquired pneumonia    COPD (chronic obstructive pulmonary disease) (HCC)    ASHLEIGH (acute kidney injury) (HCC)    Diastolic heart failure (HCC)    Multifocal pneumonia    Pericardial effusion       PLAN:    1. Keep on oral Lasix 40 mg daily  2. Pressure stockings  3. Elevation of the legs multiple times a day for at least 15 to 20 minutes at times. 4. Echocardiogram next week  5. Patient can be discharged      Please see orders. Discussed with patient and other admitting service and the patient's daughter-in-law who is a nurse in this hospital.  And nursing.     Jessi Marcos MD, MD

## 2021-12-02 NOTE — PLAN OF CARE
Problem: Falls - Risk of:  Goal: Will remain free from falls  Description: Will remain free from falls  12/2/2021 1211 by Nikos Hart RN  Outcome: Ongoing  Siderails up x 2  Hourly rounding. Call light in reach. Instructed to call for assist before attempting out of bed. Remains free from falls and accidental injury at this time. Floor free from obstacles, and bed is locked and in lowest position. Adequate lighting provided. Bed alarm on. Fall sticker on wristband.  Fall Sign posted in doorway       Problem: Falls - Risk of:  Goal: Absence of physical injury  Description: Absence of physical injury  12/2/2021 1211 by Nikos Hart RN  Outcome: Ongoing     Problem: Breathing Pattern - Ineffective:  Goal: Ability to achieve and maintain a regular respiratory rate will improve  Description: Ability to achieve and maintain a regular respiratory rate will improve  12/2/2021 1211 by Nikos Hart RN  Outcome: Ongoing

## 2021-12-03 NOTE — TELEPHONE ENCOUNTER
[facility] Transitions Initial Follow Up Call    Outreach made within 2 business days of discharge: Yes    Patient: Marnie Pulido Patient : 1955   MRN: [id number]  Reason for Admission: There are no discharge diagnoses documented for the most recent discharge. Discharge Date: 21       Spoke with: Ria Esquivel    Discharge department/facility: [address] [place] [address] Interactive Patient Contact:  Was patient able to fill all prescriptions: Yes  Was patient instructed to bring all medications to the follow-up visit: Yes  Is patient taking all medications as directed in the discharge summary?  Yes  Does patient understand their discharge instructions: Yes  Does patient have questions or concerns that need addressed prior to 7-14 day follow up office visit: no    Scheduled appointment with PCP within 7-14 days    Follow Up  Future Appointments   Date Time Provider Kludeep Sun   2021  4:15 PM Kota Mcfadden MD [address] [place] [address]   2022 12:00 PM Cliff Navas MD fp sc [address], [place]

## 2021-12-16 NOTE — TELEPHONE ENCOUNTER
Mike Briggs MD VISIT   DR Schulte Standing IN TO SEE PATIENT  ORDERS RECEIVED  RV 3-4 MONTHS  MD VISIT 03/24/22 @3:15PM  AVS PRINTED AND GIVEN TO PATIENT WITH INSTRUCTIONS  PATIENT DISCHARGED AMBULATORY

## 2021-12-17 NOTE — PROGRESS NOTES
_           Chief Complaint   Patient presents with    Follow-up    Shortness of Breath    Leg Swelling    Nausea     DIAGNOSIS:       Stage T1b N0 M0 left lower lobe lung adenocarcinoma  COPD  Chronic tobacco abuse  Multiple comorbidities as listed    CURRENT THERAPY:         Status post left lower lobectomy 7/19/2021  BRIEF CASE HISTORY:      Ms. Renea Angeles is a very pleasant 61 y.o. female with history of multiple co morbidities as listed. Patient is referred for further evaluation and management of lung cancer. The patient had bacterial pneumonia in December 2019. CT scan at that time showed questionable abnormality in the left lower lobe as well. Recommendation was for monitoring. Patient did fairly well since then. She had screening CT scan done April 2021 and that showed suspicious 1.7 lesion in the left lung lower lobe. And CT scan did not show any significant activity but due to the increase in the size of the nodule compared to last year biopsy was done which was positive for invasive adenocarcinoma. Patient is referred for further management. Patient denies any chest pain or hemoptysis. She has occasional cough and sputum. No fever. No headaches. No back pain. No weight loss or decreased appetite. No other complaints. Patient was also referred to see chest surgery which is scheduled for May 27, 2021. She has scheduled pulmonary function test on June 1, 2021. Patient smoker of 1 to 1.5 packs/day for the last 33 years. Denies alcohol drinking. INTERIM HISTORY:   Patient seen for follow-up lung cancer. She was last seen in May 2021. Since then she had surgery with left lower lobectomy 7/19/2021. Patient is recovering fine. She was recently hospitalized with increasing shortness of breath sounds pleural effusion pericardial effusion. Doing well now.   She continues follow-up with pulmonary and cardiology. She is receiving antibiotics for pneumonia. PAST MEDICAL HISTORY: has a past medical history of Abnormal CT of the chest, Adenocarcinoma, lung, left ([address] Arthritis, Community acquired pneumonia, COPD (chronic obstructive pulmonary disease) ([address] Deep venous thrombosis of left profunda femoris vein ([address] Depression, DVT (deep venous thrombosis) ([address] GERD (gastroesophageal reflux disease), History of pulmonary embolism, History of thyroid nodule, Hypertension, Major depressive disorder, recurrent, moderate ([address] Nodule of lower lobe of left lung, Obesity, Class II, BMI 35-39.9, On anticoagulant therapy, On home oxygen therapy, On methotrexate therapy, Prediabetes, Rheumatoid arthritis ([address] Snores, Thyroid nodule, Tobacco abuse, Under care of team, Under care of team, Under care of team, and Wellness examination. PAST SURGICAL HISTORY: has a past surgical history that includes Appendectomy (1982); Endoscopy, colon, diagnostic (376425); bronchoscopy (N/A, 12/27/2019); Colonoscopy (N/A, 2/12/2020); CT NEEDLE BIOPSY LUNG PERCUTANEOUS (5/13/2021); lobectomy (Left, 07/19/2021); Lung removal, total (Left, 7/19/2021); and Insert Midline Catheter (11/3/2021). CURRENT MEDICATIONS:  has a current medication list which includes the following prescription(s): prenatal vitamin, furosemide, albuterol, sertraline, ondansetron, spiriva respimat, potassium chloride, acetaminophen, eliquis, albuterol sulfate hfa, pantoprazole, folic acid, and methotrexate. ALLERGIES:  has No Known Allergies. FAMILY HISTORY: 2 brothers and father had lung cancer. Mother had uterine and breast cancer. Otherwise negative for any hematological or oncological conditions. SOCIAL HISTORY:  reports that she quit smoking about 35 years ago. Her smoking use included cigarettes. She has a 8.75 pack-year smoking history. She has never used smokeless tobacco. She reports previous alcohol use.  She reports that she does not use drugs. REVIEW OF SYSTEMS:     · General: No weakness or fatigue. No unanticipated weight loss or decreased appetite. No fever or chills. · Eyes: No blurred vision, eye pain or double vision. · Ears: No hearing problems or drainage. No tinnitus. · Throat: No sore throat, problems with swallowing or dysphagia. · Respiratory: As above  · Cardiovascular: No chest pain, orthopnea or PND. No lower extremity edema. No palpitation. · Gastrointestinal: No problems with swallowing. No abdominal pain or bloating. No nausea or vomiting. No diarrhea or constipation. No GI bleeding. · Genitourinary: No dysuria, hematuria, frequency or urgency. · Musculoskeletal: No muscle aches or pains. No limitation of movement. No back pain. No gait disturbance, No joint complaints. · Dermatologic: No skin rashes or pruritus. No skin lesions or discolorations. · Psychiatric: No depression, anxiety, or stress or signs of schizophrenia. No change in mood or affect. · Hematologic: No history of bleeding tendency. No bruises or ecchymosis. No history of clotting problems. · Infectious disease: No fever, chills or frequent infections. · Endocrine: No polydipsia or polyuria. No temperature intolerance. · Neurologic: No headaches or dizziness. No weakness or numbness of the extremities. No changes in balance, coordination,  memory, mentation, behavior. · Allergic/Immunologic: No nasal congestion or hives. No repeated infections. PHYSICAL EXAM:  The patient is not in acute distress. Vital signs: Blood pressure 117/66, pulse 91, temperature 97.4 °F (36.3 °C), temperature source Temporal, resp. rate 18, weight 198 lb 3.2 oz (89.9 kg), not currently breastfeeding.      General appearance - well appearing, not in pain or distress  Mental status - good mood, alert and oriented  Eyes - pupils equal and reactive, extraocular eye movements intact  Ears - bilateral TM's and external ear canals normal  Nose - normal and patent, no erythema, discharge or polyps  Mouth - mucous membranes moist, pharynx normal without lesions  Neck - supple, no significant adenopathy  Lymphatics - no palpable lymphadenopathy, no hepatosplenomegaly  Chest - clear to auscultation, no wheezes, rales or rhonchi, symmetric air entry  Heart - normal rate, regular rhythm, normal S1, S2, no murmurs, rubs, clicks or gallops  Abdomen - soft, nontender, nondistended, no masses or organomegaly  Neurological - alert, oriented, normal speech, no focal findings or movement disorder noted  Musculoskeletal - no joint tenderness, deformity or swelling  Extremities - peripheral pulses normal, no pedal edema, no clubbing or cyanosis  Skin - normal coloration and turgor, no rashes, no suspicious skin lesions noted     Review of Diagnostic data:   Lab Results   Component Value Date    WBC 12.0 (H) 12/01/2021    HGB 7.6 (L) 12/01/2021    HCT 26.3 (L) 12/01/2021    MCV 94.3 12/01/2021     12/01/2021       Chemistry        Component Value Date/Time     12/02/2021 0918    K 3.7 12/02/2021 0918     12/02/2021 0918    CO2 31 12/02/2021 0918    BUN 17 12/02/2021 0918    CREATININE 0.48 (L) 12/02/2021 0918        Component Value Date/Time    CALCIUM 8.9 12/02/2021 0918    ALKPHOS 106 (H) 10/15/2021 0956    AST 15 10/15/2021 0956    ALT 6 10/15/2021 0956    BILITOT 0.60 10/15/2021 0956            IMPRESSION:   Stage T1b N0 M0 left lower lobe lung adenocarcinoma  COPD  Chronic tobacco abuse  Multiple comorbidities as listed    PLAN:  I explained to the patient the nature of lung cancer, staging, prognosis and treatment. The patient presents with a small tumor size with no clear evidence of metastasis. She had left lower lobectomy. Recovered well. Discussed the patient results of the recent CT scan. No evidence of cancer recurrence. She had mild pleural effusion pericardial effusion.   She continues to have follow-up with cardiology and pulmonary. We will see her again in about 3 to 4 months. Patient scheduled for repeated CT scan in about 3 months. Patient was counseled about importance of tobacco cessation. Patient's questions were answered to the best of her satisfaction and she verbalized full understanding and agreement.

## 2022-01-01 ENCOUNTER — HOSPITAL ENCOUNTER (INPATIENT)
Age: 67
LOS: 2 days | DRG: 720 | End: 2022-08-27
Attending: EMERGENCY MEDICINE | Admitting: INTERNAL MEDICINE
Payer: COMMERCIAL

## 2022-01-01 ENCOUNTER — HOSPITAL ENCOUNTER (OUTPATIENT)
Facility: MEDICAL CENTER | Age: 67
End: 2022-01-01

## 2022-01-01 ENCOUNTER — TELEPHONE (OUTPATIENT)
Dept: FAMILY MEDICINE CLINIC | Age: 67
End: 2022-01-01

## 2022-01-01 ENCOUNTER — HOSPITAL ENCOUNTER (INPATIENT)
Age: 67
LOS: 2 days | Discharge: HOME | DRG: 140 | End: 2022-02-10
Attending: EMERGENCY MEDICINE
Payer: COMMERCIAL

## 2022-01-01 ENCOUNTER — TELEPHONE (OUTPATIENT)
Dept: PULMONOLOGY | Age: 67
End: 2022-01-01

## 2022-01-01 ENCOUNTER — TELEMEDICINE (OUTPATIENT)
Dept: FAMILY MEDICINE CLINIC | Age: 67
End: 2022-01-01
Payer: COMMERCIAL

## 2022-01-01 ENCOUNTER — APPOINTMENT (OUTPATIENT)
Dept: GENERAL RADIOLOGY | Age: 67
DRG: 140 | End: 2022-01-01
Payer: COMMERCIAL

## 2022-01-01 ENCOUNTER — OFFICE VISIT (OUTPATIENT)
Dept: PULMONOLOGY | Age: 67
End: 2022-01-01
Payer: COMMERCIAL

## 2022-01-01 ENCOUNTER — APPOINTMENT (OUTPATIENT)
Dept: GENERAL RADIOLOGY | Age: 67
DRG: 720 | End: 2022-01-01
Payer: COMMERCIAL

## 2022-01-01 ENCOUNTER — HOSPITAL ENCOUNTER (OUTPATIENT)
Dept: GENERAL RADIOLOGY | Age: 67
Discharge: HOME | End: 2022-05-18
Payer: COMMERCIAL

## 2022-01-01 ENCOUNTER — OFFICE VISIT (OUTPATIENT)
Dept: ONCOLOGY | Age: 67
End: 2022-01-01
Payer: COMMERCIAL

## 2022-01-01 ENCOUNTER — HOSPITAL ENCOUNTER (INPATIENT)
Age: 67
LOS: 4 days | Discharge: HOME | DRG: 720 | End: 2022-04-26
Attending: EMERGENCY MEDICINE | Admitting: INTERNAL MEDICINE
Payer: COMMERCIAL

## 2022-01-01 ENCOUNTER — HOSPITAL ENCOUNTER (OUTPATIENT)
Dept: NUCLEAR MEDICINE | Age: 67
Discharge: HOME | End: 2022-05-18
Payer: COMMERCIAL

## 2022-01-01 ENCOUNTER — TELEPHONE (OUTPATIENT)
Dept: ONCOLOGY | Age: 67
End: 2022-01-01

## 2022-01-01 ENCOUNTER — APPOINTMENT (OUTPATIENT)
Dept: CT IMAGING | Age: 67
DRG: 720 | End: 2022-01-01
Payer: COMMERCIAL

## 2022-01-01 ENCOUNTER — HOSPITAL ENCOUNTER (OUTPATIENT)
Age: 67
Discharge: HOME | End: 2022-05-18

## 2022-01-01 ENCOUNTER — HOSPITAL ENCOUNTER (EMERGENCY)
Age: 67
Discharge: HOME | End: 2022-08-24

## 2022-01-01 ENCOUNTER — HOSPITAL ENCOUNTER (OUTPATIENT)
Dept: CT IMAGING | Age: 67
Discharge: HOME | End: 2022-06-04
Payer: COMMERCIAL

## 2022-01-01 ENCOUNTER — TELEMEDICINE (OUTPATIENT)
Dept: PULMONOLOGY | Age: 67
End: 2022-01-01
Payer: COMMERCIAL

## 2022-01-01 ENCOUNTER — HOSPITAL ENCOUNTER (OUTPATIENT)
Dept: CT IMAGING | Age: 67
Discharge: HOME | End: 2022-04-10
Payer: COMMERCIAL

## 2022-01-01 ENCOUNTER — APPOINTMENT (OUTPATIENT)
Dept: ULTRASOUND IMAGING | Age: 67
DRG: 720 | End: 2022-01-01
Payer: COMMERCIAL

## 2022-01-01 ENCOUNTER — TELEPHONE (OUTPATIENT)
Dept: GASTROENTEROLOGY | Age: 67
End: 2022-01-01

## 2022-01-01 ENCOUNTER — HOSPITAL ENCOUNTER (OUTPATIENT)
Dept: GENERAL RADIOLOGY | Age: 67
Discharge: HOME | End: 2022-06-04
Payer: COMMERCIAL

## 2022-01-01 VITALS
SYSTOLIC BLOOD PRESSURE: 110 MMHG | HEART RATE: 74 BPM | DIASTOLIC BLOOD PRESSURE: 55 MMHG | BODY MASS INDEX: 28.77 KG/M2 | WEIGHT: 172.9 LBS | TEMPERATURE: 97.4 F | RESPIRATION RATE: 16 BRPM

## 2022-01-01 VITALS
WEIGHT: 184.75 LBS | SYSTOLIC BLOOD PRESSURE: 155 MMHG | HEART RATE: 73 BPM | TEMPERATURE: 97.8 F | BODY MASS INDEX: 30.78 KG/M2 | RESPIRATION RATE: 18 BRPM | DIASTOLIC BLOOD PRESSURE: 84 MMHG | OXYGEN SATURATION: 93 % | HEIGHT: 65 IN

## 2022-01-01 VITALS
OXYGEN SATURATION: 95 % | WEIGHT: 180 LBS | RESPIRATION RATE: 16 BRPM | DIASTOLIC BLOOD PRESSURE: 57 MMHG | HEART RATE: 84 BPM | SYSTOLIC BLOOD PRESSURE: 101 MMHG | HEIGHT: 65 IN | TEMPERATURE: 97.2 F | BODY MASS INDEX: 29.99 KG/M2

## 2022-01-01 VITALS
OXYGEN SATURATION: 94 % | WEIGHT: 177 LBS | TEMPERATURE: 97 F | RESPIRATION RATE: 16 BRPM | DIASTOLIC BLOOD PRESSURE: 62 MMHG | HEIGHT: 65 IN | HEART RATE: 81 BPM | BODY MASS INDEX: 29.49 KG/M2 | SYSTOLIC BLOOD PRESSURE: 129 MMHG

## 2022-01-01 VITALS
BODY MASS INDEX: 29.99 KG/M2 | HEIGHT: 65 IN | DIASTOLIC BLOOD PRESSURE: 72 MMHG | WEIGHT: 180 LBS | RESPIRATION RATE: 16 BRPM | SYSTOLIC BLOOD PRESSURE: 116 MMHG | TEMPERATURE: 98.5 F | HEART RATE: 73 BPM | OXYGEN SATURATION: 96 %

## 2022-01-01 VITALS
RESPIRATION RATE: 18 BRPM | SYSTOLIC BLOOD PRESSURE: 138 MMHG | DIASTOLIC BLOOD PRESSURE: 64 MMHG | HEART RATE: 92 BPM | BODY MASS INDEX: 28.66 KG/M2 | TEMPERATURE: 97.9 F | HEIGHT: 65 IN | OXYGEN SATURATION: 92 % | WEIGHT: 172 LBS

## 2022-01-01 VITALS
TEMPERATURE: 100.6 F | BODY MASS INDEX: 29.75 KG/M2 | DIASTOLIC BLOOD PRESSURE: 40 MMHG | SYSTOLIC BLOOD PRESSURE: 141 MMHG | WEIGHT: 178.57 LBS | HEIGHT: 65 IN | RESPIRATION RATE: 19 BRPM | OXYGEN SATURATION: 84 %

## 2022-01-01 VITALS
HEART RATE: 98 BPM | TEMPERATURE: 97.9 F | DIASTOLIC BLOOD PRESSURE: 85 MMHG | SYSTOLIC BLOOD PRESSURE: 136 MMHG | RESPIRATION RATE: 18 BRPM | OXYGEN SATURATION: 92 %

## 2022-01-01 VITALS
OXYGEN SATURATION: 94 % | DIASTOLIC BLOOD PRESSURE: 74 MMHG | HEART RATE: 95 BPM | BODY MASS INDEX: 29.06 KG/M2 | SYSTOLIC BLOOD PRESSURE: 133 MMHG | WEIGHT: 174.4 LBS | HEIGHT: 65 IN

## 2022-01-01 DIAGNOSIS — Z86.718 HISTORY OF DVT IN ADULTHOOD: ICD-10-CM

## 2022-01-01 DIAGNOSIS — Z90.2 STATUS POST LOBECTOMY OF LUNG: ICD-10-CM

## 2022-01-01 DIAGNOSIS — Z00.00 PREVENTATIVE HEALTH CARE: ICD-10-CM

## 2022-01-01 DIAGNOSIS — M06.9 RHEUMATOID ARTHRITIS INVOLVING MULTIPLE SITES, UNSPECIFIED WHETHER RHEUMATOID FACTOR PRESENT (HCC): ICD-10-CM

## 2022-01-01 DIAGNOSIS — K21.9 GASTROESOPHAGEAL REFLUX DISEASE, UNSPECIFIED WHETHER ESOPHAGITIS PRESENT: ICD-10-CM

## 2022-01-01 DIAGNOSIS — C34.92 ADENOCARCINOMA OF LEFT LUNG (HCC): ICD-10-CM

## 2022-01-01 DIAGNOSIS — E78.2 MIXED HYPERLIPIDEMIA: ICD-10-CM

## 2022-01-01 DIAGNOSIS — J43.1 PANLOBULAR EMPHYSEMA (HCC): ICD-10-CM

## 2022-01-01 DIAGNOSIS — F41.1 GAD (GENERALIZED ANXIETY DISORDER): ICD-10-CM

## 2022-01-01 DIAGNOSIS — J44.9 COPD, SEVERITY TO BE DETERMINED (HCC): ICD-10-CM

## 2022-01-01 DIAGNOSIS — N12 PYELONEPHRITIS: ICD-10-CM

## 2022-01-01 DIAGNOSIS — I10 PRIMARY HYPERTENSION: ICD-10-CM

## 2022-01-01 DIAGNOSIS — J43.2 CENTRILOBULAR EMPHYSEMA (HCC): ICD-10-CM

## 2022-01-01 DIAGNOSIS — I50.9 CONGESTIVE HEART FAILURE, UNSPECIFIED HF CHRONICITY, UNSPECIFIED HEART FAILURE TYPE (HCC): ICD-10-CM

## 2022-01-01 DIAGNOSIS — C34.90 PRIMARY MUCINOUS ADENOCARCINOMA OF LUNG (HCC): Primary | ICD-10-CM

## 2022-01-01 DIAGNOSIS — J44.9 STAGE 2 MODERATE COPD BY GOLD CLASSIFICATION (HCC): ICD-10-CM

## 2022-01-01 DIAGNOSIS — F33.1 MAJOR DEPRESSIVE DISORDER, RECURRENT, MODERATE (HCC): ICD-10-CM

## 2022-01-01 DIAGNOSIS — I10 ESSENTIAL HYPERTENSION: ICD-10-CM

## 2022-01-01 DIAGNOSIS — J44.9 CHRONIC OBSTRUCTIVE PULMONARY DISEASE, UNSPECIFIED COPD TYPE (HCC): Primary | ICD-10-CM

## 2022-01-01 DIAGNOSIS — R91.1 NODULE OF UPPER LOBE OF LEFT LUNG: ICD-10-CM

## 2022-01-01 DIAGNOSIS — J18.9 PNEUMONIA OF LEFT LOWER LOBE DUE TO INFECTIOUS ORGANISM: ICD-10-CM

## 2022-01-01 DIAGNOSIS — B37.0 ORAL CANDIDIASIS: Primary | ICD-10-CM

## 2022-01-01 DIAGNOSIS — J44.9 CHRONIC OBSTRUCTIVE PULMONARY DISEASE, UNSPECIFIED COPD TYPE (HCC): ICD-10-CM

## 2022-01-01 DIAGNOSIS — R91.1 NODULE OF UPPER LOBE OF LEFT LUNG: Primary | ICD-10-CM

## 2022-01-01 DIAGNOSIS — Z90.2 S/P LOBECTOMY OF LUNG: ICD-10-CM

## 2022-01-01 DIAGNOSIS — J98.11 ATELECTASIS OF RIGHT LUNG: ICD-10-CM

## 2022-01-01 DIAGNOSIS — J43.1 PANLOBULAR EMPHYSEMA (HCC): Primary | ICD-10-CM

## 2022-01-01 DIAGNOSIS — R65.21 SEPTIC SHOCK (HCC): Primary | ICD-10-CM

## 2022-01-01 DIAGNOSIS — R11.0 CHRONIC NAUSEA: ICD-10-CM

## 2022-01-01 DIAGNOSIS — J44.1 COPD EXACERBATION (HCC): Primary | ICD-10-CM

## 2022-01-01 DIAGNOSIS — J44.1 CHRONIC OBSTRUCTIVE PULMONARY DISEASE WITH ACUTE EXACERBATION (HCC): ICD-10-CM

## 2022-01-01 DIAGNOSIS — J18.9 MULTIFOCAL PNEUMONIA: Primary | ICD-10-CM

## 2022-01-01 DIAGNOSIS — U07.1 COVID: ICD-10-CM

## 2022-01-01 DIAGNOSIS — F17.200 SMOKER: ICD-10-CM

## 2022-01-01 DIAGNOSIS — I10 PRIMARY HYPERTENSION: Primary | ICD-10-CM

## 2022-01-01 DIAGNOSIS — Z71.6 ENCOUNTER FOR SMOKING CESSATION COUNSELING: ICD-10-CM

## 2022-01-01 DIAGNOSIS — J96.11 CHRONIC RESPIRATORY FAILURE WITH HYPOXIA (HCC): ICD-10-CM

## 2022-01-01 DIAGNOSIS — J18.9 PNEUMONIA OF LEFT LOWER LOBE DUE TO INFECTIOUS ORGANISM: Primary | ICD-10-CM

## 2022-01-01 DIAGNOSIS — R73.03 PREDIABETES: ICD-10-CM

## 2022-01-01 DIAGNOSIS — Z85.118 HISTORY OF ADENOCARCINOMA OF LUNG: ICD-10-CM

## 2022-01-01 DIAGNOSIS — A41.9 SEPTIC SHOCK (HCC): Primary | ICD-10-CM

## 2022-01-01 DIAGNOSIS — R09.02 HYPOXIA: ICD-10-CM

## 2022-01-01 LAB
ABSOLUTE EOS #: 0 K/UL (ref 0–0.4)
ABSOLUTE EOS #: 0.35 K/UL (ref 0–0.4)
ABSOLUTE IMMATURE GRANULOCYTE: 0.51 K/UL (ref 0–0.3)
ABSOLUTE IMMATURE GRANULOCYTE: 1.19 K/UL (ref 0–0.3)
ABSOLUTE IMMATURE GRANULOCYTE: 1.8 K/UL (ref 0–0.3)
ABSOLUTE IMMATURE GRANULOCYTE: 2.07 K/UL (ref 0–0.3)
ABSOLUTE IMMATURE GRANULOCYTE: 2.09 K/UL (ref 0–0.3)
ABSOLUTE IMMATURE GRANULOCYTE: 2.76 K/UL (ref 0–0.3)
ABSOLUTE IMMATURE GRANULOCYTE: 4.33 K/UL (ref 0–0.3)
ABSOLUTE IMMATURE GRANULOCYTE: 4.86 K/UL (ref 0–0.3)
ABSOLUTE IMMATURE GRANULOCYTE: 6.34 K/UL (ref 0–0.3)
ABSOLUTE LYMPH #: 11.17 K/UL (ref 1–4.8)
ABSOLUTE LYMPH #: 3.43 K/UL (ref 1–4.8)
ABSOLUTE LYMPH #: 3.87 K/UL (ref 1–4.8)
ABSOLUTE LYMPH #: 4 K/UL (ref 1–4.8)
ABSOLUTE LYMPH #: 4.19 K/UL (ref 1–4.8)
ABSOLUTE LYMPH #: 5.52 K/UL (ref 1–4.8)
ABSOLUTE LYMPH #: 5.99 K/UL (ref 1–4.8)
ABSOLUTE LYMPH #: 6.56 K/UL (ref 1–4.8)
ABSOLUTE LYMPH #: 9 K/UL (ref 1–4.8)
ABSOLUTE MONO #: 0.69 K/UL (ref 0.1–0.8)
ABSOLUTE MONO #: 1.33 K/UL (ref 0.1–0.8)
ABSOLUTE MONO #: 1.38 K/UL (ref 0.1–0.8)
ABSOLUTE MONO #: 1.49 K/UL (ref 0.1–0.8)
ABSOLUTE MONO #: 1.5 K/UL (ref 0.1–0.8)
ABSOLUTE MONO #: 2.09 K/UL (ref 0.1–0.8)
ABSOLUTE MONO #: 2.16 K/UL (ref 0.1–0.8)
ABSOLUTE MONO #: 2.64 K/UL (ref 0.1–0.8)
ABSOLUTE MONO #: 3.43 K/UL (ref 0.1–0.8)
ABSOLUTE RETIC #: 0.06 M/UL (ref 0.03–0.08)
ACETAMINOPHEN LEVEL: <5 UG/ML (ref 10–30)
ACTION: NORMAL
ACTION: NORMAL
ADENOVIRUS PCR: NOT DETECTED
ALBUMIN SERPL-MCNC: 2.3 G/DL (ref 3.5–5.2)
ALBUMIN SERPL-MCNC: 2.4 G/DL (ref 3.5–5.2)
ALBUMIN SERPL-MCNC: 2.5 G/DL (ref 3.5–5.2)
ALBUMIN SERPL-MCNC: 3.2 G/DL (ref 3.5–5.2)
ALBUMIN/GLOBULIN RATIO: 0.3 (ref 1–2.5)
ALBUMIN/GLOBULIN RATIO: 0.4 (ref 1–2.5)
ALLEN TEST: ABNORMAL
ALP BLD-CCNC: 49 U/L (ref 35–104)
ALP BLD-CCNC: 52 U/L (ref 35–104)
ALP BLD-CCNC: 74 U/L (ref 35–104)
ALP BLD-CCNC: 74 U/L (ref 35–104)
ALT SERPL-CCNC: 13 U/L (ref 5–33)
ALT SERPL-CCNC: 17 U/L (ref 5–33)
ALT SERPL-CCNC: 409 U/L (ref 5–33)
ALT SERPL-CCNC: <5 U/L (ref 5–33)
AMMONIA: 20 UMOL/L (ref 11–51)
ANION GAP SERPL CALCULATED.3IONS-SCNC: 12 MMOL/L (ref 9–17)
ANION GAP SERPL CALCULATED.3IONS-SCNC: 12 MMOL/L (ref 9–17)
ANION GAP SERPL CALCULATED.3IONS-SCNC: 16 MMOL/L (ref 9–17)
ANION GAP SERPL CALCULATED.3IONS-SCNC: 16 MMOL/L (ref 9–17)
ANION GAP SERPL CALCULATED.3IONS-SCNC: 17 MMOL/L (ref 9–17)
ANION GAP SERPL CALCULATED.3IONS-SCNC: 2 MMOL/L (ref 9–17)
ANION GAP SERPL CALCULATED.3IONS-SCNC: 2 MMOL/L (ref 9–17)
ANION GAP SERPL CALCULATED.3IONS-SCNC: 25 MMOL/L (ref 9–17)
ANION GAP SERPL CALCULATED.3IONS-SCNC: 35 MMOL/L (ref 9–17)
ANION GAP SERPL CALCULATED.3IONS-SCNC: 5 MMOL/L (ref 9–17)
ANION GAP SERPL CALCULATED.3IONS-SCNC: 5 MMOL/L (ref 9–17)
ANION GAP SERPL CALCULATED.3IONS-SCNC: 6 MMOL/L (ref 9–17)
ANION GAP SERPL CALCULATED.3IONS-SCNC: 7 MMOL/L (ref 9–17)
ANION GAP SERPL CALCULATED.3IONS-SCNC: 7 MMOL/L (ref 9–17)
ANION GAP SERPL CALCULATED.3IONS-SCNC: 8 MMOL/L (ref 9–17)
ANION GAP: 16 MMOL/L (ref 7–16)
AST SERPL-CCNC: 13 U/L
AST SERPL-CCNC: 23 U/L
AST SERPL-CCNC: 40 U/L
AST SERPL-CCNC: 602 U/L
ATYPICAL LYMPHOCYTE ABSOLUTE COUNT: 0.25 K/UL
ATYPICAL LYMPHOCYTE ABSOLUTE COUNT: 0.89 K/UL
ATYPICAL LYMPHOCYTE ABSOLUTE COUNT: 1.4 K/UL
ATYPICAL LYMPHOCYTE ABSOLUTE COUNT: 1.5 K/UL
ATYPICAL LYMPHOCYTES: 2 %
ATYPICAL LYMPHOCYTES: 3 %
ATYPICAL LYMPHOCYTES: 4 %
ATYPICAL LYMPHOCYTES: 5 %
BASOPHILS # BLD: 0 % (ref 0–2)
BASOPHILS # BLD: 1 % (ref 0–2)
BASOPHILS # BLD: 1 % (ref 0–2)
BASOPHILS ABSOLUTE: 0 K/UL (ref 0–0.2)
BASOPHILS ABSOLUTE: 0.33 K/UL (ref 0–0.2)
BASOPHILS ABSOLUTE: 0.35 K/UL (ref 0–0.2)
BILIRUB SERPL-MCNC: 0.5 MG/DL (ref 0.3–1.2)
BILIRUB SERPL-MCNC: 0.52 MG/DL (ref 0.3–1.2)
BILIRUB SERPL-MCNC: 0.54 MG/DL (ref 0.3–1.2)
BILIRUB SERPL-MCNC: 0.74 MG/DL (ref 0.3–1.2)
BILIRUBIN DIRECT: 0.09 MG/DL
BILIRUBIN DIRECT: 0.21 MG/DL
BILIRUBIN URINE: NEGATIVE
BILIRUBIN URINE: NEGATIVE
BILIRUBIN, INDIRECT: 0.33 MG/DL (ref 0–1)
BILIRUBIN, INDIRECT: 0.43 MG/DL (ref 0–1)
BNP INTERPRETATION: ABNORMAL
BNP INTERPRETATION: ABNORMAL
BORDETELLA PARAPERTUSSIS: NOT DETECTED
BORDETELLA PERTUSSIS PCR: NOT DETECTED
BUN BLDV-MCNC: 13 MG/DL (ref 8–23)
BUN BLDV-MCNC: 21 MG/DL (ref 8–23)
BUN BLDV-MCNC: 31 MG/DL (ref 8–23)
BUN BLDV-MCNC: 32 MG/DL (ref 8–23)
BUN BLDV-MCNC: 35 MG/DL (ref 8–23)
BUN BLDV-MCNC: 36 MG/DL (ref 8–23)
BUN BLDV-MCNC: 37 MG/DL (ref 8–23)
BUN BLDV-MCNC: 37 MG/DL (ref 8–23)
BUN BLDV-MCNC: 38 MG/DL (ref 8–23)
BUN BLDV-MCNC: 39 MG/DL (ref 8–23)
BUN BLDV-MCNC: 43 MG/DL (ref 8–23)
BUN BLDV-MCNC: 53 MG/DL (ref 8–23)
BUN BLDV-MCNC: 54 MG/DL (ref 8–23)
BUN BLDV-MCNC: 62 MG/DL (ref 8–23)
BUN BLDV-MCNC: 66 MG/DL (ref 8–23)
BUN BLDV-MCNC: 9 MG/DL (ref 8–23)
BUN/CREAT BLD: ABNORMAL (ref 9–20)
C-REACTIVE PROTEIN: 24.9 MG/L (ref 0–5)
CALCIUM IONIZED: 0.98 MMOL/L (ref 1.13–1.33)
CALCIUM IONIZED: 0.99 MMOL/L (ref 1.13–1.33)
CALCIUM IONIZED: 1.04 MMOL/L (ref 1.13–1.33)
CALCIUM IONIZED: 1.11 MMOL/L (ref 1.13–1.33)
CALCIUM SERPL-MCNC: 6.9 MG/DL (ref 8.6–10.4)
CALCIUM SERPL-MCNC: 7.2 MG/DL (ref 8.6–10.4)
CALCIUM SERPL-MCNC: 7.2 MG/DL (ref 8.6–10.4)
CALCIUM SERPL-MCNC: 7.4 MG/DL (ref 8.6–10.4)
CALCIUM SERPL-MCNC: 7.6 MG/DL (ref 8.6–10.4)
CALCIUM SERPL-MCNC: 7.6 MG/DL (ref 8.6–10.4)
CALCIUM SERPL-MCNC: 8.4 MG/DL (ref 8.6–10.4)
CALCIUM SERPL-MCNC: 8.6 MG/DL (ref 8.6–10.4)
CALCIUM SERPL-MCNC: 8.9 MG/DL (ref 8.6–10.4)
CALCIUM SERPL-MCNC: 9 MG/DL (ref 8.6–10.4)
CALCIUM SERPL-MCNC: 9 MG/DL (ref 8.6–10.4)
CARBOXYHEMOGLOBIN: 2 % (ref 0–5)
CASTS UA: ABNORMAL /LPF (ref 0–2)
CASTS UA: ABNORMAL /LPF (ref 0–2)
CASTS UA: ABNORMAL /LPF (ref 0–8)
CELLS COUNTED: 200
CHLAMYDIA PNEUMONIAE BY PCR: NOT DETECTED
CHLORIDE BLD-SCNC: 100 MMOL/L (ref 98–107)
CHLORIDE BLD-SCNC: 101 MMOL/L (ref 98–107)
CHLORIDE BLD-SCNC: 104 MMOL/L (ref 98–107)
CHLORIDE BLD-SCNC: 105 MMOL/L (ref 98–107)
CHLORIDE BLD-SCNC: 106 MMOL/L (ref 98–107)
CHLORIDE BLD-SCNC: 107 MMOL/L (ref 98–107)
CHLORIDE BLD-SCNC: 107 MMOL/L (ref 98–107)
CHLORIDE BLD-SCNC: 94 MMOL/L (ref 98–107)
CHLORIDE BLD-SCNC: 96 MMOL/L (ref 98–107)
CHLORIDE BLD-SCNC: 97 MMOL/L (ref 98–107)
CHLORIDE BLD-SCNC: 99 MMOL/L (ref 98–107)
CHLORIDE, UR: 69 MMOL/L
CO2: 11 MMOL/L (ref 20–31)
CO2: 11 MMOL/L (ref 20–31)
CO2: 17 MMOL/L (ref 20–31)
CO2: 18 MMOL/L (ref 20–31)
CO2: 20 MMOL/L (ref 20–31)
CO2: 22 MMOL/L (ref 20–31)
CO2: 23 MMOL/L (ref 20–31)
CO2: 24 MMOL/L (ref 20–31)
CO2: 25 MMOL/L (ref 20–31)
CO2: 26 MMOL/L (ref 20–31)
CO2: 33 MMOL/L (ref 20–31)
COLOR: ABNORMAL
COLOR: YELLOW
COMPLEMENT C3: 52 MG/DL (ref 90–180)
COMPLEMENT C4: 9 MG/DL (ref 10–40)
CORONAVIRUS 229E PCR: NOT DETECTED
CORONAVIRUS HKU1 PCR: NOT DETECTED
CORONAVIRUS NL63 PCR: NOT DETECTED
CORONAVIRUS OC43 PCR: DETECTED
CREAT SERPL-MCNC: 0.64 MG/DL (ref 0.5–0.9)
CREAT SERPL-MCNC: 0.68 MG/DL (ref 0.5–0.9)
CREAT SERPL-MCNC: 0.71 MG/DL (ref 0.5–0.9)
CREAT SERPL-MCNC: 0.8 MG/DL (ref 0.5–0.9)
CREAT SERPL-MCNC: 0.86 MG/DL (ref 0.5–0.9)
CREAT SERPL-MCNC: 1.03 MG/DL (ref 0.5–0.9)
CREAT SERPL-MCNC: 1.53 MG/DL (ref 0.5–0.9)
CREAT SERPL-MCNC: 1.67 MG/DL (ref 0.5–0.9)
CREAT SERPL-MCNC: 1.95 MG/DL (ref 0.5–0.9)
CREAT SERPL-MCNC: 2.54 MG/DL (ref 0.5–0.9)
CREAT SERPL-MCNC: 2.95 MG/DL (ref 0.5–0.9)
CREAT SERPL-MCNC: 2.98 MG/DL (ref 0.5–0.9)
CREAT SERPL-MCNC: 3.04 MG/DL (ref 0.5–0.9)
CREAT SERPL-MCNC: 3.13 MG/DL (ref 0.5–0.9)
CREAT SERPL-MCNC: 3.39 MG/DL (ref 0.5–0.9)
CREAT SERPL-MCNC: 3.45 MG/DL (ref 0.5–0.9)
CREATININE URINE: 44.2 MG/DL (ref 28–217)
CREATININE URINE: 44.6 MG/DL (ref 28–217)
CULTURE: ABNORMAL
CULTURE: NO GROWTH
DATE AND TIME: NORMAL
DATE AND TIME: NORMAL
DIFFERENTIAL TYPE: ABNORMAL
EKG ATRIAL RATE: 102 BPM
EKG ATRIAL RATE: 103 BPM
EKG ATRIAL RATE: 104 BPM
EKG ATRIAL RATE: 71 BPM
EKG ATRIAL RATE: 91 BPM
EKG ATRIAL RATE: 99 BPM
EKG P AXIS: 56 DEGREES
EKG P AXIS: 60 DEGREES
EKG P AXIS: 62 DEGREES
EKG P AXIS: 70 DEGREES
EKG P-R INTERVAL: 124 MS
EKG P-R INTERVAL: 138 MS
EKG P-R INTERVAL: 158 MS
EKG P-R INTERVAL: 162 MS
EKG Q-T INTERVAL: 310 MS
EKG Q-T INTERVAL: 340 MS
EKG Q-T INTERVAL: 364 MS
EKG Q-T INTERVAL: 412 MS
EKG Q-T INTERVAL: 418 MS
EKG Q-T INTERVAL: 494 MS
EKG QRS DURATION: 126 MS
EKG QRS DURATION: 84 MS
EKG QRS DURATION: 86 MS
EKG QRS DURATION: 88 MS
EKG QRS DURATION: 90 MS
EKG QRS DURATION: 90 MS
EKG QTC CALCULATION (BAZETT): 404 MS
EKG QTC CALCULATION (BAZETT): 425 MS
EKG QTC CALCULATION (BAZETT): 445 MS
EKG QTC CALCULATION (BAZETT): 447 MS
EKG QTC CALCULATION (BAZETT): 536 MS
EKG QTC CALCULATION (BAZETT): 610 MS
EKG R AXIS: -10 DEGREES
EKG R AXIS: -13 DEGREES
EKG R AXIS: -19 DEGREES
EKG R AXIS: -20 DEGREES
EKG R AXIS: -23 DEGREES
EKG R AXIS: -34 DEGREES
EKG T AXIS: 121 DEGREES
EKG T AXIS: 126 DEGREES
EKG T AXIS: 22 DEGREES
EKG T AXIS: 24 DEGREES
EKG T AXIS: 63 DEGREES
EKG T AXIS: 92 DEGREES
EKG VENTRICULAR RATE: 102 BPM
EKG VENTRICULAR RATE: 103 BPM
EKG VENTRICULAR RATE: 64 BPM
EKG VENTRICULAR RATE: 91 BPM
EKG VENTRICULAR RATE: 92 BPM
EKG VENTRICULAR RATE: 99 BPM
EOSINOPHILS RELATIVE PERCENT: 0 % (ref 1–4)
EOSINOPHILS RELATIVE PERCENT: 1 % (ref 1–4)
EPITHELIAL CELLS UA: ABNORMAL /HPF (ref 0–5)
EPITHELIAL CELLS UA: ABNORMAL /HPF (ref 0–5)
ETHANOL PERCENT: <0.01 %
ETHANOL: <10 MG/DL
FERRITIN: 70 UG/L (ref 13–150)
FIO2: 100
FIO2: 80
FIO2: 90
FIO2: ABNORMAL
FOLATE: 17.5 NG/ML
FOLATE: 6.4 NG/ML
FREE KAPPA/LAMBDA RATIO: 122.55 (ref 0.26–1.65)
GFR AFRICAN AMERICAN: 16 ML/MIN
GFR AFRICAN AMERICAN: 16 ML/MIN
GFR AFRICAN AMERICAN: 18 ML/MIN
GFR AFRICAN AMERICAN: 19 ML/MIN
GFR AFRICAN AMERICAN: 23 ML/MIN
GFR AFRICAN AMERICAN: 31 ML/MIN
GFR AFRICAN AMERICAN: 37 ML/MIN
GFR AFRICAN AMERICAN: 41 ML/MIN
GFR AFRICAN AMERICAN: >60 ML/MIN
GFR NON-AFRICAN AMERICAN: 13 ML/MIN
GFR NON-AFRICAN AMERICAN: 14 ML/MIN
GFR NON-AFRICAN AMERICAN: 15 ML/MIN
GFR NON-AFRICAN AMERICAN: 15 ML/MIN
GFR NON-AFRICAN AMERICAN: 16 ML/MIN
GFR NON-AFRICAN AMERICAN: 16 ML/MIN
GFR NON-AFRICAN AMERICAN: 17 ML/MIN
GFR NON-AFRICAN AMERICAN: 19 ML/MIN
GFR NON-AFRICAN AMERICAN: 26 ML/MIN
GFR NON-AFRICAN AMERICAN: 31 ML/MIN
GFR NON-AFRICAN AMERICAN: 34 ML/MIN
GFR NON-AFRICAN AMERICAN: 54 ML/MIN
GFR NON-AFRICAN AMERICAN: >60 ML/MIN
GFR SERPL CREATININE-BSD FRML MDRD: 21 ML/MIN
GFR SERPL CREATININE-BSD FRML MDRD: ABNORMAL ML/MIN/{1.73_M2}
GLUCOSE BLD-MCNC: 100 MG/DL (ref 74–100)
GLUCOSE BLD-MCNC: 101 MG/DL (ref 74–100)
GLUCOSE BLD-MCNC: 103 MG/DL (ref 70–99)
GLUCOSE BLD-MCNC: 104 MG/DL (ref 74–100)
GLUCOSE BLD-MCNC: 107 MG/DL (ref 70–99)
GLUCOSE BLD-MCNC: 107 MG/DL (ref 70–99)
GLUCOSE BLD-MCNC: 108 MG/DL (ref 70–99)
GLUCOSE BLD-MCNC: 109 MG/DL (ref 70–99)
GLUCOSE BLD-MCNC: 110 MG/DL (ref 70–99)
GLUCOSE BLD-MCNC: 112 MG/DL (ref 65–105)
GLUCOSE BLD-MCNC: 115 MG/DL (ref 70–99)
GLUCOSE BLD-MCNC: 121 MG/DL (ref 70–99)
GLUCOSE BLD-MCNC: 138 MG/DL (ref 74–100)
GLUCOSE BLD-MCNC: 147 MG/DL (ref 70–99)
GLUCOSE BLD-MCNC: 152 MG/DL (ref 70–99)
GLUCOSE BLD-MCNC: 163 MG/DL (ref 70–99)
GLUCOSE BLD-MCNC: 168 MG/DL (ref 70–99)
GLUCOSE BLD-MCNC: 170 MG/DL (ref 74–100)
GLUCOSE BLD-MCNC: 208 MG/DL (ref 70–99)
GLUCOSE BLD-MCNC: 42 MG/DL (ref 74–100)
GLUCOSE BLD-MCNC: 46 MG/DL (ref 70–99)
GLUCOSE BLD-MCNC: 69 MG/DL (ref 65–105)
GLUCOSE BLD-MCNC: 72 MG/DL (ref 70–99)
GLUCOSE BLD-MCNC: 90 MG/DL (ref 65–105)
GLUCOSE BLD-MCNC: 91 MG/DL (ref 74–100)
GLUCOSE BLD-MCNC: 94 MG/DL (ref 74–100)
GLUCOSE BLD-MCNC: 95 MG/DL (ref 74–100)
GLUCOSE BLD-MCNC: 95 MG/DL (ref 74–100)
GLUCOSE URINE: NEGATIVE
GLUCOSE URINE: NEGATIVE
HAV IGM SER IA-ACNC: NONREACTIVE
HAV IGM SER IA-ACNC: NONREACTIVE
HCO3 VENOUS: 21.4 MMOL/L (ref 22–29)
HCO3 VENOUS: 24.4 MMOL/L (ref 24–30)
HCT VFR BLD CALC: 25.6 % (ref 36.3–47.1)
HCT VFR BLD CALC: 25.8 % (ref 36.3–47.1)
HCT VFR BLD CALC: 26.1 % (ref 36.3–47.1)
HCT VFR BLD CALC: 26.2 % (ref 36.3–47.1)
HCT VFR BLD CALC: 26.3 % (ref 36.3–47.1)
HCT VFR BLD CALC: 26.4 % (ref 36.3–47.1)
HCT VFR BLD CALC: 26.6 % (ref 36.3–47.1)
HCT VFR BLD CALC: 26.7 % (ref 36.3–47.1)
HCT VFR BLD CALC: 27.3 % (ref 36.3–47.1)
HCT VFR BLD CALC: 28.5 % (ref 36.3–47.1)
HCT VFR BLD CALC: 29.6 % (ref 36.3–47.1)
HCT VFR BLD CALC: 29.9 % (ref 36.3–47.1)
HEMOGLOBIN: 7.9 G/DL (ref 11.9–15.1)
HEMOGLOBIN: 7.9 G/DL (ref 11.9–15.1)
HEMOGLOBIN: 8 G/DL (ref 11.9–15.1)
HEMOGLOBIN: 8.1 G/DL (ref 11.9–15.1)
HEMOGLOBIN: 8.2 G/DL (ref 11.9–15.1)
HEMOGLOBIN: 8.2 G/DL (ref 11.9–15.1)
HEMOGLOBIN: 8.3 G/DL (ref 11.9–15.1)
HEMOGLOBIN: 8.3 G/DL (ref 11.9–15.1)
HEMOGLOBIN: 8.4 G/DL (ref 11.9–15.1)
HEMOGLOBIN: 8.7 G/DL (ref 11.9–15.1)
HEMOGLOBIN: 8.8 G/DL (ref 11.9–15.1)
HEMOGLOBIN: 9.3 G/DL (ref 11.9–15.1)
HEPATITIS B CORE IGM ANTIBODY: NONREACTIVE
HEPATITIS B CORE IGM ANTIBODY: NONREACTIVE
HEPATITIS B SURFACE ANTIGEN: NONREACTIVE
HEPATITIS B SURFACE ANTIGEN: NONREACTIVE
HEPATITIS C ANTIBODY: NONREACTIVE
HEPATITIS C ANTIBODY: NONREACTIVE
HUMAN METAPNEUMOVIRUS PCR: NOT DETECTED
IMMATURE GRANULOCYTES: 13 %
IMMATURE GRANULOCYTES: 17 %
IMMATURE GRANULOCYTES: 24 %
IMMATURE GRANULOCYTES: 4 %
IMMATURE GRANULOCYTES: 4 %
IMMATURE GRANULOCYTES: 6 %
IMMATURE GRANULOCYTES: 8 %
IMMATURE RETIC FRACT: 39.1 % (ref 2.7–18.3)
INFLUENZA A BY PCR: NOT DETECTED
INFLUENZA A H1 (2009) PCR: ABNORMAL
INFLUENZA A H1 PCR: ABNORMAL
INFLUENZA A H3 PCR: ABNORMAL
INFLUENZA B BY PCR: NOT DETECTED
INR BLD: 1.1
INR BLD: 1.6
IRON SATURATION: 11 % (ref 20–55)
IRON: 33 UG/DL (ref 37–145)
KAPPA FREE LIGHT CHAINS QNT: 87.01 MG/DL (ref 0.37–1.94)
KETONES, URINE: NEGATIVE
KETONES, URINE: NEGATIVE
LACTIC ACID, SEPSIS WHOLE BLOOD: 2.3 MMOL/L (ref 0.5–1.9)
LACTIC ACID, SEPSIS WHOLE BLOOD: 4 MMOL/L (ref 0.5–1.9)
LACTIC ACID, SEPSIS WHOLE BLOOD: 5.7 MMOL/L (ref 0.5–1.9)
LACTIC ACID, WHOLE BLOOD: 23 MMOL/L (ref 0.7–2.1)
LACTIC ACID, WHOLE BLOOD: 7.8 MMOL/L (ref 0.7–2.1)
LAMBDA FREE LIGHT CHAINS QNT: 0.71 MG/DL (ref 0.57–2.63)
LEGIONELLA PNEUMOPHILIA AG, URINE: NEGATIVE
LEUKOCYTE ESTERASE, URINE: ABNORMAL
LEUKOCYTE ESTERASE, URINE: NEGATIVE
LV EF: 53 %
LVEF MODALITY: NORMAL
LYMPHOCYTES # BLD: 13 % (ref 24–44)
LYMPHOCYTES # BLD: 13 % (ref 24–44)
LYMPHOCYTES # BLD: 14 % (ref 24–44)
LYMPHOCYTES # BLD: 16 % (ref 24–44)
LYMPHOCYTES # BLD: 18 % (ref 24–44)
LYMPHOCYTES # BLD: 19 % (ref 24–44)
LYMPHOCYTES # BLD: 30 % (ref 24–44)
LYMPHOCYTES # BLD: 32 % (ref 24–44)
LYMPHOCYTES # BLD: 33 % (ref 24–44)
Lab: ABNORMAL
Lab: ABNORMAL
MAGNESIUM: 1.5 MG/DL (ref 1.6–2.6)
MAGNESIUM: 1.7 MG/DL (ref 1.6–2.6)
MAGNESIUM: 1.8 MG/DL (ref 1.6–2.6)
MAGNESIUM: 1.8 MG/DL (ref 1.6–2.6)
MAGNESIUM: 2.4 MG/DL (ref 1.6–2.6)
MCH RBC QN AUTO: 26.6 PG (ref 25.2–33.5)
MCH RBC QN AUTO: 26.8 PG (ref 25.2–33.5)
MCH RBC QN AUTO: 27.1 PG (ref 25.2–33.5)
MCH RBC QN AUTO: 29.3 PG (ref 25.2–33.5)
MCH RBC QN AUTO: 29.7 PG (ref 25.2–33.5)
MCH RBC QN AUTO: 30.1 PG (ref 25.2–33.5)
MCH RBC QN AUTO: 30.3 PG (ref 25.2–33.5)
MCH RBC QN AUTO: 30.5 PG (ref 25.2–33.5)
MCH RBC QN AUTO: 30.7 PG (ref 25.2–33.5)
MCH RBC QN AUTO: 31.5 PG (ref 25.2–33.5)
MCH RBC QN AUTO: 31.5 PG (ref 25.2–33.5)
MCH RBC QN AUTO: 31.8 PG (ref 25.2–33.5)
MCHC RBC AUTO-ENTMCNC: 29.1 G/DL (ref 28.4–34.8)
MCHC RBC AUTO-ENTMCNC: 29.4 G/DL (ref 28.4–34.8)
MCHC RBC AUTO-ENTMCNC: 29.7 G/DL (ref 28.4–34.8)
MCHC RBC AUTO-ENTMCNC: 30.3 G/DL (ref 28.4–34.8)
MCHC RBC AUTO-ENTMCNC: 30.3 G/DL (ref 28.4–34.8)
MCHC RBC AUTO-ENTMCNC: 30.4 G/DL (ref 28.4–34.8)
MCHC RBC AUTO-ENTMCNC: 31.3 G/DL (ref 28.4–34.8)
MCHC RBC AUTO-ENTMCNC: 31.4 G/DL (ref 28.4–34.8)
MCHC RBC AUTO-ENTMCNC: 31.4 G/DL (ref 28.4–34.8)
MCHC RBC AUTO-ENTMCNC: 31.5 G/DL (ref 28.4–34.8)
MCHC RBC AUTO-ENTMCNC: 32 G/DL (ref 28.4–34.8)
MCHC RBC AUTO-ENTMCNC: 33.1 G/DL (ref 28.4–34.8)
MCV RBC AUTO: 100.4 FL (ref 82.6–102.9)
MCV RBC AUTO: 103.9 FL (ref 82.6–102.9)
MCV RBC AUTO: 90.2 FL (ref 82.6–102.9)
MCV RBC AUTO: 91.3 FL (ref 82.6–102.9)
MCV RBC AUTO: 92 FL (ref 82.6–102.9)
MCV RBC AUTO: 95.7 FL (ref 82.6–102.9)
MCV RBC AUTO: 95.9 FL (ref 82.6–102.9)
MCV RBC AUTO: 96 FL (ref 82.6–102.9)
MCV RBC AUTO: 96.4 FL (ref 82.6–102.9)
MCV RBC AUTO: 96.7 FL (ref 82.6–102.9)
MCV RBC AUTO: 97.4 FL (ref 82.6–102.9)
MCV RBC AUTO: 97.8 FL (ref 82.6–102.9)
MODE: ABNORMAL
MONOCYTES # BLD: 10 % (ref 1–7)
MONOCYTES # BLD: 12 % (ref 1–7)
MONOCYTES # BLD: 17 % (ref 1–7)
MONOCYTES # BLD: 2 % (ref 1–7)
MONOCYTES # BLD: 4 % (ref 1–7)
MONOCYTES # BLD: 4 % (ref 1–7)
MONOCYTES # BLD: 5 % (ref 1–7)
MONOCYTES # BLD: 5 % (ref 1–7)
MONOCYTES # BLD: 6 % (ref 1–7)
MORPHOLOGY: ABNORMAL
MORPHOLOGY: NORMAL
MORPHOLOGY: NORMAL
MYCOPLASMA PNEUMONIAE PCR: NOT DETECTED
NEGATIVE BASE EXCESS, ART: 10 (ref 0–2)
NEGATIVE BASE EXCESS, ART: 11 (ref 0–2)
NEGATIVE BASE EXCESS, ART: 13 (ref 0–2)
NEGATIVE BASE EXCESS, ART: 14 (ref 0–2)
NEGATIVE BASE EXCESS, ART: 14 (ref 0–2)
NEGATIVE BASE EXCESS, ART: 18 (ref 0–2)
NEGATIVE BASE EXCESS, ART: 21 (ref 0–2)
NEGATIVE BASE EXCESS, ART: 9 (ref 0–2)
NEGATIVE BASE EXCESS, VEN: 0.9 MMOL/L (ref 0–2)
NEGATIVE BASE EXCESS, VEN: 5 (ref 0–2)
NITRITE, URINE: NEGATIVE
NITRITE, URINE: NEGATIVE
NOTIFY: NORMAL
NOTIFY: NORMAL
NRBC AUTOMATED: 0.7 PER 100 WBC
NRBC AUTOMATED: 0.7 PER 100 WBC
NRBC AUTOMATED: 0.8 PER 100 WBC
NRBC AUTOMATED: 0.9 PER 100 WBC
NRBC AUTOMATED: 1.1 PER 100 WBC
NRBC AUTOMATED: 1.6 PER 100 WBC
NRBC AUTOMATED: 1.7 PER 100 WBC
NRBC AUTOMATED: 2.2 PER 100 WBC
NRBC AUTOMATED: 2.6 PER 100 WBC
NRBC AUTOMATED: 23 PER 100 WBC
NRBC AUTOMATED: 29 PER 100 WBC
NRBC AUTOMATED: 4.6 PER 100 WBC
NUCLEATED RED BLOOD CELLS: 1 PER 100 WBC
NUCLEATED RED BLOOD CELLS: 1 PER 100 WBC
NUCLEATED RED BLOOD CELLS: 2 PER 100 WBC
NUCLEATED RED BLOOD CELLS: 2 PER 100 WBC
NUCLEATED RED BLOOD CELLS: 23 PER 100 WBC
NUCLEATED RED BLOOD CELLS: 29 PER 100 WBC
NUCLEATED RED BLOOD CELLS: 5 PER 100 WBC
O2 DEVICE/FLOW/%: ABNORMAL
O2 SAT, VEN: 70 % (ref 60–85)
O2 SAT, VEN: 96.1 % (ref 60–85)
PARAINFLUENZA 1 PCR: NOT DETECTED
PARAINFLUENZA 2 PCR: NOT DETECTED
PARAINFLUENZA 3 PCR: DETECTED
PARAINFLUENZA 4 PCR: NOT DETECTED
PARTIAL THROMBOPLASTIN TIME: 22.8 SEC (ref 20.5–30.5)
PARTIAL THROMBOPLASTIN TIME: 23.6 SEC (ref 20.5–30.5)
PARTIAL THROMBOPLASTIN TIME: 23.7 SEC (ref 20.5–30.5)
PARTIAL THROMBOPLASTIN TIME: 24 SEC (ref 20.5–30.5)
PARTIAL THROMBOPLASTIN TIME: 24.6 SEC (ref 20.5–30.5)
PARTIAL THROMBOPLASTIN TIME: 24.8 SEC (ref 20.5–30.5)
PARTIAL THROMBOPLASTIN TIME: 24.9 SEC (ref 20.5–30.5)
PARTIAL THROMBOPLASTIN TIME: 24.9 SEC (ref 20.5–30.5)
PARTIAL THROMBOPLASTIN TIME: 26.2 SEC (ref 20.5–30.5)
PARTIAL THROMBOPLASTIN TIME: 26.6 SEC (ref 20.5–30.5)
PARTIAL THROMBOPLASTIN TIME: 33.8 SEC (ref 20.5–30.5)
PARTIAL THROMBOPLASTIN TIME: 35.5 SEC (ref 20.5–30.5)
PARTIAL THROMBOPLASTIN TIME: 48.6 SEC (ref 20.5–30.5)
PARTIAL THROMBOPLASTIN TIME: 51.8 SEC (ref 20.5–30.5)
PARTIAL THROMBOPLASTIN TIME: >120 SEC (ref 20.5–30.5)
PARTIAL THROMBOPLASTIN TIME: >120 SEC (ref 20.5–30.5)
PATIENT TEMP: 35
PATIENT TEMP: 36
PATIENT TEMP: 37
PATIENT TEMP: 37.6
PATIENT TEMP: 37.7
PCO2, VEN: 42.6 MM HG (ref 41–51)
PCO2, VEN: 46.6 (ref 39–55)
PDW BLD-RTO: 19.6 % (ref 11.8–14.4)
PDW BLD-RTO: 20 % (ref 11.8–14.4)
PDW BLD-RTO: 20.3 % (ref 11.8–14.4)
PDW BLD-RTO: 21.4 % (ref 11.8–14.4)
PDW BLD-RTO: 21.5 % (ref 11.8–14.4)
PDW BLD-RTO: 21.6 % (ref 11.8–14.4)
PDW BLD-RTO: 21.7 % (ref 11.8–14.4)
PDW BLD-RTO: 21.8 % (ref 11.8–14.4)
PDW BLD-RTO: 22 % (ref 11.8–14.4)
PH UA: 5 (ref 5–8)
PH UA: 6 (ref 5–8)
PH VENOUS: 7.31 (ref 7.32–7.43)
PH VENOUS: 7.34 (ref 7.32–7.42)
PHOSPHORUS: 9.8 MG/DL (ref 2.6–4.5)
PLATELET # BLD: 112 K/UL (ref 138–453)
PLATELET # BLD: 188 K/UL (ref 138–453)
PLATELET # BLD: 199 K/UL (ref 138–453)
PLATELET # BLD: 214 K/UL (ref 138–453)
PLATELET # BLD: 229 K/UL (ref 138–453)
PLATELET # BLD: 244 K/UL (ref 138–453)
PLATELET # BLD: 258 K/UL (ref 138–453)
PLATELET # BLD: 263 K/UL (ref 138–453)
PLATELET # BLD: 269 K/UL (ref 138–453)
PLATELET # BLD: 288 K/UL (ref 138–453)
PLATELET # BLD: 305 K/UL (ref 138–453)
PLATELET # BLD: 336 K/UL (ref 138–453)
PLATELET # BLD: ABNORMAL K/UL (ref 138–453)
PLATELET ESTIMATE: ABNORMAL
PLATELET, FLUORESCENCE: 186 K/UL (ref 138–453)
PLATELET, IMMATURE FRACTION: 2.9 % (ref 1.1–10.3)
PMV BLD AUTO: 10.5 FL (ref 8.1–13.5)
PMV BLD AUTO: 10.6 FL (ref 8.1–13.5)
PMV BLD AUTO: 8.9 FL (ref 8.1–13.5)
PMV BLD AUTO: 8.9 FL (ref 8.1–13.5)
PMV BLD AUTO: 9.1 FL (ref 8.1–13.5)
PMV BLD AUTO: 9.3 FL (ref 8.1–13.5)
PMV BLD AUTO: 9.4 FL (ref 8.1–13.5)
PMV BLD AUTO: 9.5 FL (ref 8.1–13.5)
PMV BLD AUTO: 9.6 FL (ref 8.1–13.5)
PMV BLD AUTO: 9.7 FL (ref 8.1–13.5)
PMV BLD AUTO: 9.8 FL (ref 8.1–13.5)
PO2, VEN: 39.9 MM HG (ref 30–50)
PO2, VEN: 75.7 (ref 30–50)
POC BUN: 35 MG/DL (ref 8–26)
POC CHLORIDE: 109 MMOL/L (ref 98–107)
POC CREATININE: 2.72 MG/DL (ref 0.51–1.19)
POC HCO3: 10.8 MMOL/L (ref 21–28)
POC HCO3: 13.2 MMOL/L (ref 21–28)
POC HCO3: 16.2 MMOL/L (ref 21–28)
POC HCO3: 16.2 MMOL/L (ref 21–28)
POC HCO3: 16.8 MMOL/L (ref 21–28)
POC HCO3: 17 MMOL/L (ref 21–28)
POC HCO3: 18 MMOL/L (ref 21–28)
POC HCO3: 18.2 MMOL/L (ref 21–28)
POC HCO3: 26.9 MMOL/L (ref 21–28)
POC HEMATOCRIT: 31 % (ref 36–46)
POC HEMOGLOBIN: 10.5 G/DL (ref 12–16)
POC IONIZED CALCIUM: 1.2 MMOL/L (ref 1.15–1.33)
POC LACTIC ACID: 14.96 MMOL/L (ref 0.56–1.39)
POC LACTIC ACID: 4.06 MMOL/L (ref 0.56–1.39)
POC LACTIC ACID: 4.53 MMOL/L (ref 0.56–1.39)
POC LACTIC ACID: 6.45 MMOL/L (ref 0.56–1.39)
POC LACTIC ACID: 7.35 MMOL/L (ref 0.56–1.39)
POC O2 SATURATION: 79 % (ref 94–98)
POC O2 SATURATION: 80 % (ref 94–98)
POC O2 SATURATION: 89 % (ref 94–98)
POC O2 SATURATION: 90 % (ref 94–98)
POC O2 SATURATION: 92 % (ref 94–98)
POC O2 SATURATION: 92 % (ref 94–98)
POC O2 SATURATION: 93 % (ref 94–98)
POC O2 SATURATION: 94 % (ref 94–98)
POC O2 SATURATION: 96 % (ref 94–98)
POC PCO2 TEMP: 43 MM HG
POC PCO2 TEMP: 57 MM HG
POC PCO2 TEMP: 57 MM HG
POC PCO2: 41.2 MM HG (ref 35–48)
POC PCO2: 43.3 MM HG (ref 35–48)
POC PCO2: 53.1 MM HG (ref 35–48)
POC PCO2: 54 MM HG (ref 35–48)
POC PCO2: 55.7 MM HG (ref 35–48)
POC PCO2: 56.1 MM HG (ref 35–48)
POC PCO2: 59.9 MM HG (ref 35–48)
POC PCO2: 61.6 MM HG (ref 35–48)
POC PCO2: 63.5 MM HG (ref 35–48)
POC PH TEMP: 6.96
POC PH TEMP: 7.11
POC PH TEMP: 7.22
POC PH: 6.91 (ref 7.35–7.45)
POC PH: 6.94 (ref 7.35–7.45)
POC PH: 7.03 (ref 7.35–7.45)
POC PH: 7.04 (ref 7.35–7.45)
POC PH: 7.09 (ref 7.35–7.45)
POC PH: 7.12 (ref 7.35–7.45)
POC PH: 7.22 (ref 7.35–7.45)
POC PH: 7.23 (ref 7.35–7.45)
POC PH: 7.29 (ref 7.35–7.45)
POC PO2 TEMP: 115 MM HG
POC PO2 TEMP: 62 MM HG
POC PO2 TEMP: 72 MM HG
POC PO2: 110.8 MM HG (ref 83–108)
POC PO2: 69 MM HG (ref 83–108)
POC PO2: 71.3 MM HG (ref 83–108)
POC PO2: 71.9 MM HG (ref 83–108)
POC PO2: 74.4 MM HG (ref 83–108)
POC PO2: 79.7 MM HG (ref 83–108)
POC PO2: 82.1 MM HG (ref 83–108)
POC PO2: 90.9 MM HG (ref 83–108)
POC PO2: 93.4 MM HG (ref 83–108)
POC POTASSIUM: 2.7 MMOL/L (ref 3.5–4.5)
POC SODIUM: 146 MMOL/L (ref 138–146)
POC TCO2: 22 MMOL/L (ref 22–30)
POSITIVE BASE EXCESS, ART: 0 (ref 0–3)
POTASSIUM SERPL-SCNC: 2.8 MMOL/L (ref 3.7–5.3)
POTASSIUM SERPL-SCNC: 3.5 MMOL/L (ref 3.7–5.3)
POTASSIUM SERPL-SCNC: 3.6 MMOL/L (ref 3.7–5.3)
POTASSIUM SERPL-SCNC: 3.6 MMOL/L (ref 3.7–5.3)
POTASSIUM SERPL-SCNC: 3.8 MMOL/L (ref 3.7–5.3)
POTASSIUM SERPL-SCNC: 3.9 MMOL/L (ref 3.7–5.3)
POTASSIUM SERPL-SCNC: 4 MMOL/L (ref 3.7–5.3)
POTASSIUM SERPL-SCNC: 4.1 MMOL/L (ref 3.7–5.3)
POTASSIUM SERPL-SCNC: 4.2 MMOL/L (ref 3.7–5.3)
POTASSIUM SERPL-SCNC: 4.7 MMOL/L (ref 3.7–5.3)
POTASSIUM SERPL-SCNC: 5.1 MMOL/L (ref 3.7–5.3)
POTASSIUM SERPL-SCNC: 5.2 MMOL/L (ref 3.7–5.3)
POTASSIUM SERPL-SCNC: 5.2 MMOL/L (ref 3.7–5.3)
POTASSIUM SERPL-SCNC: 5.4 MMOL/L (ref 3.7–5.3)
POTASSIUM SERPL-SCNC: 6 MMOL/L (ref 3.7–5.3)
PRO-BNP: 1309 PG/ML
PRO-BNP: 4776 PG/ML
PRO-BNP: 945 PG/ML
PROTEIN UA: ABNORMAL
PROTEIN UA: ABNORMAL
PROTHROMBIN TIME: 11.4 SEC (ref 9.1–12.3)
PROTHROMBIN TIME: 16.3 SEC (ref 9.1–12.3)
RBC # BLD: 2.54 M/UL (ref 3.95–5.11)
RBC # BLD: 2.57 M/UL (ref 3.95–5.11)
RBC # BLD: 2.67 M/UL (ref 3.95–5.11)
RBC # BLD: 2.69 M/UL (ref 3.95–5.11)
RBC # BLD: 2.7 M/UL (ref 3.95–5.11)
RBC # BLD: 2.74 M/UL (ref 3.95–5.11)
RBC # BLD: 2.79 M/UL (ref 3.95–5.11)
RBC # BLD: 2.79 M/UL (ref 3.95–5.11)
RBC # BLD: 2.95 M/UL (ref 3.95–5.11)
RBC # BLD: 3.07 M/UL (ref 3.95–5.11)
RBC # BLD: 3.12 M/UL (ref 3.95–5.11)
RBC # BLD: 3.25 M/UL (ref 3.95–5.11)
RBC # BLD: ABNORMAL 10*6/UL
RBC UA: ABNORMAL /HPF (ref 0–2)
RBC UA: ABNORMAL /HPF (ref 0–4)
READ BACK: YES
READ BACK: YES
REASON FOR REJECTION: NORMAL
RESP SYNCYTIAL VIRUS PCR: NOT DETECTED
RETIC %: 1.8 % (ref 0.5–1.9)
RETIC HEMOGLOBIN: 29.6 PG (ref 28.2–35.7)
RHINO/ENTEROVIRUS PCR: NOT DETECTED
SALICYLATE LEVEL: <1 MG/DL (ref 3–10)
SAMPLE SITE: ABNORMAL
SARS-COV-2, PCR: NOT DETECTED
SARS-COV-2, RAPID: NOT DETECTED
SEDIMENTATION RATE, ERYTHROCYTE: 70 MM (ref 0–30)
SEG NEUTROPHILS: 44 % (ref 36–66)
SEG NEUTROPHILS: 50 % (ref 36–66)
SEG NEUTROPHILS: 53 % (ref 36–66)
SEG NEUTROPHILS: 54 % (ref 36–66)
SEG NEUTROPHILS: 57 % (ref 36–66)
SEG NEUTROPHILS: 64 % (ref 36–66)
SEG NEUTROPHILS: 72 % (ref 36–66)
SEG NEUTROPHILS: 73 % (ref 36–66)
SEG NEUTROPHILS: 75 % (ref 36–66)
SEGMENTED NEUTROPHILS ABSOLUTE COUNT: 13.99 K/UL (ref 1.8–7.7)
SEGMENTED NEUTROPHILS ABSOLUTE COUNT: 16.2 K/UL (ref 1.8–7.7)
SEGMENTED NEUTROPHILS ABSOLUTE COUNT: 16.31 K/UL (ref 1.8–7.7)
SEGMENTED NEUTROPHILS ABSOLUTE COUNT: 17.45 K/UL (ref 1.8–7.7)
SEGMENTED NEUTROPHILS ABSOLUTE COUNT: 21.32 K/UL (ref 1.8–7.7)
SEGMENTED NEUTROPHILS ABSOLUTE COUNT: 22.36 K/UL (ref 1.8–7.7)
SEGMENTED NEUTROPHILS ABSOLUTE COUNT: 24.84 K/UL (ref 1.8–7.7)
SEGMENTED NEUTROPHILS ABSOLUTE COUNT: 25.18 K/UL (ref 1.8–7.7)
SEGMENTED NEUTROPHILS ABSOLUTE COUNT: 5.59 K/UL (ref 1.8–7.7)
SODIUM BLD-SCNC: 129 MMOL/L (ref 135–144)
SODIUM BLD-SCNC: 131 MMOL/L (ref 135–144)
SODIUM BLD-SCNC: 131 MMOL/L (ref 135–144)
SODIUM BLD-SCNC: 132 MMOL/L (ref 135–144)
SODIUM BLD-SCNC: 133 MMOL/L (ref 135–144)
SODIUM BLD-SCNC: 134 MMOL/L (ref 135–144)
SODIUM BLD-SCNC: 135 MMOL/L (ref 135–144)
SODIUM BLD-SCNC: 135 MMOL/L (ref 135–144)
SODIUM BLD-SCNC: 136 MMOL/L (ref 135–144)
SODIUM BLD-SCNC: 137 MMOL/L (ref 135–144)
SODIUM BLD-SCNC: 139 MMOL/L (ref 135–144)
SODIUM BLD-SCNC: 140 MMOL/L (ref 135–144)
SODIUM,UR: 75 MMOL/L
SPECIFIC GRAVITY UA: 1.01 (ref 1–1.03)
SPECIFIC GRAVITY UA: 1.03 (ref 1–1.03)
SPECIMEN DESCRIPTION: ABNORMAL
SPECIMEN DESCRIPTION: NORMAL
SURGICAL PATHOLOGY REPORT: NORMAL
TOTAL IRON BINDING CAPACITY: 291 UG/DL (ref 250–450)
TOTAL PROTEIN, URINE: 427 MG/DL
TOTAL PROTEIN, URINE: 429 MG/DL
TOTAL PROTEIN: 10 G/DL (ref 6.4–8.3)
TOTAL PROTEIN: 10.6 G/DL (ref 6.4–8.3)
TOTAL PROTEIN: 8.7 G/DL (ref 6.4–8.3)
TOTAL PROTEIN: 9 G/DL (ref 6.4–8.3)
TOXIC TRICYCLIC SC,BLOOD: NEGATIVE
TROPONIN INTERP: ABNORMAL
TROPONIN T: ABNORMAL NG/ML
TROPONIN, HIGH SENSITIVITY: 207 NG/L (ref 0–14)
TROPONIN, HIGH SENSITIVITY: 21 NG/L (ref 0–14)
TROPONIN, HIGH SENSITIVITY: 25 NG/L (ref 0–14)
TROPONIN, HIGH SENSITIVITY: 25 NG/L (ref 0–14)
TROPONIN, HIGH SENSITIVITY: 30 NG/L (ref 0–14)
TROPONIN, HIGH SENSITIVITY: 342 NG/L (ref 0–14)
TROPONIN, HIGH SENSITIVITY: 43 NG/L (ref 0–14)
TROPONIN, HIGH SENSITIVITY: 90 NG/L (ref 0–14)
TROPONIN, HIGH SENSITIVITY: 92 NG/L (ref 0–14)
TSH SERPL DL<=0.05 MIU/L-ACNC: 0.38 UIU/ML (ref 0.3–5)
TSH SERPL DL<=0.05 MIU/L-ACNC: 1.12 UIU/ML (ref 0.3–5)
TURBIDITY: ABNORMAL
TURBIDITY: CLEAR
UNSATURATED IRON BINDING CAPACITY: 258 UG/DL (ref 112–347)
URINE HGB: ABNORMAL
URINE HGB: NEGATIVE
URINE TOTAL PROTEIN CREATININE RATIO: 9.57 (ref 0–0.2)
UROBILINOGEN, URINE: NORMAL
UROBILINOGEN, URINE: NORMAL
VANCOMYCIN RANDOM DATE LAST DOSE: NORMAL
VANCOMYCIN RANDOM DOSE AMOUNT: NORMAL
VANCOMYCIN RANDOM TIME LAST DOSE: NORMAL
VANCOMYCIN RANDOM: 11.1 UG/ML
VANCOMYCIN RANDOM: 12.4 UG/ML
VANCOMYCIN RANDOM: 12.7 UG/ML
VANCOMYCIN RANDOM: 13.9 UG/ML
VANCOMYCIN RANDOM: 9.2 UG/ML
VITAMIN B-12: 1233 PG/ML (ref 232–1245)
VITAMIN B-12: 842 PG/ML (ref 232–1245)
VITAMIN D 25-HYDROXY: 23.1 NG/ML
WBC # BLD: 12.7 K/UL (ref 3.5–11.3)
WBC # BLD: 15.1 K/UL (ref 3.5–11.3)
WBC # BLD: 15.5 K/UL (ref 3.5–11.3)
WBC # BLD: 26.4 K/UL (ref 3.5–11.3)
WBC # BLD: 28.6 K/UL (ref 3.5–11.3)
WBC # BLD: 29.8 K/UL (ref 3.5–11.3)
WBC # BLD: 30 K/UL (ref 3.5–11.3)
WBC # BLD: 33.3 K/UL (ref 3.5–11.3)
WBC # BLD: 33.6 K/UL (ref 3.5–11.3)
WBC # BLD: 34.5 K/UL (ref 3.5–11.3)
WBC # BLD: 34.5 K/UL (ref 3.5–11.3)
WBC # BLD: 34.9 K/UL (ref 3.5–11.3)
WBC # BLD: ABNORMAL 10*3/UL
WBC UA: ABNORMAL /HPF (ref 0–5)
WBC UA: ABNORMAL /HPF (ref 0–5)
ZZ NTE CLEAN UP: ORDERED TEST: NORMAL
ZZ NTE WITH NAME CLEAN UP: SPECIMEN SOURCE: NORMAL

## 2022-01-01 PROCEDURE — 06HY33Z INSERTION OF INFUSION DEVICE INTO LOWER VEIN, PERCUTANEOUS APPROACH: ICD-10-PCS | Performed by: EMERGENCY MEDICINE

## 2022-01-01 PROCEDURE — 6360000002 HC RX W HCPCS: Performed by: STUDENT IN AN ORGANIZED HEALTH CARE EDUCATION/TRAINING PROGRAM

## 2022-01-01 PROCEDURE — 85027 COMPLETE CBC AUTOMATED: CPT

## 2022-01-01 PROCEDURE — 6370000000 HC RX 637 (ALT 250 FOR IP): Performed by: STUDENT IN AN ORGANIZED HEALTH CARE EDUCATION/TRAINING PROGRAM

## 2022-01-01 PROCEDURE — 93005 ELECTROCARDIOGRAM TRACING: CPT | Performed by: STUDENT IN AN ORGANIZED HEALTH CARE EDUCATION/TRAINING PROGRAM

## 2022-01-01 PROCEDURE — 80048 BASIC METABOLIC PNL TOTAL CA: CPT

## 2022-01-01 PROCEDURE — 99211 OFF/OP EST MAY X REQ PHY/QHP: CPT | Performed by: INTERNAL MEDICINE

## 2022-01-01 PROCEDURE — 82803 BLOOD GASES ANY COMBINATION: CPT

## 2022-01-01 PROCEDURE — 99233 SBSQ HOSP IP/OBS HIGH 50: CPT | Performed by: INTERNAL MEDICINE

## 2022-01-01 PROCEDURE — 88342 IMHCHEM/IMCYTCHM 1ST ANTB: CPT

## 2022-01-01 PROCEDURE — 2580000003 HC RX 258: Performed by: RADIOLOGY

## 2022-01-01 PROCEDURE — 2580000003 HC RX 258: Performed by: STUDENT IN AN ORGANIZED HEALTH CARE EDUCATION/TRAINING PROGRAM

## 2022-01-01 PROCEDURE — 31622 DX BRONCHOSCOPE/WASH: CPT | Performed by: INTERNAL MEDICINE

## 2022-01-01 PROCEDURE — 6360000002 HC RX W HCPCS: Performed by: NURSE PRACTITIONER

## 2022-01-01 PROCEDURE — 76937 US GUIDE VASCULAR ACCESS: CPT

## 2022-01-01 PROCEDURE — 6360000004 HC RX CONTRAST MEDICATION

## 2022-01-01 PROCEDURE — 99223 1ST HOSP IP/OBS HIGH 75: CPT | Performed by: INTERNAL MEDICINE

## 2022-01-01 PROCEDURE — 85652 RBC SED RATE AUTOMATED: CPT

## 2022-01-01 PROCEDURE — 97116 GAIT TRAINING THERAPY: CPT

## 2022-01-01 PROCEDURE — 82947 ASSAY GLUCOSE BLOOD QUANT: CPT

## 2022-01-01 PROCEDURE — 1200000000 HC SEMI PRIVATE

## 2022-01-01 PROCEDURE — 2580000003 HC RX 258: Performed by: INTERNAL MEDICINE

## 2022-01-01 PROCEDURE — 94660 CPAP INITIATION&MGMT: CPT

## 2022-01-01 PROCEDURE — 71260 CT THORAX DX C+: CPT

## 2022-01-01 PROCEDURE — 94640 AIRWAY INHALATION TREATMENT: CPT

## 2022-01-01 PROCEDURE — 74018 RADEX ABDOMEN 1 VIEW: CPT

## 2022-01-01 PROCEDURE — 99232 SBSQ HOSP IP/OBS MODERATE 35: CPT | Performed by: INTERNAL MEDICINE

## 2022-01-01 PROCEDURE — 3017F COLORECTAL CA SCREEN DOC REV: CPT | Performed by: INTERNAL MEDICINE

## 2022-01-01 PROCEDURE — 6360000002 HC RX W HCPCS

## 2022-01-01 PROCEDURE — G8399 PT W/DXA RESULTS DOCUMENT: HCPCS | Performed by: INTERNAL MEDICINE

## 2022-01-01 PROCEDURE — 6370000000 HC RX 637 (ALT 250 FOR IP): Performed by: NURSE PRACTITIONER

## 2022-01-01 PROCEDURE — 82746 ASSAY OF FOLIC ACID SERUM: CPT

## 2022-01-01 PROCEDURE — 7100000011 HC PHASE II RECOVERY - ADDTL 15 MIN

## 2022-01-01 PROCEDURE — 71045 X-RAY EXAM CHEST 1 VIEW: CPT

## 2022-01-01 PROCEDURE — 87086 URINE CULTURE/COLONY COUNT: CPT

## 2022-01-01 PROCEDURE — 6370000000 HC RX 637 (ALT 250 FOR IP): Performed by: CLINICAL NURSE SPECIALIST

## 2022-01-01 PROCEDURE — 6360000002 HC RX W HCPCS: Performed by: INTERNAL MEDICINE

## 2022-01-01 PROCEDURE — 84484 ASSAY OF TROPONIN QUANT: CPT

## 2022-01-01 PROCEDURE — 3017F COLORECTAL CA SCREEN DOC REV: CPT | Performed by: FAMILY MEDICINE

## 2022-01-01 PROCEDURE — 83735 ASSAY OF MAGNESIUM: CPT

## 2022-01-01 PROCEDURE — 87205 SMEAR GRAM STAIN: CPT

## 2022-01-01 PROCEDURE — 80202 ASSAY OF VANCOMYCIN: CPT

## 2022-01-01 PROCEDURE — 36415 COLL VENOUS BLD VENIPUNCTURE: CPT

## 2022-01-01 PROCEDURE — 6370000000 HC RX 637 (ALT 250 FOR IP): Performed by: INTERNAL MEDICINE

## 2022-01-01 PROCEDURE — 2700000000 HC OXYGEN THERAPY PER DAY

## 2022-01-01 PROCEDURE — 99214 OFFICE O/P EST MOD 30 MIN: CPT | Performed by: FAMILY MEDICINE

## 2022-01-01 PROCEDURE — 2500000003 HC RX 250 WO HCPCS

## 2022-01-01 PROCEDURE — 85025 COMPLETE CBC W/AUTO DIFF WBC: CPT

## 2022-01-01 PROCEDURE — 94003 VENT MGMT INPAT SUBQ DAY: CPT

## 2022-01-01 PROCEDURE — 2500000003 HC RX 250 WO HCPCS: Performed by: STUDENT IN AN ORGANIZED HEALTH CARE EDUCATION/TRAINING PROGRAM

## 2022-01-01 PROCEDURE — 71250 CT THORAX DX C-: CPT

## 2022-01-01 PROCEDURE — 3023F SPIROM DOC REV: CPT | Performed by: INTERNAL MEDICINE

## 2022-01-01 PROCEDURE — 85730 THROMBOPLASTIN TIME PARTIAL: CPT

## 2022-01-01 PROCEDURE — 99291 CRITICAL CARE FIRST HOUR: CPT | Performed by: INTERNAL MEDICINE

## 2022-01-01 PROCEDURE — 94760 N-INVAS EAR/PLS OXIMETRY 1: CPT

## 2022-01-01 PROCEDURE — 2500000003 HC RX 250 WO HCPCS: Performed by: INTERNAL MEDICINE

## 2022-01-01 PROCEDURE — 94761 N-INVAS EAR/PLS OXIMETRY MLT: CPT

## 2022-01-01 PROCEDURE — 99254 IP/OBS CNSLTJ NEW/EST MOD 60: CPT | Performed by: INTERNAL MEDICINE

## 2022-01-01 PROCEDURE — 96375 TX/PRO/DX INJ NEW DRUG ADDON: CPT

## 2022-01-01 PROCEDURE — 87070 CULTURE OTHR SPECIMN AEROBIC: CPT

## 2022-01-01 PROCEDURE — 78815 PET IMAGE W/CT SKULL-THIGH: CPT

## 2022-01-01 PROCEDURE — 85055 RETICULATED PLATELET ASSAY: CPT

## 2022-01-01 PROCEDURE — 85610 PROTHROMBIN TIME: CPT

## 2022-01-01 PROCEDURE — 2000000000 HC ICU R&B

## 2022-01-01 PROCEDURE — 84443 ASSAY THYROID STIM HORMONE: CPT

## 2022-01-01 PROCEDURE — 0BC78ZZ EXTIRPATION OF MATTER FROM LEFT MAIN BRONCHUS, VIA NATURAL OR ARTIFICIAL OPENING ENDOSCOPIC: ICD-10-PCS | Performed by: INTERNAL MEDICINE

## 2022-01-01 PROCEDURE — 84100 ASSAY OF PHOSPHORUS: CPT

## 2022-01-01 PROCEDURE — 1090F PRES/ABSN URINE INCON ASSESS: CPT | Performed by: INTERNAL MEDICINE

## 2022-01-01 PROCEDURE — 3430000000 HC RX DIAGNOSTIC RADIOPHARMACEUTICAL: Performed by: INTERNAL MEDICINE

## 2022-01-01 PROCEDURE — 81001 URINALYSIS AUTO W/SCOPE: CPT

## 2022-01-01 PROCEDURE — 99222 1ST HOSP IP/OBS MODERATE 55: CPT | Performed by: CLINICAL NURSE SPECIALIST

## 2022-01-01 PROCEDURE — 93005 ELECTROCARDIOGRAM TRACING: CPT

## 2022-01-01 PROCEDURE — 4040F PNEUMOC VAC/ADMIN/RCVD: CPT | Performed by: FAMILY MEDICINE

## 2022-01-01 PROCEDURE — 6360000002 HC RX W HCPCS: Performed by: CLINICAL NURSE SPECIALIST

## 2022-01-01 PROCEDURE — 5A1D90Z PERFORMANCE OF URINARY FILTRATION, CONTINUOUS, GREATER THAN 18 HOURS PER DAY: ICD-10-PCS | Performed by: INTERNAL MEDICINE

## 2022-01-01 PROCEDURE — 83605 ASSAY OF LACTIC ACID: CPT

## 2022-01-01 PROCEDURE — 94664 DEMO&/EVAL PT USE INHALER: CPT

## 2022-01-01 PROCEDURE — 2580000003 HC RX 258: Performed by: NURSE PRACTITIONER

## 2022-01-01 PROCEDURE — 84165 PROTEIN E-PHORESIS SERUM: CPT

## 2022-01-01 PROCEDURE — 1123F ACP DISCUSS/DSCN MKR DOCD: CPT | Performed by: FAMILY MEDICINE

## 2022-01-01 PROCEDURE — 83880 ASSAY OF NATRIURETIC PEPTIDE: CPT

## 2022-01-01 PROCEDURE — 97535 SELF CARE MNGMENT TRAINING: CPT

## 2022-01-01 PROCEDURE — 87154 CUL TYP ID BLD PTHGN 6+ TRGT: CPT

## 2022-01-01 PROCEDURE — 82140 ASSAY OF AMMONIA: CPT

## 2022-01-01 PROCEDURE — 87635 SARS-COV-2 COVID-19 AMP PRB: CPT

## 2022-01-01 PROCEDURE — 84166 PROTEIN E-PHORESIS/URINE/CSF: CPT

## 2022-01-01 PROCEDURE — 80051 ELECTROLYTE PANEL: CPT

## 2022-01-01 PROCEDURE — 4004F PT TOBACCO SCREEN RCVD TLK: CPT | Performed by: INTERNAL MEDICINE

## 2022-01-01 PROCEDURE — 36600 WITHDRAWAL OF ARTERIAL BLOOD: CPT

## 2022-01-01 PROCEDURE — 93308 TTE F-UP OR LMTD: CPT

## 2022-01-01 PROCEDURE — A9552 F18 FDG: HCPCS | Performed by: INTERNAL MEDICINE

## 2022-01-01 PROCEDURE — 82607 VITAMIN B-12: CPT

## 2022-01-01 PROCEDURE — 5A1945Z RESPIRATORY VENTILATION, 24-96 CONSECUTIVE HOURS: ICD-10-PCS | Performed by: INTERNAL MEDICINE

## 2022-01-01 PROCEDURE — G8427 DOCREV CUR MEDS BY ELIG CLIN: HCPCS | Performed by: FAMILY MEDICINE

## 2022-01-01 PROCEDURE — 2580000003 HC RX 258

## 2022-01-01 PROCEDURE — 3E033XZ INTRODUCTION OF VASOPRESSOR INTO PERIPHERAL VEIN, PERCUTANEOUS APPROACH: ICD-10-PCS | Performed by: EMERGENCY MEDICINE

## 2022-01-01 PROCEDURE — 02HV33Z INSERTION OF INFUSION DEVICE INTO SUPERIOR VENA CAVA, PERCUTANEOUS APPROACH: ICD-10-PCS | Performed by: INTERNAL MEDICINE

## 2022-01-01 PROCEDURE — 1124F ACP DISCUSS-NO DSCNMKR DOCD: CPT | Performed by: INTERNAL MEDICINE

## 2022-01-01 PROCEDURE — 84300 ASSAY OF URINE SODIUM: CPT

## 2022-01-01 PROCEDURE — 2500000003 HC RX 250 WO HCPCS: Performed by: NURSE PRACTITIONER

## 2022-01-01 PROCEDURE — 87077 CULTURE AEROBIC IDENTIFY: CPT

## 2022-01-01 PROCEDURE — G0480 DRUG TEST DEF 1-7 CLASSES: HCPCS

## 2022-01-01 PROCEDURE — 80076 HEPATIC FUNCTION PANEL: CPT

## 2022-01-01 PROCEDURE — 84520 ASSAY OF UREA NITROGEN: CPT

## 2022-01-01 PROCEDURE — 80307 DRUG TEST PRSMV CHEM ANLYZR: CPT

## 2022-01-01 PROCEDURE — 1090F PRES/ABSN URINE INCON ASSESS: CPT | Performed by: FAMILY MEDICINE

## 2022-01-01 PROCEDURE — G8417 CALC BMI ABV UP PARAM F/U: HCPCS | Performed by: INTERNAL MEDICINE

## 2022-01-01 PROCEDURE — G8427 DOCREV CUR MEDS BY ELIG CLIN: HCPCS | Performed by: INTERNAL MEDICINE

## 2022-01-01 PROCEDURE — 37799 UNLISTED PX VASCULAR SURGERY: CPT

## 2022-01-01 PROCEDURE — 86140 C-REACTIVE PROTEIN: CPT

## 2022-01-01 PROCEDURE — 87449 NOS EACH ORGANISM AG IA: CPT

## 2022-01-01 PROCEDURE — 83540 ASSAY OF IRON: CPT

## 2022-01-01 PROCEDURE — 85049 AUTOMATED PLATELET COUNT: CPT

## 2022-01-01 PROCEDURE — 1111F DSCHRG MED/CURRENT MED MERGE: CPT | Performed by: INTERNAL MEDICINE

## 2022-01-01 PROCEDURE — 80053 COMPREHEN METABOLIC PANEL: CPT

## 2022-01-01 PROCEDURE — 70496 CT ANGIOGRAPHY HEAD: CPT

## 2022-01-01 PROCEDURE — 80074 ACUTE HEPATITIS PANEL: CPT

## 2022-01-01 PROCEDURE — 86038 ANTINUCLEAR ANTIBODIES: CPT

## 2022-01-01 PROCEDURE — 99285 EMERGENCY DEPT VISIT HI MDM: CPT

## 2022-01-01 PROCEDURE — 93010 ELECTROCARDIOGRAM REPORT: CPT | Performed by: INTERNAL MEDICINE

## 2022-01-01 PROCEDURE — 1111F DSCHRG MED/CURRENT MED MERGE: CPT | Performed by: FAMILY MEDICINE

## 2022-01-01 PROCEDURE — 96374 THER/PROPH/DIAG INJ IV PUSH: CPT

## 2022-01-01 PROCEDURE — 7100000010 HC PHASE II RECOVERY - FIRST 15 MIN

## 2022-01-01 PROCEDURE — 85014 HEMATOCRIT: CPT

## 2022-01-01 PROCEDURE — 99214 OFFICE O/P EST MOD 30 MIN: CPT | Performed by: INTERNAL MEDICINE

## 2022-01-01 PROCEDURE — 80179 DRUG ASSAY SALICYLATE: CPT

## 2022-01-01 PROCEDURE — 90945 DIALYSIS ONE EVALUATION: CPT

## 2022-01-01 PROCEDURE — 82728 ASSAY OF FERRITIN: CPT

## 2022-01-01 PROCEDURE — 76775 US EXAM ABDO BACK WALL LIM: CPT

## 2022-01-01 PROCEDURE — 94002 VENT MGMT INPAT INIT DAY: CPT

## 2022-01-01 PROCEDURE — 93320 DOPPLER ECHO COMPLETE: CPT

## 2022-01-01 PROCEDURE — G8399 PT W/DXA RESULTS DOCUMENT: HCPCS | Performed by: FAMILY MEDICINE

## 2022-01-01 PROCEDURE — 89220 SPUTUM SPECIMEN COLLECTION: CPT

## 2022-01-01 PROCEDURE — 97530 THERAPEUTIC ACTIVITIES: CPT

## 2022-01-01 PROCEDURE — 88341 IMHCHEM/IMCYTCHM EA ADD ANTB: CPT

## 2022-01-01 PROCEDURE — 96365 THER/PROPH/DIAG IV INF INIT: CPT

## 2022-01-01 PROCEDURE — 86335 IMMUNFIX E-PHORSIS/URINE/CSF: CPT

## 2022-01-01 PROCEDURE — 82330 ASSAY OF CALCIUM: CPT

## 2022-01-01 PROCEDURE — 93306 TTE W/DOPPLER COMPLETE: CPT

## 2022-01-01 PROCEDURE — 84295 ASSAY OF SERUM SODIUM: CPT

## 2022-01-01 PROCEDURE — 80143 DRUG ASSAY ACETAMINOPHEN: CPT

## 2022-01-01 PROCEDURE — 84156 ASSAY OF PROTEIN URINE: CPT

## 2022-01-01 PROCEDURE — 82565 ASSAY OF CREATININE: CPT

## 2022-01-01 PROCEDURE — G8484 FLU IMMUNIZE NO ADMIN: HCPCS | Performed by: INTERNAL MEDICINE

## 2022-01-01 PROCEDURE — 82805 BLOOD GASES W/O2 SATURATION: CPT

## 2022-01-01 PROCEDURE — 2580000003 HC RX 258: Performed by: CLINICAL NURSE SPECIALIST

## 2022-01-01 PROCEDURE — 96376 TX/PRO/DX INJ SAME DRUG ADON: CPT

## 2022-01-01 PROCEDURE — 2709999900 CT NEEDLE BIOPSY LUNG PERCUTANEOUS W IMAGING GUIDANCE

## 2022-01-01 PROCEDURE — 70450 CT HEAD/BRAIN W/O DYE: CPT

## 2022-01-01 PROCEDURE — 88333 PATH CONSLTJ SURG CYTO XM 1: CPT

## 2022-01-01 PROCEDURE — 88305 TISSUE EXAM BY PATHOLOGIST: CPT

## 2022-01-01 PROCEDURE — 82570 ASSAY OF URINE CREATININE: CPT

## 2022-01-01 PROCEDURE — 1036F TOBACCO NON-USER: CPT | Performed by: INTERNAL MEDICINE

## 2022-01-01 PROCEDURE — 93005 ELECTROCARDIOGRAM TRACING: CPT | Performed by: INTERNAL MEDICINE

## 2022-01-01 PROCEDURE — 0BH18EZ INSERTION OF ENDOTRACHEAL AIRWAY INTO TRACHEA, VIA NATURAL OR ARTIFICIAL OPENING ENDOSCOPIC: ICD-10-PCS | Performed by: INTERNAL MEDICINE

## 2022-01-01 PROCEDURE — 82436 ASSAY OF URINE CHLORIDE: CPT

## 2022-01-01 PROCEDURE — 97165 OT EVAL LOW COMPLEX 30 MIN: CPT

## 2022-01-01 PROCEDURE — 93325 DOPPLER ECHO COLOR FLOW MAPG: CPT

## 2022-01-01 PROCEDURE — 86225 DNA ANTIBODY NATIVE: CPT

## 2022-01-01 PROCEDURE — 83883 ASSAY NEPHELOMETRY NOT SPEC: CPT

## 2022-01-01 PROCEDURE — 1123F ACP DISCUSS/DSCN MKR DOCD: CPT | Performed by: INTERNAL MEDICINE

## 2022-01-01 PROCEDURE — 0202U NFCT DS 22 TRGT SARS-COV-2: CPT

## 2022-01-01 PROCEDURE — 99239 HOSP IP/OBS DSCHRG MGMT >30: CPT | Performed by: INTERNAL MEDICINE

## 2022-01-01 PROCEDURE — 6370000000 HC RX 637 (ALT 250 FOR IP)

## 2022-01-01 PROCEDURE — 87040 BLOOD CULTURE FOR BACTERIA: CPT

## 2022-01-01 PROCEDURE — 82306 VITAMIN D 25 HYDROXY: CPT

## 2022-01-01 PROCEDURE — 97162 PT EVAL MOD COMPLEX 30 MIN: CPT

## 2022-01-01 PROCEDURE — 83550 IRON BINDING TEST: CPT

## 2022-01-01 PROCEDURE — 87186 SC STD MICRODIL/AGAR DIL: CPT

## 2022-01-01 PROCEDURE — 84155 ASSAY OF PROTEIN SERUM: CPT

## 2022-01-01 PROCEDURE — C9113 INJ PANTOPRAZOLE SODIUM, VIA: HCPCS | Performed by: STUDENT IN AN ORGANIZED HEALTH CARE EDUCATION/TRAINING PROGRAM

## 2022-01-01 PROCEDURE — 97161 PT EVAL LOW COMPLEX 20 MIN: CPT

## 2022-01-01 PROCEDURE — 86160 COMPLEMENT ANTIGEN: CPT

## 2022-01-01 PROCEDURE — 85045 AUTOMATED RETICULOCYTE COUNT: CPT

## 2022-01-01 PROCEDURE — 86334 IMMUNOFIX E-PHORESIS SERUM: CPT

## 2022-01-01 RX ORDER — ALBUTEROL SULFATE 2.5 MG/3ML
2.5 SOLUTION RESPIRATORY (INHALATION) EVERY 6 HOURS PRN
Qty: 120 EACH | Refills: 11 | Status: SHIPPED | OUTPATIENT
Start: 2022-01-01

## 2022-01-01 RX ORDER — AMLODIPINE BESYLATE 10 MG/1
10 TABLET ORAL DAILY
COMMUNITY
End: 2022-01-01 | Stop reason: DRUGHIGH

## 2022-01-01 RX ORDER — PANTOPRAZOLE SODIUM 40 MG/1
TABLET, DELAYED RELEASE ORAL
Qty: 90 TABLET | Refills: 2 | Status: SHIPPED | OUTPATIENT
Start: 2022-01-01

## 2022-01-01 RX ORDER — SODIUM CHLORIDE 0.9 % (FLUSH) 0.9 %
5-40 SYRINGE (ML) INJECTION EVERY 12 HOURS SCHEDULED
Status: DISCONTINUED | OUTPATIENT
Start: 2022-01-01 | End: 2022-01-01 | Stop reason: HOSPADM

## 2022-01-01 RX ORDER — HEPARIN SODIUM 1000 [USP'U]/ML
4000 INJECTION, SOLUTION INTRAVENOUS; SUBCUTANEOUS PRN
Status: DISCONTINUED | OUTPATIENT
Start: 2022-01-01 | End: 2022-01-01

## 2022-01-01 RX ORDER — DEXTROSE MONOHYDRATE 25 G/50ML
25 INJECTION, SOLUTION INTRAVENOUS ONCE
Status: COMPLETED | OUTPATIENT
Start: 2022-01-01 | End: 2022-01-01

## 2022-01-01 RX ORDER — POTASSIUM CHLORIDE 20 MEQ/1
40 TABLET, EXTENDED RELEASE ORAL ONCE
Status: COMPLETED | OUTPATIENT
Start: 2022-01-01 | End: 2022-01-01

## 2022-01-01 RX ORDER — AMLODIPINE BESYLATE 5 MG/1
5 TABLET ORAL DAILY
Qty: 90 TABLET | Refills: 1 | Status: SHIPPED | OUTPATIENT
Start: 2022-01-01 | End: 2022-01-01

## 2022-01-01 RX ORDER — SODIUM CHLORIDE 9 MG/ML
INJECTION, SOLUTION INTRAVENOUS PRN
Status: DISCONTINUED | OUTPATIENT
Start: 2022-01-01 | End: 2022-01-01 | Stop reason: HOSPADM

## 2022-01-01 RX ORDER — HEPARIN SODIUM 1000 [USP'U]/ML
2000 INJECTION, SOLUTION INTRAVENOUS; SUBCUTANEOUS PRN
Status: DISCONTINUED | OUTPATIENT
Start: 2022-01-01 | End: 2022-01-01 | Stop reason: ALTCHOICE

## 2022-01-01 RX ORDER — POLYETHYLENE GLYCOL 3350 17 G/17G
17 POWDER, FOR SOLUTION ORAL DAILY PRN
Status: DISCONTINUED | OUTPATIENT
Start: 2022-01-01 | End: 2022-01-01 | Stop reason: HOSPADM

## 2022-01-01 RX ORDER — SUCCINYLCHOLINE CHLORIDE 20 MG/ML
INJECTION INTRAMUSCULAR; INTRAVENOUS
Status: COMPLETED
Start: 2022-01-01 | End: 2022-01-01

## 2022-01-01 RX ORDER — PREDNISONE 20 MG/1
40 TABLET ORAL DAILY
Status: DISCONTINUED | OUTPATIENT
Start: 2022-01-01 | End: 2022-01-01 | Stop reason: HOSPADM

## 2022-01-01 RX ORDER — ALBUTEROL SULFATE 2.5 MG/3ML
SOLUTION RESPIRATORY (INHALATION)
Qty: 75 ML | Refills: 1 | Status: SHIPPED | OUTPATIENT
Start: 2022-01-01 | End: 2022-01-01 | Stop reason: SDUPTHER

## 2022-01-01 RX ORDER — AZITHROMYCIN 250 MG/1
TABLET, FILM COATED ORAL
Qty: 4 TABLET | Refills: 0 | Status: SHIPPED | OUTPATIENT
Start: 2022-01-01 | End: 2022-01-01 | Stop reason: ALTCHOICE

## 2022-01-01 RX ORDER — MAGNESIUM SULFATE IN WATER 40 MG/ML
2000 INJECTION, SOLUTION INTRAVENOUS ONCE
Status: COMPLETED | OUTPATIENT
Start: 2022-01-01 | End: 2022-01-01

## 2022-01-01 RX ORDER — SODIUM CHLORIDE 0.9 % (FLUSH) 0.9 %
5-40 SYRINGE (ML) INJECTION PRN
Status: DISCONTINUED | OUTPATIENT
Start: 2022-01-01 | End: 2022-01-01 | Stop reason: HOSPADM

## 2022-01-01 RX ORDER — ALBUTEROL SULFATE 2.5 MG/3ML
15 SOLUTION RESPIRATORY (INHALATION)
Status: DISCONTINUED | OUTPATIENT
Start: 2022-01-01 | End: 2022-01-01 | Stop reason: HOSPADM

## 2022-01-01 RX ORDER — SODIUM CHLORIDE 9 MG/ML
INJECTION, SOLUTION INTRAVENOUS CONTINUOUS
Status: DISCONTINUED | OUTPATIENT
Start: 2022-01-01 | End: 2022-01-01

## 2022-01-01 RX ORDER — METHYLPREDNISOLONE SODIUM SUCCINATE 125 MG/2ML
40 INJECTION, POWDER, LYOPHILIZED, FOR SOLUTION INTRAMUSCULAR; INTRAVENOUS DAILY
Status: DISCONTINUED | OUTPATIENT
Start: 2022-01-01 | End: 2022-01-01

## 2022-01-01 RX ORDER — MORPHINE SULFATE 2 MG/ML
2 INJECTION, SOLUTION INTRAMUSCULAR; INTRAVENOUS
Status: DISCONTINUED | OUTPATIENT
Start: 2022-01-01 | End: 2022-01-01 | Stop reason: HOSPADM

## 2022-01-01 RX ORDER — AMLODIPINE BESYLATE 10 MG/1
5 TABLET ORAL DAILY
Status: DISCONTINUED | OUTPATIENT
Start: 2022-01-01 | End: 2022-01-01 | Stop reason: HOSPADM

## 2022-01-01 RX ORDER — ALBUTEROL SULFATE 2.5 MG/3ML
2.5 SOLUTION RESPIRATORY (INHALATION)
Status: DISCONTINUED | OUTPATIENT
Start: 2022-01-01 | End: 2022-01-01 | Stop reason: HOSPADM

## 2022-01-01 RX ORDER — HEPARIN SODIUM (PORCINE) LOCK FLUSH IV SOLN 100 UNIT/ML 100 UNIT/ML
500 SOLUTION INTRAVENOUS ONCE
Status: DISCONTINUED | OUTPATIENT
Start: 2022-01-01 | End: 2022-01-01

## 2022-01-01 RX ORDER — SODIUM CHLORIDE 9 MG/ML
INJECTION, SOLUTION INTRAVENOUS PRN
Status: DISCONTINUED | OUTPATIENT
Start: 2022-01-01 | End: 2022-01-01

## 2022-01-01 RX ORDER — NOREPINEPHRINE BIT/0.9 % NACL 16MG/250ML
INFUSION BOTTLE (ML) INTRAVENOUS
Status: COMPLETED
Start: 2022-01-01 | End: 2022-01-01

## 2022-01-01 RX ORDER — VITAMIN A, ASCORBIC ACID, CHOLECALCIFEROL, .ALPHA.-TOCOPHEROL ACETATE, DL-, THIAMINE MONONITRATE, RIBOFLAVIN, NIACINAMIDE, PYRIDOXINE HYDROCHLORIDE, FOLIC ACID, CYANOCOBALAMIN, CALCIUM CARBONATE, IRON, ZINC OXIDE, AND CUPRIC OXIDE 4000; 120; 400; 22; 1.84; 3; 20; 10; 1; 12; 200; 29; 25; 2 [IU]/1; MG/1; [IU]/1; [IU]/1; MG/1; MG/1; MG/1; MG/1; MG/1; UG/1; MG/1; MG/1; MG/1; MG/1
1 TABLET ORAL DAILY
Status: DISCONTINUED | OUTPATIENT
Start: 2022-01-01 | End: 2022-01-01 | Stop reason: HOSPADM

## 2022-01-01 RX ORDER — LEVOFLOXACIN 750 MG/1
750 TABLET ORAL DAILY
Status: DISCONTINUED | OUTPATIENT
Start: 2022-01-01 | End: 2022-01-01 | Stop reason: HOSPADM

## 2022-01-01 RX ORDER — HEPARIN SODIUM 1000 [USP'U]/ML
1500 INJECTION, SOLUTION INTRAVENOUS; SUBCUTANEOUS PRN
Status: DISCONTINUED | OUTPATIENT
Start: 2022-01-01 | End: 2022-01-01

## 2022-01-01 RX ORDER — SODIUM CHLORIDE 0.9 % (FLUSH) 0.9 %
10 SYRINGE (ML) INJECTION ONCE
Status: COMPLETED | OUTPATIENT
Start: 2022-01-01 | End: 2022-01-01

## 2022-01-01 RX ORDER — ALBUTEROL SULFATE 2.5 MG/3ML
2.5 SOLUTION RESPIRATORY (INHALATION) 4 TIMES DAILY
Qty: 120 EACH | Refills: 11 | Status: ON HOLD | OUTPATIENT
Start: 2022-01-01 | End: 2022-01-01 | Stop reason: SDUPTHER

## 2022-01-01 RX ORDER — FLUDEOXYGLUCOSE F 18 200 MCI/ML
10 INJECTION, SOLUTION INTRAVENOUS
Status: COMPLETED | OUTPATIENT
Start: 2022-01-01 | End: 2022-01-01

## 2022-01-01 RX ORDER — METHYLPREDNISOLONE SODIUM SUCCINATE 125 MG/2ML
125 INJECTION, POWDER, LYOPHILIZED, FOR SOLUTION INTRAMUSCULAR; INTRAVENOUS ONCE
Status: COMPLETED | OUTPATIENT
Start: 2022-01-01 | End: 2022-01-01

## 2022-01-01 RX ORDER — SODIUM CHLORIDE, SODIUM LACTATE, POTASSIUM CHLORIDE, AND CALCIUM CHLORIDE .6; .31; .03; .02 G/100ML; G/100ML; G/100ML; G/100ML
1000 INJECTION, SOLUTION INTRAVENOUS ONCE
Status: COMPLETED | OUTPATIENT
Start: 2022-01-01 | End: 2022-01-01

## 2022-01-01 RX ORDER — HEPARIN SODIUM 1000 [USP'U]/ML
1000 INJECTION, SOLUTION INTRAVENOUS; SUBCUTANEOUS PRN
Status: DISCONTINUED | OUTPATIENT
Start: 2022-01-01 | End: 2022-01-01

## 2022-01-01 RX ORDER — NOREPINEPHRINE BIT/0.9 % NACL 16MG/250ML
1-100 INFUSION BOTTLE (ML) INTRAVENOUS CONTINUOUS
Status: DISCONTINUED | OUTPATIENT
Start: 2022-01-01 | End: 2022-01-01

## 2022-01-01 RX ORDER — FUROSEMIDE 40 MG/1
40 TABLET ORAL DAILY
Status: DISCONTINUED | OUTPATIENT
Start: 2022-01-01 | End: 2022-01-01 | Stop reason: HOSPADM

## 2022-01-01 RX ORDER — ASPIRIN 81 MG/1
324 TABLET, CHEWABLE ORAL ONCE
Status: DISCONTINUED | OUTPATIENT
Start: 2022-01-01 | End: 2022-01-01

## 2022-01-01 RX ORDER — ALBUTEROL SULFATE 90 UG/1
2 AEROSOL, METERED RESPIRATORY (INHALATION) EVERY 4 HOURS PRN
Status: DISCONTINUED | OUTPATIENT
Start: 2022-01-01 | End: 2022-01-01

## 2022-01-01 RX ORDER — POTASSIUM CHLORIDE 20 MEQ/1
TABLET, EXTENDED RELEASE ORAL
Qty: 180 TABLET | Refills: 0 | Status: SHIPPED | OUTPATIENT
Start: 2022-01-01

## 2022-01-01 RX ORDER — ONDANSETRON 4 MG/1
4 TABLET, ORALLY DISINTEGRATING ORAL EVERY 8 HOURS PRN
Status: DISCONTINUED | OUTPATIENT
Start: 2022-01-01 | End: 2022-01-01 | Stop reason: HOSPADM

## 2022-01-01 RX ORDER — DOPAMINE HYDROCHLORIDE 160 MG/100ML
1-20 INJECTION, SOLUTION INTRAVENOUS CONTINUOUS
Status: DISCONTINUED | OUTPATIENT
Start: 2022-01-01 | End: 2022-01-01

## 2022-01-01 RX ORDER — POTASSIUM CHLORIDE 29.8 MG/ML
INJECTION INTRAVENOUS
Status: COMPLETED
Start: 2022-01-01 | End: 2022-01-01

## 2022-01-01 RX ORDER — PREDNISONE 20 MG/1
20 TABLET ORAL DAILY
Qty: 4 TABLET | Refills: 0 | Status: SHIPPED | OUTPATIENT
Start: 2022-01-01 | End: 2022-01-01

## 2022-01-01 RX ORDER — ACETAMINOPHEN 325 MG/1
650 TABLET ORAL EVERY 6 HOURS PRN
Status: DISCONTINUED | OUTPATIENT
Start: 2022-01-01 | End: 2022-01-01

## 2022-01-01 RX ORDER — PANTOPRAZOLE SODIUM 40 MG/1
40 TABLET, DELAYED RELEASE ORAL
Status: DISCONTINUED | OUTPATIENT
Start: 2022-01-01 | End: 2022-01-01 | Stop reason: HOSPADM

## 2022-01-01 RX ORDER — SERTRALINE HYDROCHLORIDE 100 MG/1
TABLET, FILM COATED ORAL
Qty: 60 TABLET | Refills: 2 | Status: SHIPPED | OUTPATIENT
Start: 2022-01-01 | End: 2022-01-01

## 2022-01-01 RX ORDER — LORAZEPAM 2 MG/ML
1 CONCENTRATE ORAL
Status: DISCONTINUED | OUTPATIENT
Start: 2022-01-01 | End: 2022-01-01 | Stop reason: HOSPADM

## 2022-01-01 RX ORDER — FENTANYL CITRATE 50 UG/ML
50 INJECTION, SOLUTION INTRAMUSCULAR; INTRAVENOUS ONCE
Status: DISCONTINUED | OUTPATIENT
Start: 2022-01-01 | End: 2022-01-01

## 2022-01-01 RX ORDER — FUROSEMIDE 20 MG/1
40 TABLET ORAL ONCE
Status: COMPLETED | OUTPATIENT
Start: 2022-01-01 | End: 2022-01-01

## 2022-01-01 RX ORDER — ONDANSETRON 2 MG/ML
4 INJECTION INTRAMUSCULAR; INTRAVENOUS EVERY 6 HOURS PRN
Status: DISCONTINUED | OUTPATIENT
Start: 2022-01-01 | End: 2022-01-01 | Stop reason: HOSPADM

## 2022-01-01 RX ORDER — 0.9 % SODIUM CHLORIDE 0.9 %
1000 INTRAVENOUS SOLUTION INTRAVENOUS ONCE
Status: COMPLETED | OUTPATIENT
Start: 2022-01-01 | End: 2022-01-01

## 2022-01-01 RX ORDER — FUROSEMIDE 20 MG/1
40 TABLET ORAL DAILY
Status: DISCONTINUED | OUTPATIENT
Start: 2022-01-01 | End: 2022-01-01

## 2022-01-01 RX ORDER — HEPARIN SODIUM 1000 [USP'U]/ML
40 INJECTION, SOLUTION INTRAVENOUS; SUBCUTANEOUS PRN
Status: DISCONTINUED | OUTPATIENT
Start: 2022-01-01 | End: 2022-01-01

## 2022-01-01 RX ORDER — SUCCINYLCHOLINE CHLORIDE 20 MG/ML
80 INJECTION INTRAMUSCULAR; INTRAVENOUS ONCE
Status: COMPLETED | OUTPATIENT
Start: 2022-01-01 | End: 2022-01-01

## 2022-01-01 RX ORDER — SODIUM CHLORIDE 0.9 % (FLUSH) 0.9 %
5-40 SYRINGE (ML) INJECTION EVERY 12 HOURS SCHEDULED
Status: DISCONTINUED | OUTPATIENT
Start: 2022-01-01 | End: 2022-01-01

## 2022-01-01 RX ORDER — POTASSIUM CHLORIDE 20 MEQ/1
TABLET, EXTENDED RELEASE ORAL
Qty: 90 TABLET | Refills: 1 | OUTPATIENT
Start: 2022-01-01

## 2022-01-01 RX ORDER — SERTRALINE HYDROCHLORIDE 100 MG/1
TABLET, FILM COATED ORAL
Qty: 30 TABLET | Refills: 0 | Status: SHIPPED | OUTPATIENT
Start: 2022-01-01

## 2022-01-01 RX ORDER — PREDNISONE 20 MG/1
20 TABLET ORAL DAILY
Qty: 3 TABLET | Refills: 0 | Status: SHIPPED | OUTPATIENT
Start: 2022-01-01 | End: 2022-01-01

## 2022-01-01 RX ORDER — POTASSIUM CHLORIDE 29.8 MG/ML
20 INJECTION INTRAVENOUS PRN
Status: DISCONTINUED | OUTPATIENT
Start: 2022-01-01 | End: 2022-01-01

## 2022-01-01 RX ORDER — MIDAZOLAM HYDROCHLORIDE 2 MG/2ML
2 INJECTION, SOLUTION INTRAMUSCULAR; INTRAVENOUS ONCE
Status: DISCONTINUED | OUTPATIENT
Start: 2022-01-01 | End: 2022-01-01

## 2022-01-01 RX ORDER — 0.9 % SODIUM CHLORIDE 0.9 %
1000 INTRAVENOUS SOLUTION INTRAVENOUS ONCE
Status: DISCONTINUED | OUTPATIENT
Start: 2022-01-01 | End: 2022-01-01

## 2022-01-01 RX ORDER — PREDNISONE 20 MG/1
20 TABLET ORAL DAILY
Status: DISCONTINUED | OUTPATIENT
Start: 2022-01-01 | End: 2022-01-01 | Stop reason: HOSPADM

## 2022-01-01 RX ORDER — LIDOCAINE HYDROCHLORIDE 10 MG/ML
INJECTION, SOLUTION INFILTRATION; PERINEURAL
Status: DISPENSED
Start: 2022-01-01 | End: 2022-01-01

## 2022-01-01 RX ORDER — BUDESONIDE AND FORMOTEROL FUMARATE DIHYDRATE 160; 4.5 UG/1; UG/1
2 AEROSOL RESPIRATORY (INHALATION) 2 TIMES DAILY
Qty: 10.2 G | Refills: 3 | Status: SHIPPED | OUTPATIENT
Start: 2022-01-01 | End: 2022-01-01

## 2022-01-01 RX ORDER — LEVOFLOXACIN 5 MG/ML
750 INJECTION, SOLUTION INTRAVENOUS EVERY 24 HOURS
Status: DISCONTINUED | OUTPATIENT
Start: 2022-01-01 | End: 2022-01-01

## 2022-01-01 RX ORDER — ALBUTEROL SULFATE 90 UG/1
AEROSOL, METERED RESPIRATORY (INHALATION)
Qty: 18 G | Refills: 0 | Status: SHIPPED | OUTPATIENT
Start: 2022-01-01

## 2022-01-01 RX ORDER — AZITHROMYCIN 250 MG/1
500 TABLET, FILM COATED ORAL ONCE
Status: COMPLETED | OUTPATIENT
Start: 2022-01-01 | End: 2022-01-01

## 2022-01-01 RX ORDER — EPINEPHRINE 0.1 MG/ML
SYRINGE (ML) INJECTION
Status: COMPLETED
Start: 2022-01-01 | End: 2022-01-01

## 2022-01-01 RX ORDER — IPRATROPIUM BROMIDE AND ALBUTEROL SULFATE 2.5; .5 MG/3ML; MG/3ML
1 SOLUTION RESPIRATORY (INHALATION)
Status: DISCONTINUED | OUTPATIENT
Start: 2022-01-01 | End: 2022-01-01

## 2022-01-01 RX ORDER — POTASSIUM CHLORIDE 7.45 MG/ML
10 INJECTION INTRAVENOUS
Status: DISCONTINUED | OUTPATIENT
Start: 2022-01-01 | End: 2022-01-01

## 2022-01-01 RX ORDER — NICOTINE 21 MG/24HR
1 PATCH, TRANSDERMAL 24 HOURS TRANSDERMAL DAILY
Qty: 28 PATCH | Refills: 0 | Status: SHIPPED | OUTPATIENT
Start: 2022-01-01 | End: 2022-01-01

## 2022-01-01 RX ORDER — TIOTROPIUM BROMIDE INHALATION SPRAY 3.12 UG/1
SPRAY, METERED RESPIRATORY (INHALATION)
Qty: 16 G | Refills: 1 | Status: SHIPPED | OUTPATIENT
Start: 2022-01-01

## 2022-01-01 RX ORDER — SODIUM CHLORIDE 9 MG/ML
25 INJECTION, SOLUTION INTRAVENOUS PRN
Status: DISCONTINUED | OUTPATIENT
Start: 2022-01-01 | End: 2022-01-01 | Stop reason: HOSPADM

## 2022-01-01 RX ORDER — PREDNISONE 20 MG/1
40 TABLET ORAL DAILY
Qty: 2 TABLET | Refills: 0 | Status: SHIPPED | OUTPATIENT
Start: 2022-01-01 | End: 2022-01-01

## 2022-01-01 RX ORDER — BUDESONIDE AND FORMOTEROL FUMARATE DIHYDRATE 160; 4.5 UG/1; UG/1
2 AEROSOL RESPIRATORY (INHALATION) 2 TIMES DAILY
Status: DISCONTINUED | OUTPATIENT
Start: 2022-01-01 | End: 2022-01-01 | Stop reason: HOSPADM

## 2022-01-01 RX ORDER — ACETAMINOPHEN 325 MG/1
650 TABLET ORAL EVERY 6 HOURS PRN
Status: DISCONTINUED | OUTPATIENT
Start: 2022-01-01 | End: 2022-01-01 | Stop reason: HOSPADM

## 2022-01-01 RX ORDER — FENTANYL CITRATE 50 UG/ML
100 INJECTION, SOLUTION INTRAMUSCULAR; INTRAVENOUS ONCE
Status: DISCONTINUED | OUTPATIENT
Start: 2022-01-01 | End: 2022-01-01

## 2022-01-01 RX ORDER — PRENATAL WITH FERROUS FUM AND FOLIC ACID 3080; 920; 120; 400; 22; 1.84; 3; 20; 10; 1; 12; 200; 27; 25; 2 [IU]/1; [IU]/1; MG/1; [IU]/1; MG/1; MG/1; MG/1; MG/1; MG/1; MG/1; UG/1; MG/1; MG/1; MG/1; MG/1
TABLET ORAL
Qty: 30 TABLET | Refills: 3 | Status: SHIPPED | OUTPATIENT
Start: 2022-01-01

## 2022-01-01 RX ORDER — ERGOCALCIFEROL 1.25 MG/1
CAPSULE ORAL
Qty: 7 CAPSULE | Refills: 1 | Status: SHIPPED | OUTPATIENT
Start: 2022-01-01

## 2022-01-01 RX ORDER — ONDANSETRON 4 MG/1
TABLET, FILM COATED ORAL
Qty: 60 TABLET | Refills: 3 | Status: SHIPPED | OUTPATIENT
Start: 2022-01-01

## 2022-01-01 RX ORDER — METHYLPREDNISOLONE SODIUM SUCCINATE 125 MG/2ML
40 INJECTION, POWDER, LYOPHILIZED, FOR SOLUTION INTRAMUSCULAR; INTRAVENOUS EVERY 8 HOURS
Status: DISCONTINUED | OUTPATIENT
Start: 2022-01-01 | End: 2022-01-01

## 2022-01-01 RX ORDER — ACETAMINOPHEN 650 MG/1
650 SUPPOSITORY RECTAL EVERY 6 HOURS PRN
Status: DISCONTINUED | OUTPATIENT
Start: 2022-01-01 | End: 2022-01-01 | Stop reason: HOSPADM

## 2022-01-01 RX ORDER — BUDESONIDE AND FORMOTEROL FUMARATE DIHYDRATE 160; 4.5 UG/1; UG/1
AEROSOL RESPIRATORY (INHALATION)
Qty: 10.2 G | Refills: 0 | Status: SHIPPED | OUTPATIENT
Start: 2022-01-01

## 2022-01-01 RX ORDER — MAGNESIUM SULFATE 1 G/100ML
1000 INJECTION INTRAVENOUS PRN
Status: DISCONTINUED | OUTPATIENT
Start: 2022-01-01 | End: 2022-01-01

## 2022-01-01 RX ORDER — GUAIFENESIN DEXTROMETHORPHAN HYDROBROMIDE ORAL SOLUTION 10; 100 MG/5ML; MG/5ML
10 SOLUTION ORAL EVERY 4 HOURS PRN
Status: DISCONTINUED | OUTPATIENT
Start: 2022-01-01 | End: 2022-01-01 | Stop reason: HOSPADM

## 2022-01-01 RX ORDER — ALBUTEROL SULFATE 2.5 MG/3ML
2.5 SOLUTION RESPIRATORY (INHALATION) 4 TIMES DAILY
Status: DISCONTINUED | OUTPATIENT
Start: 2022-01-01 | End: 2022-01-01

## 2022-01-01 RX ORDER — HEPARIN SODIUM 1000 [USP'U]/ML
1600 INJECTION, SOLUTION INTRAVENOUS; SUBCUTANEOUS PRN
Status: DISCONTINUED | OUTPATIENT
Start: 2022-01-01 | End: 2022-01-01

## 2022-01-01 RX ORDER — POTASSIUM CHLORIDE 29.8 MG/ML
20 INJECTION INTRAVENOUS
Status: ACTIVE | OUTPATIENT
Start: 2022-01-01 | End: 2022-01-01

## 2022-01-01 RX ORDER — ERGOCALCIFEROL 1.25 MG/1
50000 CAPSULE ORAL WEEKLY
Qty: 11 CAPSULE | Refills: 0 | Status: SHIPPED | OUTPATIENT
Start: 2022-01-01 | End: 2022-01-01

## 2022-01-01 RX ORDER — HEPARIN SODIUM 1000 [USP'U]/ML
80 INJECTION, SOLUTION INTRAVENOUS; SUBCUTANEOUS PRN
Status: DISCONTINUED | OUTPATIENT
Start: 2022-01-01 | End: 2022-01-01

## 2022-01-01 RX ORDER — MORPHINE SULFATE 4 MG/ML
4 INJECTION, SOLUTION INTRAMUSCULAR; INTRAVENOUS
Status: DISCONTINUED | OUTPATIENT
Start: 2022-01-01 | End: 2022-01-01 | Stop reason: HOSPADM

## 2022-01-01 RX ORDER — POTASSIUM CHLORIDE 20 MEQ/1
20 TABLET, EXTENDED RELEASE ORAL 2 TIMES DAILY
Status: DISCONTINUED | OUTPATIENT
Start: 2022-01-01 | End: 2022-01-01

## 2022-01-01 RX ORDER — ALBUTEROL SULFATE 90 UG/1
AEROSOL, METERED RESPIRATORY (INHALATION)
Qty: 18 G | Refills: 2 | Status: SHIPPED | OUTPATIENT
Start: 2022-01-01 | End: 2022-01-01

## 2022-01-01 RX ORDER — SODIUM CHLORIDE 9 MG/ML
INJECTION, SOLUTION INTRAVENOUS CONTINUOUS
Status: DISCONTINUED | OUTPATIENT
Start: 2022-01-01 | End: 2022-01-01 | Stop reason: HOSPADM

## 2022-01-01 RX ORDER — POTASSIUM CHLORIDE 20 MEQ/1
TABLET, EXTENDED RELEASE ORAL
Qty: 90 TABLET | Refills: 1 | Status: SHIPPED | OUTPATIENT
Start: 2022-01-01 | End: 2022-01-01

## 2022-01-01 RX ORDER — ETOMIDATE 2 MG/ML
20 INJECTION INTRAVENOUS ONCE
Status: COMPLETED | OUTPATIENT
Start: 2022-01-01 | End: 2022-01-01

## 2022-01-01 RX ORDER — CETIRIZINE HYDROCHLORIDE 10 MG/1
10 TABLET ORAL DAILY
Qty: 30 TABLET | Refills: 1 | Status: SHIPPED | OUTPATIENT
Start: 2022-01-01

## 2022-01-01 RX ORDER — PNV NO.95/FERROUS FUM/FOLIC AC 28MG-0.8MG
1 TABLET ORAL DAILY
Status: DISCONTINUED | OUTPATIENT
Start: 2022-01-01 | End: 2022-01-01

## 2022-01-01 RX ORDER — LIDOCAINE HYDROCHLORIDE 20 MG/ML
15 SOLUTION OROPHARYNGEAL PRN
Qty: 1 EACH | Refills: 1 | Status: SHIPPED | OUTPATIENT
Start: 2022-01-01 | End: 2022-01-01

## 2022-01-01 RX ORDER — ACETAMINOPHEN 650 MG/1
650 SUPPOSITORY RECTAL EVERY 6 HOURS PRN
Status: DISCONTINUED | OUTPATIENT
Start: 2022-01-01 | End: 2022-01-01

## 2022-01-01 RX ORDER — HEPARIN SODIUM 1000 [USP'U]/ML
4000 INJECTION, SOLUTION INTRAVENOUS; SUBCUTANEOUS ONCE
Status: COMPLETED | OUTPATIENT
Start: 2022-01-01 | End: 2022-01-01

## 2022-01-01 RX ORDER — POTASSIUM CHLORIDE 20 MEQ/1
20 TABLET, EXTENDED RELEASE ORAL 2 TIMES DAILY
Status: DISCONTINUED | OUTPATIENT
Start: 2022-01-01 | End: 2022-01-01 | Stop reason: HOSPADM

## 2022-01-01 RX ORDER — LEVOFLOXACIN 750 MG/1
750 TABLET ORAL DAILY
Qty: 5 TABLET | Refills: 0 | Status: SHIPPED | OUTPATIENT
Start: 2022-01-01 | End: 2022-01-01

## 2022-01-01 RX ORDER — FUROSEMIDE 20 MG/1
40 TABLET ORAL DAILY
Status: DISCONTINUED | OUTPATIENT
Start: 2022-01-01 | End: 2022-01-01 | Stop reason: HOSPADM

## 2022-01-01 RX ORDER — 0.9 % SODIUM CHLORIDE 0.9 %
30 INTRAVENOUS SOLUTION INTRAVENOUS ONCE
Status: COMPLETED | OUTPATIENT
Start: 2022-01-01 | End: 2022-01-01

## 2022-01-01 RX ORDER — HEPARIN SODIUM AND DEXTROSE 10000; 5 [USP'U]/100ML; G/100ML
5-30 INJECTION INTRAVENOUS CONTINUOUS
Status: DISCONTINUED | OUTPATIENT
Start: 2022-01-01 | End: 2022-01-01

## 2022-01-01 RX ORDER — SODIUM CHLORIDE, SODIUM LACTATE, POTASSIUM CHLORIDE, AND CALCIUM CHLORIDE .6; .31; .03; .02 G/100ML; G/100ML; G/100ML; G/100ML
500 INJECTION, SOLUTION INTRAVENOUS ONCE
Status: COMPLETED | OUTPATIENT
Start: 2022-01-01 | End: 2022-01-01

## 2022-01-01 RX ORDER — DEXTROSE MONOHYDRATE 25 G/50ML
INJECTION, SOLUTION INTRAVENOUS
Status: COMPLETED
Start: 2022-01-01 | End: 2022-01-01

## 2022-01-01 RX ORDER — ASPIRIN 81 MG/1
81 TABLET, CHEWABLE ORAL DAILY
Status: DISCONTINUED | OUTPATIENT
Start: 2022-01-01 | End: 2022-01-01

## 2022-01-01 RX ORDER — AMLODIPINE BESYLATE 10 MG/1
10 TABLET ORAL DAILY
Qty: 90 TABLET | Refills: 1 | Status: SHIPPED | OUTPATIENT
Start: 2022-01-01

## 2022-01-01 RX ORDER — PREDNISONE 10 MG/1
10 TABLET ORAL DAILY
Qty: 3 TABLET | Refills: 0 | Status: SHIPPED | OUTPATIENT
Start: 2022-01-01 | End: 2022-01-01

## 2022-01-01 RX ORDER — POTASSIUM CHLORIDE 29.8 MG/ML
40 INJECTION INTRAVENOUS ONCE
Status: COMPLETED | OUTPATIENT
Start: 2022-01-01 | End: 2022-01-01

## 2022-01-01 RX ORDER — ERGOCALCIFEROL 1.25 MG/1
50000 CAPSULE ORAL WEEKLY
Status: DISCONTINUED | OUTPATIENT
Start: 2022-01-01 | End: 2022-01-01 | Stop reason: HOSPADM

## 2022-01-01 RX ORDER — POLYETHYLENE GLYCOL 3350 17 G/17G
17 POWDER, FOR SOLUTION ORAL DAILY PRN
Status: DISCONTINUED | OUTPATIENT
Start: 2022-01-01 | End: 2022-01-01

## 2022-01-01 RX ORDER — SODIUM CHLORIDE 0.9 % (FLUSH) 0.9 %
5-40 SYRINGE (ML) INJECTION PRN
Status: DISCONTINUED | OUTPATIENT
Start: 2022-01-01 | End: 2022-01-01

## 2022-01-01 RX ORDER — PREDNISONE 10 MG/1
10 TABLET ORAL DAILY
Status: DISCONTINUED | OUTPATIENT
Start: 2022-01-01 | End: 2022-01-01 | Stop reason: HOSPADM

## 2022-01-01 RX ORDER — ALBUTEROL SULFATE 2.5 MG/3ML
2.5 SOLUTION RESPIRATORY (INHALATION) 4 TIMES DAILY
Status: DISCONTINUED | OUTPATIENT
Start: 2022-01-01 | End: 2022-01-01 | Stop reason: HOSPADM

## 2022-01-01 RX ORDER — CALCIUM GLUCONATE 20 MG/ML
2000 INJECTION, SOLUTION INTRAVENOUS PRN
Status: DISCONTINUED | OUTPATIENT
Start: 2022-01-01 | End: 2022-01-01

## 2022-01-01 RX ORDER — ALPRAZOLAM 0.25 MG/1
0.25 TABLET ORAL NIGHTLY
Qty: 30 TABLET | Refills: 0 | Status: SHIPPED | OUTPATIENT
Start: 2022-01-01 | End: 2022-01-01

## 2022-01-01 RX ORDER — CALCIUM GLUCONATE 20 MG/ML
1000 INJECTION, SOLUTION INTRAVENOUS PRN
Status: DISCONTINUED | OUTPATIENT
Start: 2022-01-01 | End: 2022-01-01

## 2022-01-01 RX ORDER — ONDANSETRON 4 MG/1
TABLET, FILM COATED ORAL
Qty: 30 TABLET | Refills: 2 | Status: SHIPPED | OUTPATIENT
Start: 2022-01-01 | End: 2022-01-01 | Stop reason: SDUPTHER

## 2022-01-01 RX ORDER — HEPARIN SODIUM 1000 [USP'U]/ML
80 INJECTION, SOLUTION INTRAVENOUS; SUBCUTANEOUS ONCE
Status: DISCONTINUED | OUTPATIENT
Start: 2022-01-01 | End: 2022-01-01

## 2022-01-01 RX ORDER — POTASSIUM CHLORIDE 20 MEQ/1
TABLET, EXTENDED RELEASE ORAL
Qty: 180 TABLET | Refills: 0 | Status: SHIPPED | OUTPATIENT
Start: 2022-01-01 | End: 2022-01-01 | Stop reason: SDUPTHER

## 2022-01-01 RX ORDER — IPRATROPIUM BROMIDE AND ALBUTEROL SULFATE 2.5; .5 MG/3ML; MG/3ML
1 SOLUTION RESPIRATORY (INHALATION)
Status: DISCONTINUED | OUTPATIENT
Start: 2022-01-01 | End: 2022-01-01 | Stop reason: HOSPADM

## 2022-01-01 RX ORDER — AMLODIPINE BESYLATE 10 MG/1
TABLET ORAL
Qty: 90 TABLET | Refills: 1 | OUTPATIENT
Start: 2022-01-01

## 2022-01-01 RX ORDER — PREDNISONE 10 MG/1
30 TABLET ORAL DAILY
Qty: 9 TABLET | Refills: 0 | Status: SHIPPED | OUTPATIENT
Start: 2022-01-01 | End: 2022-01-01

## 2022-01-01 RX ORDER — GLYCOPYRROLATE 0.2 MG/ML
0.2 INJECTION INTRAMUSCULAR; INTRAVENOUS EVERY 4 HOURS PRN
Status: DISCONTINUED | OUTPATIENT
Start: 2022-01-01 | End: 2022-01-01 | Stop reason: HOSPADM

## 2022-01-01 RX ORDER — FOLIC ACID 1 MG/1
1 TABLET ORAL DAILY
Status: DISCONTINUED | OUTPATIENT
Start: 2022-01-01 | End: 2022-01-01 | Stop reason: HOSPADM

## 2022-01-01 RX ORDER — TIOTROPIUM BROMIDE INHALATION SPRAY 3.12 UG/1
SPRAY, METERED RESPIRATORY (INHALATION)
Qty: 4 G | Refills: 3 | Status: SHIPPED | OUTPATIENT
Start: 2022-01-01 | End: 2022-01-01

## 2022-01-01 RX ORDER — ALBUTEROL SULFATE 2.5 MG/3ML
2.5 SOLUTION RESPIRATORY (INHALATION) EVERY 4 HOURS PRN
Status: DISCONTINUED | OUTPATIENT
Start: 2022-01-01 | End: 2022-01-01

## 2022-01-01 RX ADMIN — ACETAMINOPHEN 650 MG: 325 TABLET ORAL at 18:16

## 2022-01-01 RX ADMIN — PIPERACILLIN AND TAZOBACTAM 3375 MG: 3; .375 INJECTION, POWDER, LYOPHILIZED, FOR SOLUTION INTRAVENOUS at 09:57

## 2022-01-01 RX ADMIN — PHENYLEPHRINE HYDROCHLORIDE 10 MCG/MIN: 10 INJECTION INTRAVENOUS at 20:35

## 2022-01-01 RX ADMIN — SODIUM BICARBONATE 150 MEQ: 84 INJECTION INTRAVENOUS at 20:17

## 2022-01-01 RX ADMIN — Medication 1500 ML/HR: at 15:34

## 2022-01-01 RX ADMIN — FUROSEMIDE 40 MG: 40 TABLET ORAL at 08:58

## 2022-01-01 RX ADMIN — BUDESONIDE AND FORMOTEROL FUMARATE DIHYDRATE 2 PUFF: 160; 4.5 AEROSOL RESPIRATORY (INHALATION) at 19:46

## 2022-01-01 RX ADMIN — SODIUM CHLORIDE, PRESERVATIVE FREE 10 ML: 5 INJECTION INTRAVENOUS at 21:00

## 2022-01-01 RX ADMIN — BUDESONIDE AND FORMOTEROL FUMARATE DIHYDRATE 2 PUFF: 160; 4.5 AEROSOL RESPIRATORY (INHALATION) at 07:41

## 2022-01-01 RX ADMIN — DEXTROSE MONOHYDRATE 25 G: 25 INJECTION, SOLUTION INTRAVENOUS at 21:36

## 2022-01-01 RX ADMIN — SODIUM BICARBONATE 150 MEQ: 84 INJECTION INTRAVENOUS at 21:45

## 2022-01-01 RX ADMIN — SODIUM CHLORIDE: 9 INJECTION, SOLUTION INTRAVENOUS at 07:20

## 2022-01-01 RX ADMIN — SODIUM CHLORIDE, PRESERVATIVE FREE 10 ML: 5 INJECTION INTRAVENOUS at 08:47

## 2022-01-01 RX ADMIN — APIXABAN 5 MG: 5 TABLET, FILM COATED ORAL at 20:11

## 2022-01-01 RX ADMIN — PHENYLEPHRINE HYDROCHLORIDE 300 MCG/MIN: 10 INJECTION INTRAVENOUS at 03:52

## 2022-01-01 RX ADMIN — CALCIUM GLUCONATE 1000 MG: 20 INJECTION, SOLUTION INTRAVENOUS at 00:55

## 2022-01-01 RX ADMIN — FENTANYL CITRATE 100 MCG: 50 INJECTION, SOLUTION INTRAMUSCULAR; INTRAVENOUS at 04:21

## 2022-01-01 RX ADMIN — Medication 1 TABLET: at 08:35

## 2022-01-01 RX ADMIN — PIPERACILLIN AND TAZOBACTAM 3375 MG: 3; .375 INJECTION, POWDER, FOR SOLUTION INTRAVENOUS at 01:21

## 2022-01-01 RX ADMIN — METOPROLOL TARTRATE 12.5 MG: 25 TABLET ORAL at 09:26

## 2022-01-01 RX ADMIN — ALBUTEROL SULFATE 2.5 MG: 2.5 SOLUTION RESPIRATORY (INHALATION) at 16:50

## 2022-01-01 RX ADMIN — POTASSIUM BICARBONATE 20 MEQ: 782 TABLET, EFFERVESCENT ORAL at 08:49

## 2022-01-01 RX ADMIN — PIPERACILLIN AND TAZOBACTAM 3375 MG: 3; .375 INJECTION, POWDER, FOR SOLUTION INTRAVENOUS at 08:47

## 2022-01-01 RX ADMIN — SODIUM CHLORIDE, PRESERVATIVE FREE 40 MG: 5 INJECTION INTRAVENOUS at 08:35

## 2022-01-01 RX ADMIN — SODIUM CHLORIDE: 9 INJECTION, SOLUTION INTRAVENOUS at 07:19

## 2022-01-01 RX ADMIN — Medication 1500 ML/HR: at 09:52

## 2022-01-01 RX ADMIN — VASOPRESSIN 0.04 UNITS/MIN: 20 INJECTION PARENTERAL at 11:19

## 2022-01-01 RX ADMIN — VASOPRESSIN 0.04 UNITS/MIN: 20 INJECTION PARENTERAL at 20:40

## 2022-01-01 RX ADMIN — POTASSIUM CHLORIDE 20 MEQ: 1500 TABLET, EXTENDED RELEASE ORAL at 09:50

## 2022-01-01 RX ADMIN — Medication 1500 ML/HR: at 20:46

## 2022-01-01 RX ADMIN — IPRATROPIUM BROMIDE AND ALBUTEROL SULFATE 1 AMPULE: .5; 3 SOLUTION RESPIRATORY (INHALATION) at 20:33

## 2022-01-01 RX ADMIN — IPRATROPIUM BROMIDE AND ALBUTEROL SULFATE 1 AMPULE: .5; 3 SOLUTION RESPIRATORY (INHALATION) at 11:49

## 2022-01-01 RX ADMIN — APIXABAN 5 MG: 5 TABLET, FILM COATED ORAL at 08:58

## 2022-01-01 RX ADMIN — Medication 100 MCG/MIN: at 00:21

## 2022-01-01 RX ADMIN — SODIUM CHLORIDE 20 ML/HR: 9 INJECTION, SOLUTION INTRAVENOUS at 10:48

## 2022-01-01 RX ADMIN — SERTRALINE 100 MG: 50 TABLET, FILM COATED ORAL at 09:52

## 2022-01-01 RX ADMIN — ALBUTEROL SULFATE 2.5 MG: 2.5 SOLUTION RESPIRATORY (INHALATION) at 20:02

## 2022-01-01 RX ADMIN — IOPAMIDOL 150 ML: 755 INJECTION, SOLUTION INTRAVENOUS at 16:50

## 2022-01-01 RX ADMIN — POTASSIUM CHLORIDE 40 MEQ: 1500 TABLET, EXTENDED RELEASE ORAL at 20:15

## 2022-01-01 RX ADMIN — FUROSEMIDE 40 MG: 20 TABLET ORAL at 20:15

## 2022-01-01 RX ADMIN — CALCIUM GLUCONATE 1000 MG: 20 INJECTION, SOLUTION INTRAVENOUS at 13:20

## 2022-01-01 RX ADMIN — VANCOMYCIN HYDROCHLORIDE 1500 MG: 10 INJECTION, POWDER, LYOPHILIZED, FOR SOLUTION INTRAVENOUS at 09:23

## 2022-01-01 RX ADMIN — PIPERACILLIN SODIUM AND TAZOBACTAM SODIUM 4500 MG: 4; .5 INJECTION, POWDER, LYOPHILIZED, FOR SOLUTION INTRAVENOUS at 17:04

## 2022-01-01 RX ADMIN — PIPERACILLIN AND TAZOBACTAM 3375 MG: 3; .375 INJECTION, POWDER, LYOPHILIZED, FOR SOLUTION INTRAVENOUS at 10:51

## 2022-01-01 RX ADMIN — ALBUTEROL SULFATE 2.5 MG: 2.5 SOLUTION RESPIRATORY (INHALATION) at 07:32

## 2022-01-01 RX ADMIN — FUROSEMIDE 40 MG: 40 TABLET ORAL at 08:49

## 2022-01-01 RX ADMIN — ACETAMINOPHEN 650 MG: 325 TABLET ORAL at 05:18

## 2022-01-01 RX ADMIN — SODIUM BICARBONATE 50 MEQ: 84 INJECTION INTRAVENOUS at 18:21

## 2022-01-01 RX ADMIN — Medication 1000 MG: at 18:45

## 2022-01-01 RX ADMIN — SODIUM CHLORIDE, PRESERVATIVE FREE 10 ML: 5 INJECTION INTRAVENOUS at 17:53

## 2022-01-01 RX ADMIN — SODIUM CHLORIDE: 9 INJECTION, SOLUTION INTRAVENOUS at 21:42

## 2022-01-01 RX ADMIN — CALCIUM GLUCONATE 1000 MG: 20 INJECTION, SOLUTION INTRAVENOUS at 18:29

## 2022-01-01 RX ADMIN — FENTANYL CITRATE 50 MCG: 50 INJECTION, SOLUTION INTRAMUSCULAR; INTRAVENOUS at 23:28

## 2022-01-01 RX ADMIN — Medication 50 MCG/HR: at 23:31

## 2022-01-01 RX ADMIN — ALBUTEROL SULFATE 2.5 MG: 2.5 SOLUTION RESPIRATORY (INHALATION) at 16:01

## 2022-01-01 RX ADMIN — DOPAMINE HYDROCHLORIDE 20 MCG/KG/MIN: 160 INJECTION, SOLUTION INTRAVENOUS at 03:29

## 2022-01-01 RX ADMIN — POTASSIUM CHLORIDE 20 MEQ: 29.8 INJECTION INTRAVENOUS at 20:04

## 2022-01-01 RX ADMIN — SODIUM BICARBONATE 50 MEQ: 84 INJECTION INTRAVENOUS at 18:20

## 2022-01-01 RX ADMIN — ALBUTEROL SULFATE 2.5 MG: 2.5 SOLUTION RESPIRATORY (INHALATION) at 00:38

## 2022-01-01 RX ADMIN — AZITHROMYCIN 500 MG: 250 TABLET, FILM COATED ORAL at 17:22

## 2022-01-01 RX ADMIN — METOPROLOL TARTRATE 12.5 MG: 25 TABLET ORAL at 20:10

## 2022-01-01 RX ADMIN — IPRATROPIUM BROMIDE AND ALBUTEROL SULFATE 1 AMPULE: .5; 3 SOLUTION RESPIRATORY (INHALATION) at 20:05

## 2022-01-01 RX ADMIN — SODIUM CHLORIDE, PRESERVATIVE FREE 10 ML: 5 INJECTION INTRAVENOUS at 22:02

## 2022-01-01 RX ADMIN — HYDROCORTISONE SODIUM SUCCINATE 100 MG: 100 INJECTION, POWDER, FOR SOLUTION INTRAMUSCULAR; INTRAVENOUS at 04:03

## 2022-01-01 RX ADMIN — SODIUM CHLORIDE, PRESERVATIVE FREE 5 ML: 5 INJECTION INTRAVENOUS at 08:47

## 2022-01-01 RX ADMIN — SODIUM CHLORIDE, PRESERVATIVE FREE 10 ML: 5 INJECTION INTRAVENOUS at 08:59

## 2022-01-01 RX ADMIN — DOPAMINE HYDROCHLORIDE 5 MCG/KG/MIN: 160 INJECTION, SOLUTION INTRAVENOUS at 23:10

## 2022-01-01 RX ADMIN — METHYLPREDNISOLONE SODIUM SUCCINATE 125 MG: 125 INJECTION, POWDER, FOR SOLUTION INTRAMUSCULAR; INTRAVENOUS at 16:53

## 2022-01-01 RX ADMIN — POTASSIUM CHLORIDE 20 MEQ: 1500 TABLET, EXTENDED RELEASE ORAL at 22:33

## 2022-01-01 RX ADMIN — APIXABAN 5 MG: 5 TABLET, FILM COATED ORAL at 08:48

## 2022-01-01 RX ADMIN — POTASSIUM CHLORIDE 20 MEQ: 1500 TABLET, EXTENDED RELEASE ORAL at 21:02

## 2022-01-01 RX ADMIN — IPRATROPIUM BROMIDE AND ALBUTEROL SULFATE 1 AMPULE: .5; 3 SOLUTION RESPIRATORY (INHALATION) at 11:37

## 2022-01-01 RX ADMIN — MAGNESIUM SULFATE HEPTAHYDRATE 2000 MG: 40 INJECTION, SOLUTION INTRAVENOUS at 13:19

## 2022-01-01 RX ADMIN — ALBUTEROL SULFATE 2.5 MG: 2.5 SOLUTION RESPIRATORY (INHALATION) at 08:30

## 2022-01-01 RX ADMIN — PIPERACILLIN AND TAZOBACTAM 3375 MG: 3; .375 INJECTION, POWDER, LYOPHILIZED, FOR SOLUTION INTRAVENOUS at 00:13

## 2022-01-01 RX ADMIN — CALCIUM GLUCONATE 1000 MG: 20 INJECTION, SOLUTION INTRAVENOUS at 09:04

## 2022-01-01 RX ADMIN — PREDNISONE 40 MG: 20 TABLET ORAL at 09:26

## 2022-01-01 RX ADMIN — HYDROCORTISONE SODIUM SUCCINATE 100 MG: 100 INJECTION, POWDER, FOR SOLUTION INTRAMUSCULAR; INTRAVENOUS at 13:17

## 2022-01-01 RX ADMIN — Medication 12 UNITS/KG/HR: at 03:16

## 2022-01-01 RX ADMIN — TIOTROPIUM BROMIDE INHALATION SPRAY 2 PUFF: 3.12 SPRAY, METERED RESPIRATORY (INHALATION) at 07:59

## 2022-01-01 RX ADMIN — PANTOPRAZOLE SODIUM 40 MG: 40 TABLET, DELAYED RELEASE ORAL at 08:58

## 2022-01-01 RX ADMIN — PIPERACILLIN AND TAZOBACTAM 3375 MG: 3; .375 INJECTION, POWDER, LYOPHILIZED, FOR SOLUTION INTRAVENOUS at 16:00

## 2022-01-01 RX ADMIN — APIXABAN 5 MG: 5 TABLET, FILM COATED ORAL at 20:55

## 2022-01-01 RX ADMIN — POTASSIUM CHLORIDE 20 MEQ: 1500 TABLET, EXTENDED RELEASE ORAL at 01:52

## 2022-01-01 RX ADMIN — ALBUTEROL SULFATE 2.5 MG: 2.5 SOLUTION RESPIRATORY (INHALATION) at 17:17

## 2022-01-01 RX ADMIN — Medication 10 MCG/MIN: at 14:50

## 2022-01-01 RX ADMIN — POTASSIUM BICARBONATE 20 MEQ: 782 TABLET, EFFERVESCENT ORAL at 14:51

## 2022-01-01 RX ADMIN — POTASSIUM CHLORIDE 20 MEQ: 1500 TABLET, EXTENDED RELEASE ORAL at 09:26

## 2022-01-01 RX ADMIN — TIOTROPIUM BROMIDE INHALATION SPRAY 2 PUFF: 3.12 SPRAY, METERED RESPIRATORY (INHALATION) at 08:30

## 2022-01-01 RX ADMIN — NOREPINEPHRINE BITARTRATE 100 MCG/MIN: 1 INJECTION, SOLUTION, CONCENTRATE INTRAVENOUS at 02:50

## 2022-01-01 RX ADMIN — SODIUM CHLORIDE, POTASSIUM CHLORIDE, SODIUM LACTATE AND CALCIUM CHLORIDE 1000 ML: 600; 310; 30; 20 INJECTION, SOLUTION INTRAVENOUS at 03:37

## 2022-01-01 RX ADMIN — SODIUM CHLORIDE, PRESERVATIVE FREE 10 ML: 5 INJECTION INTRAVENOUS at 13:23

## 2022-01-01 RX ADMIN — ALBUTEROL SULFATE 2.5 MG: 2.5 SOLUTION RESPIRATORY (INHALATION) at 11:19

## 2022-01-01 RX ADMIN — SODIUM CHLORIDE: 9 INJECTION, SOLUTION INTRAVENOUS at 15:45

## 2022-01-01 RX ADMIN — PIPERACILLIN AND TAZOBACTAM 3375 MG: 3; .375 INJECTION, POWDER, LYOPHILIZED, FOR SOLUTION INTRAVENOUS at 08:00

## 2022-01-01 RX ADMIN — PIPERACILLIN AND TAZOBACTAM 3375 MG: 3; .375 INJECTION, POWDER, FOR SOLUTION INTRAVENOUS at 23:13

## 2022-01-01 RX ADMIN — EPINEPHRINE 1 MCG/MIN: 1 INJECTION INTRAMUSCULAR; INTRAVENOUS; SUBCUTANEOUS at 00:49

## 2022-01-01 RX ADMIN — SODIUM CHLORIDE 1000 ML: 9 INJECTION, SOLUTION INTRAVENOUS at 15:31

## 2022-01-01 RX ADMIN — DEXTROSE MONOHYDRATE 25 G: 25 INJECTION, SOLUTION INTRAVENOUS at 03:33

## 2022-01-01 RX ADMIN — NOREPINEPHRINE BITARTRATE 65 MCG/MIN: 1 INJECTION, SOLUTION, CONCENTRATE INTRAVENOUS at 14:33

## 2022-01-01 RX ADMIN — SODIUM BICARBONATE: 84 INJECTION, SOLUTION INTRAVENOUS at 00:45

## 2022-01-01 RX ADMIN — POTASSIUM CHLORIDE 20 MEQ: 1500 TABLET, EXTENDED RELEASE ORAL at 09:19

## 2022-01-01 RX ADMIN — PANTOPRAZOLE SODIUM 40 MG: 40 TABLET, DELAYED RELEASE ORAL at 05:02

## 2022-01-01 RX ADMIN — POTASSIUM CHLORIDE 20 MEQ: 1500 TABLET, EXTENDED RELEASE ORAL at 20:10

## 2022-01-01 RX ADMIN — PANTOPRAZOLE SODIUM 40 MG: 40 TABLET, DELAYED RELEASE ORAL at 05:28

## 2022-01-01 RX ADMIN — POTASSIUM CHLORIDE 40 MEQ: 29.8 INJECTION, SOLUTION INTRAVENOUS at 20:53

## 2022-01-01 RX ADMIN — ALBUTEROL SULFATE 2.5 MG: 2.5 SOLUTION RESPIRATORY (INHALATION) at 16:40

## 2022-01-01 RX ADMIN — POTASSIUM CHLORIDE 20 MEQ: 1500 TABLET, EXTENDED RELEASE ORAL at 08:36

## 2022-01-01 RX ADMIN — APIXABAN 5 MG: 5 TABLET, FILM COATED ORAL at 20:23

## 2022-01-01 RX ADMIN — IPRATROPIUM BROMIDE AND ALBUTEROL SULFATE 1 AMPULE: .5; 3 SOLUTION RESPIRATORY (INHALATION) at 12:58

## 2022-01-01 RX ADMIN — DEXTROSE MONOHYDRATE 25 G: 25 INJECTION, SOLUTION INTRAVENOUS at 02:38

## 2022-01-01 RX ADMIN — ACETAMINOPHEN 650 MG: 325 TABLET ORAL at 03:45

## 2022-01-01 RX ADMIN — SODIUM BICARBONATE 50 MEQ: 84 INJECTION INTRAVENOUS at 22:56

## 2022-01-01 RX ADMIN — PIPERACILLIN AND TAZOBACTAM 3375 MG: 3; .375 INJECTION, POWDER, LYOPHILIZED, FOR SOLUTION INTRAVENOUS at 04:02

## 2022-01-01 RX ADMIN — PIPERACILLIN AND TAZOBACTAM 3375 MG: 3; .375 INJECTION, POWDER, LYOPHILIZED, FOR SOLUTION INTRAVENOUS at 13:27

## 2022-01-01 RX ADMIN — ALBUTEROL SULFATE 2.5 MG: 2.5 SOLUTION RESPIRATORY (INHALATION) at 19:46

## 2022-01-01 RX ADMIN — PIPERACILLIN AND TAZOBACTAM 3375 MG: 3; .375 INJECTION, POWDER, LYOPHILIZED, FOR SOLUTION INTRAVENOUS at 01:08

## 2022-01-01 RX ADMIN — Medication 85 MCG/MIN: at 22:33

## 2022-01-01 RX ADMIN — SODIUM BICARBONATE 200 MEQ: 84 INJECTION INTRAVENOUS at 02:12

## 2022-01-01 RX ADMIN — SODIUM BICARBONATE: 84 INJECTION, SOLUTION INTRAVENOUS at 19:46

## 2022-01-01 RX ADMIN — DEXTROSE MONOHYDRATE 25 G: 25 INJECTION, SOLUTION INTRAVENOUS at 18:12

## 2022-01-01 RX ADMIN — BUDESONIDE AND FORMOTEROL FUMARATE DIHYDRATE 2 PUFF: 160; 4.5 AEROSOL RESPIRATORY (INHALATION) at 07:33

## 2022-01-01 RX ADMIN — PREDNISONE 40 MG: 20 TABLET ORAL at 08:58

## 2022-01-01 RX ADMIN — Medication 1500 ML/HR: at 09:53

## 2022-01-01 RX ADMIN — IPRATROPIUM BROMIDE AND ALBUTEROL SULFATE 1 AMPULE: .5; 3 SOLUTION RESPIRATORY (INHALATION) at 15:38

## 2022-01-01 RX ADMIN — SODIUM CHLORIDE: 9 INJECTION, SOLUTION INTRAVENOUS at 04:01

## 2022-01-01 RX ADMIN — IRON SUCROSE 200 MG: 20 INJECTION, SOLUTION INTRAVENOUS at 18:16

## 2022-01-01 RX ADMIN — SODIUM CHLORIDE, POTASSIUM CHLORIDE, SODIUM LACTATE AND CALCIUM CHLORIDE 500 ML: 600; 310; 30; 20 INJECTION, SOLUTION INTRAVENOUS at 20:02

## 2022-01-01 RX ADMIN — EPINEPHRINE 20 MCG: 0.1 INJECTION, SOLUTION ENDOTRACHEAL; INTRACARDIAC; INTRAVENOUS at 16:17

## 2022-01-01 RX ADMIN — ALBUTEROL SULFATE 2.5 MG: 2.5 SOLUTION RESPIRATORY (INHALATION) at 19:39

## 2022-01-01 RX ADMIN — SODIUM CHLORIDE, PRESERVATIVE FREE 10 ML: 5 INJECTION INTRAVENOUS at 09:11

## 2022-01-01 RX ADMIN — HYDROCORTISONE SODIUM SUCCINATE 100 MG: 100 INJECTION, POWDER, FOR SOLUTION INTRAMUSCULAR; INTRAVENOUS at 20:22

## 2022-01-01 RX ADMIN — MAGNESIUM SULFATE HEPTAHYDRATE 2000 MG: 40 INJECTION, SOLUTION INTRAVENOUS at 16:03

## 2022-01-01 RX ADMIN — Medication 1500 ML/HR: at 15:36

## 2022-01-01 RX ADMIN — METHYLPREDNISOLONE SODIUM SUCCINATE 40 MG: 125 INJECTION, POWDER, FOR SOLUTION INTRAMUSCULAR; INTRAVENOUS at 01:20

## 2022-01-01 RX ADMIN — Medication 60 MCG/MIN: at 19:36

## 2022-01-01 RX ADMIN — HEPARIN SODIUM 1500 UNITS: 1000 INJECTION INTRAVENOUS; SUBCUTANEOUS at 04:59

## 2022-01-01 RX ADMIN — METHYLPREDNISOLONE SODIUM SUCCINATE 40 MG: 125 INJECTION, POWDER, FOR SOLUTION INTRAMUSCULAR; INTRAVENOUS at 08:36

## 2022-01-01 RX ADMIN — APIXABAN 5 MG: 5 TABLET, FILM COATED ORAL at 08:36

## 2022-01-01 RX ADMIN — APIXABAN 5 MG: 5 TABLET, FILM COATED ORAL at 21:02

## 2022-01-01 RX ADMIN — ERGOCALCIFEROL 50000 UNITS: 1.25 CAPSULE ORAL at 19:59

## 2022-01-01 RX ADMIN — HYDROCORTISONE SODIUM SUCCINATE 100 MG: 100 INJECTION, POWDER, FOR SOLUTION INTRAMUSCULAR; INTRAVENOUS at 20:13

## 2022-01-01 RX ADMIN — Medication 1500 ML/HR: at 15:35

## 2022-01-01 RX ADMIN — APIXABAN 5 MG: 5 TABLET, FILM COATED ORAL at 09:26

## 2022-01-01 RX ADMIN — METHYLPREDNISOLONE SODIUM SUCCINATE 40 MG: 125 INJECTION, POWDER, FOR SOLUTION INTRAMUSCULAR; INTRAVENOUS at 10:45

## 2022-01-01 RX ADMIN — Medication 1500 ML/HR: at 20:47

## 2022-01-01 RX ADMIN — FOLIC ACID 1 MG: 1 TABLET ORAL at 08:49

## 2022-01-01 RX ADMIN — IPRATROPIUM BROMIDE AND ALBUTEROL SULFATE 1 AMPULE: .5; 3 SOLUTION RESPIRATORY (INHALATION) at 08:15

## 2022-01-01 RX ADMIN — Medication 50 MCG/HR: at 01:02

## 2022-01-01 RX ADMIN — METOPROLOL TARTRATE 12.5 MG: 25 TABLET ORAL at 12:55

## 2022-01-01 RX ADMIN — IPRATROPIUM BROMIDE 0.5 MG: 0.5 SOLUTION RESPIRATORY (INHALATION) at 17:17

## 2022-01-01 RX ADMIN — BUDESONIDE AND FORMOTEROL FUMARATE DIHYDRATE 2 PUFF: 160; 4.5 AEROSOL RESPIRATORY (INHALATION) at 20:02

## 2022-01-01 RX ADMIN — ALBUTEROL SULFATE 2.5 MG: 2.5 SOLUTION RESPIRATORY (INHALATION) at 07:41

## 2022-01-01 RX ADMIN — ACETAMINOPHEN 650 MG: 325 TABLET ORAL at 01:01

## 2022-01-01 RX ADMIN — FOLIC ACID 1 MG: 1 TABLET ORAL at 08:58

## 2022-01-01 RX ADMIN — METHYLPREDNISOLONE SODIUM SUCCINATE 125 MG: 125 INJECTION, POWDER, FOR SOLUTION INTRAMUSCULAR; INTRAVENOUS at 18:41

## 2022-01-01 RX ADMIN — SODIUM CHLORIDE, PRESERVATIVE FREE 10 ML: 5 INJECTION INTRAVENOUS at 09:54

## 2022-01-01 RX ADMIN — SUCCINYLCHOLINE CHLORIDE 80 MG: 20 INJECTION, SOLUTION INTRAMUSCULAR; INTRAVENOUS at 23:34

## 2022-01-01 RX ADMIN — POTASSIUM CHLORIDE 20 MEQ: 29.8 INJECTION, SOLUTION INTRAVENOUS at 20:04

## 2022-01-01 RX ADMIN — ACETAMINOPHEN 650 MG: 325 TABLET ORAL at 20:23

## 2022-01-01 RX ADMIN — PIPERACILLIN AND TAZOBACTAM 3375 MG: 3; .375 INJECTION, POWDER, LYOPHILIZED, FOR SOLUTION INTRAVENOUS at 20:10

## 2022-01-01 RX ADMIN — ALBUTEROL SULFATE 2.5 MG: 2.5 SOLUTION RESPIRATORY (INHALATION) at 15:26

## 2022-01-01 RX ADMIN — NOREPINEPHRINE BITARTRATE 100 MCG/MIN: 1 INJECTION, SOLUTION, CONCENTRATE INTRAVENOUS at 23:21

## 2022-01-01 RX ADMIN — HEPARIN SODIUM 4000 UNITS: 1000 INJECTION INTRAVENOUS; SUBCUTANEOUS at 03:14

## 2022-01-01 RX ADMIN — CEFEPIME 2000 MG: 2 INJECTION, POWDER, FOR SOLUTION INTRAVENOUS at 17:49

## 2022-01-01 RX ADMIN — SUCCINYLCHOLINE CHLORIDE 80 MG: 20 INJECTION INTRAMUSCULAR; INTRAVENOUS at 23:34

## 2022-01-01 RX ADMIN — SODIUM CHLORIDE, PRESERVATIVE FREE 10 ML: 5 INJECTION INTRAVENOUS at 08:35

## 2022-01-01 RX ADMIN — Medication 1 TABLET: at 09:49

## 2022-01-01 RX ADMIN — PANTOPRAZOLE SODIUM 40 MG: 40 TABLET, DELAYED RELEASE ORAL at 09:55

## 2022-01-01 RX ADMIN — SODIUM CHLORIDE 1000 ML: 9 INJECTION, SOLUTION INTRAVENOUS at 02:43

## 2022-01-01 RX ADMIN — AMLODIPINE BESYLATE 5 MG: 10 TABLET ORAL at 09:26

## 2022-01-01 RX ADMIN — PHENYLEPHRINE HYDROCHLORIDE 300 MCG/MIN: 10 INJECTION INTRAVENOUS at 00:50

## 2022-01-01 RX ADMIN — VANCOMYCIN HYDROCHLORIDE 1250 MG: 10 INJECTION, POWDER, LYOPHILIZED, FOR SOLUTION INTRAVENOUS at 01:09

## 2022-01-01 RX ADMIN — SODIUM CHLORIDE, PRESERVATIVE FREE 10 ML: 5 INJECTION INTRAVENOUS at 20:24

## 2022-01-01 RX ADMIN — ASPIRIN 81 MG: 81 TABLET, CHEWABLE ORAL at 08:35

## 2022-01-01 RX ADMIN — Medication 1250 MG: at 17:52

## 2022-01-01 RX ADMIN — SODIUM CHLORIDE: 0.9 INJECTION, SOLUTION INTRAVENOUS at 09:55

## 2022-01-01 RX ADMIN — SODIUM CHLORIDE, PRESERVATIVE FREE 10 ML: 5 INJECTION INTRAVENOUS at 22:34

## 2022-01-01 RX ADMIN — FLUDEOXYGLUCOSE F 18 9 MILLICURIE: 200 INJECTION, SOLUTION INTRAVENOUS at 13:22

## 2022-01-01 RX ADMIN — DEXTROSE MONOHYDRATE 500 MG: 50 INJECTION, SOLUTION INTRAVENOUS at 13:07

## 2022-01-01 RX ADMIN — Medication 1000 MG: at 20:50

## 2022-01-01 RX ADMIN — Medication 1000 MG: at 13:50

## 2022-01-01 RX ADMIN — CEFTRIAXONE SODIUM 1000 MG: 1 INJECTION, POWDER, FOR SOLUTION INTRAMUSCULAR; INTRAVENOUS at 17:23

## 2022-01-01 RX ADMIN — ALBUTEROL SULFATE 2.5 MG: 2.5 SOLUTION RESPIRATORY (INHALATION) at 11:26

## 2022-01-01 RX ADMIN — TIOTROPIUM BROMIDE INHALATION SPRAY 2 PUFF: 3.12 SPRAY, METERED RESPIRATORY (INHALATION) at 07:41

## 2022-01-01 RX ADMIN — FUROSEMIDE 40 MG: 20 TABLET ORAL at 09:26

## 2022-01-01 RX ADMIN — IPRATROPIUM BROMIDE AND ALBUTEROL SULFATE 1 AMPULE: .5; 3 SOLUTION RESPIRATORY (INHALATION) at 08:28

## 2022-01-01 RX ADMIN — Medication 1 TABLET: at 09:26

## 2022-01-01 RX ADMIN — PREDNISONE 40 MG: 20 TABLET ORAL at 12:55

## 2022-01-01 RX ADMIN — HEPARIN SODIUM 1600 UNITS: 1000 INJECTION INTRAVENOUS; SUBCUTANEOUS at 05:00

## 2022-01-01 RX ADMIN — ALBUTEROL SULFATE 2.5 MG: 2.5 SOLUTION RESPIRATORY (INHALATION) at 12:09

## 2022-01-01 RX ADMIN — Medication 1500 ML/HR: at 20:45

## 2022-01-01 RX ADMIN — VASOPRESSIN 0.03 UNITS/MIN: 20 INJECTION PARENTERAL at 20:15

## 2022-01-01 RX ADMIN — APIXABAN 5 MG: 5 TABLET, FILM COATED ORAL at 01:52

## 2022-01-01 RX ADMIN — ONDANSETRON 4 MG: 2 INJECTION INTRAMUSCULAR; INTRAVENOUS at 21:28

## 2022-01-01 RX ADMIN — ACETAMINOPHEN 650 MG: 325 TABLET ORAL at 11:40

## 2022-01-01 RX ADMIN — SODIUM CHLORIDE, PRESERVATIVE FREE 10 ML: 5 INJECTION INTRAVENOUS at 20:55

## 2022-01-01 RX ADMIN — ALBUTEROL SULFATE 2.5 MG: 2.5 SOLUTION RESPIRATORY (INHALATION) at 20:21

## 2022-01-01 RX ADMIN — PANTOPRAZOLE SODIUM 40 MG: 40 TABLET, DELAYED RELEASE ORAL at 08:00

## 2022-01-01 RX ADMIN — Medication 2 MG/HR: at 23:16

## 2022-01-01 RX ADMIN — ETOMIDATE 20 MG: 2 INJECTION, SOLUTION INTRAVENOUS at 23:08

## 2022-01-01 RX ADMIN — METHYLPREDNISOLONE SODIUM SUCCINATE 40 MG: 125 INJECTION, POWDER, FOR SOLUTION INTRAMUSCULAR; INTRAVENOUS at 09:11

## 2022-01-01 RX ADMIN — PREDNISONE 40 MG: 20 TABLET ORAL at 08:48

## 2022-01-01 RX ADMIN — PANTOPRAZOLE SODIUM 40 MG: 40 TABLET, DELAYED RELEASE ORAL at 05:15

## 2022-01-01 RX ADMIN — VASOPRESSIN 0.04 UNITS/MIN: 20 INJECTION PARENTERAL at 02:59

## 2022-01-01 RX ADMIN — APIXABAN 5 MG: 5 TABLET, FILM COATED ORAL at 09:12

## 2022-01-01 RX ADMIN — MIDAZOLAM HYDROCHLORIDE 2 MG: 2 INJECTION, SOLUTION INTRAMUSCULAR; INTRAVENOUS at 23:28

## 2022-01-01 RX ADMIN — HEPARIN SODIUM AND DEXTROSE 18 UNITS/KG/HR: 10000; 5 INJECTION INTRAVENOUS at 02:31

## 2022-01-01 RX ADMIN — TIOTROPIUM BROMIDE INHALATION SPRAY 2 PUFF: 3.12 SPRAY, METERED RESPIRATORY (INHALATION) at 07:33

## 2022-01-01 RX ADMIN — NOREPINEPHRINE BITARTRATE 95 MCG/MIN: 1 INJECTION, SOLUTION, CONCENTRATE INTRAVENOUS at 08:00

## 2022-01-01 RX ADMIN — PHENYLEPHRINE HYDROCHLORIDE 50 MCG/MIN: 10 INJECTION INTRAVENOUS at 23:02

## 2022-01-01 RX ADMIN — SERTRALINE 100 MG: 50 TABLET, FILM COATED ORAL at 09:11

## 2022-01-01 RX ADMIN — FUROSEMIDE 40 MG: 20 TABLET ORAL at 12:55

## 2022-01-01 RX ADMIN — SODIUM CHLORIDE: 9 INJECTION, SOLUTION INTRAVENOUS at 20:25

## 2022-01-01 RX ADMIN — SODIUM BICARBONATE: 84 INJECTION, SOLUTION INTRAVENOUS at 09:57

## 2022-01-01 RX ADMIN — ALBUTEROL SULFATE 2.5 MG: 2.5 SOLUTION RESPIRATORY (INHALATION) at 12:02

## 2022-01-01 RX ADMIN — SODIUM CHLORIDE 2340 ML: 9 INJECTION, SOLUTION INTRAVENOUS at 15:20

## 2022-01-01 RX ADMIN — Medication 1 TABLET: at 09:12

## 2022-01-01 RX ADMIN — Medication 1500 ML/HR: at 09:54

## 2022-01-01 ASSESSMENT — ENCOUNTER SYMPTOMS
COLOR CHANGE: 0
DIARRHEA: 0
COLOR CHANGE: 0
COUGH: 1
SHORTNESS OF BREATH: 1
RHINORRHEA: 0
NAUSEA: 1
VOMITING: 0
DIARRHEA: 0
EYE REDNESS: 0
VOMITING: 1
CONSTIPATION: 0
SHORTNESS OF BREATH: 1
PHOTOPHOBIA: 0
BACK PAIN: 1
WHEEZING: 1
BACK PAIN: 1
WHEEZING: 0
EYE PAIN: 0
ABDOMINAL PAIN: 0
EYE REDNESS: 0
STRIDOR: 0
ABDOMINAL DISTENTION: 0
SHORTNESS OF BREATH: 0
ABDOMINAL DISTENTION: 0
NAUSEA: 0
GASTROINTESTINAL NEGATIVE: 1
STRIDOR: 0
SORE THROAT: 0
WHEEZING: 1
TACHYPNEA: 1
COUGH: 1
ABDOMINAL PAIN: 0
CONSTIPATION: 0
COUGH: 1
ABDOMINAL DISTENTION: 0
ABDOMINAL PAIN: 0
WHEEZING: 1
SORE THROAT: 0
BACK PAIN: 0
COUGH: 1
WHEEZING: 0
RECTAL PAIN: 0
SHORTNESS OF BREATH: 1
COUGH: 1
ABDOMINAL PAIN: 0
WHEEZING: 1
VOICE CHANGE: 0
COUGH: 1
VOMITING: 0
BLOOD IN STOOL: 0
VOMITING: 0
RHINORRHEA: 0
CHOKING: 0
CHEST TIGHTNESS: 0
ALLERGIC/IMMUNOLOGIC NEGATIVE: 1
SHORTNESS OF BREATH: 1
DIARRHEA: 0
CHEST TIGHTNESS: 0
BACK PAIN: 0
NAUSEA: 0
WHEEZING: 1
ABDOMINAL PAIN: 0
RHINORRHEA: 0
COUGH: 1
SORE THROAT: 0
NAUSEA: 0
SHORTNESS OF BREATH: 1
TROUBLE SWALLOWING: 0
CHEST TIGHTNESS: 0
PHOTOPHOBIA: 0
COUGH: 1
VOMITING: 0
VOMITING: 0
SINUS PRESSURE: 0
DIARRHEA: 0
CONSTIPATION: 0
VOMITING: 0
DIARRHEA: 0
SHORTNESS OF BREATH: 0
NAUSEA: 1
ABDOMINAL PAIN: 0
STRIDOR: 0
EYE REDNESS: 0
WHEEZING: 0
ABDOMINAL PAIN: 0
BLOOD IN STOOL: 0
NAUSEA: 0
DIARRHEA: 0
TROUBLE SWALLOWING: 0
SORE THROAT: 0
SHORTNESS OF BREATH: 0
BACK PAIN: 1
SHORTNESS OF BREATH: 1
COUGH: 1
EYE DISCHARGE: 0
TACHYPNEA: 1
NAUSEA: 0
ABDOMINAL PAIN: 0
SINUS PAIN: 0
RHINORRHEA: 0
TROUBLE SWALLOWING: 0

## 2022-01-01 ASSESSMENT — PAIN DESCRIPTION - ONSET: ONSET: ON-GOING

## 2022-01-01 ASSESSMENT — PAIN SCALES - GENERAL
PAINLEVEL_OUTOF10: 0
PAINLEVEL_OUTOF10: 6
PAINLEVEL_OUTOF10: 3
PAINLEVEL_OUTOF10: 2
PAINLEVEL_OUTOF10: 0
PAINLEVEL_OUTOF10: 6
PAINLEVEL_OUTOF10: 0
PAINLEVEL_OUTOF10: 3
PAINLEVEL_OUTOF10: 0
PAINLEVEL_OUTOF10: 8
PAINLEVEL_OUTOF10: 4
PAINLEVEL_OUTOF10: 10
PAINLEVEL_OUTOF10: 0
PAINLEVEL_OUTOF10: 7
PAINLEVEL_OUTOF10: 0
PAINLEVEL_OUTOF10: 0

## 2022-01-01 ASSESSMENT — PATIENT HEALTH QUESTIONNAIRE - PHQ9
SUM OF ALL RESPONSES TO PHQ QUESTIONS 1-9: 1
1. LITTLE INTEREST OR PLEASURE IN DOING THINGS: 0
SUM OF ALL RESPONSES TO PHQ9 QUESTIONS 1 & 2: 1
SUM OF ALL RESPONSES TO PHQ QUESTIONS 1-9: 1
2. FEELING DOWN, DEPRESSED OR HOPELESS: 1

## 2022-01-01 ASSESSMENT — PAIN DESCRIPTION - LOCATION
LOCATION: BACK
LOCATION: BACK;LEG

## 2022-01-01 ASSESSMENT — PAIN DESCRIPTION - ORIENTATION
ORIENTATION: LOWER
ORIENTATION: LEFT;RIGHT;LOWER

## 2022-01-01 ASSESSMENT — PAIN - FUNCTIONAL ASSESSMENT
PAIN_FUNCTIONAL_ASSESSMENT: ACTIVITIES ARE NOT PREVENTED
PAIN_FUNCTIONAL_ASSESSMENT: 0-10
PAIN_FUNCTIONAL_ASSESSMENT: ACTIVITIES ARE NOT PREVENTED
PAIN_FUNCTIONAL_ASSESSMENT: NONE - DENIES PAIN
PAIN_FUNCTIONAL_ASSESSMENT: 0-10

## 2022-01-01 ASSESSMENT — PAIN DESCRIPTION - DESCRIPTORS
DESCRIPTORS: ACHING
DESCRIPTORS: ACHING
DESCRIPTORS: DULL;THROBBING
DESCRIPTORS: ACHING

## 2022-01-01 ASSESSMENT — PAIN DESCRIPTION - FREQUENCY: FREQUENCY: INTERMITTENT

## 2022-01-01 ASSESSMENT — PAIN DESCRIPTION - PAIN TYPE: TYPE: CHRONIC PAIN

## 2022-01-01 ASSESSMENT — PULMONARY FUNCTION TESTS
PIF_VALUE: 32
PIF_VALUE: 27
PIF_VALUE: 22
PIF_VALUE: 23
PIF_VALUE: 27
PIF_VALUE: 24
PIF_VALUE: 21
PIF_VALUE: 33
PIF_VALUE: 27

## 2022-01-01 ASSESSMENT — ANXIETY QUESTIONNAIRES
6. BECOMING EASILY ANNOYED OR IRRITABLE: 1
7. FEELING AFRAID AS IF SOMETHING AWFUL MIGHT HAPPEN: 2
5. BEING SO RESTLESS THAT IT IS HARD TO SIT STILL: 1
GAD7 TOTAL SCORE: 10
4. TROUBLE RELAXING: 1
2. NOT BEING ABLE TO STOP OR CONTROL WORRYING: 2
3. WORRYING TOO MUCH ABOUT DIFFERENT THINGS: 1
1. FEELING NERVOUS, ANXIOUS, OR ON EDGE: 2

## 2022-01-10 NOTE — TELEPHONE ENCOUNTER
Pt was last prescribed albuterol (PROVENTIL) (2.5 MG/3ML) 0.083% nebulizer solution    On 12/2/21. She is currently out. She had a f/u scheduled with you on 1/17/22 which was rescheduled to 3/14/22. Pharmacy is [facility] on [facility]. Please advise.

## 2022-01-24 NOTE — TELEPHONE ENCOUNTER
Please Approve or Refuse.   Send to Pharmacy per Pt's Request:      Next Visit Date:  1/31/2022   Last Visit Date: 10/28/2021    Hemoglobin A1C (%)   Date Value   12/23/2019 6.0   06/12/2019 5.7   11/12/2018 5.9             ( goal A1C is < 7)   BP Readings from Last 3 Encounters:   12/16/21 117/66   12/02/21 136/89   11/03/21 119/60          (goal 120/80)  BUN   Date Value Ref Range Status   12/02/2021 17 8 - 23 mg/dL Final     CREATININE   Date Value Ref Range Status   12/02/2021 0.48 (L) 0.50 - 0.90 mg/dL Final     Potassium   Date Value Ref Range Status   12/02/2021 3.7 3.7 - 5.3 mmol/L Final

## 2022-02-08 NOTE — ED NOTES
Pt presents to the ed via private auto with family c/o cough, SOB, and runny nose that started x2 days ago. Pt reports that she has a h/o lung cancer and had her left lung removed in July and since then has not been breathing well. Pt reports that she uses 2L of NC, upon arrival to the ed pt is on 4L NC O2 sat 90%. Slight wheezing noted throughout the lung fields, pt reports productive cough sputum is clear. EKG WDL, pt placed on monitor all other vital signs are stable at this time will continue to monitor.      Boom Jamil RN  02/08/22 0855

## 2022-02-08 NOTE — ED NOTES
After registering pt states she has been SOB x a day and a half  O2 on 4L is 93%     Ar Henao, ANUJ  22/00/51 0809

## 2022-02-08 NOTE — ED PROVIDER NOTES
[facility] [facility]     Emergency Department     Faculty Attestation    I performed a history and physical examination of the patient and discussed management with the resident. I reviewed the residents note and agree with the documented findings and plan of care. Any areas of disagreement are noted on the chart. I was personally present for the key portions of any procedures. I have documented in the chart those procedures where I was not present during the key portions. I have reviewed the emergency nurses triage note. I agree with the chief complaint, past medical history, past surgical history, allergies, medications, social and family history as documented unless otherwise noted below. For Physician Assistant/ Nurse Practitioner cases/documentation I have personally evaluated this patient and have completed at least one if not all key elements of the E/M (history, physical exam, and MDM). Additional findings are as noted. Patient with history of lung cancer with left lower lobectomy presents with increasing shortness of breath. She says that a few days ago she started having nasal congestion, cough, and chills. She says that her shortness of breath has been worsening since then. She says she does have oxygen at home that she uses as needed and has needed it today. She denies chest pain. She denies abdominal pain, vomiting or diarrhea. Patient currently has no evidence of disease for the lung cancer is not currently under any treatments. She does have a history of COPD. Will get EKG, chest x-ray, labs. Will administer steroids and nebulizer treatment. Will reassess.       Kole Cervantes MD  Attending Emergency  Physician              Iris Prado MD  02/08/22 [id number]

## 2022-02-08 NOTE — ED PROVIDER NOTES
[address]  ED  Emergency Department Encounter  EmergencyMedicine Resident     Pt Name:Iris Levine  MRN: [id number]  [place] 1955  Date of evaluation: 2/8/22  PCP:  Aurora Becerra MD    This patient was evaluated in the Emergency Department for symptoms described in the history of present illness. The patient was evaluated in the context of the global COVID-19 pandemic, which necessitated consideration that the patient might be at risk for infection with the SARS-CoV-2 virus that causes COVID-19. Institutional protocols and algorithms that pertain to the evaluation of patients at risk for COVID-19 are in a state of rapid change based on information released by regulatory bodies including the CDC and federal and state organizations. These policies and algorithms were followed during the patient's care in the ED. CHIEF COMPLAINT       Chief Complaint   Patient presents with    Shortness of Breath   Shortness of breath    HISTORY OF PRESENT ILLNESS  (Location/Symptom, Timing/Onset, Context/Setting, Quality, Duration, Modifying Factors, Severity.)      Clarissa Feliciano is a 61 y.o. female who presents with complaints of cough, shortness of breath, rhinorrhea x2 days. Not Covid vaccinated. Notes was around granddaughter who had similar symptoms. Patient has history of lung cancer and had left lung removed in July 2021. Notes she uses 2 L nasal cannula as needed. Patient has history of COPD and smokes approximately 4 cigarettes/day. Notes she was recently started on Lasix for swelling. Notes cough is occasionally productive but clear sputum. Takes Eliquis and has not missed any doses.     PAST MEDICAL / SURGICAL / SOCIAL / FAMILY HISTORY      has a past medical history of Abnormal CT of the chest, Adenocarcinoma, lung, left ([address] Arthritis, Community acquired pneumonia, COPD (chronic obstructive pulmonary disease) ([address] Deep venous thrombosis of left profunda femoris vein ([facility] Depression, DVT (deep venous thrombosis) ([facility] GERD (gastroesophageal reflux disease), History of pulmonary embolism, History of thyroid nodule, Hypertension, Major depressive disorder, recurrent, moderate ([facility] Nodule of lower lobe of left lung, Obesity, Class II, BMI 35-39.9, On anticoagulant therapy, On home oxygen therapy, On methotrexate therapy, Prediabetes, Rheumatoid arthritis ([facility] [address] Snores, Thyroid nodule, Tobacco abuse, Under care of team, Under care of team, Under care of team, and Wellness examination. has a past surgical history that includes Appendectomy (); Endoscopy, colon, diagnostic (784409); bronchoscopy (N/A, 2019); Colonoscopy (N/A, 2020); CT NEEDLE BIOPSY LUNG PERCUTANEOUS (2021); lobectomy (Left, 2021); Lung removal, total (Left, 2021); and Insert Midline Catheter (11/3/2021).       Social History     Socioeconomic History    Marital status: Single     Spouse name: Not on file    Number of children: 6    Years of education: Not on file    Highest education level: Not on file   Occupational History     Employer: N/A   Tobacco Use    Smoking status: Former Smoker     Packs/day: 0.25     Years: 35.00     Pack years: 8.75     Types: Cigarettes     Quit date: 1986     Years since quittin.0    Smokeless tobacco: Never Used    Tobacco comment: restarted smoking 2019   Vaping Use    Vaping Use: Never used   Substance and Sexual Activity    Alcohol use: Not Currently     Alcohol/week: 0.0 standard drinks    Drug use: No    Sexual activity: Not Currently   Other Topics Concern    Not on file   Social History Narrative    Not on file     Social Determinants of Health     Financial Resource Strain: Low Risk     Difficulty of Paying Living Expenses: Not hard at all   Food Insecurity: No Food Insecurity    Worried About Running Out of Food in the Last Year: Never true    Emelyn of Food in the Last Year: Never true   Transportation tablet by mouth daily 12/2/21   Enrique Cottrell DO   SPIRIVA RESPIMAT 2.5 MCG/ACT AERS inhaler inhale 2 puffs INTO THE LUNGS once daily 10/25/21   Aurora Becerra MD   potassium chloride (KLOR-CON M) 20 MEQ extended release tablet Take 1 tablet by mouth 2 times daily  Patient taking differently: Take 20 mEq by mouth 2 times daily Not taking regular 7/23/21   ILIR Yin NP   acetaminophen (TYLENOL) 500 MG tablet Take 1,000 mg by mouth every 6 hours as needed for Pain    Historical Provider, MD   ELIQUIS 5 MG TABS tablet take 1 tablet by mouth twice a day 7/16/21   Aurora Becerra MD   albuterol sulfate  (90 Base) MCG/ACT inhaler inhale 2 puffs by mouth and INTO THE LUNGS every 4 hours for 7 days 6/1/21   Aurora Becerra MD   pantoprazole (PROTONIX) 40 MG tablet take 1 tablet by mouth once daily 5/18/21   Aurora Becerra MD   folic acid (FOLVITE) 1 MG tablet take 1 tablet by mouth once daily 4/12/21   Aurora Becerra MD   methotrexate (RHEUMATREX) 2.5 MG chemo tablet Take 15 mg by mouth once a week Indications: Wednesdays Takes 6 tabs (=15mg) on Wednesdays    Historical Provider, MD       REVIEW OF SYSTEMS    (2-9 systems for level 4, 10 or more for level 5)      Review of Systems   Constitutional: Negative for chills and fever. HENT: Negative for congestion and rhinorrhea. Eyes: Negative for photophobia and visual disturbance. Respiratory: Positive for cough, shortness of breath and wheezing. Cardiovascular: Negative for chest pain and palpitations. Gastrointestinal: Negative for abdominal pain, diarrhea, nausea and vomiting. Genitourinary: Negative for dysuria and frequency. Musculoskeletal: Negative for back pain and neck pain. Skin: Negative for rash and wound. Neurological: Negative for dizziness and headaches.        PHYSICAL EXAM   (up to 7 for level 4, 8 or more for level 5)      INITIAL VITALS:   BP (!) 141/81   Pulse 103   Temp 98.5 °F (36.9 °C) (Oral)   Resp 26   SpO2 95% Physical Exam  Vitals and nursing note reviewed. Constitutional:       General: She is not in acute distress. HENT:      Head: Normocephalic and atraumatic. Right Ear: External ear normal.      Left Ear: External ear normal.      Nose: Nose normal.      Mouth/Throat:      Mouth: Mucous membranes are dry. Eyes:      Conjunctiva/sclera: Conjunctivae normal.   Cardiovascular:      Rate and Rhythm: Regular rhythm. Tachycardia present. Pulmonary:      Effort: No respiratory distress. Breath sounds: No stridor. Wheezing and rales present. Abdominal:      Palpations: Abdomen is soft. Tenderness: There is no abdominal tenderness. Musculoskeletal:         General: Normal range of motion. Cervical back: Normal range of motion. Skin:     General: Skin is warm and dry. Neurological:      General: No focal deficit present. Mental Status: She is alert and oriented to person, place, and time. DIFFERENTIAL  DIAGNOSIS     PLAN (LABS / IMAGING / EKG):  Orders Placed This Encounter   Procedures    COVID-19, Rapid    XR CHEST PORTABLE    CBC Auto Differential    Basic Metabolic Panel w/ Reflex to MG    Troponin    Brain Natriuretic Peptide    Troponin    Magnesium    Troponin    Inpatient consult to Hospitalist    Inpatient consult to Cardiology    Inpatient consult to [facility]    Initiate ED RT Aerosol protocol    EKG 12 Lead       MEDICATIONS ORDERED:  Orders Placed This Encounter   Medications    AND Linked Order Group     albuterol (PROVENTIL) nebulizer solution 2.5 mg      Order Specific Question:   Initiate RT Bronchodilator Protocol      Answer: Yes     ipratropium (ATROVENT) 0.02 % nebulizer solution 0.25 mg      Order Specific Question:   Initiate RT Bronchodilator Protocol      Answer:    Yes    AND Linked Order Group     albuterol (PROVENTIL) nebulizer solution 15 mg      Order Specific Question:   Initiate RT Bronchodilator Protocol      Answer: Glucose 107 (H) 70 - 99 mg/dL    BUN 9 8 - 23 mg/dL    CREATININE 0.64 0.50 - 0.90 mg/dL    Bun/Cre Ratio NOT REPORTED 9 - 20    Calcium 9.0 8.6 - 10.4 mg/dL    Sodium 129 (L) 135 - 144 mmol/L    Potassium 3.5 (L) 3.7 - 5.3 mmol/L    Chloride 101 98 - 107 mmol/L    CO2 22 20 - 31 mmol/L    Anion Gap 6 (L) 9 - 17 mmol/L    GFR Non-African American >60 >60 mL/min    GFR African American >60 >60 mL/min    GFR Comment          GFR Staging NOT REPORTED    Troponin   Result Value Ref Range    Troponin, High Sensitivity 21 (H) 0 - 14 ng/L    Troponin T NOT REPORTED <0.03 ng/mL    Troponin Interp NOT REPORTED    Brain Natriuretic Peptide   Result Value Ref Range    Pro- (H) <300 pg/mL    BNP Interpretation NOT REPORTED    Troponin   Result Value Ref Range    Troponin, High Sensitivity 25 (H) 0 - 14 ng/L    Troponin T NOT REPORTED <0.03 ng/mL    Troponin Interp NOT REPORTED    Magnesium   Result Value Ref Range    Magnesium 1.8 1.6 - 2.6 mg/dL       IMPRESSION: COPD exacerbation, uptrending troponins, CHF exacerbation    RADIOLOGY:  XR CHEST PORTABLE   Final Result   Mildly improved bibasal infiltrates and bilateral pleural effusions. Pulmonary vascular congestion             EKG  EKG Interpretation    Interpreted by emergency department physician    Rhythm: normal sinus   Rate: tachycardia  Axis: left  Ectopy: none  Conduction: normal  ST Segments: no acute change  T Waves: inversion aVl  Q Waves: none    Clinical Impression: non-specific EKG, compared to previous 10/15/21    Lucas Reno MD    All EKG's are interpreted by the Emergency Department Physician who either signs or Co-signs this chart in the absence of a cardiologist.    EMERGENCY DEPARTMENT COURSE:  73-year-old female, history of COPD, CHF, lung cancer with lobectomy on left in July 2021, presented to ED with complaints of cough, rhinorrhea, nasal congestion over the past couple days. Patient granddaughter with similar symptoms at home.   Denied any purulent sputum production and notes only intermittent clear sputum production. Uses at home nasal cannula as needed. Denied any chest pain. Has been taking Eliquis and has not missed any doses. On exam, afebrile, tachycardic at 103, dry mucus membranes, lungs with right-sided diffuse expiratory wheeze, bilateral lower extremity 1+ pitting edema. Patient noted she stopped taking her Lasix due to the way it made her feel over the past couple weeks. B NP less than baseline, but patient given 40 p.o. Lasix in ED due to swelling. Troponins uptrending from 21-25 and has had previous similar troponins around 20 in the past.  Potassium slightly low at 3.5 so was given oral potassium. Covid negative. Chest x-ray showed mildly improved bibasilar infiltrates and bilateral pleural effusions on chest x-ray likely consistent with CHF. Plan to admit for uptrending troponins with cardiology consulted who recommended repeat troponin in a.m., COPD exacerbation for which patient was given steroids in ED, and CHF exacerbation. PROCEDURES:  None    CONSULTS:  IP CONSULT TO HOSPITALIST  IP CONSULT TO CARDIOLOGY  IP CONSULT TO FAMILY MEDICINE    CRITICAL CARE:  None    FINAL IMPRESSION      1. COPD exacerbation ([address]    2. Congestive heart failure, unspecified HF chronicity, unspecified heart failure type ([address]    3. Hypoxia          DISPOSITION / PLAN     DISPOSITION Decision To Admit 02/08/2022 08:13:34 PM      PATIENT REFERRED TO:  No follow-up provider specified.     DISCHARGE MEDICATIONS:  New Prescriptions    No medications on file       Agnieszka Ruano MD  Emergency Medicine Resident    (Please note that portions of thisnote were completed with a voice recognition program.  Efforts were made to edit the dictations but occasionally words are mis-transcribed.)     Bill Lion MD  Resident  02/08/22 [id number]

## 2022-02-09 PROBLEM — J96.11 CHRONIC RESPIRATORY FAILURE WITH HYPOXIA (HCC): Status: ACTIVE | Noted: 2022-01-01

## 2022-02-09 NOTE — CARE COORDINATION
Case Management Initial Discharge Plan  Gayle Chávez,             Met with:patient to discuss discharge plans. Information verified: address, contacts, phone number, , insurance Yes  Insurance Provider: Debbie    Emergency Contact/Next of Kin name & number: laura Hu [phone]  Who are involved in patient's support system? family    PCP: Galileo Roque MD  Date of last visit: had phone visit in Nov      Discharge Planning    Living Arrangements:        Home has 2 stories  4 stairs to climb to get into front door, 1 1 flight stairs to climb to reach second floor  Location of bedroom/bathroom in home 2nd story    Patient able to perform ADL's:Independent    Current Services (outpatient & in home) N/A  DME equipment: n/a  DME provider: n/a    Is patient receiving oral anticoagulation therapy? Eliquis        Potential Assistance Needed: no       Patient agreeable to home care: No  Paxton of choice provided:  n/a    Prior SNF/Rehab Placement and Facility: no  Agreeable to SNF/Rehab: No  Paxton of choice provided: n/a     Evaluation: n/a    Expected Discharge date:   unknown    Patient expects to be discharged to: home       If home: is the family and/or caregiver wiling & able to provide support at home? yes  Who will be providing this support? family    Follow Up Appointment: Best Day/ Time:      Transportation provider: family  Transportation arrangements needed for discharge: No    Readmission Risk              Risk of Unplanned Readmission:  37             Does patient have a readmission risk score greater than 14?: Yes  If yes, follow-up appointment must be made within 7 days of discharge.      Goals of Care: improve breathing, safety      Educated patient on transitional options, provided freedom of choice and are agreeable with plan      Discharge Plan: home independent with family, has ride          Electronically signed by Mara Schuler RN on 22 at 8:56 AM EST

## 2022-02-09 NOTE — CONSULTS
Attestation signed by      Attending Physician Statement:    I have discussed the care of  Ruth Michael , including pertinent history and exam findings, with the Cardiology fellow/resident. I have seen and examined the patient and the key elements of all parts of the encounter have been performed by me. I agree with the assessment, plan and orders as documented by the fellow/resident, after I modified exam findings and plan of treatments, and the final version is my approved version of the assessment. Additional Comments:     71-year-old smoker history of lung cancer status post resection of the left lower lobe  On home O2 but only use as needed  According to patient her granddaughter had a flu which got he also got flu feverish runny nose sneezing which increases her shortness of breath and patient decided to come  With her last admission patient had a moderate pericardial effusion. Short of breath seems to be most likely from her primary lung pathology smoking aggravated with viral infection  Denies any chest pain pressure tightness breathing is getting better on 24 oxygen  Patient denies any heart fluttering racing lightheaded dizzy no signs hemodynamically for any tamponade  Her tropes are negative  Could not find EKG in the chart or in the computer  Plan to do EKG  We will do limited echo for pericardial effusion     [facility] Card[facility] Cardiology    Consult / H&P               Today's Date: 2/9/2022  Patient Name: Ruth Michael  Date of admission: 2/8/2022  6:00 PM  Patient's age: 61 y.o., 1955  Admission Dx: Hypoxia [R09.02]  COPD exacerbation ([facility] [J44.1]  Congestive heart failure, unspecified HF chronicity, unspecified heart failure type ([facility] [I50.9]    Reason for Consult:  Cardiac evaluation    Requesting Physician: Treasure Scherer MD    CHIEF COMPLAINT:  Cough and shortness of breath    History Obtained From:  patient, electronic medical record    HISTORY OF PRESENT ILLNESS:      The patient is a 61 y.o.  female who is admitted to the hospital for cough and shortness of breath. Past medical history of adenocarcinoma of lung status post resection, COPD, DVT/PE, GERD. Admitted with worsening shortness of breath, history of adenocarcinoma of the lung status post resection, uses 2 oxygen via nasal cannula at home as needed. Patient denies any chest pain, complains of increased shortness of breath. Exertional shortness of breath. Denies any fever. Reports increased requirement of inhalers and oxygen recently. Reports pedal edema, which is new. Patient was started on lasix last year, doesn't remember exactly why and when. Has history of COPD, still smokes approx 4 - 5 cigarettes a day, on Proventil and Spiriva inhalers    Patient was recently admitted with similar complaints, found to have a large pericardial effusion which is new with bilateral pleural effusions, 2D echo showed moderate pericardial effusion with no evidence of tamponade, responded to diuretics, oxygen requirement came down, also was treated with antibiotics for pneumonia. Before this admission patient was admitted with staph aureus bacteremia and pneumonia completed 20 days of IV antibiotics. Chest x-ray showed enlarged cardiac size, mildly improved bibasilar infiltrates and bilateral pleural effusions.     Lab work showed elevated troponin's 21 > 25 > 25  Pro bnp 945    Last echo showed EF of 60%    Past Medical History:   has a past medical history of Abnormal CT of the chest, Adenocarcinoma, lung, left ([address] Arthritis, Community acquired pneumonia, COPD (chronic obstructive pulmonary disease) ([address] Deep venous thrombosis of left profunda femoris vein ([address] Depression, DVT (deep venous thrombosis) ([address] GERD (gastroesophageal reflux disease), History of pulmonary embolism, History of thyroid nodule, Hypertension, Major depressive disorder, recurrent, moderate ([address] Nodule of lower lobe of left lung, Obesity, Class II, BMI 35-39.9, On anticoagulant therapy, On home oxygen therapy, On methotrexate therapy, Prediabetes, Rheumatoid arthritis ([address] Snores, Thyroid nodule, Tobacco abuse, Under care of team, Under care of team, Under care of team, and Wellness examination. Past Surgical History:   has a past surgical history that includes Appendectomy (1982); Endoscopy, colon, diagnostic (478347); bronchoscopy (N/A, 12/27/2019); Colonoscopy (N/A, 2/12/2020); CT NEEDLE BIOPSY LUNG PERCUTANEOUS (5/13/2021); lobectomy (Left, 07/19/2021); Lung removal, total (Left, 7/19/2021); and Insert Midline Catheter (11/3/2021). Home Medications:    Prior to Admission medications    Medication Sig Start Date End Date Taking?  Authorizing Provider   ondansetron (ZOFRAN) 4 MG tablet take 1 tablet by mouth three times a day if needed for nausea and vomiting 1/31/22   Heike Wilkinson MD   sertraline (ZOLOFT) 100 MG tablet take 1 tablet by mouth once daily 1/24/22   Heike Wilkinson MD   albuterol (PROVENTIL) (2.5 MG/3ML) 0.083% nebulizer solution Take 3 mLs by nebulization 4 times daily 1/10/22   Pricilla Graff MD   Prenatal Vit-Fe Fumarate-FA (PRENATAL VITAMIN) 27-0.8 MG TABS Take 1 tablet by mouth daily 12/16/21   Keaton Mckeon MD   furosemide (LASIX) 40 MG tablet Take 1 tablet by mouth daily 12/2/21   Brice Hoffman DO   SPIRIVA RESPIMAT 2.5 MCG/ACT AERS inhaler inhale 2 puffs INTO THE LUNGS once daily 10/25/21   Heike Wilkinson MD   potassium chloride (KLOR-CON M) 20 MEQ extended release tablet Take 1 tablet by mouth 2 times daily  Patient taking differently: Take 20 mEq by mouth 2 times daily Not taking regular 7/23/21   ILIR Thompson - NP   acetaminophen (TYLENOL) 500 MG tablet Take 1,000 mg by mouth every 6 hours as needed for Pain    Historical Provider, MD   ELIQUWILFRID 5 MG TABS tablet take 1 tablet by mouth twice a day 7/16/21   Heike Wilkinson MD   albuterol sulfate  (90 Base) MCG/ACT inhaler inhale 2 puffs by mouth and INTO THE LUNGS every 4 hours for 7 days 6/1/21   Benton Bass MD   pantoprazole (PROTONIX) 40 MG tablet take 1 tablet by mouth once daily 5/18/21   Benton Bass MD   folic acid (FOLVITE) 1 MG tablet take 1 tablet by mouth once daily 4/12/21   Benton Bass MD   methotrexate (RHEUMATREX) 2.5 MG chemo tablet Take 15 mg by mouth once a week Indications: Wednesdays Takes 6 tabs (=15mg) on Wednesdays    Historical Provider, MD         Allergies:  Patient has no known allergies. Social History:   reports that she quit smoking about 36 years ago. Her smoking use included cigarettes. She has a 8.75 pack-year smoking history. She has never used smokeless tobacco. She reports previous alcohol use. She reports that she does not use drugs. Family History: family history includes Cancer in her brother, brother, father, and mother; Diabetes in her mother; Heart Disease in her mother. No h/o sudden cardiac death. No for premature CAD    REVIEW OF SYSTEMS:    · Constitutional: there has been no unanticipated weight loss. There's been No change in energy level, No change in activity level. · Eyes: No visual changes or diplopia. No scleral icterus. · ENT: No Headaches  · Cardiovascular: denies any chest pain or syncope, reports pedal edema and shortness of breath  · Respiratory: No previous pulmonary problems, No cough  · Gastrointestinal: No abdominal pain. No change in bowel or bladder habits. · Genitourinary: No dysuria, trouble voiding, or hematuria. · Musculoskeletal:  No gait disturbance, No weakness or joint complaints. · Integumentary: No rash or pruritis. · Neurological: No headache, diplopia, change in muscle strength, numbness or tingling. No change in gait, balance, coordination, mood, affect, memory, mentation, behavior. · Psychiatric: No anxiety, or depression. · Endocrine: No temperature intolerance. No excessive thirst, fluid intake, or urination.  No tremor. · Hematologic/Lymphatic: No abnormal bruising or bleeding, blood clots or swollen lymph nodes. · Allergic/Immunologic: No nasal congestion or hives. PHYSICAL EXAM:      BP (!) 143/86   Pulse 101   Temp 98 °F (36.7 °C) (Oral)   Resp 20   SpO2 93%    Constitutional and General Appearance: alert, cooperative, no distress and appears stated age  HEENT: PERRL, no cervical lymphadenopathy. No masses palpable. Normal oral mucosa  Respiratory:  · Normal excursion and expansion without use of accessory muscles  · Resp Auscultation: Good respiratory effort. No for increased work of breathing. On auscultation: clear to auscultation bilaterally  Cardiovascular:  · The apical impulse is not displaced  · Heart tones are crisp and normal. regular S1 and S2.  · Jugular venous pulsation Normal  · The carotid upstroke is normal in amplitude and contour without delay or bruit  · Peripheral pulses are symmetrical and full   Abdomen:   · No masses or tenderness  · Bowel sounds present  Extremities:  ·  No Cyanosis or Clubbing  ·  Lower extremity edema: No  ·  Skin: Warm and dry  Neurological:  · Alert and oriented. · Moves all extremities well  · No abnormalities of mood, affect, memory, mentation, or behavior are noted    DATA:    Diagnostics:    EKG: normal EKG, normal sinus rhythm, sinus tachycardia, unchanged from previous tracings. ECHO: 11/29/21    Summary  Left ventricle is normal in size. Mild left ventricular hypertrophy. Global left ventricular systolic function is normal with an estimated  ejection fraction of 60 % . No obvious wall motion abnormality seen. Left atrium is mildly dilated. Right atrium is mildly dilated . No obvious valvular abnormality. Mild pulmonary hypertension. Estimated right ventricular systolic pressure  is 30OAJC. Moderate pericardial effusion. No clear tamponade physiology  Mitral and tricuspid inflows are normal. Borderline mitral inflow variation.   No right ventricular or right atrial diastolic collapse. The IVC is normal  size without respiratory variation. Ejection fraction: 60%    Stress Test: not obtained. Cardiac Angiography: not obtained. Labs:     CBC:   Recent Labs     02/08/22  1842 02/09/22  0336   WBC 12.7* 15.5*   HGB 8.8* 8.3*   HCT 29.9* 28.5*    244     BMP:   Recent Labs     02/08/22  1842 02/09/22  0336   * 129*   K 3.5* 3.9   CO2 22 24   BUN 9 13   CREATININE 0.64 0.71   LABGLOM >60 >60   GLUCOSE 107* 208*     BNP: No results for input(s): BNP in the last 72 hours. PT/INR: No results for input(s): PROTIME, INR in the last 72 hours. APTT:No results for input(s): APTT in the last 72 hours. CARDIAC ENZYMES:No results for input(s): CKTOTAL, CKMB, CKMBINDEX, TROPONINI in the last 72 hours. FASTING LIPID PANEL:  Lab Results   Component Value Date    HDL 77 01/30/2015    TRIG 84 01/30/2015     LIVER PROFILE:No results for input(s): AST, ALT, LABALBU in the last 72 hours. IMPRESSION:      1. elevated troponin's  2. Pericardial effusion as per last echo  3.  COPD exacerbation    Patient Active Problem List   Diagnosis    Hypertension    GERD (gastroesophageal reflux disease)    Rheumatoid arthritis ([address]    Essential hypertension    Obesity, Class II, BMI 35-39.9    Major depressive disorder, recurrent, moderate (HCC)    Prediabetes    Tobacco abuse    History of DVT in adulthood    COPD exacerbation ([address]    Pneumonia due to infectious organism    History of thyroid nodule    Pneumococcal septicemia (HCC)    Thyroid nodule    Positive colorectal cancer screening using Cologuard test    Deep venous thrombosis of left profunda femoris vein (HCC)    Abnormal CT of the chest    Encounter for smoking cessation counseling    Nodule of lower lobe of left lung    Primary mucinous adenocarcinoma of lung ([address]    Adenocarcinoma, lung, left ([address]    History of pulmonary embolism    On anticoagulant therapy    On methotrexate therapy    Obesity, Class III, BMI 40-49.9 (morbid obesity) ([facility]    S/P lobectomy of lung    Hypoxia    Moderate malnutrition (HCC)    RSV bronchitis    History of adenocarcinoma of lung    Mixed hyperlipidemia    Sepsis ([facility]    Hyponatremia    Anemia    Leucocytosis    Chronic rhinitis    Respiratory distress    Acute on chronic respiratory failure (HCC)    Hospital-acquired pneumonia    COPD (chronic obstructive pulmonary disease) ([facility])    ASHLEIGH (acute kidney injury) ([facility])    Diastolic heart failure ([facility])    Multifocal pneumonia    Pericardial effusion    Chronic respiratory failure with hypoxia ([facility])       RECOMMENDATIONS:  1. Troponin's at  baseline  2. Repeat Limited 2D echo, to evaluate pericardial effusion  3. Continue lasix 40 mg oral daily  4. Rest of the management as per primary  5. Final recommendations after discussing with the attending      Discussed with patient and Nurse.     Electronically signed by Wellington Hernandes MD on 2/9/2022 at 7:28 [address] [place] [address] [facility]      [phone]

## 2022-02-09 NOTE — PLAN OF CARE
Problem: Breathing Pattern - Ineffective:  Goal: Ability to achieve and maintain a regular respiratory rate will improve  Description: Ability to achieve and maintain a regular respiratory rate will improve  2/9/2022 1124 by Marianna Altman RN  Outcome: Ongoing  2/9/2022 0237 by Tomasz Mays RN  Outcome: Ongoing     Problem: Pain:  Goal: Pain level will decrease  Description: Pain level will decrease  2/9/2022 1124 by Marianna Altman RN  Outcome: Ongoing  2/9/2022 0237 by Tomasz Mays RN  Outcome: Ongoing  Goal: Control of acute pain  Description: Control of acute pain  2/9/2022 1124 by Marianna Altman RN  Outcome: Ongoing  2/9/2022 0237 by Tomasz Mays RN  Outcome: Ongoing  Goal: Control of chronic pain  Description: Control of chronic pain  2/9/2022 1124 by Marianna Altman RN  Outcome: Ongoing  2/9/2022 0237 by Tomasz Mays RN  Outcome: Ongoing

## 2022-02-09 NOTE — PROGRESS NOTES
Physical Therapy    Facility/Department: [facility]MED [facility]  Initial Assessment    NAME: Emily Camilo  : 1955  MRN: [id number]    Date of Service: 2022  Chief Complaint   Patient presents with    Shortness of Breath     Discharge Recommendations:  Patient would benefit from continued therapy after discharge   Assessment   Body structures, Functions, Activity limitations: Decreased functional mobility ; Decreased strength;Decreased endurance  Assessment: The pt ambulated 10 ft without a device x CGA. She had significant fatigue with only a minimal increase in activity. She could benefit from a continuation of PT to address her deficits  Prognosis: Good  Decision Making: Medium Complexity  PT Education: Goals;PT Role;Plan of Care  REQUIRES PT FOLLOW UP: Yes  Activity Tolerance  Activity Tolerance: Patient limited by fatigue;Patient limited by endurance       Patient Diagnosis(es): The primary encounter diagnosis was COPD exacerbation ([address] Diagnoses of Congestive heart failure, unspecified HF chronicity, unspecified heart failure type ([address] and Hypoxia were also pertinent to this visit. has a past medical history of Abnormal CT of the chest, Adenocarcinoma, lung, left ([address] Arthritis, Community acquired pneumonia, COPD (chronic obstructive pulmonary disease) ([address] Deep venous thrombosis of left profunda femoris vein ([address] Depression, DVT (deep venous thrombosis) ([address] GERD (gastroesophageal reflux disease), History of pulmonary embolism, History of thyroid nodule, Hypertension, Major depressive disorder, recurrent, moderate ([address] Nodule of lower lobe of left lung, Obesity, Class II, BMI 35-39.9, On anticoagulant therapy, On home oxygen therapy, On methotrexate therapy, Prediabetes, Rheumatoid arthritis ([address] Snores, Thyroid nodule, Tobacco abuse, Under care of team, Under care of team, Under care of team, and Wellness examination.    has a past surgical history that includes Appendectomy (1982); Endoscopy, colon, diagnostic (039930); bronchoscopy (N/A, 12/27/2019); Colonoscopy (N/A, 2/12/2020); CT NEEDLE BIOPSY LUNG PERCUTANEOUS (5/13/2021); lobectomy (Left, 07/19/2021); Lung removal, total (Left, 7/19/2021); and Insert Midline Catheter (11/3/2021).     Restrictions  Restrictions/Precautions  Restrictions/Precautions: Up as Tolerated  Required Braces or Orthoses?: No  Vision/Hearing  Vision: Impaired  Vision Exceptions: Wears glasses at all times  Hearing: Within functional limits     Subjective  General  Patient assessed for rehabilitation services?: Yes  Response To Previous Treatment: Not applicable  Family / Caregiver Present: No  Follows Commands: Within Functional Limits  Subjective  Subjective: RN and pt agreeable to PT eval  Pain Screening  Patient Currently in Pain: No  Pain Assessment  Pain Assessment: 0-10  Pain Level: 0  Vital Signs  Patient Currently in Pain: No     Orientation  Orientation  Overall Orientation Status: Within Functional Limits  Social/Functional History  Social/Functional History  Lives With: Son (Son and  7 yo granddaughter)  Type of Home: House  Home Layout: Two level  Home Access: Stairs to enter without rails  Entrance Stairs - Number of Steps: 2 steps  Bathroom Shower/Tub: Tub/Shower unit  Bathroom Toilet: Standard  ADL Assistance: Independent  Homemaking Assistance: Independent  Homemaking Responsibilities: Yes  Ambulation Assistance: Independent  Transfer Assistance: Independent  Active : No  Patient's  Info: family  Occupation: Retired  Leisure & Hobbies: Puzzles  Cognition      Objective     AROM RLE (degrees)  RLE AROM: WFL  AROM LLE (degrees)  LLE AROM : WFL  AROM RUE (degrees)  RUE AROM : WFL  AROM LUE (degrees)  LUE AROM : WFL  Strength RLE  Strength RLE: WFL  Strength LLE  Strength LLE: WFL  Strength RUE  Strength RUE: WFL  Strength LUE  Strength LUE: WFL     Sensation  Overall Sensation Status: WFL  Bed mobility  Supine to Sit: Contact guard assistance  Sit to Supine: Contact guard assistance  Scooting: Contact guard assistance  Transfers  Sit to Stand: Contact guard assistance  Stand to sit: Contact guard assistance  Ambulation  Ambulation?: Yes  Ambulation 1  Surface: level tile  Device: No Device  Assistance: Contact guard assistance  Distance: amb 10 ft without a device x CGA     Balance  Posture: Good  Sitting - Static: Good  Sitting - Dynamic: Fair  Standing - Static: Fair  Standing - Dynamic: [facility]  Times per week: 5-6x wk  Current Treatment Recommendations: Strengthening,Functional Mobility Training,Gait Training,Safety Education & Training,Endurance Training,Stair training  Safety Devices  Type of devices: Nurse notified,Left in chair,Call light within reach    G-Code     OutComes Score     AM-PAC Score  AM-PAC Inpatient Mobility Raw Score : 19 (02/09/22 1520)  AM-PAC Inpatient T-Scale Score : 45.44 (02/09/22 1520)  Mobility Inpatient CMS 0-100% Score: 41.77 (02/09/22 1520)  Mobility Inpatient CMS G-Code Modifier : CK (02/09/22 1520)          Goals  Short term goals  Time Frame for Short term goals: 10 visits  Short term goal 1: transfers with SBA  Short term goal 2: amb 150 ft without a device x SBA  Short term goal 3: ascend/decend 4 steps with SBA  Short term goal 4: 20 min exercise program x SBA  Patient Goals   Patient goals : Return home       Therapy Time   Individual Concurrent Group Co-treatment   Time In 2095         Time Out 1329         Minutes 24             1 of [facility], PT

## 2022-02-09 NOTE — PROGRESS NOTES
lung cancer  ** Continue current risk factors modifications and treatments. 3. HTN: Acceptable control  ** Same medications for now  ** Ambulatory BP measurement is important    4. Obesity/ Overweight (BMI: >35 ):  ** Educated about calories restriction and better diet. ** Educated about exercise importance. ** To consider further measures as needed. Dr. Wolfgang Cockayne 12/2/2021:  1. pericardial effusion without tamponade, the etiology is not clear, most likely viral infection however with underlying lung cancer malignancy should be considered.  Unlikely to be CHF related  2.  Lungs cancer, status post left lower lobe resection  3.  New bilateral lower extremities edema, etiology is not clear  4.  Parainfluenza infection    Plan --   1. Keep on oral Lasix 40 mg daily  2. Pressure stockings  3. Elevation of the legs multiple times a day for at least 15 to 20 minutes at times. 4. Echocardiogram next week  5.  Patient can be discharged    Sissy Giron [facility]

## 2022-02-09 NOTE — H&P
[facility]  Office: [address], DO, Roxie Nina, DO, Tika Graham, DO, Dakota Vivian Epstein, DO, Arlene Cruz MD, Dickson Govea MD, Siomara Gilliland MD, Brittany Caicedo MD, Orestes Lubin MD, Demond Donovan MD, Jeovanny Hart MD, Dorota Sanchez, DO, Lucas Greenfield, DO, Nereyda Georges MD,  Neftali Mccoy, DO, Treasure Scherer MD, Hayder Landers MD, Rasta Resendiz MD, Addison Levine MD, Suzie Hensley MD, Reanna Barrientos, [facility], [facility], CNP, Ferny Odonnell, CNP, Bijan Bai, CNS, Rocco Monique, CNP, Dari Vaz, CNP, Sahra Alfred, CNP, Korina Ruffin, CNP, Svitlana Vu, CNP, Evelyn Almaraz PA-C, Ivon Sosa, [facility], Sergei Chou [facility], Mena Resendiz, CNP, Lasandra Runner, CNP, Brooke Escalante, CNP, Pedro Hill, CNP, Manjinder Chaidez, CNP, Fran Whelan, [facility]         [address] [facility]    HISTORY AND PHYSICAL EXAMINATION            Date:   2/9/2022  Patient name:  Ruth Michael  Date of admission:  2/8/2022  6:00 PM  MRN:   [id number]  Account:  [de-identified]  YOB: 1955  PCP:    Mena Germain MD  Room:   [facility]9/0319-  Code Status:    Full Code    Chief Complaint:     Chief Complaint   Patient presents with    Shortness of Breath       History Obtained From:     patient, electronic medical record    History of Present Illness:     Ruth Michael is a 61 y.o. 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 Paitent reports shortness of breath, cough and runny nose for approximately 2 days. She is nonvaccinated and has been in contact with her grand daughter who had very similar symptomology.       Patient is on home oxygen at 2 L/nc prn due to Adenocarcinoma of Lung sp Left lower lobectomy July '21, T1b N0 M0. Patient was admitted [facility] 11/29-12/2 for pericardial effusion and fluid overload with plans to repeat echocardiogram.  Started on Lasix which she stopped taking about a week ago because she didn't think she needed it anymore. She received Solumedrol and Lasix 40 mg IV in ED and is feeling a little better today    Work up in the emergency room      Laboratories:   Metabolic panel. -Sodium 007, potassium 3.5, chloride 101, CO2 22, BUN 9, creatinine 0.64 glucose 107. GI/Liver Panel-not done  Hematology. -WBC 12.7, hemoglobin 8.8 hematocrit 29.9  Coagulation-  Cardiac Markers-proBNP 945, high sensitive troponin 25  EKG-normal sinus without acute ST segment changes inversion NIECY T wave no Q waves when compared to previous EKG nonspecific per ER  Urine-   Rapid Covid negative, resp panel + corona virus/para infulenza 3    Imaging and Diagnostics:   XR CHEST PORTABLE  Result Date: 2/8/2022  Mildly improved bibasal infiltrates and bilateral pleural effusions.    Pulmonary vascular congestion       Past Medical History:     Past Medical History:   Diagnosis Date    Abnormal CT of the chest 04/21/2021    Adenocarcinoma, lung, left ([address] 05/14/2021    Arthritis     Community acquired pneumonia 12/23/2019    COPD (chronic obstructive pulmonary disease) ([address] 08/14/2019    Deep venous thrombosis of left profunda femoris vein ([address] 04/16/2020    Depression     DVT (deep venous thrombosis) ([address] 2014    b/l     GERD (gastroesophageal reflux disease)     History of pulmonary embolism 05/27/2021    History of thyroid nodule 12/23/2019    Hypertension     Major depressive disorder, recurrent, moderate ([address] 11/12/2018    Nodule of lower lobe of left lung 04/23/2021    Obesity, Class II, BMI 35-39.9 11/12/2018    On anticoagulant therapy 05/27/2021    On home oxygen therapy     02 2L NC PRN    On methotrexate therapy 05/27/2021    Prediabetes 11/12/2018  Rheumatoid arthritis [facility]     Snores     denies apnea    Thyroid nodule 01/09/2020    Tobacco abuse 11/12/2018    Under care of team 06/29/2021    pulmonology-Dr Blank- gareth-last visit june 2021    Under care of team 06/29/2021    oncology-[facility]-last visit june 2021    Under care of team     Dr. Precious Willams clearance appointment 7/6/21, stress test 7/9/2021, clearance obtained and placed on chart    Wellness examination 06/29/2021    pcp-Dr Grant Barba office-last visit may 2021        Past Surgical History:     Past Surgical History:   Procedure Laterality Date   [address]  N/A 12/27/2019    BRONCHOSCOPY ALVEOLAR LAVAGE performed by Chandrakant Pastrana MD at [address] [place] [address] N/A 2/12/2020    COLONOSCOPY POLYPECTOMIES HOT SNARE, COLD BIOPSY POLYPECTOMIES performed by Sahil Quintanilla MD at [facility]  5/13/2021    CT NEEDLE BIOPSY LUNG PERCUTANEOUS 5/13/2021 [facility] CT SCAN    ENDOSCOPY, COLON, DIAGNOSTIC  [id number]    gastritis, sm. bowel lipoma, biopsies    INSERT MIDLINE CATHETER  11/3/2021         LOBECTOMY Left 07/19/2021    XI ROBOTIC LEFT LOWER LOBECTOMY CONVERTED TO OPEN THORACTOMY LEFT LOWER LOBECTOMY (Left )    LUNG REMOVAL, TOTAL Left 7/19/2021    XI ROBOTIC LEFT LOWER LOBECTOMY CONVERTED TO OPEN THORACTOMY LEFT LOWER LOBECTOMY performed by Sahil Navarrete MD at [place] [address]        Medications Prior to Admission:     Prior to Admission medications    Medication Sig Start Date End Date Taking?  Authorizing Provider   ondansetron (ZOFRAN) 4 MG tablet take 1 tablet by mouth three times a day if needed for nausea and vomiting 1/31/22   Flaca Rosado MD   sertraline (ZOLOFT) 100 MG tablet take 1 tablet by mouth once daily 1/24/22   Flaca Rosado MD   albuterol (PROVENTIL) (2.5 MG/3ML) 0.083% nebulizer solution Take 3 mLs by nebulization 4 times daily 1/10/22   Brandon Forman MD   Prenatal Vit-Fe Fumarate-FA (PRENATAL VITAMIN) 27-0.8 MG TABS Take 1 tablet by mouth daily 12/16/21   Keaton Mckeon MD   furosemide (LASIX) 40 MG tablet Take 1 tablet by mouth daily 12/2/21   Brice Hoffman DO   SPIRIVA RESPIMAT 2.5 MCG/ACT AERS inhaler inhale 2 puffs INTO THE LUNGS once daily 10/25/21   Heike Wilkinson MD   potassium chloride (KLOR-CON M) 20 MEQ extended release tablet Take 1 tablet by mouth 2 times daily  Patient taking differently: Take 20 mEq by mouth 2 times daily Not taking regular 7/23/21   ILIR Thompson NP   acetaminophen (TYLENOL) 500 MG tablet Take 1,000 mg by mouth every 6 hours as needed for Pain    Historical Provider, MD   ELIQUIS 5 MG TABS tablet take 1 tablet by mouth twice a day 7/16/21   Heike Wilkinson MD   albuterol sulfate  (90 Base) MCG/ACT inhaler inhale 2 puffs by mouth and INTO THE LUNGS every 4 hours for 7 days 6/1/21   Heike Wilkinson MD   pantoprazole (PROTONIX) 40 MG tablet take 1 tablet by mouth once daily 5/18/21   Heike Wilkinson MD   folic acid (FOLVITE) 1 MG tablet take 1 tablet by mouth once daily 4/12/21   Heike Wilkinson MD   methotrexate (RHEUMATREX) 2.5 MG chemo tablet Take 15 mg by mouth once a week Indications: Wednesdays Takes 6 tabs (=15mg) on Wednesdays    Historical Provider, MD        Allergies:     Patient has no known allergies. Social History:     Tobacco:    reports that she quit smoking about 36 years ago. Her smoking use included cigarettes. She has a 8.75 pack-year smoking history. She has never used smokeless tobacco.  Alcohol:      reports previous alcohol use. Drug Use:  reports no history of drug use. Family History:     Family History   Problem Relation Age of Onset    Cancer Mother         Uterine and breast    Diabetes Mother     Heart Disease Mother     Cancer Father         lung    Cancer Brother         lung    Cancer Brother         lung       Review of Systems:     Positive and Negative as described in HPI.     Review of Systems   Constitutional: Positive for fatigue. Negative for chills and fever. HENT: Negative for sore throat and trouble swallowing. Respiratory: Positive for cough (minimally productive) and shortness of breath (worse than baseline). Cardiovascular: Positive for leg swelling (x 2 days). Negative for chest pain. Gastrointestinal: Positive for nausea. Negative for abdominal pain, diarrhea and vomiting. Genitourinary: Negative for dysuria and hematuria. Neurological: Negative for dizziness, light-headedness and headaches. All other systems reviewed and are negative. Physical Exam:   /80   Pulse 102   Temp 97.3 °F (36.3 °C) (Oral)   Resp 20   SpO2 92%   Temp (24hrs), Av.9 °F (36.6 °C), Min:97.3 °F (36.3 °C), Max:98.5 °F (36.9 °C)    No results for input(s): POCGLU in the last 72 hours. No intake or output data in the 24 hours ending 22 1149    Physical Exam  Vitals and nursing note reviewed. Constitutional:       General: She is in acute distress (mild dyspnea). Appearance: She is ill-appearing (chronically). HENT:      Head: Normocephalic. Mouth/Throat:      Mouth: Mucous membranes are moist.      Pharynx: No oropharyngeal exudate. Eyes:      General: No scleral icterus. Conjunctiva/sclera: Conjunctivae normal.      Pupils: Pupils are equal, round, and reactive to light. Cardiovascular:      Rate and Rhythm: Regular rhythm. Tachycardia present. Heart sounds: Normal heart sounds. No murmur heard. Pulmonary:      Breath sounds: Wheezing present. Comments: Diminished throughout with scattered exp wheezes. LLL minimal air exchange, referred breath sounds. No conversational dyspnea. On 3L/nc  Abdominal:      General: Bowel sounds are normal.      Palpations: Abdomen is soft. Tenderness: There is no abdominal tenderness. Musculoskeletal:      Comments: Mild gen edema lower legs, +1.   No redness or pain   Lymphadenopathy:      Cervical: No cervical adenopathy. Skin:     General: Skin is warm and dry. Capillary Refill: Capillary refill takes less than 2 seconds. Coloration: Skin is pale. Skin is not jaundiced. Neurological:      General: No focal deficit present. Mental Status: She is alert and oriented to person, place, and time. Cranial Nerves: No cranial nerve deficit.    Psychiatric:         Behavior: Behavior normal.         Investigations:      Laboratory Testing:  Recent Results (from the past 24 hour(s))   CBC Auto Differential    Collection Time: 02/08/22  6:42 PM   Result Value Ref Range    WBC 12.7 (H) 3.5 - 11.3 k/uL    RBC 3.25 (L) 3.95 - 5.11 m/uL    Hemoglobin 8.8 (L) 11.9 - 15.1 g/dL    Hematocrit 29.9 (L) 36.3 - 47.1 %    MCV 92.0 82.6 - 102.9 fL    MCH 27.1 25.2 - 33.5 pg    MCHC 29.4 28.4 - 34.8 g/dL    RDW 21.7 (H) 11.8 - 14.4 %    Platelets 592 336 - 854 k/uL    MPV 9.1 8.1 - 13.5 fL    NRBC Automated 2.6 (H) 0.0 per 100 WBC    Differential Type NOT REPORTED     WBC Morphology NOT REPORTED     RBC Morphology NOT REPORTED     Platelet Estimate NOT REPORTED     Immature Granulocytes 4 (H) 0 %    Seg Neutrophils 44 36 - 66 %    Lymphocytes 33 24 - 44 %    Atypical Lymphocytes 2 %    Monocytes 17 (H) 1 - 7 %    Eosinophils % 0 (L) 1 - 4 %    Basophils 0 0 - 2 %    nRBC 2 (H) 0 per 100 WBC    Absolute Immature Granulocyte 0.51 (H) 0.00 - 0.30 k/uL    Segs Absolute 5.59 1.8 - 7.7 k/uL    Absolute Lymph # 4.19 1.0 - 4.8 k/uL    Atypical Lymphocytes Absolute 0.25 k/uL    Absolute Mono # 2.16 (H) 0.1 - 0.8 k/uL    Absolute Eos # 0.00 0.0 - 0.4 k/uL    Basophils Absolute 0.00 0.0 - 0.2 k/uL    Morphology Normal    Basic Metabolic Panel w/ Reflex to MG    Collection Time: 02/08/22  6:42 PM   Result Value Ref Range    Glucose 107 (H) 70 - 99 mg/dL    BUN 9 8 - 23 mg/dL    CREATININE 0.64 0.50 - 0.90 mg/dL    Bun/Cre Ratio NOT REPORTED 9 - 20    Calcium 9.0 8.6 - 10.4 mg/dL    Sodium 129 (L) 135 - 144 mmol/L Potassium 3.5 (L) 3.7 - 5.3 mmol/L    Chloride 101 98 - 107 mmol/L    CO2 22 20 - 31 mmol/L    Anion Gap 6 (L) 9 - 17 mmol/L    GFR Non-African American >60 >60 mL/min    GFR African American >60 >60 mL/min    GFR Comment          GFR Staging NOT REPORTED    Troponin    Collection Time: 02/08/22  6:42 PM   Result Value Ref Range    Troponin, High Sensitivity 21 (H) 0 - 14 ng/L    Troponin T NOT REPORTED <0.03 ng/mL    Troponin Interp NOT REPORTED    Brain Natriuretic Peptide    Collection Time: 02/08/22  6:42 PM   Result Value Ref Range    Pro- (H) <300 pg/mL    BNP Interpretation NOT REPORTED    COVID-19, Rapid    Collection Time: 02/08/22  6:42 PM    Specimen: Nasopharyngeal Swab   Result Value Ref Range    Specimen Description . NASOPHARYNGEAL SWAB     SARS-CoV-2, Rapid Not Detected Not Detected   Magnesium    Collection Time: 02/08/22  6:42 PM   Result Value Ref Range    Magnesium 1.8 1.6 - 2.6 mg/dL   Troponin    Collection Time: 02/08/22  7:28 PM   Result Value Ref Range    Troponin, High Sensitivity 25 (H) 0 - 14 ng/L    Troponin T NOT REPORTED <0.03 ng/mL    Troponin Interp NOT REPORTED    Respiratory Panel, Molecular, with COVID-19 (Restricted: peds pts or suitable admitted adults)    Collection Time: 02/09/22  3:30 AM    Specimen: Nasopharyngeal Swab   Result Value Ref Range    Specimen Description . NASOPHARYNGEAL SWAB     Adenovirus PCR Not Detected Not Detected    Coronavirus 229E PCR Not Detected Not Detected    Coronavirus HKU1 PCR Not Detected Not Detected    Coronavirus NL63 PCR Not Detected Not Detected    Coronavirus OC43 PCR DETECTED (A) Not Detected    SARS-CoV-2, PCR Not Detected Not Detected    Human Metapneumovirus PCR Not Detected Not Detected    Rhino/Enterovirus PCR Not Detected Not Detected    Influenza A by PCR Not Detected Not Detected    Influenza A H1 PCR NOT REPORTED Not Detected    Influenza A H1 (2009) PCR NOT REPORTED Not Detected    Influenza A H3 PCR NOT REPORTED Not Detected    Influenza B by PCR Not Detected Not Detected    Parainfluenza 1 PCR Not Detected Not Detected    Parainfluenza 2 PCR Not Detected Not Detected    Parainfluenza 3 PCR DETECTED (A) Not Detected    Parainfluenza 4 PCR Not Detected Not Detected    Resp Syncytial Virus PCR Not Detected Not Detected    Bordetella Parapertussis Not Detected Not Detected    B Pertussis by PCR Not Detected Not Detected    Chlamydia pneumoniae By PCR Not Detected Not Detected    Mycoplasma pneumo by PCR Not Detected Not Detected   Sedimentation Rate    Collection Time: 02/09/22  3:36 AM   Result Value Ref Range    Sed Rate 70 (H) 0 - 30 mm   C-REACTIVE PROTEIN    Collection Time: 02/09/22  3:36 AM   Result Value Ref Range    CRP 24.9 (H) 0.0 - 5.0 mg/L   Troponin    Collection Time: 02/09/22  3:36 AM   Result Value Ref Range    Troponin, High Sensitivity 25 (H) 0 - 14 ng/L    Troponin T NOT REPORTED <0.03 ng/mL    Troponin Interp NOT REPORTED    Basic Metabolic Panel w/ Reflex to MG    Collection Time: 02/09/22  3:36 AM   Result Value Ref Range    Glucose 208 (H) 70 - 99 mg/dL    BUN 13 8 - 23 mg/dL    CREATININE 0.71 0.50 - 0.90 mg/dL    Bun/Cre Ratio NOT REPORTED 9 - 20    Calcium 8.9 8.6 - 10.4 mg/dL    Sodium 129 (L) 135 - 144 mmol/L    Potassium 3.9 3.7 - 5.3 mmol/L    Chloride 100 98 - 107 mmol/L    CO2 24 20 - 31 mmol/L    Anion Gap 5 (L) 9 - 17 mmol/L    GFR Non-African American >60 >60 mL/min    GFR African American >60 >60 mL/min    GFR Comment          GFR Staging NOT REPORTED    CBC    Collection Time: 02/09/22  3:36 AM   Result Value Ref Range    WBC 15.5 (H) 3.5 - 11.3 k/uL    RBC 3.12 (L) 3.95 - 5.11 m/uL    Hemoglobin 8.3 (L) 11.9 - 15.1 g/dL    Hematocrit 28.5 (L) 36.3 - 47.1 %    MCV 91.3 82.6 - 102.9 fL    MCH 26.6 25.2 - 33.5 pg    MCHC 29.1 28.4 - 34.8 g/dL    RDW 21.7 (H) 11.8 - 14.4 %    Platelets 815 625 - 215 k/uL    MPV 8.9 8.1 - 13.5 fL    NRBC Automated 2.2 (H) 0.0 per 100 WBC   EKG 12 Lead Collection Time: 02/09/22 11:26 AM   Result Value Ref Range    Ventricular Rate 103 BPM    Atrial Rate 103 BPM    P-R Interval 162 ms    QRS Duration 86 ms    Q-T Interval 340 ms    QTc Calculation (Bazett) 445 ms    P Axis 70 degrees    R Axis -20 degrees    T Axis 126 degrees       Imaging/Diagnostics:    XR CHEST PORTABLE  Result Date: 2/8/2022  Mildly improved bibasal infiltrates and bilateral pleural effusions.  Pulmonary vascular congestion       Assessment :      Hospital Problems           Last Modified POA    * (Principal) COPD exacerbation ([address] 2/9/2022 Yes    Hyponatremia 2/9/2022 Yes    Pericardial effusion 2/9/2022 Yes    Essential hypertension 2/9/2022 Yes    On methotrexate therapy 2/9/2022 Yes    Overview Signed 2/9/2022 11:39 AM by Braxton Crigler, APRN - CNS     Rheumatoid Arthritis         S/P lobectomy of lung 2/9/2022 Yes    Overview Addendum 2/9/2022 11:12 AM by Braxton Crigler, APRN - CNS     Left Lower Lobe July '21, + Adenocarcinoma         Anemia 8/8/2512 Yes    Diastolic heart failure ([address] 2/9/2022 Yes    Chronic respiratory failure with hypoxia ([address] 2/9/2022 Yes    History of pulmonary embolism 2/9/2022 Yes          Plan:     Patient status inpatient in the Med/Surge    COPD exacerbation  -Steroid taper, neb tx  -Add Zithromax, immunocompromised due to methotrexate for RA (15 mg q Wednesday)    Hyponatremia  -Na q 6 hrs x 3, may be dt vol overload    Pericardial Effusion Nov '21  -Consult Cardiology  -Echo today  -Resume Lasix (pt stopped taking it ~ 7 days ago)    Diastolic Heart Failure   -Lasix  -Trops flat at 25  -, recheck in am    Anemia  -Check iron studies/B12/Folate/retic ct, Hgb 10 in July '21    Chronic Resp Failure on 2L home O2 prn since LLLobectomy July '21  -Continue supplemental O2    Hx of PE  -Continue Eliquis    GI prophylaxis:  Continue Protonix    Consultations:   IP CONSULT TO HOSPITALIST  IP CONSULT TO CARDIOLOGY  IP CONSULT TO HOSPITALIST    Patient is admitted as inpatient status because of co-morbidities listed above, severity of signs and symptoms as outlined, requirement for current medical therapies and most importantly because of direct risk to patient if care not provided in a hospital setting. Expected length of stay > 48 hours.     ILIR Collier - CNS  2/9/2022  11:49 AM    Copy sent to Dr. Flaca Rosado MD

## 2022-02-09 NOTE — PROGRESS NOTES
Occupational Therapy   Occupational Therapy Initial Assessment  Date: 2022   Patient Name: Clarissa Feliciano  MRN: [id number]     : 1955    Date of Service: 2022     Chief Complaint   Patient presents with    Shortness of Breath     Discharge Recommendations:    No therapy recommended at discharge. Assessment   Performance deficits / Impairments: Decreased endurance;Decreased ADL status; Decreased high-level IADLs  Assessment: Patient verbalized s/s of SOB with functional tasks, specifically forward reaching during ADLs and functional tasks. OT educated patient on AE to reduce exertion with poor return from patient. Pt reports no concerns upon being able to complete ADLs at home and is \"fine\". OT to defer d/t patient verbalizing no concerns at this time and declines need for AE education or use. Re-consult if patient verbalizes decline with ADL and functional status. Prognosis: Good  Decision Making: Low Complexity  Patient Education: OT role, OT POC, OT vs. PT role, AE, - fair/poor return  No Skilled OT: Patient refusal  REQUIRES OT FOLLOW UP: No  Activity Tolerance  Activity Tolerance: Patient Tolerated treatment well  Activity Tolerance: O2 maintaining at 99% post activity  Safety Devices  Safety Devices in place: Yes  Type of devices: Call light within reach; Left in chair;Nurse notified  Restraints  Initially in place: No         Patient Diagnosis(es): The primary encounter diagnosis was COPD exacerbation ([address] Diagnoses of Congestive heart failure, unspecified HF chronicity, unspecified heart failure type ([address] and Hypoxia were also pertinent to this visit.      has a past medical history of Abnormal CT of the chest, Adenocarcinoma, lung, left ([address] Arthritis, Community acquired pneumonia, COPD (chronic obstructive pulmonary disease) ([address] Deep venous thrombosis of left profunda femoris vein ([address] Depression, DVT (deep venous thrombosis) ([address] GERD (gastroesophageal reflux disease), No  Patient's  Info: family  Occupation: Retired  Leisure & Hobbies: Puzzles  Additional Comments: Information obtained from PT evaluation     Objective   Vision: Impaired  Vision Exceptions: Wears glasses at all times  Hearing: Within functional limits          Balance  Sitting Balance: Independent (supported and unsupported in recliner)  Standing Balance: Supervision  Standing Balance  Time: ~1 min  Activity: at bedside recliner  Functional Mobility  Functional - Mobility Device: No device  Activity: Other  Assist Level: Supervision  Functional Mobility Comments: x4 steps forward and back; demo awareness of O2 tubing  ADL  Feeding: Independent  Grooming: Independent  UE Bathing: Modified independent   LE Bathing: Modified independent   UE Dressing: Modified independent   LE Dressing: Modified independent   Toileting: Modified independent   Additional Comments: Pt verbalized SOB with functional reach to distal extremities. OT facilitated reduction of SOB with functional activity, educating patient with sock aid, pt reports granddaughter will complete; OT educated patient on reach with demo with patient verbalizing no need and is able to performance functional tasks, despite verbalization of SOB.   Tone RUE  RUE Tone: Normotonic  Tone LUE  LUE Tone: Normotonic  Coordination  Movements Are Fluid And Coordinated: Yes     Bed mobility  Comment: in bedside recliner upon arrival, retired to bedside recliner at end of session  Transfers  Sit to stand: Supervision  Stand to sit: Supervision     Cognition  Overall Cognitive Status: Exceptions  Insights: Decreased awareness of deficits        Sensation  Overall Sensation Status: WFL      LUE AROM : WFL  Left Hand AROM: WFL  RUE AROM : WFL  Right Hand AROM: WFL  LUE Strength  Gross LUE Strength: WFL  L Hand General: 4+/5  RUE Strength  Gross RUE Strength: WFL  R Hand General: 4+/5              Plan      AM-PAC Score        AM-PAC Inpatient Daily Activity Raw Score: 24 (02/09/22 1648)  AM-PAC Inpatient ADL T-Scale Score : 57.54 (02/09/22 1648)  ADL Inpatient CMS 0-100% Score: 0 (02/09/22 1648)  ADL Inpatient CMS G-Code Modifier : CH (02/09/22 1648)    Goals        Therapy Time   Individual Concurrent Group Co-treatment   Time In 7867         Time Out 1512         Minutes 15         Timed Code Treatment Minutes: [place], OTR/L

## 2022-02-10 PROBLEM — B34.8 PARAINFLUENZA INFECTION: Status: ACTIVE | Noted: 2022-01-01

## 2022-02-10 PROBLEM — B34.2 CORONAVIRUS INFECTION: Status: ACTIVE | Noted: 2022-01-01

## 2022-02-10 NOTE — PROGRESS NOTES
Physician Progress Note      Felix Perez  CSN #:                  [id number]  :                       1955  ADMIT DATE:       2022 6:00 PM  [address] DATE:  RESPONDING  PROVIDER #:        Mary Ann DURBIN DO        QUERY TEXT:    Type of Anemia: Please provide further specificity, if known. Clinical indicators include: hemoglobin, hematocrit, fe, chemo, cancer,   hematuria, rbc, platelets, platelet, anemia, iron studies/b12, hgb, iron   sucrose  Options provided:  -- Anemia due to acute blood loss  -- Anemia due to chronic blood loss  -- Anemia due to iron deficiency  -- Anemia due to postoperative blood loss  -- Anemia due to chronic disease  -- Other - I will add my own diagnosis  -- Disagree - Not applicable / Not valid  -- Disagree - Clinically Unable to determine / Unknown        PROVIDER RESPONSE TEXT:    The patient has anemia due to chronic disease.       Electronically signed by:  Angelia Lee DO 2/10/2022 11:38 AM

## 2022-02-10 NOTE — PROGRESS NOTES
[facility]  Office: [address], DO, Noman Agosto, DO, Kent Mohs, DO, Kallie Silveira Blood, DO, Fausto Bowers MD, Monica Wolff MD, Kenzie Galo MD, Ubaldo Hilton MD, Joan Newton MD, Danae Hanley MD, Salvador Jernigan MD, Roro Ventura, DO, Jacek Perla, DO, Carl Spears MD,  Octaviano White DO, Subhash Cantu MD, Vlad Alonso MD, Ashley Hardy MD, Alanna Capps MD, Enio Bruce MD, Sheryl Bailon, [facility], [facility] Berry, CNP, Heriberto Albrecht, CNP, Yaya Gaitan, CNS, Valerie Mackenzie, [facility], Jayne Reeves, CNP, Nettie Wyatt, CNP, Bea Davila, CNP, Jessie Canales, CNP, Himanshu Braton PA-C, Irena Royal, DNP, Eladio Goldberg, [facility], Yancy Yoder, CNP, Natividad Mathew, CNP, Milad Camarillo, CNP, Kiki Savage, CNP, Haleigh Mason, [facility], Venida Cap, [facility]    Progress Note    2/10/2022    11:41 AM    Name:   Wilma Oakes  MRN:     [id number]     Vannesalyside:      [de-identified]   Room:   [address] [place] [address] Day:  2  Admit Date:  2/8/2022  6:00 PM    PCP:   Yane Mayes MD  Code Status:  Full Code    Subjective:     C/C:   Chief Complaint   Patient presents with    Shortness of Breath     Interval History Status:   Feeling better  Less short of breath  Less cough  Still reporting scant yellow sputum  Hoping to go home today  She believes she got a cold from her [facility]    Data Base Updates:  [id number] High mg/dL     VXA94fc/dL   CREATININE0.86mg/dL   Bun/Cre RatioNOT REPORTED     Calcium8. 4 Low mg/dL   Ghnwkz306 Low mmol/L   Potassium3. 6 Low      Pro-BNP1,309 High      EKG:  Sinus tachycardia   Possible Left atrial enlargement   Possible Anterior infarct , age undetermined   Abnormal ECG   When compared with ECG of 31-OCT-2021 06:08,   Sinus rhythm has replaced Atrial fibrillation   ST no longer elevated in Lateral leads     Echocardiogram:Left Atrium   Enlarged left atrium.    Left Ventricle   Left ventricle is normal in size with normal systolic function. Calculated ejection fraction is 51%   Right Atrium   Right atrial enlargement. Brief History:     As documented in the medical record:  \"Iris Rivera is a 61 y.o. 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 Paitent reports shortness of breath, cough and runny nose for approximately 2 days. She is nonvaccinated and has been in contact with her grand daughter who had very similar symptomology. Patient is on home oxygen at 2 L/nc prn due to Adenocarcinoma of Lung sp Left lower lobectomy July '21, T1b N0 M0. Patient was admitted [facility] 11/29-12/2 for pericardial effusion and fluid overload with plans to repeat echocardiogram.  Started on Lasix which she stopped taking about a week ago because she didn't think she needed it anymore. She received Solumedrol and Lasix 40 mg IV in ED and is feeling a little better today     Work up in the emergency room      Laboratories:   Metabolic panel. -Sodium 478, potassium 3.5, chloride 101, CO2 22, BUN 9, creatinine 0.64 glucose 107. GI/Liver Panel-not done  Hematology. -WBC 12.7, hemoglobin 8.8 hematocrit 29.9  Coagulation-  Cardiac Markers-proBNP 945, high sensitive troponin 25  EKG-normal sinus without acute ST segment changes inversion NIECY T wave no Q waves when compared to previous EKG nonspecific per ER  Urine-   Rapid Covid negative, resp panel + corona virus/para infulenza 3     Imaging and Diagnostics:   XR CHEST PORTABLE  Result Date: 2/8/2022  Mildly improved bibasal infiltrates and bilateral pleural effusions.    Pulmonary vascular congestion \"    The intitial assessment and plan included:  \"   * (Principal) COPD exacerbation ([facility] 2/9/2022 Yes     Hyponatremia 2/9/2022 Yes     Pericardial effusion 2/9/2022 Yes     Essential hypertension 2/9/2022 Yes     On methotrexate therapy 2/9/2022 Yes     Overview Signed 2/9/2022 11:39 AM by ILIR Collier - CNS       Rheumatoid Arthritis           S/P lobectomy of lung 2/9/2022 Yes     Overview Addendum 2/9/2022 11:12 AM by ILIR Collier - CNS       Left Lower Lobe July '21, + Adenocarcinoma           Anemia 6/1/4869 Yes     Diastolic heart failure ([address] 2/9/2022 Yes     Chronic respiratory failure with hypoxia ([address] 2/9/2022 Yes     History of pulmonary embolism 2/9/2022 Yes            Plan:   Patient status inpatient in the Med/Surge     COPD exacerbation  -Steroid taper, neb tx  -Add Zithromax, immunocompromised due to methotrexate for RA (15 mg q Wednesday)     Hyponatremia  -Na q 6 hrs x 3, may be dt vol overload     Pericardial Effusion Nov '21  -Consult Cardiology  -Echo today  -Resume Lasix (pt stopped taking it ~ 7 days ago)     Diastolic Heart Failure   -Lasix  -Trops flat at 25  -, recheck in am     Anemia  -Check iron studies/B12/Folate/retic ct, Hgb 10 in July '21     Chronic Resp Failure on 2L home O2 prn since LLLobectomy July '21  -Continue supplemental O2     Hx of PE  -Continue Eliquis     GI prophylaxis:  Continue Protonix\"     Database included:  Pro-BNP1,309 High      EKG:  Sinus tachycardia   Possible Left atrial enlargement   Possible Anterior infarct , age undetermined   Abnormal ECG   When compared with ECG of 31-OCT-2021 06:08,   Sinus rhythm has replaced Atrial fibrillation   ST no longer elevated in Lateral leads     Echocardiogram:Left Atrium   Enlarged left atrium. Left Ventricle   Left ventricle is normal in size with normal systolic function. Calculated ejection fraction is 51%   Right Atrium   Right atrial enlargement. Specimen Description. NASOPHARYNGEAL SWAB Adenovirus PCRNot Detected Coronavirus 229E PCRNot Detected Coronavirus HKU1 PCRNot Detected Coronavirus NL63 PCRNot Detected Coronavirus OC43 PCRDETECTED Abnormal  SARS-CoV-2, PCRNot Detected Human Metapneumovirus PCRNot Detected Rhino/Enterovirus PCRNot Detected Influenza A by PCRNot Detected Influenza A H1 PCRNOT REPORTED Influenza A H1 (2009) PCRNOT REPORTED Influenza A H3 PCRNOT REPORTED Influenza B by PCRNot Detected Parainfluenza 1 PCRNot Detected Parainfluenza 2 PCRNot Detected Parainfluenza 3 PCRDETECTED Abnormal  Parainfluenza 4 PCRNot Detected Resp Syncytial Virus PCRNot Detected Bordetella ParapertussisNot Detected B Pertussis by PCRNot Detected Chlamydia pneumoniae By PCRNot Detected Mycoplasma pneumo by PCRNot Detected     Chest x-ray 2/9  FINDINGS:   The mediastinal and cardiac contours are stable.  There is a persistent left   pleural effusion which may be partially loculated and adjacent left lower   lobe volume loss/consolidation.  Right basilar atelectasis is present. Diffuse increased interstitial markings are unchanged.       Impression:       Persistent left pleural effusion which may be partially loculated and lower   lobe volume loss/consolidation, left greater than right. (The patient has had left lung cancer resected)    Results for Jett Canseco (MRN [id number]) as of 2/10/2022 11:14   Ref.  Range 2/9/2022 03:36 2/9/2022 12:34   Ferritin Latest Ref Range: 13 - 150 ug/L 70    Iron Latest Ref Range: 37 - 145 ug/dL 33 (L)    Iron Saturation Latest Ref Range: 20 - 55 % 11 (L)    UIBC Latest Ref Range: 112 - 347 ug/dL 258    TIBC Latest Ref Range: 250 - 450 ug/dL 291    Folate Latest Ref Range: >4.8 ng/mL  17.5   Vitamin B-12 Latest Ref Range: 232 - 1245 pg/mL  1233       The patient responded well to medical therapy  Discharge planning initiated      Past Medical History:   has a past medical history of Abnormal CT of the chest, Adenocarcinoma, lung, left (HCC), Arthritis, Community acquired pneumonia, COPD (chronic obstructive pulmonary disease) ([address] Deep venous thrombosis of left profunda femoris vein (HCC), Depression, DVT (deep venous thrombosis) ([address] GERD (gastroesophageal reflux disease), History of pulmonary embolism, History of thyroid nodule, Hypertension, Major depressive disorder, recurrent, moderate ([address] Nodule of lower lobe of left lung, Obesity, Class II, BMI 35-39.9, On anticoagulant therapy, On home oxygen therapy, On methotrexate therapy, Prediabetes, Rheumatoid arthritis ([address] Snores, Thyroid nodule, Tobacco abuse, Under care of team, Under care of team, Under care of team, and Wellness examination. Social History:   reports that she quit smoking about 36 years ago. Her smoking use included cigarettes. She has a 8.75 pack-year smoking history. She has never used smokeless tobacco. She reports previous alcohol use. She reports that she does not use drugs. Family History:   Family History   Problem Relation Age of Onset    Cancer Mother         Uterine and breast    Diabetes Mother     Heart Disease Mother     Cancer Father         lung    Cancer Brother         lung    Cancer Brother         lung       Review of Systems:     Review of Systems   Constitutional: Positive for fatigue (Exercise capacity improved). Negative for chills, diaphoresis and fever. Respiratory: Positive for cough (Occasional yellow sputum) and shortness of breath (Less dyspnea on exertion). Negative for wheezing. Cardiovascular: Positive for leg swelling (Chronic). Negative for chest pain and palpitations. Gastrointestinal: Negative for abdominal pain, nausea and vomiting. Genitourinary: Negative for flank pain and hematuria. Physical Examination:        Vitals  /85   Pulse 98   Temp 97.9 °F (36.6 °C) (Oral)   Resp 18   SpO2 91%   Temp (24hrs), Av.9 °F (36.6 °C), Min:97.8 °F (36.6 °C), Max:97.9 °F (36.6 °C)    No results for input(s): POCGLU in the last 72 hours. Physical Exam  Vitals reviewed.    Constitutional:       General: She is not in acute distress. Appearance: She is not diaphoretic. HENT:      Head: Normocephalic. Nose: Nose normal.   Eyes:      General: No scleral icterus. Conjunctiva/sclera: Conjunctivae normal.   Neck:      Trachea: No tracheal deviation. Cardiovascular:      Rate and Rhythm: Normal rate and regular rhythm. Pulmonary:      Effort: Pulmonary effort is normal. No respiratory distress. Breath sounds: No wheezing or rales. Comments: Airflow reduced  Rare Rhonchi  No retractions  No accessory muscle use  No cyanosis    Chest:      Chest wall: No tenderness. Abdominal:      General: There is no distension. Palpations: Abdomen is soft. Tenderness: There is no abdominal tenderness. Musculoskeletal:         General: No tenderness. Cervical back: Neck supple. Right lower leg: Edema present. Left lower leg: Edema present. Skin:     General: Skin is warm and dry. Neurological:      Mental Status: She is alert and oriented to person, place, and time. Medications: Allergies:  No Known Allergies    Current Meds:   Scheduled Meds:    azithromycin  250 mg IntraVENous Q24H    apixaban  5 mg Oral BID    folic acid  1 mg Oral Daily    pantoprazole  40 mg Oral QAM AC    sertraline  100 mg Oral Daily    sodium chloride flush  5-40 mL IntraVENous 2 times per day    ipratropium-albuterol  1 ampule Inhalation Q4H WA    predniSONE  40 mg Oral Daily    furosemide  40 mg Oral Daily    potassium bicarb-citric acid  20 mEq Oral BID    iron sucrose  200 mg IntraVENous Q24H     Continuous Infusions:    sodium chloride       PRN Meds: sodium chloride flush, sodium chloride, ondansetron **OR** ondansetron, polyethylene glycol, acetaminophen **OR** acetaminophen, albuterol, albuterol **AND** [] ipratropium, albuterol **AND** [] ipratropium    Data:     I/O (24Hr):   No intake or output data in the 24 hours ending 02/10/22 1141    Labs:  Hematology:  Recent Labs 02/08/22 1842 02/09/22 0336 02/09/22  2304   WBC 12.7* 15.5* 15.1*   RBC 3.25* 3.12* 2.95*   HGB 8.8* 8.3* 7.9*   HCT 29.9* 28.5* 26.6*   MCV 92.0 91.3 90.2   MCH 27.1 26.6 26.8   MCHC 29.4 29.1 29.7   RDW 21.7* 21.7* 21.7*    244 258   MPV 9.1 8.9 9.4   SEDRATE  --  70*  --    CRP  --  24.9*  --      Chemistry:  Recent Labs     02/08/22 1842 02/08/22 1842 02/08/22 1928 02/09/22 0336 02/09/22 0336 02/09/22  1740 02/09/22  2304 02/10/22  0507   *   < >  --  129*   < > 131* 133* 136   K 3.5*  --   --  3.9  --   --  3.6*  --      --   --  100  --   --  104  --    CO2 22  --   --  24  --   --  24  --    GLUCOSE 107*  --   --  208*  --   --  152*  --    BUN 9  --   --  13  --   --  21  --    CREATININE 0.64  --   --  0.71  --   --  0.86  --    MG 1.8  --   --   --   --   --   --   --    ANIONGAP 6*  --   --  5*  --   --  5*  --    LABGLOM >60  --   --  >60  --   --  >60  --    GFRAA >60  --   --  >60  --   --  >60  --    CALCIUM 9.0  --   --  8.9  --   --  8.4*  --    PROBNP 945*  --   --   --   --   --  1,309*  --    TROPHS 21*  --  25* 25*  --   --   --   --     < > = values in this interval not displayed. No results for input(s): PROT, LABALBU, LABA1C, U1GENPV, S3KOINS, FT4, TSH, AST, ALT, LDH, GGT, ALKPHOS, LABGGT, BILITOT, BILIDIR, AMMONIA, AMYLASE, LIPASE, LACTATE, CHOL, HDL, LDLCHOLESTEROL, CHOLHDLRATIO, TRIG, VLDL, NAS65FY, PHENYTOIN, PHENYF, URICACID, POCGLU in the last 72 hours.   ABG:  Lab Results   Component Value Date    POCPH 7.313 07/19/2021    PHART 7.442 09/18/2021    POCPCO2 48.3 07/19/2021    UPW1KYI 39.2 09/18/2021    POCPO2 373.3 07/19/2021    PO2ART 38.2 09/18/2021    POCHCO3 24.4 07/19/2021    OXI4IUW 26.7 09/18/2021    NBEA NOT REPORTED 09/18/2021    PBEA 2.6 09/18/2021    VVN4NOC NOT REPORTED 07/19/2021    NOWP9BVG 100 07/19/2021    P9ZJXABC 71.1 09/18/2021    FIO2 UNKNOWN 10/31/2021     Lab Results   Component Value Date/Time    SPECIAL NOT REPORTED 11/29/2021 04:05 PM Lab Results   Component Value Date/Time    CULTURE NO GROWTH 5 DAYS 11/29/2021 04:05 PM       Radiology:  XR CHEST PORTABLE    Result Date: 2/9/2022  Persistent left pleural effusion which may be partially loculated and lower lobe volume loss/consolidation, left greater than right. XR CHEST PORTABLE    Result Date: 2/8/2022  Mildly improved bibasal infiltrates and bilateral pleural effusions. Pulmonary vascular congestion       Assessment:        Primary Problem  COPD exacerbation ([facility]    Active Hospital Problems    Diagnosis Date Noted    Coronavirus infection OC43 [B34.2] 02/10/2022    Parainfluenza infection [B34.8] 02/10/2022    Chronic respiratory failure with hypoxia (HCC) [J96.11] 02/09/2022    Pericardial effusion [I31.3] 40/95/3791    Diastolic heart failure (HCC) [I50.30] 10/31/2021    Hyponatremia [E87.1] 10/15/2021    Anemia [D64.9] 10/15/2021    S/P lobectomy of lung [Z90.2] 07/19/2021    History of pulmonary embolism [Z86.711] 05/27/2021    On methotrexate therapy [Z79.899] 05/27/2021    Adenocarcinoma, lung, left (HCC) [C34.92] 05/14/2021    Deep venous thrombosis of left profunda femoris vein (HCC) [I82.412] 04/16/2020    COPD exacerbation ([facility] [J44.1] 08/14/2019    Essential hypertension [I10] 07/14/2014    Rheumatoid arthritis ([facility] [M06.9] 07/14/2014         Plan:        Optimize cardio-pulmonary function  Respiratory Therapy and Bronchodilators prn  Steroids  Zithromax  Oxygen  (The patient is on home oxygen at 2 L/min)  The patient hopes to go home today  We will transition to oral  Cardiology eval & f/u as scheduled Dr Jessi Hou for recurrent pericardial effusion  Pulmonary follow-up Dr. Brett machuca  Oncology follow-up Dr. Roberta Chan  DVT prophylaxis  DC planning  Will discharge when arrangements complete and ok with other services.   Follow-up with PCP in one week, Benton Bass MD  Notify PCP of discharge     IP CONSULT TO HOSPITALIST  IP CONSULT TO CARDIOLOGY  IP CONSULT TO HOSPITALIST    Norm Dixon DO  2/10/2022  11:41 AM

## 2022-02-10 NOTE — PROGRESS NOTES
[facility]  Progress Note                   Date:   2/10/2022  Patient name: Emily Camilo  Date of admission:  2/8/2022  6:00 PM  MRN:   [id number]  YOB: 1955  PCP: Alejandro Padron MD    Reason for Admission: Hypoxia [R09.02]  COPD exacerbation ([facility] [J44.1]  Congestive heart failure, unspecified HF chronicity, unspecified heart failure type ([facility] [I50.9]    Subjective:       Clinical Changes /Abnormalities: no acute CV issues/concerns overnight. Labs, vitals, tele, & echo reviewed. Discharge order already placed. Review of Systems    Medications:   Scheduled Meds:   azithromycin  250 mg IntraVENous Q24H    apixaban  5 mg Oral BID    folic acid  1 mg Oral Daily    pantoprazole  40 mg Oral QAM AC    sertraline  100 mg Oral Daily    sodium chloride flush  5-40 mL IntraVENous 2 times per day    ipratropium-albuterol  1 ampule Inhalation Q4H WA    predniSONE  40 mg Oral Daily    furosemide  40 mg Oral Daily    potassium bicarb-citric acid  20 mEq Oral BID    iron sucrose  200 mg IntraVENous Q24H     Continuous Infusions:   sodium chloride       CBC:   Recent Labs     02/08/22  1842 02/09/22  0336 02/09/22  2304   WBC 12.7* 15.5* 15.1*   HGB 8.8* 8.3* 7.9*    244 258     BMP:    Recent Labs     02/08/22  1842 02/08/22  1842 02/09/22  0336 02/09/22  0336 02/09/22  1740 02/09/22  2304 02/10/22  0507   *   < > 129*   < > 131* 133* 136   K 3.5*  --  3.9  --   --  3.6*  --      --  100  --   --  104  --    CO2 22  --  24  --   --  24  --    BUN 9  --  13  --   --  21  --    CREATININE 0.64  --  0.71  --   --  0.86  --    GLUCOSE 107*  --  208*  --   --  152*  --     < > = values in this interval not displayed. Hepatic:No results for input(s): AST, ALT, ALB, BILITOT, ALKPHOS in the last 72 hours.   Troponin:   Recent Labs     02/08/22  1842 02/08/22  1928 02/09/22  0336   TROPHS 21* 25* 25*     BNP: No results for input(s): BNP in the last 72 hours. Lipids: No results for input(s): CHOL, HDL in the last 72 hours. Invalid input(s): LDLCALCU  INR: No results for input(s): INR in the last 72 hours. Objective:   Vitals: /85   Pulse 98   Temp 97.9 °F (36.6 °C) (Oral)   Resp 18   SpO2 91%   General appearance: alert and cooperative with exam  HEENT: Head: Normocephalic, no lesions, without obvious abnormality. Neck:no JVD, trachea midline, no adenopathy  Lungs: Clear to auscultation  Heart: Regular rate and rhythm, s1/s2 auscultated, no murmurs  Abdomen: soft, non-tender, bowel sounds active  Extremities: no edema  Neurologic: not done    ECHO: 11/29/21     Summary  Left ventricle is normal in size. Mild left ventricular hypertrophy. Global left ventricular systolic function is normal with an estimated  ejection fraction of 60 % . No obvious wall motion abnormality seen. Left atrium is mildly dilated. Right atrium is mildly dilated . No obvious valvular abnormality. Mild pulmonary hypertension. Estimated right ventricular systolic pressure  is 17GICW. Moderate pericardial effusion. No clear tamponade physiology  Mitral and tricuspid inflows are normal. Borderline mitral inflow variation. No right ventricular or right atrial diastolic collapse. The IVC is normal  size without respiratory variation. Ejection fraction: 60%    Limited echo 2/10/22  Summary  Limited echocardiogram  Left ventricle is normal in size with normal systolic function. Estimated EF 50-55%  Small pericardial effusion    Assessment / Acute Cardiac Problems:   1. COPD exacerbation with chronic resp failure on home oxygen  2. Hyponatremia  3. Hx of pericardial effusion (follows with Dr. William Knott)  4. Chronic diastolic CHF  5.  Hx of PE    Patient Active Problem List:     Hypertension     GERD (gastroesophageal reflux disease)     Rheumatoid arthritis ([facility]     Essential hypertension     Obesity, Class II, BMI 35-39.9     Major depressive disorder, recurrent, moderate ([facility]     Prediabetes     Tobacco abuse     History of DVT in adulthood     COPD exacerbation ([address]     Pneumonia due to infectious organism     History of thyroid nodule     Pneumococcal septicemia (HCC)     Thyroid nodule     Positive colorectal cancer screening using Cologuard test     Deep venous thrombosis of left profunda femoris vein (HCC)     Abnormal CT of the chest     Encounter for smoking cessation counseling     Nodule of lower lobe of left lung     Primary mucinous adenocarcinoma of lung (HCC)     Adenocarcinoma, lung, left ([facility]     History of pulmonary embolism     On anticoagulant therapy     On methotrexate therapy     Obesity, Class III, BMI 40-49.9 (morbid obesity) (HCC)     S/P lobectomy of lung     Hypoxia     Moderate malnutrition (HCC)     RSV bronchitis     History of adenocarcinoma of lung     Mixed hyperlipidemia     Sepsis (HCC)     Hyponatremia     Anemia     Leucocytosis     Chronic rhinitis     Respiratory distress     Acute on chronic respiratory failure (HCC)     Hospital-acquired pneumonia     COPD (chronic obstructive pulmonary disease) (HCC)     ASHLEIGH (acute kidney injury) (HCC)     Diastolic heart failure (HCC)     Multifocal pneumonia     Pericardial effusion     Chronic respiratory failure with hypoxia ([address]      Plan of Treatment:   1. Stable from CV standpoint. Limited echo reviewed with small effusion. Pt. States she was supposed to have a repeat echo and then f/u with Dr. Marita Morgan soon. Reviewed echo with patient. Questions/concerns addressed. 2. Continue PO Lasix. F/U as OP with [facility] (Dr. Marita Morgan) in 2 weeks.     Electronically signed by ILIR Cunningham CNP on 2/10/2022 at 11:08 AM  [address]  [phone]

## 2022-02-10 NOTE — PLAN OF CARE
Problem: Breathing Pattern - Ineffective:  Goal: Ability to achieve and maintain a regular respiratory rate will improve  Description: Ability to achieve and maintain a regular respiratory rate will improve  Outcome: Ongoing     Problem: Pain:  Goal: Pain level will decrease  Description: Pain level will decrease  Outcome: Ongoing  Goal: Control of acute pain  Description: Control of acute pain  Outcome: Ongoing  Goal: Control of chronic pain  Description: Control of chronic pain  Outcome: Ongoing     Problem: Musculor/Skeletal Functional Status  Goal: Highest potential functional level  Outcome: Ongoing     Problem: Falls - Risk of:  Goal: Will remain free from falls  Description: Will remain free from falls  Outcome: Ongoing  Goal: Absence of physical injury  Description: Absence of physical injury  Outcome: Ongoing

## 2022-02-10 NOTE — PROGRESS NOTES
Physical Therapy  Facility/Department: [facility] RENAL//MED SURG  Daily Treatment Note  NAME: Daisy Zarate  : 1955  MRN: [id number]  Chief Complaint   Patient presents with    Shortness of Breath     Date of Service: 2/10/2022    Discharge Recommendations:  Patient would benefit from continued therapy after discharge        Assessment   Assessment: Pt ambulated 40 feet x 2 without AD and supervision. significant fatigue, SOB and decreased O2 saturation to 86% upon exertion. Pt will benefit from continued PT to address her functional deficits. PT Education: Goals;PT Role;Plan of Care  Activity Tolerance  Activity Tolerance: Patient limited by fatigue;Patient limited by endurance  Activity Tolerance: RPE = 10/10 by end of session. Pt's mobility limited by fatigue and decreased endurance. Patient Diagnosis(es): The primary encounter diagnosis was COPD exacerbation ([address] Diagnoses of Congestive heart failure, unspecified HF chronicity, unspecified heart failure type ([address] Hypoxia, and Panlobular emphysema ([address] were also pertinent to this visit. has a past medical history of Abnormal CT of the chest, Adenocarcinoma, lung, left ([address] Arthritis, Community acquired pneumonia, COPD (chronic obstructive pulmonary disease) ([address] Deep venous thrombosis of left profunda femoris vein ([address] Depression, DVT (deep venous thrombosis) ([address] GERD (gastroesophageal reflux disease), History of pulmonary embolism, History of thyroid nodule, Hypertension, Major depressive disorder, recurrent, moderate ([address] Nodule of lower lobe of left lung, Obesity, Class II, BMI 35-39.9, On anticoagulant therapy, On home oxygen therapy, On methotrexate therapy, Prediabetes, Rheumatoid arthritis ([address] Snores, Thyroid nodule, Tobacco abuse, Under care of team, Under care of team, Under care of team, and Wellness examination. has a past surgical history that includes Appendectomy ();  Endoscopy, colon, diagnostic (096328); bronchoscopy (N/A, 12/27/2019); Colonoscopy (N/A, 2/12/2020); CT NEEDLE BIOPSY LUNG PERCUTANEOUS (5/13/2021); lobectomy (Left, 07/19/2021); Lung removal, total (Left, 7/19/2021); and Insert Midline Catheter (11/3/2021). Restrictions  Restrictions/Precautions  Restrictions/Precautions: Up as Tolerated  Required Braces or Orthoses?: No  Subjective   General  Response To Previous Treatment: Patient with no complaints from previous session. Family / Caregiver Present: No  Subjective  Subjective: Pt sitting EOB upon PT arrival.  Pt and Nursing agreeable to PT session this AM.  Pt with c/o increased coughing and feeling SOB this visit. Orientation  Orientation  Overall Orientation Status: Within Functional Limits  Cognition   Cognition  Insights: Decreased awareness of deficits  Objective   Bed mobility  Bridging: Unable to assess  Rolling to Left: Unable to assess  Rolling to Right: Unable to assess  Supine to Sit: Unable to assess  Sit to Supine: Unable to assess  Scooting: Unable to assess  Comment: pt seated EOB upon PT arrival.  Transfers  Sit to Stand: Supervision  Stand to sit: Supervision  Bed to Chair: Supervision  Comment: transfers without AD. Ambulation  Ambulation?: Yes  More Ambulation?: No  Ambulation 1  Surface: level tile  Device: No Device  Other Apparatus: O2 (3L via NC)  Assistance: Supervision  Gait Deviations: None  Distance: 40 feet x 2 supervision without AD. Comments: increased SOB upon exertion. desaturation to 86%. takes <1 min to recover to 90s.      Balance  Posture: Good  Sitting - Static: Good  Sitting - Dynamic: Good;-  Standing - Static: Good  Standing - Dynamic: Fair;+                           G-Code     OutComes Score                                                     AM-PAC Score             Goals  Short term goals  Time Frame for Short term goals: 10 visits  Short term goal 1: transfers with SBA  Short term goal 2: amb 150 ft without a device x SBA  Short term goal 3: ascend/decend 4 steps with SBA  Short term goal 4: 20 min exercise program x SBA  Patient Goals   Patient goals : Return home    Plan    Plan  Times per week: 5-6x wk  Current Treatment Recommendations: Strengthening,Functional Mobility Training,Gait Training,Safety Education & Training,Endurance Training,Stair training  Safety Devices  Type of devices: Call light within reach,Left in chair,Nurse notified  Restraints  Initially in place: No     Therapy Time   Individual Concurrent Group Co-treatment   Time In 5158         Time Out 1213         Minutes 425  [facility],

## 2022-02-10 NOTE — PROGRESS NOTES
CLINICAL PHARMACY NOTE: MEDS TO BEDS    Total # of Prescriptions Filled: 4   The following medications were delivered to the patient:  · Ventolin hfa inhaler  · Albuterol sulf 2.5 mg/ 3 ml sol  · Azithromycin 250 mg tab  · Prednisone 20 mg tab    Additional Documentation:Medications delivered to the patient @ 1:25 pm no copay.

## 2022-02-11 NOTE — DISCHARGE SUMMARY
[facility]  Office: [address], DO, Love Favre, DO, Joanie Stspike, DO, Margie Eye Blood, DO, Heriberto Verdin MD, Teresa Betancourt MD, Isiah Kelsey MD, Bernadette Mccarty MD, Ne Daniel MD, Bhavik Nguyen MD, Isela Beatty MD, Madelin Urena, DO, Jenny Maxwell, DO, Murlean Gowers, MD,  Milly Hernandez, DO, Stanley Zuñiga MD, Kvng Parrish MD, Chay Hull MD, Carl Spicer MD, Rbo Garcia MD, Zeynep Molina, CNP, Americo Villalba, CNP, Andrey Reyes, CNP, Le More, CNS, Lenin Liao, CNP, Suly Sewell, CNP, Sepideh Vinson, CNP, Marcia Gonzalez, CNP, Kelsie Wong, CNP, Tammy Montiel PA-C, Minerva Gonzalez, DNP, Feliz Valente, DNP, Natalie Mar, CNP, Sidney Henning, CNP, Mike Quan, CNP, Mike Kearney, CNP, Karlene Anton, CNP, Donny Archibald, [facility]    Discharge Summary    Patient ID: Yevgeniy Felder  :  1955   MRN: [id number]     ACCOUNT:  [de-identified]   Patient's PCP: Yisel Walls MD  Admit Date: 2022   Discharge Date: 2/10/2022    Discharge Physician: Sheree Portillo DO     The patient was seen and examined on day of discharge and this discharge summary is in conjunction with any daily progress note from day of discharge.     Active Discharge Diagnoses:     Primary Problem  COPD exacerbation [facility])      [facility] Problems    Diagnosis Date Noted    Coronavirus infection OC43 [B34.2] 02/10/2022    Parainfluenza infection [B34.8] 02/10/2022    Chronic respiratory failure with hypoxia (HCC) [J96.11] 2022    Pericardial effusion [I31.3] 2315    Diastolic heart failure (HCC) [I50.30] 10/31/2021    Hyponatremia [E87.1] 10/15/2021    Anemia [D64.9] 10/15/2021    S/P lobectomy of lung [Z90.2] 07/19/2021    History of pulmonary embolism [Z86.711] 05/27/2021    On methotrexate therapy [Z79.899] 05/27/2021    Adenocarcinoma, lung, left ([facility] [C34.92] 05/14/2021    Deep venous thrombosis of left profunda femoris vein (HCC) [I82.412] 04/16/2020    COPD exacerbation ([facility] [J44.1] 08/14/2019    Essential hypertension [I10] 07/14/2014    Rheumatoid arthritis ([facility] [M06.9] 07/14/2014         Hospital Course:     Brief History  As documented in the medical record:  \"Cynthia Colette Gaucher a 61 y. o. 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  Paitent reports shortness of breath, cough and runny nose for approximately 2 days. Dung Pickering is nonvaccinated and has been in contact with her grand daughter who had very similar symptomology.       Patient is on home oxygen at 2 L/nc prn due to Adenocarcinoma of Lung sp Left lower lobectomy July '21, T1b N0 M0.      Patient was admitted [facility] 11/29-12/2 for pericardial effusion and fluid overload with plans to repeat echocardiogram.  Started on Lasix which she stopped taking about a week ago because she didn't think she needed it anymore.       She received Solumedrol and Lasix 40 mg IV in ED and is feeling a little better today     Work up in the emergency room      Laboratories:   Metabolic panel. -Sodium 443, potassium 3.5, chloride 101, CO2 22, BUN 9, creatinine 0.64 glucose 107. GI/Liver Panel-not done  Hematology. -WBC 12.7, hemoglobin 8.8 hematocrit 29.9  Coagulation-  Cardiac Markers-proBNP 945, high sensitive troponin 25  EKG-normal sinus without acute ST segment changes inversion NIECY T wave no Q waves when compared to previous EKG nonspecific per ER  Urine-   Rapid Covid negative, resp panel + corona virus/para infulenza 3     Imaging and Diagnostics:   XR CHEST PORTABLE  Result Date: 2/8/2022  Mildly improved bibasal infiltrates and bilateral pleural effusions.    Pulmonary vascular congestion \"     The intitial assessment and plan included:  \"    * (Principal) COPD exacerbation ([address] 2/9/2022 Yes     Hyponatremia 2/9/2022 Yes     Pericardial effusion 2/9/2022 Yes     Essential hypertension 2/9/2022 Yes     On methotrexate therapy 2/9/2022 Yes     Overview Signed 2/9/2022 11:39 AM by ILIR Cheng - CNS       Rheumatoid Arthritis           S/P lobectomy of lung 2/9/2022 Yes     Overview Addendum 2/9/2022 11:12 AM by ILIR Cheng - CNS       Left Lower Lobe July '21, + Adenocarcinoma           Anemia 1/0/7240 Yes     Diastolic heart failure ([address] 2/9/2022 Yes     Chronic respiratory failure with hypoxia ([address] 2/9/2022 Yes     History of pulmonary embolism 2/9/2022 Yes             Plan:   Patient status inpatient in the Med/Surge     COPD exacerbation  -Steroid taper, neb tx  -Add Zithromax, immunocompromised due to methotrexate for RA (15 mg q Wednesday)     Hyponatremia  -Na q 6 hrs x 3, may be dt vol overload     Pericardial Effusion Nov '21  -Consult Cardiology  -Echo today  -Resume Lasix (pt stopped taking it ~ 7 days ago)     Diastolic Heart Failure   -Lasix  -Trops flat at 25  -, recheck in am     Anemia  -Check iron studies/B12/Folate/retic ct, Hgb 10 in July '21     Chronic Resp Failure on 2L home O2 prn since LLLobectomy July '21  -Continue supplemental O2     Hx of PE  -Continue Eliquis     GI prophylaxis:  Continue Protonix\"     Database included:  Pro-BNP1,309 High       EKG:  Sinus tachycardia   Possible Left atrial enlargement   Possible Anterior infarct , age undetermined   Abnormal ECG   When compared with ECG of 31-OCT-2021 06:08,   Sinus rhythm has replaced Atrial fibrillation   ST no longer elevated in Lateral leads      Echocardiogram:Left Atrium   Enlarged left atrium. Left Ventricle   Left ventricle is normal in size with normal systolic function. Calculated ejection fraction is 51%   Right Atrium   Right atrial enlargement.      Specimen Description. NASOPHARYNGEAL SWAB Adenovirus PCRNot Detected Coronavirus 229E PCRNot Detected Coronavirus HKU1 PCRNot Detected Coronavirus NL63 PCRNot Detected Coronavirus OC43 PCRDETECTED Abnormal  SARS-CoV-2, PCRNot Detected Human Metapneumovirus PCRNot Detected Rhino/Enterovirus PCRNot Detected Influenza A by PCRNot Detected Influenza A H1 PCRNOT REPORTED Influenza A H1 (2009) PCRNOT REPORTED Influenza A H3 PCRNOT REPORTED Influenza B by PCRNot Detected Parainfluenza 1 PCRNot Detected Parainfluenza 2 PCRNot Detected Parainfluenza 3 PCRDETECTED Abnormal  Parainfluenza 4 PCRNot Detected Resp Syncytial Virus PCRNot Detected Bordetella ParapertussisNot Detected B Pertussis by PCRNot Detected Chlamydia pneumoniae By PCRNot Detected Mycoplasma pneumo by PCRNot Detected      Chest x-ray 2/9       FINDINGS:   The mediastinal and cardiac contours are stable.  There is a persistent left   pleural effusion which may be partially loculated and adjacent left lower   lobe volume loss/consolidation.  Right basilar atelectasis is present. Diffuse increased interstitial markings are unchanged.        Impression:       Persistent left pleural effusion which may be partially loculated and lower   lobe volume loss/consolidation, left greater than right. (The patient has had left lung cancer resected)     Results for Joanne Nichols (MRN [id number]) as of 2/10/2022 11:14    Ref.  Range 2/9/2022 03:36 2/9/2022 12:34   Ferritin Latest Ref Range: 13 - 150 ug/L 70     Iron Latest Ref Range: 37 - 145 ug/dL 33 (L)     Iron Saturation Latest Ref Range: 20 - 55 % 11 (L)     UIBC Latest Ref Range: 112 - 347 ug/dL 258     TIBC Latest Ref Range: 250 - 450 ug/dL 291     Folate Latest Ref Range: >4.8 ng/mL   17.5   Vitamin B-12 Latest Ref Range: 232 - 1245 pg/mL   1233         The patient responded well to medical therapy  Discharge planning initiated        Discharge plan:     Optimize cardio-pulmonary function  Respiratory Therapy and Bronchodilators prn  Steroids  Zithromax  Oxygen  (The patient is on home oxygen at 2 L/min)  The patient hopes to go home today  We will transition to oral  Cardiology eval & f/u as scheduled Dr Elizabeth Eckert for recurrent pericardial effusion  Pulmonary follow-up Dr. Myles Winter follow-up Dr. Shin Check  DVT prophylaxis  DC planning  Will discharge when arrangements complete and ok with other services. Follow-up with PCP in one week, Heike Wilkinson MD  Notify PCP of discharge     No discharge procedures on file.     Significant Diagnostic Studies:     Labs / Micro:  Labs:  Hematology:  Recent Labs     02/08/22 1842 02/09/22 0336 02/09/22 2304   WBC 12.7* 15.5* 15.1*   RBC 3.25* 3.12* 2.95*   HGB 8.8* 8.3* 7.9*   HCT 29.9* 28.5* 26.6*   MCV 92.0 91.3 90.2   MCH 27.1 26.6 26.8   MCHC 29.4 29.1 29.7   RDW 21.7* 21.7* 21.7*    244 258   MPV 9.1 8.9 9.4   SEDRATE  --  70*  --    CRP  --  24.9*  --      Chemistry:  Recent Labs     02/08/22  1842 02/08/22  1842 02/08/22  1928 02/09/22 0336 02/09/22  1740 02/09/22  2304 02/10/22  0507 02/10/22  1128   *   < >  --  129*   < > 133* 136 136   K 3.5*  --   --  3.9  --  3.6*  --   --      --   --  100  --  104  --   --    CO2 22  --   --  24  --  24  --   --    GLUCOSE 107*  --   --  208*  --  152*  --   --    BUN 9  --   --  13  --  21  --   --    CREATININE 0.64  --   --  0.71  --  0.86  --   --    MG 1.8  --   --   --   --   --   --   --    ANIONGAP 6*  --   --  5*  --  5*  --   --    LABGLOM >60  --   --  >60  --  >60  --   --    GFRAA >60  --   --  >60  --  >60  --   --    CALCIUM 9.0  --   --  8.9  --  8.4*  --   --    PROBNP 945*  --   --   --   --  1,309*  --   --    TROPHS 21*  --  25* 25*  --   --   -- --     < > = values in this interval not displayed. No results for input(s): PROT, LABALBU, LABA1C, G3TFSKK, F1QVQIC, FT4, TSH, AST, ALT, LDH, GGT, ALKPHOS, LABGGT, BILITOT, BILIDIR, AMMONIA, AMYLASE, LIPASE, LACTATE, CHOL, HDL, LDLCHOLESTEROL, CHOLHDLRATIO, TRIG, VLDL, NUI22GZ, PHENYTOIN, PHENYF, URICACID, POCGLU in the last 72 hours. Radiology:    Echocardiogram Limited 2D Adult    Result Date: 2/10/2022  Transthoracic Echocardiography Report (TTE)  [facility]   Date of Study               02/10/2022               PAPO ALCAZAR   Date of      1955  Gender                      Female  Birth   Age          61 year(s)  Race                           Room Number  319         Height:                     65 inch, 165.1 cm   Corporate ID [id number]    Weight:                     198 pounds, 89.8 kg  #   Patient Acct [de-identified]   BSA:          1.97 m^2      BMI:       32.95  #                                                               kg/m^2   MR #         [id number]     Sonographer                 [facility]   Accession #  [id number]  Interpreting Physician      Yandel Carney   Fellow                   Referring Nurse                           Practitioner   Interpreting             Referring Physician         Brayan Castillo  Fellow  Type of Study   TTE procedure:2D Echocardiogram, Doppler, Limited Echo. Procedure Date Date: 02/10/2022 Start: 07:34 AM Study Location: [facility] History / Martinez De La Cruz. Comments: COPD, CHF, Hx of Pericardial Effusion Patient Status: Inpatient Height: 65 inches Weight: 198 pounds BSA: 1.97 m^2 BMI: 32.95 kg/m^2 CONCLUSIONS Summary Limited echocardiogram Left ventricle is normal in size with normal systolic function.  Estimated EF 50-55% Small pericardial effusion Signature ----------------------------------------------------------------------------  Electronically signed by Rosetta Bernard(Sonographer) on 02/10/2022 08:10  AM ---------------------------------------------------------------------------- ----------------------------------------------------------------------------  Electronically signed by Yandel Carney(Interpreting physician) on  02/10/2022 09:25 AM ---------------------------------------------------------------------------- FINDINGS Left Atrium Enlarged left atrium. Left Ventricle Left ventricle is normal in size with normal systolic function. Calculated ejection fraction is 51% Right Atrium Right atrial enlargement. Mitral Valve Mitral valve structure is normal. Mild mitral regurgitation. Pericardial Effusion trivial to small pericardial effusion. No echocardiographic signs of cardiac tamponade. Miscellaneous Dilated IVC (2.9 cm )with poor inspiratory collapse. M-mode / 2D Measurements & Calculations:   LVIDd:5.3 cm(3.7 - 5.6 cm)             Diastolic LDBQKE:768.62 ml  LVIDs:3.85 cm(2.2 - 4.0 cm)            Systolic TYRGKA:87.5 ml   Fractional Shortenin.36 %  Calculated LVEF (%): 50.71 %   Tricuspid:   Peak TR Velocity: 2.82 m/s  Peak TR Gradient: 31.8096 mmHg      XR CHEST PORTABLE    Result Date: 2022  EXAMINATION: ONE XRAY VIEW OF THE CHEST 2022 9:00 am COMPARISON: 2022 HISTORY: ORDERING SYSTEM PROVIDED HISTORY: AECOPD TECHNOLOGIST PROVIDED HISTORY: AECOPD Reason for Exam: upright port FINDINGS: The mediastinal and cardiac contours are stable. There is a persistent left pleural effusion which may be partially loculated and adjacent left lower lobe volume loss/consolidation. Right basilar atelectasis is present. Diffuse increased interstitial markings are unchanged. Persistent left pleural effusion which may be partially loculated and lower lobe volume loss/consolidation, left greater than right.      XR CHEST PORTABLE    Result Date: 2022  EXAMINATION: ONE XRAY VIEW OF THE CHEST 2022 3:29 pm COMPARISON: 2021 HISTORY: ORDERING SYSTEM PROVIDED HISTORY: shortness of breath TECHNOLOGIST PROVIDED HISTORY: shortness of breath Reason for Exam: upr FINDINGS: Cardiac size is enlarged. Mildly improved bibasal infiltrates and bilateral pleural effusions. .  The pulmonary vascularity is hazy and indistinct. No pneumothorax. Ely Hu Postsurgical changes overlying the mediastinum. Mildly improved bibasal infiltrates and bilateral pleural effusions. Pulmonary vascular congestion         Consultations:    Consults:     Final Specialist Recommendations/Findings:   IP CONSULT TO HOSPITALIST  IP CONSULT TO CARDIOLOGY  IP CONSULT TO HOSPITALIST        Discharged Condition:    Stable     Disposition: Home    Physician Follow Up:   No follow-up provider specified. Activity:  activity as tolerated    Diet:  cardiac diet     Discharge Medications:      Medication List      START taking these medications    azithromycin 250 MG tablet  Commonly known as: Zithromax  Take 1 daily x4 days     predniSONE 20 MG tablet  Commonly known as: DELTASONE  Take 1 tablet by mouth daily for 4 days  Start taking on: February 11, 2022        CHANGE how you take these medications    * albuterol sulfate  (90 Base) MCG/ACT inhaler  inhale 2 puffs by mouth and INTO THE LUNGS every 4 hours prn wheezing  What changed: See the new instructions. * albuterol (2.5 MG/3ML) 0.083% nebulizer solution  Commonly known as: PROVENTIL  Take 3 mLs by nebulization every 6 hours as needed for Wheezing  What changed:   · when to take this  · reasons to take this     potassium chloride 20 MEQ extended release tablet  Commonly known as: KLOR-CON M  Take 1 tablet by mouth 2 times daily  What changed: additional instructions         * This list has 2 medication(s) that are the same as other medications prescribed for you. Read the directions carefully, and ask your doctor or other care provider to review them with you.             CONTINUE taking these medications    acetaminophen 500 MG tablet  Commonly known as: TYLENOL     Eliquis 5 MG Tabs tablet  Generic drug: apixaban  take 1 tablet by mouth twice a day     folic acid 1 MG tablet  Commonly known as: FOLVITE  take 1 tablet by mouth once daily     furosemide 40 MG tablet  Commonly known as: Lasix  Take 1 tablet by mouth daily     methotrexate 2.5 MG chemo tablet  Commonly known as: RHEUMATREX     ondansetron 4 MG tablet  Commonly known as: ZOFRAN  take 1 tablet by mouth three times a day if needed for nausea and vomiting     pantoprazole 40 MG tablet  Commonly known as: PROTONIX  take 1 tablet by mouth once daily     Prenatal Vitamin 27-0.8 MG Tabs  Take 1 tablet by mouth daily     sertraline 100 MG tablet  Commonly known as: ZOLOFT  take 1 tablet by mouth once daily     Spiriva Respimat 2.5 MCG/ACT Aers inhaler  Generic drug: tiotropium  inhale 2 puffs INTO THE LUNGS once daily           Where to Get Your Medications      These medications were sent to [facility] [address] [facility], [address] [place] [address],  [address] [address]    Phone: [phone]   · albuterol (2.5 MG/3ML) 0.083% nebulizer solution  · albuterol sulfate  (90 Base) MCG/ACT inhaler  · azithromycin 250 MG tablet  · predniSONE 20 MG tablet         Time Spent on discharge is  17 mins in patient examination, evaluation, counseling, medication reconciliation, discharge plan and follow up. Electronically signed by   Tika Tran DO  2/10/2022  9:31 PM      Thank you Dr. Rubina June MD for the opportunity to be involved in this patient's care.

## 2022-02-11 NOTE — TELEPHONE ENCOUNTER
[facility] Transitions Initial Follow Up Call    Outreach made within 2 business days of discharge: Yes    Patient: Jluis Maynard Patient : 1955   MRN: [id number]  Reason for Admission: There are no discharge diagnoses documented for the most recent discharge. Discharge Date: 2/10/22       Spoke with: Christy Ladd    Discharge department/facility: [facility] Interactive Patient Contact:  Was patient able to fill all prescriptions: Yes  Was patient instructed to bring all medications to the follow-up visit: Yes  Is patient taking all medications as directed in the discharge summary?  Yes  Does patient understand their discharge instructions: Yes  Does patient have questions or concerns that need addressed prior to 7-14 day follow up office visit: no    Scheduled appointment with PCP within 7-14 days    Follow Up  Future Appointments   Date Time Provider Kuldeep Sun   3/14/2022 11:15 AM Myra Yoon MD Resp Spec [facility]   3/31/2022 10:00 AM [facility] George [facility]       Candido Rm MA

## 2022-02-15 NOTE — PROGRESS NOTES
Visit Information    Have you changed or started any medications since your last visit including any over-the-counter medicines, vitamins, or herbal medicines? no   Have you stopped taking any of your medications? Is so, why? -  no  Are you having any side effects from any of your medications? - no    Have you seen any other physician or provider since your last visit? yes -    Have you had any other diagnostic tests since your last visit? yes -    Have you been seen in the emergency room and/or had an admission in a hospital since we last saw you?  yes -    Have you had your routine dental cleaning in the past 6 months? NO     Do you have an active MyChart account? If no, what is the barrier?   No:     Patient Care Team:  Vee Arriaza MD as PCP - General (Family Medicine)  Vee Arriaza MD as PCP - [facility]  Gabriella Mcdonough MD as Consulting Physician (Internal Medicine)  Evi Meeks MD as Consulting Physician (Pulmonary Disease)    Medical History Review  Past Medical, Family, and Social History reviewed and does contribute to the patient presenting condition    Health Maintenance   Topic Date Due    Hepatitis C screen  Never done    COVID-19 Vaccine (1) Never done    HIV screen  Never done    Cervical cancer screen  Never done    Lipid screen  01/30/2020    A1C test (Diabetic or Prediabetic)  12/23/2020    Flu vaccine (1) 09/01/2021    Colon cancer screen colonoscopy  02/12/2022    Pneumococcal 0-64 years Vaccine (2 of 2 - PPSV23) 05/01/2022    Depression Monitoring  10/27/2022    Potassium monitoring  02/09/2023    Creatinine monitoring  02/09/2023    Breast cancer screen  05/11/2023    DTaP/Tdap/Td vaccine (2 - Td or Tdap) 06/12/2029    Shingles Vaccine  Completed    Hepatitis A vaccine  Aged Out    Hepatitis B vaccine  Aged Out    Hib vaccine  Aged Out    Meningococcal (ACWY) vaccine  Aged Out

## 2022-02-16 PROBLEM — J20.5 RSV BRONCHITIS: Status: RESOLVED | Noted: 2021-01-01 | Resolved: 2022-01-01

## 2022-02-16 PROBLEM — R09.02 HYPOXIA: Status: RESOLVED | Noted: 2021-01-01 | Resolved: 2022-01-01

## 2022-02-16 PROBLEM — A40.3 PNEUMOCOCCAL SEPTICEMIA (HCC): Status: RESOLVED | Noted: 2019-12-24 | Resolved: 2022-01-01

## 2022-02-16 PROBLEM — A41.9 SEPSIS (HCC): Status: RESOLVED | Noted: 2021-01-01 | Resolved: 2022-01-01

## 2022-02-16 PROBLEM — B34.8 PARAINFLUENZA INFECTION: Status: RESOLVED | Noted: 2022-01-01 | Resolved: 2022-01-01

## 2022-02-16 PROBLEM — E87.1 HYPONATREMIA: Status: RESOLVED | Noted: 2021-01-01 | Resolved: 2022-01-01

## 2022-02-16 NOTE — PROGRESS NOTES
[address] [address]  Phone: [phone], Fax: [phone]    TELEHEALTH EVALUATION -- Audio/Visual (During [id number] public health emergency)    Patient ID verified by me prior to start of this visit    Devika Peng (:  1955) has requested an audio/video evaluation for the following concern(s):  Chief Complaint   Patient presents with    Follow-Up from [address] [place] [address]      HPI:  Devika Peng is an established patient of Fariba Randhawa MD   Patient has a history of chronic respiratory failure was admitted with COPD exacerbation. Patient reports her breathing was very bad she was short of breath chest tightness wheezing. She tested positive for Covid but was not COVID-19. She received normal treatment with steroids aerosols and oxygen. Patient is currently on oxygen steroids and Z-Reji. Patient reports her symptoms has improved she uses her inhalers. Has appointment with pulmonologist scheduled. Patient has underlying lobectomy done due to history of lung malignancy. Patient also had bilateral pleural effusion on admission in November had repeat echocardiogram done which is normal.  Results below. Patient is not vaccinated against COVID is planning to do vaccinations. Hyperlipidemia due for blood work. Prediabetes in the past is due for A1c on diet control. Hypertension usually runs normal    As documented in the medical record:  \"Iris Del Real is a 61 y. o. 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  Paitent reports shortness of breath, cough and runny nose for approximately 2 days. Ellie Lozano is nonvaccinated and has been in contact with her grand daughter who had very similar symptomology.       Patient is on home oxygen at 2 L/nc prn due to Adenocarcinoma of Lung sp Left lower lobectomy July '21, T1b N0 M0.      Patient was admitted formerly [facility] 11/29-12/2 for pericardial effusion and fluid overload with plans to repeat echocardiogram.  Started on Lasix which she stopped taking about a week ago because she didn't think she needed it anymore.       She received Solumedrol and Lasix 40 mg IV in ED and is feeling a little better today    Echocardiogram on February 8  Limited echocardiogram  Left ventricle is normal in size with normal systolic function. Estimated EF 50-55%  Small pericardial effusion   [x]Negative depression screening. []1-4 = Minimal depression   []5-9 = Mild depression   []10-14 = Moderate depression   []15-19 = Moderately severe depression   []20-27 = Severe depression  PHQ Scores 2/15/2022 10/27/2021 10/4/2021 5/17/2021 3/3/2021 4/1/2020 6/12/2019   PHQ2 Score 1 0 0 1 1 2 0   PHQ9 Score 1 0 0 1 1 2 0     Review of Systems   Constitutional: Positive for activity change. Negative for appetite change, diaphoresis, fever and unexpected weight change. HENT: Positive for congestion. Negative for postnasal drip and rhinorrhea. Eyes: Negative for visual disturbance. Respiratory: Positive for cough and wheezing. Negative for chest tightness and shortness of breath. Cardiovascular: Negative for chest pain, palpitations and leg swelling. Gastrointestinal: Negative for abdominal distention, abdominal pain, constipation and nausea. Endocrine: Negative for polyphagia and polyuria. Genitourinary: Negative for difficulty urinating, frequency and vaginal pain. Musculoskeletal: Positive for arthralgias, back pain and gait problem. Negative for neck pain. Neurological: Positive for weakness and numbness. Negative for tremors, syncope, speech difficulty and headaches.    Hematological: Negative for adenopathy. Psychiatric/Behavioral: Positive for decreased concentration and dysphoric mood. Negative for agitation, behavioral problems, self-injury and suicidal ideas. The patient is nervous/anxious. The patient is not hyperactive.         Patient Active Problem List    Diagnosis Date Noted    Coronavirus infection OC43 02/10/2022    Chronic respiratory failure with hypoxia ([address] 02/09/2022    Pericardial effusion 11/29/2021    Respiratory distress 10/31/2021    Acute on chronic respiratory failure ([address] 10/31/2021    Hospital-acquired pneumonia 10/31/2021    COPD (chronic obstructive pulmonary disease) ([address] 10/31/2021    ASHLEIGH (acute kidney injury) ([address] 88/16/7065    Diastolic heart failure ([address] 10/31/2021    Multifocal pneumonia     Chronic rhinitis 10/28/2021    Anemia 10/15/2021    Leucocytosis 10/15/2021    Mixed hyperlipidemia 10/04/2021    Moderate malnutrition ([address] 09/20/2021    History of adenocarcinoma of lung 09/20/2021    S/P lobectomy of lung 07/19/2021    History of pulmonary embolism 05/27/2021    On anticoagulant therapy 05/27/2021    On methotrexate therapy 05/27/2021    Primary mucinous adenocarcinoma of lung ([address] 05/14/2021    Adenocarcinoma, lung, left ([address] 05/14/2021    Encounter for smoking cessation counseling 04/23/2021    Nodule of lower lobe of left lung 04/23/2021    Abnormal CT of the chest 04/21/2021    Deep venous thrombosis of left profunda femoris vein ([address] 04/16/2020    Positive colorectal cancer screening using Cologuard test 02/01/2020    Thyroid nodule 01/09/2020    Pneumonia due to infectious organism 12/23/2019    History of thyroid nodule 12/23/2019    COPD exacerbation ([address] 08/14/2019    Major depressive disorder, recurrent, moderate ([address] 11/12/2018    Prediabetes 11/12/2018    Tobacco abuse 11/12/2018    History of DVT in adulthood 11/12/2018    Rheumatoid arthritis ([address] 07/14/2014    Essential hypertension 07/14/2014    GERD (gastroesophageal reflux disease) 09/16/2013        Past Surgical History:   Procedure Laterality Date    APPENDECTOMY  1982    BRONCHOSCOPY N/A 12/27/2019    BRONCHOSCOPY ALVEOLAR LAVAGE performed by Brayan Altman MD at [facility] COLONOSCOPY N/A 2/12/2020    COLONOSCOPY POLYPECTOMIES HOT SNARE, COLD BIOPSY POLYPECTOMIES performed by Emily Adame MD at [facility] CT NEEDLE BIOPSY LUNG PERCUTANEOUS  5/13/2021    CT NEEDLE BIOPSY LUNG PERCUTANEOUS 5/13/2021 [facility] CT SCAN    ENDOSCOPY, COLON, DIAGNOSTIC  [id number]    gastritis, sm. bowel lipoma, biopsies    INSERT MIDLINE CATHETER  11/3/2021         LOBECTOMY Left 07/19/2021    XI ROBOTIC LEFT LOWER LOBECTOMY CONVERTED TO OPEN THORACTOMY LEFT LOWER LOBECTOMY (Left )    LUNG REMOVAL, TOTAL Left 7/19/2021    XI ROBOTIC LEFT LOWER LOBECTOMY CONVERTED TO OPEN THORACTOMY LEFT LOWER LOBECTOMY performed by Lion Mccollum MD at [facility] History   Problem Relation Age of Onset    Cancer Mother         Uterine and breast    Diabetes Mother     Heart Disease Mother     Cancer Father         lung    Cancer Brother         lung    Cancer Brother         lung     Current Outpatient Medications   Medication Sig Dispense Refill    albuterol sulfate  (90 Base) MCG/ACT inhaler inhale 2 puffs by mouth and INTO THE LUNGS every 4 hours prn wheezing 18 g 2    albuterol (PROVENTIL) (2.5 MG/3ML) 0.083% nebulizer solution Take 3 mLs by nebulization every 6 hours as needed for Wheezing 120 each 11    azithromycin (ZITHROMAX) 250 MG tablet Take 1 daily x4 days 4 tablet 0    ondansetron (ZOFRAN) 4 MG tablet take 1 tablet by mouth three times a day if needed for nausea and vomiting 30 tablet 2    sertraline (ZOLOFT) 100 MG tablet take 1 tablet by mouth once daily 60 tablet 2    Prenatal Vit-Fe Fumarate-FA (PRENATAL VITAMIN) 27-0.8 MG TABS Take 1 tablet by mouth daily 30 tablet 3    furosemide (LASIX) 40 MG tablet Take 1 tablet by mouth daily 60 tablet 3    SPIRIVA RESPIMAT 2.5 MCG/ACT AERS inhaler inhale 2 puffs INTO THE LUNGS once daily 4 g 3    potassium chloride (KLOR-CON M) 20 MEQ extended release tablet Take 1 tablet by mouth 2 times daily (Patient taking differently: Take 20 mEq by mouth 2 times daily Not taking regular) 90 tablet 1    acetaminophen (TYLENOL) 500 MG tablet Take 1,000 mg by mouth every 6 hours as needed for Pain      ELIQUIS 5 MG TABS tablet take 1 tablet by mouth twice a day 180 tablet 3    pantoprazole (PROTONIX) 40 MG tablet take 1 tablet by mouth once daily 90 tablet 2    folic acid (FOLVITE) 1 MG tablet take 1 tablet by mouth once daily 30 tablet 0    methotrexate (RHEUMATREX) 2.5 MG chemo tablet Take 15 mg by mouth once a week Indications:  Takes 6 tabs (=15mg) on        No current facility-administered medications for this visit.        No Known Allergies     Social History     Tobacco Use    Smoking status: Former Smoker     Packs/day: 0.25     Years: 35.00     Pack years: 8.75     Types: Cigarettes     Quit date: 1986     Years since quittin.1    Smokeless tobacco: Never Used    Tobacco comment: restarted smoking 2019   Vaping Use    Vaping Use: Never used   Substance Use Topics    Alcohol use: Not Currently     Alcohol/week: 0.0 standard drinks    Drug use: No        PHYSICAL EXAMINATION:  Vital Signs: (As obtained by patient/caregiver or practitioner observation)  Patient-Reported Vitals 2/15/2022   Patient-Reported Weight 198 LB   Patient-Reported Height 5 5   Patient-Reported Systolic -   Patient-Reported Diastolic -   Patient-Reported Pulse -        Constitutional: [x] Appears well-developed and well-nourished [x] No apparent distress      [] Abnormal-   Mental status  [x] Alert and awake  [x] Oriented to person/place/time [x]Able to follow commands      Eyes:  EOM    [x]  Normal  [] Abnormal-  Sclera  [x]  Normal  [] Abnormal -         Discharge [x]  None visible  [] Abnormal -    HENT:   [x] Normocephalic, atraumatic. [] Abnormal   [x] Mouth/Throat: Mucous membranes are moist.     External Ears [x] Normal  [] Abnormal-     Neck: [x] No visualized mass     Pulmonary/Chest: [x] Respiratory effort normal.  [x] No visualized signs of difficulty breathing or respiratory distress        [] Abnormal, baseline wheezing, on 2 L of oxygen. Musculoskeletal:   [x] Normal gait with no signs of ataxia         [x] Normal range of motion of neck        [] Abnormal-     Neurological:        [x] No Facial Asymmetry (Cranial nerve 7 motor function) (limited exam to video visit)          [x] No gaze palsy        [] Abnormal-     Skin:        [x] No significant exanthematous lesions or discoloration noted on facial skin         [] Abnormal-     Psychiatric:       [x] Normal Affect [x] No Hallucinations        [x] Abnormal- Anxious, with pressured speech    Other pertinent observable physical exam findings-   Lab Results   Component Value Date    WBC 15.1 (H) 02/09/2022    HGB 7.9 (L) 02/09/2022    HCT 26.6 (L) 02/09/2022    MCV 90.2 02/09/2022     02/09/2022     Lab Results   Component Value Date     02/10/2022    K 3.6 02/09/2022     02/09/2022    CO2 24 02/09/2022    BUN 21 02/09/2022    CREATININE 0.86 02/09/2022    GLUCOSE 152 02/09/2022    GLUCOSE 105 12/05/2011    CALCIUM 8.4 02/09/2022        Due to this being a TeleHealth encounter, evaluation of the following organ systems is limited: Vitals/Constitutional/EENT/Resp/CV/GI//MS/Neuro/Skin/Heme-Lymph-Imm. ASSESSMENT/PLAN:  1. COPD exacerbation ([address]  Continue steroids Z-Reji inhalers and aerosol treatments follow-up with pulmonologist    2. Chronic respiratory failure with hypoxia (HCC)  Continue home oxygen continue inhalers follow-up with pulmonologist  3. S/P lobectomy of lung  Stable continue to follow with hematologist and oncologist    4. COVID  Not COVID-19 detected    5.  Prediabetes  Repeat A1c continue diet control  - Hemoglobin A1C; Future    6. Essential hypertension  Controlled continue same medications    7. Mixed hyperlipidemia  Repeat lipid panel  - Lipid, Fasting; Future    8. Preventative health care    - Hepatitis C Antibody; Future    Controlled Substance Monitoring:  Acute and Chronic Pain Monitoring:   No flowsheet data found. Orders Placed This Encounter   Procedures    Hemoglobin A1C     Standing Status:   Future     Standing Expiration Date:   2/15/2023    Lipid, Fasting     Standing Status:   Future     Standing Expiration Date:   2/15/2023    Hepatitis C Antibody     Standing Status:   Future     Standing Expiration Date:   2/15/2023      No orders of the defined types were placed in this encounter. There are no discontinued medications. Dewayne Ferguson received counseling on the following healthy behaviors: nutrition, exercise, medication adherence and tobacco cessation  Reviewed prior labs and health maintenance. Continue current medications, diet and exercise. Discussed use, benefit, and side effects of prescribed medications. Barriers to medication compliance addressed. Patient given educational materials - see patient instructions. All patient questions answered. Patient voiced understanding. No follow-ups on file. Emily Camilo is a 61 y.o. female patient  being evaluated by a Virtual Visit (video visit) encounter to address concerns as mentioned above. A caregiver was present when appropriate. Due to this being a TeleHealth encounter ([facility] emergency), evaluation of the following organ systems was limited:Vitals/Constitutional/EENT/Resp/CV/GI//MS/Neuro/Skin/Heme-Lymph-Imm. Services were provided through a video synchronous discussion virtually to substitute for in-person clinic visit. This is a telehealth visit that was performed with the originating site at Patient Location: home and provider Location of [place], [place].      Verbal consent to participate in video visit was obtained. Patient ID verified by me prior to start of this visit  I discussed with the patient the nature of our telehealth visits via interactive/real-time audio/video that:  - I would evaluate the patient and recommend diagnostics and treatments based on my assessment  - Our sessions are not being recorded and that personal health information is protected  - Our team would provide follow up care in person if/when the patient needs it. Pursuant to the emergency declaration under the [address] [place] [address], [address] [place] [address] authority and the [employer] Act, this Virtual Visit was conducted with patient's (and/or legal guardian's) consent, to reduce the patient's risk of exposure to COVID-19 and provide necessary medical care. The patient (and/or legal guardian) has also been advised to contact this office for worsening conditions or problems, and seek emergency medical treatment and/or call 911 if deemed necessary. This note was completed by using the assistance of a speech-recognition program. However, inadvertent computerized transcription errors may be present. Although every effort was made to ensure accuracy, no guarantees can be provided that every mistake has been identified and corrected by editing.   Electronically signed by Benton Bass MD on 2/16/22 at 10:44 AM EST

## 2022-02-16 NOTE — TELEPHONE ENCOUNTER
Called and left message to let patient know I was just calling to check her out of her virtual appt from this morning and to get her scheduled for her 3 month follow up.

## 2022-03-14 NOTE — PROGRESS NOTES
REASON FOR THE CONSULTATION:  Lung cancer post lobectomy   HISTORY OF PRESENT ILLNESS:    Vivek Zambrano is a 61y.o. year old female   Accompanied by her daughter   She continues to smoke 1 ppd . Dyspnea is grade 2 no active wheeze or hemoptysis . Using spiriva   Albuterol as needed     Last visit     Patient was seen by cardiothoracic surgery and she is scheduled for left lower lobe lobectomy for adenocarcinoma of lung on July 12, 2021. She needs to get PFT but has not had COVID-19 vaccination so will need COVID-19 testing prior to PFT. Unfortunately she continues to smoke almost a pack a day. Advised her to quit smoking. She did not use Chantix. She wants to quit on her own. In May patient was admitted to [facility] with a left upper lobe pneumonia. She was treated with antibiotics and discharged. Denies any purulent sputum or hemoptysis or fevers at present. She is using Spiriva daily and uses albuterol as needed. Last visit   here for evaluation of lung nodule which was seen on CT lung screening. It is spiculated and he is 1.7 cm. This has increased in size from CTA of the chest done December 2019. Patient has chronic grade 2 dyspnea , which has been progressively getting worse withafter one flight stairs, with one/ half  block walking , Complains of Yes Cough , Yessputum production clear in morning   , No  hemoptysis , Yes  Associated with wheezing . No home oxygen  . She has spiriva which helps . LUNG CANCER SCREENING     1. CRITERIA MET    [x]     CT ORDERED  []      2. CRITERIA NOT MET   []      3. REFUSED                    []        REASON CRITERIA NOT MET     1. SMOKING LESS THAN 30 PY  []      2. AGE LESS THAN 55 or GREATER 77 YEARS  []      3. QUIT SMOKING 15 YEARS OR GREATER   []      4. RECENT CT WITH IN 11 MONTHS    []      5. LIFE EXPECTANCY < 5 YEARS   []      6.  SIGNS  AND SYMPTOMS OF LUNG CANCER   []         Immunization   Immunization History   Administered Date(s) Administered    Influenza Virus Vaccine 01/26/2016    Influenza, Quadv, IM, PF (6 mo and older Fluzone, Flulaval, Fluarix, and 3 yrs and older Afluria) 10/28/2016, 11/12/2018    Pneumococcal Polysaccharide ([id number]) 04/11/2017    Tdap (Boostrix, Adacel) 06/12/2019    Zoster Recombinant (Shingrix) 06/12/2019, 11/20/2019          PAST MEDICAL HISTORY:       Diagnosis Date    Abnormal CT of the chest 04/21/2021    Adenocarcinoma, lung, left ([address] 05/14/2021    Arthritis     Community acquired pneumonia 12/23/2019    COPD (chronic obstructive pulmonary disease) ([address] 08/14/2019    Deep venous thrombosis of left profunda femoris vein ([address] 04/16/2020    Depression     DVT (deep venous thrombosis) ([address] 2014    b/l     GERD (gastroesophageal reflux disease)     History of pulmonary embolism 05/27/2021    History of thyroid nodule 12/23/2019    Hypertension     Major depressive disorder, recurrent, moderate ([address] 11/12/2018    Nodule of lower lobe of left lung 04/23/2021    Obesity, Class II, BMI 35-39.9 11/12/2018    On anticoagulant therapy 05/27/2021    On home oxygen therapy     02 2L NC PRN    On methotrexate therapy 05/27/2021    Prediabetes 11/12/2018    Rheumatoid arthritis ([address]     Snores     denies apnea    Thyroid nodule 01/09/2020    Tobacco abuse 11/12/2018    Under care of team 06/29/2021    pulmonology-Dr Blank-[facility]-last visit june 2021    Under care of team 06/29/2021    oncology-Dr Kelly[facility]-last visit june 2021    Under care of team     Dr. Josh Evans clearance appointment 7/6/21, stress test 7/9/2021, clearance obtained and placed on chart    Wellness examination 06/29/2021    pcp-Dr Pool Rayo office-last visit may 2021         Family History:       Problem Relation Age of Onset    Cancer Mother         Uterine and breast    Diabetes Mother     Heart Disease Mother     Cancer Father         lung    Cancer daily 2/23/22  Yes Fariba Randhawa MD   albuterol sulfate  (90 Base) MCG/ACT inhaler inhale 2 puffs by mouth and INTO THE LUNGS every 4 hours prn wheezing 2/10/22  Yes Renetta Chicas DO   albuterol (PROVENTIL) (2.5 MG/3ML) 0.083% nebulizer solution Take 3 mLs by nebulization every 6 hours as needed for Wheezing 2/10/22  Yes Renetta Chicas DO   azithromycin (ZITHROMAX) 250 MG tablet Take 1 daily x4 days 2/10/22  Yes Renetta Chicas DO   [facility] 4 MG tablet take 1 tablet by mouth three times a day if needed for nausea and vomiting 1/31/22  Yes Fariba Randhawa MD   sertraline (ZOLOFT) 100 MG tablet take 1 tablet by mouth once daily 1/24/22  Yes Fariba Randhawa MD   Prenatal Vit-Fe Fumarate-FA (PRENATAL VITAMIN) 27-0.8 MG TABS Take 1 tablet by mouth daily 12/16/21  Yes Elena De La Cruz MD   furosemide (LASIX) 40 MG tablet Take 1 tablet by mouth daily 12/2/21  Yes Dex Wall DO   potassium chloride (KLOR-CON M) 20 MEQ extended release tablet Take 1 tablet by mouth 2 times daily  Patient taking differently: Take 20 mEq by mouth 2 times daily Not taking regular 7/23/21  Yes ILIR Brice NP   acetaminophen (TYLENOL) 500 MG tablet Take 1,000 mg by mouth every 6 hours as needed for Pain   Yes Historical Provider, MD   ELIQUIS 5 MG TABS tablet take 1 tablet by mouth twice a day 7/16/21  Yes Fariba Randhawa MD   pantoprazole (PROTONIX) 40 MG tablet take 1 tablet by mouth once daily 5/18/21  Yes Fariba Randhawa MD   folic acid (FOLVITE) 1 MG tablet take 1 tablet by mouth once daily 4/12/21  Yes Fariba Randhawa MD   methotrexate (RHEUMATREX) 2.5 MG chemo tablet Take 15 mg by mouth once a week Indications:  Wednesdays Takes 6 tabs (=15mg) on Wednesdays   Yes Historical Provider, MD   amLODIPine (NORVASC) 5 MG tablet Take 1 tablet by mouth daily 3/14/22   Phylliss Fast, MD              Review of Systems -  General ROS: negative for - chills, fatigue, fever or weight loss  ENT ROS: negative for - headaches, oral lesions or sore throat  Cardiovascular ROS: no chest pain , orthopnea or pnd   Gastrointestinal ROS: no abdominal pain, change in bowel habits, or black or bloody stools  Skin - no rash   Neuro - no blurry vision , no loc . No focal weakness   msk - no jt tenderness or swelling    Vascular - no claudication , rest completed and negative   Lymphatic - complete and negative   Hematology - oncology - complete and negative   Allergy immunology - complete and negative    no burning or hematuria    Vitals:  /74 (Site: Left Upper Arm, Position: Sitting, Cuff Size: Large Adult)   Pulse 95   Ht 5' 5\" (1.651 m)   Wt 174 lb 6.4 oz (79.1 kg)   SpO2 94%   BMI 29.02 kg/m²     PHYSICAL EXAM:  Head and neck atraumatic, normocephalic    Lymph nodes-no cervical, supraclavicular lymphadenopathy    Neck-no JVP elevation    Lungs - dec bs left mammary region ,   Clear right and left post   No dullness  No bb    CVS- S1, S2 regular. No S3 no S4, no murmurs    Abdomen-nontender, nondistended. Bowel sounds are present. No organomegaly    Lower extremity-no edema    Upper extremity-no edema    Neurological-grossly normal cranial nerves. No overt motor deficit     CBC: No results for input(s): WBC, HGB, PLT in the last 72 hours. BMP:  No results for input(s): NA, K, CL, CO2, BUN, CREATININE, GLUCOSE in the last 72 hours. Hepatic: No results for input(s): AST, ALT, ALB, BILITOT, ALKPHOS in the last 72 hours. Amylase:   Lab Results   Component Value Date    AMYLASE 18 05/01/2014     Lipase:   Lab Results   Component Value Date    LIPASE 21 05/01/2014     Troponin: No results for input(s): TROPONINI in the last 72 hours. BNP: No results for input(s): BNP in the last 72 hours. Lipids: No results for input(s): CHOL, HDL in the last 72 hours.     Invalid input(s): LDLCALCU  ABGs:   Lab Results   Component Value Date    PHART 7.442 09/18/2021    PO2ART 38.2 09/18/2021    AUN0KXP 39.2 09/18/2021     INR: No results for input(s): INR in the last 72 hours. Thyroid:   Lab Results   Component Value Date    TSH 0.18 12/23/2019     Urinalysis: No results for input(s): BACTERIA, BLOODU, CLARITYU, COLORU, PHUR, PROTEINU, RBCUA, SPECGRAV, BILIRUBINUR, NITRU, WBCUA, LEUKOCYTESUR, GLUCOSEU in the last 72 hours. IMPRESSION:     Diagnosis Orders   1. Panlobular emphysema ([facility]  Handicap Placard MISC   2. Adenocarcinoma of left lung (HCC)  CT CHEST WO CONTRAST   3. Status post lobectomy of lung  CT CHEST WO CONTRAST   4. Encounter for smoking cessation counseling  nicotine (NICODERM CQ) 14 MG/24HR   5. COPD, severity to be determined ([facility]     6. Rheumatoid arthritis involving multiple sites, unspecified whether rheumatoid factor present ([facility]          :                PLAN:      D/w patient in detail - recommend smoking cessation . She is smoking 1 ppd , does not want to try CHANTIX . Will start nicotine patch and based on response after 28 days will give patch of 7 mg / day. Ct chest wo contrast .  Follow up with oncology   Follow up with cardiology to monitor pericardial effusion . Continue spiriva and albuterol   Follow up 3 months         Requested Prescriptions     Signed Prescriptions Disp Refills    [facility] 1 each 0     Sig: by Does not apply route 3/14/2022 to 3/13/2027    Panlobular emphysema ([facility]  (primary encounter diagnosis)    nicotine (NICODERM CQ) 14 MG/24HR 28 patch 0     Sig: Place 1 patch onto the skin daily for 28 days       There are no discontinued medications. Bhaskar Hernandez received counseling on the following healthy behaviors: nutrition, exercise and medication adherence    Patient given educational materials : see patient instruction       Discussed use, benefit, and side effects of prescribed medications. Barriers to medication compliance addressed. All patient questions answered. Pt voiced understanding.    I hope this updates you on my evaluation and clinical thinking. Thank you for allowing me to participate in his care. Sincerely,    Electronically signed by Rene Morrow MD on 3/14/2022 at 6:05 PM       Please note that this chart was generated using voice recognition Dragon dictation software. Although every effort was made to ensure the accuracy of this automated transcription, some errors in transcription may have occurred.

## 2022-03-14 NOTE — TELEPHONE ENCOUNTER
Pt is asking for a refill on her amlodipine 10mg however it looks like that medication is not on her med list and It was discontinued in the past

## 2022-03-14 NOTE — TELEPHONE ENCOUNTER
----- Message from Sierra Herndon sent at 3/14/2022  9:23 AM EDT -----  Subject: Message to Provider    QUESTIONS  Information for Provider? pt called in requesting her BP medication be   called in. Pharmacy told her the doctor cancelled it. Amopladine is how   she thinks it is spelled. I am not seeing this in her chart. said it was   10mg. [facility] said office has not gotten ahold of them since doctor   cancelled it.   ---------------------------------------------------------------------------  --------------  [address]  What is the best way for the office to contact you? OK to leave message on   voicemail  Preferred Call Back Phone Number? [phone]  ---------------------------------------------------------------------------  --------------  SCRIPT ANSWERS  Relationship to Patient?  Self

## 2022-03-18 NOTE — TELEPHONE ENCOUNTER
I send lopressor 12.5 bid to pharmacy ,she can start taking that and check her BP daily , schedule VV on wenesday

## 2022-03-18 NOTE — TELEPHONE ENCOUNTER
Patient called stating that she is having some headache onset, and dizziness. Also stated that since her bp has been adjusted she is having these high bp readings. She did check her bp yesterday it was 167/98 before bed. She did recheck today this morning it was 171/93, also rechecked shortly after it was 161/90. She did state that she takes her bp medication amlodipine in the mornings. She also stated that it is causing a heart beat feeling in her right ear Please advise.

## 2022-03-23 PROBLEM — J18.9 PNEUMONIA DUE TO INFECTIOUS ORGANISM: Status: RESOLVED | Noted: 2019-12-23 | Resolved: 2022-01-01

## 2022-03-23 PROBLEM — B34.2 CORONAVIRUS INFECTION: Status: RESOLVED | Noted: 2022-01-01 | Resolved: 2022-01-01
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
## 2022-03-23 PROBLEM — J44.1 COPD EXACERBATION (HCC): Status: RESOLVED | Noted: 2019-08-14 | Resolved: 2022-01-01

## 2022-03-23 PROBLEM — J18.9 HOSPITAL-ACQUIRED PNEUMONIA: Status: RESOLVED | Noted: 2021-01-01 | Resolved: 2022-01-01

## 2022-03-23 PROBLEM — R06.03 RESPIRATORY DISTRESS: Status: RESOLVED | Noted: 2021-01-01 | Resolved: 2022-01-01

## 2022-03-23 PROBLEM — J96.20 ACUTE ON CHRONIC RESPIRATORY FAILURE (HCC): Status: RESOLVED | Noted: 2021-01-01 | Resolved: 2022-01-01

## 2022-04-15 NOTE — PROGRESS NOTES
4/15/2022    TELEHEALTH EVALUATION -- Audio/Visual     Patient and physician are located in their individual locations. This is visit is completed via [employer] application / [email or web address] / Telephone     Chief Complaint   Patient presents with    COPD    Cancer    Follow-up     follow up CT chest        HPI:    Yolis Gant (:  1955) has requested an audio/video evaluation for the following concern(s):    Continues to smoke. Imaging reviewed with patient  Her daughter had COVID-19 and patient was exposed but was not symptomatic. Has moderate to large sputum which is clear no hemoptysis  Dyspnea with exertion grade 2    Review of Systems   Constitutional: Negative for appetite change and unexpected weight change. HENT: Negative. Respiratory: Positive for cough and shortness of breath. Negative for wheezing. Cardiovascular: Negative. Gastrointestinal: Negative. Neurological: Negative.             Immunization   Immunization History   Administered Date(s) Administered    Influenza Virus Vaccine 2016    Influenza, Quadv, IM, PF (6 mo and older Fluzone, Flulaval, Fluarix, and 3 yrs and older Afluria) 10/28/2016, 2018    Pneumococcal Polysaccharide (Rhvbuverg10) 2017    Tdap (Boostrix, Adacel) 2019    Zoster Recombinant (Shingrix) 2019, 2019          PAST MEDICAL HISTORY:       Diagnosis Date    Abnormal CT of the chest 2021    Adenocarcinoma, lung, left ([address] 2021    Arthritis     Community acquired pneumonia 2019    COPD (chronic obstructive pulmonary disease) ([address] 2019    Deep venous thrombosis of left profunda femoris vein ([address] 2020    Depression     DVT (deep venous thrombosis) ([address]     b/l     GERD (gastroesophageal reflux disease)     History of pulmonary embolism 2021    History of thyroid nodule 2019    Hypertension     Major depressive disorder, recurrent, moderate ([address] 2018    Nodule of lower lobe of left lung 04/23/2021    Obesity, Class II, BMI 35-39.9 11/12/2018    On anticoagulant therapy 05/27/2021    On home oxygen therapy     02 2L NC PRN    On methotrexate therapy 05/27/2021    Prediabetes 11/12/2018    Rheumatoid arthritis ([facility]     Snores     denies apnea    Thyroid nodule 01/09/2020    Tobacco abuse 11/12/2018    Under care of team 06/29/2021    pulmonology-Dr [facility]-last visit june 2021    Under care of team 06/29/2021    oncology-Dr [facility]-last visit june 2021    Under care of team     Dr. Tam Charles clearance appointment 7/6/21, stress test 7/9/2021, clearance obtained and placed on chart    Wellness examination 06/29/2021    pcp-Dr Ho Prather office-last visit may 2021         Family History:       Problem Relation Age of Onset    Cancer Mother         Uterine and breast    Diabetes Mother     Heart Disease Mother     Cancer Father         lung    Cancer Brother         lung    Cancer Brother         lung       SURGICAL HISTORY:   Past Surgical History:   Procedure Laterality Date    APPENDECTOMY  1982    BRONCHOSCOPY N/A 12/27/2019    BRONCHOSCOPY ALVEOLAR LAVAGE performed by Shila Ojeda MD at [facility] N/A 2/12/2020    COLONOSCOPY POLYPECTOMIES HOT SNARE, COLD BIOPSY POLYPECTOMIES performed by Ti Gr MD at [facility]  5/13/2021    CT NEEDLE BIOPSY LUNG PERCUTANEOUS 5/13/2021 [facility] CT SCAN    ENDOSCOPY, COLON, DIAGNOSTIC  [id number]    gastritis, sm. bowel lipoma, biopsies    INSERT MIDLINE CATHETER  11/3/2021         LOBECTOMY Left 07/19/2021    XI ROBOTIC LEFT LOWER LOBECTOMY CONVERTED TO OPEN THORACTOMY LEFT LOWER LOBECTOMY (Left )    LUNG REMOVAL, TOTAL Left 7/19/2021    XI ROBOTIC LEFT LOWER LOBECTOMY CONVERTED TO OPEN THORACTOMY LEFT LOWER LOBECTOMY performed by Baron Weldon MD at [address] [place] [address] Rd:       Social History     Socioeconomic History    Marital status: Single     Spouse name: Not on file    Number of children: 6    Years of education: Not on file    Highest education level: Not on file   Occupational History     Employer: N/A   Tobacco Use    Smoking status: Former Smoker     Packs/day: 0.25     Years: 35.00     Pack years: 8.75     Types: Cigarettes     Quit date: 1986     Years since quittin.2    Smokeless tobacco: Never Used    Tobacco comment: restarted smoking 2019   Vaping Use    Vaping Use: Never used   Substance and Sexual Activity    Alcohol use: Not Currently     Alcohol/week: 0.0 standard drinks    Drug use: No    Sexual activity: Not Currently   Other Topics Concern    Not on file   Social History Narrative    Not on file     Social Determinants of Health     Financial Resource Strain: Low Risk     Difficulty of Paying Living Expenses: Not hard at all   Food Insecurity: No Food Insecurity    Worried About Running Out of Food in the Last Year: Never true    Emelyn of Food in the Last Year: Never true   Transportation Needs: No Transportation Needs    Lack of Transportation (Medical): No    Lack of Transportation (Non-Medical):  No   Physical Activity:     Days of Exercise per Week: Not on file    Minutes of Exercise per Session: Not on file   Stress:     Feeling of Stress : Not on file   Social Connections:     Frequency of Communication with Friends and Family: Not on file    Frequency of Social Gatherings with Friends and Family: Not on file    Attends Worship Services: Not on file    Active Member of Clubs or Organizations: Not on file    Attends Club or Organization Meetings: Not on file    Marital Status: Not on file   Intimate Partner Violence:     Fear of Current or Ex-Partner: Not on file    Emotionally Abused: Not on file    Physically Abused: Not on file    Sexually Abused: Not on file   Housing Stability:     Unable to Pay for Housing in the Last Year: Not on file    Number of Places Lived in the Last Year: Not on file    Unstable Housing in the Last Year: Not on file        TOBACCO:   reports that she quit smoking about 36 years ago. Her smoking use included cigarettes. She has a 8.75 pack-year smoking history. She has never used smokeless tobacco.  ETOH:   reports previous alcohol use. ALLERGIES:      No Known Allergies      Home Meds:   Prior to Admission medications    Medication Sig Start Date End Date Taking?  Authorizing Provider   metoprolol tartrate (LOPRESSOR) 25 MG tablet Take 0.5 tablets by mouth 2 times daily 3/18/22  Yes Aidan Manning MD   amLODIPine (NORVASC) 5 MG tablet Take 1 tablet by mouth daily 3/14/22  Yes Aidan Manning MD   Handicap Placard MISC by Does not apply route 3/14/2022 to 3/13/2027    Panlobular emphysema ([address]  (primary encounter diagnosis) 3/14/22  Yes Meenakshi Bowden MD   SPIRIVA RESPIMAT 2.5 MCG/ACT AERS inhaler inhale 2 puffs INTO THE LUNGS once daily 2/23/22  Yes Aidan Manning MD   albuterol sulfate  (90 Base) MCG/ACT inhaler inhale 2 puffs by mouth and INTO THE LUNGS every 4 hours prn wheezing 2/10/22  Yes Deryl Ing, DO   albuterol (PROVENTIL) (2.5 MG/3ML) 0.083% nebulizer solution Take 3 mLs by nebulization every 6 hours as needed for Wheezing 2/10/22  Yes Deryl Ing, DO   ondansetron (ZOFRAN) 4 MG tablet take 1 tablet by mouth three times a day if needed for nausea and vomiting 1/31/22  Yes Aidan Manning MD   sertraline (ZOLOFT) 100 MG tablet take 1 tablet by mouth once daily 1/24/22  Yes Aidan Manning MD   Prenatal Vit-Fe Fumarate-FA (PRENATAL VITAMIN) 27-0.8 MG TABS Take 1 tablet by mouth daily 12/16/21  Yes Awilda Agee MD   furosemide (LASIX) 40 MG tablet Take 1 tablet by mouth daily 12/2/21  Yes Yayo Massey,    potassium chloride (KLOR-CON M) 20 MEQ extended release tablet Take 1 tablet by mouth 2 times daily 7/23/21  Yes Mu Shell, APRN - NP   acetaminophen (TYLENOL) 500 MG tablet Take 1,000 mg by mouth every 6 hours as needed for Pain   Yes Historical Provider, MD   ELIQUIS 5 MG TABS tablet take 1 tablet by mouth twice a day 7/16/21  Yes Kimo Bean MD   pantoprazole (PROTONIX) 40 MG tablet take 1 tablet by mouth once daily 5/18/21  Yes Kimo Bean MD   nicotine (NICODERM CQ) 14 MG/24HR Place 1 patch onto the skin daily for 28 days 3/14/22 4/11/22  Mirna Finney MD              Wt Readings from Last 3 Encounters:   03/14/22 174 lb 6.4 oz (79.1 kg)   12/16/21 198 lb 3.2 oz (89.9 kg)   12/02/21 202 lb 8 oz (91.9 kg)           CT CHEST WO CONTRAST    Result Date: 4/8/2022  EXAMINATION: CT OF THE CHEST WITHOUT CONTRAST 4/8/2022 2:25 pm TECHNIQUE: CT of the chest was performed without the administration of intravenous contrast. Multiplanar reformatted images are provided for review. Dose modulation, iterative reconstruction, and/or weight based adjustment of the mA/kV was utilized to reduce the radiation dose to as low as reasonably achievable. COMPARISON: 11/29/2021 HISTORY: ORDERING SYSTEM PROVIDED HISTORY: Adenocarcinoma of left lung ([facility] TECHNOLOGIST PROVIDED HISTORY: hx of lung cancer post left lower lobe lobectomy Reason for Exam: hx of lung cancer post left lower lobe lobectomy, Adenocarcinoma of left lung ([facility] Status post FINDINGS: Mediastinum: There are enlarged prevascular lymph nodes which are similar when compared to the previous exam.  For example, the prevascular lymph node seen previously measures roughly 1.4 cm in short axis (previously 1.5 cm). More inferiorly an additional lymph node seen measures maximally 1.3 cm, unchanged. There is soft tissue prominence in the region of the right hilum, but the lymph nodes cannot be differentiated from pulmonary arteries without intravenous contrast. The large pericardial effusion seen previously is decreased considerably, with a scant amount of residual pericardial fluid and/or pericardial thickening. Multiple calcified thyroid nodules are seen, measuring less than 1 cm. No follow-up of those is recommended. Cardiac chambers are enlarged, similar when compared to the previous exam. Main pulmonary artery is dilated, also similar when compared to the previous exam, suggesting pulmonary hypertension. That appears stable when compared to the previous exam.  Noncontrast imaging of the thoracic aorta is unremarkable. Lungs/pleura: There is again noted to be a band of atelectasis/consolidation seen within the right middle lobe, which appears decreased when compared to the previous exam. Mildly spiculated nodule within the anterior left upper lobe is increased in size when compared to the previous exam, now measuring 1.4 x 0.7 cm (previously 0.6 x 0.6 cm). No other solid nodules are identified. There are patchy areas of ground-glass opacity. Some of these have a nodular appearance, especially in the right upper lung. They do appear new when compared to the previous exam. Upper Abdomen: Left adrenal nodule is noted measuring 1.6 cm, unchanged. Images of the upper abdomen are otherwise unremarkable. Soft Tissues/Bones: Visualized extrathoracic soft tissues are unremarkable in appearance. No acute or suspicious bony abnormalities are identified. Interval enlargement of the spiculated nodule within the left upper lobe, concerning for worsening metastatic disease. Enlarged mediastinal lymph nodes which are similar when compared to the previous exam. Band of consolidation in the right middle lobe, which may relate to scarring or postobstructive atelectasis, but neoplasm could be masked by that process. Development of multifocal ground-glass opacities, some with a nodular morphology. These are likely inflammatory or infectious (in which case consider both typical and atypical etiologies, including viral pneumonia). However, metastatic disease would be considered as well.  Multiple subcentimeter, partially calcified thyroid nodules, which require no specific follow-up. Full recommendations below. RECOMMENDATIONS: Subcentimeter incidental thyroid nodule. No follow-up imaging is recommended. Reference: J Am Vikas Radiol. 2015 Feb;12(2): 143-50       PHYSICAL EXAMINATION:  Due to this being a TeleHealth encounter, evaluation of the following organ systems is limited: Vitals/Constitutional/EENT/Resp/CV/GI//MS/Neuro/Skin/Heme-Lymph-Imm. Constitutional: [x] Appears well-developed and well-nourished. [] Abnormal  Mental status  [x] Alert and awake  [x] Oriented to person/place/time [x]Able to follow commands    [x] No apparent distress      Eyes:  EOM    [x]  Normal  [] Abnormal-  Sclera  [x]  Normal  [] Abnormal -         Discharge [x]  None visible  [] Abnormal -    HENT:   [x] Normocephalic, atraumatic. [] Abnormal shaped head   [x] Mouth/Throat: Mucous membranes are moist.     Ears [x] Normal  [] Abnormal-    Neck: [x] Normal range of motion [x] Supple [x] No visualized mass. Pulmonary/Chest: [x] Respiratory effort normal.  [x] No visualized signs of difficulty breathing or respiratory distress        [] Abnormal      Musculoskeletal:   [x] Normal range of motion. [x] Normal gait with no signs of ataxia. [x]  No signs of cyanosis of the peripheral portions of extremities. [] Abnormal       Neurological:        [x] Normal cranial nerve (limited exam to video visit) [x] No focal weakness observed       [] Abnormal          Speech       [x] Normal   [] Abnormal       DATE OF PROCEDURE:  07/19/2021     PREOPERATIVE DIAGNOSIS:  Left lower lobe lung cancer.     POSTOPERATIVE DIAGNOSIS:  Left lower lobe lung cancer.     PROCEDURES PERFORMED:  1.  Robotic left-sided lymph node dissection. 2.  Conversion to open left lower lobectomy. 3.  Platelet gel. 4.  Pain ball.       CT CHEST WO CONTRAST    Result Date: 4/8/2022  Interval enlargement of the spiculated nodule within the left upper lobe, concerning for worsening metastatic disease. Enlarged mediastinal lymph nodes which are similar when compared to the previous exam. Band of consolidation in the right middle lobe, which may relate to scarring or postobstructive atelectasis, but neoplasm could be masked by that process. Development of multifocal ground-glass opacities, some with a nodular morphology. These are likely inflammatory or infectious (in which case consider both typical and atypical etiologies, including viral pneumonia). However, metastatic disease would be considered as well. Multiple subcentimeter, partially calcified thyroid nodules, which require no specific follow-up. Full recommendations below. RECOMMENDATIONS: Subcentimeter incidental thyroid nodule. No follow-up imaging is recommended. Reference: J Am Vikas Radiol. 2015 Feb;12(2): 143-50            ASSESSMENT:   Diagnosis Orders   1. Nodule of upper lobe of left lung - enlarging   PET CT SKULL BASE TO MID THIGH   2. Panlobular emphysema ([address]     3. Adenocarcinoma of left lung (HCC)  PET CT SKULL BASE TO MID THIGH   4. Status post lobectomy of left lower lobe  - 7/19/21     5. COPD, severity to be determined ([address]     6. Atelectasis of right middle lobe of  lung     7. Smoker     8. History of DVT in adulthood     5. Rheumatoid arthritis involving multiple sites, unspecified whether rheumatoid factor present [facility])           Plan:   Patient continues to smoke but is cutting down to half pack per day  CT of the chest reviewed and compared to November 2021. Left upper lobe spiculated lung nodule has enlarged in size to 1.4 cm. There is right middle lobe atelectasis which was previously evaluated in 2019 by Dr. Bard Gottlieb with bronchoscopy and mucous plugs were noted. There is bilateral groundglass changes. Patient has had exposure to COVID-19 but not symptomatic.     Due to her history of adenocarcinoma of the left lung, left lower lobe lobectomy in July 2021, active tobacco abuse history in the form of cigarettes. I am concerned that the left upper lobe lung nodule may be metastatic disease. We will proceed with PET CT scan and then follow-up with the clinic for evaluation for biopsy diagnosis. Reviewed imaging with patient and family in detail. Requested Prescriptions      No prescriptions requested or ordered in this encounter       There are no discontinued medications. Oxana Kang received counseling on the following healthy behaviors: nutrition, exercise and medication adherence    Patient given educational materials : see patient instruction       Discussed use, benefit, and side effects of prescribed medications. Barriers to medication compliance addressed. All patient questions answered. Pt voiced understanding. An  electronic signature was used to authenticate this note. --Harriet Abrams MD on 4/15/2022 at 1:03 PM      Please note that this chart was generated using voice recognition Dragon dictation software. Although every effort was made to ensure the accuracy of this automated transcription, some errors in transcription may have occurred. Nicol Sandoval , was evaluated through a synchronous (real-time) audio-video encounter. The patient (or guardian if applicable) is aware that this is a billable service, which includes applicable co-pays. This Virtual Visit was conducted with patient's (and/or legal guardian's) consent. The visit was conducted pursuant to the emergency declaration under the [facility], [address] [place] [address] waiver authority and the [employer] General Act. Patient identification was verified, and a caregiver was present when appropriate. The patient was located in a state where the provider was licensed to provide care.

## 2022-04-15 NOTE — PATIENT INSTRUCTIONS
Transferred pt to [address] [place] [address] to schedule PET SCAN. Dr. Nahomi Shepard will call with results and discuss next plan.    Pt was asked to schedule prior to May 2,2022     Mailed PET Q&A with AVS    CHARISMA   4/25/22 CHECKING STATUS OF PET RESULTS LS   PET SCAN 4/22/22 WAS CANCELED   RESCHEDULED  4/26/22

## 2022-04-22 PROBLEM — J18.9 PNEUMONIA: Status: ACTIVE | Noted: 2022-01-01

## 2022-04-22 PROBLEM — J96.01 ACUTE RESPIRATORY FAILURE WITH HYPOXIA (HCC): Status: ACTIVE | Noted: 2022-01-01

## 2022-04-22 NOTE — Clinical Note
Discharge Plan[de-identified] Other/[facility])   Telemetry/Cardiac Monitoring Required?: Yes   Bed request comments: AQUILES ellison

## 2022-04-22 NOTE — ED NOTES
Pt arrived to ED with c/o of cough, sob and emesis. Pt has hx of copd. Pt states she wears 4L NC at home as needed. Pt states she has been coughing for x3 days. Pt states she thinks she has the flu. Pt was placed on 2L NC. Pt was placed on cardiac monitor. Pt is alert and oriented x4.      Janean Leventhal, RN  04/22/22 [id number]

## 2022-04-22 NOTE — ED PROVIDER NOTES
[facility] ED  eMERGENCY dEPARTMENT eNCOUnter   Attending Attestation     Pt Name: Clif Hoover  MRN: [id number]  [place] 1955  Date of evaluation: 4/22/22       Clif Hoover is a 61 y.o. female who presents with Emesis (was vomiting two days ago), Cough (lastiing for a few days, mucous), and Back Pain (mid back pain lasting a few days)      History: Patient presents with vomiting, cough, difficulty breathing. Patient recently diagnosed with flu. Patient's become worse. Exam: *Heart rate and rhythm are regular. Lungs severely diminished on the left, right sided rattles and wheeze. Patient awake and alert however does appear ill. Nontoxic. Plan for COPD treatment, ACS screening, likely admission. Patient is now requiring more oxygen at this time. EKG Interpretation    Interpreted by emergency department physician    Rhythm: normal sinus   Rate: normal  Axis: left  Ectopy: none  Conduction: normal  ST Segments: no acute change  T Waves: no acute change  Q Waves: none    Clinical Impression: non-specific EKG      Kaya Villanueva M.D. I performed a history and physical examination of the patient and discussed management with the resident. I reviewed the residents note and agree with the documented findings and plan of care. Any areas of disagreement are noted on the chart. I was personally present for the key portions of any procedures. I have documented in the chart those procedures where I was not present during the key portions. I have personally reviewed all images and agree with the resident's interpretation. I have reviewed the emergency nurses triage note. I agree with the chief complaint, past medical history, past surgical history, allergies, medications, social and family history as documented unless otherwise noted below.  Documentation of the HPI, Physical Exam and Medical Decision Making performed by medical students or scribes is based on my personal performance of the HPI, PE and MDM. For Phys Assistant/ Nurse Practitioner cases/documentation I have had a face to face evaluation of this patient and have completed at least one if not all key elements of the E/M (history, physical exam, and MDM). Additional findings are as noted. For APC cases I have personally evaluated and examined the patient in conjunction with the APC and agree with the treatment plan and disposition of the patient as recorded by the APC.     Jessica Ray MD  Attending Emergency  Physician       Alis Whitfield MD  04/22/22 [id number]

## 2022-04-22 NOTE — ED PROVIDER NOTES
[facility]. 2  Emergency Department Encounter  EmergencyMedicine Resident     Pt Name:Iris Zhao  MRN: [id number]  [place] 1955  Date of evaluation: 4/22/22  PCP:  Reynold Montano MD    This patient was evaluated in the Emergency Department for symptoms described in the history of present illness. The patient was evaluated in the context of the global COVID-19 pandemic, which necessitated consideration that the patient might be at risk for infection with the SARS-CoV-2 virus that causes COVID-19. Institutional protocols and algorithms that pertain to the evaluation of patients at risk for COVID-19 are in a state of rapid change based on information released by regulatory bodies including the CDC and federal and state organizations. These policies and algorithms were followed during the patient's care in the ED. CHIEF COMPLAINT       Chief Complaint   Patient presents with    Emesis     was vomiting two days ago    Cough     lastiing for a few days, mucous    Back Pain     mid back pain lasting a few days       HISTORY OF PRESENT ILLNESS  (Location/Symptom, Timing/Onset, Context/Setting, Quality, Duration, Modifying Factors, Severity.)      Lennie Sawyer is a 61 y.o. female history of COPD, recurrent blood clots, on Eliquis twice daily presenting with shortness of breath, URI symptoms, nausea and vomiting for the past 4 days. Patient states that she has been feeling generalized malaise, she is coughing and has had trouble sleeping at night. Patient is not having fevers at home, she has had some chest pain only with coughing. Patient has not had any issues with bowel or bladder movements. Patient has had reduced p.o. intake.     PAST MEDICAL / SURGICAL / SOCIAL / FAMILY HISTORY      has a past medical history of Abnormal CT of the chest, Adenocarcinoma, lung, left ([facility] [address] Arthritis, Community acquired pneumonia, COPD (chronic obstructive pulmonary disease) ([facility] [address] Deep venous thrombosis of left profunda femoris vein (HCC), Depression, DVT (deep venous thrombosis) ([facility] [address] GERD (gastroesophageal reflux disease), History of pulmonary embolism, History of thyroid nodule, Hypertension, Major depressive disorder, recurrent, moderate ([address] Nodule of lower lobe of left lung, Obesity, Class II, BMI 35-39.9, On anticoagulant therapy, On home oxygen therapy, On methotrexate therapy, Prediabetes, Rheumatoid arthritis ([address] Snores, Thyroid nodule, Tobacco abuse, Under care of team, Under care of team, Under care of team, and Wellness examination. has a past surgical history that includes Appendectomy (); Endoscopy, colon, diagnostic (338239); bronchoscopy (N/A, 2019); Colonoscopy (N/A, 2020); CT NEEDLE BIOPSY LUNG PERCUTANEOUS (2021); lobectomy (Left, 2021); Lung removal, total (Left, 2021); and Insert Midline Catheter (11/3/2021).       Social History     Socioeconomic History    Marital status: Single     Spouse name: Not on file    Number of children: 6    Years of education: Not on file    Highest education level: Not on file   Occupational History     Employer: N/A   Tobacco Use    Smoking status: Current Every Day Smoker     Packs/day: 0.25     Years: 35.00     Pack years: 8.75     Types: Cigarettes     Last attempt to quit: 1986     Years since quittin.2    Smokeless tobacco: Never Used    Tobacco comment: restarted smoking 2019   Vaping Use    Vaping Use: Never used   Substance and Sexual Activity    Alcohol use: Not Currently     Alcohol/week: 0.0 standard drinks    Drug use: No    Sexual activity: Not Currently   Other Topics Concern    Not on file   Social History Narrative    Not on file     Social Determinants of Health     Financial Resource Strain: Low Risk     Difficulty of Paying Living Expenses: Not hard at all   Food Insecurity: No Food Insecurity    Worried About Running Out of Food in the Last Year: Never true    Emelyn of Food in the Last Year: Never true   Transportation Needs: No Transportation Needs    Lack of Transportation (Medical): No    Lack of Transportation (Non-Medical): No   Physical Activity:     Days of Exercise per Week: Not on file    Minutes of Exercise per Session: Not on file   Stress:     Feeling of Stress : Not on file   Social Connections:     Frequency of Communication with Friends and Family: Not on file    Frequency of Social Gatherings with Friends and Family: Not on file    Attends Ep[employer] Services: Not on file    Active Member of [address] [place] [address] [employer] or Organizations: Not on file    Attends Club or Organization Meetings: Not on file    Marital Status: Not on file   Intimate Partner Violence:     Fear of Current or Ex-Partner: Not on file    Emotionally Abused: Not on file    Physically Abused: Not on file    Sexually Abused: Not on file   Housing Stability:     Unable to Pay for Housing in the Last Year: Not on file    Number of [place] in the Last Year: Not on file    Unstable Housing in the Last Year: Not on file       Family History   Problem Relation Age of Onset    Cancer Mother         Uterine and breast    Diabetes Mother     Heart Disease Mother     Cancer Father         lung    Cancer Brother         lung    Cancer Brother         lung       Allergies:  Patient has no known allergies. Home Medications:  Prior to Admission medications    Medication Sig Start Date End Date Taking?  Authorizing Provider   metoprolol tartrate (LOPRESSOR) 25 MG tablet Take 0.5 tablets by mouth 2 times daily 3/18/22  Yes Cele Ortiz MD   amLODIPine (NORVASC) 5 MG tablet Take 1 tablet by mouth daily 3/14/22  Yes Cele Ortiz MD   Handicap Placard MISC by Does not apply route 3/14/2022 to 3/13/2027    [facility]  (primary encounter diagnosis) 3/14/22  Yes Elva Urban MD   SPIRIVA RESPIMAT 2.5 MCG/ACT AERS inhaler inhale 2 puffs INTO THE LUNGS once daily 2/23/22  Yes Cele Ortiz MD albuterol sulfate  (90 Base) MCG/ACT inhaler inhale 2 puffs by mouth and INTO THE LUNGS every 4 hours prn wheezing 2/10/22  Yes Merle Perdomo DO   albuterol (PROVENTIL) (2.5 MG/3ML) 0.083% nebulizer solution Take 3 mLs by nebulization every 6 hours as needed for Wheezing 2/10/22  Yes Merle Perdomo DO   ondansetron (ZOFRAN) 4 MG tablet take 1 tablet by mouth three times a day if needed for nausea and vomiting 1/31/22  Yes Cele Ortiz MD   sertraline (ZOLOFT) 100 MG tablet take 1 tablet by mouth once daily 1/24/22  Yes Cele Ortiz MD   Prenatal Vit-Fe Fumarate-FA (PRENATAL VITAMIN) 27-0.8 MG TABS Take 1 tablet by mouth daily 12/16/21  Yes Sergey Torres MD   furosemide (LASIX) 40 MG tablet Take 1 tablet by mouth daily 12/2/21  Yes Camille Curtsi DO   potassium chloride (KLOR-CON M) 20 MEQ extended release tablet Take 1 tablet by mouth 2 times daily 7/23/21  Yes ILIR Abdi - NP   acetaminophen (TYLENOL) 500 MG tablet Take 1,000 mg by mouth every 6 hours as needed for Pain   Yes Historical Provider, MD   ELIQUIS 5 MG TABS tablet take 1 tablet by mouth twice a day 7/16/21  Yes Cele Ortiz MD   pantoprazole (PROTONIX) 40 MG tablet take 1 tablet by mouth once daily 5/18/21  Yes Cele Ortiz MD   nicotine (NICODERM CQ) 14 MG/24HR Place 1 patch onto the skin daily for 28 days 3/14/22 4/11/22  Elva Urban MD       REVIEW OF SYSTEMS    (2-9 systems for level 4, 10 or more for level 5)      Review of Systems   Constitutional: Positive for fatigue. Negative for chills and fever. HENT: Positive for congestion. Negative for trouble swallowing. Eyes: Negative for visual disturbance. Respiratory: Positive for cough, shortness of breath and wheezing. Negative for chest tightness and stridor. Cardiovascular: Positive for leg swelling. Gastrointestinal: Positive for nausea and vomiting. Negative for abdominal distention, abdominal pain, blood in stool and diarrhea. Genitourinary: Negative for dysuria, flank pain, hematuria and urgency. Musculoskeletal: Positive for myalgias. Negative for back pain. Skin: Negative for color change. Neurological: Negative for dizziness, syncope, weakness, light-headedness and headaches. PHYSICAL EXAM   (up to 7 for level 4, 8 or more for level 5)      INITIAL VITALS:   BP (!) 108/55   Pulse 88   Temp 98.8 °F (37.1 °C) (Oral)   Resp 22   Ht 5' 5\" (1.651 m)   Wt 174 lb (78.9 kg)   SpO2 91%   BMI 28.96 kg/m²     Physical Exam   General: Well-developed, well-nourished, appears appropriate for documented age. HEENT: Normocephalic, atraumatic. Sclera white, pupils reactive. No cervical lymphadenopathy  Cardiovascular: Regular rate and rhythm. Pulses strong in bilateral upper and lower extremities. Capillary refill less than 2 seconds. Pulmonary: Coarse breath sounds throughout, wheezing bilateral upper and lower lung fields bilaterally diminished lung sounds on the left compared to the right  Abdominal: Soft, nondistended, nontender. No palpable masses. Neuro: Reacts appropriately to external stimuli, moves all 4 extremities spontaneously. Skin: No rashes lesions abrasions or bruises. DIFFERENTIAL  DIAGNOSIS     PLAN (LABS / IMAGING / EKG):  Orders Placed This Encounter   Procedures    COVID-19, Rapid    Culture, Respiratory    LEGIONELLA ANTIGEN, URINE    XR CHEST PORTABLE    CT CHEST WO CONTRAST    Troponin    CBC with Auto Differential    Basic Metabolic Panel w/ Reflex to MG    Magnesium    Troponin    Basic Metabolic Panel w/ Reflex to MG    Hepatic function panel    CBC with Auto Differential    BLOOD GAS, ARTERIAL    BUN & Creatinine    ADULT DIET;  Regular    Vital signs per unit routine    Notify physician    Up as tolerated    Daily weights    Intake and output    Height and weight    Full Code    Inpatient consult to Internal Medicine    Inpatient consult to Case Management    Pharmacy to Dose: Vancomycin    Pharmacy to Dose: Vancomycin    OT eval and treat    PT evaluation and treat    Respiratory Care Evaluation and Treat    Adult NIV/Positive Airway Pressure    Initiate Oxygen Therapy Protocol    Initiate RT Protocol    Respiratory care evaluation only    Arterial Blood Gas, POC    POCT Glucose    EKG 12 Lead    ADMIT TO INPATIENT    ADMIT TO INPATIENT       MEDICATIONS ORDERED:  Orders Placed This Encounter   Medications    methylPREDNISolone sodium (SOLU-MEDROL) injection 125 mg    cefTRIAXone (ROCEPHIN) 1000 mg IVPB in NS 50ml minibag     Order Specific Question:   Antimicrobial Indications     Answer:   Pneumonia (CAP)     Order Specific Question:   Antimicrobial Indications     Answer:   COPD Exacerbation    azithromycin (ZITHROMAX) tablet 500 mg     Order Specific Question:   Antimicrobial Indications     Answer:   COPD Exacerbation     Order Specific Question:   Antimicrobial Indications     Answer:   Pneumonia (CAP)    DISCONTD: albuterol (PROVENTIL) nebulizer solution 2.5 mg     Order Specific Question:   Initiate RT Bronchodilator Protocol     Answer:   No    ipratropium (ATROVENT) 0.02 % nebulizer solution 0.5 mg     Order Specific Question:   Initiate RT Bronchodilator Protocol     Answer:   No    albuterol (PROVENTIL) nebulizer solution 2.5 mg     Order Specific Question:   Initiate RT Bronchodilator Protocol     Answer: Yes    DISCONTD: albuterol sulfate  (90 Base) MCG/ACT inhaler 2 puff     Order Specific Question:   Initiate RT Bronchodilator Protocol     Answer:    Yes    amLODIPine (NORVASC) tablet 5 mg    apixaban (ELIQUIS) tablet 5 mg     Order Specific Question:   Indication of Use     Answer:   History of DVT/PE (indefinite)    furosemide (LASIX) tablet 40 mg    metoprolol tartrate (LOPRESSOR) tablet 12.5 mg    pantoprazole (PROTONIX) tablet 40 mg    potassium chloride (KLOR-CON M) extended release tablet 20 mEq    DISCONTD: Prenatal Vitamin 27-0.8 MG TABS 1 tablet    sertraline (ZOLOFT) tablet 100 mg    tiotropium (SPIRIVA RESPIMAT) 2.5 MCG/ACT inhaler 2 puff    sodium chloride flush 0.9 % injection 5-40 mL    sodium chloride flush 0.9 % injection 5-40 mL    0.9 % sodium chloride infusion    OR Linked Order Group     ondansetron (ZOFRAN-ODT) disintegrating tablet 4 mg     ondansetron (ZOFRAN) injection 4 mg    polyethylene glycol (GLYCOLAX) packet 17 g    OR Linked Order Group     acetaminophen (TYLENOL) tablet 650 mg     acetaminophen (TYLENOL) suppository 650 mg    0.9 % sodium chloride infusion    DISCONTD: levoFLOXacin (LEVAQUIN) 750 MG/150ML infusion 750 mg     Order Specific Question:   Antimicrobial Indications     Answer:   Pneumonia (CAP)     Order Specific Question:   CAP duration of therapy     Answer:   7 days    methylPREDNISolone sodium (SOLU-MEDROL) injection 40 mg    DISCONTD: tiotropium (SPIRIVA RESPIMAT) 2.5 MCG/ACT inhaler 2 puff    albuterol (PROVENTIL) nebulizer solution 2.5 mg     Order Specific Question:   Initiate RT Bronchodilator Protocol     Answer: Yes    prenatal vitamin plus iron 29-1 MG tablet 1 tablet    DISCONTD: piperacillin-tazobactam (ZOSYN) 3,375 mg in dextrose 5 % 50 mL IVPB extended infusion (mini-bag)     Order Specific Question:   Antimicrobial Indications     Answer:   Pneumonia (CAP)     Order Specific Question:   CAP duration of therapy     Answer: Other    DISCONTD: vancomycin (VANCOCIN) 1000 mg in sodium chloride 0.9% 250 mL IVPB     Order Specific Question:   Antimicrobial Indications     Answer:   Pneumonia (CAP)     Order Specific Question:   CAP duration of therapy     Answer:    Other    vancomycin (VANCOCIN) 1250 mg in sodium chloride 0.9% 250 mL IVPB     Order Specific Question:   Antimicrobial Indications     Answer:   Pneumonia (CAP)     Order Specific Question:   CAP duration of therapy     Answer:   7 days    vancomycin (VANCOCIN) intermittent dosing (placeholder)     Order Specific Question:   Antimicrobial Indications     Answer:   Pneumonia (CAP)     Order Specific Question:   CAP duration of therapy     Answer:   7 days    piperacillin-tazobactam (ZOSYN) 3,375 mg in sodium chloride 0.9 % 50 mL IVPB (mini-bag)     Order Specific Question:   Antimicrobial Indications     Answer:   Pneumonia (CAP)     Order Specific Question:   CAP duration of therapy     Answer:   7 days       DDX: COPD exacerbation, MI, heart failure, pulmonary embolism rhinovirus, enterovirus, Influenza COVID, Viral URI, Pneumonia, ARDS,       DIAGNOSTIC RESULTS / EMERGENCY DEPARTMENT COURSE / MDM   LAB RESULTS:  Results for orders placed or performed during the hospital encounter of 04/22/22   COVID-19, Rapid    Specimen: Nasopharyngeal Swab   Result Value Ref Range    Specimen Description . NASOPHARYNGEAL SWAB     SARS-CoV-2, Rapid Not Detected Not Detected   Troponin   Result Value Ref Range    Troponin, High Sensitivity 30 (H) 0 - 14 ng/L   CBC with Auto Differential   Result Value Ref Range    WBC 29.8 (H) 3.5 - 11.3 k/uL    RBC 3.07 (L) 3.95 - 5.11 m/uL    Hemoglobin 9.3 (L) 11.9 - 15.1 g/dL    Hematocrit 29.6 (L) 36.3 - 47.1 %    MCV 96.4 82.6 - 102.9 fL    MCH 30.3 25.2 - 33.5 pg    MCHC 31.4 28.4 - 34.8 g/dL    RDW 21.5 (H) 11.8 - 14.4 %    Platelets 131 366 - 480 k/uL    MPV 9.6 8.1 - 13.5 fL    NRBC Automated 1.1 (H) 0.0 per 100 WBC    Immature Granulocytes 4 (H) 0 %    Seg Neutrophils 75 (H) 36 - 66 %    Lymphocytes 13 (L) 24 - 44 %    Atypical Lymphocytes 3 %    Monocytes 5 1 - 7 %    Eosinophils % 0 (L) 1 - 4 %    Basophils 0 0 - 2 %    nRBC 2 (H) 0 per 100 WBC    Absolute Immature Granulocyte 1.19 (H) 0.00 - 0.30 k/uL    Segs Absolute 22.36 (H) 1.8 - 7.7 k/uL    Absolute Lymph # 3.87 1.0 - 4.8 k/uL    Atypical Lymphocytes Absolute 0.89 k/uL    Absolute Mono # 1.49 (H) 0.1 - 0.8 k/uL    Absolute Eos # 0.00 0.0 - 0.4 k/uL    Basophils Absolute 0.00 0.0 - 0.2 k/uL    Morphology ANISOCYTOSIS PRESENT    Basic Metabolic Panel w/ Reflex to MG   Result Value Ref Range    Glucose 109 (H) 70 - 99 mg/dL    BUN 62 (H) 8 - 23 mg/dL    CREATININE 1.95 (H) 0.50 - 0.90 mg/dL    Calcium 8.9 8.6 - 10.4 mg/dL    Sodium 129 (L) 135 - 144 mmol/L    Potassium 3.6 (L) 3.7 - 5.3 mmol/L    Chloride 97 (L) 98 - 107 mmol/L    CO2 25 20 - 31 mmol/L    Anion Gap 7 (L) 9 - 17 mmol/L    GFR Non-African American 26 (L) >60 mL/min    GFR  31 (L) >60 mL/min    GFR Comment         Magnesium   Result Value Ref Range    Magnesium 1.7 1.6 - 2.6 mg/dL   Troponin   Result Value Ref Range    Troponin, High Sensitivity 43 (H) 0 - 14 ng/L   Arterial Blood Gas, POC   Result Value Ref Range    POC pH 7.289 (L) 7.350 - 7.450    POC pCO2 56.1 (H) 35.0 - 48.0 mm Hg    POC PO2 79.7 (L) 83.0 - 108.0 mm Hg    POC HCO3 26.9 21.0 - 28.0 mmol/L    Positive Base Excess, Art 0 0.0 - 3.0    POC O2 SAT 94 94.0 - 98.0 %    O2 Device/Flow/% Cannula     Sample Site Left Radial Artery    POCT Glucose   Result Value Ref Range    POC Glucose 170 (H) 74 - 100 mg/dL   EKG 12 Lead   Result Value Ref Range    Ventricular Rate 92 BPM    Atrial Rate 92 BPM    P-R Interval 138 ms    QRS Duration 82 ms    Q-T Interval 354 ms    QTc Calculation (Bazett) 437 ms    P Axis 63 degrees    R Axis -33 degrees    T Axis 86 degrees         RADIOLOGY:  CT CHEST WO CONTRAST    Result Date: 4/22/2022  Multifocal left-sided pneumonia progressed prior exams. The spiculated nodular opacity upper lung is noted identified perhaps obscured by the airspace disease. Follow-up is strongly recommended resolution of the airspace disease in to further evaluate the previously noted and previously described nodule. Enlarged mediastinal and left hilar lymph nodes. These are non prior imaging studies. This could be metastatic or these could be reactive. Left adrenal nodule 1.5 cm in size. Unchanged. Close attention follow-up imaging recommended.      CT CHEST WO CONTRAST    Result Date: 4/8/2022  Interval enlargement of the spiculated nodule within the left upper lobe, concerning for worsening metastatic disease. Enlarged mediastinal lymph nodes which are similar when compared to the previous exam. Band of consolidation in the right middle lobe, which may relate to scarring or postobstructive atelectasis, but neoplasm could be masked by that process. Development of multifocal ground-glass opacities, some with a nodular morphology. These are likely inflammatory or infectious (in which case consider both typical and atypical etiologies, including viral pneumonia). However, metastatic disease would be considered as well. Multiple subcentimeter, partially calcified thyroid nodules, which require no specific follow-up. Full recommendations below. RECOMMENDATIONS: Subcentimeter incidental thyroid nodule. No follow-up imaging is recommended. Reference: J Am Vikas Radiol. 2015 Feb;12(2): 143-50     XR CHEST PORTABLE    Result Date: 4/22/2022  Increased opacity left hemithorax could represent left pleural fluid combined with atelectasis or pneumonia           EMERGENCY DEPARTMENT COURSE:  ED Course as of 04/23/22 0043   Fri Apr 22, 2022   1617 Patient is a 21-year-old female on Eliquis for blood clots, history of COPD, presenting with URI symptoms/COPD exacerbation for the past 4 days. Patient has cough congestion upper respiratory symptoms nausea vomiting x1 over the past 4 days. Patient is a 2 cigarette a day smoker, formerly a pack-a-day smoker. [GQ]   4289 Large left-sided pneumonia evident on chest x-ray, will start azithromycin and Rocephin [KK]   1716 White count elevated at 29.8, troponin elevated at 43, pending second troponin. Patient is afebrile, normotensive, not tachycardic. Is requiring 6 L O2 for saturation of 91-92%.  [KK]      ED Course User Index  301 Luis Reza DO           CONSULTS:  IP CONSULT TO INTERNAL MEDICINE  IP CONSULT TO CASE MANAGEMENT  PHARMACY TO DOSE VANCOMYCIN  PHARMACY TO DOSE VANCOMYCIN      Assessment/Plan: 30-year-old female with worsening pneumonia on the left side on chest x-ray. CT evaluation shows consistent findings. See above. Patient had elevated white count to 30, increased oxygen requirement. Patient was treated with azithromycin and Rocephin in the ER. Patient was admitted to internal medicine service for continued antibiotics treatment, and increased oxygen requirement, likely COPD exacerbation superimposed with viral/bacterial infection considering elevated white count. FINAL IMPRESSION      1. Pneumonia of left lower lobe due to infectious organism    2. Chronic obstructive pulmonary disease with acute exacerbation ([facility] [address]          DISPOSITION / PLAN     DISPOSITION Admitted 04/22/2022 09:03:13 PM      PATIENT REFERRED TO:  No follow-up provider specified.     DISCHARGE MEDICATIONS:  Current Discharge Medication List          Anastacio Salinas DO  Emergency Medicine Resident    (Please note that portions of thisnote were completed with a voice recognition program.  Efforts were made to edit the dictations but occasionally words are mis-transcribed.)        Shay Coats DO  Resident  04/23/22 2510

## 2022-04-22 NOTE — ED NOTES
Pt increase o2 to 6L NC     Carlos Logan, [address] [facility]  04/22/22 2602

## 2022-04-22 NOTE — ED NOTES
Pt back to room via stretcher from CT. Stable VS noted, put back on cardiac monitor.      Reina Hightower RN  04/22/22 2524

## 2022-04-23 PROBLEM — I82.403 RECURRENT ACUTE DEEP VEIN THROMBOSIS (DVT) OF BOTH LOWER EXTREMITIES (HCC): Status: ACTIVE | Noted: 2020-04-16

## 2022-04-23 PROBLEM — J96.21 ACUTE ON CHRONIC RESPIRATORY FAILURE WITH HYPOXIA (HCC): Status: ACTIVE | Noted: 2022-01-01

## 2022-04-23 PROBLEM — G47.33 OSA (OBSTRUCTIVE SLEEP APNEA): Status: ACTIVE | Noted: 2022-01-01

## 2022-04-23 PROBLEM — J96.02 ACUTE RESPIRATORY ACIDOSIS (HCC): Status: ACTIVE | Noted: 2022-01-01

## 2022-04-23 NOTE — ED NOTES
Pt ill appearing, pale, sallow, hot, dry. RR 40 with abdominal usage noted. LS with pronounced wheezing insp/exp A/P . Pt states she does wheeze pretty badly at baseline but feels it is currently worse. Pt is able to speak complete sentences and requests ice chips. RRT called for aerosol prior to pt transport to [facility].      Manoj Barnes RN  04/22/22 2019

## 2022-04-23 NOTE — PROGRESS NOTES
[address] [facility] Pharmacokinetic Monitoring Service - Vancomycin     Clif Hoover is a 61 y.o. female starting on vancomycin therapy for CAP. Pharmacy consulted by [facility] for monitoring and adjustment. Target Concentration: Goal AUC/AMBER 400-600 mg*hr/L    Additional Antimicrobials: Zosyn    Pertinent Laboratory Values: Wt Readings from Last 1 Encounters:   04/22/22 174 lb (78.9 kg)     Temp Readings from Last 1 Encounters:   04/22/22 98.8 °F (37.1 °C) (Oral)     Estimated Creatinine Clearance: 30 mL/min (A) (based on SCr of 1.95 mg/dL (H)). Recent Labs     04/22/22  1641   CREATININE 1.95*   WBC 29.8*     MRSA Nasal Swab: not ordered    Plan:  Dosing based off levels due to ASHLEIGH (current creatinine = 1.95, baseline appears to be around 0.6)  Start vancomycin 1250mg IV x 1 dose then adjust based off level.   Renal labs as indicated   Pharmacy will continue to monitor patient and adjust therapy as indicated    Thank you for the consult,  Nicole Green, [address] [facility]  4/22/2022 11:15 PM

## 2022-04-23 NOTE — H&P
Abhay as outpatient    Review of Systems:  General ROS: Completed and except as mentioned above were negative   HEENT ROS: Completed and except as mentioned above were negative   Allergy and Immunology ROS:  Completed and except as mentioned above were negative  Hematological and Lymphatic ROS:  Completed and except as mentioned above were negative  Respiratory ROS:  Completed and except as mentioned above were negative  Cardiovascular ROS:  Completed and except as mentioned above were negative  Gastrointestinal ROS: Completed and except as mentioned above were negative  Genito-Urinary ROS:  Completed and except as mentioned above were negative  Musculoskeletal ROS:  Completed and except as mentioned above were negative  Neurological ROS:  Completed and except as mentioned above were negative  Skin & Dermatological ROS:  Completed and except as mentioned above were negative  Psychological ROS:  Completed and except as mentioned above were negative    PAST MEDICAL HISTORY     Past Medical History:   Diagnosis Date    Abnormal CT of the chest 04/21/2021    Adenocarcinoma, lung, left ([facility] [address] 05/14/2021    Arthritis     Community acquired pneumonia 12/23/2019    COPD (chronic obstructive pulmonary disease) ([facility] [address] 08/14/2019    Deep venous thrombosis of left profunda femoris vein ([facility] [address] 04/16/2020    Depression     DVT (deep venous thrombosis) ([facility] [address] 2014    b/l     GERD (gastroesophageal reflux disease)     History of pulmonary embolism 05/27/2021    History of thyroid nodule 12/23/2019    Hypertension     Major depressive disorder, recurrent, moderate ([address] 11/12/2018    Nodule of lower lobe of left lung 04/23/2021    Obesity, Class II, BMI 35-39.9 11/12/2018    On anticoagulant therapy 05/27/2021    On home oxygen therapy     02 2L NC PRN    On methotrexate therapy 05/27/2021    Prediabetes 11/12/2018    Rheumatoid arthritis ([facility] [address]     Snores     denies apnea    Thyroid nodule 01/09/2020    Tobacco abuse 11/12/2018    Under care of team 06/29/2021    pulmonology-Dr Blank[facility]-last visit june 2021    Under care of team 06/29/2021    oncology-Dr Kelly[facility]-last visit june 2021    Under care of team     Dr. Geraldo Cerda clearance appointment 7/6/21, stress test 7/9/2021, clearance obtained and placed on chart    Wellness examination 06/29/2021    pcp-Dr Tali Melo office-last visit may 2021       PAST SURGICAL HISTORY     Past Surgical History:   Procedure Laterality Date    [address] N/A 12/27/2019    BRONCHOSCOPY ALVEOLAR LAVAGE performed by Bebo Osorio MD at [address] [facility] [place] N/A 2/12/2020    COLONOSCOPY POLYPECTOMIES HOT SNARE, COLD BIOPSY POLYPECTOMIES performed by Judy Silva MD at [facility]  5/13/2021    CT NEEDLE BIOPSY LUNG PERCUTANEOUS 5/13/2021 [facility] CT SCAN    ENDOSCOPY, COLON, DIAGNOSTIC  [id number]    gastritis, sm. bowel lipoma, biopsies    INSERT MIDLINE CATHETER  11/3/2021         LOBECTOMY Left 07/19/2021    XI ROBOTIC LEFT LOWER LOBECTOMY CONVERTED TO OPEN THORACTOMY LEFT LOWER LOBECTOMY (Left )    LUNG REMOVAL, TOTAL Left 7/19/2021    XI ROBOTIC LEFT LOWER LOBECTOMY CONVERTED TO OPEN THORACTOMY LEFT LOWER LOBECTOMY performed by Jessie Siegel MD at [address]     Patient has no known allergies. MEDICATIONS PRIOR TO ADMISSION     Prior to Admission medications    Medication Sig Start Date End Date Taking?  Authorizing Provider   metoprolol tartrate (LOPRESSOR) 25 MG tablet Take 0.5 tablets by mouth 2 times daily 3/18/22  Yes Augusto Patel MD   amLODIPine (NORVASC) 5 MG tablet Take 1 tablet by mouth daily 3/14/22  Yes Augusto Patel MD   Handicap Placard MISC by Does not apply route 3/14/2022 to 3/13/2027    Panlobular emphysema ([facility] [address]  (primary encounter diagnosis) 3/14/22  Yes Cielo Jaramillo MD   SPIRIVA RESPIMAT 2.5 MCG/ACT AERS inhaler inhale 2 puffs INTO THE LUNGS once daily 2/23/22  Yes Sharon Shah MD   albuterol sulfate  (90 Base) MCG/ACT inhaler inhale 2 puffs by mouth and INTO THE LUNGS every 4 hours prn wheezing 2/10/22  Yes Cain Torres DO   albuterol (PROVENTIL) (2.5 MG/3ML) 0.083% nebulizer solution Take 3 mLs by nebulization every 6 hours as needed for Wheezing 2/10/22  Yes Cain Torres DO   ondansetron [facility]) 4 MG tablet take 1 tablet by mouth three times a day if needed for nausea and vomiting 1/31/22  Yes Sharon Shah MD   sertraline (ZOLOFT) 100 MG tablet take 1 tablet by mouth once daily 1/24/22  Yes Sharon Shah MD   Prenatal Vit-Fe Fumarate-FA (PRENATAL VITAMIN) 27-0.8 MG TABS Take 1 tablet by mouth daily 12/16/21  Yes Henry Quinonez MD   furosemide (LASIX) 40 MG tablet Take 1 tablet by mouth daily 12/2/21  Yes Kye Cedeño DO   potassium chloride (KLOR-CON M) 20 MEQ extended release tablet Take 1 tablet by mouth 2 times daily 7/23/21  Yes ILIR Hazel NP   acetaminophen (TYLENOL) 500 MG tablet Take 1,000 mg by mouth every 6 hours as needed for Pain   Yes Historical Provider, MD   ELIQUWILFRID 5 MG TABS tablet take 1 tablet by mouth twice a day 7/16/21  Yes Sharon Shah MD   pantoprazole (PROTONIX) 40 MG tablet take 1 tablet by mouth once daily 5/18/21  Yes Sharon Shah MD   nicotine (NICODERM CQ) 14 MG/24HR Place 1 patch onto the skin daily for 28 days 3/14/22 4/11/22  Franco Galindo MD       SOCIAL HISTORY     Tobacco: 1.5 PPD for > 40 years and currently smoking 1 cigarette/day and trying to quit.   Alcohol: Occasional  Illicits: Denies    FAMILY HISTORY     Family History   Problem Relation Age of Onset    Cancer Mother         Uterine and breast    Diabetes Mother     Heart Disease Mother     Cancer Father         lung    Cancer Brother         lung    Cancer Brother         lung       PHYSICAL EXAM     Vitals: BP (!) 108/55   Pulse 88   Temp 98.8 °F (37.1 °C) (Oral)   Resp 25   Ht 5' 5\" (1.651 m)   Wt 174 lb (78.9 kg)   SpO2 91%   BMI 28.96 kg/m²   Tmax: Temp (24hrs), Av.2 °F (36.8 °C), Min:97.5 °F (36.4 °C), Max:98.8 °F (37.1 °C)    Last Body weight:   Wt Readings from Last 3 Encounters:   22 174 lb (78.9 kg)   22 174 lb 6.4 oz (79.1 kg)   21 198 lb 3.2 oz (89.9 kg)     Body Mass Index : Body mass index is 28.96 kg/m². PHYSICAL EXAMINATION:  Constitutional: This is a well developed, well nourished, 25-29.9 - Overweight 61y.o. year old female who is alert, oriented, cooperative and in no apparent distress. Head:normocephalic and atraumatic. EENT:  PERRLA. No conjunctival injections. Septum was midline, mucosa was without erythema, exudates or cobblestoning. No thrush was noted. Neck: Supple without thyromegaly. No elevated JVP. Trachea was midline. Respiratory: Breath sounds decreased on left middle and lower lung zones. Diffuse wheezing present bilaterally. Cardiovascular: Regular without murmur, clicks, gallops or rubs. Abdomen: Slightly rounded and soft without organomegaly. No rebound, rigidity or guarding was appreciated. Lymphatic: No lymphadenopathy. Musculoskeletal: Normal curvature of the spine. No gross muscle weakness. Extremities:  No lower extremity edema, ulcerations, tenderness, varicosities or erythema. Muscle size, tone and strength are normal.  No involuntary movements are noted. Skin:  Warm and dry. Good color, turgor and pigmentation. No lesions or scars.   No cyanosis or clubbing  Neurological/Psychiatric: The patient's general behavior, level of consciousness, thought content and emotional status is normal.          INVESTIGATIONS     Laboratory Testing:     Recent Results (from the past 24 hour(s))   EKG 12 Lead    Collection Time: 22  4:39 PM   Result Value Ref Range    Ventricular Rate 92 BPM    Atrial Rate 92 BPM    P-R Interval 138 ms    QRS Duration 82 ms    Q-T Interval 354 ms    QTc Calculation (Bazett) 437 ms    P Axis 63 degrees    R Axis -33 degrees    T Axis 86 degrees   CBC with Auto Differential    Collection Time: 04/22/22  4:41 PM   Result Value Ref Range    WBC 29.8 (H) 3.5 - 11.3 k/uL    RBC 3.07 (L) 3.95 - 5.11 m/uL    Hemoglobin 9.3 (L) 11.9 - 15.1 g/dL    Hematocrit 29.6 (L) 36.3 - 47.1 %    MCV 96.4 82.6 - 102.9 fL    MCH 30.3 25.2 - 33.5 pg    MCHC 31.4 28.4 - 34.8 g/dL    RDW 21.5 (H) 11.8 - 14.4 %    Platelets 346 997 - 030 k/uL    MPV 9.6 8.1 - 13.5 fL    NRBC Automated 1.1 (H) 0.0 per 100 WBC    Immature Granulocytes 4 (H) 0 %    Seg Neutrophils 75 (H) 36 - 66 %    Lymphocytes 13 (L) 24 - 44 %    Atypical Lymphocytes 3 %    Monocytes 5 1 - 7 %    Eosinophils % 0 (L) 1 - 4 %    Basophils 0 0 - 2 %    nRBC 2 (H) 0 per 100 WBC    Absolute Immature Granulocyte 1.19 (H) 0.00 - 0.30 k/uL    Segs Absolute 22.36 (H) 1.8 - 7.7 k/uL    Absolute Lymph # 3.87 1.0 - 4.8 k/uL    Atypical Lymphocytes Absolute 0.89 k/uL    Absolute Mono # 1.49 (H) 0.1 - 0.8 k/uL    Absolute Eos # 0.00 0.0 - 0.4 k/uL    Basophils Absolute 0.00 0.0 - 0.2 k/uL    Morphology ANISOCYTOSIS PRESENT    Basic Metabolic Panel w/ Reflex to MG    Collection Time: 04/22/22  4:41 PM   Result Value Ref Range    Glucose 109 (H) 70 - 99 mg/dL    BUN 62 (H) 8 - 23 mg/dL    CREATININE 1.95 (H) 0.50 - 0.90 mg/dL    Calcium 8.9 8.6 - 10.4 mg/dL    Sodium 129 (L) 135 - 144 mmol/L    Potassium 3.6 (L) 3.7 - 5.3 mmol/L    Chloride 97 (L) 98 - 107 mmol/L    CO2 25 20 - 31 mmol/L    Anion Gap 7 (L) 9 - 17 mmol/L    GFR Non-African American 26 (L) >60 mL/min    GFR  31 (L) >60 mL/min    GFR Comment         Magnesium    Collection Time: 04/22/22  4:41 PM   Result Value Ref Range    Magnesium 1.7 1.6 - 2.6 mg/dL   Troponin    Collection Time: 04/22/22  4:41 PM   Result Value Ref Range    Troponin, High Sensitivity 43 (H) 0 - 14 ng/L   Troponin    Collection Time: 04/22/22  5:57 PM   Result Value Ref Range    Troponin, High Sensitivity 30 (H) 0 - 14 ng/L COVID-19, Rapid    Collection Time: 04/22/22  6:57 PM    Specimen: Nasopharyngeal Swab   Result Value Ref Range    Specimen Description . NASOPHARYNGEAL SWAB     SARS-CoV-2, Rapid Not Detected Not Detected       Imaging:   CT CHEST WO CONTRAST    Result Date: 4/22/2022  Multifocal left-sided pneumonia progressed prior exams. The spiculated nodular opacity upper lung is noted identified perhaps obscured by the airspace disease. Follow-up is strongly recommended resolution of the airspace disease in to further evaluate the previously noted and previously described nodule. Enlarged mediastinal and left hilar lymph nodes. These are non prior imaging studies. This could be metastatic or these could be reactive. Left adrenal nodule 1.5 cm in size. Unchanged. Close attention follow-up imaging recommended. XR CHEST PORTABLE    Result Date: 4/22/2022  Increased opacity left hemithorax could represent left pleural fluid combined with atelectasis or pneumonia       ASSESSMENT & PLAN     ASSESSMENT / PLAN:     IMPRESSION  This is a 61 y.o. female who presented with fatigue, shortness of breath and cough and found to have left-sided pleuritic COVID-pneumonia. 1. Acute on chronic hypoxic respiratory failure 2/2 community acquired pnmeumonia: Continuous BiPAP. ABG as needed. Pulmonology consult. 2. Community-acquired pneumonia: S/P 1 dose of Rocephin and azithromycin in ER. Started on vancomycin and Zosyn for broad-spectrum coverage as patient appears toxic. Continue IV fluids. Follow-up respiratory cultures, Legionella antigen. 3. COPD exacerbation: Solu-Medrol 40 Mg IV every 8 hours. Bronchodilators. 4. Acute respiratory acidosis: Continuous BiPAP. Repeat ABG/VBG tomorrow a.m.  5. Lung nodule: Morphology concerning for malignancy. Could be metastatic. Pulmonary consult. 6. H/O lung adenocarcinoma s/p resection of LLL 7/2021  7. Adrenal nodule: 1.5 cm. Could be metastatic. Follow-up.   8. ASHLEIGH: Creatinine 1.95 (baseline normal). Continue IV fluids. BMP tomorrow a.m. 9. Chronic dastolic CHF: Hold Lasix for now due to ASHLEIGH. 10. Moderate pericardial effusion: Follows Dr. Jasmina Alrbecht as outpatient. Follow-up repeat echo. 11. H/O recurrent DVT/PE: Hold Eliquis. Start heparin infusion per low-dose protocol. 12. Essential hypertension: Patient on the lower side of blood pressure. Hold home BP meds. 13. Tobacco abuse: Nicotine patch. Advised alcohol cessation. 15. H/o major depressive disorder: Continue Zoloft 100 Mg daily. 15. GERD: Continue Protonix 40 Mg oral daily. 16. BRAYAN: Outpatient sleep study      DVT ppx: On heparin infusion  GI ppx: On Protonix 40 Mg oral daily    PT/OT/SW: PT and OT consulted. Discharge Planning: Admit to stepdown. Chauncey Vinson MD  Internal Medicine Resident, PGY-3  [address], [place]  4/22/2022, 10:59 PM

## 2022-04-23 NOTE — CONSULTS
Patient - Chelsey Solomon   MRN -  [id number]   Acct # - [de-identified]   - 1955        Date of evaluation -  2022    REASON FOR THE CONSULTATION:  Shortness of breath, Pneumonia  HISTORY OF PRESENT ILLNESS:    Chelsey Solomon is a 61y.o. year old female well-known to me from outpatient tests. Patient has history of left lung adenocarcinoma post left lower lobe lobectomy in 2021. She continues to smoke actively. On her recent CT chest left upper lobe lung nodule had increased in size. PET scan had been ordered but is pending. Prior to that patient started complaining of 2 days of shortness of breath and worsening cough malaise and fevers. She did complain of some chills. She is coughing but is not able to expectorate. Denies hemoptysis. The ER chest x-ray showed left lung consolidation is from previous x-ray. CT of the chest showed multilobar pneumonia on the left side and left upper lobe nodule. Mediastinal lymph nodes are also enlarged.               Immunization   Immunization History   Administered Date(s) Administered    Influenza Virus Vaccine 2016    Influenza, Quadv, IM, PF (6 mo and older Fluzone, Flulaval, Fluarix, and 3 yrs and older Afluria) 10/28/2016, 2018    Pneumococcal Polysaccharide ([id number]) 2017    Tdap (Boostrix, Adacel) 2019    Zoster Recombinant (Shingrix) 2019, 2019              PAST MEDICAL HISTORY:       Diagnosis Date    Abnormal CT of the chest 2021    Adenocarcinoma, lung, left ([address] 2021    Arthritis     Community acquired pneumonia 2019    COPD (chronic obstructive pulmonary disease) ([address] 2019    Deep venous thrombosis of left profunda femoris vein ([address] 2020    Depression     DVT (deep venous thrombosis) ([address]     b/l     GERD (gastroesophageal reflux disease)     History of pulmonary embolism 2021    History of thyroid nodule 2019    Hypertension  Major depressive disorder, recurrent, moderate ([facility] 11/12/2018    Nodule of lower lobe of left lung 04/23/2021    Obesity, Class II, BMI 35-39.9 11/12/2018    On anticoagulant therapy 05/27/2021    On home oxygen therapy     02 2L NC PRN    On methotrexate therapy 05/27/2021    Prediabetes 11/12/2018    Rheumatoid arthritis ([facility]     Snores     denies apnea    Thyroid nodule 01/09/2020    Tobacco abuse 11/12/2018    Under care of team 06/29/2021    pulmonology-Dr [facility]-last visit june 2021    Under care of team 06/29/2021    oncology-Dr Kelly[facility]-last visit june 2021    Under care of team     Dr. Aaron Burns clearance appointment 7/6/21, stress test 7/9/2021, clearance obtained and placed on chart    Wellness examination 06/29/2021    pcp-Dr Fernando Hernandez office-last visit may 2021         Family History:       Problem Relation Age of Onset    Cancer Mother         Uterine and breast    Diabetes Mother     Heart Disease Mother     Cancer Father         lung    Cancer Brother         lung    Cancer Brother         lung       SURGICAL HISTORY:   Past Surgical History:   Procedure Laterality Date    APPENDECTOMY  1982    BRONCHOSCOPY N/A 12/27/2019    BRONCHOSCOPY ALVEOLAR LAVAGE performed by Cris Grimm MD at [address] [place] [address] N/A 2/12/2020    COLONOSCOPY POLYPECTOMIES HOT SNARE, COLD BIOPSY POLYPECTOMIES performed by Merary Reyes MD at [facility]  5/13/2021    CT NEEDLE BIOPSY LUNG PERCUTANEOUS 5/13/2021 [facility] CT SCAN    ENDOSCOPY, COLON, DIAGNOSTIC  [id number]    gastritis, sm. bowel lipoma, biopsies    INSERT MIDLINE CATHETER  11/3/2021         LOBECTOMY Left 07/19/2021    XI ROBOTIC LEFT LOWER LOBECTOMY CONVERTED TO OPEN THORACTOMY LEFT LOWER LOBECTOMY (Left )    LUNG REMOVAL, TOTAL Left 7/19/2021    XI ROBOTIC LEFT LOWER LOBECTOMY CONVERTED TO OPEN THORACTOMY LEFT LOWER LOBECTOMY performed by Ramiro Francis Elba Steen MD at [address] [place] [address] Rd:   Social History     Socioeconomic History    Marital status: Single     Spouse name: None    Number of children: 6    Years of education: None    Highest education level: None   Occupational History     Employer: N/A   Tobacco Use    Smoking status: Current Every Day Smoker     Packs/day: 0.25     Years: 35.00     Pack years: 8.75     Types: Cigarettes     Last attempt to quit: 1986     Years since quittin.2    Smokeless tobacco: Never Used    Tobacco comment: restarted smoking 2019   Vaping Use    Vaping Use: Never used   Substance and Sexual Activity    Alcohol use: Not Currently     Alcohol/week: 0.0 standard drinks    Drug use: No    Sexual activity: Not Currently   Other Topics Concern    None   Social History Narrative    None     Social Determinants of Health     Financial Resource Strain: Low Risk     Difficulty of Paying Living Expenses: Not hard at all   Food Insecurity: No Food Insecurity    Worried About Running Out of Food in the Last Year: Never true    Emelyn of Food in the Last Year: Never true   Transportation Needs: No Transportation Needs    Lack of Transportation (Medical): No    Lack of Transportation (Non-Medical):  No   Physical Activity:     Days of Exercise per Week: Not on file    Minutes of Exercise per Session: Not on file   Stress:     Feeling of Stress : Not on file   Social Connections:     Frequency of Communication with Friends and Family: Not on file    Frequency of Social Gatherings with Friends and Family: Not on file    Attends Restoration Services: Not on file    Active Member of Clubs or Organizations: Not on file    Attends Club or Organization Meetings: Not on file    Marital Status: Not on file   Intimate Partner Violence:     Fear of Current or Ex-Partner: Not on file    Emotionally Abused: Not on file    Physically Abused: Not on file    Sexually Abused: Not on file acetaminophen (TYLENOL) 500 MG tablet Take 1,000 mg by mouth every 6 hours as needed for Pain   Yes Historical Provider, MD   ELIQUIS 5 MG TABS tablet take 1 tablet by mouth twice a day 7/16/21  Yes Lucia Issa MD   pantoprazole (PROTONIX) 40 MG tablet take 1 tablet by mouth once daily 5/18/21  Yes Lucia Issa MD   nicotine (NICODERM CQ) 14 MG/24HR Place 1 patch onto the skin daily for 28 days 3/14/22 4/11/22  Yohan Soria MD     CURRENT MEDS :  Scheduled Meds:   albuterol  2.5 mg Nebulization 4x daily    [Held by provider] amLODIPine  5 mg Oral Daily    [Held by provider] furosemide  40 mg Oral Daily    [Held by provider] metoprolol tartrate  12.5 mg Oral BID    pantoprazole  40 mg Oral QAM AC    potassium chloride  20 mEq Oral BID    sertraline  100 mg Oral Daily    tiotropium  2 puff Inhalation Daily    sodium chloride flush  5-40 mL IntraVENous 2 times per day    methylPREDNISolone  40 mg IntraVENous Q8H    albuterol  2.5 mg Nebulization 4x daily    prenatal vitamin  1 tablet Oral Daily    vancomycin (VANCOCIN) intermittent dosing (placeholder)   Other RX Placeholder    piperacillin-tazobactam  3,375 mg IntraVENous q8h       Continuous Infusions:   heparin (PORCINE) Infusion 12 Units/kg/hr (04/23/22 0316)    sodium chloride      sodium chloride 100 mL/hr at 04/22/22 2142           REVIEW OF SYSTEMS:  CONSTITUTIONAL: fevers, chills, sweats, fatigue, malaise, anorexia   EYES:  negative for  double vision, blurred vision, dry eyes, eye discharge and redness  HEENT:  negative for  hearing loss, tinnitus, ear drainage, earaches and nasal congestion  RESPIRATORY:  See hpi  CARDIOVASCULAR:  negative for  chest pain, palpitations, orthopnea, PND  GASTROINTESTINAL:  negative for nausea, vomiting, change in bowel habits, diarrhea, constipation, abdominal pain, pruritus, abdominal mass and abdominal distention  GENITOURINARY:  negative for frequency, dysuria, nocturia, urinary incontinence and hesitancy  INTEGUMENT/BREAST:  negative for rash, skin lesion(s), dryness, skin color change, changes in lesion, pruritus and changes in hair  HEMATOLOGIC/LYMPHATIC:  negative for easy bruising, bleeding, lymphadenopathy, petechiae and swelling/edema  ALLERGIC/IMMUNOLOGIC:  negative for recurrent infections, urticaria and drug reactions  ENDOCRINE:  negative for heat intolerance, cold intolerance, tremor, weight changes and change in bowel habits  MUSCULOSKELETAL:  negative for  myalgias, arthralgias, pain, joint swelling, stiff joints and decreased range of motion  NEUROLOGICAL:  negative for headaches, dizziness, seizures, memory problems, speech problems, visual disturbance and coordination problems  BEHAVIOR/PSYCH:  negative for poor appetite, increased appetite, decreased sleep, increased sleep, decreased energy level, increased energy level and poor concentration   skin - no rash no dermatitis     Vitals:  /65   Pulse 73   Temp 98.5 °F (36.9 °C) (Axillary)   Resp 20   Ht 5' 5\" (1.651 m)   Wt 174 lb (78.9 kg)   SpO2 93%   BMI 28.96 kg/m²     PHYSICAL EXAM:  General Appearance:    Alert, cooperative, no distress, appears stated age   Head:    Normocephalic, without obvious abnormality, atraumatic      Eyes:    PERRL, conjunctiva/corneas clear, EOM's intact   Ears:    Normal TM's and external ear canals, both ears   Nose:   Nares normal, septum midline, mucosa normal, no drainage        or sinus tenderness   Throat:   Lips, mucosa, and tongue normal; teeth and gums normal   Neck:   Supple, symmetrical, trachea midline, no adenopathy;     thyroid:  no enlargement/tenderness/nodules;    Back:     Symmetric, no curvature, ROM normal, no CVA tenderness   Lungs:      AP diameter of chest increased. Thoracic expansion and diaphragmatic excursion diminished. BS diminished and expiratory phase prolonged.   Dullness to percussion left posterior chest and there are crackles left posterior chest, axilla and anteriorly, rhonchi expiratory on the right side   Chest Wall:    No tenderness or deformity      Heart:    Regular rate and rhythm, S1 and S2 normal, no murmur, rub        or gallop no rvh                           Abdomen:                                                 Pulses:                              Skin:                  Lymph nodes:                    Neurologic:                  Soft, non-tender, bowel sounds active all four quadrants,     no masses, no organomegaly         2+ and symmetric all extremities     Skin color, texture, turgor normal, no rashes or lesions       Cervical, supraclavicular not enlarged or matted or tender      CNII-XII intact,      Clubbing No  Lower ext edema No1+   [] , 2 +  [] , 3+   []  Upper ext edema No             CBC:   Recent Labs     04/22/22  1641 04/23/22  0121 04/23/22  0609   WBC 29.8* 33.6* 34.5*   HGB 9.3* 8.4* 8.2*    188 See Reflexed IPF Result     BMP:    Recent Labs     04/22/22  1641 04/22/22  1757 04/23/22  0609   *  --  131*   K 3.6*  --  4.1   CL 97*  --  99   CO2 25  --  24   BUN 62* 53* 66*   CREATININE 1.95* 1.67* 1.53*   GLUCOSE 109*  --  163*     Hepatic:   Recent Labs     04/23/22  0609   AST 13   ALT <5*   BILITOT 0.52   ALKPHOS 74     Amylase:   Lab Results   Component Value Date    AMYLASE 18 05/01/2014     Lipase:   Lab Results   Component Value Date    LIPASE 21 05/01/2014     Troponin: No results for input(s): TROPONINI in the last 72 hours. BNP: No results for input(s): BNP in the last 72 hours. Lipids: No results for input(s): CHOL, HDL in the last 72 hours. Invalid input(s): LDLCALCU  ABGs:   Lab Results   Component Value Date    PHART 7.442 09/18/2021    PO2ART 38.2 09/18/2021    TTU3JBL 39.2 09/18/2021     INR: No results for input(s): INR in the last 72 hours.   Thyroid:   Lab Results   Component Value Date    TSH 0.38 04/23/2022     Urinalysis: No results for input(s): BACTERIA, BLOODU, CLARITYU, COLORU, PHUR, PROTEINU, Marden Reil, BILIRUBINUR, NITRU, WBCUA, LEUKOCYTESUR, GLUCOSEU in the last 72 hours. CT CHEST WO CONTRAST    Result Date: 4/22/2022  Multifocal left-sided pneumonia progressed prior exams. The spiculated nodular opacity upper lung is noted identified perhaps obscured by the airspace disease. Follow-up is strongly recommended resolution of the airspace disease in to further evaluate the previously noted and previously described nodule. Enlarged mediastinal and left hilar lymph nodes. These are non prior imaging studies. This could be metastatic or these could be reactive. Left adrenal nodule 1.5 cm in size. Unchanged. Close attention follow-up imaging recommended. XR CHEST PORTABLE    Result Date: 4/23/2022  Stable extensive airspace infiltrate throughout the left lung and mildly progressive vascular congestion in the right lung. XR CHEST PORTABLE    Result Date: 4/22/2022  Increased opacity left hemithorax could represent left pleural fluid combined with atelectasis or pneumonia      IMPRESSION:    Left multilobar pneumonia. Sepsis  Acute on chronic hypoxic respiratory failure due to multilobar pneumonia. Left upper lobe pulmonary nodule-enlarging, outpatient PET scan pending. History of adenocarcinoma of left lung post left lower lobe lobectomy. Chronic obstructive pulmonary disease and emphysema. Rheumatoid arthritis. History of DVT. Right middle lobe atelectasis which is improved    from CT chest 4/8/2022         PLAN:      Follow-up blood cultures. Agree with Zosyn and vancomycin. Will obtain chest x-ray on Monday. Continue steroids IV but decrease to daily  BiPAP at night and as needed as needed. Continue supplemental oxygen keep saturations 88 to 92%. Bronchodilator therapy. DC heparin drip and continue Eliquis. Will need follow-up CT chest in 4 weeks to assess resolution of pneumonia and then PET CT scan to assess left upper lobe lung nodule.     Discussed with patient. I hope this updates you on my evaluation and clinical thinking. Thank you for allowing me to participate in his care.      Sincerely,      Electronically signed by Sonya Cordova MD on 4/23/2022 at 9:25 AM

## 2022-04-23 NOTE — PLAN OF CARE
Problem: Discharge Planning  Goal: Discharge to home or other facility with appropriate resources  Outcome: Progressing     Problem: Pain  Goal: Verbalizes/displays adequate comfort level or baseline comfort level  Outcome: Progressing     Problem: Safety - Adult  Goal: Free from fall injury  Outcome: Progressing

## 2022-04-23 NOTE — PROGRESS NOTES
[facility]  Internal Medicine Teaching Residency Program  Inpatient Daily Progress Note  ______________________________________________________________________________    Patient: Chelsey Solomon  YOB: 1955   XNP:[id number]    Acct: [de-identified]     Room: 2026/2026-01  Admit date: 4/22/2022  Today's date: 04/23/22  Number of days in the hospital: 1    SUBJECTIVE   CC: Emesis (was vomiting two days ago), Cough (lastiing for a few days, mucous), and Back Pain (mid back pain lasting a few days)    Pt examined at bedside. Chart & results reviewed. - VSS, pt is saturating well on 6 L of oxygen on nasal cannula. Patient is afebrile, hemodynamically stable with /59, heart rate 73.  - labs reviewed -sodium 131, potassium 4.1, chloride 99, creatinine 1.53, BUN 66, glucose 163,  pH 7.28, PO2 79.7, PCO2 56.1, HCO3 26.9  proBNP 4776. High-sensitivity troponins are at 43 and 30  WBC count elevated at 34.5, hemoglobin-8.2, platelet count 848  Legionella antigen negative    - no acute events overnight.   - Patient denies nausea, vomiting, chest pain,  abdominal pain, changes in bowel or urinary habits. Chest x-ray:    Stable extensive airspace infiltrate throughout the left lung and mildly   progressive vascular congestion in the right lung.          ROS:  General ROS: Completed and except as mentioned above were negative   HEENT ROS: Completed and except as mentioned above were negative   Allergy and Immunology ROS:  Completed and except as mentioned above were negative  Hematological and Lymphatic ROS:  Completed and except as mentioned above were negative  Respiratory ROS:  Completed and except as mentioned above were negative  Cardiovascular ROS:  Completed and except as mentioned above were negative  Gastrointestinal ROS: Completed and except as mentioned above were negative  Genito-Urinary ROS:  Completed and except as mentioned above were negative  Musculoskeletal ROS:  Completed and except as mentioned above were negative  Neurological ROS:  Completed and except as mentioned above were negative  Skin & Dermatological ROS:  Completed and except as mentioned above were negative  Psychological ROS:  Completed and except as mentioned above were negative  BRIEF HISTORY     The patient is a pleasant 61 y.o. 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 Cough productive of orange sputum. Denies any fevers, chills, diaphoresis, palpitations, headaches, dizziness, diarrhea, constipation, abdominal pain.     Patient states that she used to smoke 1.5 PPD for > 40 years and currently smoking 1 cigarette/day and trying to quit.     ECHO 11/2021 - EF 60%, moderate pericardial effusion, no tamponade physiology. Cardiology evaluated the patient at the time and recommended to follow-up outpatient with Dr. Fiorella Peterson.     WBC 29.8, Hb 9.3, MCV 96  Creatinine 1.95, BUN 62, sodium 129, potassium 3.6, chloride 97  EKG showed normal sinus rhythm with no acute changes. Rapid COVID-negative  Troponins 43> 30     Chest x-ray showed left lower and middle lung zones opacity which has increased since her previous chest x-ray 3 months ago. CT chest demonstrates left-sided significant consolidation concerning for pneumonia. Also reports spiculated nodular opacity obscured by the consolidation. Also demonstrates left hilar lymphadenopathy and left atrial nodule of 1.5 cm.     Previous CT chest without contrast on 4/8/2022 showed spiculated nodule in left upper lobe concerning for malignancy which was not present on CT chest 11/29/2021.   Follow Dr. Kenrick Boykin as outpatient  OBJECTIVE     Vital Signs:  /65   Pulse 73   Temp 98.5 °F (36.9 °C) (Axillary)   Resp 25   Ht 5' 5\" (1.651 m)   Wt 174 lb (78.9 kg)   SpO2 95%   BMI 28.96 kg/m²     Temp (24hrs), Av.2 °F (36.8 °C), Min:97.5 °F (36.4 °C), Max:98.8 °F (37.1 °C)    In: -   Out: 350 [Urine:350]    Physical Exam:  Constitutional: This is a well developed, well nourished, 25-29.9 - Overweight 61y.o. year old female who is alert, oriented, cooperative and in no apparent distress. Head:normocephalic and atraumatic. EENT:    No conjunctival injections. Septum was midline, mucosa was without erythema, exudates or cobblestoning. No thrush was noted. Neck: Supple without thyromegaly. No elevated JVP. Trachea was midline. Respiratory:   Breath sounds bilaterally were clear to auscultation. There were no wheezes, rhonchi or rales. There is no intercostal retraction or use of accessory muscles. Cardiovascular: Regular without murmur, clicks, gallops or rubs. Abdomen: Slightly rounded and soft. No rebound, rigidity or guarding was appreciated. Extremities:  No lower extremity edema, ulcerations, tenderness, varicosities or erythema. Muscle size, tone and strength are normal.  No involuntary movements are noted. Skin:  Warm and dry. Good color, turgor and pigmentation. No lesions or scars.   No cyanosis or clubbing  Neurological/Psychiatric: The patient's general behavior, level of consciousness, thought content and emotional status is normal.        Medications:  Scheduled Medications:    albuterol  2.5 mg Nebulization 4x daily    [Held by provider] amLODIPine  5 mg Oral Daily    [Held by provider] furosemide  40 mg Oral Daily    [Held by provider] metoprolol tartrate  12.5 mg Oral BID    pantoprazole  40 mg Oral QAM AC    potassium chloride  20 mEq Oral BID    sertraline  100 mg Oral Daily    tiotropium  2 puff Inhalation Daily    sodium chloride flush  5-40 mL IntraVENous 2 times per day    methylPREDNISolone  40 mg IntraVENous Q8H    albuterol  2.5 mg Nebulization 4x daily    prenatal vitamin  1 tablet Oral Daily    vancomycin (VANCOCIN) intermittent dosing (placeholder)   Other RX Placeholder    piperacillin-tazobactam  3,375 mg IntraVENous q8h     Continuous Infusions:    heparin (PORCINE) Infusion 12 Units/kg/hr (04/23/22 0316)    sodium chloride      sodium chloride 100 mL/hr at 04/22/22 2142     PRN Medicationsheparin (porcine), 4,000 Units, PRN  heparin (porcine), 2,000 Units, PRN  sodium chloride flush, 5-40 mL, PRN  sodium chloride, , PRN  ondansetron, 4 mg, Q8H PRN   Or  ondansetron, 4 mg, Q6H PRN  polyethylene glycol, 17 g, Daily PRN  acetaminophen, 650 mg, Q6H PRN   Or  acetaminophen, 650 mg, Q6H PRN        Diagnostic Labs:  CBC:   Recent Labs     04/22/22  1641 04/23/22  0121 04/23/22  0609   WBC 29.8* 33.6* 34.5*   RBC 3.07* 2.79* 2.67*   HGB 9.3* 8.4* 8.2*   HCT 29.6* 26.7* 25.6*   MCV 96.4 95.7 95.9   RDW 21.5* 21.6* 22.0*    188 See Reflexed IPF Result     BMP:   Recent Labs     04/22/22  1641 04/22/22  1757 04/23/22  0609   *  --  131*   K 3.6*  --  4.1   CL 97*  --  99   CO2 25  --  24   BUN 62* 53* 66*   CREATININE 1.95* 1.67* 1.53*     BNP: No results for input(s): BNP in the last 72 hours. PT/INR: No results for input(s): PROTIME, INR in the last 72 hours. APTT:   Recent Labs     04/23/22  0121   APTT 35.5*     CARDIAC ENZYMES: No results for input(s): CKMB, CKMBINDEX, TROPONINI in the last 72 hours. Invalid input(s): CKTOTAL;3  FASTING LIPID PANEL:  Lab Results   Component Value Date    CHOL 192 01/30/2015    HDL 77 01/30/2015    TRIG 84 01/30/2015     LIVER PROFILE:   Recent Labs     04/23/22  0609   AST 13   ALT <5*   BILIDIR 0.09   BILITOT 0.52   ALKPHOS 76      MICROBIOLOGY:   Lab Results   Component Value Date/Time    CULTURE NO GROWTH 5 DAYS 11/29/2021 04:05 PM       Imaging:    CT CHEST WO CONTRAST    Result Date: 4/22/2022  Multifocal left-sided pneumonia progressed prior exams. The spiculated nodular opacity upper lung is noted identified perhaps obscured by the airspace disease. Follow-up is strongly recommended resolution of the airspace disease in to further evaluate the previously noted and previously described nodule. Enlarged mediastinal and left hilar lymph nodes. These are non prior imaging studies. This could be metastatic or these could be reactive. Left adrenal nodule 1.5 cm in size. Unchanged. Close attention follow-up imaging recommended. XR CHEST PORTABLE    Result Date: 4/22/2022  Increased opacity left hemithorax could represent left pleural fluid combined with atelectasis or pneumonia       ASSESSMENT & PLAN     Principal Problem:    Acute on chronic respiratory failure with hypoxia (HCC)  Active Problems:    Community acquired pneumonia of left lower lobe of lung    BRAYAN (obstructive sleep apnea)    Acute respiratory acidosis    GERD (gastroesophageal reflux disease)    Essential hypertension    Major depressive disorder, recurrent, moderate (HCC)    Tobacco abuse    History of DVT in adulthood    COPD exacerbation (HCC)    Recurrent acute deep vein thrombosis (DVT) of both lower extremities (HCC)    S/P lobectomy of lung    ASHLEIGH (acute kidney injury) ([facility] [address]    Pericardial effusion  Resolved Problems:    * No resolved hospital problems. *    1. Acute on chronic hypoxic respiratory failure 2/2 community acquired pnmeumonia: Continuous BiPAP. ABG as needed. Pulmonology consult. 2. Community-acquired pneumonia: S/P 1 dose of Rocephin and azithromycin in ER. on vancomycin and Zosyn for broad-spectrum coverage as patient appears toxic. Continue IV fluids. Legionella antigen negative. 3. COPD exacerbation: Solu-Medrol 40 Mg IV every 8 hours. Bronchodilators. 4. Acute respiratory acidosis: Continuous BiPAP. Repeat ABG/VBG tomorrow a.m.  5. Lung nodule: Morphology concerning for malignancy. Could be metastatic. Pulmonary consult. 6. H/O lung adenocarcinoma s/p resection of LLL 7/2021  7. Adrenal nodule: 1.5 cm. Could be metastatic. Follow-up.   8. ASHLEIGH: Creatinine 1.95 (baseline normal). Continue IV fluids. BMP tomorrow a.m. 9. Chronic dastolic CHF: Hold Lasix for now due to ASHLEIGH. 10. Moderate pericardial effusion: Follows Dr. Sergio Sun as outpatient. Follow-up repeat echo. 11. H/O recurrent DVT/PE: Hold Eliquis. Start heparin infusion per low-dose protocol. 12. Essential hypertension: Patient on the lower side of blood pressure. Hold home BP meds. 13. Tobacco abuse: Nicotine patch. Advised alcohol cessation. 15. H/o major depressive disorder: Continue Zoloft 100 Mg daily. 15. GERD: Continue Protonix 40 Mg oral daily. 16. BRAYAN: Outpatient sleep study        DVT ppx: On heparin infusion  GI ppx: On Protonix 40 Mg oral daily     PT/OT/SW: PT and OT consulted. Discharge Planning: Admit to stepdown.       Rodriguez Mccrary MD  PGY-1, Internal Medicine Resident  [facility]         4/23/2022, 7:31 AM

## 2022-04-23 NOTE — CARE COORDINATION
Case Management Initial Discharge Plan  Ivan Barrett,             Met with:patient to discuss discharge plans. Information verified: address, contacts, phone number, , insurance Yes  Insurance Provider: [facility]    Emergency Contact/Next of Kin name & number: Kelley Powell, son, [phone]. [place], daughter, [phone]. Who are involved in patient's support system? Family    PCP: Richie Peace MD  Date of last visit: 3 weeks ago      Discharge Planning    Living Arrangements:    Pt lives with her son. Home has 2 stories  2 stairs to climb to get into front door, flight of stairs to climb to reach second floor  Location of bedroom/bathroom in home Main floor    Patient able to perform ADL's:Assisted    Current Services (outpatient & in home) None  DME equipment: Home O2, commode  DME provider: [facility]    Is patient receiving oral anticoagulation therapy? Yes, Eliquis    Does patient have any issues/concerns obtaining medications? No  If yes, what are patient's concerns? Is there a preferred Pharmacy after hours or on weekends? Yes    If yes, which pharmacy? [facility], [facility]     Potential Assistance Needed:   Pt requests a walker. Patient agreeable to home care: Yes  Freedom of choice provided:  n/a    Prior SNF/Rehab Placement and Facility: No  Agreeable to SNF/Rehab: No  [place] of choice provided: n/a     Evaluation: no    Patient expects to be discharged to:   Home    If home: is the family and/or caregiver wiling & able to provide support at home? Yes  Who will be providing this support?  Family    Follow Up Appointment: Best Day/ Time:      Transportation provider: Family  Transportation arrangements needed for discharge: No    Readmission Risk              Risk of Unplanned Readmission:  44             Does patient have a readmission risk score greater than 14?: Yes or No  If yes, follow-up appointment must be made within 7 days of discharge. Goals of Care: Increase comfort. Educated pt on transitional options, provided freedom of choice and are agreeable with plan      Discharge Plan: Pt's plan is to home, pt states that she is agreeable to [facility] only \"if really needed,\" as she \"has plenty of help at home. \"    Placed call to RT charge at [phone], and notified her that pt wears home O2 prn at 3L. Pt requests a walker.     Electronically signed by Randolph Gayle RN on 4/23/22 at 11:15 AM EDT

## 2022-04-23 NOTE — PLAN OF CARE
Problem: Discharge Planning  Goal: Discharge to home or other facility with appropriate resources  4/23/2022 1406 by Margarito Polo RN  Outcome: Progressing  4/23/2022 0427 by Lore Elizabeth RN  Outcome: Progressing     Problem: Pain  Goal: Verbalizes/displays adequate comfort level or baseline comfort level  4/23/2022 1406 by Margarito Polo RN  Outcome: Progressing  4/23/2022 0427 by Lore Elizabeth RN  Outcome: Progressing     Problem: Safety - Adult  Goal: Free from fall injury  4/23/2022 1406 by Margarito Polo RN  Outcome: Progressing  4/23/2022 0427 by Lore Elizabeth RN  Outcome: Progressing     Problem: Respiratory - Adult  Goal: Achieves optimal ventilation and oxygenation  4/23/2022 1406 by Margarito Polo RN  Outcome: Progressing  4/23/2022 1204 by Barby Young RCP  Flowsheets (Taken 4/23/2022 1204)  Achieves optimal ventilation and oxygenation:   Oxygen supplementation based on oxygen saturation or arterial blood gases   Position to facilitate oxygenation and minimize respiratory effort   Respiratory therapy support as indicated  Note: BRONCHOSPASM/BRONCHOCONSTRICTION     [x]         IMPROVE AERATION/BREATH SOUNDS  [x]   ADMINISTER BRONCHODILATOR THERAPY AS APPROPRIATE  [x]   ASSESS BREATH SOUNDS  [x]   IMPLEMENT AEROSOL/MDI PROTOCOL  [x]   PATIENT EDUCATION AS NEEDED        Problem: Discharge Planning  Goal: Discharge to home or other facility with appropriate resources  4/23/2022 1406 by Margarito Polo RN  Outcome: Progressing  4/23/2022 0427 by Lore Elizabeth RN  Outcome: Progressing     Problem: Pain  Goal: Verbalizes/displays adequate comfort level or baseline comfort level  4/23/2022 1406 by Margarito Polo RN  Outcome: Progressing  4/23/2022 0427 by Lore Elizabeth RN  Outcome: Progressing     Problem: Safety - Adult  Goal: Free from fall injury  4/23/2022 1406 by Margarito Polo RN  Outcome: Progressing  4/23/2022 0427 by Lore Elizabeth RN  Outcome: Progressing     Problem: Respiratory - Adult  Goal: Achieves optimal

## 2022-04-23 NOTE — PROGRESS NOTES
[facility] Pharmacokinetic Monitoring Service - Vancomycin    Consulting Provider: Dr. Nata Dutta  Indication: Pneumonia  Target Concentration: Dosing based on anticipated concentration <15 mg/L due to renal impairment/insufficiency  Day of Therapy: 1  Additional Antimicrobials: Piperacillin/tazobactam    Pertinent Laboratory Values: Wt Readings from Last 1 Encounters:   04/22/22 174 lb (78.9 kg)     Temp Readings from Last 1 Encounters:   04/23/22 98.4 °F (36.9 °C) (Oral)     Estimated Creatinine Clearance: 39 mL/min (A) (based on SCr of 1.53 mg/dL (H)). Recent Labs     04/22/22  1641 04/22/22  1757 04/23/22  0121 04/23/22  0609   CREATININE  --  1.67*  --  1.53*   WBC   < >  --  33.6* 34.5*    < > = values in this interval not displayed. Pertinent Cultures:  Culture Date Source Results   Pending     MRSA Nasal Swab: not ordered. Order placed by pharmacy. Recent vancomycin administrations                     vancomycin (VANCOCIN) 1250 mg in sodium chloride 0.9% 250 mL IVPB (mg) 1,250 mg New Bag 04/23/22 0109                    Assessment:  Date/Time Current Dose Concentration Timing of Concentration (h) AUC   4/23 @ 1909 1250 mg x 1 9.2 ~18h N/a   Note: Serum concentrations collected for AUC dosing may appear elevated if collected in close proximity to the dose administered, this is not necessarily an indication of toxicity    Scr improved to 1.53, but urine output remains low at 0.3 mL/kg/hr.  Will give additional 1-time dose to assess clearance    Plan:  Current dosing regimen is sub-therapeutic  Give additional 1-time dose of 1000 mg   Repeat vancomycin concentration ordered for 4/24 @ 1200   Pharmacy will continue to monitor patient and adjust therapy as indicated    Thank you for the consult,  Gildardo Mitchell, [facility]  4/23/2022 7:52 PM

## 2022-04-23 NOTE — PLAN OF CARE
Problem: Respiratory - Adult  Goal: Achieves optimal ventilation and oxygenation  Flowsheets (Taken 4/23/2022 1204)  Achieves optimal ventilation and oxygenation:   Oxygen supplementation based on oxygen saturation or arterial blood gases   Position to facilitate oxygenation and minimize respiratory effort   Respiratory therapy support as indicated  Note: BRONCHOSPASM/BRONCHOCONSTRICTION     [x]         IMPROVE AERATION/BREATH SOUNDS  [x]   ADMINISTER BRONCHODILATOR THERAPY AS APPROPRIATE  [x]   ASSESS BREATH SOUNDS  [x]   IMPLEMENT AEROSOL/MDI PROTOCOL  [x]   PATIENT EDUCATION AS NEEDED

## 2022-04-23 NOTE — PROGRESS NOTES
SPIRITUAL CARE DEPARTMENT - [address]  PROGRESS NOTE    Shift date: 04/23/22  Shift day: Saturday   Shift # 2    Room # 2026/2026-01   Name: Sylvia Chavez            Age: 61 y.o. Gender: female          Baptism:    Place of Tenriism:     Referral: Routine Visit    Admit Date & Time: 4/22/2022  3:22 PM    PATIENT/EVENT DESCRIPTION:  Sylvia Cahvez is a 61 y.o. female         SPIRITUAL ASSESSMENT/INTERVENTION:  Patient appeared to be receptive to  visit. Patient engaged in conversation and  was a ministry of presence. Family/Friends arrived cutting encounter short.  asked if patient would like if visit continued later in the day which was accepted. SPIRITUAL CARE FOLLOW-UP PLAN:  Chaplains will remain available to offer spiritual and emotional support as needed.       Electronically signed by Yolande Gunderson, on 4/23/2022 at 2:45 PM.  [address] [facility]  [phone]

## 2022-04-24 NOTE — PROGRESS NOTES
[facility] Pharmacokinetic Monitoring Service - Vancomycin    Consulting Provider: Dr. Belén Powell   Indication: Pneumonia  Target Concentration: Dosing based on anticipated concentration <15 mg/L due to renal impairment/insufficiency  Day of Therapy: 2  Additional Antimicrobials: Piperacillin/tazobactam    Pertinent Laboratory Values: Wt Readings from Last 1 Encounters:   04/24/22 193 lb 11.2 oz (87.9 kg)     Temp Readings from Last 1 Encounters:   04/24/22 98.3 °F (36.8 °C) (Oral)     Estimated Creatinine Clearance: 60 mL/min (A) (based on SCr of 1.03 mg/dL (H)).   Recent Labs     04/23/22  0609 04/24/22  0759   CREATININE 1.53* 1.03*   WBC 34.5* 34.5*     Procalcitonin: N/a    Pertinent Cultures:  Culture Date Source Results   4/22 Sputum Pending   4/22 Urine Negative for Legionella   MRSA Nasal Swab:  Pending    Recent vancomycin administrations                     vancomycin (VANCOCIN) 1000 mg in sodium chloride 0.9% 250 mL IVPB (mg) 1,000 mg New Bag 04/23/22 2050    vancomycin (VANCOCIN) 1250 mg in sodium chloride 0.9% 250 mL IVPB (mg) 1,250 mg New Bag 04/23/22 0109                    Assessment:  Date/Time Current Dose Concentration Timing of Concentration (h) AUC   4/24 1000 mg x1 13.9 ~14 hours post dose N/a   Note: Serum concentrations collected for AUC dosing may appear elevated if collected in close proximity to the dose administered, this is not necessarily an indication of toxicity    Plan:  Current dosing regimen is sub-therapeutic  Give additional 1-time dose of 1000 mg IV  Baseline Scr ~0.7 mg/dL  Urine output improving from 0.3 to 0.8 mL/kg/hr & Scr improving from 1.53 to 1.03 mg/dL  Repeat vancomycin concentration ordered for 4/25 with AM labs  Pharmacy will continue to monitor patient and adjust therapy as indicated    Thank you for the consult,  Audrey Jean-Baptiste, [facility]  4/24/2022 12:52 PM

## 2022-04-24 NOTE — PLAN OF CARE
Pt.on NC-6L,encourage to use IS.VSS,afebrile,abd.soft,regular diet-tolerating well. AxOx4,move all four extremities,pulses present. Pain control with PRN medication.   Problem: Discharge Planning  Goal: Discharge to home or other facility with appropriate resources  Outcome: Progressing

## 2022-04-24 NOTE — PROGRESS NOTES
PATIENT REFUSES TO WEAR BIPAP     [x] Risks and benefits explained to patient   [x] Patient refuses to wear Bipap stating \"I do not wear it. \"  [x] Patient verbalizes understanding of information presented.

## 2022-04-24 NOTE — PROGRESS NOTES
[facility]  Internal Medicine Teaching Residency Program  Inpatient Daily Progress Note  ______________________________________________________________________________    Patient: Alli Murray  YOB: 1955   FRW:[id number]    Acct: [de-identified]     Room: 2026/2026-01  Admit date: 4/22/2022  Today's date: 04/24/22  Number of days in the hospital: 2    SUBJECTIVE   CC: Emesis (was vomiting two days ago), Cough (lastiing for a few days, mucous), and Back Pain (mid back pain lasting a few days)    Pt examined at bedside. Chart & results reviewed. - VSS, pt is saturating well on 6 L of oxygen on nasal cannula. Patient is afebrile, hemodynamically stable with /71, heart rate 93  Labs reviewed sodium 135, potassium 4, chloride 105, creatinine 1.03, glucose 168  WBC 34.5, hemoglobin 8.3, platelet count 171, folate 6.4, vitamin B12 842, PTT 24.6  Legionella antigen negative    - no acute events overnight.   - Patient denies nausea, vomiting, chest pain,  abdominal pain, changes in bowel or urinary habits. Chest x-ray:    Stable extensive airspace infiltrate throughout the left lung and mildly   progressive vascular congestion in the right lung.          ROS:  General ROS: Completed and except as mentioned above were negative   HEENT ROS: Completed and except as mentioned above were negative   Allergy and Immunology ROS:  Completed and except as mentioned above were negative  Hematological and Lymphatic ROS:  Completed and except as mentioned above were negative  Respiratory ROS:  Completed and except as mentioned above were negative  Cardiovascular ROS:  Completed and except as mentioned above were negative  Gastrointestinal ROS: Completed and except as mentioned above were negative  Genito-Urinary ROS:  Completed and except as mentioned above were negative  Musculoskeletal ROS:  Completed and except as mentioned above were negative  Neurological ROS: Completed and except as mentioned above were negative  Skin & Dermatological ROS:  Completed and except as mentioned above were negative  Psychological ROS:  Completed and except as mentioned above were negative  BRIEF HISTORY     The patient is a pleasant 61 y.o. 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 Cough productive of orange sputum. Denies any fevers, chills, diaphoresis, palpitations, headaches, dizziness, diarrhea, constipation, abdominal pain.     Patient states that she used to smoke 1.5 PPD for > 40 years and currently smoking 1 cigarette/day and trying to quit.     ECHO 2021 - EF 60%, moderate pericardial effusion, no tamponade physiology. Cardiology evaluated the patient at the time and recommended to follow-up outpatient with Dr. Bonita Loja.     WBC 29.8, Hb 9.3, MCV 96  Creatinine 1.95, BUN 62, sodium 129, potassium 3.6, chloride 97  EKG showed normal sinus rhythm with no acute changes. Rapid COVID-negative  Troponins 43> 30     Chest x-ray showed left lower and middle lung zones opacity which has increased since her previous chest x-ray 3 months ago. CT chest demonstrates left-sided significant consolidation concerning for pneumonia. Also reports spiculated nodular opacity obscured by the consolidation. Also demonstrates left hilar lymphadenopathy and left atrial nodule of 1.5 cm.     Previous CT chest without contrast on 2022 showed spiculated nodule in left upper lobe concerning for malignancy which was not present on CT chest 2021.   Follow Dr. Osvaldo Le as outpatient  OBJECTIVE     Vital Signs:  /71   Pulse 93   Temp 98 °F (36.7 °C) (Oral)   Resp 25   Ht 5' 5\" (1.651 m)   Wt 193 lb 11.2 oz (87.9 kg)   SpO2 93%   BMI 32.23 kg/m²     Temp (24hrs), Av.1 °F (36.7 °C), Min:97.7 °F (36.5 °C), Max:98.8 °F (37.1 °C)    In: 3803.2   Out: 1750 [Urine:1750]    Physical Exam:  Constitutional: This is a well developed, well nourished, 25-29.9 - Overweight 61y.o. year old female who is alert, oriented, cooperative and in no apparent distress. Physical Exam -  Constitutional:  Alert, cooperative and no distress. Mental Status:  Oriented to person, place and time and normal affect. Lungs:  Bilateral air entry present, lung fields clear. Normal effort. Heart:  Regular rate and rhythm, no murmur. Abdomen:  Soft, nontender, nondistended,  Extremities:  No edema, peripheral pulses palpable in both lower extremities  Skin:  Warm, dry, no gross lesions or rashes.     Medications:  Scheduled Medications:    apixaban  5 mg Oral BID    vitamin D  50,000 Units Oral Weekly    budesonide-formoterol  2 puff Inhalation BID    methylPREDNISolone  40 mg IntraVENous Daily    albuterol  2.5 mg Nebulization 4x daily    [Held by provider] amLODIPine  5 mg Oral Daily    [Held by provider] furosemide  40 mg Oral Daily    [Held by provider] metoprolol tartrate  12.5 mg Oral BID    pantoprazole  40 mg Oral QAM AC    potassium chloride  20 mEq Oral BID    sertraline  100 mg Oral Daily    tiotropium  2 puff Inhalation Daily    sodium chloride flush  5-40 mL IntraVENous 2 times per day    prenatal vitamin  1 tablet Oral Daily    vancomycin (VANCOCIN) intermittent dosing (placeholder)   Other RX Placeholder    piperacillin-tazobactam  3,375 mg IntraVENous q8h     Continuous Infusions:    sodium chloride      sodium chloride 100 mL/hr at 04/24/22 0719     PRN Medicationsdextromethorphan-guaiFENesin, 10 mL, Q4H PRN  sodium chloride flush, 5-40 mL, PRN  sodium chloride, , PRN  ondansetron, 4 mg, Q8H PRN   Or  ondansetron, 4 mg, Q6H PRN  polyethylene glycol, 17 g, Daily PRN  acetaminophen, 650 mg, Q6H PRN   Or  acetaminophen, 650 mg, Q6H PRN        Diagnostic Labs:  CBC:   Recent Labs     04/23/22  0121 04/23/22  9019 04/24/22  0759   WBC 33.6* 34.5* 34.5*   RBC 2.79* 2.67* 2.79*   HGB 8.4* 8.2* 8.3*   HCT 26.7* 25.6* 27.3*   MCV 95.7 95.9 97.8   RDW 21.6* 22.0* 21.7*    See Reflexed IPF Result 214     BMP:   Recent Labs     04/22/22  1641 04/22/22  1641 04/22/22  1757 04/23/22  0609 04/24/22  0759   *  --   --  131* 135   K 3.6*  --   --  4.1 4.0   CL 97*  --   --  99 105   CO2 25  --   --  24 23   BUN 62*   < > 53* 66* 54*   CREATININE 1.95*   < > 1.67* 1.53* 1.03*    < > = values in this interval not displayed. BNP: No results for input(s): BNP in the last 72 hours. PT/INR: No results for input(s): PROTIME, INR in the last 72 hours. APTT:   Recent Labs     04/23/22  1909 04/24/22  0059 04/24/22  0803   APTT 26.6 24.9 24.6     CARDIAC ENZYMES: No results for input(s): CKMB, CKMBINDEX, TROPONINI in the last 72 hours. Invalid input(s): CKTOTAL;3  FASTING LIPID PANEL:  Lab Results   Component Value Date    CHOL 192 01/30/2015    HDL 77 01/30/2015    TRIG 84 01/30/2015     LIVER PROFILE:   Recent Labs     04/23/22  0609   AST 13   ALT <5*   BILIDIR 0.09   BILITOT 0.52   ALKPHOS 76      MICROBIOLOGY:   Lab Results   Component Value Date/Time    CULTURE NO GROWTH 5 DAYS 11/29/2021 04:05 PM       Imaging:    CT CHEST WO CONTRAST    Result Date: 4/22/2022  Multifocal left-sided pneumonia progressed prior exams. The spiculated nodular opacity upper lung is noted identified perhaps obscured by the airspace disease. Follow-up is strongly recommended resolution of the airspace disease in to further evaluate the previously noted and previously described nodule. Enlarged mediastinal and left hilar lymph nodes. These are non prior imaging studies. This could be metastatic or these could be reactive. Left adrenal nodule 1.5 cm in size. Unchanged. Close attention follow-up imaging recommended.      XR CHEST PORTABLE    Result Date: 4/22/2022  Increased opacity left hemithorax could represent left pleural fluid combined with atelectasis or pneumonia       ASSESSMENT & PLAN     Principal Problem:    Acute on chronic respiratory failure with hypoxia (HCC)  Active Problems:    Community acquired pneumonia of left lower lobe of lung    BRAYAN (obstructive sleep apnea)    Acute respiratory acidosis    GERD (gastroesophageal reflux disease)    Essential hypertension    Major depressive disorder, recurrent, moderate (HCC)    Tobacco abuse    History of DVT in adulthood    COPD exacerbation (HCC)    Recurrent acute deep vein thrombosis (DVT) of both lower extremities (HCC)    S/P lobectomy of lung    ASHLEIGH (acute kidney injury) ([facility] [address]    Pneumonia of left lower lobe due to infectious organism    Pericardial effusion  Resolved Problems:    * No resolved hospital problems. *    1. Acute on chronic hypoxic respiratory failure 2/2 community acquired pnmeumonia: Continuous BiPAP. Pulmonology recommends to give supplemental oxygen to keep saturations 88 to 92%. Patient will need follow-up CT chest in 4 weeks to assess resolution of pneumonia then BD scan to assess left upper lobe lung nodule. 2. Community-acquired pneumonia: S/P 1 dose of Rocephin and azithromycin in ER. on vancomycin and Zosyn for broad-spectrum coverage as patient appears toxic. His current dose of vancomycin is subtherapeutic pharmacy to give 1 on edition on one-time dose of 1000 mg vancomycin. Continue IV fluids. Legionella antigen negative. 3. COPD exacerbation: Solu-Medrol 40 Mg IV daily per pulmonology. Patient on tiotropium inhaler 2 puffs daily  4. Acute respiratory acidosis: Resolving. Patient refused to have BiPAP. Advised patient with importance of wearing BiPAP. 5. Lung nodule: Morphology concerning for malignancy. Could be metastatic. Pulmonary consult. 6. H/O lung adenocarcinoma s/p resection of LLL 7/2021  7. Adrenal nodule: 1.5 cm. Could be metastatic. Follow-up. 8. ASHLEIGH: Creatinine 1.95 (baseline normal).   0.9% sodium chloride infusion at 100 mL/h. Torey Confer BMP tomorrow a.m. 9. Chronic dastolic CHF: Hold Lasix for now due to ASHLEIGH. 10. Moderate pericardial effusion: Follows Dr. Tez Reilly as outpatient. Follow-up repeat echo. 11. H/O recurrent DVT/PE:  Patient on Eliquis 5 mg twice daily  12. Essential hypertension: Patient on the lower side of blood pressure. Hold home BP meds. 13. Tobacco abuse: Nicotine patch. Advised alcohol cessation. 15. H/o major depressive disorder: Continue Zoloft 100 Mg daily. 15. GERD: Continue Protonix 40 Mg oral daily. 16. BRAYAN: Outpatient sleep study   17. Patient on potassium chloride extended release tablet 20 mEq twice daily.        DVT ppx: On heparin   GI ppx: On Protonix 40 Mg oral daily     PT/OT/SW: PT and OT consulted. Discharge Planning: Admit to stepdown.       Radha Maldonado MD  PGY-1, Internal Medicine Resident  [address]         4/24/2022, 11:41 AM

## 2022-04-24 NOTE — PLAN OF CARE
Problem: Discharge Planning  Goal: Discharge to home or other facility with appropriate resources  4/23/2022 1406 by Shahriar Prieto RN  Outcome: Progressing     Problem: Pain  Goal: Verbalizes/displays adequate comfort level or baseline comfort level  4/23/2022 1406 by Shahriar Prieto RN  Outcome: Progressing     Problem: Safety - Adult  Goal: Free from fall injury  4/23/2022 1406 by Shahriar Prieto RN  Outcome: Progressing     Problem: Respiratory - Adult  Goal: Achieves optimal ventilation and oxygenation  4/23/2022 1406 by Shahriar Prieto RN  Outcome: Progressing  4/23/2022 1204 by Osmel Villatoro RCP  Flowsheets (Taken 4/23/2022 1204)  Achieves optimal ventilation and oxygenation:   Oxygen supplementation based on oxygen saturation or arterial blood gases   Position to facilitate oxygenation and minimize respiratory effort   Respiratory therapy support as indicated  Note: BRONCHOSPASM/BRONCHOCONSTRICTION     [x]         IMPROVE AERATION/BREATH SOUNDS  [x]   ADMINISTER BRONCHODILATOR THERAPY AS APPROPRIATE  [x]   ASSESS BREATH SOUNDS  [x]   IMPLEMENT AEROSOL/MDI PROTOCOL  [x]   PATIENT EDUCATION AS NEEDED

## 2022-04-24 NOTE — PROGRESS NOTES
Patient - Clif Hoover   MRN -  [id number]   Acct # - [de-identified]   - 1955        Date of evaluation -  2022    REASON FOR THE CONSULTATION:  Shortness of breath, Pneumonia  HISTORY OF PRESENT ILLNESS:    Clif Hoover is a 61y.o. year old female well-known to me from outpatient tests. Patient has history of left lung adenocarcinoma post left lower lobe lobectomy in 2021. She continues to smoke actively. On her recent CT chest left upper lobe lung nodule had increased in size. PET scan had been ordered but is pending. Prior to that patient started complaining of 2 days of shortness of breath and worsening cough malaise and fevers. She did complain of some chills. She is coughing but is not able to expectorate. Denies hemoptysis. The ER chest x-ray showed left lung consolidation is from previous x-ray. CT of the chest showed multilobar pneumonia on the left side and left upper lobe nodule. Mediastinal lymph nodes are also enlarged.         2022   Subjective     Afebrile   Cough   Wheezing   Sputum present but unable to exp         Immunization   Immunization History   Administered Date(s) Administered    Influenza Virus Vaccine 2016    Influenza, Quadv, IM, PF (6 mo and older Fluzone, Flulaval, Fluarix, and 3 yrs and older Afluria) 10/28/2016, 2018    Pneumococcal Polysaccharide ([id number]) 2017    Tdap (Boostrix, Adacel) 2019    Zoster Recombinant (Shingrix) 2019, 2019              PAST MEDICAL HISTORY:       Diagnosis Date    Abnormal CT of the chest 2021    Adenocarcinoma, lung, left ([facility] 2021    Arthritis     Community acquired pneumonia 2019    COPD (chronic obstructive pulmonary disease) ([facility] [facility] 2019    Deep venous thrombosis of left profunda femoris vein ([facility] [facility] 2020    Depression     DVT (deep venous thrombosis) ([facility] [facility]     b/l     GERD (gastroesophageal reflux disease)     History of pulmonary embolism 05/27/2021    History of thyroid nodule 12/23/2019    Hypertension     Major depressive disorder, recurrent, moderate ([facility] [address] 11/12/2018    Nodule of lower lobe of left lung 04/23/2021    Obesity, Class II, BMI 35-39.9 11/12/2018    On anticoagulant therapy 05/27/2021    On home oxygen therapy     02 2L NC PRN    On methotrexate therapy 05/27/2021    Prediabetes 11/12/2018    Rheumatoid arthritis ([facility] [address] [facility]     Snores     denies apnea    Thyroid nodule 01/09/2020    Tobacco abuse 11/12/2018    Under care of team 06/29/2021    pulmonology-Dr Blank- gareth-last visit june 2021    Under care of team 06/29/2021    oncology-[facility]-last visit june 2021    Under care of team     Dr. Nance Cool clearance appointment 7/6/21, stress test 7/9/2021, clearance obtained and placed on chart    Wellness examination 06/29/2021    pcp-Dr Caryn Carlson office-last visit may 2021         Family History:       Problem Relation Age of Onset    Cancer Mother         Uterine and breast    Diabetes Mother     Heart Disease Mother     Cancer Father         lung    Cancer Brother         lung    Cancer Brother         lung       SURGICAL HISTORY:   Past Surgical History:   Procedure Laterality Date    APPENDECTOMY  1982    BRONCHOSCOPY N/A 12/27/2019    BRONCHOSCOPY ALVEOLAR LAVAGE performed by Richard Courtney MD at [address] [place] [address] N/A 2/12/2020    COLONOSCOPY POLYPECTOMIES HOT SNARE, COLD BIOPSY POLYPECTOMIES performed by Teo Antoine MD at [facility]  5/13/2021    CT NEEDLE BIOPSY LUNG PERCUTANEOUS 5/13/2021 [facility] [place] CT SCAN    ENDOSCOPY, COLON, DIAGNOSTIC  [id number]    gastritis, sm. bowel lipoma, biopsies    INSERT MIDLINE CATHETER  11/3/2021         LOBECTOMY Left 07/19/2021    XI ROBOTIC LEFT LOWER LOBECTOMY CONVERTED TO OPEN THORACTOMY LEFT LOWER LOBECTOMY (Left )    LUNG REMOVAL, TOTAL Left 7/19/2021    XI ROBOTIC LEFT LOWER LOBECTOMY CONVERTED TO OPEN THORACTOMY LEFT LOWER LOBECTOMY performed by Kilo Perkins MD at [address]:   Social History     Socioeconomic History    Marital status: Single     Spouse name: None    Number of children: 6    Years of education: None    Highest education level: None   Occupational History     Employer: N/A   Tobacco Use    Smoking status: Current Every Day Smoker     Packs/day: 0.25     Years: 35.00     Pack years: 8.75     Types: Cigarettes     Last attempt to quit: 1986     Years since quittin.2    Smokeless tobacco: Never Used    Tobacco comment: restarted smoking 2019   Vaping Use    Vaping Use: Never used   Substance and Sexual Activity    Alcohol use: Not Currently     Alcohol/week: 0.0 standard drinks    Drug use: No    Sexual activity: Not Currently   Other Topics Concern    None   Social History Narrative    None     Social Determinants of Health     Financial Resource Strain: Low Risk     Difficulty of Paying Living Expenses: Not hard at all   Food Insecurity: No Food Insecurity    Worried About Running Out of Food in the Last Year: Never true    Emelyn of Food in the Last Year: Never true   Transportation Needs: No Transportation Needs    Lack of Transportation (Medical): No    Lack of Transportation (Non-Medical):  No   Physical Activity:     Days of Exercise per Week: Not on file    Minutes of Exercise per Session: Not on file   Stress:     Feeling of Stress : Not on file   Social Connections:     Frequency of Communication with Friends and Family: Not on file    Frequency of Social Gatherings with Friends and Family: Not on file    Attends Zoroastrianism Services: Not on file    Active Member of Clubs or Organizations: Not on file    Attends Club or Organization Meetings: Not on file    Marital Status: Not on file   Intimate Partner Violence:     Fear of Current or Ex-Partner: Not on file   [employer] Abused: Not on file    Physically Abused: Not on file    Sexually Abused: Not on file   Housing Stability:     Unable to Pay for Housing in the Last Year: Not on file    Number of Places Lived in the Last Year: Not on file    Unstable Housing in the Last Year: Not on file        TOBACCO:   reports that she has been smoking cigarettes. She has a 8.75 pack-year smoking history. She has never used smokeless tobacco.  ETOH:   reports previous alcohol use. ALLERGIES:  No Known Allergies    Home Meds:   Prior to Admission medications    Medication Sig Start Date End Date Taking?  Authorizing Provider   metoprolol tartrate (LOPRESSOR) 25 MG tablet Take 0.5 tablets by mouth 2 times daily 3/18/22  Yes Poppy Braun MD   amLODIPine (NORVASC) 5 MG tablet Take 1 tablet by mouth daily 3/14/22  Yes Poppy Braun MD   Handicap Placard MISC by Does not apply route 3/14/2022 to 3/13/2027    Panlobular emphysema ([address]  (primary encounter diagnosis) 3/14/22  Yes Soha Reilly MD   SPIRIVA RESPIMAT 2.5 MCG/ACT AERS inhaler inhale 2 puffs INTO THE LUNGS once daily 2/23/22  Yes Poppy Braun MD   albuterol sulfate  (90 Base) MCG/ACT inhaler inhale 2 puffs by mouth and INTO THE LUNGS every 4 hours prn wheezing 2/10/22  Yes Rise Siemens, DO   albuterol (PROVENTIL) (2.5 MG/3ML) 0.083% nebulizer solution Take 3 mLs by nebulization every 6 hours as needed for Wheezing 2/10/22  Yes Rise Siemens, DO   ondansetron (ZOFRAN) 4 MG tablet take 1 tablet by mouth three times a day if needed for nausea and vomiting 1/31/22  Yes Poppy Braun MD   sertraline (ZOLOFT) 100 MG tablet take 1 tablet by mouth once daily 1/24/22  Yes Poppy Braun MD   Prenatal Vit-Fe Fumarate-FA (PRENATAL VITAMIN) 27-0.8 MG TABS Take 1 tablet by mouth daily 12/16/21  Yes Luis Woodard MD   furosemide (LASIX) 40 MG tablet Take 1 tablet by mouth daily 12/2/21  Yes Hay Pearson, DO   potassium chloride (KLOR-CON M) 20 MEQ extended release tablet Take 1 tablet by mouth 2 times daily 7/23/21  Yes ILIR Sanchez - NP   acetaminophen (TYLENOL) 500 MG tablet Take 1,000 mg by mouth every 6 hours as needed for Pain   Yes Historical Provider, MD   ELIQUIS 5 MG TABS tablet take 1 tablet by mouth twice a day 7/16/21  Yes Chris العراقي MD   pantoprazole (PROTONIX) 40 MG tablet take 1 tablet by mouth once daily 5/18/21  Yes Chris العراقي MD   nicotine (NICODERM CQ) 14 MG/24HR Place 1 patch onto the skin daily for 28 days 3/14/22 4/11/22  Tera Mackey MD     CURRENT MEDS :  Scheduled Meds:   apixaban  5 mg Oral BID    vitamin D  50,000 Units Oral Weekly    budesonide-formoterol  2 puff Inhalation BID    methylPREDNISolone  40 mg IntraVENous Daily    albuterol  2.5 mg Nebulization 4x daily    [Held by provider] amLODIPine  5 mg Oral Daily    [Held by provider] furosemide  40 mg Oral Daily    [Held by provider] metoprolol tartrate  12.5 mg Oral BID    pantoprazole  40 mg Oral QAM AC    potassium chloride  20 mEq Oral BID    sertraline  100 mg Oral Daily    tiotropium  2 puff Inhalation Daily    sodium chloride flush  5-40 mL IntraVENous 2 times per day    prenatal vitamin  1 tablet Oral Daily    vancomycin (VANCOCIN) intermittent dosing (placeholder)   Other RX Placeholder    piperacillin-tazobactam  3,375 mg IntraVENous q8h       Continuous Infusions:   sodium chloride      sodium chloride 100 mL/hr at 04/24/22 0719       Review of Systems -  General ROS:fatigue   ENT ROS: negative for - headaches, oral lesions or sore throat  Cardiovascular ROS: no chest pain , orthopnea or pnd   Gastrointestinal ROS: no abdominal pain, change in bowel habits, or black or bloody stools  Skin - no rash   Neuro - no blurry vision , no loc .  No focal weakness   msk - no jt tenderness or swelling    Vascular - no claudication , rest completed and negative   Lymphatic - complete and negative   Hematology - oncology - complete and negative   Allergy immunology - complete and negative    no burning or hematuria      Vitals:  /76   Pulse 81   Temp 97.7 °F (36.5 °C) (Oral)   Resp 23   Ht 5' 5\" (1.651 m)   Wt 193 lb 11.2 oz (87.9 kg)   SpO2 98%   BMI 32.23 kg/m²     PHYSICAL EXAM:  Head and neck atraumatic, normocephalic    Lymph nodes-no cervical, supraclavicular lymphadenopathy    Neck-no JVP elevation    Lungs -  AP diameter of chest increased. Thoracic expansion and diaphragmatic excursion diminished. BS diminished and expiratory phase prolonged. Dullness to percussion left posterior chest and there are crackles left posterior chest, axilla and anteriorly, rhonchi expiratory and insp both sides     CVS- S1, S2 regular. No S3 no S4, no murmurs    Abdomen-nontender, nondistended. Bowel sounds are present. No organomegaly    Lower extremity-no edema    Upper extremity-no edema    Neurological-grossly normal cranial nerves. No overt motor deficit          CBC:   Recent Labs     04/22/22  1641 04/23/22  0121 04/23/22  0609   WBC 29.8* 33.6* 34.5*   HGB 9.3* 8.4* 8.2*    188 See Reflexed IPF Result     BMP:    Recent Labs     04/22/22  1641 04/22/22  1757 04/23/22  0609   *  --  131*   K 3.6*  --  4.1   CL 97*  --  99   CO2 25  --  24   BUN 62* 53* 66*   CREATININE 1.95* 1.67* 1.53*   GLUCOSE 109*  --  163*     Hepatic:   Recent Labs     04/23/22  0609   AST 13   ALT <5*   BILITOT 0.52   ALKPHOS 74     Amylase:   Lab Results   Component Value Date    AMYLASE 18 05/01/2014     Lipase:   Lab Results   Component Value Date    LIPASE 21 05/01/2014     Troponin: No results for input(s): TROPONINI in the last 72 hours. BNP: No results for input(s): BNP in the last 72 hours. Lipids: No results for input(s): CHOL, HDL in the last 72 hours.     Invalid input(s): LDLCALCU  ABGs:   Lab Results   Component Value Date    PHART 7.442 09/18/2021    PO2ART 38.2 09/18/2021    POJ9YPG 39.2 09/18/2021     INR: No results for input(s): INR in the last 72 hours. Thyroid:   Lab Results   Component Value Date    TSH 0.38 04/23/2022     Urinalysis: No results for input(s): BACTERIA, BLOODU, CLARITYU, COLORU, PHUR, PROTEINU, RBCUA, SPECGRAV, BILIRUBINUR, NITRU, WBCUA, LEUKOCYTESUR, GLUCOSEU in the last 72 hours. CT CHEST WO CONTRAST    Result Date: 4/22/2022  Multifocal left-sided pneumonia progressed prior exams. The spiculated nodular opacity upper lung is noted identified perhaps obscured by the airspace disease. Follow-up is strongly recommended resolution of the airspace disease in to further evaluate the previously noted and previously described nodule. Enlarged mediastinal and left hilar lymph nodes. These are non prior imaging studies. This could be metastatic or these could be reactive. Left adrenal nodule 1.5 cm in size. Unchanged. Close attention follow-up imaging recommended. XR CHEST PORTABLE    Result Date: 4/23/2022  Stable extensive airspace infiltrate throughout the left lung and mildly progressive vascular congestion in the right lung. XR CHEST PORTABLE    Result Date: 4/22/2022  Increased opacity left hemithorax could represent left pleural fluid combined with atelectasis or pneumonia      IMPRESSION:    Left multilobar pneumonia. Sepsis  Acute on chronic hypoxic respiratory failure due to multilobar pneumonia. Left upper lobe pulmonary nodule-enlarging, outpatient PET scan pending. History of adenocarcinoma of left lung post left lower lobe lobectomy. Chronic obstructive pulmonary disease and emphysema. Rheumatoid arthritis. History of DVT. Right middle lobe atelectasis which is improved    from CT chest 4/8/2022         PLAN:      No  blood cultures taken   Continue  Zosyn and vancomycin. Will obtain chest x-ray on Monday. Continue steroids IV  daily  Continue supplemental oxygen keep saturations 88 to 92%. Bronchodilator therapy. continue Eliquis.   Will need follow-up CT chest in 4 weeks to assess resolution of pneumonia and then PET CT scan to assess left upper lobe lung nodule. Discussed with patient. D/w primary           I hope this updates you on my evaluation and clinical thinking. Thank you for allowing me to participate in his care.      Sincerely,      Electronically signed by Jaylan Levy MD on 4/24/2022 at 8:34 AM

## 2022-04-25 NOTE — PROGRESS NOTES
[facility]  Internal Medicine Teaching Residency Program  Inpatient Daily Progress Note  ______________________________________________________________________________    Patient: Sylvia Chavez  YOB: 1955   NZK:[id number]    Acct: [de-identified]     Room: 1/80-12  Admit date: 4/22/2022  Today's date: 04/25/22  Number of days in the hospital: 3    SUBJECTIVE   Admitting Diagnosis: Acute on chronic respiratory failure with hypoxia ([address]  CC: Emesis, Cough, and Back Pain   Pt examined at bedside. Chart & results reviewed. No acute episodes overnight  Patient is heme stable and afebrile  Appears to be \"loopy\" per son  Possible CO2 narcosis - RT informed of desire to keep oxygen from 88-92  On 3-4 liters but not on oxygen at home  Complains of chest congestion and frequent urination    ROS:  Constitutional:  negative for chills, fevers, sweats  Respiratory:  negative for cough, dyspnea on exertion, hemoptysis, shortness of breath, wheezing  Cardiovascular:  negative for chest pain, positive for chest pressure/discomfort, negative for lower extremity edema, palpitations  Gastrointestinal:  negative for abdominal pain, constipation, diarrhea, nausea, vomiting  Neurological:  negative for dizziness, headache    BRIEF HISTORY     The patient is a pleasant 66 y. o. 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  Cough productive of orange sputum.  Denies any fevers, chills, diaphoresis, palpitations, headaches, dizziness, diarrhea, constipation, abdominal pain.     Patient states that she used to smoke 1.5 PPD for > 40 years and currently smoking 1 cigarette/day and trying to quit.     ECHO 11/2021 - EF 60%, moderate pericardial effusion, no tamponade physiology.  Cardiology evaluated the patient at the time and recommended to follow-up outpatient with Dr. Tez Reilly.     WBC 29.8, Hb 9.3, MCV 96  Creatinine 1.95, BUN 62, sodium 129, potassium 3.6, chloride 97  EKG showed normal sinus rhythm with no acute changes. Rapid COVID-negative  Troponins 43> 30     Chest x-ray showed left lower and middle lung zones opacity which has increased since her previous chest x-ray 3 months ago. CT chest demonstrates left-sided significant consolidation concerning for pneumonia.  Also reports spiculated nodular opacity obscured by the consolidation.  Also demonstrates left hilar lymphadenopathy and left atrial nodule of 1.5 cm.     Previous CT chest without contrast on 2022 showed spiculated nodule in left upper lobe concerning for malignancy which was not present on CT chest 2021. Follow Dr. Desi Posey as outpatient    OBJECTIVE     Vital Signs:  BP (!) 156/92   Pulse 87   Temp 97.9 °F (36.6 °C) (Oral)   Resp 18   Ht 5' 5\" (1.651 m)   Wt 185 lb 9 oz (84.2 kg)   SpO2 95%   BMI 30.88 kg/m²     Temp (24hrs), Av.7 °F (36.5 °C), Min:97.3 °F (36.3 °C), Max:98.3 °F (36.8 °C)    In: 550   Out: 1100 [Urine:1100]    Physical Exam:  Constitutional: This is a well developed, well nourished, 25-29.9 - Overweight 61y.o. year old female who is alert, oriented, cooperative and in no apparent distress. Mental Status:  Oriented to person, place and time and normal affect. Lungs:  Bilateral air entry present, lung fields clear. Normal effort. Heart:  Regular rate and rhythm, no murmur. Abdomen:  Soft, nontender, nondistended,  Extremities:  No edema, peripheral pulses palpable in both lower extremities  Skin:  Warm, dry, no gross lesions or rashes.         Medications:  Scheduled Medications:    vancomycin  1,000 mg IntraVENous Q12H    apixaban  5 mg Oral BID    vitamin D  50,000 Units Oral Weekly    budesonide-formoterol  2 puff Inhalation BID    methylPREDNISolone  40 mg IntraVENous Daily    albuterol 2.5 mg Nebulization 4x daily    amLODIPine  5 mg Oral Daily    [Held by provider] furosemide  40 mg Oral Daily    [Held by provider] metoprolol tartrate  12.5 mg Oral BID    pantoprazole  40 mg Oral QAM AC    potassium chloride  20 mEq Oral BID    sertraline  100 mg Oral Daily    tiotropium  2 puff Inhalation Daily    sodium chloride flush  5-40 mL IntraVENous 2 times per day    prenatal vitamin  1 tablet Oral Daily    vancomycin (VANCOCIN) intermittent dosing (placeholder)   Other RX Placeholder    piperacillin-tazobactam  3,375 mg IntraVENous q8h     Continuous Infusions:    sodium chloride 20 mL/hr (04/25/22 1048)     PRN Medicationsdextromethorphan-guaiFENesin, 10 mL, Q4H PRN  sodium chloride flush, 5-40 mL, PRN  sodium chloride, , PRN  ondansetron, 4 mg, Q8H PRN   Or  ondansetron, 4 mg, Q6H PRN  polyethylene glycol, 17 g, Daily PRN  acetaminophen, 650 mg, Q6H PRN   Or  acetaminophen, 650 mg, Q6H PRN      Diagnostic Labs:  CBC:   Recent Labs     04/23/22  0609 04/24/22  0759 04/25/22  0751   WBC 34.5* 34.5* 26.4*   RBC 2.67* 2.79* 2.69*   HGB 8.2* 8.3* 8.2*   HCT 25.6* 27.3* 26.2*   MCV 95.9 97.8 97.4   RDW 22.0* 21.7* 21.8*   PLT See Reflexed IPF Result 214 199     BMP:   Recent Labs     04/23/22  0609 04/24/22  0759 04/25/22  0751   * 135 134*   K 4.1 4.0 4.2   CL 99 105 106   CO2 24 23 26   BUN 66* 54* 36*   CREATININE 1.53* 1.03* 0.80     BNP: No results for input(s): BNP in the last 72 hours. PT/INR: No results for input(s): PROTIME, INR in the last 72 hours. APTT:   Recent Labs     04/24/22  0803 04/24/22  1417 04/24/22  1907   APTT 24.6 24.8 24.0     CARDIAC ENZYMES: No results for input(s): CKMB, CKMBINDEX, TROPONINI in the last 72 hours.     Invalid input(s): CKTOTAL;3  FASTING LIPID PANEL:  Lab Results   Component Value Date    CHOL 192 01/30/2015    HDL 77 01/30/2015    TRIG 84 01/30/2015     LIVER PROFILE:   Recent Labs     04/23/22  0609   AST 13   ALT <5*   BILIDIR 0.09   BILITOT 0. 116 [facility]      MICROBIOLOGY:   Lab Results   Component Value Date/Time    CULTURE NO GROWTH 5 DAYS 11/29/2021 04:05 PM     Imaging:    CT CHEST WO CONTRAST    Result Date: 4/22/2022  Multifocal left-sided pneumonia progressed prior exams. The spiculated nodular opacity upper lung is noted identified perhaps obscured by the airspace disease. Follow-up is strongly recommended resolution of the airspace disease in to further evaluate the previously noted and previously described nodule. Enlarged mediastinal and left hilar lymph nodes. These are non prior imaging studies. This could be metastatic or these could be reactive. Left adrenal nodule 1.5 cm in size. Unchanged. Close attention follow-up imaging recommended. XR CHEST PORTABLE    Result Date: 4/25/2022  Bilateral airspace opacities again seen, left greater than right, compatible with infection. Probable small left pleural effusion. Similar cardiomegaly. XR CHEST PORTABLE    Result Date: 4/23/2022  Stable extensive airspace infiltrate throughout the left lung and mildly progressive vascular congestion in the right lung. XR CHEST PORTABLE    Result Date: 4/22/2022  Increased opacity left hemithorax could represent left pleural fluid combined with atelectasis or pneumonia     ASSESSMENT & PLAN     ASSESSMENT / PLAN:     1. Acute on chronic hypoxic respiratory failure 2/2 community acquired pnmeumonia: Keep oxygen saturations 88 to 92%. Patient will need follow-up CT chest in 4 weeks to assess resolution of pneumonia then BD scan to assess left upper lobe lung nodule. 2. Community-acquired pneumonia: Remains onvancomycin and Zosyn for broad-spectrum coverage as patient appears toxic. Will de-escalate tomorrow. 3. COPD exacerbation: Solu-Medrol 40 Mg IV daily per pulmonology. Proventil nebulizer, Spiriva, Symbicort. Pulmonology following. Recommendations appreciated.    4. Lung nodule: Morphology concerning for malignancy.  Could be metastatic. Outpatient follow up CT in 4 weeks. 5. H/O lung adenocarcinoma s/p resection of LLL 7/2021  6. ASHLEIGH: Resolved  7. Chronic dastolic CHF: Home Lasix PO 40 mg resumed   8. Moderate pericardial effusion: Follows Dr. Tashi Sigala outpatient. Repeat Echo performed with interpretation pending. 9. H/O recurrent DVT/PE:  Continue Eliquis 5 mg twice daily  10. Tobacco abuse: Nicotine patch.  Advised smoking cessation. 11. H/o major depressive disorder: Continue Zoloft 100 Mg daily. 12. GERD: Continue Protonix 40 Mg oral daily. 13. BRAYAN: Outpatient sleep study      DVT ppx: Eliquis  GI ppx: Protonix      PT/OT/SW: PT and OT consulted again. Had not worked with patient since original consultation on admission date. Discharge Planning: Home tomorrow pending Pulmonology clearance and improvement of symptoms    Mis Espinal MD  Internal Medicine Resident, PGY-2  [address], [place]  4/25/2022, 11:00 AM

## 2022-04-25 NOTE — PROGRESS NOTES
[address] [facility] Pharmacokinetic Monitoring Service - Vancomycin    Consulting Provider:  Dr. Valentin Burton    Indication: pna  Day of Therapy: 3  Additional Antimicrobials: Zosyn    Pertinent Laboratory Values: Wt Readings from Last 1 Encounters:   04/25/22 185 lb 9 oz (84.2 kg)     Temp Readings from Last 1 Encounters:   04/25/22 97.9 °F (36.6 °C) (Oral)     Estimated Creatinine Clearance: 76 mL/min (based on SCr of 0.8 mg/dL). Recent Labs     04/24/22  0759 04/25/22  0751   CREATININE 1.03* 0.80   WBC 34.5* 26.4*     Procalcitonin: n/a    Pertinent Cultures:  Culture Date Source Results   4/22 Sputum Pending   4/22 Urine Negative for Legionella       MRSA Nasal Swab:  Pending    Recent vancomycin administrations                     vancomycin (VANCOCIN) 1000 mg in sodium chloride 0.9% 250 mL IVPB (mg) 1,000 mg New Bag 04/24/22 1350    vancomycin (VANCOCIN) 1000 mg in sodium chloride 0.9% 250 mL IVPB (mg) 1,000 mg New Bag 04/23/22 2050    vancomycin (VANCOCIN) 1250 mg in sodium chloride 0.9% 250 mL IVPB (mg) 1,250 mg New Bag 04/23/22 0109                    Results for Nahed Lawrence (MRN [id number]) as of 4/25/2022 10:01   Ref.  Range 4/25/2022 07:51   Vancomycin Rm Latest Units: ug/mL 11.1       Plan:  SCr improved to 0.8; WBC improving but still elevated at 26.4  Initiate vancomycin 1000 mg q12h with close renal fxn monitoring   Random vanc ordered 4/26/22 with AM labs  Pharmacy will continue to monitor patient and adjust therapy as indicated    Thank you for the consult,  Nuno Brian, [facility]  4/25/2022 10:00 AM

## 2022-04-25 NOTE — PLAN OF CARE
Problem: Discharge Planning  Goal: Discharge to home or other facility with appropriate resources  4/25/2022 0631 by Author Vikas RN  Outcome: Progressing  4/25/2022 0224 by Leonides Simmonds, RN  Outcome: Progressing     Problem: Pain  Goal: Verbalizes/displays adequate comfort level or baseline comfort level  4/25/2022 0631 by Author Vikas RN  Outcome: Progressing  4/25/2022 0224 by Leonides Simmonds, RN  Outcome: Progressing     Problem: Safety - Adult  Goal: Free from fall injury  4/25/2022 0631 by Author Vikas RN  Outcome: Progressing  4/25/2022 0224 by Leonides Simmonds, RN  Outcome: Progressing     Problem: Respiratory - Adult  Goal: Achieves optimal ventilation and oxygenation  4/25/2022 0631 by Author Vikas RN  Outcome: Progressing  4/25/2022 0224 by Leonides Simmonds, RN  Outcome: Progressing

## 2022-04-25 NOTE — PLAN OF CARE
Problem: Discharge Planning  Goal: Discharge to home or other facility with appropriate resources  4/25/2022 1903 by Andrea Griffin  Outcome: Progressing  4/25/2022 0631 by Jasson Lin RN  Outcome: Progressing     Problem: Pain  Goal: Verbalizes/displays adequate comfort level or baseline comfort level  4/25/2022 1903 by Andrea Griffin  Outcome: Progressing  4/25/2022 0631 by Jasson Lin RN  Outcome: Progressing     Problem: Safety - Adult  Goal: Free from fall injury  4/25/2022 1903 by Andrea Griffin  Outcome: Progressing  4/25/2022 0631 by Jasson Lin RN  Outcome: Progressing     Problem: Respiratory - Adult  Goal: Achieves optimal ventilation and oxygenation  4/25/2022 1903 by Andrea Griffin  Outcome: Progressing  4/25/2022 0631 by Jasson Lin RN  Outcome: Progressing

## 2022-04-25 NOTE — PROGRESS NOTES
Patient - Aniya Escamilla   MRN -  [id number]   Acct # - [de-identified]   - 1955        Date of evaluation -  2022    REASON FOR THE CONSULTATION:  Shortness of breath, Pneumonia  HISTORY OF PRESENT ILLNESS:    Aniya Escamilla is a 61y.o. year old female well-known to me from outpatient tests. Patient has history of left lung adenocarcinoma post left lower lobe lobectomy in 2021. She continues to smoke actively. On her recent CT chest left upper lobe lung nodule had increased in size. PET scan had been ordered but is pending. Prior to that patient started complaining of 2 days of shortness of breath and worsening cough malaise and fevers. She did complain of some chills. She is coughing but is not able to expectorate. Denies hemoptysis. The ER chest x-ray showed left lung consolidation is from previous x-ray. CT of the chest showed multilobar pneumonia on the left side and left upper lobe nodule. Mediastinal lymph nodes are also enlarged.         2022   Subjective     Afebrile   Improving cough, shortness of breath and wheezing  On oxygen by nasal cannula  No orthopnea or PND  No chest pain or pressure  No increasing pedal edema        Immunization   Immunization History   Administered Date(s) Administered    Influenza Virus Vaccine 2016    Influenza, Quadv, IM, PF (6 mo and older Fluzone, Flulaval, Fluarix, and 3 yrs and older Afluria) 10/28/2016, 2018    Pneumococcal Polysaccharide ([id number]) 2017    Tdap (Boostrix, Adacel) 2019    Zoster Recombinant (Shingrix) 2019, 2019              PAST MEDICAL HISTORY:       Diagnosis Date    Abnormal CT of the chest 2021    [facility]enocarcinoma, lung, left ([facility] 2021    Arthritis     Community acquired pneumonia 2019    COPD (chronic obstructive pulmonary disease) ([facility] 2019    Deep venous thrombosis of left profunda femoris vein ([facility] 2020    Depression     DVT (deep venous thrombosis) ([facility] 2014    b/l     GERD (gastroesophageal reflux disease)     History of pulmonary embolism 05/27/2021    History of thyroid nodule 12/23/2019    Hypertension     Major depressive disorder, recurrent, moderate ([facility] 11/12/2018    Nodule of lower lobe of left lung 04/23/2021    Obesity, Class II, BMI 35-39.9 11/12/2018    On anticoagulant therapy 05/27/2021    On home oxygen therapy     02 2L NC PRN    On methotrexate therapy 05/27/2021    Prediabetes 11/12/2018    Rheumatoid arthritis ([facility]     Snores     denies apnea    Thyroid nodule 01/09/2020    Tobacco abuse 11/12/2018    Under care of team 06/29/2021    pulmonology-Dr Blank- gareth-last visit june 2021    Under care of team 06/29/2021    oncology-Dr Kelly- Kristen-last visit june 2021    Under care of team     Dr. Emmanuel Desai clearance appointment 7/6/21, stress test 7/9/2021, clearance obtained and placed on chart    Wellness examination 06/29/2021    pcp-Dr Anny Payan office-last visit may 2021         Family History:       Problem Relation Age of Onset    Cancer Mother         Uterine and breast    Diabetes Mother     Heart Disease Mother     Cancer Father         lung    Cancer Brother         lung    Cancer Brother         lung       SURGICAL HISTORY:   Past Surgical History:   Procedure Laterality Date    APPENDECTOMY  1982    BRONCHOSCOPY N/A 12/27/2019    BRONCHOSCOPY ALVEOLAR LAVAGE performed by Josy Hernandez MD at [address] [place] [address] N/A 2/12/2020    COLONOSCOPY POLYPECTOMIES HOT SNARE, COLD BIOPSY POLYPECTOMIES performed by Antolin Cabezas MD at [facility]  5/13/2021    CT NEEDLE BIOPSY LUNG PERCUTANEOUS 5/13/2021 [facility] CT SCAN    ENDOSCOPY, COLON, DIAGNOSTIC  [id number]    gastritis, sm. bowel lipoma, biopsies    INSERT MIDLINE CATHETER  11/3/2021         LOBECTOMY Left 07/19/2021    XI ROBOTIC LEFT LOWER LOBECTOMY CONVERTED TO OPEN THORACTOMY LEFT LOWER LOBECTOMY (Left )    LUNG REMOVAL, TOTAL Left 2021    XI ROBOTIC LEFT LOWER LOBECTOMY CONVERTED TO OPEN THORACTOMY LEFT LOWER LOBECTOMY performed by Rehana Aleman MD at [address] [place] [address] Rd:   Social History     Socioeconomic History    Marital status: Single     Spouse name: None    Number of children: 6    Years of education: None    Highest education level: None   Occupational History     Employer: N/A   Tobacco Use    Smoking status: Current Every Day Smoker     Packs/day: 0.25     Years: 35.00     Pack years: 8.75     Types: Cigarettes     Last attempt to quit: 1986     Years since quittin.3    Smokeless tobacco: Never Used    Tobacco comment: restarted smoking 2019   Vaping Use    Vaping Use: Never used   Substance and Sexual Activity    Alcohol use: Not Currently     Alcohol/week: 0.0 standard drinks    Drug use: No    Sexual activity: Not Currently   Other Topics Concern    None   Social History Narrative    None     Social Determinants of Health     Financial Resource Strain: Low Risk     Difficulty of Paying Living Expenses: Not hard at all   Food Insecurity: No Food Insecurity    Worried About Running Out of Food in the Last Year: Never true    Emelyn of Food in the Last Year: Never true   Transportation Needs: No Transportation Needs    Lack of Transportation (Medical): No    Lack of Transportation (Non-Medical):  No   Physical Activity:     Days of Exercise per Week: Not on file    Minutes of Exercise per Session: Not on file   Stress:     Feeling of Stress : Not on file   Social Connections:     Frequency of Communication with Friends and Family: Not on file    Frequency of Social Gatherings with Friends and Family: Not on file    Attends Temple Services: Not on file    Active Member of Clubs or Organizations: Not on file    Attends Club or Organization Meetings: Not on file    Marital Status: Not on file   Intimate Partner Violence:     Fear of Current or Ex-Partner: Not on file    Emotionally Abused: Not on file    Physically Abused: Not on file    Sexually Abused: Not on file   Housing Stability:     Unable to Pay for Housing in the Last Year: Not on file    Number of Nancy in the Last Year: Not on file    Unstable Housing in the Last Year: Not on file        TOBACCO:   reports that she has been smoking cigarettes. She has a 8.75 pack-year smoking history. She has never used smokeless tobacco.  ETOH:   reports previous alcohol use. ALLERGIES:  No Known Allergies    Home Meds:   Prior to Admission medications    Medication Sig Start Date End Date Taking?  Authorizing Provider   metoprolol tartrate (LOPRESSOR) 25 MG tablet Take 0.5 tablets by mouth 2 times daily 3/18/22  Yes Kimo Bean MD   amLODIPine (NORVASC) 5 MG tablet Take 1 tablet by mouth daily 3/14/22  Yes Kimo Bean MD   Handicap Placard MISC by Does not apply route 3/14/2022 to 3/13/2027    Panlobular emphysema ([facility]  (primary encounter diagnosis) 3/14/22  Yes Mirna Finney MD   SPIRIVA RESPIMAT 2.5 MCG/ACT AERS inhaler inhale 2 puffs INTO THE LUNGS once daily 2/23/22  Yes Kimo Bean MD   albuterol sulfate  (90 Base) MCG/ACT inhaler inhale 2 puffs by mouth and INTO THE LUNGS every 4 hours prn wheezing 2/10/22  Yes Sofia Sorto DO   albuterol (PROVENTIL) (2.5 MG/3ML) 0.083% nebulizer solution Take 3 mLs by nebulization every 6 hours as needed for Wheezing 2/10/22  Yes Sfoia Sorto DO   ondansetron (ZOFRAN) 4 MG tablet take 1 tablet by mouth three times a day if needed for nausea and vomiting 1/31/22  Yes Kimo Bean MD   sertraline (ZOLOFT) 100 MG tablet take 1 tablet by mouth once daily 1/24/22  Yes Kimo Bean MD   Prenatal Vit-Fe Fumarate-FA (PRENATAL VITAMIN) 27-0.8 MG TABS Take 1 tablet by mouth daily 12/16/21  Yes Smita Thornton MD   furosemide (LASIX) 40 MG tablet Take 1 tablet by mouth daily 12/2/21  Yes Bjorn Grimes,    potassium chloride (KLOR-CON M) 20 MEQ extended release tablet Take 1 tablet by mouth 2 times daily 7/23/21  Yes ILIR Bennett NP   acetaminophen (TYLENOL) 500 MG tablet Take 1,000 mg by mouth every 6 hours as needed for Pain   Yes Historical Provider, MD   ELIQUIS 5 MG TABS tablet take 1 tablet by mouth twice a day 7/16/21  Yes Madelin Tineo MD   pantoprazole (PROTONIX) 40 MG tablet take 1 tablet by mouth once daily 5/18/21  Yes Madelin Tineo MD   nicotine (NICODERM CQ) 14 MG/24HR Place 1 patch onto the skin daily for 28 days 3/14/22 4/11/22  Landon Mcarthur MD     CURRENT MEDS :  Scheduled Meds:   apixaban  5 mg Oral BID    vitamin D  50,000 Units Oral Weekly    budesonide-formoterol  2 puff Inhalation BID    methylPREDNISolone  40 mg IntraVENous Daily    albuterol  2.5 mg Nebulization 4x daily    [Held by provider] amLODIPine  5 mg Oral Daily    [Held by provider] furosemide  40 mg Oral Daily    [Held by provider] metoprolol tartrate  12.5 mg Oral BID    pantoprazole  40 mg Oral QAM AC    potassium chloride  20 mEq Oral BID    sertraline  100 mg Oral Daily    tiotropium  2 puff Inhalation Daily    sodium chloride flush  5-40 mL IntraVENous 2 times per day    prenatal vitamin  1 tablet Oral Daily    vancomycin (VANCOCIN) intermittent dosing (placeholder)   Other RX Placeholder    piperacillin-tazobactam  3,375 mg IntraVENous q8h       Continuous Infusions:   sodium chloride      sodium chloride 100 mL/hr at 04/25/22 0720       Review of Systems -  Review of Systems -   General ROS: Completed and except as mentioned above were negative   Psychological ROS:  Completed and except as mentioned above were negative  Ophthalmic ROS:  Completed and except as mentioned above were negative  ENT ROS:  Completed and except as mentioned above were negative  Allergy and Immunology ROS:  Completed and except as mentioned above were negative  Hematological and Lymphatic ROS:  Completed and except as mentioned above were negative  Endocrine ROS: Completed and except as mentioned above were negative  Breast ROS:  Completed and except as mentioned above were negative  Respiratory ROS:  Completed and except as mentioned above were negative  Cardiovascular ROS:  Completed and except as mentioned above were negative  Gastrointestinal ROS: Completed and except as mentioned above were negative  Genito-Urinary ROS:  Completed and except as mentioned above were negative  Musculoskeletal ROS:  Completed and except as mentioned above were negative  Neurological ROS:  Completed and except as mentioned above were negative  Dermatological ROS:  Completed and except as mentioned above were negative    Vitals:  BP (!) 156/92   Pulse 87   Temp 97.9 °F (36.6 °C) (Oral)   Resp 18   Ht 5' 5\" (1.651 m)   Wt 185 lb 9 oz (84.2 kg)   SpO2 95%   BMI 30.88 kg/m²     PHYSICAL EXAM:  Head and neck atraumatic, normocephalic    Lymph nodes-no cervical, supraclavicular lymphadenopathy    Neck-no JVP elevation    Lungs -  AP diameter of chest increased. Thoracic expansion and diaphragmatic excursion diminished. BS diminished and expiratory phase prolonged. Dullness to percussion left posterior chest and there are crackles left posterior chest, axilla and anteriorly, rhonchi expiratory and insp both sides     CVS- S1, S2 regular. No S3 no S4, no murmurs    Abdomen-nontender, nondistended. Bowel sounds are present. No organomegaly    Bilateral lower extremity-no edema    Bilateral upper extremity-no edema    Neurological-patient is alert and oriented x3.   No overt motor deficit          CBC:   Recent Labs     04/23/22  0121 04/23/22  0609 04/24/22  0759   WBC 33.6* 34.5* 34.5*   HGB 8.4* 8.2* 8.3*    See Reflexed IPF Result 214     BMP:    Recent Labs     04/22/22  1641 04/22/22  1641 04/22/22  1757 04/23/22  0609 04/24/22  0759   *  --   --  131* 135   K 3.6*  --   --  4.1 4.0 CL 97*  --   --  99 105   CO2 25  --   --  24 23   BUN 62*   < > 53* 66* 54*   CREATININE 1.95*   < > 1.67* 1.53* 1.03*   GLUCOSE 109*  --   --  163* 168*    < > = values in this interval not displayed. Hepatic:   Recent Labs     04/23/22  0609   AST 13   ALT <5*   BILITOT 0.52   ALKPHOS 74     Amylase:   Lab Results   Component Value Date    AMYLASE 18 05/01/2014     Lipase:   Lab Results   Component Value Date    LIPASE 21 05/01/2014     Troponin: No results for input(s): TROPONINI in the last 72 hours. BNP: No results for input(s): BNP in the last 72 hours. Lipids: No results for input(s): CHOL, HDL in the last 72 hours. Invalid input(s): LDLCALCU  ABGs:   Lab Results   Component Value Date    PHART 7.442 09/18/2021    PO2ART 38.2 09/18/2021    KQD0LTI 39.2 09/18/2021     INR: No results for input(s): INR in the last 72 hours. Thyroid:   Lab Results   Component Value Date    TSH 0.38 04/23/2022     Urinalysis: No results for input(s): BACTERIA, BLOODU, CLARITYU, COLORU, PHUR, PROTEINU, RBCUA, SPECGRAV, BILIRUBINUR, NITRU, WBCUA, LEUKOCYTESUR, GLUCOSEU in the last 72 hours. CT CHEST WO CONTRAST    Result Date: 4/22/2022  Multifocal left-sided pneumonia progressed prior exams. The spiculated nodular opacity upper lung is noted identified perhaps obscured by the airspace disease. Follow-up is strongly recommended resolution of the airspace disease in to further evaluate the previously noted and previously described nodule. Enlarged mediastinal and left hilar lymph nodes. These are non prior imaging studies. This could be metastatic or these could be reactive. Left adrenal nodule 1.5 cm in size. Unchanged. Close attention follow-up imaging recommended. XR CHEST PORTABLE    Result Date: 4/23/2022  Stable extensive airspace infiltrate throughout the left lung and mildly progressive vascular congestion in the right lung.      XR CHEST PORTABLE    Result Date: 4/22/2022  Increased opacity left hemithorax could represent left pleural fluid combined with atelectasis or pneumonia      IMPRESSION:    Left multilobar pneumonia. Sepsis  Acute on chronic hypoxic respiratory failure due to multilobar pneumonia. Left upper lobe pulmonary nodule-enlarging, outpatient PET scan pending. History of adenocarcinoma of left lung post left lower lobe lobectomy. Chronic obstructive pulmonary disease and emphysema. Rheumatoid arthritis. History of DVT. Right middle lobe atelectasis which is improved    from CT chest 4/8/2022      Leukocytosis, improving  Anemia, stable    PLAN:      No  blood cultures taken   Continue  Zosyn and vancomycin. Vancomycin levels are being monitored  Chest x-ray today, 4/25/2022 showed bilateral airspace opacities and small left pleural effusion  Change to oral corticosteroids  Continue supplemental oxygen keep saturations 88 to 92%. Bronchodilator therapy. continue Eliquis. Will need follow-up CT chest in 6 weeks to assess resolution of pneumonia and then PET CT scan to assess left upper lobe lung nodule. Discussed with patient. D/w primary           I hope this updates you on my evaluation and clinical thinking. Thank you for allowing me to participate in his care.      Sincerely,      Electronically signed by Erling Heimlich, MD on 4/25/2022 at 8:23 AM

## 2022-04-25 NOTE — PLAN OF CARE
Problem: Discharge Planning  Goal: Discharge to home or other facility with appropriate resources  4/25/2022 0224 by Kenrick Gonzalez RN  Outcome: Progressing  2/94/7350 8545 by Karina Ma RN  Outcome: Progressing     Problem: Pain  Goal: Verbalizes/displays adequate comfort level or baseline comfort level  4/25/2022 0224 by Kenrick Gonzalez RN  Outcome: Progressing  7/63/8437 8219 by Karina Ma RN  Outcome: Progressing     Problem: Safety - Adult  Goal: Free from fall injury  4/25/2022 0224 by Kenrick Gonzalez RN  Outcome: Progressing  0/76/6251 5863 by Karina Ma RN  Outcome: Progressing     Problem: Respiratory - Adult  Goal: Achieves optimal ventilation and oxygenation  4/25/2022 0224 by Kenrick Gonzalez RN  Outcome: Progressing  4/28/4339 8651 by Karina Ma RN  Outcome: Progressing

## 2022-04-25 NOTE — PROGRESS NOTES
Physical Therapy  Facility/Department: [facility] [place] RENAL//MED SURG  Physical Therapy Initial Assessment    Name: Natividad Goncalves  : 1955  MRN: [id number]  Date of Service: 2022    Discharge Recommendations:  Patient would benefit from continued therapy after discharge   PT Equipment Recommendations  Equipment Needed: Yes  Mobility Devices: Aide New: Rolling      Patient Diagnosis(es): The primary encounter diagnosis was Pneumonia of left lower lobe due to infectious organism. A diagnosis of Chronic obstructive pulmonary disease with acute exacerbation ([address] was also pertinent to this visit. Past Medical History:  has a past medical history of Abnormal CT of the chest, Adenocarcinoma, lung, left ([address] Arthritis, Community acquired pneumonia, COPD (chronic obstructive pulmonary disease) ([address] Deep venous thrombosis of left profunda femoris vein ([address] Depression, DVT (deep venous thrombosis) ([address] GERD (gastroesophageal reflux disease), History of pulmonary embolism, History of thyroid nodule, Hypertension, Major depressive disorder, recurrent, moderate ([address] Nodule of lower lobe of left lung, Obesity, Class II, BMI 35-39.9, On anticoagulant therapy, On home oxygen therapy, On methotrexate therapy, Prediabetes, Rheumatoid arthritis ([address] Snores, Thyroid nodule, Tobacco abuse, Under care of team, Under care of team, Under care of team, and Wellness examination. Past Surgical History:  has a past surgical history that includes Appendectomy (); Endoscopy, colon, diagnostic (804178); bronchoscopy (N/A, 2019); Colonoscopy (N/A, 2020); CT NEEDLE BIOPSY LUNG PERCUTANEOUS (2021); lobectomy (Left, 2021); Lung removal, total (Left, 2021); and Insert Midline Catheter (11/3/2021). Assessment   Body Structures, Functions, Activity Limitations Requiring Skilled Therapeutic Intervention: Decreased functional mobility ; Decreased endurance;Decreased cognition;Decreased Extension: 4+/5  L Knee Extension: 4+/5  L Ankle Dorsiflexion: 4+/5      Transfers  Sit to Stand: Contact guard assistance  Stand to sit: Stand by assistance  Comment: Impaired safety awareness. Ambulation  Surface: level tile  Device: Rolling Walker  Assistance: Contact guard assistance;Stand by assistance  Quality of Gait: Slow and steady with walker. Mild shortness of breath on 2 LPM.  Gait Deviations: Slow Caprice;Decreased step length;Decreased step height  Distance: 150'     Balance  Sitting - Static: Good  Sitting - Dynamic: Good  Standing - Static: Fair;+  Standing - Dynamic: Fair                AM-PAC Score  AM-PAC Inpatient Mobility Raw Score : 19 (04/25/22 1503)  AM-PAC Inpatient T-Scale Score : 45.44 (04/25/22 1503)  Mobility Inpatient CMS 0-100% Score: 41.77 (04/25/22 1503)  Mobility Inpatient CMS G-Code Modifier : CK (04/25/22 1503)          Goals  Short Term Goals  Time Frame for Short term goals: 14 visits  Short term goal 1: Sit to/from stand with independence. Short term goal 2: Ambulate 200' with walker with SBA. Short term goal 3: Negotiate up and down 2 steps with one railing with CGA.        Therapy Time   Individual Concurrent Group Co-treatment   Time In 9845         Time Out 1445         Minutes 30         Timed Code Treatment Minutes: JARAD Murphy

## 2022-04-25 NOTE — CARE COORDINATION
[facility] Flow/Interdisciplinary Rounds Progress Note    Quality Flow Rounds held on April 25, 2022 at [address] [facility] [place] Attending:  Bedside Nurse,  and Nursing Unit Leadership    :    Readmission Risk              Risk of Unplanned Readmission:  45           Discussed patient goal for the day, patient clinical progression, and barriers to discharge.   The following Goal(s) of the Day/Commitment(s) have been identified:    Plan home  Patient is asking for a walker ps Dr. Fadumo Charles, RN  April 25, 2022

## 2022-04-26 NOTE — CARE COORDINATION
Transitional planning    Writer received order for walker, clinical paperwork faxed to [facility], call to Silvia Dyer to update. Patient updated will be delivered to hospital.    1300 Received call from [facility] at [facility] and no walkers available. Call placed to [facility] at [place], left message with request for call back. 1345 Received call back from [facility] , will fax clinical paperwork when return from break, she states they have 1 available. [facility] paperwork and order to [place] @[phone].      2063 Call to [facility] to update on faxed clinicals , will review for insurance. Patient updated, received order for home 02, [facility] [address] [facility], has everything she needs.

## 2022-04-26 NOTE — PLAN OF CARE
Problem: Pain  Goal: Verbalizes/displays adequate comfort level or baseline comfort level  Outcome: Adequate for Discharge     Problem: Discharge Planning  Goal: Discharge to home or other facility with appropriate resources  Outcome: Completed     Problem: Safety - Adult  Goal: Free from fall injury  Outcome: Completed     Problem: Respiratory - Adult  Goal: Achieves optimal ventilation and oxygenation  Outcome: Completed

## 2022-04-26 NOTE — PROGRESS NOTES
PULMONARY & CRITICAL CARE MEDICINE PROGRESS  NOTE     Patient:  Eduardo Posey  MRN: [id number]  [facility] date: 4/22/2022  Primary Care Physician: Mikki Martinez MD  Consulting Physician: Amy Huerta MD  CODE Status: Full Code  LOS: 4    SUBJECTIVE     I personally interviewed/examined the patient, reviewed interval history and interpreted all available radiographic, laboratory data at the time of service. Chief Compliant/Reason for Initial Consult:   Pneumonia/acute on chronic hypoxic respiratory failure/COPD    Brief Hospital Course: The patient is a 61 y.o. female  Patient has history of left lung adenocarcinoma post left lower lobe lobectomy in 7/20/2021. She continues to smoke actively. On her recent CT chest left upper lobe lung nodule had increased in size. PET scan had been ordered but is pending. Prior to that patient started complaining of 2 days of shortness of breath and worsening cough malaise and fevers. She did complain of some chills. She is coughing but is not able to expectorate. Denies hemoptysis. The ER chest x-ray showed left lung consolidation is from previous x-ray. CT of the chest showed multilobar pneumonia on the left side and left upper lobe nodule. Mediastinal lymph nodes are also enlarged. Interval History:  04/26/22  Overnight events noted. Patient is afebrile overnight T-max of 97.8 she is hemodynamically stable. Heart rate is in 80s to 90s and no acute hypoxic events were reported. She remains on 2 to 3 L nasal cannula weaned down from 4 L and she is saturating 90% above at rest.  She is able to ambulate to bathroom according to patient does get shortness of breath. She does have cough cough usually dry and does not expectorate occasional whitish sputum denies hemoptysis and denies chest pain does complain of wheezing occasionally. Does not complain of fever and appetite is okay.   She has been getting vancomycin and Zosyn for pneumonia. Her labs show WBC count is 28.6 hemoglobin is 7.9 BUN is 37 creatinine 0.68 and bicarbonate 33. She has a chest x-ray done today which was a portable x-ray shows better aeration of left lower lung field as compared to chest x-ray on 2022 probable small left pleural effusion. Review of Systems:  Review of Systems   Constitutional: Positive for activity change. Negative for fatigue and fever. HENT: Negative for nosebleeds, postnasal drip, sinus pain, sore throat, trouble swallowing and voice change. Eyes: Negative for photophobia, redness and visual disturbance. Respiratory: Positive for cough, shortness of breath and wheezing. Negative for choking. Cardiovascular: Positive for leg swelling. Negative for chest pain and palpitations. Gastrointestinal: Negative for abdominal pain, constipation, diarrhea and vomiting. Endocrine: Negative for polydipsia, polyphagia and polyuria. Genitourinary: Negative for dysuria, frequency and hematuria. Musculoskeletal: Negative. Allergic/Immunologic: Negative. Neurological: Negative for dizziness, speech difficulty and headaches. Hematological: Negative for adenopathy. Does not bruise/bleed easily. Psychiatric/Behavioral: Negative.         OBJECTIVE     VITAL SIGNS:   LAST-  BP (!) 155/84   Pulse 73   Temp 97.8 °F (36.6 °C) (Oral)   Resp 18   Ht 5' 5\" (1.651 m)   Wt 184 lb 11.9 oz (83.8 kg)   SpO2 93%   BMI 30.74 kg/m²   8-24 HR RANGE-  TEMP Temp  Av.8 °F (36.6 °C)  Min: 97.7 °F (36.5 °C)  Max: 97.8 °F (68.9 °C)   BP Systolic (14TGT), RYX:373 , Min:148 , KQT:421      Diastolic (90SVF), VE, Min:76, Max:84     PULSE Pulse  Av  Min: 73  Max: 95   RR Resp  Av.5  Min: 17  Max: 18   O2 SAT SpO2  Av.5 %  Min: 86 %  Max: 93 %   OXYGEN DELIVERY No data recorded     Systemic Examination:   Physical Exam  General appearance - looks comfortable and in no acute distress, mildly tachypneic  Mental status - alert, oriented to person, place, and time  Eyes - pupils equal and reactive, extraocular eye movements intact  Mouth - mucous membranes moist, pharynx normal without lesions  Neck - supple, no significant adenopathy  Chest - Chest was symmetrical without dullness to percussion. Breath sounds bilaterally were present bilaterally, bilaterally distant with prolonged expiration mild expiratory wheezing. Bilateral basilar crackles present mostly in left lower lung field with decreased breath sound. There is no intercostal recession or use of accessory muscles  Heart - normal rate, regular rhythm, normal S1, S2, no murmurs, rubs, clicks or gallops  Abdomen - soft, nontender, nondistended, no masses or organomegaly  Neurological - alert, oriented, normal speech, no focal findings or movement disorder noted  Extremities - peripheral pulses normal, mild pedal edema, no clubbing or cyanosis, no calf tenderness.   Skin - normal coloration and turgor, no rashes, no suspicious skin lesions noted     DATA REVIEW     Medications:  Scheduled Meds:   vancomycin  1,500 mg IntraVENous Q18H    [START ON 4/27/2022] levoFLOXacin  750 mg Oral Daily    furosemide  40 mg Oral Daily    metoprolol tartrate  12.5 mg Oral BID    predniSONE  40 mg Oral Daily    Followed by   Say [address] ON 4/28/2022] predniSONE  30 mg Oral Daily    Followed by   Say [address] ON 5/1/2022] predniSONE  20 mg Oral Daily    Followed by   Say Lane ON 5/4/2022] predniSONE  10 mg Oral Daily    apixaban  5 mg Oral BID    vitamin D  50,000 Units Oral Weekly    budesonide-formoterol  2 puff Inhalation BID    albuterol  2.5 mg Nebulization 4x daily    amLODIPine  5 mg Oral Daily    pantoprazole  40 mg Oral QAM AC    potassium chloride  20 mEq Oral BID    sertraline  100 mg Oral Daily    tiotropium  2 puff Inhalation Daily    sodium chloride flush  5-40 mL IntraVENous 2 times per day    prenatal vitamin  1 tablet Oral Daily    vancomycin (VANCOCIN) intermittent dosing (placeholder)   Other RX Placeholder    piperacillin-tazobactam  3,375 mg IntraVENous q8h     Continuous Infusions:   sodium chloride Stopped (04/26/22 0402)     LABS:-  ABG:   No results for input(s): POCPH, POCPCO2, POCPO2, POCHCO3, HMZL4YZG in the last 72 hours. CBC:   Recent Labs     04/24/22 0759 04/25/22 0751 04/26/22  0034   WBC 34.5* 26.4* 28.6*   HGB 8.3* 8.2* 7.9*   HCT 27.3* 26.2* 26.1*   MCV 97.8 97.4 96.7    199 269   LYMPHOPCT 16* 13* 14*   RBC 2.79* 2.69* 2.70*   MCH 29.7 30.5 29.3   MCHC 30.4 31.3 30.3   RDW 21.7* 21.8* 21.4*     BMP:   Recent Labs     04/24/22 0759 04/25/22 0751 04/26/22  0607    134* 139   K 4.0 4.2 3.8    106 104   CO2 23 26 33*   BUN 54* 36* 37*   CREATININE 1.03* 0.80 0.68   GLUCOSE 168* 103* 147*     Liver Function Test:   No results for input(s): PROT, LABALBU, ALT, AST, GGT, ALKPHOS, BILITOT in the last 72 hours. Amylase/Lipase:  No results for input(s): AMYLASE, LIPASE in the last 72 hours. Coagulation Profile:   Recent Labs     04/25/22 1938 04/26/22  0034 04/26/22  0607   APTT 23.7 23.6 26.2     Cardiac Enzymes:  No results for input(s): CKTOTAL, CKMB, CKMBINDEX, TROPONINI in the last 72 hours.   Lactic Acid:  Lab Results   Component Value Date    LACTA 1.0 11/29/2021     BNP:   No results found for: BNP  D-Dimer:  Lab Results   Component Value Date    DDIMER 1.97 10/31/2021     Others:   Lab Results   Component Value Date    TSH 0.38 04/23/2022     Lab Results   Component Value Date    SARITA POSITIVE (A) 07/08/2014    SEDRATE 70 (H) 02/09/2022    CRP 24.9 (H) 02/09/2022     Lab Results   Component Value Date    URICACID 5.0 12/05/2011     Lab Results   Component Value Date    IRON 33 (L) 02/09/2022    TIBC 291 02/09/2022    FERRITIN 70 02/09/2022     No results found for: SPEP, UPEP  No results found for: PSA, CEA, , YQ4780,     Input/Output:    Intake/Output Summary (Last 24 hours) at 4/26/2022 1315  Last data filed at 4/26/2022 0609  Gross per 24 hour   Intake 257.77 ml   Output --   Net 257.77 ml       Microbiology:  No results for input(s): SPECDESC, SPECDESC, SPECIAL, CULTURE, CULTURE, STATUS, ORG, CDIFFTOXPCR, CAMPYLOBPCR, SALMONELLAPC, SHIGAPCR, SHIGELLAPCR, MPNEUG, MPNEUM, LACTOQL in the last 72 hours. Pathology:    Radiology reports:  XR CHEST PORTABLE   Final Result   Bilateral airspace opacities again seen, left greater than right, compatible   with infection. Probable small left pleural effusion. Similar cardiomegaly. XR CHEST PORTABLE   Final Result   Stable extensive airspace infiltrate throughout the left lung and mildly   progressive vascular congestion in the right lung. CT CHEST WO CONTRAST   Final Result   Multifocal left-sided pneumonia progressed prior exams. The spiculated   nodular opacity upper lung is noted identified perhaps obscured by the   airspace disease. Follow-up is strongly recommended resolution of the   airspace disease in to further evaluate the previously noted and previously   described nodule. Enlarged mediastinal and left hilar lymph nodes. These are non prior imaging   studies. This could be metastatic or these could be reactive. Left adrenal nodule 1.5 cm in size. Unchanged. Close attention follow-up   imaging recommended.          XR CHEST PORTABLE   Final Result   Increased opacity left hemithorax could represent left pleural fluid combined   with atelectasis or pneumonia             Echocardiogram:   Results for orders placed during the hospital encounter of 04/22/22    ECHO Complete 2D W Doppler W Color    Narrative  Transthoracic Echocardiography Report (TTE)    Patient Name [facility]   Date of Study         04/25/2022  PAPO ALCAZAR    Date of      1955  Gender                Female  Birth    Age          61 year(s)  Race                      Room Number  0316        Height:               65 inch, 165.1 cm    Corporate ID [id number]    Weight:               174 pounds, 78.9 kg  #    Patient Acct [de-identified]   BSA:       1.86 m^2   BMI:        28.96 kg/m^2  #    MR #         [id number]     Elbert Maharaj    Accession #  [id number]  Interpreting          [address] [facility]  Physician    Fellow                   Referring Nurse  Practitioner    Interpreting             Referring Physician   Radha Ghosh  Fellow                                         Serge Mccrray MD    Type of Study    TTE procedure:2D Echocardiogram, M-Mode, Doppler, Color Doppler. Procedure Date  Date: 04/25/2022 Start: 09:44 AM    Study Location: [facility]  Technical Quality: Adequate visualization    Indications:Pericardial effusion. History / Tech. Comments:  Procedure explained to patient. BRAYAN, COPD, HTN    Patient Status: Inpatient    Height: 65 inches Weight: 174 pounds BSA: 1.86 m^2 BMI: 28.96 kg/m^2    CONCLUSIONS    Summary  Left ventricle is normal in size. Global left ventricular systolic function  is normal. Calculated ejection fraction 53% by Heart Model. Left atrium is severely dilated. Normal right ventricular function. Mildly dilated right ventricular cavity. TAPSE value of 2.72cm noted. Aortic valve is trileaflet and opens adequately. Trivial aortic insufficiency. Normal mitral valve structure and function. Moderate mitral regurgitation. Multiple jets noted. EOA =0.23 cm2. PISA radius is 0.8cm. Vena contracta is 0.58cm. MR volume is  43ml. Mean gradient 7mmHg. No obvious valvular abnormality. Moderate tricuspid regurgitation. Severe pulmonary hypertension. Estimated right ventricular systolic pressure  is 18IVRE. E/E' average = 15.8. IVC Increased diameter, but still has inspiratory variation suggesting upper  normal or mildly elevated RA filling pressure (i.e. CVP) .     Signature  ----------------------------------------------------------------------------  Electronically signed by Mamta Busby(Sonographer) on 2022  10:56 AM  ----------------------------------------------------------------------------    ----------------------------------------------------------------------------  Electronically signed by Mikel Trujillo(Interpreting physician) on 2022  12:24 PM  ----------------------------------------------------------------------------  FINDINGS  Left Atrium  Left atrium is severely dilated. Left Ventricle  Left ventricle is normal in size. Global left ventricular systolic function  is normal. Calculated ejection fraction 53% by Heart Model. Right Atrium  Right atrial dilatation. Right Ventricle  Normal right ventricular function. Mildly dilated right ventricular cavity. TAPSE value of 2.72cm noted. Mitral Valve  Normal mitral valve structure and function. Moderate mitral regurgitation. Multiple jets noted. EOA =0.23 cm2. PISA radius is 0.8cm. Vena contracta is 0.58cm. MR volume is  43ml. Mean gradient 7mmHg. Aortic Valve  Aortic valve is trileaflet and opens adequately. Trivial aortic insufficiency. Tricuspid Valve  No obvious valvular abnormality. Moderate tricuspid regurgitation. Severe pulmonary hypertension. Estimated right ventricular systolic pressure  is 25QYWO. Pulmonic Valve  The pulmonic valve is normal in structure. No pulmonic insufficiency. Pericardial Effusion  No pericardial effusion seen. Miscellaneous  E/E' average = 15.8. IVC Increased diameter, but still has inspiratory variation suggesting upper  normal or mildly elevated RA filling pressure (i.e. CVP) .     M-mode / 2D Measurements & Calculations:    LVIDd:5.4 cm(3.7 - 5.6 cm)       Diastolic AXETEV:198 ml  YFOYQ:6.9 cm(2.2 - 4.0 cm)       Systolic KOXJYJ:87 ml  QMTM:7.3 cm(0.6 - 1.1 cm)        Aortic Root:3.4 cm(2.0 - 3.7 cm)  LVPWd:1 cm(0.6 - 1.1 cm)         LA Dimension: 4.9 cm(1.9 - 4.0 cm)  Fractional Shortenin.04 %    LA volume/Index: 91.9 ml /49m^2  Calculated LVEF (%): 52.69 % LVOT:2.1 cm  RVDd:4.4 cm    Mitral:                                 Aortic    Valve Area (P1/2-Time): 4.4 cm^2        Peak Velocity: 1.66 m/s  Peak E-Wave: 1.40 m/s                   Mean Velocity: 0.93 m/s  Peak A-Wave: 1.27 m/s                   Peak Gradient: 11.02 mmHg  E/A Ratio: 1.1                          Mean Gradient: 4 mmHg  Peak Gradient: 7.84 mmHg  Mean Gradient: 7 mmHg  Deceleration Time: 171 msec             Area (continuity): 2.65 cm^2  P1/2t: 50 msec                          AV VTI: 32.6 cm  MR PISA Radius: 0.8 cm  MR Alias Velocity: 0.37 m/s  MR Velocity: 6.59 m/s  JOSHUA Volumetric: 0.17 cm^2  Mean Velocity: 1.24 m/s  MR VTI: 187 cm  JOSHUA PISA: 0.23 cm^2    Tricuspid:                              Pulmonic:    Peak TR Velocity: 3.76 m/s              Peak Velocity: 1.25 m/s  Peak TR Gradient: 56.5504 mmHg          Peak Gradient: 6.25 mmHg    Diastology / Tissue Doppler  Septal Wall E' velocity:0.08 m/s  Septal Wall E/E':15.9  Lateral Wall E' velocity:0.08 m/s  Lateral Wall E/E':15.7      Cardiac Catheterization:   No results found for this or any previous visit. ASSESSMENT AND PLAN     Assessment:    Left upper lobe and left lower lobe multilobar pneumonia. Acute on chronic hypoxic respiratory failure. Leukocytosis improving partly could be related to steroid. Left upper lobe pulmonary nodule-enlarging, outpatient PET scan pending. History of adenocarcinoma of left lung post left lower lobe lobectomy. Chronic obstructive pulmonary disease and emphysema. Rheumatoid arthritis. History of DVT. Right middle lobe atelectasis which is improved from CT chest 4/8/2022      Anemia, stable  Tobacco dependence    Plan:    Patient remains hemodynamically stable and is currently saturating better on nasal cannula. Continue supplemental oxygen to keep oxygen saturation greater than 90%  Patient is currently on Zosyn and vancomycin. Pharmacy following for vancomycin.   Chest x-ray reviewed and showed better aeration as mentioned above in left lower lung field probably small pleural effusion. Continue Symbicort and Spiriva  Anticoagulation to continue with Eliquis  Continue incentive spirometry, pulmonary toilet, aspiration precautions and bronchodilators  Continue to monitor I/O with a goal of even/negative fluid balance  Continue prednisone taper  DVT prophylaxis on therapeutic Eliquis  Physical/occupational/speech therapy; increase activity as tolerated    She is followed by Dr. Philly Hatfield she was supposed to have the PET scan which was pending but now patient had multilobar consolidation she will need follow-up in the office with Dr. Philly Hatfield in the next few weeks will likely need chest x-ray in 2 weeks for follow-up of consolidation/effusion and then PET/CT scan once consolidation/pneumonia improved    I updated the patient regarding the current clinical condition, provisional diagnosis and management plan. I addressed concerns and answered all questions to the best of my abilities. It was my pleasure to evaluate Clif Hoover today. We will continue to follow. I would like to thank you for allowing me to participate in the care of this patient. Please feel free to call with any further questions or concerns. Elbert Isaac MD, M.D. Pulmonary and Critical Care Medicine           4/26/2022, 1:15 PM    Please note that this chart was generated using voice recognition Dragon dictation software. Although every effort was made to ensure the accuracy of this automated transcription, some errors in transcription may have occurred.

## 2022-04-26 NOTE — PLAN OF CARE
Kezia Geiger was evaluated today and a DME order was entered for a wheeled walker with seat because she requires this to successfully complete daily living tasks of eating, bathing, toileting, personal cares, ambulating, grooming, hygiene, dressing upper body, dressing lower body, meal preparation and taking own medications. A wheeled walker with seat is necessary due to the patient's unsteady gait, upper body weakness, inability to  and ambulation device, ambulating only short distances by pushing a walker, and the need to sit for a short time before resuming ambulation. These tasks cannot be completed with a lesser ambulation device such as a cane, crutch, or standard walker. The need for this equipment was discussed with the patient and she understands and is in agreement.       Electronically signed by Adolph Kendrick MD on 4/26/2022 at 10:34 AM'

## 2022-04-26 NOTE — PROGRESS NOTES
[facility]  Internal Medicine Teaching Residency Program  Inpatient Daily Progress Note  ______________________________________________________________________________    Patient: Adrianne Elizabeth  YOB: 1955   JTW:[id number]    Acct: [de-identified]     Room: 1/80-12  Admit date: 4/22/2022  Today's date: 04/26/22  Number of days in the hospital: 4    SUBJECTIVE   Admitting Diagnosis: Acute on chronic respiratory failure with hypoxia ([address]  CC: Emesis, Cough, and Back Pain   Pt examined at bedside. Chart & results reviewed. No acute episodes overnight  Patient is heme stable and afebrile  Saturating better at 2 liters nasal canula   Mental status improved  Chest congestion improved  AM CBC and BMP reviewed  Urine output 1.1 liters over last 24 hours    ROS:  Constitutional:  negative for chills, fevers, sweats  Respiratory:  negative for cough, dyspnea on exertion, hemoptysis, shortness of breath, wheezing  Cardiovascular:  negative for chest pain, positive for chest pressure/discomfort, negative for lower extremity edema, palpitations  Gastrointestinal:  negative for abdominal pain, constipation, diarrhea, nausea, vomiting  Neurological:  negative for dizziness, headache    BRIEF HISTORY     The patient is a pleasant 66 y. o. 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  Cough productive of orange sputum.  Denies any fevers, chills, diaphoresis, palpitations, headaches, dizziness, diarrhea, constipation, abdominal pain.     Patient states that she used to smoke 1.5 PPD for > 40 years and currently smoking 1 cigarette/day and trying to quit.     ECHO 11/2021 - EF 60%, moderate pericardial effusion, no tamponade physiology.  Cardiology evaluated the patient at the time and recommended to follow-up outpatient with Dr. Brooks Montano.     WBC 29.8, Hb 9.3, MCV 96  Creatinine 1.95, BUN 62, sodium 129, potassium 3.6, chloride 97  EKG showed normal sinus rhythm with no acute changes. Rapid COVID-negative  Troponins 43> 30     Chest x-ray showed left lower and middle lung zones opacity which has increased since her previous chest x-ray 3 months ago. CT chest demonstrates left-sided significant consolidation concerning for pneumonia.  Also reports spiculated nodular opacity obscured by the consolidation.  Also demonstrates left hilar lymphadenopathy and left atrial nodule of 1.5 cm.     Previous CT chest without contrast on 2022 showed spiculated nodule in left upper lobe concerning for malignancy which was not present on CT chest 2021. Follow Dr. Beverly Chu as outpatient    OBJECTIVE     Vital Signs:  BP (!) 155/84   Pulse 73   Temp 97.8 °F (36.6 °C) (Oral)   Resp 18   Ht 5' 5\" (1.651 m)   Wt 184 lb 11.9 oz (83.8 kg)   SpO2 93%   BMI 30.74 kg/m²     Temp (24hrs), Av.8 °F (36.6 °C), Min:97.7 °F (36.5 °C), Max:97.8 °F (36.6 °C)    In: 257.8   Out: -     Physical Exam:  Constitutional: This is a well developed, well nourished, 25-29.9 - Overweight 61y.o. year old female who is alert, oriented, cooperative and in no apparent distress. Mental Status:  Oriented to person, place and time and normal affect. Lungs:  Bilateral air entry present, lung fields clear. Normal effort. Heart:  Regular rate and rhythm, no murmur. Abdomen:  Soft, nontender, nondistended,  Extremities:  No edema, peripheral pulses palpable in both lower extremities  Skin:  Warm, dry, no gross lesions or rashes.         Medications:  Scheduled Medications:    vancomycin  1,500 mg IntraVENous Q18H    [START ON 2022] levoFLOXacin  750 mg Oral Daily    furosemide  40 mg Oral Daily    metoprolol tartrate  12.5 mg Oral BID    predniSONE  40 mg Oral Daily    Followed by   Sadaf Dyson ON 2022] predniSONE  30 mg Oral Daily    Followed by   Missael Brink ON 5/1/2022] predniSONE  20 mg Oral Daily    Followed by   Missael Brink ON 5/4/2022] predniSONE  10 mg Oral Daily    apixaban  5 mg Oral BID    vitamin D  50,000 Units Oral Weekly    budesonide-formoterol  2 puff Inhalation BID    albuterol  2.5 mg Nebulization 4x daily    amLODIPine  5 mg Oral Daily    pantoprazole  40 mg Oral QAM AC    potassium chloride  20 mEq Oral BID    sertraline  100 mg Oral Daily    tiotropium  2 puff Inhalation Daily    sodium chloride flush  5-40 mL IntraVENous 2 times per day    prenatal vitamin  1 tablet Oral Daily    vancomycin (VANCOCIN) intermittent dosing (placeholder)   Other RX Placeholder    piperacillin-tazobactam  3,375 mg IntraVENous q8h     Continuous Infusions:    sodium chloride Stopped (04/26/22 0402)     PRN Medicationsdextromethorphan-guaiFENesin, 10 mL, Q4H PRN  sodium chloride flush, 5-40 mL, PRN  sodium chloride, , PRN  ondansetron, 4 mg, Q8H PRN   Or  ondansetron, 4 mg, Q6H PRN  polyethylene glycol, 17 g, Daily PRN  acetaminophen, 650 mg, Q6H PRN   Or  acetaminophen, 650 mg, Q6H PRN      Diagnostic Labs:  CBC:   Recent Labs     04/24/22  0759 04/25/22  0751 04/26/22  0034   WBC 34.5* 26.4* 28.6*   RBC 2.79* 2.69* 2.70*   HGB 8.3* 8.2* 7.9*   HCT 27.3* 26.2* 26.1*   MCV 97.8 97.4 96.7   RDW 21.7* 21.8* 21.4*    199 269     BMP:   Recent Labs     04/24/22  0759 04/25/22  0751 04/26/22  0607    134* 139   K 4.0 4.2 3.8    106 104   CO2 23 26 33*   BUN 54* 36* 37*   CREATININE 1.03* 0.80 0.68     BNP: No results for input(s): BNP in the last 72 hours. PT/INR: No results for input(s): PROTIME, INR in the last 72 hours. APTT:   Recent Labs     04/25/22  1938 04/26/22  0034 04/26/22  0607   APTT 23.7 23.6 26.2     CARDIAC ENZYMES: No results for input(s): CKMB, CKMBINDEX, TROPONINI in the last 72 hours.     Invalid input(s): CKTOTAL;3  FASTING LIPID PANEL:  Lab Results   Component Value Date    CHOL 192 01/30/2015    HDL 77 01/30/2015    TRIG 84 01/30/2015     LIVER PROFILE:   No results for input(s): AST, ALT, ALB, BILIDIR, BILITOT, ALKPHOS in the last 72 hours. MICROBIOLOGY:   Lab Results   Component Value Date/Time    CULTURE NO GROWTH 5 DAYS 11/29/2021 04:05 PM     Imaging:    CT CHEST WO CONTRAST    Result Date: 4/22/2022  Multifocal left-sided pneumonia progressed prior exams. The spiculated nodular opacity upper lung is noted identified perhaps obscured by the airspace disease. Follow-up is strongly recommended resolution of the airspace disease in to further evaluate the previously noted and previously described nodule. Enlarged mediastinal and left hilar lymph nodes. These are non prior imaging studies. This could be metastatic or these could be reactive. Left adrenal nodule 1.5 cm in size. Unchanged. Close attention follow-up imaging recommended. XR CHEST PORTABLE    Result Date: 4/25/2022  Bilateral airspace opacities again seen, left greater than right, compatible with infection. Probable small left pleural effusion. Similar cardiomegaly. XR CHEST PORTABLE    Result Date: 4/23/2022  Stable extensive airspace infiltrate throughout the left lung and mildly progressive vascular congestion in the right lung. XR CHEST PORTABLE    Result Date: 4/22/2022  Increased opacity left hemithorax could represent left pleural fluid combined with atelectasis or pneumonia     ASSESSMENT & PLAN     ASSESSMENT / PLAN:     1. Acute on chronic hypoxic respiratory failure 2/2 community acquired pnmeumonia: Keep oxygen saturations 88 to 92%. Patient will need follow-up CT chest in 4 weeks to assess resolution of pneumonia then BD scan to assess left upper lobe lung nodule. Chest xray in 2 weeks ordered. 2. Community-acquired pneumonia: Discharged on 5 days course of Levaquin. 3. COPD exacerbation: Pulmonology following. Recommendations appreciated. Discharged on Prednisone taper.  Continue Proventil nebulizer, Spiriva, and Symbicort. 4. Lung nodule: Morphology concerning for malignancy.  Could be metastatic. Outpatient follow up CT in 4 weeks. 5. H/O lung adenocarcinoma s/p resection of LLL 7/2021  6. ASHLEIGH: Resolved  7. Chronic dastolic CHF: Home Lasix PO 40 mg resumed   8. Moderate pericardial effusion: Follows Dr. Kieran Chavira outpatient. Repeat Echo from 4/23 suggestive of No pericardial effusion  9. H/O recurrent DVT/PE:  Continue Eliquis 5 mg twice daily  10. Tobacco abuse: Nicotine patch.  Advised smoking cessation. 11. H/o major depressive disorder: Continue Zoloft 100 Mg daily. 12. GERD: Continue Protonix 40 Mg oral daily. 13. BRAYAN: Outpatient sleep study      DVT ppx: Eliquis  GI ppx: Protonix      PT/OT/SW: PT and OT working with patient  Discharge Planning: Home today     Matthew Greenwood MD  Internal Medicine Resident, PGY-2  [address] [place], [place]  4/26/2022, 2:39 PM

## 2022-04-26 NOTE — PROGRESS NOTES
Physician Progress Note      Miesha Young  CSN #:                  [id number]  :                       1955  ADMIT DATE:       2022 3:22 PM  [address] [place] DATE:  RESPONDING  PROVIDER #:        Christy Rebolledo MD          QUERY TEXT:    Pt admitted with PNA. Pt noted to have SIRS. If possible, please document in   the progress notes and discharge summary if you are evaluating and /or   treating any of the following: The medical record reflects the following:  Risk Factors:  generalized malaise, shortness of breath and cough for the past   2 days. , COPD,  lung adenocarcinoma s/p left lower lobe lobectomy  Clinical Indicators: pulmonologist documenting sepsis, WBC 29.8>34.5, Resp   25>33 , SpO2 91>82 on 13 L/min O2, Pulse 92>93>95, H&P; Respiratory: Breath   sounds decreased on left middle and lower lung zones. Diffuse wheezing   present bilaterally. Chest x-ray showed left lower and middle lung zones   opacity which has increased since her previous chest x-ray 3 months ago. CT   chest demonstrates left-sided significant consolidation concerning for   pneumonia. Treatment: piperacillin-tazobactam IV, vancomycin IV, rocephin IV, 0.9 %   sodium chloride [id number] mL/hr    Thank you, Please call if questions  Silvia Farfan RN [facility]  [phone]  Options provided:  -- Sepsis, d/t PNA present on admission  -- pneumonia, without Sepsis  -- Other - I will add my own diagnosis  -- Disagree - Not applicable / Not valid  -- Disagree - Clinically unable to determine / Unknown  -- Refer to Clinical Documentation Reviewer    PROVIDER RESPONSE TEXT:    This patient has sepsis due to PNAwhich was present on admission.     Query created by: Anne Luther on 2022 8:11 AM      Electronically signed by:  Christy Rebolledo MD 2022 12:48 PM

## 2022-04-26 NOTE — PLAN OF CARE
Problem: Discharge Planning  Goal: Discharge to home or other facility with appropriate resources  4/26/2022 1524 by Ashlee Call  Outcome: Completed     Problem: Pain  Goal: Verbalizes/displays adequate comfort level or baseline comfort level  4/26/2022 1702 by Soco Mcghee RN  Outcome: Completed  4/26/2022 1524 by Alvord Call  Outcome: Adequate for Discharge     Problem: Safety - Adult  Goal: Free from fall injury  4/26/2022 1524 by Ashlee Call  Outcome: Completed     Problem: Respiratory - Adult  Goal: Achieves optimal ventilation and oxygenation  4/26/2022 1524 by Ashlee Call  Outcome: Completed

## 2022-04-26 NOTE — PROGRESS NOTES
[address] [facility] Pharmacokinetic Monitoring Service - Vancomycin    Consulting Provider: Dr Laura Eden    Indication: PNA   Target Concentration: Goal AUC/AMBER 400-600 mg*hr/L  Day of Therapy: 4  Additional Antimicrobials: zosyn    Pertinent Laboratory Values: Wt Readings from Last 1 Encounters:   04/26/22 184 lb 11.9 oz (83.8 kg)     Temp Readings from Last 1 Encounters:   04/25/22 97.8 °F (36.6 °C) (Oral)     Estimated Creatinine Clearance: 89 mL/min (based on SCr of 0.68 mg/dL). Recent Labs     04/25/22  0751 04/26/22  0034 04/26/22  0607   CREATININE 0.80  --  0.68   WBC 26.4* 28.6*  --      Procalcitonin:     Pertinent Cultures:  Culture Date Source Results   4/23 4/23 4/23 Urine  Sputum  MRSA Swab Negative legionella   Pending   Pending     MRSA Nasal Swab: was ordered by provider, awaiting results. Recent vancomycin administrations                     vancomycin (VANCOCIN) 1000 mg in sodium chloride 0.9% 250 mL IVPB (mg) 1,000 mg New Bag 04/25/22 1845    vancomycin (VANCOCIN) 1000 mg in sodium chloride 0.9% 250 mL IVPB (mg) 1,000 mg New Bag 04/24/22 1350    vancomycin (VANCOCIN) 1000 mg in sodium chloride 0.9% 250 mL IVPB (mg) 1,000 mg New Bag 04/23/22 2050                    Assessment:  Date/Time Current Dose Concentration Timing of Concentration (h) AUC   4/26 1000 mg every 12 hours  12.4 11 hrs post dose  242   Note: Serum concentrations collected for AUC dosing may appear elevated if collected in close proximity to the dose administered, this is not necessarily an indication of toxicity    Plan:  Current dosing regimen is sub-therapeutic Patient is receiving Vancomycin every 24 hours not consistent due to line issues and extravasation. Current AUC is calculated at 242, however, renal function was not stable   Increase dose to   1500 mg every 18 hours which will predict an AUC of 475, however renal continue to improve.  Will review dose, level, renal function tomorrow and adjust the dose accordingly   Repeat vancomycin concentration ordered for 4/27 @ 0600   Pharmacy will continue to monitor patient and adjust therapy as indicated    Thank you for the consult,    Thank you  Steven Messina, PharmD, [address] [place] [address] Pharmacist  [phone]

## 2022-04-26 NOTE — PLAN OF CARE
Problem: Discharge Planning  Goal: Discharge to home or other facility with appropriate resources  4/26/2022 0014 by Kamilah Giles RN  Outcome: Progressing  4/25/2022 1903 by Kevan Human  Outcome: Progressing     Problem: Pain  Goal: Verbalizes/displays adequate comfort level or baseline comfort level  4/26/2022 0014 by Kamilah Giles RN  Outcome: Progressing  4/25/2022 1903 by Kevan Human  Outcome: Progressing     Problem: Safety - Adult  Goal: Free from fall injury  4/26/2022 0014 by Kamilah Giles RN  Outcome: Progressing  4/25/2022 1903 by Kevan Human  Outcome: Progressing     Problem: Respiratory - Adult  Goal: Achieves optimal ventilation and oxygenation  4/26/2022 0014 by Kamilah Giles RN  Outcome: Progressing  4/25/2022 1903 by Kevan Human  Outcome: Progressing

## 2022-04-26 NOTE — TELEPHONE ENCOUNTER
----- Message from Bola Thomason sent at 4/26/2022  2:44 PM EDT -----  Vishal Shock, please see message from Dr Izaiah George. Thank you.   ----- Message -----  From: Azam Flores MD  Sent: 4/26/2022   2:40 PM EDT  To: [facility] Respiratory Spec Clinical Staff    She is followed with Dr. Julita Gonzalez and she is hospitalized she is going to be discharged today.   Please schedule appointment with Dr. Julita Gonzalez in 2 to 3 weeks

## 2022-04-26 NOTE — PROGRESS NOTES
Patient was evaluated today for the diagnosis of COPD. I entered a DME order for home oxygen because the diagnosis and testing requires the patient to have supplemental oxygen. Condition will improve or be benefited by oxygen use. The patient is not able to perform good mobility in a home setting and therefore does require the use of a portable oxygen system. The need for this equipment was discussed with the patient and she understands and is in agreement. Hamilton Becerra MD  PGY-2, Internal Medicine Resident  [address]  4/26/2022 12:40 PM

## 2022-04-26 NOTE — PROGRESS NOTES
PATIENT REFUSES TO WEAR BIPAP     [x] Risks and benefits explained to patient   [x] Patient refuses to wear Bipap stating \"no I dont need it tonight\"  [x] Patient verbalizes understanding of information presented.

## 2022-04-27 NOTE — PROGRESS NOTES
CLINICAL PHARMACY NOTE: MEDS TO BEDS    Total # of Prescriptions Filled: 4   The following medications were delivered to the patient:  · Prednisone 10mg  · Symbicort 160  · Vitamin D [id number]  · Levofloxacin 750mg    Additional Documentation: delivered to patient in room 316 4/26 at 4:57pm. No co-pay.

## 2022-04-27 NOTE — TELEPHONE ENCOUNTER
DR RANDALL - SEE MESSAGES - If pt is scheduled for PET ON 5/16/22 - I was going to offer her f/u with you that Friday same wk - you are at 131% pt load- do you want me to overbook her or call her with results?

## 2022-04-27 NOTE — TELEPHONE ENCOUNTER
[facility]    Outreach made within 2 business days of discharge: Yes    Patient: Kezia Geiger Patient : 1955   MRN: [id number]  Reason for Admission: There are no discharge diagnoses documented for the most recent discharge. Discharge Date: 22       Spoke with: [facility]    Discharge department/facility: [facility] Interactive Patient Contact:  Was patient able to fill all prescriptions: Yes  Was patient instructed to bring all medications to the follow-up visit: Yes  Is patient taking all medications as directed in the discharge summary?  Yes  Does patient understand their discharge instructions: Yes  Does patient have questions or concerns that need addressed prior to 7-14 day follow up office visit: yes - [address] [place] [address]    Scheduled appointment with PCP within 7-14 days    Follow Up  Future Appointments   Date Time Provider Kuldeep Sun   2022  1:00 PM [facility] INJECTION ROOM [facility]   2022  2:00 PM [facility] PET/CT ROOM [facility]   2022  3:00 PM Maggie Wilder MD [id number]   2022  3:45 PM Cielo Jaramillo MD Resp Spec [address] [place] [address]

## 2022-04-27 NOTE — TELEPHONE ENCOUNTER
COPIED WRAP UP FROM PREVIOUS VISIT WITH DR RANDALL:    Transferred pt to [address] [facility] Scheduling Dept to schedule PET SCAN. Dr. Chasity Canchola will call with results and discuss next plan.    Pt was asked to schedule prior to May 2,2022      Mailed PET Q&A with YUDY ZAFAR   4/25/22 CHECKING STATUS OF PET RESULTS LS   PET SCAN 4/22/22 WAS CANCELED   RESCHEDULED  4/26/22 4/27/22 CHECKING STATUS OF PET RESULTS   PET WAS CANCELED AND RESCHEDULED TO 5/16/22     INFORMED DANIELLE THIS IS ALREADY BEING TRACKED - Lora Rossi

## 2022-04-28 NOTE — TELEPHONE ENCOUNTER
Please Approve or Refuse.   Send to Pharmacy per Pt's Request:      Next Visit Date:  Visit date not found   Last Visit Date: 3/23/2022    Hemoglobin A1C (%)   Date Value   12/23/2019 6.0   06/12/2019 5.7   11/12/2018 5.9             ( goal A1C is < 7)   BP Readings from Last 3 Encounters:   04/26/22 (!) 155/84   03/14/22 133/74   02/10/22 136/85          (goal 120/80)  BUN   Date Value Ref Range Status   04/26/2022 37 (H) 8 - 23 mg/dL Final     CREATININE   Date Value Ref Range Status   04/26/2022 0.68 0.50 - 0.90 mg/dL Final     Potassium   Date Value Ref Range Status   04/26/2022 3.8 3.7 - 5.3 mmol/L Final

## 2022-04-29 NOTE — TELEPHONE ENCOUNTER
[place]  Daughter In law called in regards to patient having some symptoms of pain going on in her tongue believes to be a allergic reaction or possible thrush from the Symbicort inhaler she did not rinse her mouth out after use. Been Ongoing since yesterday , tongue is very painful. Will like to know if something can be called into pharmacy.

## 2022-05-01 NOTE — DISCHARGE SUMMARY
[address]     Department of Internal Medicine - Staff Internal Medicine Teaching Service    INPATIENT DISCHARGE SUMMARY      Patient Identification:  Shannan Emery is a 74 y.o. female. :  1955  MRN: [id number]     Acct: [de-identified]   PCP: Maya Padilla MD  Admit Date:  2022  Discharge date and time: 2022  5:04 PM   Attending Provider: No att. providers found                                     [address] Problem Lists:  Principal Problem:    Acute on chronic respiratory failure with hypoxia ([address]  Active Problems:    Community acquired pneumonia of left lower lobe of lung    BRAYAN (obstructive sleep apnea)    Acute respiratory acidosis    GERD (gastroesophageal reflux disease)    Essential hypertension    Major depressive disorder, recurrent, moderate (HCC)    Tobacco abuse    History of DVT in adulthood    COPD exacerbation ([address]    Recurrent acute deep vein thrombosis (DVT) of both lower extremities (HCC)    S/P lobectomy of lung    ASHLEIGH (acute kidney injury) ([address]    Pneumonia of left lower lobe due to infectious organism    Pericardial effusion  Resolved Problems:    * No resolved hospital problems. *    HOSPITAL STAY     Brief Inpatient course:   Shannan Emery is a 74 y.o. female who was admitted for the management of Acute on chronic respiratory failure with hypoxia ([address] presented to the emergency department with generalized malaise, shortness of breath, and productive cough for the past 2 days that acutely worsened. Patient has a significant smoking history and has been smoking 1.5 packs per day for greater than 40 years. Further investigation was performed with chest x-ray and CT Imaging of the chest was indicative of an underlying pneumonia and patient was admitted to to the care of Internal Medicine. She was treated with IV antibiotics and IV steroids in addition to her respiratory treatments for her underlying COPD. Once medically clear and stable patient was discharged home on an oral steroid taper and oral antibiotic course with 2 liters home oxygen via nasal canula. Procedures/ Significant Interventions:    N/A    Consults:     Consults:     Final Specialist Recommendations/Findings:   IP CONSULT TO INTERNAL MEDICINE  IP CONSULT TO CASE MANAGEMENT  PHARMACY TO DOSE VANCOMYCIN  PHARMACY TO DOSE VANCOMYCIN  IP CONSULT TO PULMONOLOGY  IP CONSULT TO IV TEAM      Any Hospital Acquired Infections: none    Discharge Functional Status:  stable    DISCHARGE PLAN     Disposition: home    Patient Instructions:   Discharge Medication List as of 4/26/2022  3:46 PM      START taking these medications    Details   budesonide-formoterol (SYMBICORT) 160-4.5 MCG/ACT AERO Inhale 2 puffs into the lungs 2 times daily, Disp-10.2 g, R-3Normal      !! predniSONE (DELTASONE) 20 MG tablet Take 2 tablets by mouth daily for 1 dose, Disp-2 tablet, R-0Normal      !! predniSONE (DELTASONE) 10 MG tablet Take 3 tablets by mouth daily for 3 doses, Disp-9 tablet, R-0Normal      !! predniSONE (DELTASONE) 20 MG tablet Take 1 tablet by mouth daily for 3 doses, Disp-3 tablet, R-0Normal      !! predniSONE (DELTASONE) 10 MG tablet Take 1 tablet by mouth daily for 3 doses, Disp-3 tablet, R-0Normal      Ergocalciferol (VITAMIN D) 33339 units CAPS Take 50,000 Units by mouth once a week for 11 doses, Disp-11 capsule, R-0Normal      levoFLOXacin (LEVAQUIN) 750 MG tablet Take 1 tablet by mouth daily for 5 doses, Disp-5 tablet, R-0Normal       !! - Potential duplicate medications found. Please discuss with provider.       CONTINUE these medications which have CHANGED    Details   metoprolol tartrate (LOPRESSOR) 25 MG tablet Take 0.5 tablets by mouth 2 times daily, Disp-30 tablet, R-0Normal         CONTINUE these medications which have NOT CHANGED    Details   amLODIPine (NORVASC) 5 MG tablet Take 1 tablet by mouth daily, Disp-90 tablet, R-1Normal      Handicap Placard MISC Starting Mon 3/14/2022, Disp-1 each, R-0, Print3/14/2022 to 3/13/2027  Panlobular emphysema ([address]  (primary encounter diagnosis)      nicotine (NICODERM CQ) 14 MG/24HR Place 1 patch onto the skin daily for 28 days, Disp-28 patch, R-0Normal      SPIRIVA RESPIMAT 2.5 MCG/ACT AERS inhaler inhale 2 puffs INTO THE LUNGS once daily, Disp-4 g, R-3Normal      albuterol sulfate  (90 Base) MCG/ACT inhaler inhale 2 puffs by mouth and INTO THE LUNGS every 4 hours prn wheezing, Disp-18 g, R-2Normal      albuterol (PROVENTIL) (2.5 MG/3ML) 0.083% nebulizer solution Take 3 mLs by nebulization every 6 hours as needed for Wheezing, Disp-120 each, R-11Normal      ondansetron (ZOFRAN) 4 MG tablet take 1 tablet by mouth three times a day if needed for nausea and vomiting, Disp-30 tablet, R-2Normal      sertraline (ZOLOFT) 100 MG tablet take 1 tablet by mouth once daily, Disp-60 tablet, R-2Normal      Prenatal Vit-Fe Fumarate-FA (PRENATAL VITAMIN) 27-0.8 MG TABS Take 1 tablet by mouth daily, Disp-30 tablet, R-3Normal      furosemide (LASIX) 40 MG tablet Take 1 tablet by mouth daily, Disp-60 tablet, R-3Normal      potassium chloride (KLOR-CON M) 20 MEQ extended release tablet Take 1 tablet by mouth 2 times daily, Disp-90 tablet, R-1Normal      acetaminophen (TYLENOL) 500 MG tablet Take 1,000 mg by mouth every 6 hours as needed for [employer] Med      ELIQUIS 5 MG TABS tablet take 1 tablet by mouth twice a day, Disp-180 tablet, R-3Normal      pantoprazole (PROTONIX) 40 MG tablet take 1 tablet by mouth once daily, Disp-90 tablet, R-2Normal           Activity: activity as tolerated    Diet: cardiac diet    Follow-up:    Dk Beltre MD  [address] [facility]  [phone]    In 1 week      [facility]  [address]  [phone]    If symptoms worsen    Ken Mahoney MD  [address]  [phone]    In 1 devin Hernandez MD  [address] [facility]  [address] [facility] [address] [phone]          Patient Instructions: Please take all medications as prescribed and follow up with your Primary Care Provider as soon as possible. Please take antibiotic Levaquin for 5 days starting tomorrow and take course to completion. Please follow up with Dr. Dayo Spencer as soon as possible as a follow-up CT chest in 4 weeks is needed to assess resolution of pneumonia and then PET CT scan to assess left upper lobe lung nodule. Please continue to monitor blood pressure and heart rate and take Lopressor 12.5 mg twice a day and follow up with your Primary Care Provider. Please do NOT take the medication if your BP is low (ex. <120/80) and/or Heart rate is low (<60). Please take prednisone course to completion - Prednisone 40 mg daily for 1 day starting tomorrow 4/27/2022 followed by Prednisone 30 mg for 3 days followed by Prednisone 20 mg for 3 days followed by Prednisone 10 mg for 3 days. Please take Vitamin D 50,000 Units for weekly for 11 weeks starting next week. Follow up imaging: Chest x-ray in 2 weeks. CT of Chest in 4 weeks. Note that over 30 minutes was spent in preparing discharge papers, discussing discharge with patient, medication review, etc.    Danielle Perera MD  Internal Medicine Resident, PGY-2  [facility];  [place], [place]  5/1/2022, 2:36 PM

## 2022-05-11 NOTE — TELEPHONE ENCOUNTER
Patient having a lot of anxiety  due to she has to have another PET Scan , she thinking her cancer is back . Can she get appt. With you  Or VV on Thursday or Friday please let her know ?  Thank you

## 2022-05-12 PROBLEM — F41.1 GAD (GENERALIZED ANXIETY DISORDER): Status: ACTIVE | Noted: 2022-01-01

## 2022-05-12 PROBLEM — I31.39 PERICARDIAL EFFUSION: Status: RESOLVED | Noted: 2021-01-01 | Resolved: 2022-01-01

## 2022-05-12 PROBLEM — J96.02 ACUTE RESPIRATORY ACIDOSIS (HCC): Status: RESOLVED | Noted: 2022-01-01 | Resolved: 2022-01-01

## 2022-05-12 NOTE — PROGRESS NOTES
2022    TELEHEALTH EVALUATION -- Audio/Visual (During TMGYW-05 public health emergency)      Byron Villaseñor (:  1955) is a 74 y.o. female,Established patient, here for evaluation of the following chief complaint(s): Anxiety and Lung Cancer        ASSESSMENT/PLAN:    1. Multifocal pneumonia  Recurrent pneumonias due to immunocompromised and also lung malignancy. Treated recently  2. VIJAYA (generalized anxiety disorder)  Uncontrolled, worsening due to recurrence of malignancy. Start on Xanax continue Zoloft will sign contract at next appointment. -     ALPRAZolam (XANAX) 0.25 MG tablet; Take 1 tablet by mouth nightly for 30 days. , Disp-30 tablet, R-0Normal  3. Primary hypertension  Uncontrolled, increase amlodipine to 10 mg continue metoprolol monitor blood pressure daily call back with blood pressure readings. -     amLODIPine (NORVASC) 10 MG tablet; Take 1 tablet by mouth daily, Disp-90 tablet, R-1Normal  4. History of adenocarcinoma of lung  Status post lobectomy recent recurrence PET scan scheduled  5. Major depressive disorder, recurrent, moderate (HCC)  Uncontrolled continue Zoloft, start on Xanax  -     ALPRAZolam (XANAX) 0.25 MG tablet; Take 1 tablet by mouth nightly for 30 days. , Disp-30 tablet, R-0Normal      Iris received counseling on the following healthy behaviors: nutrition, exercise and medication adherence  Reviewed prior labs and health maintenance  Discussed use, benefit, and side effects of prescribed medications. Barriers to medication compliance addressed. Patient given educational materials - see patient instructions  All patient questions answered. Patient voiced understanding. The patient's past medical,surgical, social, and family history as well as her current medications and allergies were reviewed as documented in today's encounter. Medications, labs, diagnostic studies, consultations and follow-up as documented in this encounter.     No follow-ups on file.    Data Unavailable    Future Appointments   Date Time Provider Kuldeep Sun   2022  3:15 PM [place] MD fp sc [id number]   2022  1:00 PM [facility] NM INJECTION ROOM [facility]   2022  2:00 PM [facility] PET/CT ROOM [facility]   2022  2:00 PM Esperanza Mann MD Resp Spec [facility]   2022  3:00 PM Chio Cerda MD [address] [place] [address]   2022  3:45 PM Esperanza Mann MD Resp Spec [id number]         SUBJECTIVE/OBJECTIVE:  Chong Trinidad (:  1955) has requested an audio/video evaluation for the following concern(s): Anxiety and Lung Cancer      Patient is scheduled for hospital follow-up was recently diagnosed with pneumonia. Patient has multiple recurrent pneumonia with recent 1 multifocal pneumonia. Patient had CT chest done that showed increasing lung malignancy. She had lobectomy done last year. Patient was treated with IV antibiotics and steroids. Patient reports her symptoms have improved, she still has mild cough and wheezing for which she uses her inhalers and aerosol treatments. Patient reports her anxiety is getting worse due to increasing lung malignancy, she has PET scan scheduled. Patient reports she is not able to sleep, she is restless, worried all the time. She denies any bad thoughts. Patient is currently on Zoloft 100 mg reports that started helping. Patient scored 10 on anxiety scale. Would like to try something which will help with her sleep and anxiety. She never tried any benzodiazepines in the past.          VIJAYA 7 SCORE 2022   VIJAYA-7 Total Score 10     Interpretation of VIJAYA-7 score: 5-9 = mild anxiety, 10-14 = moderate anxiety, 15+ = severe anxiety. Recommend referral to behavioral health for scores 10 or greater.     CT chest.    Interval enlargement of the spiculated nodule within the left upper lobe,   concerning for worsening metastatic disease.       Enlarged mediastinal lymph nodes which are similar when compared to the   previous exam.       Band of consolidation in the right middle lobe, which may relate to scarring   or postobstructive atelectasis, but neoplasm could be masked by that process.       Development of multifocal ground-glass opacities, some with a nodular   morphology.  These are likely inflammatory or infectious (in which case   consider both typical and atypical etiologies, including viral pneumonia). However, metastatic disease would be considered as well.       Multiple subcentimeter, partially calcified thyroid nodules, which require no   specific follow-up.  Full recommendations below.           Discharge summary    Brief Inpatient course:   Latisha Mcconnell is a 74 y.o. female who was admitted for the management of Acute on chronic respiratory failure with hypoxia ([facility] presented to the emergency department with generalized malaise, shortness of breath, and productive cough for the past 2 days that acutely worsened. Patient has a significant smoking history and has been smoking 1.5 packs per day for greater than 40 years. Further investigation was performed with chest x-ray and CT Imaging of the chest was indicative of an underlying pneumonia and patient was admitted to to the care of Internal Medicine. She was treated with IV antibiotics and IV steroids in addition to her respiratory treatments for her underlying COPD. Once medically clear and stable patient was discharged home on an oral steroid taper and oral antibiotic course with 2 liters home oxygen via nasal canula. Hypertension uncontrolled, blood pressures is running high, she is on amlodipine 5 mg, she was started on metoprolol 12.5 in the hospital.  Her blood pressure still running high, patient reports she checks her blood pressure usually runs more than 170M systolic. She was initially on 10 mg of amlodipine in the past.        Depression fairly stable on Zoloft.   Patient lives with her son.      [facility] Scores 3/22/2022 2/15/2022 10/27/2021 10/4/2021 5/17/2021 3/3/2021 4/1/2020   PHQ2 Score 1 1 0 0 1 1 2   PHQ9 Score 1 1 0 0 1 1 2     Interpretation of Total Score Depression Severity: 1-4 = Minimal depression, 5-9 = Mild depression, 10-14 = Moderate depression, 15-19 = Moderately severe depression, 20-27 = Severe depression    Ht Readings from Last 3 Encounters:   04/22/22 5' 5\" (1.651 m)   03/14/22 5' 5\" (1.651 m)   11/30/21 5' 5\" (1.651 m)     Wt Readings from Last 3 Encounters:   04/26/22 184 lb 11.9 oz (83.8 kg)   03/14/22 174 lb 6.4 oz (79.1 kg)   12/16/21 198 lb 3.2 oz (89.9 kg)         Review of Systems   Constitutional: Positive for activity change and appetite change. Negative for fatigue, fever and unexpected weight change. HENT: Positive for congestion. Negative for ear pain, postnasal drip, rhinorrhea, sinus pressure and sore throat. Eyes: Negative for redness and visual disturbance. Respiratory: Positive for cough and wheezing. Negative for chest tightness, shortness of breath and stridor. Cardiovascular: Negative for chest pain, palpitations and leg swelling. Gastrointestinal: Negative for abdominal distention, abdominal pain, blood in stool, constipation, diarrhea, nausea, rectal pain and vomiting. Endocrine: Negative for polyuria. Genitourinary: Negative for difficulty urinating, flank pain, frequency and urgency. Musculoskeletal: Positive for arthralgias, back pain and gait problem. Negative for myalgias and neck pain. Skin: Negative for color change, rash and wound. Allergic/Immunologic: Positive for immunocompromised state. Negative for food allergies. Neurological: Positive for weakness and numbness. Negative for dizziness, speech difficulty, light-headedness and headaches. Psychiatric/Behavioral: Positive for decreased concentration, dysphoric mood and sleep disturbance. Negative for agitation, behavioral problems, hallucinations and suicidal ideas.  The patient is nervous/anxious. Prior to Visit Medications    Medication Sig Taking? Authorizing Provider   ALPRAZolam (XANAX) 0.25 MG tablet Take 1 tablet by mouth nightly for 30 days.  Yes Maya Padilla MD   amLODIPine (NORVASC) 10 MG tablet Take 1 tablet by mouth daily Yes Maya Padilla MD   nystatin (MYCOSTATIN) 522192 UNIT/ML suspension Take 5 mLs by mouth 4 times daily Swish and swallow Yes Maya Padilla MD   lidocaine viscous hcl (XYLOCAINE) 2 % SOLN solution Take 15 mLs by mouth as needed for Irritation or Pain Yes Maya Padilla MD   potassium chloride (KLOR-CON M) 20 MEQ extended release tablet take 1 tablet by mouth twice a day Yes Maya Padilla MD   budesonide-formoterol (SYMBICORT) 160-4.5 MCG/ACT AERO Inhale 2 puffs into the lungs 2 times daily Yes Orlando Travis MD   Ergocalciferol (VITAMIN D) 13462 units CAPS Take 50,000 Units by mouth once a week for 11 doses Yes Hamilton Cormier MD   metoprolol tartrate (LOPRESSOR) 25 MG tablet Take 0.5 tablets by mouth 2 times daily Yes Orlando Travis MD   Handicap Placard MISC by Does not apply route 3/14/2022 to 3/13/2027    Panlobular emphysema ([facility]  (primary encounter diagnosis) Yes Gwen Tiwari MD   SPIRIVA RESPIMAT 2.5 MCG/ACT AERS inhaler inhale 2 puffs INTO THE LUNGS once daily Yes Maya Padilla MD   albuterol sulfate  (90 Base) MCG/ACT inhaler inhale 2 puffs by mouth and INTO THE LUNGS every 4 hours prn wheezing Yes Juan Luis Bowling, DO   albuterol (PROVENTIL) (2.5 MG/3ML) 0.083% nebulizer solution Take 3 mLs by nebulization every 6 hours as needed for Wheezing Yes Juan Luis Bowling, DO   ondansetron (ZOFRAN) 4 MG tablet take 1 tablet by mouth three times a day if needed for nausea and vomiting Yes Maya Padilla MD   sertraline (ZOLOFT) 100 MG tablet take 1 tablet by mouth once daily Yes Maya Padilla MD   Prenatal Vit-Fe Fumarate-FA (PRENATAL VITAMIN) 27-0.8 MG TABS Take 1 tablet by mouth daily Yes Bogdan Mar MD furosemide (LASIX) 40 MG tablet Take 1 tablet by mouth daily Yes Camille Curtis DO   acetaminophen (TYLENOL) 500 MG tablet Take 1,000 mg by mouth every 6 hours as needed for Pain Yes Historical Provider, MD   ELIQUIS 5 MG TABS tablet take 1 tablet by mouth twice a day Yes Cele Ortiz MD   pantoprazole (PROTONIX) 40 MG tablet take 1 tablet by mouth once daily Yes Cele Ortiz MD   nicotine (Maxwell Job) 14 MG/24HR Place 1 patch onto the skin daily for 28 days  Elva Urban MD       Social History     Tobacco Use    Smoking status: Current Every Day Smoker     Packs/day: 0.25     Years: 35.00     Pack years: 8.75     Types: Cigarettes     Last attempt to quit: 1986     Years since quittin.3    Smokeless tobacco: Never Used    Tobacco comment: restarted smoking 2019   Vaping Use    Vaping Use: Never used   Substance Use Topics    Alcohol use: Not Currently     Alcohol/week: 0.0 standard drinks    Drug use: No          PHYSICAL EXAMINATION:    Vital Signs: (As obtained by patient/caregiver or practitioner observation)  -vital signs stable and within normal limits except   Patient-Reported Vitals 4/15/2022   Patient-Reported Weight 173 lbs   Patient-Reported Height 5' 5\"   Patient-Reported Systolic 462   Patient-Reported Diastolic 85   Patient-Reported Pulse 96           Intensity of pain is:5      Constitutional: [x] Appears well-developed and well-nourished [x] No apparent distress      [] Abnormal-depressed and anxious      Mental status  [x] Alert and awake  [x] Oriented to person/place/time [x]Able to follow commands      Eyes:  EOM    [x]  Normal  [] Abnormal-  Sclera  [x]  Normal  [] Abnormal -         Discharge [x]  None visible  [] Abnormal -    HENT:   [x] Normocephalic, atraumatic.   [] Abnormal   [x] Mouth/Throat: Mucous membranes are moist.     External Ears [x] Normal  [] Abnormal-     Neck: [x] No visualized mass     Pulmonary/Chest: [x] Respiratory effort normal.  [x] No visualized signs of difficulty breathing or respiratory distress        [x] Abnormal baseline shortness of breath is on home oxygen intermittently     Musculoskeletal:   [x] Normal gait with no signs of ataxia         [x] Normal range of motion of neck        [] Abnormal-       Neurological:        [x] No Facial Asymmetry (Cranial nerve 7 motor function) (limited exam to video visit)          [x] No gaze palsy        [] Abnormal-            Skin:        [x] No significant exanthematous lesions or discoloration noted on facial skin         [] Abnormal-            Psychiatric:      [x] No Hallucinations       [x]Mood is normal      [x]Behavior is normal      [x]Judgment is normal      [x]Thought content is normal       [x] Abnormal-depressed anxious, restless and some neck    Other pertinent observable physical exam findings-     Due to this being a TeleHealth encounter, evaluation of the following organ systems is limited: Vitals/Constitutional/EENT/Resp/CV/GI//MS/Neuro/Skin/Heme-Lymph-Imm. I personally reviewed most recent labs reviewed with the patient and all questions fully answered.      Otherwise labs within normal limits        Lab Results   Component Value Date    WBC 28.6 (H) 04/26/2022    HGB 7.9 (L) 04/26/2022    HCT 26.1 (L) 04/26/2022    MCV 96.7 04/26/2022     04/26/2022       Lab Results   Component Value Date     04/26/2022    K 3.8 04/26/2022     04/26/2022    CO2 33 04/26/2022    BUN 37 04/26/2022    CREATININE 0.68 04/26/2022    GLUCOSE 147 04/26/2022    GLUCOSE 105 12/05/2011    CALCIUM 8.9 04/26/2022        Lab Results   Component Value Date    ALT <5 (L) 04/23/2022    AST 13 04/23/2022    ALKPHOS 74 04/23/2022    BILITOT 0.52 04/23/2022       Lab Results   Component Value Date    TSH 0.38 04/23/2022       Lab Results   Component Value Date    CHOL 192 01/30/2015    CHOL 183 03/19/2014     Lab Results   Component Value Date    TRIG 84 01/30/2015    TRIG 139 03/19/2014     Lab Results Component Value Date    HDL 77 01/30/2015    HDL 33 (L) 03/19/2014     Lab Results   Component Value Date    LDLCHOLESTEROL 98 01/30/2015    LDLCHOLESTEROL 122 (H) 03/19/2014       Lab Results   Component Value Date    CHOLHDLRATIO 2.5 01/30/2015    CHOLHDLRATIO 5.5 (H) 03/19/2014       Lab Results   Component Value Date    LABA1C 6.0 12/23/2019    LABA1C 5.7 06/12/2019    LABA1C 5.9 11/12/2018         Lab Results   Component Value Date    OYPXMKWU19 842 04/24/2022       Lab Results   Component Value Date    VITD25 23.1 (L) 04/23/2022       Orders Placed This Encounter   Medications    ALPRAZolam (XANAX) 0.25 MG tablet     Sig: Take 1 tablet by mouth nightly for 30 days. Dispense:  30 tablet     Refill:  0    amLODIPine (NORVASC) 10 MG tablet     Sig: Take 1 tablet by mouth daily     Dispense:  90 tablet     Refill:  1       No orders of the defined types were placed in this encounter. Medications Discontinued During This Encounter   Medication Reason    amLODIPine (NORVASC) 5 MG tablet               Alli Murray, was evaluated through a synchronous (real-time) audio-video encounter. The patient (or guardian if applicable) is aware that this is a billable service, which includes applicable co-pays. The virtual visit was conducted with patient's (and/or legal guardian consent). Verbal consent to proceed has been obtained within the past 12 months. The visit was conducted pursuant to the emergency declaration under the [address] [place] [id number], [address] [place] [address] authority and the [employer] General Act. Patient identification was verified    Patient was alone   Provider was located at primary practice location. The patient was located at home, in a state where the provider was licensed to provide care.     On this date 5/12/2022 I have spent 30 minutes reviewing previous notes, test results and face to face with the patient discussing the diagnosis and importance of compliance with the treatment plan as well as documenting on the day of the visit. --Chris العراقي MD on 5/12/2022 at 2:54 PM    An electronic signature was used to authenticate this note.

## 2022-05-20 PROBLEM — R91.1 NODULE OF UPPER LOBE OF LEFT LUNG: Status: ACTIVE | Noted: 2022-01-01

## 2022-05-20 NOTE — PROGRESS NOTES
REASON FOR THE CONSULTATION:  Lung cancer post lobectomy   Left upper lobe lung nodule  HISTORY OF PRESENT ILLNESS:    Aaliyah Hauser is a 74y.o. year old female   Patient is accompanied by her daughter. I reviewed the PET CT scan port and images with them. Reviewed the previous CT chest and PFT. She is already had left lower lobe lobectomy for adenocarcinoma of the lung. Unfortunately continues to smoke. Left upper lobe lung nodule has increased in size and is PET positive. Pneumonic process and consolidation is resolving. Last visit  Accompanied by her daughter   She continues to smoke 1 ppd . Dyspnea is grade 2 no active wheeze or hemoptysis . Using spiriva   Albuterol as needed     Last visit     Patient was seen by cardiothoracic surgery and she is scheduled for left lower lobe lobectomy for adenocarcinoma of lung on July 12, 2021. She needs to get PFT but has not had COVID-19 vaccination so will need COVID-19 testing prior to PFT. Unfortunately she continues to smoke almost a pack a day. Advised her to quit smoking. She did not use Chantix. She wants to quit on her own. In May patient was admitted to [facility] with a left upper lobe pneumonia. She was treated with antibiotics and discharged. Denies any purulent sputum or hemoptysis or fevers at present. She is using Spiriva daily and uses albuterol as needed. Last visit   here for evaluation of lung nodule which was seen on CT lung screening. It is spiculated and he is 1.7 cm. This has increased in size from CTA of the chest done December 2019. Patient has chronic grade 2 dyspnea , which has been progressively getting worse withafter one flight stairs, with one/ half  block walking , Complains of Yes Cough , Yessputum production clear in morning   , No  hemoptysis , Yes  Associated with wheezing . No home oxygen  . She has spiriva which helps .                          LUNG CANCER SCREENING     1. CRITERIA MET    [x] CT ORDERED  []      2. CRITERIA NOT MET   []      3. REFUSED                    []        REASON CRITERIA NOT MET     1. SMOKING LESS THAN 30 PY  []      2. AGE LESS THAN 55 or GREATER 77 YEARS  []      3. QUIT SMOKING 15 YEARS OR GREATER   []      4. RECENT CT WITH IN 11 MONTHS    []      5. LIFE EXPECTANCY < 5 YEARS   []      6.  SIGNS  AND SYMPTOMS OF LUNG CANCER   []         Immunization   Immunization History   Administered Date(s) Administered    Influenza Virus Vaccine 01/26/2016    Influenza, Quadv, IM, PF (6 mo and older Fluzone, Flulaval, Fluarix, and 3 yrs and older Afluria) 10/28/2016, 11/12/2018    Pneumococcal Polysaccharide ([id number]) 04/11/2017    Tdap (Boostrix, Adacel) 06/12/2019    Zoster Recombinant (Shingrix) 06/12/2019, 11/20/2019          PAST MEDICAL HISTORY:       Diagnosis Date    Abnormal CT of the chest 04/21/2021    Adenocarcinoma, lung, left ([address] 05/14/2021    Arthritis     Community acquired pneumonia 12/23/2019    COPD (chronic obstructive pulmonary disease) ([address] 08/14/2019    Deep venous thrombosis of left profunda femoris vein ([address] 04/16/2020    Depression     DVT (deep venous thrombosis) ([address] 2014    b/l     GERD (gastroesophageal reflux disease)     History of pulmonary embolism 05/27/2021    History of thyroid nodule 12/23/2019    Hypertension     Major depressive disorder, recurrent, moderate ([address] 11/12/2018    Nodule of lower lobe of left lung 04/23/2021    Obesity, Class II, BMI 35-39.9 11/12/2018    On anticoagulant therapy 05/27/2021    On home oxygen therapy     02 2L NC PRN    On methotrexate therapy 05/27/2021    Prediabetes 11/12/2018    Rheumatoid arthritis ([address]     Twila     denies apnea    Thyroid nodule 01/09/2020    Tobacco abuse 11/12/2018    Under care of team 06/29/2021    pulmonology-Dr Blank- gareth-last visit june 2021    Under care of team 06/29/2021    [facility]   Ella Garrison Under care of team     Dr. Rosario Wiggins clearance appointment 7/6/21, stress test 7/9/2021, clearance obtained and placed on chart    Wellness examination 06/29/2021    pcp-Dr Salima Jenkins office-last visit may 2021         Family History:       Problem Relation Age of Onset    Cancer Mother         Uterine and breast    Diabetes Mother     Heart Disease Mother     Cancer Father         lung    Cancer Brother         lung    Cancer Brother         lung       SURGICAL HISTORY:   Past Surgical History:   Procedure Laterality Date    APPENDECTOMY  1982    BRONCHOSCOPY N/A 12/27/2019    BRONCHOSCOPY ALVEOLAR LAVAGE performed by Zaira Munson MD at [address] [facility] N/A 2/12/2020    COLONOSCOPY POLYPECTOMIES HOT SNARE, COLD BIOPSY POLYPECTOMIES performed by Asher Lancaster MD at [facility]  5/13/2021    CT NEEDLE BIOPSY LUNG PERCUTANEOUS 5/13/2021 [facility] CT SCAN    ENDOSCOPY, COLON, DIAGNOSTIC  [id number]    gastritis, sm. bowel lipoma, biopsies    INSERT MIDLINE CATHETER  11/3/2021         LOBECTOMY Left 07/19/2021    XI ROBOTIC LEFT LOWER LOBECTOMY CONVERTED TO OPEN THORACTOMY LEFT LOWER LOBECTOMY (Left )    LUNG REMOVAL, TOTAL Left 7/19/2021    XI ROBOTIC LEFT LOWER LOBECTOMY CONVERTED TO OPEN THORACTOMY LEFT LOWER LOBECTOMY performed by Maxime Calvert MD at [address] [place] [address] Rd:    No history of tuberculosis   no occupational exposure  Pets -cats Yes, dogsNo  Birds No    Occupational history - heavy duty cleaning     TOBACCO:   reports that she has quit smoking. Her smoking use included cigarettes. She started smoking about 36 years ago. She has a 8.75 pack-year smoking history. She has never used smokeless tobacco.  ETOH:   reports previous alcohol use. ALLERGIES:      No Known Allergies      Home Meds:   Prior to Admission medications    Medication Sig Start Date End Date Taking?  Authorizing Provider   ALPRAZolam (XANAX) 0.25 MG tablet Take 1 tablet by mouth nightly for 30 days.  5/12/22 6/11/22 Yes Devang López MD   amLODIPine (NORVASC) 10 MG tablet Take 1 tablet by mouth daily 5/12/22  Yes Devang López MD   potassium chloride (KLOR-CON M) 20 MEQ extended release tablet take 1 tablet by mouth twice a day 4/28/22  Yes Devang López MD   budesonide-formoterol (SYMBICORT) 160-4.5 MCG/ACT AERO Inhale 2 puffs into the lungs 2 times daily 4/26/22  Yes Himanshu Bowser MD   Ergocalciferol (VITAMIN D) 81497 units CAPS Take 50,000 Units by mouth once a week for 11 doses 4/30/22 7/10/22 Yes Hamilton Cruz MD   metoprolol tartrate (LOPRESSOR) 25 MG tablet Take 0.5 tablets by mouth 2 times daily 4/26/22  Yes Himanshu Bowser MD   Handicap Placard MISC by Does not apply route 3/14/2022 to 3/13/2027    Panlobular emphysema ([address]  (primary encounter diagnosis) 3/14/22  Yes Cristian Fan MD   SPIRIVA RESPIMAT 2.5 MCG/ACT AERS inhaler inhale 2 puffs INTO THE LUNGS once daily 2/23/22  Yes Devang López MD   albuterol sulfate  (90 Base) MCG/ACT inhaler inhale 2 puffs by mouth and INTO THE LUNGS every 4 hours prn wheezing 2/10/22  Yes Ima Dancer, DO   albuterol (PROVENTIL) (2.5 MG/3ML) 0.083% nebulizer solution Take 3 mLs by nebulization every 6 hours as needed for Wheezing 2/10/22  Yes Ima Dancer, DO   ondansetron (ZOFRAN) 4 MG tablet take 1 tablet by mouth three times a day if needed for nausea and vomiting 1/31/22  Yes Devang López MD   sertraline (ZOLOFT) 100 MG tablet take 1 tablet by mouth once daily 1/24/22  Yes Devang López MD   Prenatal Vit-Fe Fumarate-FA (PRENATAL VITAMIN) 27-0.8 MG TABS Take 1 tablet by mouth daily 12/16/21  Yes Ayla Miguel MD   furosemide (LASIX) 40 MG tablet Take 1 tablet by mouth daily 12/2/21  Yes Jamaica Torrez DO   acetaminophen (TYLENOL) 500 MG tablet Take 1,000 mg by mouth every 6 hours as needed for Pain   Yes Historical Provider, MD KRISHNAN 5 MG TABS tablet take 1 tablet by mouth twice a day 7/16/21  Yes Norene Schirmer, MD   pantoprazole (PROTONIX) 40 MG tablet take 1 tablet by mouth once daily 5/18/21  Yes Norene Schirmer, MD   nystatin (MYCOSTATIN) 472144 UNIT/ML suspension Take 5 mLs by mouth 4 times daily Swish and swallow  Patient not taking: Reported on 5/20/2022 4/29/22   Norene Schirmer, MD   lidocaine viscous hcl (XYLOCAINE) 2 % SOLN solution Take 15 mLs by mouth as needed for Irritation or Pain  Patient not taking: Reported on 5/20/2022 4/29/22   Norene Schirmer, MD   nicotine (Elizabeth Saucer) 14 MG/24HR Place 1 patch onto the skin daily for 28 days 3/14/22 4/11/22  Ean Diez MD              Review of Systems -  General ROS: negative for - chills, fatigue, fever or weight loss  ENT ROS: negative for - headaches, oral lesions or sore throat  Cardiovascular ROS: no chest pain , orthopnea or pnd   Gastrointestinal ROS: no abdominal pain, change in bowel habits, or black or bloody stools  Skin - no rash       Vitals:  BP (!) 101/57 (Site: Right Upper Arm)   Pulse 84   Temp 97.2 °F (36.2 °C)   Resp 16   Ht 5' 5\" (1.651 m)   Wt 180 lb (81.6 kg)   SpO2 95% Comment: room air at rest  BMI 29.95 kg/m²     PHYSICAL EXAM:        CBC: No results for input(s): WBC, HGB, PLT in the last 72 hours. BMP:  No results for input(s): NA, K, CL, CO2, BUN, CREATININE, GLUCOSE in the last 72 hours. Hepatic: No results for input(s): AST, ALT, ALB, BILITOT, ALKPHOS in the last 72 hours. Amylase:   Lab Results   Component Value Date    AMYLASE 18 05/01/2014     Lipase:   Lab Results   Component Value Date    LIPASE 21 05/01/2014     Troponin: No results for input(s): TROPONINI in the last 72 hours. BNP: No results for input(s): BNP in the last 72 hours. Lipids: No results for input(s): CHOL, HDL in the last 72 hours.     Invalid input(s): LDLCALCU  ABGs:   Lab Results   Component Value Date    PHART 7.442 09/18/2021    PO2ART 38.2 09/18/2021    LTZ4VSR 39.2 09/18/2021     INR: No results for input(s): INR in the last 72 hours. Thyroid:   Lab Results   Component Value Date    TSH 0.38 04/23/2022     Urinalysis: No results for input(s): BACTERIA, BLOODU, CLARITYU, COLORU, PHUR, PROTEINU, RBCUA, SPECGRAV, BILIRUBINUR, NITRU, WBCUA, LEUKOCYTESUR, GLUCOSEU in the last 72 hours. PET CT scan 5/16/2020    Postsurgical changes status post left lower lobectomy.       New metabolically active spiculated left upper lobe pulmonary nodules   measuring 15 x 9 mm and 9 mm in size suggesting new metastatic disease.       Subtle residual subpleural ground-glass opacity in the left lung in the area   of previously seen dense airspace consolidation with low level FDG uptake   below background mediastinal uptake suggesting mild residual   fibrosis/scarring.       Resolution of previously seen mediastinal/hilar lymphadenopathy suggesting   resolved reactive lymphadenopathy.  No evidence of metabolically active   lymphadenopathy.       Stable left adrenal nodule measuring 1 cm in size without metabolic activity. Metastatic disease is unlikely. IMPRESSION:     Diagnosis Orders   1. Nodule of upper lobe of left lung  CT GUIDED NEEDLE PLACEMENT        :                PLAN:      PET CT scan and previous CT chest reviewed with patient and her daughter. Left upper lobe lung nodule is PET positive and has increased in size since last November 2021. She will need CT-guided biopsy of lung nodule . Treatment options based on results of biopsy . Advised smoking cessation   Continue bronchodilator therapy with Spiriva Symbicort and albuterol   Follow-up after biopsy . Requested Prescriptions      No prescriptions requested or ordered in this encounter       There are no discontinued medications.     Ash Abrams received counseling on the following healthy behaviors: nutrition, exercise and medication adherence    Patient given educational materials : see patient instruction       Discussed use, benefit, and side effects of prescribed medications. Barriers to medication compliance addressed. All patient questions answered. Pt voiced understanding. I hope this updates you on my evaluation and clinical thinking. Thank you for allowing me to participate in his care. Sincerely,    Electronically signed by Charmaine Patten MD on 5/20/2022 at 3:39 PM       Please note that this chart was generated using voice recognition Dragon dictation software. Although every effort was made to ensure the accuracy of this automated transcription, some errors in transcription may have occurred.

## 2022-05-24 NOTE — TELEPHONE ENCOUNTER
I sent Zyrtec to pharmacy. 1. Chronic obstructive pulmonary disease, unspecified COPD type (HCC)    - cetirizine (ZYRTEC ALLERGY) 10 MG tablet; Take 1 tablet by mouth daily  Dispense: 30 tablet;  Refill: 1

## 2022-05-24 NOTE — TELEPHONE ENCOUNTER
Incoming call came from pt, requesting what will be good allergy medication to take due to her high blood pressure. Something she can take OTC, or if  can prescribe a medication. Pt stated that she has been having some ongoing sinus issues. I did advise may need to schedule evisit or VV mychart if  is prescribing anything. Unless what is something she can take OTC and purchase herself. Please advise.

## 2022-06-02 NOTE — H&P
History and Physical    Pt Name: Daryle Greaves  MRN: [id number]  YOB: 1955  Date of evaluation: 6/2/2022  Primary Care Physician: Cele Ortiz MD    SUBJECTIVE:   History of Chief Complaint:    Daryle Greaves is a 74 y.o. female who is scheduled today for IR CT lung biopsy. She reports history of lung cancer and is s/p left lobectomy last summer, July 2021. She reports imaging revealed an increase in size of a lung nodule. She reports smoking a couple of cigarettes a day. Allergies  has No Known Allergies. Medications  Prior to Admission medications    Medication Sig Start Date End Date Taking? Authorizing Provider   cetirizine (ZYRTEC ALLERGY) 10 MG tablet Take 1 tablet by mouth daily 5/24/22   Cele Ortiz MD   pantoprazole (PROTONIX) 40 MG tablet take 1 tablet by mouth once daily 5/21/22   Cele Ortiz MD   ALPRAZolam Saddie Du) 0.25 MG tablet Take 1 tablet by mouth nightly for 30 days.  5/12/22 6/11/22  Cele Ortiz MD   amLODIPine (NORVASC) 10 MG tablet Take 1 tablet by mouth daily 5/12/22   Cele Ortiz MD   nystatin (MYCOSTATIN) 169300 UNIT/ML suspension Take 5 mLs by mouth 4 times daily Swish and swallow  Patient not taking: Reported on 5/20/2022 4/29/22   Cele Ortiz MD   lidocaine viscous hcl (XYLOCAINE) 2 % SOLN solution Take 15 mLs by mouth as needed for Irritation or Pain  Patient not taking: Reported on 5/20/2022 4/29/22   Cele Ortiz MD   potassium chloride (KLOR-CON M) 20 MEQ extended release tablet take 1 tablet by mouth twice a day 4/28/22   Cele Ortiz MD   budesonide-formoterol (SYMBICORT) 160-4.5 MCG/ACT AERO Inhale 2 puffs into the lungs 2 times daily 4/26/22   Ni Noonan MD   Ergocalciferol (VITAMIN D) 73665 units CAPS Take 50,000 Units by mouth once a week for 11 doses 4/30/22 7/10/22  Ni Noonan MD   metoprolol tartrate (LOPRESSOR) 25 MG tablet Take 0.5 tablets by mouth 2 times daily 4/26/22   MD Veronica Banerjee [facility] by Does not apply route 3/14/2022 to 3/13/2027    Panlobular emphysema ([facility]  (primary encounter diagnosis) 3/14/22   Ritchie Noble MD   nicotine (NICODERM CQ) 14 MG/24HR Place 1 patch onto the skin daily for 28 days 3/14/22 4/11/22  Ritchie Noble MD   SPIRIVA RESPIMAT 2.5 MCG/ACT AERS inhaler inhale 2 puffs INTO THE LUNGS once daily 2/23/22   Adeel Knutson MD   albuterol sulfate  (90 Base) MCG/ACT inhaler inhale 2 puffs by mouth and INTO THE LUNGS every 4 hours prn wheezing 2/10/22   Maritza Randolph DO   albuterol (PROVENTIL) (2.5 MG/3ML) 0.083% nebulizer solution Take 3 mLs by nebulization every 6 hours as needed for Wheezing 2/10/22   Maritza Randolph DO   ondansetron [facility]) 4 MG tablet take 1 tablet by mouth three times a day if needed for nausea and vomiting 1/31/22   Adeel Knutson MD   sertraline (ZOLOFT) 100 MG tablet take 1 tablet by mouth once daily 1/24/22   Adeel Knutson MD   Prenatal Vit-Fe Fumarate-FA (PRENATAL VITAMIN) 27-0.8 MG TABS Take 1 tablet by mouth daily 12/16/21   Neetu Panchal MD   furosemide (LASIX) 40 MG tablet Take 1 tablet by mouth daily 12/2/21   Shirley Song DO   acetaminophen (TYLENOL) 500 MG tablet Take 1,000 mg by mouth every 6 hours as needed for Pain    Historical Provider, MD   ELIQUWILFRID 5 MG TABS tablet take 1 tablet by mouth twice a day 7/16/21   Adeel Knutson MD     Past Medical History    has a past medical history of Abnormal CT of the chest, Adenocarcinoma, lung, left ([facility] Arthritis, Community acquired pneumonia, COPD (chronic obstructive pulmonary disease) ([facility] Deep venous thrombosis of left profunda femoris vein ([facility] Depression, DVT (deep venous thrombosis) ([facility] GERD (gastroesophageal reflux disease), History of pulmonary embolism, History of thyroid nodule, Hypertension, Major depressive disorder, recurrent, moderate ([address] Nodule of lower lobe of left lung, Obesity, Class II, BMI 35-39.9, On anticoagulant therapy, On home oxygen therapy, On methotrexate therapy, Prediabetes, Rheumatoid arthritis ([facility] Snores, Thyroid nodule, Tobacco abuse, Under care of team, Under care of team, Under care of team, Wears glasses, and Wellness examination. Past Surgical History   has a past surgical history that includes Appendectomy (); Endoscopy, colon, diagnostic (462034); bronchoscopy (N/A, 2019); Colonoscopy (N/A, 2020); CT NEEDLE BIOPSY LUNG PERCUTANEOUS (2021); lobectomy (Left, 2021); Lung removal, total (Left, 2021); and Insert Midline Catheter (11/3/2021). Social History   reports that she has quit smoking. Her smoking use included cigarettes. She started smoking about 36 years ago. She has a 8.75 pack-year smoking history. She has never used smokeless tobacco.   reports previous alcohol use. reports no history of drug use. Marital Status   Occupation factory  Family History  Family Status   Relation Name Status    Mother     [facility] Favorite Father     [facility] Brother     [facility] Brother       family history includes Cancer in her brother, brother, father, and mother; Diabetes in her mother; Heart Disease in her mother. Review of Systems:  CONSTITUTIONAL:   negative for fevers, chills, fatigue and malaise    EYES:   negative for double vision, blurred vision and photophobia +glasses   HEENT:   negative for tinnitus, epistaxis and sore throat     RESPIRATORY:   negative for shortness of breath, wheezing  +intermittent coughing   CARDIOVASCULAR:   negative for chest pain, palpitations, syncope, edema     GASTROINTESTINAL:   negative for nausea, vomiting     GENITOURINARY:   negative for incontinence     MUSCULOSKELETAL:   negative for neck or back pain     NEUROLOGICAL:   Negative for weakness and tingling  negative for headaches and dizziness     PSYCHIATRIC:   negative for anxiety       OBJECTIVE:   VITALS:  height is 5' 5\" (1.651 m) and weight is 180 lb (81.6 kg).  Her tympanic temperature is 98.5 °F (36.9 °C). Her blood pressure is 136/67 and her pulse is 82. Her respiration is 16 and oxygen saturation is 95%. CONSTITUTIONAL:alert & oriented x 3, no acute distress. Calm and pleasant. SKIN:  Warm and dry, no rashes on exposed areas of skin   HEAD:  Normocephalic, atraumatic   EYES: PERRL. EOMs intact. Wearing glasses. EARS:  Equal bilaterally, no edema or thickening, skin is intact without lumps or lesions. No discharge. Hearing grossly WNL. NOSE:  Nares patent. No rhinorrhea   MOUTH/THROAT:  Mucous membranes moist, tongue is pink, uvula midline, teeth appear to be intact  NECK:supple, full ROM  LUNGS: Respirations even and non-labored. Intermittent cough. Faint wheezing throughout  CARDIOVASCULAR: Regular rate and rhythm  ABDOMEN: soft, non-tender, non-distended, bowel sounds active x 4   EXTREMITIES: No edema bilateral lower extremities. No varicosities bilateral lower extremities. NEUROLOGIC: CN II-XII are grossly intact. Gait not assessed.    IMPRESSIONS:   Lung nodule  PET +  PLANS:   IR Ct lung biopsy     ILIR NUNEZ CNP   Electronically signed 6/2/2022 at 10:24 AM

## 2022-06-11 NOTE — PROGRESS NOTES
REASON FOR THE CONSULTATION:  Lung cancer post lobectomy   Left upper lobe lung nodule  HISTORY OF PRESENT ILLNESS:    Blayne Carvalho is a 74y.o. year old female   bx results reciewed with patient and her son in detail. Patient has history of rheumatoid arthritis and is not on medication. She has not followed up with a rheumatologist in a while. Patient tells me that she has stopped smoking. She denies hemoptysis or purulent sputum. He is feeling better. Last visit  Patient is accompanied by her daughter. I reviewed the PET CT scan port and images with them. Reviewed the previous CT chest and PFT. She is already had left lower lobe lobectomy for adenocarcinoma of the lung. Unfortunately continues to smoke. Left upper lobe lung nodule has increased in size and is PET positive. Pneumonic process and consolidation is resolving. Last visit  Accompanied by her daughter   She continues to smoke 1 ppd . Dyspnea is grade 2 no active wheeze or hemoptysis . Using spiriva   Albuterol as needed     Last visit     Patient was seen by cardiothoracic surgery and she is scheduled for left lower lobe lobectomy for adenocarcinoma of lung on July 12, 2021. She needs to get PFT but has not had COVID-19 vaccination so will need COVID-19 testing prior to PFT. Unfortunately she continues to smoke almost a pack a day. Advised her to quit smoking. She did not use Chantix. She wants to quit on her own. In May patient was admitted to [facility] with a left upper lobe pneumonia. She was treated with antibiotics and discharged. Denies any purulent sputum or hemoptysis or fevers at present. She is using Spiriva daily and uses albuterol as needed. Last visit   here for evaluation of lung nodule which was seen on CT lung screening. It is spiculated and he is 1.7 cm. This has increased in size from CTA of the chest done December 2019.   Patient has chronic grade 2 dyspnea , which has been progressively getting worse withafter one flight stairs, with one/ half  block walking , Complains of Yes Cough , Yessputum production clear in morning   , No  hemoptysis , Yes  Associated with wheezing . No home oxygen  . She has spiriva which helps . LUNG CANCER SCREENING     1. CRITERIA MET    []     CT ORDERED  []      2. CRITERIA NOT MET   [x]      3. REFUSED                    []        REASON CRITERIA NOT MET     1. SMOKING LESS THAN 30 PY  []      2. AGE LESS THAN 55 or GREATER 77 YEARS  []      3. QUIT SMOKING 15 YEARS OR GREATER   []      4. RECENT CT WITH IN 11 MONTHS    []      5. LIFE EXPECTANCY < 5 YEARS   []      6.  SIGNS  AND SYMPTOMS OF LUNG CANCER   []         Immunization   Immunization History   Administered Date(s) Administered    Influenza Virus Vaccine 01/26/2016    Influenza, Quadv, IM, PF (6 mo and older Fluzone, Flulaval, Fluarix, and 3 yrs and older Afluria) 10/28/2016, 11/12/2018    Pneumococcal Polysaccharide ([id number]) 04/11/2017    Tdap (Boostrix, Adacel) 06/12/2019    Zoster Recombinant (Shingrix) 06/12/2019, 11/20/2019          PAST MEDICAL HISTORY:       Diagnosis Date    Abnormal CT of the chest 04/21/2021    Adenocarcinoma, lung, left ([facility] 05/14/2021    Arthritis     Community acquired pneumonia 12/23/2019    COPD (chronic obstructive pulmonary disease) ([address] 08/14/2019    Deep venous thrombosis of left profunda femoris vein ([address] 04/16/2020    Depression     DVT (deep venous thrombosis) ([address] 2014    b/l     GERD (gastroesophageal reflux disease)     History of pulmonary embolism 05/27/2021    History of thyroid nodule 12/23/2019    Hypertension     Major depressive disorder, recurrent, moderate ([address] 11/12/2018    Nodule of lower lobe of left lung 04/23/2021    Obesity, Class II, BMI 35-39.9 11/12/2018    On anticoagulant therapy 05/27/2021    On home oxygen therapy     02 2L NC PRN    On methotrexate therapy 05/27/2021    Prediabetes 11/12/2018    Rheumat[facility]     Snores     denies apnea    Thyroid nodule 01/09/2020    Tobacco abuse 11/12/2018    Under care of team 06/29/2021    [facility]    Under care of team 06/29/2021    [facility]-last visit june 2021    Under care of team     Dr. Walker Sloop clearance appointment 7/6/21, stress test 7/9/2021, clearance obtained and placed on chart    Wears glasses     Wellness examination 06/29/2021    pcp-Dr Fernando Hernandez office-last visit may 2021         Family History:       Problem Relation Age of Onset    Cancer Mother         Uterine and breast    Diabetes Mother     Heart Disease Mother     Cancer Father         lung    Cancer Brother         lung    Cancer Brother         lung       SURGICAL HISTORY:   Past Surgical History:   Procedure Laterality Date    APPENDECTOMY  1982    BRONCHOSCOPY N/A 12/27/2019    BRONCHOSCOPY ALVEOLAR LAVAGE performed by Cris Grimm MD at [facility] N/A 2/12/2020    COLONOSCOPY POLYPECTOMIES HOT SNARE, COLD BIOPSY POLYPECTOMIES performed by Merary Reyes MD at [facility]  5/13/2021    CT NEEDLE BIOPSY LUNG PERCUTANEOUS 5/13/2021 [facility] CT SCAN    CT NEEDLE BIOPSY LUNG PERCUTANEOUS  6/2/2022    CT NEEDLE BIOPSY LUNG PERCUTANEOUS 6/2/2022 ST CT SCAN    ENDOSCOPY, COLON, DIAGNOSTIC  [id number]    gastritis, sm. bowel lipoma, biopsies    INSERT MIDLINE CATHETER  11/3/2021         LOBECTOMY Left 07/19/2021    XI ROBOTIC LEFT LOWER LOBECTOMY CONVERTED TO OPEN THORACTOMY LEFT LOWER LOBECTOMY (Left )    LUNG REMOVAL, TOTAL Left 7/19/2021    XI ROBOTIC LEFT LOWER LOBECTOMY CONVERTED TO OPEN THORACTOMY LEFT LOWER LOBECTOMY performed by Aydin Singh MD at [address] [place] [address] Rd:    No history of tuberculosis   no occupational exposure  Pets -cats Yes, dogsNo  Birds No    Occupational history - heavy duty cleaning     TOBACCO:   reports that she has been smoking cigarettes. She started smoking about 36 years ago. She has a 8.75 pack-year smoking history. She has never used smokeless tobacco.  ETOH:   reports previous alcohol use. ALLERGIES:      No Known Allergies      Home Meds:   Prior to Admission medications    Medication Sig Start Date End Date Taking? Authorizing Provider   metoprolol tartrate (LOPRESSOR) 25 MG tablet take 1/2 tablet by mouth twice a day 6/3/22  Yes Adeel Knutson MD   cetirizine (ZYRTEC ALLERGY) 10 MG tablet Take 1 tablet by mouth daily 5/24/22  Yes Adeel Knutson MD   pantoprazole (PROTONIX) 40 MG tablet take 1 tablet by mouth once daily 5/21/22  Yes Adeel Knutson MD   ALPRAZolam Jack Nasuti) 0.25 MG tablet Take 1 tablet by mouth nightly for 30 days.  5/12/22 6/11/22 Yes Adeel Knutson MD   amLODIPine (NORVASC) 10 MG tablet Take 1 tablet by mouth daily 5/12/22  Yes Adeel Knutson MD   potassium chloride (KLOR-CON M) 20 MEQ extended release tablet take 1 tablet by mouth twice a day 4/28/22  Yes Adeel Knutson MD   budesonide-formoterol [facility]) 160-4.5 MCG/ACT AERO Inhale 2 puffs into the lungs 2 times daily 4/26/22  Yes Herve Steele MD   Ergocalciferol (VITAMIN D) 47335 units CAPS Take 50,000 Units by mouth once a week for 11 doses 4/30/22 7/10/22 Yes Herve Steele MD   Handicap Nela MISC by Does not apply route 3/14/2022 to 3/13/2027    Panlobular emphysema ([address]  (primary encounter diagnosis) 3/14/22  Yes Ritchie Noble MD   SPIRIVA RESPIMAT 2.5 MCG/ACT AERS inhaler inhale 2 puffs INTO THE LUNGS once daily 2/23/22  Yes Adeel Knutson MD   albuterol sulfate  (90 Base) MCG/ACT inhaler inhale 2 puffs by mouth and INTO THE LUNGS every 4 hours prn wheezing 2/10/22  Yes Maritza Randolph,    albuterol (PROVENTIL) (2.5 MG/3ML) 0.083% nebulizer solution Take 3 mLs by nebulization every 6 hours as needed for Wheezing 2/10/22  Yes Kenyetta Chew Valeria Burt,    ondansetron (ZOFRAN) 4 MG tablet take 1 tablet by mouth three times a day if needed for nausea and vomiting 1/31/22  Yes Dari Rice MD   sertraline (ZOLOFT) 100 MG tablet take 1 tablet by mouth once daily 1/24/22  Yes Dari Rice MD   Prenatal Vit-Fe Fumarate-FA (PRENATAL VITAMIN) 27-0.8 MG TABS Take 1 tablet by mouth daily 12/16/21  Yes Blanca Castro MD   furosemide (LASIX) 40 MG tablet Take 1 tablet by mouth daily 12/2/21  Yes Jayne Tavares DO   acetaminophen (TYLENOL) 500 MG tablet Take 1,000 mg by mouth every 6 hours as needed for Pain   Yes Historical Provider, MD   ELIQUWILFRID 5 MG TABS tablet take 1 tablet by mouth twice a day 7/16/21  Yes Dari Rice MD   nystatin (MYCOSTATIN) 420276 UNIT/ML suspension Take 5 mLs by mouth 4 times daily Swish and swallow  Patient not taking: Reported on 5/20/2022 4/29/22   Dari Rice MD   lidocaine viscous hcl (XYLOCAINE) 2 % SOLN solution Take 15 mLs by mouth as needed for Irritation or Pain  Patient not taking: Reported on 5/20/2022 4/29/22   Dari Rice MD   nicotine (NICODERM CQ) 14 MG/24HR Place 1 patch onto the skin daily for 28 days 3/14/22 4/11/22  Chauncey Dawson MD              Review of Systems -  General ROS: negative for - chills, fatigue, fever or weight loss  ENT ROS: negative for - headaches, oral lesions or sore throat  Cardiovascular ROS: no chest pain , orthopnea or pnd   Gastrointestinal ROS: no abdominal pain, change in bowel habits, or black or bloody stools  Skin - no rash       Vitals:  /62 (Site: Right Upper Arm)   Pulse 81   Temp 97 °F (36.1 °C)   Resp 16   Ht 5' 5\" (1.651 m)   Wt 177 lb (80.3 kg)   SpO2 94% Comment: room air at rest  BMI 29.45 kg/m²     PHYSICAL EXAM:  Head and neck atraumatic, normocephalic    Lymph nodes-no cervical, supraclavicular lymphadenopathy    Neck-no JVP elevation    Lungs - AP diameter of chest increased. Thoracic expansion and diaphragmatic excursion diminished.  BS diminished and expiratory phase prolonged. No dullness to percussion or tenderness to palpation. No bronchial breath sounds . CVS- S1, S2 regular. No S3 no S4, no murmurs    Abdomen-nontender, nondistended. Bowel sounds are present. No organomegaly    Lower extremity-no edema    Upper extremity-no edema    Neurological-grossly normal cranial nerves. No overt motor deficit          CBC: No results for input(s): WBC, HGB, PLT in the last 72 hours. BMP:  No results for input(s): NA, K, CL, CO2, BUN, CREATININE, GLUCOSE in the last 72 hours. Hepatic: No results for input(s): AST, ALT, ALB, BILITOT, ALKPHOS in the last 72 hours. Amylase:   Lab Results   Component Value Date    AMYLASE 18 05/01/2014     Lipase:   Lab Results   Component Value Date    LIPASE 21 05/01/2014     Troponin: No results for input(s): TROPONINI in the last 72 hours. BNP: No results for input(s): BNP in the last 72 hours. Lipids: No results for input(s): CHOL, HDL in the last 72 hours. Invalid input(s): LDLCALCU  ABGs:   Lab Results   Component Value Date    PHART 7.442 09/18/2021    PO2ART 38.2 09/18/2021    KZH9HDT 39.2 09/18/2021     INR: No results for input(s): INR in the last 72 hours. Thyroid:   Lab Results   Component Value Date    TSH 0.38 04/23/2022     Urinalysis: No results for input(s): BACTERIA, BLOODU, CLARITYU, COLORU, PHUR, PROTEINU, RBCUA, SPECGRAV, BILIRUBINUR, NITRU, WBCUA, LEUKOCYTESUR, GLUCOSEU in the last 72 hours. SURGICAL PATHOLOGY CONSULTATION       Patient Name: Latosha Jackson Med Rec: [id number]   Path Number: [id number]   Collected: 6/2/2022   Received: 6/2/2022   Reported: 6/6/2022 14:31     -- Diagnosis --   PRELIMINARY DIAGNOSIS:   LUNG, LEFT UPPER LOBE, CT-GUIDED CORE NEEDLE BIOPSIES:             -  PATCHY MUCINOUS AND NON-MUCINOUS GLANDULAR PROLIFERATION   EXTENDING ALONG ALVEOLAR SEPTAL SURFACES, CONCERNING FOR NON-INVASIVE   ADENOCARCINOMA (PENDING OUTSIDE CONSULTATION).         -  ORGANIZING PNEUMONIA. -- Diagnosis Comment --   ** 6/6/2022: THIS IS AN AMENDED REPORT TO INCLUDE THE   IMMUNOHISTOCHEMICAL STAIN RESULTS.  THIS CASE IS PENDING ADDITIONAL   OUTSIDE CONSULTATION AT [address] [place] [address] FOR FINAL   DIAGNOSIS.  THOSE RESULTS WILL BE REPORTED AS AN ADDENDUM WHEN   AVAILABLE. Chanel Casey M.D.   **Electronically Signed Out**         jet/6/3/2022   Procedures/Addenda   ADDENDUM AFTER OUTSIDE CONSULTATION     Date Ordered:     6/8/2022       Status: Signed Out        Date Complete:     6/8/2022     By: Prerna Casey M.D.        Date Reported:     6/8/2022       INTERPRETATION   THIS CASE WAS SENT TO THE [facility], PATHOLOGY   CONSULTATION [facility].  DR. Lesley Dsouza RENDERED   THE FOLLOWING DIAGNOSIS:       FINAL DIAGNOSIS:   \"LUNG, LEFT UPPER LOBE, CORE NEEDLE BIOPSY ([id number], A1-2;   06/02/2022):  ORGANIZING PNEUMONIA WITHOUT SPECIFIC FEATURES (SEE   DETAILED COMMENT BELOW). \"     IMPRESSION:     Diagnosis Orders   1. Nodule of upper lobe of left lung  CT CHEST LOW DOSE (LDCT)   2. Centrilobular emphysema ([address]     3. Adenocarcinoma of left lung (HCC)     4. Status post lobectomy of left lower lobe  - 7/19/21     5. Stage 2 moderate COPD by GOLD classification ([address]     6. COPD, severity to be determined ([address]     7. Rheumatoid arthritis involving multiple sites, unspecified whether rheumatoid factor present ([address]     8. History of DVT in adulthood          :                PLAN:      Ct guided Bx of lung mass done by Dr Fortino Dinh shows organizing pneumonia - dd post infectious / RA . But because of her hx of lung cancer and smoking , will continue with surveillance ct chest - in 3 months . It lung nodule grows in size then will need repeat ct guided Bx .    Reviewed with pt and her son   Follow up with rheumatologist .  Smoking cessation   Continue bronchodilator therapy with Spiriva, Symbicort and use albuterol as needed. Requested Prescriptions      No prescriptions requested or ordered in this encounter       There are no discontinued medications. Darien Hill received counseling on the following healthy behaviors: nutrition, exercise and medication adherence    Patient given educational materials : see patient instruction       Discussed use, benefit, and side effects of prescribed medications. Barriers to medication compliance addressed. All patient questions answered. Pt voiced understanding. I hope this updates you on my evaluation and clinical thinking. Thank you for allowing me to participate in his care. Sincerely,    Electronically signed by Kelsie Palafox MD on 6/11/2022 at 1:05 PM       Please note that this chart was generated using voice recognition Dragon dictation software. Although every effort was made to ensure the accuracy of this automated transcription, some errors in transcription may have occurred.

## 2022-06-16 NOTE — TELEPHONE ENCOUNTER
PATIENT CALLED AND LEFT A VM AT 8:29 CANCELLING HER APPOINTMENT, AS SHE IS NOT FEELING WELL. WRITER TRIED TO CALL PATIENT BACK TO GET HER RESCHEDULED BUT VM WAS FULL. WRITER ATTEMPTED TO CALL ANOTHER NUMBER GIVEN, BUT NO ANSWER.

## 2022-06-22 NOTE — TELEPHONE ENCOUNTER
Patient called hoping to be seen. For the past week she has been experiencing a cough and and runny nose for the past week. The also has been experiencing some body aches, I tried to fund her a virtual appointment but our soonest wasn't until July 7th so I advised patient to go to out walk-in clinic across the street to get checked out.

## 2022-07-14 NOTE — PROGRESS NOTES
_           Chief Complaint   Patient presents with    Follow-up    Nausea    Medication Refill     DIAGNOSIS:       Stage T1b N0 M0 left lower lobe lung adenocarcinoma  COPD  Chronic tobacco abuse  Multiple comorbidities as listed    CURRENT THERAPY:         Status post left lower lobectomy 7/19/2021  BRIEF CASE HISTORY:      Ms. Aleks Gastelum is a very pleasant 74 y.o. female with history of multiple co morbidities as listed. Patient is referred for further evaluation and management of lung cancer. The patient had bacterial pneumonia in December 2019. CT scan at that time showed questionable abnormality in the left lower lobe as well. Recommendation was for monitoring. Patient did fairly well since then. She had screening CT scan done April 2021 and that showed suspicious 1.7 lesion in the left lung lower lobe. And CT scan did not show any significant activity but due to the increase in the size of the nodule compared to last year biopsy was done which was positive for invasive adenocarcinoma. Patient is referred for further management. Patient denies any chest pain or hemoptysis. She has occasional cough and sputum. No fever. No headaches. No back pain. No weight loss or decreased appetite. No other complaints. Patient was also referred to see chest surgery which is scheduled for May 27, 2021. She has scheduled pulmonary function test on June 1, 2021. Patient smoker of 1 to 1.5 packs/day for the last 33 years. Denies alcohol drinking. INTERIM HISTORY:   Patient seen for follow-up lung cancer. She had surgery with left lower lobectomy 7/19/2021. Patient was hospitalized due to pneumonia. She had suspicious lesion and had biopsy. Pathology showed no malignancy. Patient had recent pneumonia again. She received antibiotics. She is recovering well.     Patient was seen by pulmonary and she will have repeated CT scan in September 2022. PAST MEDICAL HISTORY: has a past medical history of Abnormal CT of the chest, Adenocarcinoma, lung, left ([address] Arthritis, Community acquired pneumonia, COPD (chronic obstructive pulmonary disease) ([address] Deep venous thrombosis of left profunda femoris vein ([address] Depression, DVT (deep venous thrombosis) ([address] GERD (gastroesophageal reflux disease), History of pulmonary embolism, History of thyroid nodule, Hypertension, Major depressive disorder, recurrent, moderate ([address] Nodule of lower lobe of left lung, Obesity, Class II, BMI 35-39.9, On anticoagulant therapy, On home oxygen therapy, On methotrexate therapy, Prediabetes, Rheumatoid arthritis ([address] Snores, Thyroid nodule, Tobacco abuse, Under care of team, Under care of team, Under care of team, Wears glasses, and Wellness examination. PAST SURGICAL HISTORY: has a past surgical history that includes Appendectomy (1982); Endoscopy, colon, diagnostic (939685); bronchoscopy (N/A, 12/27/2019); Colonoscopy (N/A, 2/12/2020); CT NEEDLE BIOPSY LUNG PERCUTANEOUS (5/13/2021); Lung lobectomy (Left, 07/19/2021); Lung removal, total (Left, 7/19/2021); Insert Midline Catheter (11/3/2021); and CT NEEDLE BIOPSY LUNG PERCUTANEOUS (6/2/2022). CURRENT MEDICATIONS:  has a current medication list which includes the following prescription(s): ondansetron, vitamin d, spiriva respimat, metoprolol tartrate, cetirizine, pantoprazole, amlodipine, potassium chloride, budesonide-formoterol, handicap placard, albuterol sulfate hfa, albuterol, sertraline, prenatal vitamin, furosemide, acetaminophen, and eliquis. ALLERGIES:  has No Known Allergies. FAMILY HISTORY: 2 brothers and father had lung cancer. Mother had uterine and breast cancer. Otherwise negative for any hematological or oncological conditions. SOCIAL HISTORY:  reports that she has been smoking cigarettes. She started smoking about 36 years ago.  She has a 8.75 pack-year smoking history. She has never used smokeless tobacco. She reports previous alcohol use. She reports that she does not use drugs. REVIEW OF SYSTEMS:     · General: No weakness or fatigue. No unanticipated weight loss or decreased appetite. No fever or chills. · Eyes: No blurred vision, eye pain or double vision. · Ears: No hearing problems or drainage. No tinnitus. · Throat: No sore throat, problems with swallowing or dysphagia. · Respiratory: As above  · Cardiovascular: No chest pain, orthopnea or PND. No lower extremity edema. No palpitation. · Gastrointestinal: No problems with swallowing. No abdominal pain or bloating. No nausea or vomiting. No diarrhea or constipation. No GI bleeding. · Genitourinary: No dysuria, hematuria, frequency or urgency. · Musculoskeletal: No muscle aches or pains. No limitation of movement. No back pain. No gait disturbance, No joint complaints. · Dermatologic: No skin rashes or pruritus. No skin lesions or discolorations. · Psychiatric: No depression, anxiety, or stress or signs of schizophrenia. No change in mood or affect. · Hematologic: No history of bleeding tendency. No bruises or ecchymosis. No history of clotting problems. · Infectious disease: No fever, chills or frequent infections. · Endocrine: No polydipsia or polyuria. No temperature intolerance. · Neurologic: No headaches or dizziness. No weakness or numbness of the extremities. No changes in balance, coordination,  memory, mentation, behavior. · Allergic/Immunologic: No nasal congestion or hives. No repeated infections. PHYSICAL EXAM:  The patient is not in acute distress. Vital signs: Blood pressure (!) 110/55, pulse 74, temperature 97.4 °F (36.3 °C), temperature source Temporal, resp. rate 16, weight 172 lb 14.4 oz (78.4 kg), not currently breastfeeding.      General appearance - well appearing, not in pain or distress  Mental status - good mood, alert and oriented  Eyes - pupils equal and reactive, extraocular eye movements intact  Ears - bilateral TM's and external ear canals normal  Nose - normal and patent, no erythema, discharge or polyps  Mouth - mucous membranes moist, pharynx normal without lesions  Neck - supple, no significant adenopathy  Lymphatics - no palpable lymphadenopathy, no hepatosplenomegaly  Chest - clear to auscultation, no wheezes, rales or rhonchi, symmetric air entry  Heart - normal rate, regular rhythm, normal S1, S2, no murmurs, rubs, clicks or gallops  Abdomen - soft, nontender, nondistended, no masses or organomegaly  Neurological - alert, oriented, normal speech, no focal findings or movement disorder noted  Musculoskeletal - no joint tenderness, deformity or swelling  Extremities - peripheral pulses normal, no pedal edema, no clubbing or cyanosis  Skin - normal coloration and turgor, no rashes, no suspicious skin lesions noted     Review of Diagnostic data:   Lab Results   Component Value Date    WBC 28.6 (H) 04/26/2022    HGB 7.9 (L) 04/26/2022    HCT 26.1 (L) 04/26/2022    MCV 96.7 04/26/2022     (L) 06/02/2022       Chemistry        Component Value Date/Time     04/26/2022 0607    K 3.8 04/26/2022 0607     04/26/2022 0607    CO2 33 (H) 04/26/2022 0607    BUN 37 (H) 04/26/2022 0607    CREATININE 0.68 04/26/2022 0607        Component Value Date/Time    CALCIUM 8.9 04/26/2022 0607    ALKPHOS 74 04/23/2022 0609    AST 13 04/23/2022 0609    ALT <5 (L) 04/23/2022 0609    BILITOT 0.52 04/23/2022 0609            IMPRESSION:   Stage T1b N0 M0 left lower lobe lung adenocarcinoma  COPD  Chronic tobacco abuse  Multiple comorbidities as listed    PLAN:  I explained to the patient the nature of lung cancer, staging, prognosis and treatment. The patient presents with a small tumor size with no clear evidence of metastasis. She had left lower lobectomy. Recovered well. She was recently hospitalized because of pneumonia.   She agreed to have

## 2022-07-14 NOTE — TELEPHONE ENCOUNTER
Krystal Haro MD VISIT  DR Adrianna Hdz IN TO SEE PATIENT  ORDERS RECEIVED  RV 6 MONTHS  AVS PRINTED AND PLACED INTO CALL FOLDER TO SCHEDULE F/U FOR JAN 2023  SCRIPT SENT TO PATIENT'S PHARMACY  AVS PRINTED AND GIVEN TO PATIENT WITH INSTRUCTIONS  PATIENT DISCHARGED AMBULATORY

## 2022-07-20 NOTE — TELEPHONE ENCOUNTER
Please Approve or Refuse.   Send to Pharmacy per Pt's Request: [facility]      Next Visit Date:  Visit date not found   Last Visit Date: 5/12/2022    Hemoglobin A1C (%)   Date Value   12/23/2019 6.0   06/12/2019 5.7   11/12/2018 5.9             ( goal A1C is < 7)   BP Readings from Last 3 Encounters:   07/14/22 (!) 110/55   06/10/22 129/62   06/02/22 116/72          (goal 120/80)  BUN   Date Value Ref Range Status   04/26/2022 37 (H) 8 - 23 mg/dL Final     CREATININE   Date Value Ref Range Status   04/26/2022 0.68 0.50 - 0.90 mg/dL Final     Potassium   Date Value Ref Range Status   04/26/2022 3.8 3.7 - 5.3 mmol/L Final

## 2022-07-25 NOTE — TELEPHONE ENCOUNTER
----- Message from Jenny Cullen sent at 7/25/2022 10:53 AM EDT -----  Subject: Refill Request    QUESTIONS  Name of Medication? albuterol sulfate  (90 Base) MCG/ACT inhaler  Patient-reported dosage and instructions? instructions are take as needed. One or two puffs every 4 hours as needed  How many days do you have left? 0  [facility]? [facility]  Pharmacy phone number (if available)? [phone]  ---------------------------------------------------------------------------  --------------,  Name of Medication? potassium chloride (KLORVicenteCON M) 20 MEQ extended   release tablet  Patient-reported dosage and instructions? One 20 MEQ tablet daily   How many days do you have left? 5  [facility]erred [facility]? [facility]  Pharmacy phone number (if available)? [phone]  ---------------------------------------------------------------------------  --------------,  Name of Medication? budesonide-formoterol (SYMBICORT) 160-4.5 MCG/ACT AERO  Patient-reported dosage and instructions? 2 puffs twice daily   How many days do you have left? 0  Preferred [facility]? [facility]  Pharmacy phone number (if available)? [phone]  ---------------------------------------------------------------------------  --------------  CALL BACK INFO  What is the best way for the office to contact you? OK to leave message on   voicemail  Preferred Call Back Phone Number? [phone]  ---------------------------------------------------------------------------  --------------  SCRIPT ANSWERS  Relationship to Patient?  Self

## 2022-08-25 PROBLEM — R65.21 SEPTIC SHOCK (HCC): Status: ACTIVE | Noted: 2022-01-01

## 2022-08-25 PROBLEM — R94.31 QT PROLONGATION: Status: ACTIVE | Noted: 2022-01-01

## 2022-08-25 PROBLEM — E87.20 LACTIC ACIDOSIS: Status: ACTIVE | Noted: 2022-01-01

## 2022-08-25 PROBLEM — E87.6 HYPOKALEMIA: Status: ACTIVE | Noted: 2022-01-01

## 2022-08-25 PROBLEM — N12 PYELONEPHRITIS: Status: ACTIVE | Noted: 2022-01-01

## 2022-08-25 PROBLEM — A41.9 SEPTIC SHOCK (HCC): Status: ACTIVE | Noted: 2022-01-01

## 2022-08-25 NOTE — ED PROVIDER NOTES
Catheter (11/3/2021); and CT NEEDLE BIOPSY LUNG PERCUTANEOUS (6/2/2022). Social History     Socioeconomic History    Marital status: Single     Spouse name: Not on file    Number of children: 8    Years of education: Not on file    Highest education level: Not on file   Occupational History     Employer: N/A   Tobacco Use    Smoking status: Some Days     Packs/day: 0.25     Years: 35.00     Pack years: 8.75     Types: Cigarettes     Start date: 1/14/1986    Smokeless tobacco: Never    Tobacco comments:     restarted smoking 2019   Vaping Use    Vaping Use: Never used   Substance and Sexual Activity    Alcohol use: Not Currently     Alcohol/week: 0.0 standard drinks    Drug use: No    Sexual activity: Not Currently   Other Topics Concern    Not on file   Social History Narrative    Not on file     Social Determinants of Health     Financial Resource Strain: Not on file   Food Insecurity: Not on file   Transportation Needs: Not on file   Physical Activity: Not on file   Stress: Not on file   Social Connections: Not on file   Intimate Partner Violence: Not on file   Housing Stability: Not on file       Family History   Problem Relation Age of Onset    Cancer Mother         Uterine and breast    Diabetes Mother     Heart Disease Mother     Cancer Father         lung    Cancer Brother         lung    Cancer Brother         lung       Allergies:  Patient has no known allergies. Home Medications:  Prior to Admission medications    Medication Sig Start Date End Date Taking?  Authorizing Provider   sertraline (ZOLOFT) 100 MG tablet take 1 tablet by mouth once daily 7/27/22   Villa Monge MD   SYMBICORT 160-4.5 MCG/ACT AERO inhale 2 puffs by mouth twice a day 7/25/22   Glendy Ramos MD   albuterol sulfate HFA (PROVENTIL;VENTOLIN;PROAIR) 108 (90 Base) MCG/ACT inhaler inhale 2 puffs by mouth and INTO THE LUNGS every 4 hours for 7 days 7/25/22   Glendy Ramos MD   SYMBICORT 160-4.5 MCG/ACT AERO inhale 2 puffs by mouth twice a day 7/25/22   Cliff Navas MD   potassium chloride (KLOR-CON M) 20 MEQ extended release tablet take 1 tablet by mouth twice a day 7/25/22   Cliff Navas MD   Prenatal Vit-Fe Fumarate-FA (PRENATAL VITAMIN) 27-1 MG TABS tablet take 1 tablet by mouth once daily 7/23/22   Kota Mcfadden MD   ondansetron (ZOFRAN) 4 MG tablet take 1 tablet by mouth three times a day if needed for nausea and vomiting 7/14/22   Kota Mcfadden MD   vitamin D (ERGOCALCIFEROL) 1.25 MG (74838 UT) CAPS capsule take 1 capsule by mouth every week for 11 doses 7/1/22   Cliff Navas MD   SPIRIVA RESPIMAT 2.5 MCG/ACT AERS inhaler inhale 2 puffs INTO THE LUNGS once daily 6/30/22   Cliff Navas MD   metoprolol tartrate (LOPRESSOR) 25 MG tablet take 1/2 tablet by mouth twice a day 6/3/22   Cliff Navas MD   cetirizine (ZYRTEC ALLERGY) 10 MG tablet Take 1 tablet by mouth daily 5/24/22   Cliff Navas MD   pantoprazole (PROTONIX) 40 MG tablet take 1 tablet by mouth once daily 5/21/22   Cliff Navas MD   amLODIPine (NORVASC) 10 MG tablet Take 1 tablet by mouth daily 5/12/22   Cliff Navas MD   Handicap Placard MISC by Does not apply route 3/14/2022 to 3/13/2027    Panlobular emphysema ([facility] [address]  (primary encounter diagnosis) 3/14/22   Aldean Jeans, MD   albuterol (PROVENTIL) (2.5 MG/3ML) 0.083% nebulizer solution Take 3 mLs by nebulization every 6 hours as needed for Wheezing 2/10/22   Fritz Morales DO   furosemide (LASIX) 40 MG tablet Take 1 tablet by mouth daily 12/2/21   Tk Allen DO   acetaminophen (TYLENOL) 500 MG tablet Take 1,000 mg by mouth every 6 hours as needed for Pain    Historical Provider, MD   ELIQUWILFRID 5 MG TABS tablet take 1 tablet by mouth twice a day 7/16/21   Cliff Navas MD       REVIEW OF SYSTEMS    (2-9 systems for level 4, 10 or more for level 5)      ROS was obtained after patient became more oriented.   Not in conjunction with physical exam.     Review of Systems   Constitutional: Negative for chills, diaphoresis, fatigue and fever. HENT:  Negative for ear pain, rhinorrhea and sore throat. Eyes:  Negative for pain, discharge and redness. Respiratory:  Positive for cough. Negative for shortness of breath, wheezing and stridor. Cardiovascular:  Negative for chest pain, palpitations and leg swelling. Gastrointestinal:  Negative for abdominal pain, diarrhea, nausea and vomiting. Genitourinary:  Negative for decreased urine volume, dysuria and frequency. Musculoskeletal:  Positive for back pain. Negative for neck pain and neck stiffness. Skin:  Negative for pallor, rash and wound. Neurological:  Negative for weakness, numbness and headaches. PHYSICAL EXAM   (up to 7 for level 4, 8 or more for level 5)      INITIAL VITALS:   BP (!) 75/47   Pulse 88   Temp 98.1 °F (36.7 °C) (Oral)   Resp (!) 32   Ht 5' 5\" (1.651 m)   Wt 172 lb (78 kg)   SpO2 94%   BMI 28.62 kg/m²     Physical Exam  Constitutional:       Appearance: She is well-developed. She is ill-appearing and toxic-appearing. HENT:      Head: Normocephalic and atraumatic. Mouth/Throat:      Mouth: Mucous membranes are dry. Eyes:      Pupils: Pupils are equal, round, and reactive to light. Cardiovascular:      Rate and Rhythm: Tachycardia present. Heart sounds: Normal heart sounds. No murmur heard. No friction rub. No gallop. Pulmonary:      Effort: Tachypnea, accessory muscle usage and respiratory distress present. Breath sounds: Examination of the right-upper field reveals decreased breath sounds. Examination of the left-upper field reveals decreased breath sounds. Examination of the right-middle field reveals decreased breath sounds and rhonchi. Examination of the left-middle field reveals decreased breath sounds and rhonchi. Examination of the right-lower field reveals decreased breath sounds and rhonchi. Examination of the left-lower field reveals decreased breath sounds and rhonchi. Decreased breath sounds and rhonchi present. No wheezing or rales. Abdominal:      General: Bowel sounds are normal.      Palpations: Abdomen is soft. Tenderness: There is no abdominal tenderness. Musculoskeletal:         General: No swelling or tenderness. Cervical back: Normal range of motion and neck supple. Skin:     General: Skin is warm. Neurological:      Mental Status: She is lethargic, disoriented and confused. GCS: GCS eye subscore is 4. GCS verbal subscore is 3. GCS motor subscore is 5.        DIFFERENTIAL  DIAGNOSIS     PLAN (LABS / IMAGING / EKG):  Orders Placed This Encounter   Procedures    Culture, Blood 1    Culture, Blood 1    COVID-19, Rapid    Culture, Urine    XR CHEST PORTABLE    CT HEAD WO CONTRAST    CTA HEAD NECK W CONTRAST    CT CHEST ABDOMEN PELVIS W CONTRAST    XR CHEST PORTABLE    CBC with Auto Differential    Urinalysis with Microscopic    Troponin    Comprehensive Metabolic Panel    TSH    TOX SCR, BLD, ED    Ammonia    ELECTROLYTES PLUS    Hemoglobin and hematocrit, blood    CALCIUM, IONIC (POC)    SPECIMEN REJECTION    PREVIOUS SPECIMEN    Troponin    Potassium    Comprehensive Metabolic Panel w/ Reflex to MG    CBC with Auto Differential    Protime-INR    APTT    Lactate, Sepsis    Troponin    Vancomycin Level, Random    Diet NPO    Continuous Pulse Oximetry    Vital signs per unit routine    Telemetry monitoring - continuous duration    Notify physician    Up with assistance    Intake and output    Neurovascular checks    Monitor for signs/symptoms of urinary retention    Full Code    Inpatient consult to Critical Care    Pharmacy to Dose: Vancomycin    Pharmacy to Dose: Other - See Comments: zosyn    Initiate Oxygen Therapy Protocol    Heated/ Humidified High Flow Nasal Cannula    Pulse oximetry, continuous    Venous Blood Gas, POC    Creatinine W/GFR Point of Care    POCT urea (BUN)    Lactic Acid, POC    POCT Glucose    EKG 12 Lead    EKG 12 Lead    EKG 12 Lead    ECHO Complete 2D W Doppler W Color    ADMIT TO INPATIENT       MEDICATIONS ORDERED:  Orders Placed This Encounter   Medications    norepinephrine-sodium chloride (LEVOPHED) 16-0.9 MG/250ML-% infusion     Alba Mathew: cabinet override    norepinephrine (LEVOPHED) 16 mg in sodium chloride 0.9 % 250 mL infusion     Order Specific Question:   Titrate Infusion? Answer:   Yes     Order Specific Question:   Initial Infusion Dose: Answer:   5 mcg/min     Order Specific Question:   Goal of Therapy is:      Answer:   MAP greater than 65 mmHg     Order Specific Question:   Contact Provider if:     Answer:   Patient is receiving the maximum dose and is not achieving the goal of therapy    0.9 % sodium chloride bolus    0.9 % sodium chloride bolus    aspirin chewable tablet 324 mg    magnesium sulfate 2000 mg in 50 mL IVPB premix    EPINEPHrine 1 MG/10ML injection     ETIENNE BENTON: radhat override    DISCONTD: iopamidol (ISOVUE-370) 76 % injection 90 mL    iopamidol (ISOVUE-370) 76 % injection 150 mL    vancomycin (VANCOCIN) 1250 mg in sodium chloride 0.9% 250 mL IVPB     Order Specific Question:   Antimicrobial Indications     Answer:   Pneumonia (CAP)    piperacillin-tazobactam (ZOSYN) 4,500 mg in dextrose 5 % 100 mL IVPB (mini-bag)     Order Specific Question:   Antimicrobial Indications     Answer:   Pneumonia (CAP)    DISCONTD: potassium chloride 10 mEq/100 mL IVPB (Peripheral Line)    sodium chloride flush 0.9 % injection 5-40 mL    sodium chloride flush 0.9 % injection 5-40 mL    0.9 % sodium chloride infusion    OR Linked Order Group     ondansetron (ZOFRAN-ODT) disintegrating tablet 4 mg     ondansetron (ZOFRAN) injection 4 mg    polyethylene glycol (GLYCOLAX) packet 17 g    OR Linked Order Group     acetaminophen (TYLENOL) tablet 650 mg     acetaminophen (TYLENOL) suppository 650 mg    pantoprazole (PROTONIX) 40 mg in sodium chloride (PF) 10 mL injection    DISCONTD: 0.9 % sodium chloride bolus piperacillin-tazobactam (ZOSYN) 3,375 mg in dextrose 5 % 50 mL IVPB extended infusion (mini-bag)     Order Specific Question:   Antimicrobial Indications     Answer:   Urinary Tract Infection     Order Specific Question:   Antimicrobial Indications     Answer:   Sepsis of Unknown Etiology     Order Specific Question:   UTI duration of therapy     Answer:   7 days     Order Specific Question:   Sepsis duration of therapy     Answer:   7 days    lidocaine 1 % injection     SERENITY ETIENNE: cabinet override    0.9 % sodium chloride infusion    vancomycin (VANCOCIN) intermittent dosing (placeholder)     Order Specific Question:   Antimicrobial Indications     Answer:   Sepsis of Unknown Etiology     Order Specific Question:   Sepsis duration of therapy     Answer:   7 days    vasopressin 20 Units in dextrose 5 % 100 mL infusion     Order Specific Question:   Titrate Infusion? Answer:   Yes     Order Specific Question:   Initial Infusion Dose: Answer:   0.03 units/min     Order Specific Question:   Goal of Therapy is: Answer:   MAP greater than 65 mmHg     Order Specific Question:   Contact Provider if:     Answer:   Patient is receiving the maximum dose and is not achieving the goal of therapy    DISCONTD: EPINEPHrine (EPINEPHrine HCL) 5 mg in sodium chloride 0.9 % 250 mL infusion     Order Specific Question:   Titrate Infusion? Answer:   Yes     Order Specific Question:   Initial Infusion Dose: Answer:   1 mcg/min     Order Specific Question:   Goal of Therapy is: Answer:   MAP greater than 65 mmHg     Order Specific Question:   Contact Provider if:     Answer:   Patient is receiving the maximum dose and is not achieving the goal of therapy    EPINEPHrine (EPINEPHrine HCL) 5 mg in dextrose 5 % 250 mL infusion     Order Specific Question:   Titrate Infusion? Answer:   Yes     Order Specific Question:   Initial Infusion Dose:      Answer:   1 mcg/min     Order Specific Question:   Goal of Granulocyte 4.33 (H) 0.00 - 0.30 k/uL    Segs Absolute 21.32 (H) 1.8 - 7.7 k/uL    Absolute Lymph # 5.99 (H) 1.0 - 4.8 k/uL    Absolute Mono # 1.33 (H) 0.1 - 0.8 k/uL    Absolute Eos # 0.00 0.0 - 0.4 k/uL    Basophils Absolute 0.33 (H) 0.0 - 0.2 k/uL    Cells Counted 200     Morphology Normal    Lactate, Sepsis   Result Value Ref Range    Lactic Acid, Sepsis, Whole Blood 5.7 (H) 0.5 - 1.9 mmol/L   Urinalysis with Microscopic   Result Value Ref Range    Color, UA Yellow Yellow    Turbidity UA Clear Clear    Glucose, Ur NEGATIVE NEGATIVE    Bilirubin Urine NEGATIVE NEGATIVE    Ketones, Urine NEGATIVE NEGATIVE    Specific Gravity, UA 1.013 1.005 - 1.030    Urine Hgb NEGATIVE NEGATIVE    pH, UA 6.0 5.0 - 8.0    Protein, UA 2+ (A) NEGATIVE    Urobilinogen, Urine Normal Normal    Nitrite, Urine NEGATIVE NEGATIVE    Leukocyte Esterase, Urine NEGATIVE NEGATIVE    WBC, UA 2 TO 5 0 - 5 /HPF    RBC, UA 0 TO 2 0 - 4 /HPF    Casts UA  0 - 8 /LPF     10 TO 20 HYALINE Reference range defined for non-centrifuged specimen.     Epithelial Cells UA 2 TO 5 0 - 5 /HPF   Troponin   Result Value Ref Range    Troponin, High Sensitivity 92 (HH) 0 - 14 ng/L   Comprehensive Metabolic Panel   Result Value Ref Range    Glucose 115 (H) 70 - 99 mg/dL    BUN 32 (H) 8 - 23 mg/dL    Creatinine 2.54 (H) 0.50 - 0.90 mg/dL    Calcium 8.9 8.6 - 10.4 mg/dL    Sodium 137 135 - 144 mmol/L    Potassium 2.8 (LL) 3.7 - 5.3 mmol/L    Chloride 101 98 - 107 mmol/L    CO2 20 20 - 31 mmol/L    Anion Gap 16 9 - 17 mmol/L    Alkaline Phosphatase 74 35 - 104 U/L    ALT 13 5 - 33 U/L    AST 23 <32 U/L    Total Bilirubin 0.74 0.3 - 1.2 mg/dL    Total Protein 10.6 (H) 6.4 - 8.3 g/dL    Albumin 3.2 (L) 3.5 - 5.2 g/dL    Albumin/Globulin Ratio 0.4 (L) 1.0 - 2.5    GFR Non- 19 (L) >60 mL/min    GFR  23 (L) >60 mL/min    GFR Comment         TSH   Result Value Ref Range    TSH 1.12 0.30 - 5.00 uIU/mL   TOX SCR, BLD, ED   Result Value Ref Range Acetaminophen Level <5 (L) 10 - 30 ug/mL    Ethanol <10 <10 mg/dL    Ethanol percent <1.721 <3.038 %    Salicylate Lvl <1 (L) 3 - 10 mg/dL    Toxic Tricyclic Sc,Blood NEGATIVE NEGATIVE   Ammonia   Result Value Ref Range    Ammonia 20 11 - 51 umol/L   ELECTROLYTES PLUS   Result Value Ref Range    POC Sodium 146 138 - 146 mmol/L    POC Potassium 2.7 (LL) 3.5 - 4.5 mmol/L    POC Chloride 109 (H) 98 - 107 mmol/L    POC TCO2 22 22 - 30 mmol/L    Anion Gap 16 7 - 16 mmol/L   Hemoglobin and hematocrit, blood   Result Value Ref Range    POC Hemoglobin 10.5 (L) 12.0 - 16.0 g/dL    POC Hematocrit 31 (L) 36 - 46 %   CALCIUM, IONIC (POC)   Result Value Ref Range    POC Ionized Calcium 1.20 1. 15 - 1.33 mmol/L   SPECIMEN REJECTION   Result Value Ref Range    Specimen Source . BLOOD     Ordered Test TROPI     Reason for Rejection Unable to perform testing: Specimen contaminated. Troponin   Result Value Ref Range    Troponin, High Sensitivity 90 (HH) 0 - 14 ng/L   Venous Blood Gas, POC   Result Value Ref Range    pH, Trevin 7.308 (L) 7.320 - 7.430    pCO2, Trevin 42.6 41.0 - 51.0 mm Hg    pO2, Trevin 39.9 30.0 - 50.0 mm Hg    HCO3, Venous 21.4 (L) 22.0 - 29.0 mmol/L    Negative Base Excess, Trevin 5 (H) 0.0 - 2.0    O2 Sat, Trevin 70 60.0 - 85.0 %   Creatinine W/GFR Point of Care   Result Value Ref Range    POC Creatinine 2.72 (H) 0.51 - 1.19 mg/dL    GFR Comment 21 (L) >60 mL/min    GFR Non- 17 (L) >60 mL/min    GFR Comment         POCT urea (BUN)   Result Value Ref Range    POC BUN 35 (H) 8 - 26 mg/dL   Lactic Acid, POC   Result Value Ref Range    POC Lactic Acid 4.06 (H) 0.56 - 1.39 mmol/L   POCT Glucose   Result Value Ref Range    POC Glucose 100 74 - 100 mg/dL       IMPRESSION: Patient is a 47-year-old female who comes in for altered mental status. History is limited due to patient being altered. Per EMS patient's last known well was 10 AM when she was talking to her son.   Her son then found her at 200 to be altered and then loss consciousness. EMS then picked her up and started her on 1 L of normal saline. Patient arrived was extremely altered only responding to painful stimuli. She was very hypertensive 46s over 35s. Started her on another liter of fluid and put a nonrebreather on. Will order sepsis work-up including lactic, CBC, blood cultures x2, CMP, venous blood gas, troponins x2 checks x-ray, CT chest abdomen and pelvis, urinalysis with cultures CT head without contrast and CTA head and neck. Patient initially not responding to fluid therapy. Started patient on Levophed drip titrating to MAP of 65. Patient's son arrived to the emergency department. Son states patient has had right-sided back pain for 3 days now. He states they went to [facility] emergency department but did not stay due to the long wait. He states the patient then vomited last night. He states at 1030 this morning he was conversing with his mother normally then he went upstairs. He came downstairs at 1230 to find his mother altered and then became unconscious. He immediately called EMS. 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 He states that the patient had a lobectomy and has not been on chemo or radiation. Son states that the patient has had pneumonia, last diagnosed 1 month ago. He states that the patient has not had any hospital admissions in the past month. Patient was given 20 mcg of epinephrine to improve her blood pressure. We will start central line. First attempt of central line in the IJ unsuccessful due to patient's hypoxia. Ultrasound was negative for pneumothorax. Chest x-ray negative. Discussed case with ICU team who will be admitting the patient.       Sepsis Times and Checklist  Vital Signs: BP: (!) 75/47  Heart Rate: 88  Resp: (!) 32  Temp: 98.1 °F (36.7 °C) SpO2: 94 %  SIRS (>2) Non Pregnant       Temp > 38.3C or < 36C   HR > 90   RR > 20   WBC > 12 or < 4 or >10% bands  SIRS (>2) Pregnant 20 weeks until 3 days postpartum   Temp > 38C or <36C   HR >110   RR > 24   WBC >15 or < 4 or >10% bands   SIRS (>2) and confirmed or suspected source of infection = Sepsis  Is Sepsis due to likely bacterial infection?: Yes    Sepsis Identified:  Date: 8/25/2022   time: 1445  Sepsis Orders:   CBC(required): Yes   CMP: Yes   PT/PTT: No   Blood Cultures x2(required): Yes   Urinalysis and Urine Culture: Yes   Lactate(required): Yes   Antibiotics Given (within 3 hours of sepsis identification, after blood cultures):  Yes    (If unable to obtain IV access for IV antibiotics within 3 hours of identification of sepsis, IM antibiotics is acceptable.)              If lactate >2.0 MUST repeat within 6 hours    If elevated, is elevated lactate from a likely infectious source?: Yes. IV Fluid Bolus:  Is lactate > 4.0:  Yes    Fluids must be initiated within 3 hours of sepsis identification. These fluids must have a rate > 125 ml/hr. Is the patient Morbidly Obese (BMI > 30): No  IV Fluids given prior to arrival can be used in this calculation but the following information must be documented:  Start time: 1400, Type of fluid normal saline, Volume of fluid 1 L, and Rate (Duration or End time) bolus. Does the patient or patient advocate refuse the entire 30 ml/kg IV fluid bolus? No      Septic Shock Identified (Initial lactate > 4.0 or 2 Hypotensive Blood Pressure readings within the first 6 hours): For septic shock sepsis focus physical exam must be completed AND documented (within 6 hours). Date: 8/25/2022 time: 1500      sepsis focus exam completed. For persistent hypotension after 30ml/kg fluid bolus vasopressors must be started (within 6 hours)      RADIOLOGY:  XR CHEST PORTABLE   Final Result   1. No evident pneumothorax related to the attempted central line placement.       2.   Increased airspace consolidation in the left lung and stable opacity at the right lung base. CT CHEST ABDOMEN PELVIS W CONTRAST   Final Result   1. Probable pyelonephritis. The kidneys are enlarged, demonstrate   heterogeneous enhancement and there are prominent adjacent inflammatory   changes. 2. Occlusion of the airways in the left lower lobe which could be related to   mucous plugging and volume loss and airspace consolidation throughout the   left lower lobe which could reflect atelectasis or pneumonia. 3. Stable left apical lung nodule measuring as much as 2.5 cm. There are   multiple new small patchy and nodular opacities elsewhere in the upper lobes   measuring as much as 11 mm which could be related to infection. Metastatic   disease is also consideration. 4. Cardiomegaly. 5. Hepatomegaly. 6. Colonic diverticulosis. 7. Cholelithiasis. CT HEAD WO CONTRAST   Final Result   1. No acute intracranial abnormality. 2. Less than optimal timing of contrast bolus within the intracranial   arterial vascular. .  Otherwise, no definite negative for large vessel   occlusion or hemodynamic stenosis. 3. Scattered parenchymal opacity involving lungs with evidence adenopathy. Please correlate with CT chest report. CTA HEAD NECK W CONTRAST   Final Result   1. No acute intracranial abnormality. 2. Less than optimal timing of contrast bolus within the intracranial   arterial vascular. .  Otherwise, no definite negative for large vessel   occlusion or hemodynamic stenosis. 3. Scattered parenchymal opacity involving lungs with evidence adenopathy. Please correlate with CT chest report. XR CHEST PORTABLE   Final Result   1. Congestive heart failure is most likely given the radiographic findings;   pneumonia is also a consideration in areas of consolidation with pleural   effusion. 2. Calcific atherosclerosis aorta. 3. Cardiomegaly. 4.  Pulmonary sequela typical of that seen with smoking, including COPD.                EKG  1425   EKG: Sinus rhythm, PVCs. Normal axis  Rate 99 bpm  FL interval 124 ms  QRS duration 90 ms  QT/QTc 418/536 ms    1535  EKG: Accelerated junctional rhythm occasional PVCs. Normal axis  Rate 92 bpm  QRS duration 90 ms  QT/QTc 494/610 ms    All EKG's are interpreted by the Emergency Department Physician who either signs or Co-signs this chart in the absence of a cardiologist.    EMERGENCY DEPARTMENT COURSE:      ED Course as of 08/25/22 2035   Thu Aug 25, 2022   1532 Patient was placed on norepinephrine. Pressure is not improving [GI]      ED Course User Index  [GI] Mikel Aase, DO       No notes of EC Admission Criteria type on file. PROCEDURES:    Central Line Placement Procedure Note    Performed by: Mikel Aase, DO, DO    Indication: centrally administered medications    Consent: The patient provided verbal consent for this procedure. Time out performed: Immediately prior to the procedure a \"time out\" was called to verify the correct patient, the correct procedure, equipment, support staff and site/side marked as required. All elements of maximal sterile barrier techniques were followed. Procedure: The patient was positioned appropriately and the skin over the right internal jugular vein was prepped with chlorhexidine. Local anesthesia was obtained by infiltration using 5.0 cc of 1% Lidocaine with epinephrine. A large bore needle was used to identify the vein. A guide wire was then inserted into the vein through the needle. A triple lumen catheter was then inserted into the vessel over the guide wire using the Seldinger technique. All ports showed good, free flowing blood return and were flushed with saline solution. The catheter was then securely fastened to the skin with sutures and covered with a sterile dressing. A post procedure X-ray was ordered and showed no evidence of pneumothorax.     The patient tolerated the procedure poorly and the procedure was terminated prior to completion, at the patient's request.    Complications: None        CONSULTS:  IP CONSULT TO CRITICAL CARE  PHARMACY TO DOSE VANCOMYCIN  IP CONSULT TO PHARMACY    CRITICAL CARE:      ED Course as of 08/25/22 2035   Thu Aug 25, 2022   1532 Patient was placed on norepinephrine. Pressure is not improving [GI]      ED Course User Index  [GI] Gabbi Quiñonez DO       FINAL IMPRESSION      1. Septic shock ([address]    2. Pyelonephritis          DISPOSITION / PLAN     DISPOSITION Admitted 08/25/2022 06:34:44 PM      PATIENT REFERRED TO:  No follow-up provider specified.     DISCHARGE MEDICATIONS:  Current Discharge Medication List          Gabbi Quiñonez DO  Emergency Medicine Resident    (Please note that portions of thisnote were completed with a voice recognition program.  Efforts were made to edit the dictations but occasionally words are mis-transcribed.)       Gabbi Quiñonez DO  Resident  08/25/22 2051

## 2022-08-25 NOTE — ED PROVIDER NOTES
[address] [facility]     Emergency Department     Faculty Attestation    I performed a history and physical examination of the patient and discussed management with the resident. I reviewed the residents note and agree with the documented findings and plan of care. Any areas of disagreement are noted on the chart. I was personally present for the key portions of any procedures. I have documented in the chart those procedures where I was not present during the key portions. I have reviewed the emergency nurses triage note. I agree with the chief complaint, past medical history, past surgical history, allergies, medications, social and family history as documented unless otherwise noted below. For Physician Assistant/ Nurse Practitioner cases/documentation I have personally evaluated this patient and have completed at least one if not all key elements of the E/M (history, physical exam, and MDM). Additional findings are as noted. Primary Care Physician:  Peg Mcdonald MD    CHIEF COMPLAINT     No chief complaint on file.       RECENT VITALS:   Temp: 98.1 °F (36.7 °C),  Heart Rate: 91, Resp: (!) 34, BP: (!) 48/35    LABS:  Labs Reviewed - No data to display  EKG large amount of artifact sinus rhythm rate of 99 bpm SD interval is 124 ms QRS durations 90 ms QT corrected 456 ms axis is -10 she has frequent PVCs she also has some T wave inversions in the lateral leads    PERTINENT ATTENDING PHYSICIAN COMMENTS:    Presents with altered mental status hypotension history of COPD and apparently lung cancer  Apparently according to squad had been having some back pain and went to another ER yesterday and did not want to stay for the weight she is hypotensive but not particularly tachycardic she is confused moving all 4 extremities we will do a neurologic toxicology and infectious work-up treat as sepsis until proven otherwise  Merlin Mckeon MD, MD, [facility]  Attending Emergency  Physician              Toya Devries MD  08/25/22 [id number]

## 2022-08-25 NOTE — PROGRESS NOTES
08/25/22 1815   Oxygen Therapy/Pulse Ox   O2 Therapy (S)  Oxygen humidified   O2 Device (S)  High flow nasal cannula   O2 Flow Rate (L/min) (S)  40 L/min   FiO2  (S)  100 %   Humidification Temp (S)  34     Placed pt on vapotherm at charted settings per Dr Fredrik Dakins due to O2 sats high 80s/low 90s on NRB. Pt tolerating HFNC well with improvement to SpO2 as well.

## 2022-08-25 NOTE — [facility] REMOTE MONITORING
Called placed re this patient's 3 & 6 hour sepsis bundle, spoke w/Dr Madelin Olivo. Thank You!     Trae De León RN  [facility] Sepsis  [phone]

## 2022-08-25 NOTE — ED NOTES
Respiratory attemped a venturi mask with patient s and her O2 sats dropped down to 84.  Patient placed back on no rebreather     Lamonte Eden RN  08/25/22 [id number]

## 2022-08-25 NOTE — ED NOTES
Dr Delma Bhardwaj and Dr Kaveh Pacheco at bedside to place central line and ART line     Bryce Ramos RN  08/25/22 0956

## 2022-08-25 NOTE — ED NOTES
Patient arrived to the ED room 24 for a AMS via EMS. Patient is unable to answer Questions. Per Son patient was ok at 10 am and when he went to talk to her at 12 pm she had a significant change is mental status. Patient was quickly worked up. Hooked to full monitor, Labs drawn. Blood cultures obtained.       Katelin Sheriff RN  08/25/22 [id number]

## 2022-08-25 NOTE — PROGRESS NOTES
[facility] Pharmacokinetic Monitoring Service - Vancomycin     Moises Diaz is a 74 y.o. female starting on vancomycin therapy for sepsis. Pharmacy consulted by Dr. Felicia Rosado for monitoring and adjustment. Target Concentration: Dosing based on anticipated concentration <15 mg/L due to renal impairment/insufficiency    Additional Antimicrobials: Piperacillin/tazobactam    Pertinent Laboratory Values: Wt Readings from Last 1 Encounters:   08/25/22 172 lb (78 kg)     Temp Readings from Last 1 Encounters:   08/25/22 98.1 °F (36.7 °C) (Oral)     Estimated Creatinine Clearance: 23 mL/min (A) (based on SCr of 2.54 mg/dL (H)). Recent Labs     08/25/22  1430 08/25/22  1445   CREATININE 2.72* 2.54*   WBC  --  33.3*     Pertinent Cultures:  Culture Date Source Results   8/25 Blood x 2 NGTD   MRSA Nasal Swab: N/A. Non-respiratory infection. Admitted with altered mental status. Found to be hypotensive, requiring 60 mcg/min of norepinephrine right now. WBC significantly elevated to 33.3. Scr elevated from baseline to 2.54.  Will dose by levels    Plan:  Concentration-guided dosing due to renal impairment/insufficiency  Start vancomycin 1250 mg x 1  Renal labs as indicated   Vancomycin concentration ordered for 8/26 @ 1200   Pharmacy will continue to monitor patient and adjust therapy as indicated    Thank you for the consult,  Ashish Roach, [facility]  8/25/2022 6:57 PM

## 2022-08-25 NOTE — ED NOTES
Resident made aware of the Levophed not working     [facility]  08/25/22 [id number]

## 2022-08-25 NOTE — H&P
desaturating. She had to be put back on nonrebreather mask. CT chest and abdomen showed probable pyelonephritis which is the likely cause of her septic shock. Of note patient had multiple pneumonias in the past, with a bacterial pneumonia in December 2019. She follows up with Dr. Mee Reed for her left lower lobe lung adenocarcinoma. Patient had a surgery with left lower lobectomy on 7/19/2021. She had no further chemotherapy or radiotherapy. Later she was hospitalized again due to pneumonia. As per pulmonary she will be having repeated CT scan in September 2022. Patient was recently admitted to the hospital in April for left lower lobe and upper lobe multilobular pneumonia and was treated with Zosyn and vancomycin. Patient has been transferred to ICU for septic shock likely secondary to pyelonephritis.     Past Medical History:        Diagnosis Date    Abnormal CT of the chest 04/21/2021    Adenocarcinoma, lung, left ([address] 05/14/2021    Arthritis     Community acquired pneumonia 12/23/2019    COPD (chronic obstructive pulmonary disease) ([address] 08/14/2019    Deep venous thrombosis of left profunda femoris vein ([address] 04/16/2020    Depression     DVT (deep venous thrombosis) ([address] 2014    b/l     GERD (gastroesophageal reflux disease)     History of pulmonary embolism 05/27/2021    History of thyroid nodule 12/23/2019    Hypertension     Major depressive disorder, recurrent, moderate ([address] 11/12/2018    Nodule of lower lobe of left lung 04/23/2021    Obesity, Class II, BMI 35-39.9 11/12/2018    On anticoagulant therapy 05/27/2021    On home oxygen therapy     02 2L NC PRN    On methotrexate therapy 05/27/2021    Prediabetes 11/12/2018    Rheumatoid arthritis ([address]     Snores     denies apnea    Thyroid nodule 01/09/2020    Tobacco abuse 11/12/2018    Under care of team 06/29/2021    pulmonology-Dr Blank- gareth-last visit june 2021    Under care of team 06/29/2021    oncology-Dr Kelly- Kristen-last visit june 2021    Under care of team     Dr. Aisha Warner clearance appointment 7/6/21, stress test 7/9/2021, clearance obtained and placed on chart    Wears glasses     Wellness examination 06/29/2021    pcp-Dr Michelle Chowdhury office-last visit may 2021       Past Surgical History:        Procedure Laterality Date    APPENDECTOMY  1982    BRONCHOSCOPY N/A 12/27/2019    BRONCHOSCOPY ALVEOLAR LAVAGE performed by Aditya Jung MD at [facility] ENDO    COLONOSCOPY N/A 2/12/2020    COLONOSCOPY POLYPECTOMIES HOT SNARE, COLD BIOPSY POLYPECTOMIES performed by Olga Arellano MD at [address] [place] [address]  5/13/2021    CT NEEDLE BIOPSY LUNG PERCUTANEOUS 5/13/2021 [facility] CT SCAN    CT NEEDLE BIOPSY LUNG PERCUTANEOUS  6/2/2022    CT NEEDLE BIOPSY LUNG PERCUTANEOUS 6/2/2022 [facility] CT SCAN    ENDOSCOPY, COLON, DIAGNOSTIC  [id number]    gastritis, sm. bowel lipoma, biopsies    INSERT MIDLINE CATHETER  11/3/2021         LOBECTOMY Left 07/19/2021    XI ROBOTIC LEFT LOWER LOBECTOMY CONVERTED TO OPEN THORACTOMY LEFT LOWER LOBECTOMY (Left )    LUNG REMOVAL, TOTAL Left 7/19/2021    XI ROBOTIC LEFT LOWER LOBECTOMY CONVERTED TO OPEN THORACTOMY LEFT LOWER LOBECTOMY performed by Suad Spears MD at [address] [facility] Ne:    No Known Allergies      Home Meds:   Prior to Admission medications    Medication Sig Start Date End Date Taking?  Authorizing Provider   sertraline (ZOLOFT) 100 MG tablet take 1 tablet by mouth once daily 7/27/22   Yfn Macias MD   SYMBICORT 160-4.5 MCG/ACT AERO inhale 2 puffs by mouth twice a day 7/25/22   Cranford Riedel, MD   albuterol sulfate HFA (PROVENTIL;VENTOLIN;PROAIR) 108 (90 Base) MCG/ACT inhaler inhale 2 puffs by mouth and INTO THE LUNGS every 4 hours for 7 days 7/25/22   Cranford Riedel, MD   SYMBICORT 160-4.5 MCG/ACT AERO inhale 2 puffs by mouth twice a day 7/25/22   Cranford Riedel, MD   potassium chloride (KLOR-CON M) 20 MEQ extended release tablet take 1 tablet by mouth twice a day 7/25/22   Iveth Lee MD   Prenatal Vit-Fe Fumarate-FA (PRENATAL VITAMIN) 27-1 MG TABS tablet take 1 tablet by mouth once daily 7/23/22   Chris Diaz MD   ondansetron (ZOFRAN) 4 MG tablet take 1 tablet by mouth three times a day if needed for nausea and vomiting 7/14/22   Chris Diaz MD   vitamin D (ERGOCALCIFEROL) 1.25 MG (23668 UT) CAPS capsule take 1 capsule by mouth every week for 11 doses 7/1/22   Iveth Lee MD   SPIRIVA RESPIMAT 2.5 MCG/ACT AERS inhaler inhale 2 puffs INTO THE LUNGS once daily 6/30/22   Iveth Lee MD   metoprolol tartrate (LOPRESSOR) 25 MG tablet take 1/2 tablet by mouth twice a day 6/3/22   Iveth Lee MD   cetirizine (ZYRTEC ALLERGY) 10 MG tablet Take 1 tablet by mouth daily 5/24/22   Iveth Lee MD   pantoprazole (PROTONIX) 40 MG tablet take 1 tablet by mouth once daily 5/21/22   Iveth Lee MD   amLODIPine (NORVASC) 10 MG tablet Take 1 tablet by mouth daily 5/12/22   Iveth Lee MD   Handicap Placard MISC by Does not apply route 3/14/2022 to 3/13/2027    Panlobular emphysema ([facility]  (primary encounter diagnosis) 3/14/22   Eugenio Garcia MD   albuterol (PROVENTIL) (2.5 MG/3ML) 0.083% nebulizer solution Take 3 mLs by nebulization every 6 hours as needed for Wheezing 2/10/22   Jono Sanchez,    furosemide (LASIX) 40 MG tablet Take 1 tablet by mouth daily 12/2/21   Valaria Blinks, DO   acetaminophen (TYLENOL) 500 MG tablet Take 1,000 mg by mouth every 6 hours as needed for Pain    Historical Provider, MD KRISHNAN 5 MG TABS tablet take 1 tablet by mouth twice a day 7/16/21   Iveth Lee MD       Social History:   TOBACCO:   reports that she has been smoking cigarettes. She started smoking about 36 years ago. She has a 8.75 pack-year smoking history. She has never used smokeless tobacco.  ETOH:   reports that she does not currently use alcohol. DRUGS:  reports no history of drug use.   OCCUPATION:      Family History:       Problem Relation Age of Onset    Cancer Mother         Uterine and breast    Diabetes Mother     Heart Disease Mother     Cancer Father         lung    Cancer Brother         lung    Cancer Brother         lung       REVIEW OF SYSTEMS (ROS):  Could not be evaluated as patient does not responding to questions. She has altered mental status but is able to move all her extremities. Physical Exam:    Vitals: BP (!) 100/47   Pulse 91   Temp 98.1 °F (36.7 °C) (Oral)   Resp 28   SpO2 97%     Last Body weight:   Wt Readings from Last 3 Encounters:   07/14/22 172 lb 14.4 oz (78.4 kg)   06/10/22 177 lb (80.3 kg)   06/02/22 180 lb (81.6 kg)       Body Mass Index : There is no height or weight on file to calculate BMI. PHYSICAL EXAMINATION :  Constitutional: Appears well, no distress  EENT: PERRLA, EOMI, sclera clear, anicteric, oropharynx clear, no lesions, neck supple with midline trachea. Neck: Supple, symmetrical, trachea midline, no adenopathy, thyroid symmetric, no jvd skin normal  Respiratory: clear to auscultation, no wheezes or rales and unlabored breathing. No intercostal tenderness  Cardiovascular: regular rate and rhythm, normal S1, S2, no murmur noted and 2+ pulses throughout  Abdomen: soft, nontender, nondistended, no masses or organomegaly. Patient has right flank tenderness. Extremities:  peripheral pulses normal, no pedal edema, no clubbing or cyanosis    MEDICATIONS:  Scheduled Meds:   sodium chloride  1,000 mL IntraVENous Once    aspirin  324 mg Oral Once    magnesium sulfate  2,000 mg IntraVENous Once    EPINEPHrine           Continuous Infusions:   norepinephrine 60 mcg/min (08/25/22 1530)       PRN Meds:   iopamidol, 150 mL, ONCE PRN        SUPPORT DEVICES: [] Ventilator [] BIPAP  [] Nasal Cannula [] Room Air    VENT SETTINGS (Comprehensive) (if applicable):      Additional Respiratory Assessments  Heart Rate: 91  Resp: 28  SpO2: 97 %  Lab Results   Component Value Date/Time    MODE NOT REPORTED 10/31/2021 11:20 AM       LABS:     VBG shows pH of 7.3, PCO2 42, PO2 39.9 and HCO3 21.4    CBC:   Recent Labs     08/25/22  1445   WBC 33.3*   HGB 8.7*        BMP:    Recent Labs     08/25/22  1430 08/25/22  1445   NA  --  137   K  --  2.8*   CL  --  101   CO2  --  20   BUN  --  32*   CREATININE 2.72* 2.54*   GLUCOSE  --  115*     S. Calcium:  Recent Labs     08/25/22  1445   CALCIUM 8.9     S. Ionized Calcium:No results for input(s): IONCA in the last 72 hours. S. Magnesium:No results for input(s): MG in the last 72 hours. S. Phosphorus:No results for input(s): PHOS in the last 72 hours. S. Glucose:  Recent Labs     08/25/22  1430   POCGLU 100     Glycosylated hemoglobin A1C: No results for input(s): LABA1C in the last 72 hours. INR: No results for input(s): INR in the last 72 hours. Hepatic functions:   Recent Labs     08/25/22  1445   ALKPHOS 74   ALT 13   AST 23   PROT 10.6*   BILITOT 0.74   LABALBU 3.2*     Pancreatic functions:No results for input(s): LACTA, AMYLASE in the last 72 hours. S. Lactic Acid: No results for input(s): LACTA in the last 72 hours. Cardiac enzymes:No results for input(s): CKTOTAL, CKMB, CKMBINDEX, TROPONINI in the last 72 hours. BNP:No results for input(s): BNP in the last 72 hours. Lipid profile: No results for input(s): CHOL, TRIG, HDL, LDL, LDLCALC in the last 72 hours. Blood Gases: No results found for: PH, PCO2, PO2, HCO3, O2SAT  Thyroid functions:   Lab Results   Component Value Date/Time    TSH 1.12 08/25/2022 02:45 PM        Urinalysis:   Recent Labs     08/25/22  1520   COLORU Yellow   PHUR 6.0   WBCUA 2 TO 5   RBCUA 0 TO 2   SPECGRAV 1.013   LEUKOCYTESUR NEGATIVE   UROBILINOGEN Normal   BILIRUBINUR NEGATIVE       Microbiology:  Urine Culture:  No components found for: CURINE  Blood Culture:  No components found for: CBLOOD, CFUNGUSBL    RADIOLOGY:  XR CHEST PORTABLE   Final Result   1.   Congestive heart failure is most likely given the radiographic findings; pneumonia is also a consideration in areas of consolidation with pleural   effusion. 2. Calcific atherosclerosis aorta. 3. Cardiomegaly. 4.  Pulmonary sequela typical of that seen with smoking, including COPD. CT HEAD WO CONTRAST    (Results Pending)   CTA HEAD NECK W CONTRAST    (Results Pending)   CT CHEST ABDOMEN PELVIS W CONTRAST    (Results Pending)       CARDIOLOGY:  Recent Cardiac Catheterization summary:   No results found for this or any previous visit. Electrocardiogram:   Patient has a junctional rhythm with a ventricular rate 92 with frequent PVCs that are multifocal.  No acute ST or T changes and the junctional beats. Abnormal EKG    Last Echocardiogram findings:   Results for orders placed during the hospital encounter of 04/22/22    ECHO Complete 2D W Doppler W Color    Narrative  Transthoracic Echocardiography Report (TTE)    Patient Name [facility]   Date of Study         04/25/2022  PAPO ALCAZAR    Date of      1955  Gender                Female  Birth    Age          61 year(s)  Race                      Room Number  0316        Height:               65 inch, 165.1 cm    Corporate ID [id number]    Weight:               174 pounds, 78.9 kg  #    Patient Acct [de-identified]   BSA:       1.86 m^2   BMI:        28.96 kg/m^2  #    MR #         [id number]     Selvin George    Accession #  [id number]  Interpreting          [address] [place] [address]  Physician    Fellow                   Referring Nurse  Practitioner    Interpreting             Referring Physician   Rocio Mathews  Fellow                                         Yoana Hernandez MD    Type of Study    TTE procedure:2D Echocardiogram, M-Mode, Doppler, Color Doppler. Procedure Date  Date: 04/25/2022 Start: 09:44 AM    Study Location: [facility]  Technical Quality: Adequate visualization    Indications:Pericardial effusion. History / Tech. Comments:  Procedure explained to patient.   BRAYAN, COPD, HTN    Patient Status: Inpatient    Height: 65 inches Weight: 174 pounds BSA: 1.86 m^2 BMI: 28.96 kg/m^2    CONCLUSIONS    Summary  Left ventricle is normal in size. Global left ventricular systolic function  is normal. Calculated ejection fraction 53% by Heart Model. Left atrium is severely dilated. Normal right ventricular function. Mildly dilated right ventricular cavity. TAPSE value of 2.72cm noted. Aortic valve is trileaflet and opens adequately. Trivial aortic insufficiency. Normal mitral valve structure and function. Moderate mitral regurgitation. Multiple jets noted. EOA =0.23 cm2. PISA radius is 0.8cm. Vena contracta is 0.58cm. MR volume is  43ml. Mean gradient 7mmHg. No obvious valvular abnormality. Moderate tricuspid regurgitation. Severe pulmonary hypertension. Estimated right ventricular systolic pressure  is 91VJDM. E/E' average = 15.8. IVC Increased diameter, but still has inspiratory variation suggesting upper  normal or mildly elevated RA filling pressure (i.e. CVP) . Signature  ----------------------------------------------------------------------------  Electronically signed by Mamta Boucher(Sonographer) on 04/25/2022  10:56 AM  ----------------------------------------------------------------------------    ----------------------------------------------------------------------------  Electronically signed by Mikel Trujillo(Interpreting physician) on 04/25/2022  12:24 PM  ----------------------------------------------------------------------------  FINDINGS  Left Atrium  Left atrium is severely dilated. Left Ventricle  Left ventricle is normal in size. Global left ventricular systolic function  is normal. Calculated ejection fraction 53% by Heart Model. Right Atrium  Right atrial dilatation. Right Ventricle  Normal right ventricular function. Mildly dilated right ventricular cavity. TAPSE value of 2.72cm noted.   Mitral Valve  Normal mitral valve structure and function. Moderate mitral regurgitation. Multiple jets noted. EOA =0.23 cm2. PISA radius is 0.8cm. Vena contracta is 0.58cm. MR volume is  43ml. Mean gradient 7mmHg. Aortic Valve  Aortic valve is trileaflet and opens adequately. Trivial aortic insufficiency. Tricuspid Valve  No obvious valvular abnormality. Moderate tricuspid regurgitation. Severe pulmonary hypertension. Estimated right ventricular systolic pressure  is 78TUUZ. Pulmonic Valve  The pulmonic valve is normal in structure. No pulmonic insufficiency. Pericardial Effusion  No pericardial effusion seen. Miscellaneous  E/E' average = 15.8. IVC Increased diameter, but still has inspiratory variation suggesting upper  normal or mildly elevated RA filling pressure (i.e. CVP) .     M-mode / 2D Measurements & Calculations:    LVIDd:5.4 cm(3.7 - 5.6 cm)       Diastolic ESGXYM:030 ml  YBRAS:2.0 cm(2.2 - 4.0 cm)       Systolic GAFAFS:19 ml  QOEC:9.3 cm(0.6 - 1.1 cm)        Aortic Root:3.4 cm(2.0 - 3.7 cm)  LVPWd:1 cm(0.6 - 1.1 cm)         LA Dimension: 4.9 cm(1.9 - 4.0 cm)  Fractional Shortenin.04 %    LA volume/Index: 91.9 ml /49m^2  Calculated LVEF (%): 52.69 %     LVOT:2.1 cm  RVDd:4.4 cm    Mitral:                                 Aortic    Valve Area (P1/2-Time): 4.4 cm^2        Peak Velocity: 1.66 m/s  Peak E-Wave: 1.40 m/s                   Mean Velocity: 0.93 m/s  Peak A-Wave: 1.27 m/s                   Peak Gradient: 11.02 mmHg  E/A Ratio: 1.1                          Mean Gradient: 4 mmHg  Peak Gradient: 7.84 mmHg  Mean Gradient: 7 mmHg  Deceleration Time: 171 msec             Area (continuity): 2.65 cm^2  P1/2t: 50 msec                          AV VTI: 32.6 cm  MR PISA Radius: 0.8 cm  MR Alias Velocity: 0.37 m/s  MR Velocity: 6.59 m/s  JOSHUA Volumetric: 0.17 cm^2  Mean Velocity: 1.24 m/s  MR VTI: 187 cm  JOSHUA PISA: 0.23 cm^2    Tricuspid:                              Pulmonic:    Peak TR Velocity: 3.76 m/s              Peak Velocity: 1.25 m/s  Peak TR Gradient: 56.5504 mmHg          Peak Gradient: 6.25 mmHg    Diastology / Tissue Doppler  Septal Wall E' velocity:0.08 m/s  Septal Wall E/E':15.9  Lateral Wall E' velocity:0.08 m/s  Lateral Wall E/E':15.7    Results for orders placed during the hospital encounter of 02/08/22    Echocardiogram Limited 2D Adult    Narrative  Transthoracic Echocardiography Report (TTE)    Patient Name Ruddy Macario   Date of Study               02/10/2022  PAPO ALCAZAR    Date of      1955  Gender                      Female  Birth    Age          61 year(s)  Race                            Room Number  319         Height:                     65 inch, 165.1 cm    Corporate ID [id number]    Weight:                     198 pounds, 89.8 kg  #    Patient Acct [de-identified]   BSA:          1.97 m^2      BMI:       32.95  #                                                               kg/m^2    MR #         [id number]     Terell Andresradha Rosetta    Accession #  [id number]  Interpreting Physician      Yandel Carney    Fellow                   Referring Nurse  Practitioner    Interpreting             Referring Physician         Yasmine Sears  Fellow    Type of Study    TTE procedure:2D Echocardiogram, Doppler, Limited Echo. Procedure Date  Date: 02/10/2022 Start: 07:34 AM    Study Location: [facility]    History / Florencio Dey. Comments:  COPD, CHF, Hx of Pericardial Effusion    Patient Status: Inpatient    Height: 65 inches Weight: 198 pounds BSA: 1.97 m^2 BMI: 32.95 kg/m^2    CONCLUSIONS    Summary  Limited echocardiogram  Left ventricle is normal in size with normal systolic function.   Estimated EF 50-55%  Small pericardial effusion    Signature  ----------------------------------------------------------------------------  Electronically signed by Rosetta Eric(Sonographer) on 02/10/2022 08:10  AM  ----------------------------------------------------------------------------    ----------------------------------------------------------------------------  Electronically signed by Yandel Carney(Interpreting physician) on  02/10/2022 09:25 AM  ----------------------------------------------------------------------------  FINDINGS  Left Atrium  Enlarged left atrium. Left Ventricle  Left ventricle is normal in size with normal systolic function. Calculated ejection fraction is 51%  Right Atrium  Right atrial enlargement. Mitral Valve  Mitral valve structure is normal.  Mild mitral regurgitation. Pericardial Effusion  trivial to small pericardial effusion. No echocardiographic signs of cardiac tamponade. Miscellaneous  Dilated IVC (2.9 cm )with poor inspiratory collapse.     M-mode / 2D Measurements & Calculations:    LVIDd:5.3 cm(3.7 - 5.6 cm)             Diastolic OILXTT:112.87 ml  LVIDs:3.85 cm(2.2 - 4.0 cm)            Systolic PRMATX:96.3 ml    Fractional Shortenin.36 %  Calculated LVEF (%): 50.71 %    Tricuspid:    Peak TR Velocity: 2.82 m/s  Peak TR Gradient: 31.8096 mmHg      Assessment and Plan     Patient Active Problem List   Diagnosis    GERD (gastroesophageal reflux disease)    Rheumatoid arthritis ([facility]    Primary hypertension    Major depressive disorder, recurrent, moderate (HCC)    Prediabetes    Tobacco abuse    History of DVT in adulthood    COPD exacerbation (HCC)    History of thyroid nodule    Thyroid nodule    Positive colorectal cancer screening using Cologuard test    Recurrent acute deep vein thrombosis (DVT) of both lower extremities (HCC)    Abnormal CT of the chest    Encounter for smoking cessation counseling    Nodule of lower lobe of left lung    Primary mucinous adenocarcinoma of lung (HCC)    Adenocarcinoma, lung, left ([address]    History of pulmonary embolism    On anticoagulant therapy    On methotrexate therapy    S/P lobectomy of lung    Moderate malnutrition ([facility]    History of adenocarcinoma of lung    Mixed hyperlipidemia    Anemia    Leucocytosis    Chronic rhinitis    COPD (chronic obstructive pulmonary disease) (HCC)    ASHLEIGH (acute kidney injury) ([facility]    Diastolic heart failure (HCC)    Chronic respiratory failure with hypoxia (HCC)    Multifocal pneumonia    Acute on chronic respiratory failure with hypoxia (HCC)    BRAYAN (obstructive sleep apnea)    VIJAYA (generalized anxiety disorder)    Nodule of upper lobe of left lung       PLAN/MEDICAL DECISION MAKING:  Neurologic:   Neuro intact  Neuro checks per protocol  Not on, propofol, versed, fentanyl, and no sedation  Not on, Nimbex, and precedex  Cardiovascular:  Septic shock secondary to pyelonephritis  MAP goal above 65. Continue IV fluid and Levophed. HR 90  QTc prolonged - Administer Mg  Elevated troponins - 90 and 92 . Repeat  Pulmonary:  Maintain oxygen sats >92%  Pulmonary toilet. Continue High flow nasal cannula. Intubate for impending respiratory failure. GI/Nutrition  Ulcer Prophylaxis: H2 blockers and PPI not indicated  Diet:No diet orders on file  Renal/Fluid/Electrolyte  IV Fluids: 0.9 NS 3.3 L bolus  I/O: No intake/output data recorded.   UOP:  cc/kg/hr  Monitor electrolytes, replace PRN   Hypokalemia with potassium 2.8 - replace  Lactic acid elevated - 5.7- Hydrate and repeat LA  ID  Septic shock secondary to pyelonephritis  WBC:   Lab Results   Component Value Date    WBC 33.3 (HH) 2022     Tmax: Temp (24hrs), Av °F (36.7 °C), Min:97.9 °F (36.6 °C), Max:98.1 °F (36.7 °C)    Antimicrobials: vancomycin and Zosyn   Hematology:  Recent Labs     22  1445   HGB 8.7*    stable  Endocrine:   glucose controlled - most recent BGL is   Recent Labs     22  1445   GLUCOSE 115*     DVT Prophylaxis  Resume home anticoagulant  Discharge Needs:  PT, OT, and Case Management      CODE STATUS: Prior    DISPOSITION:  [x] To remain ICU  [] OK for out of ICU from [address]

## 2022-08-25 NOTE — ED PROVIDER NOTES
Faculty Sign-Out Attestation  Handoff taken on the following patient from prior Attending Physician: Rolan Lamas    I was available and discussed any additional care issues that arose and coordinated the management plans with the resident(s) caring for the patient during my duty period. Any areas of disagreement with residents documentation of care or procedures are noted on the chart. I was personally present for the key portions of any/all procedures during my duty period. I have documented in the chart those procedures where I was not present during the key portions. 3:50 PM EDT  Bedside evaluation this time patient's mental status has improved she is now alert and orient x2can identify her son complains of back pain. Blood pressure has improved with fluids although is still on norepinephrine. We will continue fluid boluses given concern for sepsis, take to CT, admit to ICU. Patient given antibiotics for sepsis concern for pyelonephritis given CT findings. Patient initially had response to fluid resuscitation but then again required additional pressor support with norepinephrine and push dose epinephrine. Central lines were placed for definitive access for multiple pressors. Arterial line placed as well. Patient transferred to the MICU in critical but stable condition    Repeat EKG: Patient has a junctional rhythm with a ventricular rate 92 with frequent PVCs that are multifocal.  No acute ST or T changes and the junctional beats. Abnormal EKG    CRITICAL CARE: There was a high probability of clinically significant/life threatening deterioration in this patient's condition which required my urgent intervention. Total critical care time was 40 minutes, including managing hypotension with multiple pressors, respiratory management with high flow oxygen, face-to-face conversations with the MICU team, and accompanying patient to CAT scan.   This excludes any time for separately reportable procedures (CVC, arterial line).          Stella Valencia MD  Attending Physician        Stella Valencia MD  08/25/22 [id number]

## 2022-08-25 NOTE — ED NOTES
Dr Arias Heads and Dr Marielena Wilkes at bedside to place art line and central line     [facility]avier [facility] [address]  08/25/22 7026

## 2022-08-25 NOTE — ED NOTES
Patients IV infiltrated with 75 ML of contrast in CT.  Resident aware     Timo Schulz RN  08/25/22 [id number]

## 2022-08-25 NOTE — ED NOTES
Patient switched to High flow 40 litter per min 100%.  Sats up to 97%     Ghassan Pierre RN  08/25/22 4782       Ghassan Pierre RN  08/25/22 6441

## 2022-08-26 PROBLEM — A41.52 PSEUDOMONAS SEPTICEMIA (HCC): Status: ACTIVE | Noted: 2022-01-01

## 2022-08-26 PROBLEM — A41.52 SEPSIS DUE TO PSEUDOMONAS SPECIES WITH ACUTE RENAL FAILURE AND SEPTIC SHOCK (HCC): Status: ACTIVE | Noted: 2022-01-01

## 2022-08-26 PROBLEM — R65.21 SEPSIS DUE TO PSEUDOMONAS SPECIES WITH ACUTE RENAL FAILURE AND SEPTIC SHOCK (HCC): Status: ACTIVE | Noted: 2022-01-01

## 2022-08-26 PROBLEM — J96.01 ACUTE RESPIRATORY FAILURE WITH HYPOXIA AND HYPERCAPNIA (HCC): Status: ACTIVE | Noted: 2022-01-01

## 2022-08-26 PROBLEM — N17.9 SEPSIS DUE TO PSEUDOMONAS SPECIES WITH ACUTE RENAL FAILURE AND SEPTIC SHOCK (HCC): Status: ACTIVE | Noted: 2022-01-01

## 2022-08-26 PROBLEM — J96.02 ACUTE RESPIRATORY FAILURE WITH HYPOXIA AND HYPERCAPNIA (HCC): Status: ACTIVE | Noted: 2022-01-01

## 2022-08-26 PROBLEM — E87.5 HYPERKALEMIA: Status: ACTIVE | Noted: 2022-01-01

## 2022-08-26 NOTE — PROCEDURES
Moises Diaz is a 74 y.o. female patient. 1. Septic shock ([address]    2. Pyelonephritis      Past Medical History:   Diagnosis Date    Abnormal CT of the chest 04/21/2021    Adenocarcinoma, lung, left ([address] 05/14/2021    Arthritis     Clinical trial participant 08/26/2022    Abionic 002 Anticipated date of completion 08/28/2022    Community acquired pneumonia 12/23/2019    COPD (chronic obstructive pulmonary disease) ([address] 08/14/2019    Deep venous thrombosis of left profunda femoris vein ([address] 04/16/2020    Depression     DVT (deep venous thrombosis) ([address] 2014    b/l     GERD (gastroesophageal reflux disease)     History of pulmonary embolism 05/27/2021    History of thyroid nodule 12/23/2019    Hypertension     Major depressive disorder, recurrent, moderate ([facility] 11/12/2018    Nodule of lower lobe of left lung 04/23/2021    Obesity, Class II, BMI 35-39.9 11/12/2018    On anticoagulant therapy 05/27/2021    On home oxygen therapy     02 2L NC PRN    On methotrexate therapy 05/27/2021    Prediabetes 11/12/2018    Rheumatoid arthritis ([facility]     Snores     denies apnea    Thyroid nodule 01/09/2020    Tobacco abuse 11/12/2018    Under care of team 06/29/2021    pulmonology-Dr [facility]-last visit june 2021    Under care of team 06/29/2021    [facility]    Under care of team     Dr. Chase Gerard clearance appointment 7/6/21, stress test 7/9/2021, clearance obtained and placed on chart    Wears glasses     Wellness examination 06/29/2021    pcp-Dr Danyelle Cai office-last visit may 2021     Blood pressure (!) 157/54, pulse 59, temperature (!) 96.4 °F (35.8 °C), temperature source Axillary, resp. rate 23, height 5' 5\" (1.651 m), weight 178 lb 9.2 oz (81 kg), SpO2 97 %, not currently breastfeeding.     Procedures  Bronchoscopy Inpatient Procedure Note    Date of Procedure: 8/26/2022    Pre-op Diagnosis: Left lung atelectasis    Post-op Diagnosis: Mucopurulent mucous plug causing obstruction of left mid mainstem. No endobronchial mass in bilateral bronchopulmonary segments. Left lower lobe lobectomy stump clean. Bronchoscopist: Americo Grissom MD      Anesthesia: See MAR    Procedure: Flexible fiberoptic bronchoscopy    Estimated Blood Loss: 0 ml     Complications: None    Indications and History    (Please see today's progress notes for the latest issues,  physical exam and lab data)    Consent to Procedure  The risks, benefits, complications, treatment options and expected outcomes were discussed with the patient and/or POA  The possibilities of reaction to medication, pulmonary aspiration, perforation of a viscus, bleeding, failure to diagnose a condition and creating a complication requiring transfusion or operation were discussed with the patient who freely signed the consent. Description of Procedure  The patient was intubated on mechanical ventilation and placed on 100% oxygen. Jose Juan Galicia was monitored by the Critical Nursing and Respiratory therapy staff and the standard ICU monitoring devices. [place] Copas and the procedure were verified as Flexible Fiberoptic Bronchoscopy. A Time Out was held and the above information confirmed. The patient was placed in the appropriate position. The patient was sedated . The bronchoscope was then passed into the trachea via the ET tube. After careful inspection of the tracheal, the bronchoscope was sequentially passed into all segments of the left and right endobronchial trees to the second and/or third divisions. Endobronchial findings    Trachea: distal  tracheal no stenosis.   No Endo tracheal tumor Normal mucosa  Marita  Normal mucosa, Sharp, and no endobronchial mass    Right Main Stem Bronchus  Normal mucosa, Sharp, and no endobronchial mass, thin secretions  Right Upper Lobe Bronchi Normal mucosa, Sharp, and no endobronchial mass, thin secretions  Right Middle Lobe Bronchi  Normal mucosa, Sharp, and no endobronchial mass, thin secretions  Right Lower Lobe Bronchi (including the Superior segment)  Normal mucosa, Sharp, and no endobronchial mass, thin secretions    Left Main Stem Bronchus Normal mucosa, Sharp, and no endobronchial mass, thick mucopurulent mucous plug in the left mid mainstem which was suctioned.   Left Upper Lobe Bronchus, Upper Division Normal mucosa, Sharp, and no endobronchial mass, thick secretion  Left Upper Lobe Bronchus, Lingula  Normal mucosa, Sharp, and no endobronchial mass, thick secretions  Left Lower Lobe Bronchus (including the Superior segment) stump    Specimens Taken    Washings -  Amount - 30 ml    Location - left mainstem          Electronically signed by Noe Aranda MD on 8/26/2022 at 6:28 PM   Noe Aranda MD  8/26/2022

## 2022-08-26 NOTE — SIGNIFICANT EVENT
Reevaluated patient at bedside. Patient is becoming increasingly more tachypneic with abdominal respirations noted. She appears restless in the bed and is rolling from side to side it appears she cannot get comfortable, concerns for impending respiratory failure given tachypnea and restlessness. Most recent blood gas does demonstrate slightly worsening hypoxia with PO2 69 however metabolic acidosis with pH 7.22 CO2 41 bicarb 17. On CT performed earlier today patient also has occlusion and collapse of her left lower lobe of her lung which is also most likely exacerbating her respiratory failure. I did have a discussion with the patient as well as the patient's family at bedside given concerns for impending respiratory failure and decision was made to intubate for impending respiratory failure. Given concerns for worsening metabolic acidosis patient was given 1 amp of sodium bicarb to facilitate intubation. Patient also near max dose of Levophed and is on 0.04 of vasopressin so phenylephrine was ordered to facilitate intubation. Patient was given 20 mg of etomidate and 80 mg of succinylcholine. She was successfully intubated on first pass intubation. Video laryngoscope he did demonstrate very thick mucus in the patient's airway. Post intubation she was started on Versed and fentanyl, propofol was avoided given concerns of septic shock.     Electronically signed by Kael Lynne DO on 8/25/2022 at 11:21 PM

## 2022-08-26 NOTE — PROCEDURES
PROCEDURE NOTE - Gerson PLACEMENT    PATIENT NAME: Hamilton Bruce  MEDICAL RECORD NO. [id number]  DATE: 8/26/2022  ATTENDING PHYSICIAN: Abhay    PREOPERATIVE DIAGNOSIS:  Worsening metabolic acidosis, need for CRRT  POSTOPERATIVE DIAGNOSIS:  Same  PROCEDURE PERFORMED:  Left Internal Jugular Vein Central Line Insertion  PERFORMING PHYSICIAN: Sheri January, DO  ANESTHESIA:  Local utilizing 1% lidocaine  ESTIMATED BLOOD LOSS:  Less than 25 ml  COMPLICATIONS:  None immediately appreciated. DISCUSSION:  Hamilton Bruce is a 74y.o.-year-old female who requires gerson for CRRT. The history and physical examination were reviewed and confirmed. CONSENT: The family members were counseled regarding the procedure, its indications, risks, potential complications and alternatives, and any questions were answered. Consent was obtained to proceed. PROCEDURE:  A timeout was initiated by the bedside nurse and was confirmed by those present. The patient was placed in a supine position. The skin overlying the Left Internal Jugular Vein was prepped with chlorhexadine and draped in sterile fashion. The skin was infiltrated with local anesthetic. The vessel and surrounding anatomy was visualized using ultrasound. Through the anesthetized region, the introducer needle was inserted into the internal jugular vein returning dark red non pulsatile blood. A guidewire was placed through the center of the needle with no resistance. Ultrasound confirmed presence of wire in the vein. A small incision made in the skin with a #11 scalpel blade. The dilator was inserted into the skin and vein over guidewire using Seldinger technique. The dilator was then removed and a second dilator was then inserted into the skin and vein over the guidewire using the Seldinger technique. The 14F 20cm catheter was placed in the vein over the guidewire using Seldinger technique.  The guidewire was then removed and all ports aspirated and flushed appropriately. The catheter then secured using silk suture and a temporary sterile dressing was applied. No immediate complication was evident. All sponge, instrument and needle counts were correct at the completion of the procedure. Postprocedural chest x-ray showed good position of the catheter with no evidence of hemothorax or pneumothorax. The patient tolerated the procedure well with no immediate complication evident.      Gage Singh DO  6:01 AM, 8/26/22

## 2022-08-26 NOTE — CARE COORDINATION
08/26/22 1116   Service Assessment   History Provided By Child/Family   Primary Caregiver Self   Support Systems Children;Family Members   Patient's Healthcare Decision Maker is: Legal Next of Anna Kirkland   PCP Verified by CM Yes   Last Visit to PCP Within last 3 months   Prior Functional Level Independent in ADLs/IADLs   Can patient return to prior living arrangement Yes   Family able to assist with home care needs: Yes   Financial Resources Medicaid   Social/Functional History   Lives With Son   Type of [address] Two [address]; Able to Live on Main level with bedroom/bathroom   Bathroom Equipment Shower chair   Home Equipment Walker, [address]   Ambulation Assistance Independent   Transfer Assistance Independent   Discharge Planning   Type of Residence House   Living Arrangements Children   Current Services Prior To Admission None   Potential Assistance Needed Home Care; [address] [facility] [address] [address]   DME Ordered? No   Potential Assistance Purchasing Medications No   Services At/After Discharge   Mode of Transport at Discharge Other (see comment)  (family)       Patient is intubated and sedated. Spoke to patient's dtr, Ángela Diss ,at bedside. Patient has 7 children, no Healthcare POA. Kinsey has a BSC, walker, oxygen and shower chair at home. Patient sleeps on couch and uses BSC on first floor. Ultimate goal is home. Discussed LTACH, SNF and home care with patient. She is open to whatever her Mom will need at bedside. Needs at discharge to be determined.

## 2022-08-26 NOTE — PROGRESS NOTES
Patient with PMH of:  -left lung adenocarcinoma s/p left lower lobectomy 7/2021  -active smoker  -PE and DVT on Eliquis  -rheumatoid arthritis  -COPD  -HTN  -depression on zoloft    Admitted to the ER with complaint of vomiting, right sided flank pain x 2-3 days. Went to [facility] ER but returned home without being seen by a physician. This am, patient was having non bloody emesis, later her son found her altered and called EMS. Was given 1 L fluid bolus by EMS> on arrival in the ER she was hypotensive, tachypnic requiring non rebreather mask 10 L. CT chest abd pelvis showed showed:  -multiple pulmonary opacities in upper lobes, occlusion of left lower lobe, hepatomegaly, cardiomegaly, bilateral pyelonephritis. CXR showing increased opacities L>R. EKG showed sinus tachycardia with PVCs, prolonged Qtc 536. Was given 2 g magnesium, art line, central line placed in the ER, started on IV levophed, IV epinephrine. Labs: K 2.8, creat 2.54, BUN 32, lactic acid 5.7, WBC 33.3, Hb 8.7, plat 305. Plan:  Septic shock 2/2 bilateral hydronephrosis:  -IV levophed, vaso for pressor support.  -bolused 3.7 L fluids so far, continue NS at 100 ml/hr  -IV zosyn  -f/u on resp, urine, blood cx  -monitor UOP woth salcedo  -f/u on repeat lactic acid    Hypokalemia with prolonged Qtc:  -replaced 80 meq K, follow up on repeat BMP, mag  -follow up on repeat EKG    ASHLEIGH:  -follow up on repeat BMP for creat, BUN  -IV NS at 100 ml/hr.  Avoid bicarb gtt until potassium improves    Elevated troponins 2/2 type II demand ischemia:  -follow up on repeat trop    Left lung adenocarcinoma with lobectomy:  -high risk of intubation due to acute hypoxic respiratory failure requriign 100% FiO2 on HFNC  -follow up on ABG    DVT ppx: heparin gtt for prior PE  GI ppx: [address]  PGY-3  ICU resident  HELENA Palomares [address] [place] [address]  8/25/2022 8:54 PM

## 2022-08-26 NOTE — SIGNIFICANT EVENT
Dr. Melida Gomes at bedside for intubation. 2307 Time out completed  2308 20 mg etomidate given IVP  2308- 80 mg Succinylcholine given IVP  2310 7.5 ett in place. Positive color change noted, positive bilateral breath. Ett secure at 23 @ the lip. CXR order per Dr. Melida Gomes.     Electronically signed by Chris Sanchez RN on 8/25/2022 at 11:18 PM

## 2022-08-26 NOTE — CONSULTS
[facility] - Initial Consult Note  Today's Date and Time: 8/26/2022, 3:33 PM    Impression :   Septic shock  Septicemia with Pseudomonas on 8-25-22  Residual changes at the base of the Lt lung, much improved from pneumonia on 4-22-22  COPD  Chronic CHF  Recurrent DVT/PE  History of lung cancer  Hypokalemia  Pyelonephritis  ASHLEIGH    Recommendations:   Cefepime 2 gm IV q 12 hr, adjusted for CVVHD at > 1.5 L/hr    Medical Decision Making/Summary/Discussion:8/26/2022       Infection Control Recommendations   [place] Precautions    Antimicrobial Stewardship Recommendations     Targeted therapy  PK dosing    Coordination of Outpatient Care:   Estimated Length of IV antimicrobials:TBD  Patient will need Midline Catheter Insertion: No  Patient will need PICC line Insertion:No  Patient will need: Home IV , [place],  SNF,  LTAC: TBD  Patient will need outpatient wound care:Yes    Chief complaint/reason for consultation:   Septic shock, UTI   Pneumonia  Pseudomonas septicemia    History of Present Illness:   Ubaldo Springer is a 74y.o.-year-old  female who was initially admitted on 8/25/2022. Patient seen at the request of Dr. Ben Myers. INITIAL HISTORY:    Patient presented to the ER with altered mental status and hypotension. Patient has a past medical history of COPD, lung cancer, CHF, DVTs and PEs. Patient is a tobacco user. Patient was hypotensive during examination in the ED with BP reaching 48/35. She was administered IV fluid and levophed. Mentation has improved since ED arrival.    CXR suggestive of cardiomegaly and CHF, and pneumonia with pleural effusions. CT of the abdomen displayed pyelonephritis findings, believed to be the source of septic shock. CT of head showed no abnormalities    Patient has left lower lobe lung adenocarcinoma and underwent a lobectomy on 7/19/22. Patient has a history of prior pneumonias.  She was most recently admitted for pneumonia this past April where she was treated with vancomycin and zosyn. At that point she had extensive involvement of Lt lung. The current CT shows some residual involvement in the base of the lt lung. Patient on ventilator. Leukocytosis present 34.9    Afebrile  VSS    22 CXR: complete opacification of the left hemithorax - possibly due to mucus pluggling atelectasis. Patient currently on zosyn. Will alter to Cefepime to facilitate dosing with CVVHD    Labs, X rays reviewed: 2022    BUN:39  Cr:3.39    WBC: 34.9  Hb:8.0  Plat: 288    Cultures:  Urine:    Blood:  22: Pseudomonas aeruginosa  Sputum :  22: Gram neg rods  Wound:      Imagin/26/22 CXR:  Impression   1. Complete opacification of the left hemithorax. This may be due to mucous   plugging in resulting atelectasis. 2.  New left internal jugular line terminates at the expected level of the   distal SVC. 3.  Endotracheal tube remains in acceptable position. The enteric tube   courses off the field of view in the upper abdomen. Discussed with patient, RN, family. I have personally reviewed the past medical history, past surgical history, medications, social history, and family history, and I have updated the database accordingly.   Past Medical History:     Past Medical History:   Diagnosis Date    Abnormal CT of the chest 2021    Adenocarcinoma, lung, left ([address] 2021    Arthritis     Clinical trial participant 2022    Abionic 002 Anticipated date of completion 2022    Community acquired pneumonia 2019    COPD (chronic obstructive pulmonary disease) ([address] 2019    Deep venous thrombosis of left profunda femoris vein ([address] 2020    Depression     DVT (deep venous thrombosis) ([address]     b/l     GERD (gastroesophageal reflux disease)     History of pulmonary embolism 2021    History of thyroid nodule 2019    Hypertension     Major depressive disorder, recurrent, moderate ([facility] [address] 11/12/2018    Nodule of lower lobe of left lung 04/23/2021    Obesity, Class II, BMI 35-39.9 11/12/2018    On anticoagulant therapy 05/27/2021    On home oxygen therapy     02 2L NC PRN    On methotrexate therapy 05/27/2021    Prediabetes 11/12/2018    Rheumatoid arthritis ([facility] [address]     Snores     denies apnea    Thyroid nodule 01/09/2020    Tobacco abuse 11/12/2018    Under care of team 06/29/2021    pulmonology-Dr Blank-[facility]-last visit june 2021    Under care of team 06/29/2021    oncology-Dr Kelly-[facility]-last visit june 2021    Under care of team     Dr. Chase Gerard clearance appointment 7/6/21, stress test 7/9/2021, clearance obtained and placed on chart    Wears glasses     Wellness examination 06/29/2021    pcp-Dr Danyelle Cai office-last visit may 2021       Past Surgical  History:     Past Surgical History:   Procedure Laterality Date    [address] N/A 12/27/2019    BRONCHOSCOPY ALVEOLAR LAVAGE performed by Edwige Claros MD at [facility] ENDO    COLONOSCOPY N/A 2/12/2020    COLONOSCOPY POLYPECTOMIES HOT SNARE, COLD BIOPSY POLYPECTOMIES performed by Delores Becker MD at [address] [place] [address]  5/13/2021    CT NEEDLE BIOPSY LUNG PERCUTANEOUS 5/13/2021 [facility] CT SCAN    CT NEEDLE BIOPSY LUNG PERCUTANEOUS  6/2/2022    CT NEEDLE BIOPSY LUNG PERCUTANEOUS 6/2/2022 [facility] CT SCAN    ENDOSCOPY, COLON, DIAGNOSTIC  [id number]    gastritis, sm. bowel lipoma, biopsies    INSERT MIDLINE CATHETER  11/3/2021         LOBECTOMY Left 07/19/2021    XI ROBOTIC LEFT LOWER LOBECTOMY CONVERTED TO OPEN THORACTOMY LEFT LOWER LOBECTOMY (Left )    LUNG REMOVAL, TOTAL Left 7/19/2021    XI ROBOTIC LEFT LOWER LOBECTOMY CONVERTED TO OPEN THORACTOMY LEFT LOWER LOBECTOMY performed by Al Bass MD at [facility] OR       Medications:      ipratropium-albuterol  1 ampule Inhalation Q4H While awake    piperacillin-tazobactam  4,500 mg IntraVENous Q8H sodium chloride flush  5-40 mL IntraVENous 2 times per day    pantoprazole (PROTONIX) 40 mg injection  40 mg IntraVENous Daily    aspirin  81 mg Oral Daily    hydrocortisone sodium succinate PF  100 mg IntraVENous Q8H       Social History:     Social History     Socioeconomic History    Marital status: Single     Spouse name: Not on file    Number of children: 8    Years of education: Not on file    Highest education level: Not on file   Occupational History     Employer: N/A   Tobacco Use    Smoking status: Some Days     Packs/day: 0.25     Years: 35.00     Pack years: 8.75     Types: Cigarettes     Start date: 1/14/1986    Smokeless tobacco: Never    Tobacco comments:     restarted smoking 2019   Vaping Use    Vaping Use: Never used   Substance and Sexual Activity    Alcohol use: Not Currently     Alcohol/week: 0.0 standard drinks    Drug use: No    Sexual activity: Not Currently   Other Topics Concern    Not on file   Social History Narrative    Not on file     Social Determinants of Health     Financial Resource Strain: Not on file   Food Insecurity: Not on file   Transportation Needs: Not on file   Physical Activity: Not on file   Stress: Not on file   Social Connections: Not on file   Intimate Partner Violence: Not on file   Housing Stability: Not on file       Family History:     Family History   Problem Relation Age of Onset    Cancer Mother         Uterine and breast    Diabetes Mother     Heart Disease Mother     Cancer Father         lung    Cancer Brother         lung    Cancer Brother         lung        Allergies:   Patient has no known allergies. Review of Systems:   Constitutional: No fevers or chills. No systemic complaints  Head: No headaches  Eyes: No double vision or blurry vision. No conjunctival inflammation. ENT: No sore throat or runny nose. . No hearing loss, tinnitus or vertigo. Cardiovascular: No chest pain or palpitations. No shortness of breath.  No RODRIGUEZ  Lung: No shortness of breath 08/26/22 1050 -- -- -- 82 25 96 % --   08/26/22 1045 -- -- -- 82 25 97 % --   08/26/22 1040 -- -- -- 82 25 97 % --   08/26/22 1035 -- -- -- 82 25 97 % --   08/26/22 1030 -- -- -- 82 25 97 % --   08/26/22 1025 -- -- -- 82 25 97 % --   08/26/22 1020 -- -- -- 83 23 97 % --   08/26/22 1015 -- -- -- 83 25 97 % --   08/26/22 1010 -- -- -- 82 25 97 % --   08/26/22 1005 -- -- -- 84 24 96 % --   08/26/22 1000 -- -- -- 83 24 96 % --   08/26/22 0945 -- -- -- 85 24 95 % --   08/26/22 0930 -- -- -- 85 25 95 % --   08/26/22 0921 -- -- -- 85 25 93 % --   08/26/22 0915 -- -- -- 85 24 93 % --   08/26/22 0900 -- -- -- 85 25 94 % --   08/26/22 0845 -- -- -- 82 22 95 % --   08/26/22 0830 -- -- -- 84 24 93 % --   08/26/22 0815 -- -- -- 84 25 93 % --   08/26/22 0800 (!) 120/49 99.9 °F (37.7 °C) CORE 83 24 93 % --   08/26/22 0745 -- -- -- 82 24 94 % --     General Appearance: Awake, alert, and in no apparent distress  Head:  Normocephalic, no trauma  Eyes: Pupils equal, round, reactive to light and accommodation; extraocular movements intact; sclera anicteric; conjunctivae pink. No embolic phenomena. ENT: Oropharynx clear, without erythema, exudate, or thrush. No tenderness of sinuses. Mouth/throat: mucosa pink and moist. No lesions. Dentition in good repair. Neck:Supple, without lymphadenopathy. Thyroid normal, No bruits. Pulmonary/Chest: Decreased breath sounds  Cardiovascular: Regular rate and rhythm without murmurs, rubs, or gallops. Abdomen: Soft, non tender. Bowel sounds normal. No organomegaly  All four Extremities: No cyanosis, clubbing, edema, or effusions. Neurologic: No gross sensory or motor deficits. Skin: Warm and dry with good turgor. No signs of peripheral arterial or venous insufficiency. No ulcerations. No open wounds.     Medical Decision Making -Laboratory:   I have independently reviewed/ordered the following labs:    CBC with Differential:   Recent Labs     08/26/22  0223 08/26/22  0832   WBC 30.0* 34.9*   HGB 8. 1* 8.0*   HCT 25.8* 26.4*    288   LYMPHOPCT 30 32   MONOPCT 5 6     BMP:   Recent Labs     08/25/22  2245 08/26/22  0216 08/26/22  0832 08/26/22  1219      < > 137 135   K 4.7   < > 5.4* 5.1      < > 104 101   CO2 18*   < > 17* 17*   BUN 37*   < > 43* 39*   CREATININE 3.04*   < > 3.45* 3.39*   MG 1.5*  --  1.8  --     < > = values in this interval not displayed. Hepatic Function Panel:   Recent Labs     08/26/22  0216 08/26/22  1219   PROT 8.7* PENDING   LABALBU 2.5* PENDING   BILIDIR  --  PENDING   IBILI  --  PENDING   BILITOT 0.50 PENDING   ALKPHOS 49 PENDING   ALT 17 PENDING   AST 40* PENDING     No results for input(s): RPR in the last 72 hours. No results for input(s): HIV in the last 72 hours. No results for input(s): BC in the last 72 hours. Lab Results   Component Value Date/Time    MUCUS NOT REPORTED 10/15/2021 10:04 PM    RBC 2.54 08/26/2022 08:32 AM    TRICHOMONAS NOT REPORTED 10/15/2021 10:04 PM    WBC 34.9 08/26/2022 08:32 AM    YEAST NOT REPORTED 10/15/2021 10:04 PM    TURBIDITY Clear 08/25/2022 03:20 PM     Lab Results   Component Value Date/Time    CREATININE 3.39 08/26/2022 12:19 PM    GLUCOSE 110 08/26/2022 12:19 PM    GLUCOSE 105 12/05/2011 09:55 AM       Medical Decision Making-Imaging:     EXAMINATION:   CT OF THE CHEST, ABDOMEN, AND PELVIS WITH CONTRAST 8/25/2022 1:21 pm       TECHNIQUE:   CT of the chest, abdomen and pelvis was performed with the administration of   intravenous contrast. Multiplanar reformatted images are provided for review. Automated exposure control, iterative reconstruction, and/or weight based   adjustment of the mA/kV was utilized to reduce the radiation dose to as low   as reasonably achievable. COMPARISON:   Chest CT 04/22/2022 and CT of the abdomen and pelvis 05/05/2014.        HISTORY:   ORDERING SYSTEM PROVIDED HISTORY: flank pain   TECHNOLOGIST PROVIDED HISTORY:       flank pain   Decision Support Exception - unselect if not a suspected or confirmed   emergency medical condition->Emergency Medical Condition (MA)   Reason for Exam: flank pain       FINDINGS:       Chest:       Mediastinum: Prevascular space lymphadenopathy is again noted with lymph   nodes measuring up to 1.6 cm. A subcarinal lymph node measures 1.3 cm. Lymph nodes in the aortopulmonary window measure up to 1 cm. There is   cardiomegaly. There is no pericardial effusion. The central pulmonary   arteries are enlarged with the main pulmonary artery measuring up to 4 cm. Lungs/pleura: The airways in the left lower lobe are opacified and there is   consolidation and volume loss throughout the left lower lobe. There is mild   emphysema. An irregularly-shaped nodular opacity at the left lung apex   measures up to 2.4 cm maximally and does not appear significantly changed. There are new patchy and somewhat nodular opacities in the upper lobes. For   example, 1 of the right apex on image 22 measures up to 6 mm.  1 at the left   apex measures 11 mm. There is a trace left pleural effusion. Mild right   middle lobe opacity is not significantly changed and favored to represent   atelectasis. Soft Tissues/Bones: No fracture or destructive bone lesion is identified. Abdomen/Pelvis:       Organs: The liver is enlarged. There are gallstones in the gallbladder. A   small left adrenal nodule is not significantly changed from the recent   comparison. The right adrenal gland and pancreas appear normal.  The kidneys   are enlarged and demonstrate heterogeneous enhancement. There is a 2 cm cyst   laterally in the left kidney and a 7 mm cyst inferiorly on the right. There   is no hydronephrosis. GI/Bowel: There is no bowel dilatation or bowel wall thickening. The stomach   is unremarkable. There are colonic diverticula. Pelvis: Bladder is empty and contains a Gonzalez catheter.        Peritoneum/Retroperitoneum: No enlarged lymph nodes are identified. There is   fluid and fat stranding surrounding the kidneys. No free air or focal fluid   collection is identified       Bones/Soft Tissues: There are no destructive bone lesions. Impression   1. Probable pyelonephritis. The kidneys are enlarged, demonstrate   heterogeneous enhancement and there are prominent adjacent inflammatory   changes. 2. Occlusion of the airways in the left lower lobe which could be related to   mucous plugging and volume loss and airspace consolidation throughout the   left lower lobe which could reflect atelectasis or pneumonia. 3. Stable left apical lung nodule measuring as much as 2.5 cm. There are   multiple new small patchy and nodular opacities elsewhere in the upper lobes   measuring as much as 11 mm which could be related to infection. Metastatic   disease is also consideration. 4. Cardiomegaly. 5. Hepatomegaly. 6. Colonic diverticulosis. 7. Cholelithiasis. Medical Decision [id number]:       Medical Decision Making-Other:     Note:  Labs, medications, radiologic studies were reviewed with personal review of films  Large amounts of data were reviewed  Discussed with nursing Staff, Discharge planner  Infection Control and Prevention measures reviewed  All prior entries were reviewed  Administer medications as ordered  Prognosis: Guarded  Discharge planning reviewed  Follow up as outpatient. Thank you for allowing us to participate in the care of this patient. Please call with questions. Amanda Churchill, DPM  Podiatric Medicine & Surgery, PGY-1  [facility]:    I have discussed the case, including pertinent history and exam findings with the residents and students. I have seen and examined the patient and the key elements of the encounter have been performed by me. I was present when the student obtained his information or examined the patient.  I have reviewed the laboratory data, other diagnostic studies and discussed them with the residents. I have updated the medical record where necessary. I agree with the assessment, plan and orders as documented by the resident/ student.     Terrell Peters MD.

## 2022-08-26 NOTE — PROGRESS NOTES
Date: 8/25/2022  Time: 2315  Patient identity confirmed:  Yes  Indications: impending resp failure and hypoxis  Preoxygenation: yes    Laryngoscope size and type Glidescope  Airway introducer used: No  Evac: No  ETT size:a 7.5 cuffed  Number of attempts:1   Cords visualized:  [x] Clearly  [] Poorly  Breath sounds present bilaterally: Yes   ETCO2   [x] Positive   ETT secured at  23    ETT secured with Crockett  Chest x-ray ordered: Yes     Difficult airway:    No       If yes, was red tape placed around ETT:   No    Was this a Code Situation:    No       If yes, was the rescue pod used:    No      BP: (!) 75/47        Procedure performed by: Dr Marycruz Cameron RCP  11:18 PM

## 2022-08-26 NOTE — PROCEDURES
PROCEDURE NOTE - CENTRAL VENOUS LINE PLACEMENT    PATIENT NAME: Pool Alcala RECORD NO. [id number]  DATE: 8/25/2022  ATTENDING PHYSICIAN: Dr. Sanjana Tolbert DIAGNOSIS:  centrally administered medications  POSTOPERATIVE DIAGNOSIS:  Same  PROCEDURE PERFORMED:  Right Femoral Vein Central Line Insertion  PERFORMING PHYSICIAN: Deepak Pineda DO  ANESTHESIA:  Local utilizing 1% lidocaine  ESTIMATED BLOOD LOSS:  Less than 25 ml  COMPLICATIONS:  None immediately appreciated. DISCUSSION:  Judie Honeycutt is a 74y.o.-year-old female who requires central IV access centrally administered medications. The history and physical examination were reviewed and confirmed. CONSENT: The patient provided verbal consent for this procedure. PROCEDURE:  A timeout was initiated by the bedside nurse and was confirmed by those present. The patient was placed in a supine position. The skin overlying the Right Femoral Vein was prepped with chlorhexadine and draped in sterile fashion. The skin was infiltrated with local anesthetic. Initial attempt by Dr. Nuno Nguyen unsuccessful. The vessel and surrounding anatomy was visualized using ultrasound. Through the anesthetized region, the introducer needle was inserted into the femoral vein returning dark red non pulsatile blood. A guidewire was placed through the center of the needle with no resistance. Ultrasound confirmed presence of wire in the vein. A small incision made in the skin with a #11 scalpel blade. The dilator was inserted into the skin and vein over guidewire using Seldinger technique. The dilator was then removed and the 7F 20cm catheter was placed in the vein over the guidewire using Seldinger technique. The guidewire was then removed and all ports aspirated and flushed appropriately. The catheter then secured using silk suture and a temporary sterile dressing was applied. No immediate complication was evident.   All sponge, instrument and needle counts were correct at the completion of the procedure. The patient tolerated the procedure well with no immediate complication evident. Chichi Graff DO  9:17 PM, 8/25/22     PROCEDURE NOTE - ARTERIAL LINE PLACEMENT    PATIENT NAME: Emerson Saleem [facility]  MEDICAL RECORD NO. [id number]  DATE: 8/25/2022  ATTENDING PHYSICIAN:  Dr. Kaylee Allen DIAGNOSIS:  Need for blood pressure monitoring  POSTOPERATIVE DIAGNOSIS:  Same  PROCEDURE PERFORMED: Right Femoral Arterial Line Insertion  PERFORMING PHYSICIAN:  Chichi Graff DO  ESTIMATED BLOOD LOSS:  Less than 25 ml  COMPLICATIONS:  None immediately appreciated. DISCUSSION:  Roxanne Bhatia is a 74 y.o. female who requires invasive pressure monitoring. The history and physical examination were reviewed and confirmed. CONSENT: The patient provided verbal consent for this procedure. PROCEDURE:  A timeout was initiated by the bedside nurse and was confirmed by those present. The patient was placed in a supine position. The skin overlying the Right Femoral was prepped with chlorhexadine. Through this region, the introducer needle through catheter was inserted into right femoral artery until pulsatile bright blood was seen in collection tubing. Guidewire was advanced with no resistance. Catheter was advanced into the artery, wire was pulled with brisk bleeding noted. Pressure monitoring setup was connected to the catheter, it aspirated and flushed easily. The catheter was secured to the wrist with 3-0 silk. No immediate complication was evident. All sponge, instrument and needle counts were correct at the completion of the procedure.       Chichi Graff DO  9:17 PM, 8/25/22

## 2022-08-26 NOTE — CONSULTS
[facility]    Consult / H&P               Today's Date: 8/26/2022  Patient Name: Chas Wright  Date of admission: 8/25/2022  2:22 PM  Patient's age: 74 y.o., 1955  Admission Dx: Septic shock ([address] [A41.9, R65.21]    Reason for Consult:  Cardiac evaluation    Requesting Physician: Rui Mayes MD    REASON FOR CONSULT:  Elevated Troponin    History Obtained From:  EMR. HISTORY OF PRESENT ILLNESS:      The patient is a 74 y.o. female with a history of adenocarcinoma of the lung was brought into the ER for altered mental status by EMS. On arrival she was very hypotensive with systolics in the 94L over 30s started on IV fluids and subsequently intubated. She was also found to have complete opacification of left hemithorax likely secondary to mucous plugging and resulting atelectasis. Her labs also showed a significant ASHLEIGH with normal baseline creatinine likely secondary to ATN from hypotension requiring multiple pressor support, nephrology was consulted overnight and patient was initiated on CVVHD. Cardiology was consulted for elevated troponin with a positive trend.         Past Medical History:   has a past medical history of Abnormal CT of the chest, Adenocarcinoma, lung, left ([address] Arthritis, Clinical trial participant, Community acquired pneumonia, COPD (chronic obstructive pulmonary disease) ([address] Deep venous thrombosis of left profunda femoris vein ([address] Depression, DVT (deep venous thrombosis) ([address] GERD (gastroesophageal reflux disease), History of pulmonary embolism, History of thyroid nodule, Hypertension, Major depressive disorder, recurrent, moderate ([address] Nodule of lower lobe of left lung, Obesity, Class II, BMI 35-39.9, On anticoagulant therapy, On home oxygen therapy, On methotrexate therapy, Prediabetes, Rheumatoid arthritis ([address] Snores, Thyroid nodule, Tobacco abuse, Under care of team, Under care of team, Under care of team, Wears glasses, and Wellness examination. Past Surgical History:   has a past surgical history that includes Appendectomy (1982); Endoscopy, colon, diagnostic (806950); bronchoscopy (N/A, 12/27/2019); Colonoscopy (N/A, 2/12/2020); CT NEEDLE BIOPSY LUNG PERCUTANEOUS (5/13/2021); Lung lobectomy (Left, 07/19/2021); Lung removal, total (Left, 7/19/2021); Insert Midline Catheter (11/3/2021); and CT NEEDLE BIOPSY LUNG PERCUTANEOUS (6/2/2022). Home Medications:    Prior to Admission medications    Medication Sig Start Date End Date Taking?  Authorizing Provider   sertraline (ZOLOFT) 100 MG tablet take 1 tablet by mouth once daily 7/27/22   Arleth Ferris MD   SYMBICORT 160-4.5 MCG/ACT AERO inhale 2 puffs by mouth twice a day 7/25/22   Palmira Soriano MD   albuterol sulfate HFA (PROVENTIL;VENTOLIN;PROAIR) 108 (90 Base) MCG/ACT inhaler inhale 2 puffs by mouth and INTO THE LUNGS every 4 hours for 7 days 7/25/22   Palmira Soriano MD   SYMBICORT 160-4.5 MCG/ACT AERO inhale 2 puffs by mouth twice a day 7/25/22   Palmira Soriano MD   potassium chloride (KLOR-CON M) 20 MEQ extended release tablet take 1 tablet by mouth twice a day 7/25/22   Palmira Soriano MD   Prenatal Vit-Fe Fumarate-FA (PRENATAL VITAMIN) 27-1 MG TABS tablet take 1 tablet by mouth once daily 7/23/22   Jhonatan Marroquin MD   ondansetron (ZOFRAN) 4 MG tablet take 1 tablet by mouth three times a day if needed for nausea and vomiting 7/14/22   Jhonatan Marroquin MD   vitamin D (ERGOCALCIFEROL) 1.25 MG (31406 UT) CAPS capsule take 1 capsule by mouth every week for 11 doses 7/1/22   Palmira Soriano MD   SPIRIVA RESPIMAT 2.5 MCG/ACT AERS inhaler inhale 2 puffs INTO THE LUNGS once daily 6/30/22   Palmira Soriano MD   metoprolol tartrate (LOPRESSOR) 25 MG tablet take 1/2 tablet by mouth twice a day 6/3/22   Palmira Soriano MD   cetirizine (ZYRTEC ALLERGY) 10 MG tablet Take 1 tablet by mouth daily 5/24/22   Palmira Soriano MD   pantoprazole (PROTONIX) 40 MG tablet take 1 tablet by mouth once daily 5/21/22   Willie Wheeler MD   amLODIPine (NORVASC) 10 MG tablet Take 1 tablet by mouth daily 5/12/22   Willie Wheeler MD   Handicap Placard MISC by Does not apply route 3/14/2022 to 3/13/2027    Panlobular emphysema ([facility]  (primary encounter diagnosis) 3/14/22   Jake De La Torre MD   albuterol (PROVENTIL) (2.5 MG/3ML) 0.083% nebulizer solution Take 3 mLs by nebulization every 6 hours as needed for Wheezing 2/10/22   Jayesh Humphries DO   furosemide (LASIX) 40 MG tablet Take 1 tablet by mouth daily 12/2/21   Melly Jasso,    acetaminophen (TYLENOL) 500 MG tablet Take 1,000 mg by mouth every 6 hours as needed for Pain    Historical Provider, MD   ELIQUIS 5 MG TABS tablet take 1 tablet by mouth twice a day 7/16/21   Willie Wheeler MD      Current Facility-Administered Medications: ipratropium-albuterol (DUONEB) nebulizer solution 1 ampule, 1 ampule, Inhalation, Q4H While awake  heparin (porcine) injection 6,240 Units, 80 Units/kg, IntraVENous, PRN  heparin (porcine) injection 3,120 Units, 40 Units/kg, IntraVENous, PRN  [Held by provider] heparin 25,000 units in dextrose 5 % 250 mL infusion (rate based), 5-30 Units/kg/hr, IntraVENous, Continuous  norepinephrine (LEVOPHED) 32 mg in dextrose 5 % 250 mL infusion, 1-100 mcg/min, IntraVENous, Continuous  heparin (porcine) injection 1,600 Units, 1,600 Units, IntraCATHeter, PRN  heparin (porcine) injection 1,500 Units, 1,500 Units, IntraCATHeter, PRN  potassium chloride 20 mEq/50 mL IVPB (Central Line), 20 mEq, IntraVENous, PRN  magnesium sulfate 1000 mg in dextrose 5% 100 mL IVPB, 1,000 mg, IntraVENous, PRN  calcium gluconate 1000 mg in sodium chloride 50 mL, 1,000 mg, IntraVENous, PRN **OR** calcium gluconate 2000 mg in sodium chloride 100 mL, 2,000 mg, IntraVENous, PRN **OR** calcium gluconate 3,000 mg in dextrose 5 % 100 mL IVPB, 3,000 mg, IntraVENous, PRN **OR** calcium gluconate 4,000 mg in dextrose 5 % 100 mL IVPB, 4,000 mg, IntraVENous, PRN  sodium phosphate 6 mmol in sodium chloride 0.9 % 250 mL IVPB, 6 mmol, IntraVENous, PRN **OR** sodium phosphate 12 mmol in sodium chloride 0.9 % 250 mL IVPB, 12 mmol, IntraVENous, PRN **OR** sodium phosphate 18 mmol in sodium chloride 0.9 % 500 mL IVPB, 18 mmol, IntraVENous, PRN **OR** sodium phosphate 24 mmol in sodium chloride 0.9 % 500 mL IVPB, 24 mmol, IntraVENous, PRN  0.9 % sodium chloride infusion, , IntraVENous, Continuous  prismaSATE BK 0/3.5 5,000 mL with potassium chloride 10 mEq solution, 1,500 mL/hr, Dialysis, Continuous  piperacillin-tazobactam (ZOSYN) 4,500 mg in dextrose 5 % 100 mL IVPB (mini-bag), 4,500 mg, IntraVENous, Q8H  sodium chloride flush 0.9 % injection 5-40 mL, 5-40 mL, IntraVENous, 2 times per day  sodium chloride flush 0.9 % injection 5-40 mL, 5-40 mL, IntraVENous, PRN  0.9 % sodium chloride infusion, , IntraVENous, PRN  ondansetron (ZOFRAN-ODT) disintegrating tablet 4 mg, 4 mg, Oral, Q8H PRN **OR** ondansetron (ZOFRAN) injection 4 mg, 4 mg, IntraVENous, Q6H PRN  polyethylene glycol (GLYCOLAX) packet 17 g, 17 g, Oral, Daily PRN  acetaminophen (TYLENOL) tablet 650 mg, 650 mg, Oral, Q6H PRN **OR** acetaminophen (TYLENOL) suppository 650 mg, 650 mg, Rectal, Q6H PRN  pantoprazole (PROTONIX) 40 mg in sodium chloride (PF) 10 mL injection, 40 mg, IntraVENous, Daily  vasopressin 20 Units in dextrose 5 % 100 mL infusion, 0.04 Units/min, IntraVENous, Continuous  aspirin chewable tablet 81 mg, 81 mg, Oral, Daily  hydrocortisone sodium succinate PF (SOLU-CORTEF) injection 100 mg, 100 mg, IntraVENous, Q8H  phenylephrine (MAGDALENO-SYNEPHRINE) 50 mg in sodium chloride 0.9 % 250 mL infusion,  mcg/min, IntraVENous, Continuous  midazolam (VERSED) 1 mg/mL in D5W infusion, 1-10 mg/hr, IntraVENous, Continuous  fentaNYL 50 mcg/mL 50 mL infusion,  mcg/hr, IntraVENous, Continuous  sodium bicarbonate 150 mEq in dextrose 5 % 1,000 mL infusion, , IntraVENous, Continuous    Allergies:  Patient has no known allergies. Social History:   reports that she has been smoking cigarettes. She started smoking about 36 years ago. She has a 8.75 pack-year smoking history. She has never used smokeless tobacco. She reports that she does not currently use alcohol. She reports that she does not use drugs. Family History: family history includes Cancer in her brother, brother, father, and mother; Diabetes in her mother; Heart Disease in her mother. PHYSICAL EXAM:      BP (!) 120/49   Pulse 67   Temp 99.9 °F (37.7 °C) (Core)   Resp 25   Ht 5' 5\" (1.651 m)   Wt 178 lb 9.2 oz (81 kg)   SpO2 98%   BMI 29.72 kg/m²    Constitutional and General Appearance: Intubated and sedated. HEENT: PERRL, no cervical lymphadenopathy. No masses palpable. Normal oral mucosa  Respiratory:  Mechanically ventilated. Cardiovascular: The apical impulse is not displaced  Heart tones are crisp and normal. regular S1 and S2. Abdomen:   No masses or tenderness  Bowel sounds present  Extremities:   No Cyanosis or Clubbing   Lower extremity edema: No   Skin: Warm and dry    Labs:     CBC:   Recent Labs     08/26/22  0223 08/26/22  0832   WBC 30.0* 34.9*   HGB 8.1* 8.0*   HCT 25.8* 26.4*    288     BMP:   Recent Labs     08/26/22  0832 08/26/22  1219    135   K 5.4* 5.1   CO2 17* 17*   BUN 43* 39*   CREATININE 3.45* 3.39*   LABGLOM 13* 14*   GLUCOSE 121* 110*     BNP: No results for input(s): BNP in the last 72 hours. PT/INR:   Recent Labs     08/26/22  0216   PROTIME 16.3*   INR 1.6     APTT:  Recent Labs     08/26/22  0216 08/26/22  0833   APTT 51.8* >120.0*     CARDIAC ENZYMES:No results for input(s): CKTOTAL, CKMB, CKMBINDEX, TROPONINI in the last 72 hours.   FASTING LIPID PANEL:  Lab Results   Component Value Date/Time    HDL 77 01/30/2015 11:42 AM    TRIG 84 01/30/2015 11:42 AM     LIVER PROFILE:  Recent Labs     08/26/22  0216 08/26/22  1219   AST 40* PENDING   ALT 17 PENDING   LABALBU 2.5* PENDING       DATA: Diagnostics:    EKG: Junctional rhythm   ECHO: 04/25/2022  Left ventricle is normal in size. Global left ventricular systolic function  is normal. Calculated ejection fraction 53% by Heart Model. Left atrium is severely dilated. Normal right ventricular function. Mildly dilated right ventricular cavity. TAPSE value of 2.72cm noted. Aortic valve is trileaflet and opens adequately. Trivial aortic insufficiency. Normal mitral valve structure and function. Moderate mitral regurgitation. Multiple jets noted. EOA =0.23 cm2. PISA radius is 0.8cm. Vena contracta is 0.58cm. MR volume is  43ml. Mean gradient 7mmHg. No obvious valvular abnormality. Moderate tricuspid regurgitation. Severe pulmonary hypertension. Estimated right ventricular systolic pressure  is 11QLSF. E/E' average = 15.8. IVC Increased diameter, but still has inspiratory variation suggesting upper  normal or mildly elevated RA filling pressure (i.e. CVP) . IMPRESSION:    Shock likely septic secondary to pneumonia with left lung collapse. History of adenocarcinoma of the lung status post left lung lobectomy 7/2021. Primary planning bronchoscopy today. Troponin elevation with a upward trend likely secondary to severe ASHLEIGH requiring CRRT. EKG with junctional rhythm. Previous echocardiogram with preserved LVEF with moderate MR. No previous cardiac ischemic history on file. Bilateral pyelonephritis. Hx of DVT/PE on Eliquis at home. RECOMMENDATIONS:  Continue aspirin, will initiate statin and heparin for now. Recommend weaning off Kartik-Synephrine due to junctional bradycardia and can start epi drip for hypotension. Will continue to follow echocardiogram and based on that we will decide ischemic work-up if any. Patient remains extremely sick. We will follow. Gloria Lafleur MD       Cardiovascular Fellow  8/26/2022, 4:42 PM    I performed a history and physical examination of the patient and discussed management with the resident. I reviewed the residents note and agree with the documented findings and plan of care. Any areas of disagreement are noted on the chart. I was personally present for the key portions of any procedures. I have documented in the chart those procedures where I was not present during the key portions. I have personally evaluated this patient and have completed at least one if not all key elements of the E/M (history, physical exam, and MDM). Additional findings are as noted. Patient was seen 8/26/22 and plan was discussed with fellow and MICU team as above.     Joon Bledsoe MD

## 2022-08-26 NOTE — PROCEDURES
PROCEDURE NOTE - EMERGENCY INTUBATION    PATIENT NAME: Rosangela Vieyra [facility]  MEDICAL RECORD NO. [id number]  DATE: 8/25/2022  ATTENDING PHYSICIAN: Abhay    PREOPERATIVE DIAGNOSIS:  Acute Respiratory Failure  POSTOPERATIVE DIAGNOSIS:  Same  PROCEDURE PERFORMED:  Emergency endotracheal intubation  PERFORMING PHYSICIAN: Blanquita Christensen DO    MEDICATIONS: etomidate intravenously and succinycholine intravenously    DISCUSSION:  Abril Cameron is a 74y.o.-year-old female who requires intubation and ventilatory support due to impending respiratory failure. The history and physical examination were reviewed and confirmed. CONSENT: The patient provided verbal consent for this procedure. PROCEDURE:  A timeout was initiated by the bedside nurse and was confirmed by those present. The patient was placed in the appropriate position. Cricoid pressure was not required. Intubation was performed by direct laryngoscopy using a laryngoscope and a 7.5 cuffed endotracheal tube. The cuff was then inflated and the tube was secured appropriately at a distance of 23 cm to the dental ridge. Initial confirmation of placement included bilateral breath sounds, an end tidal CO2 detector, absence of sounds over the stomach, adequate chest rise, and adequate pulse oximetry reading. A chest x-ray to verify correct placement of the tube showed appropriate tube position. The patient tolerated the procedure well.      COMPLICATIONS:  None     Blanquita Christensen DO  11:21 PM, 8/25/22

## 2022-08-26 NOTE — PLAN OF CARE
Problem: Respiratory - Adult  Goal: Achieves optimal ventilation and oxygenation  Outcome: Progressing  Flowsheets (Taken 8/26/2022 0509)  Achieves optimal ventilation and oxygenation:   Assess for changes in respiratory status   Assess for changes in mentation and behavior   Position to facilitate oxygenation and minimize respiratory effort   Oxygen supplementation based on oxygen saturation or arterial blood gases   Respiratory therapy support as indicated   Assess the need for suctioning and aspirate as needed

## 2022-08-26 NOTE — CONSULTS
Renal Consult Note    Patient :  Brie Ochoa; 74 y.o. MRN# [id number]  Location:  8506/2420-55  Attending:  Alexa Oliveira MD  Admit Date:  8/25/2022   Hospital Day: 1    Reason for Consult:     Asked by Dr Alexa Oliveira MD to see for ASHLEIGH/Elevated Creatinine. History Obtained From:     Electronic medical record, patient's nurse, patient's children. History of Present Illness:     Brie Ochoa; 74 y.o. female with past medical history of hypertension, left lung adenocarcinoma status post left lung lobectomy 7/2021, arthritis, Community acquired pneumonia, COPD, Deep venous thrombosis of left profunda femoris vein, Depression, GERD, History of pulmonary embolism and DVT, Hypertension, Major depressive disorder, recurrent, moderate ([address] Nodule of lower lobe of left lung, Obesity, Class II, BMI 35-39.9, On anticoagulant therapy, On home oxygen therapy, On methotrexate therapy, Prediabetes, Rheumatoid arthritis, Thyroid nodule, Tobacco abuse presented to the via EMS when her son found her with AMS. For the past 2-3 days pt had vomiting, right sided flank pain and initially went to [facility] ER but left without being seen by a physician. Later next day her son found her having altered mental status and EMS was called and patient was brought to the hospital for further evaluation. Patient was having nonbloody emesis. Patient was intubated today due to impending respiratory failure without any complications. Pt's baseline Cr is 0.6-0.85 today is 3.45. Pt is slightly hyperkalemic with K of 5.4 today  WBC is 34.9. Pt blood cultures positive for pseudomonas. Pt is currently on zosyn   CXR shows complete opacification of left hemithorax may be due to mucus plugging leading to atelectasis.     EKG today shows Accelerated Junctional rhythm with frequent Premature ventricular complexes, ST & T wave abnormality, consider anterolateral ischemia, Prolonged QT  When compared with ECG of 25-AUG-2022 14:25, Junctional rhythm has replaced Sinus rhythm. T wave inversion more evident in Anterior leads, QT has lengthened. She did receive CT head and neck with contrast 8/25/2022 which showed no acute endocrine abnormality. Also got CT chest abdomen pelvis with contrast on 8/25/2022 which showed probable polynephritis. Kidneys are enlarged, demonstrate heterogeneous enhancement and there are prominent adjacent inflammatory changes. Occlusion of areas in the left lower lobe which could be related to mucous plugging and volume loss and airspace consolidation throughout the left lower lobe which could reflect atelectasis or pneumonia. Stable left apical lung nodule measuring 2.5 cm. Cardiomegaly. Hepatomegaly. Chronic diverticulosis. Colitis. Today morning chest x-ray 8/26/2022 showed complete opacification of left hemithorax this may be due to mucous plugging and resulting atelectasis. Patient's urine output dropped. She also became hypotensive and was on 3 pressor support yesterday and currently requiring 2 pressors. Nephrology was consulted overnight and patient was initiated on CVVHD on emergent basis. Currently patient was seen and examined on CVVHD tolerating the procedure well. Home medications include Lasix 40 mg p.o. daily, potassium chloride 20 mEq daily. Blood cultures 8/25/2020 are positive for Pseudomonas aeruginosa. Patient is currently on IV antibiotics. Past History/Allergies? Social History:     Past Medical History:   Diagnosis Date    Abnormal CT of the chest 04/21/2021    Adenocarcinoma, lung, left ([address] 05/14/2021    Arthritis     Community acquired pneumonia 12/23/2019    COPD (chronic obstructive pulmonary disease) ([address] 08/14/2019    Deep venous thrombosis of left profunda femoris vein ([address] 04/16/2020    Depression     DVT (deep venous thrombosis) ([address] 2014    b/l     GERD (gastroesophageal reflux disease)     History of pulmonary embolism 05/27/2021    History of thyroid nodule 12/23/2019    Hypertension     Major depressive disorder, recurrent, moderate ([facility] [address] [facility] 11/12/2018    Nodule of lower lobe of left lung 04/23/2021    Obesity, Class II, BMI 35-39.9 11/12/2018    On anticoagulant therapy 05/27/2021    On home oxygen therapy     02 2L NC PRN    On methotrexate therapy 05/27/2021    Prediabetes 11/12/2018    Rheumatoid arthritis ([facility] [address]     Snores     denies apnea    Thyroid nodule 01/09/2020    Tobacco abuse 11/12/2018    Under care of team 06/29/2021    pulmonology-Dr Blank-[facility]-last visit june 2021    Under care of team 06/29/2021    oncology-Dr Kelly[facility]-last visit june 2021    Under care of team     Dr. Gloria Maravilla clearance appointment 7/6/21, stress test 7/9/2021, clearance obtained and placed on chart    Wears glasses     Wellness examination 06/29/2021    pcp-Dr Aram Collado office-last visit may 2021       Past Surgical History:   Procedure Laterality Date    APPENDECTOMY  1982    BRONCHOSCOPY N/A 12/27/2019    BRONCHOSCOPY ALVEOLAR LAVAGE performed by Trinh Shoemaker MD at [facility] ENDO    COLONOSCOPY N/A 2/12/2020    COLONOSCOPY POLYPECTOMIES HOT SNARE, COLD BIOPSY POLYPECTOMIES performed by Kavon Urbina MD at [facility]  5/13/2021    CT NEEDLE BIOPSY LUNG PERCUTANEOUS 5/13/2021 [facility] CT SCAN    CT NEEDLE BIOPSY LUNG PERCUTANEOUS  6/2/2022    CT NEEDLE BIOPSY LUNG PERCUTANEOUS 6/2/2022 [facility] CT SCAN    ENDOSCOPY, COLON, DIAGNOSTIC  [id number]    gastritis, sm. bowel lipoma, biopsies    INSERT MIDLINE CATHETER  11/3/2021         LOBECTOMY Left 07/19/2021    XI ROBOTIC LEFT LOWER LOBECTOMY CONVERTED TO OPEN THORACTOMY LEFT LOWER LOBECTOMY (Left )    LUNG REMOVAL, TOTAL Left 7/19/2021    XI ROBOTIC LEFT LOWER LOBECTOMY CONVERTED TO OPEN THORACTOMY LEFT LOWER LOBECTOMY performed by Madina Simpson MD at [place] [address]       No Known Allergies    Social History     Socioeconomic History    Marital status: Single     Spouse name: Not on file    Number of children: 8    Years of education: Not on file    Highest education level: Not on file   Occupational History     Employer: N/A   Tobacco Use    Smoking status: Some Days     Packs/day: 0.25     Years: 35.00     Pack years: 8.75     Types: Cigarettes     Start date: 1/14/1986    Smokeless tobacco: Never    Tobacco comments:     restarted smoking 2019   Vaping Use    Vaping Use: Never used   Substance and Sexual Activity    Alcohol use: Not Currently     Alcohol/week: 0.0 standard drinks    Drug use: No    Sexual activity: Not Currently   Other Topics Concern    Not on file   Social History Narrative    Not on file     Social Determinants of Health     Financial Resource Strain: Not on file   Food Insecurity: Not on file   Transportation Needs: Not on file   Physical Activity: Not on file   Stress: Not on file   Social Connections: Not on file   Intimate Partner Violence: Not on file   Housing Stability: Not on file       Family History:        Family History   Problem Relation Age of Onset    Cancer Mother         Uterine and breast    Diabetes Mother     Heart Disease Mother     Cancer Father         lung    Cancer Brother         lung    Cancer Brother         lung       Outpatient Medications:     Medications Prior to Admission: sertraline (ZOLOFT) 100 MG tablet, take 1 tablet by mouth once daily  SYMBICORT 160-4.5 MCG/ACT AERO, inhale 2 puffs by mouth twice a day  albuterol sulfate HFA (PROVENTIL;VENTOLIN;PROAIR) 108 (90 Base) MCG/ACT inhaler, inhale 2 puffs by mouth and INTO THE LUNGS every 4 hours for 7 days  SYMBICORT 160-4.5 MCG/ACT AERO, inhale 2 puffs by mouth twice a day  potassium chloride (KLOR-CON M) 20 MEQ extended release tablet, take 1 tablet by mouth twice a day  Prenatal Vit-Fe Fumarate-FA (PRENATAL VITAMIN) 27-1 MG TABS tablet, take 1 tablet by mouth once daily  ondansetron (ZOFRAN) 4 MG tablet, take 1 tablet by mouth three times a day if needed for nausea and vomiting  vitamin D (ERGOCALCIFEROL) 1.25 MG (54059 UT) CAPS capsule, take 1 capsule by mouth every week for 11 doses  SPIRIVA RESPIMAT 2.5 MCG/ACT AERS inhaler, inhale 2 puffs INTO THE LUNGS once daily  metoprolol tartrate (LOPRESSOR) 25 MG tablet, take 1/2 tablet by mouth twice a day  cetirizine (ZYRTEC ALLERGY) 10 MG tablet, Take 1 tablet by mouth daily  pantoprazole (PROTONIX) 40 MG tablet, take 1 tablet by mouth once daily  amLODIPine (NORVASC) 10 MG tablet, Take 1 tablet by mouth daily  Handicap Placard MISC, by Does not apply route 3/14/2022 to 3/13/2027  Panlobular emphysema ([address]  (primary encounter diagnosis)  albuterol (PROVENTIL) (2.5 MG/3ML) 0.083% nebulizer solution, Take 3 mLs by nebulization every 6 hours as needed for Wheezing  furosemide (LASIX) 40 MG tablet, Take 1 tablet by mouth daily  acetaminophen (TYLENOL) 500 MG tablet, Take 1,000 mg by mouth every 6 hours as needed for Pain  ELIQUIS 5 MG TABS tablet, take 1 tablet by mouth twice a day    Current Medications:     Scheduled Meds:    ipratropium-albuterol  1 ampule Inhalation Q4H While awake    fentanNYL  100 mcg IntraVENous Once    aspirin  324 mg Oral Once    sodium chloride flush  5-40 mL IntraVENous 2 times per day    pantoprazole (PROTONIX) 40 mg injection  40 mg IntraVENous Daily    piperacillin-tazobactam  3,375 mg IntraVENous Q8H    vancomycin (VANCOCIN) intermittent dosing (placeholder)   Other RX Placeholder    aspirin  81 mg Oral Daily    hydrocortisone sodium succinate PF  100 mg IntraVENous Q8H    midazolam  2 mg IntraVENous Once    fentanNYL  50 mcg IntraVENous Once     Continuous Infusions:    heparin (PORCINE) Infusion 18 Units/kg/hr (08/26/22 0829)    norepinephrine (LEVOPHED) infusion (double concentration) 95 mcg/min (08/26/22 0829)    CRRT dialysis builder      sodium chloride 10 mL/hr at 08/26/22 0829    vasopressin (Septic Shock) infusion 0.04 Units/min (08/26/22 0829)    phenylephrine (MAGDALENO-SYNEPHRINE) 50mg/250mL infusion Stopped (08/26/22 0502)    midazolam 2 mg/hr (22)    fentaNYL 50 mcg/mL 50 mcg/hr (22)    IV infusion builder 125 mL/hr at 22     PRN Meds:  heparin (porcine), heparin (porcine), heparin (porcine), heparin (porcine), potassium chloride, magnesium sulfate, calcium gluconate **OR** calcium gluconate **OR** calcium gluconate **OR** calcium gluconate, sodium phosphate IVPB **OR** sodium phosphate IVPB **OR** sodium phosphate IVPB **OR** sodium phosphate IVPB, sodium chloride flush, sodium chloride, ondansetron **OR** ondansetron, polyethylene glycol, acetaminophen **OR** acetaminophen    Review of Systems:     Review of systems not possible as patient is on vent     Input/Output:       I/O last 3 completed shifts: In: 4776.8 [I.V.:2216.8; IV Piggyback:2560]  Out: 2904 [Urine:334; Emesis/NG output:700]    Vital Signs:   Temperature:  Temp: 100.4 °F (38 °C)  TMax:   Temp (24hrs), Av.4 °F (37.4 °C), Min:98.1 °F (36.7 °C), Max:100.4 °F (38 °C)    Respirations:  Resp: 22  Pulse:   Heart Rate: 84  BP:    BP: (!) 98/58  BP Range: Systolic (10WKN), MVY:69 , Min:48 , RTR:393       Diastolic (67XWO), TGT:13, Min:22, Max:98      Physical Examination:     General:  Sedated on ventilator. FiO2 80% with a PEEP of 10   HEENT:  Atraumatic, normocephalic, ET tube positive. Eyes:   Pupils equal, round and reactive to light, pallor, no icterus. Neck:   No thyromegaly, no lymphadenopathy. Chest:   Air entry fair bilaterally,   Cardiac: S1 S2 RRR   Abdomen:  Soft, no masses or organomegally appreciable, BS sluggish. :   No suprapubic or flank tenderness. Neuro:  Sedated on ventilator. Skin:   No rashes, good skin turgor. Extremities:  Slight bilateral lower extremity edema.     Labs:       Recent Labs     22  1445 22  0223   WBC 33.3* 30.0*   RBC 2.74* 2.57*   HGB 8.7* 8.1*   HCT 26.3* 25.8*   MCV 96.0 100.4   MCH 31.8 31.5   MCHC 33.1 31.4   RDW 19.6* 20.0*    336   MPV 8.9 9.5      BMP: Recent Labs     08/25/22  1445 08/25/22  2245 08/26/22  0216    137 136   K 2.8* 4.7 5.2    107 107   CO2 20 18* 17*   BUN 32* 37* 38*   CREATININE 2.54* 3.04* 2.98*   GLUCOSE 115* 108* 107*   CALCIUM 8.9 7.2* 7.2*     Magnesium:    Recent Labs     08/25/22  2245   MG 1.5*     Albumin:    Recent Labs     08/25/22  1445 08/26/22  0216   LABALBU 3.2* 2.5*     SARITA:      Lab Results   Component Value Date/Time    SARITA POSITIVE 07/08/2014 10:26 AM     SPEP:  Lab Results   Component Value Date/Time    PROT 8.7 08/26/2022 02:16 AM     Urinalysis/Chemistries:      Lab Results   Component Value Date/Time    NITRU NEGATIVE 08/25/2022 03:20 PM    COLORU Yellow 08/25/2022 03:20 PM    PHUR 6.0 08/25/2022 03:20 PM    WBCUA 2 TO 5 08/25/2022 03:20 PM    RBCUA 0 TO 2 08/25/2022 03:20 PM    MUCUS NOT REPORTED 10/15/2021 10:04 PM    TRICHOMONAS NOT REPORTED 10/15/2021 10:04 PM    YEAST NOT REPORTED 10/15/2021 10:04 PM    BACTERIA NOT REPORTED 10/15/2021 10:04 PM    SPECGRAV 1.013 08/25/2022 03:20 PM    LEUKOCYTESUR NEGATIVE 08/25/2022 03:20 PM    UROBILINOGEN Normal 08/25/2022 03:20 PM    BILIRUBINUR NEGATIVE 08/25/2022 03:20 PM    GLUCOSEU NEGATIVE 08/25/2022 03:20 PM    KETUA NEGATIVE 08/25/2022 03:20 PM    AMORPHOUS NOT REPORTED 10/15/2021 10:04 PM     Urine Creatinine:     Lab Results   Component Value Date/Time    LABCREA 242.6 03/19/2014 12:52 PM     Radiology:     Reviewed. Assessment:     Acute Kidney Injury likely secondary to prerenal factors with dehydration/volume depletion due to vomiting, hypotension requiring pressor support and underlying infection/sepsis. Baseline creatinine seems to be normal.    Acute hypoxic and hypercapnic respiratory failure requiring mechanical ventilation. Septic shock/hypotension requiring pressor support. Hyperkalemia-should improve with dialysis. Metabolic acidosis. Pyelonephritis bilateral.  Pseudomonas sepsis. Anemia.   History of left lung adenocarcinoma status post left lung lobectomy 2021. History of DVT and PE. History of hypertension. Plan:     Patient was seen and examined on CVVHD at bedside. Orders were confirmed with patient's nurse. Continue monitor lab work every 6 hours. Will change to 2K potassium bath. Okay to give +100 cc an hour. Currently on 200 blood flow rate and 1.5 L/h. Continue sodium bicarbonate drip 150 mEq at 125 cc an hour as prefilter. Stop normal saline IV fluids. Wean off pressor support as tolerated. Might require ID consult to help with antibiotics. Will Check Renal Ultrasound to r/o element of obstruction and to assess the kidney size/echotexture. Comprehensive urine testing including Urinalysis, Urine sodium, potassium, chloride, Urine protein and creatinine to quantify the proteinuria  Will Order serum and urine protein electrophoresis to r/o element to occult paraprotein disease  Will order Hepatitis B and C, SARITA, complement levels. Patient has total of 8 children, 1 . 5 out of 7 children were present today at bedside, I did discuss patient's care including the continued need for requiring dialysis currently and CVVHD was explained. Hemodialysis related risks (including SCD) and benefits were discussed and they were okay with continuing the procedure as required. Will follow. Nutrition   Please ensure that patient is on a renal diet/TF. Avoid nephrotoxic drugs/contrast exposure. Thank you for the consultation. Please do not hesitate to contact us for any further questions/concerns. Attending Physician Statement  I have discussed the care of Prashanth Garcia, including pertinent history and exam findings, with the Resident/CNP/medical student. I also saw and examined the patient myself. I have reviewed all the key elements of all parts of the encounter and edited all that was required. Antonino Wilson MD   Nephrology [address] [place] [address] Drive    This note is created with the assistance of best speech-recognition program. While intending to generate a document that actually reflects the content of the visit, no guarantees can be provided that every mistake has been identified and corrected by editing.

## 2022-08-26 NOTE — PLAN OF CARE
Problem: Safety - Medical Restraint  Goal: Remains free of injury from restraints (Restraint for Interference with Medical Device)  Description: INTERVENTIONS:  1. Determine that other, less restrictive measures have been tried or would not be effective before applying the restraint  2. Evaluate the patient's condition at the time of restraint application  3. Inform patient/family regarding the reason for restraint  4.  Q2H: Monitor safety, psychosocial status, comfort, nutrition and hydration  Outcome: Completed  Flowsheets (Taken 8/26/2022 0800)  Remains free of injury from restraints (restraint for interference with medical device): Every 2 hours: Monitor safety, psychosocial status, comfort, nutrition and hydration

## 2022-08-26 NOTE — PROGRESS NOTES
INTENSIVE CARE UNIT  Resident Physician Progress Note    Patient - Merary Arriaga  Date of Admission -  8/25/2022  2:22 PM  Date of Evaluation -  8/26/2022  Room and Bed Number -  3023/3023-01   Hospital Day - 1  Chief Complaint   Patient presents with    Altered Mental Status      SUBJECTIVE:   HISTORY OF PRESENT ILLNESS:    Merary Arriaga is a 74 y.o. female who initially was admitted on 8/25/2022  2:22 PM secondary to Septic shock ([facility] [address] [A41.9, R65.21]    Merary Arriaga is a 74 y.o. female with a PMH of COPD exacerbation on home oxygen 1 to 2 L, chronic diastolic CHF, recurrent DVT/PE, essential hypertension, tobacco use, GERD, BRAYAN and lung cancer presented to the ER with altered mental status and hypotension. According to EMS patient has been having back pain and went to another ER yesterday but did not want to stay there due to the wait. During evaluation in [facility] ER patient was found to be hypotensive with BP as low as 48/35 but not particularly tachycardic. She required IV fluid as well as Levophed as high as 60. Patient is confused but is able to move all her extremities. Around 3:50 PM her mental status improved and she was able to identify her son and was complaining of back pain. VBG shows pH of 7.3, PCO2 42, PO2 39.9 and HCO3 21.4     Blood labs showed  hypokalemia with potassium 2.8, BUN/creatinine elevated at 32/2.54 with low GFR of 19, elevated lactic acid at 5.7 and elevated troponin of 92. CBC showed leukocytosis with WBC 33.3, anemia with hemoglobin 8.7 and hematocrit 26.3 and normal platelet count of 126. Chest x-ray findings suggestive of cardiomegaly, congestive heart failure, pneumonia with pleural effusion and calcific atherosclerotic aorta. CT head with and without contrast shows no intracranial abnormality. While going to CT patient, patient's oxygen tank got emptied and she started desaturating. She had to be put back on nonrebreather mask. CT chest and abdomen showed probable pyelonephritis which is the likely cause of her septic shock. Of note patient had multiple pneumonias in the past, with a bacterial pneumonia in December 2019. She follows up with Dr. Emily Carreon for her left lower lobe lung adenocarcinoma. Patient had a surgery with left lower lobectomy on 7/19/2021. She had no further chemotherapy or radiotherapy. Later she was hospitalized again due to pneumonia. As per pulmonary she will be having repeated CT scan in September 2022. Patient was recently admitted to the hospital in April for left lower lobe and upper lobe multilobular pneumonia and was treated with Zosyn and vancomycin. Patient has been transferred to ICU for septic shock likely secondary to pyelonephritis. OVERNIGHT EVENTS:      AMS, tachypneic, worsening metabolic acidosis. Decision made to intubate patient. Worsening kidney function, nephrology consulted, plan to begin CRRT. FELTON andujar placed.     TODAY:     AWAKE & FOLLOWING COMMANDS: [x]   No  []   Yes                           SECRETIONS                 Amount:  []   Small [x]   Moderate []   Large []   None        Color: []   White [x]   Colored []   Bloody                         SEDATION:                 RAAS Score: [x]   +1 to -1 []   -2 []   -3 []   -4        Sedation Agent: []   Propofol gtt []   Versed gtt  []   Ativan gtt  []   Precedex gtt        Paralytic Agent: []   Norcuron []   Nimbex [x]   None                         VASOPRESSORS:  []   No  [x]   Yes            Vasopressor Agent [x] Levophed [] Dopamine [x] Vasopressin [] Dobutamine [] Phenylephrine [] Epinephrine                  BAKER [] No [] Yes                          CENTRAL LINE [] No [x] Yes                          ARTERIAL LINE [] No [x] Yes                            OBJECTIVE:   VITAL SIGNS:  Patient Vitals for the past 8 hrs:   BP Temp Temp src Pulse Resp SpO2   08/26/22 0845 -- -- -- 82 22 95 %   08/26/22 0830 -- -- peripheral pulses normal, no pedal edema, no clubbing or cyanosis    MEDICATIONS:  Scheduled Meds:   ipratropium-albuterol  1 ampule Inhalation Q4H While awake    fentanNYL  100 mcg IntraVENous Once    aspirin  324 mg Oral Once    sodium chloride flush  5-40 mL IntraVENous 2 times per day    pantoprazole (PROTONIX) 40 mg injection  40 mg IntraVENous Daily    piperacillin-tazobactam  3,375 mg IntraVENous Q8H    vancomycin (VANCOCIN) intermittent dosing (placeholder)   Other RX Placeholder    aspirin  81 mg Oral Daily    hydrocortisone sodium succinate PF  100 mg IntraVENous Q8H    midazolam  2 mg IntraVENous Once    fentanNYL  50 mcg IntraVENous Once       Continuous Infusions:   heparin (PORCINE) Infusion 18 Units/kg/hr (08/26/22 0829)    norepinephrine (LEVOPHED) infusion (double concentration) 95 mcg/min (08/26/22 0829)    CRRT dialysis builder      sodium chloride 10 mL/hr at 08/26/22 0829    vasopressin (Septic Shock) infusion 0.04 Units/min (08/26/22 0829)    phenylephrine (MAGDALENO-SYNEPHRINE) 50mg/250mL infusion Stopped (08/26/22 0502)    midazolam 2 mg/hr (08/26/22 0829)    fentaNYL 50 mcg/mL 50 mcg/hr (08/26/22 0829)    IV infusion builder 125 mL/hr at 08/26/22 0829       PRN Meds:   heparin (porcine), 80 Units/kg, PRN  heparin (porcine), 40 Units/kg, PRN  heparin (porcine), 1,600 Units, PRN  heparin (porcine), 1,500 Units, PRN  potassium chloride, 20 mEq, PRN  magnesium sulfate, 1,000 mg, PRN  calcium gluconate, 1,000 mg, PRN   Or  calcium gluconate, 2,000 mg, PRN   Or  calcium gluconate, 3,000 mg, PRN   Or  calcium gluconate, 4,000 mg, PRN  sodium phosphate IVPB, 6 mmol, PRN   Or  sodium phosphate IVPB, 12 mmol, PRN   Or  sodium phosphate IVPB, 18 mmol, PRN   Or  sodium phosphate IVPB, 24 mmol, PRN  sodium chloride flush, 5-40 mL, PRN  sodium chloride, , PRN  ondansetron, 4 mg, Q8H PRN   Or  ondansetron, 4 mg, Q6H PRN  polyethylene glycol, 17 g, Daily PRN  acetaminophen, 650 mg, Q6H PRN   Or  acetaminophen, 650 mg, Q6H PRN      SUPPORT DEVICES: [x] Ventilator [] BIPAP  [] Nasal Cannula [] Room Air  VENT SETTINGS (Comprehensive) (if applicable):  Mode: PRVC  TV: 440  RR: 24  PEEP: 10  FiO2: 80    ABGs:   Arterial Blood Gas result:  pH 7.04; pCO2 59.9; pO2 82.1; HCO3 16.2; BE 14; %O2 Sat 89.     DATA:  Complete Blood Count:   Recent Labs     08/25/22 1445 08/26/22 0223 08/26/22  0832   WBC 33.3* 30.0* PENDING   RBC 2.74* 2.57* 2.54*   HGB 8.7* 8.1* 8.0*   HCT 26.3* 25.8* 26.4*   MCV 96.0 100.4 103.9*   MCH 31.8 31.5 31.5   MCHC 33.1 31.4 30.3   RDW 19.6* 20.0* 20.3*    336 PENDING   MPV 8.9 9.5 PENDING        Last 3 Blood Glucose:   Recent Labs     08/25/22 1445 08/25/22 2245 08/26/22 0216 08/26/22  0832   GLUCOSE 115* 108* 107* 121*        PT/INR:    Lab Results   Component Value Date/Time    PROTIME 16.3 08/26/2022 02:16 AM    PROTIME 64.1 04/03/2015 01:32 PM    INR 1.6 08/26/2022 02:16 AM     PTT:    Lab Results   Component Value Date/Time    APTT 51.8 08/26/2022 02:16 AM       Basic Metabolic Profile:   Recent Labs     08/25/22 2245 08/26/22 0216 08/26/22  0832    136 137   K 4.7 5.2 5.4*    107 104   CO2 18* 17* 17*   BUN 37* 38* 43*   CREATININE 3.04* 2.98* 3.45*   GLUCOSE 108* 107* 121*   PHOS  --   --  9.8*       Liver Function:  Recent Labs     08/26/22 0216   PROT 8.7*   LABALBU 2.5*   ALT 17   AST 40*   ALKPHOS 49   BILITOT 0.50       Magnesium:   Lab Results   Component Value Date/Time    MG 1.8 08/26/2022 08:32 AM    MG 1.5 08/25/2022 10:45 PM    MG 1.7 04/22/2022 04:41 PM     Phosphorus:   Lab Results   Component Value Date/Time    PHOS 9.8 08/26/2022 08:32 AM    PHOS 3.0 08/28/2016 02:40 PM     Ionized Calcium:   Lab Results   Component Value Date/Time    CAION 0.98 08/26/2022 08:32 AM    CAION 1.32 12/23/2019 12:23 PM    CAION 4.85 05/03/2014 11:06 AM        Urinalysis:   Lab Results   Component Value Date/Time    NITRU NEGATIVE 08/25/2022 03:20 PM    COLORU Yellow 08/25/2022 03:20 PM    PHUR 6.0 08/25/2022 03:20 PM    WBCUA 2 TO 5 08/25/2022 03:20 PM    RBCUA 0 TO 2 08/25/2022 03:20 PM    MUCUS NOT REPORTED 10/15/2021 10:04 PM    TRICHOMONAS NOT REPORTED 10/15/2021 10:04 PM    YEAST NOT REPORTED 10/15/2021 10:04 PM    BACTERIA NOT REPORTED 10/15/2021 10:04 PM    SPECGRAV 1.013 08/25/2022 03:20 PM    LEUKOCYTESUR NEGATIVE 08/25/2022 03:20 PM    UROBILINOGEN Normal 08/25/2022 03:20 PM    BILIRUBINUR NEGATIVE 08/25/2022 03:20 PM    GLUCOSEU NEGATIVE 08/25/2022 03:20 PM    KETUA NEGATIVE 08/25/2022 03:20 PM    AMORPHOUS NOT REPORTED 10/15/2021 10:04 PM       HgBA1c:    Lab Results   Component Value Date/Time    LABA1C 6.0 12/23/2019 07:40 AM     TSH:    Lab Results   Component Value Date/Time    TSH 1.12 08/25/2022 02:45 PM     Lactic Acid:   Lab Results   Component Value Date/Time    LACTA 1.0 11/29/2021 04:05 PM      Troponin: No results for input(s): TROPONINI in the last 72 hours. Microbiology:  Blood Culture:  No components found for: CBLOOD, CFUNGUSBL    Radiology/Imaging:  XR CHEST PORTABLE   Final Result   1. Complete opacification of the left hemithorax. This may be due to mucous   plugging in resulting atelectasis. 2.  New left internal jugular line terminates at the expected level of the   distal SVC. 3.  Endotracheal tube remains in acceptable position. The enteric tube   courses off the field of view in the upper abdomen. XR CHEST (SINGLE VIEW FRONTAL)   Final Result   1. Endotracheal tube tip 4 cm above the babatunde. 2. Unchanged extensive airspace disease in the left lung. 3. NG tube tip overlies the left lateral abdomen, most likely in the gastric   body. XR ABDOMEN FOR NG/OG/NE TUBE PLACEMENT   Final Result   1. Endotracheal tube tip 4 cm above the babatunde. 2. Unchanged extensive airspace disease in the left lung. 3. NG tube tip overlies the left lateral abdomen, most likely in the gastric   body.          XR CHEST PORTABLE   Final Result 1.  No evident pneumothorax related to the attempted central line placement. 2.   Increased airspace consolidation in the left lung and stable opacity at   the right lung base. CT CHEST ABDOMEN PELVIS W CONTRAST   Final Result   1. Probable pyelonephritis. The kidneys are enlarged, demonstrate   heterogeneous enhancement and there are prominent adjacent inflammatory   changes. 2. Occlusion of the airways in the left lower lobe which could be related to   mucous plugging and volume loss and airspace consolidation throughout the   left lower lobe which could reflect atelectasis or pneumonia. 3. Stable left apical lung nodule measuring as much as 2.5 cm. There are   multiple new small patchy and nodular opacities elsewhere in the upper lobes   measuring as much as 11 mm which could be related to infection. Metastatic   disease is also consideration. 4. Cardiomegaly. 5. Hepatomegaly. 6. Colonic diverticulosis. 7. Cholelithiasis. CT HEAD WO CONTRAST   Final Result   1. No acute intracranial abnormality. 2. Less than optimal timing of contrast bolus within the intracranial   arterial vascular. .  Otherwise, no definite negative for large vessel   occlusion or hemodynamic stenosis. 3. Scattered parenchymal opacity involving lungs with evidence adenopathy. Please correlate with CT chest report. CTA HEAD NECK W CONTRAST   Final Result   1. No acute intracranial abnormality. 2. Less than optimal timing of contrast bolus within the intracranial   arterial vascular. .  Otherwise, no definite negative for large vessel   occlusion or hemodynamic stenosis. 3. Scattered parenchymal opacity involving lungs with evidence adenopathy. Please correlate with CT chest report. XR CHEST PORTABLE   Final Result   1. Congestive heart failure is most likely given the radiographic findings;   pneumonia is also a consideration in areas of consolidation with pleural   effusion.    2. Calcific atherosclerosis aorta. 3. Cardiomegaly. 4.  Pulmonary sequela typical of that seen with smoking, including COPD.              ASSESSMENT:     Patient Active Problem List    Diagnosis Date Noted    Septic shock ([address] 08/25/2022    Hypokalemia 08/25/2022    QT prolongation 08/25/2022    Pyelonephritis 08/25/2022    Lactic acidosis 08/25/2022    Nodule of upper lobe of left lung 05/20/2022    VIJAYA (generalized anxiety disorder) 05/12/2022    BRAYAN (obstructive sleep apnea) 04/23/2022    Multifocal pneumonia 04/22/2022    Acute on chronic respiratory failure with hypoxia ([address] 04/22/2022    Chronic respiratory failure with hypoxia ([address] 02/09/2022    COPD (chronic obstructive pulmonary disease) ([address] 10/31/2021    ASHLEIGH (acute kidney injury) ([address] 72/12/8404    Diastolic heart failure ([address] 10/31/2021    Chronic rhinitis 10/28/2021    Anemia 10/15/2021    Leucocytosis 10/15/2021    Mixed hyperlipidemia 10/04/2021    Moderate malnutrition ([address] 09/20/2021    History of adenocarcinoma of lung 09/20/2021    S/P lobectomy of lung 07/19/2021    History of pulmonary embolism 05/27/2021    On anticoagulant therapy 05/27/2021    On methotrexate therapy 05/27/2021    Primary mucinous adenocarcinoma of lung ([address] 05/14/2021    Adenocarcinoma, lung, left ([address] 05/14/2021    Encounter for smoking cessation counseling 04/23/2021    Nodule of lower lobe of left lung 04/23/2021    Abnormal CT of the chest 04/21/2021    Recurrent acute deep vein thrombosis (DVT) of both lower extremities ([address] 04/16/2020    Positive colorectal cancer screening using Cologuard test 02/01/2020    Thyroid nodule 01/09/2020    History of thyroid nodule 12/23/2019    COPD exacerbation ([address] 08/14/2019    Major depressive disorder, recurrent, moderate ([address] 11/12/2018    Prediabetes 11/12/2018    Tobacco abuse 11/12/2018    History of DVT in adulthood 11/12/2018    Rheumatoid arthritis ([facility] [address] 07/14/2014    Primary hypertension 07/14/2014    GERD (gastroesophageal reflux disease) 2013        PLAN:      WEAN PER PROTOCOL:  []   No  [x]   Yes []   N/A                         ICU PROPHYLAXIS:                Stress ulcer:  [x]   PPI Agent []   W8Daoep []   Sucralfate []   Other []   None      VTE:  []   Enoxaparin []   SQ Heparin []   EPC Cuffs [x]  Other [] Contraindicated                     NUTRITION:   [x]  NPO []   TF:  []   TPN []   PO                       HOME MEDS RECONCILED:  []   No  [x]   Yes                           CONSULTATION NEEDED:  []   No  [x]   Yes                           FAMILY UPDATED:  []   No  [x]   Yes                           TRANSFER OUT OF ICU:  [x]   No  []   Yes             PLAN/MEDICAL DECISION MAKING:  Neurologic:   Neuro checks per protocol  Sedation: Fentanyl @ 50, versed @ 2  Pain management: Fent gtt  Cardiovascular:  HR: 81-92  MAPs: 67-76  MAP goal >65  Levo @ 85, Vaso @ 0.04  Was on Kartik but after 1L bolus, was able to wean  Trop 92 > 90 > 207; suspect 2/2 Arf  Pulmonary:  CXR showing complete opacification of left hemithorax  Intubated overnight due to AMS, worsening acidosis with hypercapnia  Maintain oxygen sats >92%  Pulmonary toilet  Vent Settings: Mode PRVC RR 24  PEEP 10 FiO2 80  ABG: pH 7.094 pCO2 53.1 pO2  90.9  Probable bronchoscopy today  GI/Nutrition  Bowel regimen:  Ulcer Prophylaxis: PPI   Diet:Diet NPO    Renal/Fluid/Electrolyte  IV Fluids: NS @ 100cc/hr   I/O: In: 4911.5 [I.V.:2351.5]  Out: 1069 [Urine:369]  UOP: 0.3 cc/kg/hr overnight   BUN/Cr: 43/3.45 (38/2.98)  Worsening renal function, acidosis- nephrology consulted, plan for CRRT  Appreciate nephrology recommendations  Monitor electrolytes, replace PRN   ID    WBC 34.9 (30.0)  Tmax: Temp (24hrs), Av.5 °F (37.5 °C), Min:98.1 °F (36.7 °C), Max:100.4 °F (38 °C)  Blood culture positive for pseudomonas   Antimicrobials: Zosyn day 1 of 7; Cefepime day 1 of 7  Dc/d vanco today; added on cefepime  Solucortef initiated   ID consulted  Hematology:  H/H: 8.0/26.4 (8.1/25. 8)  Endocrine:   Glucose 121  DVT Prophylaxis  EPC Cuffs  Hep gtt    Discharge Needs:  PT, OT, ST, SW, and Case Management      CODE STATUS: Full Code    DISPOSITION:  [x] To remain ICU:   [] OK for out of ICU from [address] Dallas Nelson MD  Emergency Medicine Resident, PGY3  Critical Care Service   08/26/22 9:27 AM   Attending Physician Statement  I have discussed the care of Johnathan Ontiveros, including pertinent history and exam findings,  with the resident. I have seen and examined the patient and the key elements of all parts of the encounter have been performed by me. I agree with the assessment, plan and orders as documented by the resident with additions . Severe sepsis with septic shock on multiple pressors due to Pseudomonas sepsis leading to multiorgan failure  Pyelonephritis, left upper lobe pneumonia  Acute hypoxic and hypercapnic respiratory failure  ASHLEIGH, hyperkalemia, anion gap metabolic acidosis on CVVHD  Acute metabolic encephalopathy  Type II MI  Left lung atelectasis-bronchoscopy performed at bedside after obtaining informed consent from the family. It showed mucoid mucous plugging in the left mid mainstem which was suctioned and sent for culture. History of left lower lobe lobectomy for lung cancer in 2021  History of recurrent DVT and PE  Plan  Continue antibiotics cefepime. Ventilator settings adjusted and repeat gases to be done serially. Continue CVVHD and bicarb. Bicarb push  Continue vasopressors keep MAP greater than 65. Follow-up cultures.   Follow cardiology recommendation  Patient is critically ill, prognosis guarded, family updated in detail    Total critical care time caring for this patient with life threatening, unstable organ failure, including direct patient contact, management of life support systems, review of data including imaging and labs, discussions with other team members and physicians at least 48 Min so far today, excluding procedures. Electronically signed by Vik Oseguera MD on   8/26/22 at 6:18 PM EDT    Please note that this chart was generated using voice recognition Dragon dictation software. Although every effort was made to ensure the accuracy of this automated transcription, some errors in transcription may have occurred.

## 2022-08-26 NOTE — PROGRESS NOTES
Comprehensive Nutrition Assessment    Type and Reason for Visit:  Initial, vent     Nutrition Recommendations/Plan:   Start nutrition as able; if TF needed, suggest Renal formula goal 40 mL/hr to provide 1728 kcal and 78 g pro/day. Will follow/montior care plans. Malnutrition Assessment:  Malnutrition Status:  Insufficient data (08/26/22 1411)      Nutrition Assessment:    Pt admitted with altered mentation,septic shock 2/2 bilateral hydronephrosis. PMH includes: COPD, pneumonia, adenocarcinoma of the left lower lung. Pt is currently intubated, on CRRT. No nutrition support at present. Labs reviewed: hyperkalemia and hyperphosphatemia noted. Nutrition Related Findings:    meds/labs reviewed Wound Type: None       Current Nutrition Intake & Therapies:    Diet NPO    Additional Calorie Sources:  sodium bicarbonate 150 mEq in dextrose 5% at 125 mL/hr =510 kcal/day    Anthropometric Measures:  Height: 5' 5\" (165.1 cm)  Ideal Body Weight (IBW): 125 lbs (57 kg)       Current Body Weight: 178 lb 9.2 oz (81 kg), 142.9 % IBW. Weight Source: Not Specified  Current BMI (kg/m2): 29.7  Usual Body Weight: 180 lb (81.6 kg) (5/2022)  % Weight Change (Calculated): -0.8                    BMI Categories: Overweight (BMI 25.0-29. 9)    Estimated Daily Nutrient Needs:  Energy Requirements Based On: Kcal/kg  Weight Used for Energy Requirements: Current  Energy (kcal/day): 1800 kcal/day  Weight Used for Protein Requirements: Ideal  Protein (g/day): 85 g pro/day  Method Used for Fluid Requirements: Other (Comment)  Fluid (ml/day): per MD    Nutrition Diagnosis:   Inadequate oral intake related to impaired respiratory function as evidenced by NPO or clear liquid status due to medical condition    Nutrition Interventions:   Food and/or Nutrient Delivery:Start nutrition as able; if TF needed, suggest Renal formula goal 40 mL/hr to provide 1728 kcal and 78 g pro/day.   Nutrition Education/Counseling: No recommendation at this time  Coordination of Nutrition Care: Continue to monitor while inpatient       Goals:     Goals: Meet at least 75% of estimated needs       Nutrition Monitoring and Evaluation:   Behavioral-Environmental Outcomes: None Identified  Food/Nutrient Intake Outcomes: Diet Advancement/Tolerance  Physical Signs/Symptoms Outcomes: Biochemical Data, Fluid Status or Edema, Nutrition Focused Physical Findings, Weight, Skin    Discharge Planning:     Too soon to determine     [address], LD  Contact: [phone]

## 2022-08-27 NOTE — PROGRESS NOTES
[address] [facility] [address]  PROGRESS NOTE    Shift date: 08/26/22  Shift day: Friday   Shift # 2    Room # 3023/3023-01   Name: Guillermo Hu                Confucianist:    Place of Orthodoxy:     Referral:  Nurse    Admit Date & Time: 8/25/2022  2:22 PM    Assessment:  Guillermo Hu is a 74 y.o. female    Intervention:  RN spoke with  regarding family care.  brought back courtesy cart for family per RN request.    Outcome:  RN expressed gratitude for bringing cart for family. Plan:  Chaplains will remain available to offer spiritual and emotional support as needed.       Electronically signed by [place] Manuel, on 8/26/2022 at 9:49 PM.  [facility]  [phone]

## 2022-08-27 NOTE — PLAN OF CARE
Discussed with family about CODE STATUS change. Informed them that patient has septic shock with multiorgan failure. She consistently has very acidotic pH and her blood gases despite CRRT, bicarb drip, multiple bicarb pushes. Family wishes to remain full code. It was also noted that patient had unequal pupils. Patient is not stable enough to go down for CT head at this time. We will shut off the heparin drip.       Melodie Wong MD  Internal medicine, PGY-2  [facility], [address]   [unfilled] 9:34 PM

## 2022-08-27 NOTE — SIGNIFICANT EVENT
I had a long discussion with the patient's family in person, in presence of the RN taking care of the patient. Patient's current clinical condition, laboratory and radiographic findings as well as recommendations of physicians consulted on the case were discussed with the patient's family in detail in simple [place]. All questions and concerns of the family were addressed, and appropriate emotional support was provided. After understanding patient's current medical condition, family decided that they wanted to continue the ongoing treatment but in case patient's heart stops (cardiac arrest) or the patient stops breathing (respiratory arrest), they do not want any chest compressions, insertion of a breathing tube down patient's throat for respiratory support or other similar  resuscitative measures to be performed on the patient. They requested that if such a condition arises, the patient should be made comfortable and nature should be allowed to take its course. They asked that patient's code status should be changed to [facility] (no intubation), and signed the [facility] order form in my presence, which was witnessed by RN, Gisela Lafleur . Will honor family's wishes, and will change patient's code status to [facility] (no intubation). Mary Bolaños MD, M.D.   Pager: [phone]  Department of Internal Medicine,  [facility])             8/27/2022, 12:04 AM

## 2022-08-27 NOTE — PROGRESS NOTES
Patient extubated per physician order. Patient extubated in usual fashion. Patient placed on room air.      Herman Nelson RCP   5:52 AM

## 2022-08-27 NOTE — PLAN OF CARE
Problem: Discharge Planning  Goal: Discharge to home or other facility with appropriate resources  Outcome: Not Progressing     Problem: Respiratory - Adult  Goal: Achieves optimal ventilation and oxygenation  8/27/2022 0150 by Elton Rogers RN  Outcome: Not Progressing  8/26/2022 2007 by Joanna Lindsay RCP  Outcome: [place]  Taken 8/26/2022 2007 by Joanna Lindsay RCP  Achieves optimal ventilation and oxygenation:   Assess for changes in respiratory status   Assess for changes in mentation and behavior   Position to facilitate oxygenation and minimize respiratory effort   Oxygen supplementation based on oxygen saturation or arterial blood gases   Assess the need for suctioning and aspirate as needed   Respiratory therapy support as indicated  Taken 8/26/2022 2000 by Elton Rogers RN  Achieves optimal ventilation and oxygenation: Assess for changes in respiratory status     Problem: Nutrition Deficit:  Goal: Optimize nutritional status  Outcome: Not Progressing     Problem: Skin/Tissue Integrity  Goal: Absence of new skin breakdown  Description: 1. Monitor for areas of redness and/or skin breakdown  2. Assess vascular access sites hourly  3. Every 4-6 hours minimum:  Change oxygen saturation probe site  4. Every 4-6 hours:  If on nasal continuous positive airway pressure, respiratory therapy assess nares and determine need for appliance change or resting period.   Outcome: Not Progressing     Problem: ABCDS Injury Assessment  Goal: Absence of physical injury  Outcome: Not Progressing     Problem: Discharge Planning  Goal: Discharge to home or other facility with appropriate resources  Outcome: Not Progressing     Problem: Respiratory - Adult  Goal: Achieves optimal ventilation and oxygenation  8/27/2022 0150 by Elton Rogers RN  Outcome: Not Progressing  8/26/2022 2007 by Joanna Lindsay RCP  Outcome: Progressing  Flowsheets  Taken 8/26/2022 2007 by Joanna Lindsay RCP  Achieves optimal ventilation and oxygenation:   Assess for changes in respiratory status   Assess for changes in mentation and behavior   Position to facilitate oxygenation and minimize respiratory effort   Oxygen supplementation based on oxygen saturation or arterial blood gases   Assess the need for suctioning and aspirate as needed   Respiratory therapy support as indicated  Taken 8/26/2022 2000 by Yaquelin Celis RN  Achieves optimal ventilation and oxygenation: Assess for changes in respiratory status     Problem: Nutrition Deficit:  Goal: Optimize nutritional status  Outcome: Not Progressing     Problem: Skin/Tissue Integrity  Goal: Absence of new skin breakdown  Description: 1. Monitor for areas of redness and/or skin breakdown  2. Assess vascular access sites hourly  3. Every 4-6 hours minimum:  Change oxygen saturation probe site  4. Every 4-6 hours:  If on nasal continuous positive airway pressure, respiratory therapy assess nares and determine need for appliance change or resting period.   Outcome: Not Progressing     Problem: ABCDS Injury Assessment  Goal: Absence of physical injury  Outcome: Not Progressing

## 2022-08-27 NOTE — PLAN OF CARE
I had a long discussion with the patient's family in person, in presence of the RN taking care of the patient. Patient's current clinical condition, laboratory and radiographic findings as well as recommendations of physicians consulted on the case were discussed with the patient's family in detail. All questions and concerns of family were addressed, and appropriate emotional support was provided. After understanding patient's current medical condition, family decided that did not want any lab draws or treatments aimed at prolonging the life of patient, at the cost of patient's overall comfort. They expressed their wishes to make the patient comfortable, stop all lab draws and not to do any resuscitative procedures on the patient. They requested patient's code status to be changed to [address], and signed the [address] order form in my presence, which was witnessed by RN, Emilio Funez. Will honor family's wishes, and will change patient's code status to [address]. Rosa Ly MD, M.D.   Department of Internal Medicine,  hospitals)             8/27/2022, 4:48 AM

## 2022-08-27 NOTE — PROGRESS NOTES
Cardiology will sign off as patient is DRN-CC now.        Daniele Hopkins MD. PGY- 4  Fellow, cardiovascular disease   [facility], [place], [place]  8/27/2022, 5:25 AM

## 2022-08-28 LAB
CULTURE: ABNORMAL
DIRECT EXAM: ABNORMAL
GLUCOSE BLD-MCNC: 149 MG/DL (ref 65–105)
GLUCOSE BLD-MCNC: 38 MG/DL (ref 65–105)
GLUCOSE BLD-MCNC: 86 MG/DL (ref 65–105)
SPECIMEN DESCRIPTION: ABNORMAL
SPECIMEN DESCRIPTION: ABNORMAL

## 2022-08-29 ENCOUNTER — TELEPHONE (OUTPATIENT)
Dept: PULMONOLOGY | Age: 67
End: 2022-08-29

## 2022-08-29 LAB
ACTION: NORMAL
ALBUMIN (CALCULATED): 2.6 G/DL (ref 3.2–5.2)
ALBUMIN PERCENT: 30 % (ref 45–65)
ALLEN TEST: ABNORMAL
ALPHA 1 PERCENT: 2 % (ref 3–6)
ALPHA 2 PERCENT: 6 % (ref 6–13)
ALPHA-1-GLOBULIN: 0.2 G/DL (ref 0.1–0.4)
ALPHA-2-GLOBULIN: 0.5 G/DL (ref 0.5–0.9)
ANTI DNA DOUBLE STRANDED: 1.2 IU/ML
ANTI-NUCLEAR ANTIBODY (ANA): NEGATIVE
BETA GLOBULIN: 0.5 G/DL (ref 0.5–1.1)
BETA PERCENT: 6 % (ref 11–19)
DATE AND TIME: NORMAL
ENA ANTIBODIES SCREEN: 0.4 U/ML
FIO2: 100
FIO2: 80
FIO2: 80
GAMMA GLOBULIN %: 55 % (ref 9–20)
GAMMA GLOBULIN: 4.8 G/DL (ref 0.5–1.5)
GLUCOSE BLD-MCNC: 43 MG/DL (ref 74–100)
GLUCOSE BLD-MCNC: 44 MG/DL (ref 74–100)
MODE: ABNORMAL
MODE: ABNORMAL
NEGATIVE BASE EXCESS, ART: 20 (ref 0–2)
NEGATIVE BASE EXCESS, ART: 22 (ref 0–2)
NEGATIVE BASE EXCESS, ART: 23 (ref 0–2)
NOTIFY: NORMAL
O2 DEVICE/FLOW/%: ABNORMAL
O2 DEVICE/FLOW/%: ABNORMAL
PATHOLOGIST: ABNORMAL
PATHOLOGIST: NORMAL
PATIENT TEMP: 35.5
PATIENT TEMP: 38.1
POC HCO3: 12.7 MMOL/L (ref 21–28)
POC HCO3: 8.2 MMOL/L (ref 21–28)
POC HCO3: 9.7 MMOL/L (ref 21–28)
POC LACTIC ACID: 12.7 MMOL/L (ref 0.56–1.39)
POC O2 SATURATION: 78 % (ref 94–98)
POC O2 SATURATION: 81 % (ref 94–98)
POC O2 SATURATION: 97 % (ref 94–98)
POC PCO2 TEMP: 45 MM HG
POC PCO2 TEMP: 49 MM HG
POC PCO2: 42.9 MM HG (ref 35–48)
POC PCO2: 52.3 MM HG (ref 35–48)
POC PCO2: 67.4 MM HG (ref 35–48)
POC PH TEMP: 6.88
POC PH TEMP: 6.9
POC PH: 6.88 (ref 7.35–7.45)
POC PH: 6.88 (ref 7.35–7.45)
POC PH: 6.89 (ref 7.35–7.45)
POC PO2 TEMP: 65 MM HG
POC PO2 TEMP: 81 MM HG
POC PO2: 161.4 MM HG (ref 83–108)
POC PO2: 71.8 MM HG (ref 83–108)
POC PO2: 75.5 MM HG (ref 83–108)
PROTEIN ELECTROPHORESIS, SERUM: ABNORMAL
READ BACK: YES
SAMPLE SITE: ABNORMAL
SERUM IFX INTERP: NORMAL
TOTAL PROT. SUM,%: 99 % (ref 98–102)
TOTAL PROT. SUM: 8.6 G/DL (ref 6.3–8.2)
TOTAL PROTEIN: 8.6 G/DL (ref 6.4–8.3)

## 2022-08-29 NOTE — TELEPHONE ENCOUNTER
Heydi from [address] called to see if you will sign the death certificate for this pt.  Please advise, thank you

## 2022-08-30 LAB
P E INTERPRETATION, U: NORMAL
PATHOLOGIST: NORMAL
SPECIMEN TYPE: NORMAL
URINE IFX INTERP: NORMAL
URINE IFX SPECIMEN: NORMAL
URINE TOTAL PROTEIN: 429 MG/DL
URINE TOTAL PROTEIN: 429 MG/DL
VOLUME: NORMAL ML

## 2022-09-15 NOTE — DISCHARGE SUMMARY
[facility]     Department of Internal Medicine - Critical Care Service    INPATIENT DEATH SUMMARY      PATIENT IDENTIFICATION:  NAME:  Lesvia Quinonez   :   1955  MRN:    [id number]     Acct:    [de-identified]   Admit Date:  2022  Discharge date:  2022  8:37 AM   Attending Provider: No att. providers found                                     Principal Problem:    Septic shock ([facility] [address]  Active Problems:    Hypokalemia    QT prolongation    Pyelonephritis    Metabolic acidosis    Acute respiratory failure with hypoxia and hypercapnia (HCC)    Hyperkalemia    Sepsis due to Pseudomonas species with acute renal failure and septic shock (HCC)    Pseudomonas septicemia ([facility] [address]    Primary mucinous adenocarcinoma of lung ([facility] [address]    History of lung cancer    ASHLEIGH (acute kidney injury) ([facility] [address]  Resolved Problems:    * No resolved hospital problems. *       REASON FOR HOSPITALIZATION:   Chief Complaint   Patient presents with    Altered Mental [address] is a 74 y.o. female with a PMH of COPD exacerbation on home oxygen 1 to 2 L, chronic diastolic CHF, recurrent DVT/PE, essential hypertension, tobacco use, GERD, BRAYAN and lung cancer presented to the ER with altered mental status and hypotension. During evaluation in [facility] ER patient was found to be hypotensive with BP as low as 48/35 but not particularly tachycardic. She required IV fluid as well as Levophed as high as 60. Patient is confused but is able to move all her extremities. Around 3:50 PM her mental status improved and she was able to identify her son and was complaining of back pain. VBG shows pH of 7.3, PCO2 42, PO2 39.9 and HCO3 21.4  Blood labs show hypokalemia with potassium 2.8, BUN/creatinine elevated at 32/2.54 with low GFR of 19, elevated lactic acid at 5.7 and elevated troponin of 92.   CBC shows leukocytosis with WBC 33.3, anemia with hemoglobin 8.7 and hematocrit 26.3 and normal platelet count of 508. Chest x-ray findings suggestive of cardiomegaly, congestive heart failure, pneumonia with pleural effusion and calcific atherosclerotic aorta. CT head with and without contrast shows no intracranial abnormality. While going to CT patient, patient's oxygen tank got emptied and she started desaturating. She had to be put back on nonrebreather mask. CT chest and abdomen showed probable pyelonephritis which is the likely cause of her septic shock. Of note patient had multiple pneumonias in the past, with a bacterial pneumonia in 2019. She follows up with Dr. Gustavo Morales for her left lower lobe lung adenocarcinoma. Patient had a surgery with left lower lobectomy on 2021. She had no further chemotherapy or radiotherapy. Later she was hospitalized again due to pneumonia. As per pulmonary she will be having repeated CT scan in 2022. Patient was recently admitted to the hospital in April for left lower lobe and upper lobe multilobular pneumonia and was treated with Zosyn and vancomycin. Patient has been transferred to ICU for septic shock likely secondary to pyelonephritis. Patient was intubated and sedated. Nephro consulted and was placed on CRRT. Patient remained acidotic ON.  Eventually after dicussion with family patient was made [facility] and passed away on 2022  Consults:   nephrology and ID and cardio    Procedures:  CVC, artline, intubation and con cath     Any Hospital Acquired Infections: no    PATIENT'S DISCHARGE CONDITION:        Disposition: Alvarado Anderson MD  Internal Medicine Resident  [facility]  9/15/2022 2:12 PM

## 2024-03-29 NOTE — TELEPHONE ENCOUNTER
Please Approve or Refuse.   Send to Pharmacy per Pt's Request:     [facility]     Next Visit Date:  Visit date not found   Last Visit Date: Visit date not found    Hemoglobin A1C (%)   Date Value   06/12/2019 5.7   11/12/2018 5.9   03/19/2014 5.9             ( goal A1C is < 7)   BP Readings from Last 3 Encounters:   08/14/19 (!) 171/89   06/12/19 132/71   11/12/18 138/84          (goal 120/80)  BUN   Date Value Ref Range Status   11/18/2016 19 6 - 20 mg/dL Final     CREATININE   Date Value Ref Range Status   04/11/2017 0.57 0.50 - 0.90 mg/dL Final     Potassium   Date Value Ref Range Status   11/18/2016 3.9 3.7 - 5.3 mmol/L Final
[Negative] : Heme/Lymph

## 2024-11-29 NOTE — PROGRESS NOTES
[facility] [address]   Patient Death Note  DEATH   Shift date: 2022    Shift day: Friday  Shift #: 3                 Room # 3023/3023-01   Name: Johnathan Ontiveros            Age: 74 y.o. Gender: female          [address]: [address] [place], [place] [address]ud of Zoroastrianism: Unknown  Admit Date & Time: 2022  2:22 PM     Referral: Code Status Change   Actual date of death: 2022   TOD: 2927       SITUATION AT DEATH:  Per report, patient's family changed her code status to [facility]. \" Patient's time of death was declared by Dr. Colleen Siddiqi at 5286. IS THIS A 'S CASE? No    SPIRITUAL/EMOTIONAL INTERVENTION:   responded to page with request to provide support to family at end of life.  arrived to room and provided support to patient's children.  learned about patient's rose with illness as well as her love of family.  provided comfort, support, and prayer in room.  also laid prayer blanket on patient's lap, prior to code status change and terminal extubation. Patient's children expressed deep grief, sadness and anger over patient's death.  provided care and support to them.  shared information regarding cremation locations, as patient had requested to family that she be cremated. Family Received Grief Packet? Yes,  will mail sympathy card and grief packet to family. NAME AND PHONE NUMBER OF DOCTOR SIGNING DEATH CERTIFICATE:  Per bedside nurse, Dr. Nelia Gordon ([phone])     spoke to/left message for N/A ( home) indicating family's choice for their services.  called  home on N/A. Copy of COMPLETED Release of Body Form Received? Yes    Patient's belongings: N/A     HOME:  To be Determined.     NEXT OF KIN:  Name: Ronald Boyle  Relationship: Alfie  Street Address: [address] [place] [address] Street: [place]  Zip code: [address]   Phone Number: [phone] & [phone]    Name: Carlos Long Aubrie  Relationship: Daughter  Phone Number: [phone]    ANY FOLLOW-UP NEEDED? Yes    IF SO, WHAT? Patient's family will contact the Spiritual Care office regarding final arrangements. 08/27/22 0501   Encounter Summary   Service Provided For: Family   Referral/Consult From: Nurse;Family   Support System Children   Last Encounter  08/26/22   Complexity of Encounter High   Begin Time 0501   End Time  0632   Total Time Calculated 91 min   Spiritual/Emotional needs   Type Spiritual Support   Grief, Loss, and Adjustments   Type End of Life;Grief and loss;Death   Assessment/Intervention/Outcome   Assessment Angry; Despair;Sad;Tearful   Intervention Active listening;Discussed death, afterlife; Discussed illness injury and its impact; Discussed meaning/purpose;End of Life Care;Grief Care;Prayer (assurance of)/[place];Sustaining Presence/Ministry of presence   Outcome Comfort;Engaged in conversation;Grieving;Receptive   Plan and Referrals   Plan/Referrals Continue to visit, (comment)  (as needed)     Electronically signed by Arleen Chun on 8/27/2022 at 9:10 AM.  Ruddy Whitney  [phone] room air

## (undated) DEVICE — FORCEPS BX L240CM WRK CHN 2.8MM STD CAP W/ NDL MIC MESH

## (undated) DEVICE — GOWN,SIRUS,NONRNF,SETINSLV,XL,20/CS: Brand: MEDLINE

## (undated) DEVICE — SOLUTION IV IRRIG POUR BRL 0.9% SODIUM CHL 2F7124

## (undated) DEVICE — TRAP SPEC 40CC MUCUS OPN SUCT

## (undated) DEVICE — CANNULA SEAL

## (undated) DEVICE — RELOAD STPL H2X4.7XL30MM THCK TISS GRN B FRM AUTO RET PIN

## (undated) DEVICE — SUTURE VCRL + SZ 4-0 L18IN ABSRB UD L19MM PS-2 3/8 CIR PRIM VCP496H

## (undated) DEVICE — SNARE ENDOSCP L240CM LOOP W13MM DIA2.4MM SHT THROW SM OVL

## (undated) DEVICE — SUTURE VCRL 1 L27IN ABSRB VLT TP-1 L65MM 1/2 CIR TAPERPOINT J650G

## (undated) DEVICE — TOTAL TRAY, 16FR 10ML SIL FOLEY, URN: Brand: MEDLINE

## (undated) DEVICE — DRESSING TRNSPAR W2XL2.75IN FLM SHT SEMIPERMEABLE WIND

## (undated) DEVICE — GLOVE SURG SZ 8 L11.77IN FNGR THK9.8MIL STRW LTX POLYMER

## (undated) DEVICE — GLOVE SURG SZ 65 THK91MIL LTX FREE SYN POLYISOPRENE

## (undated) DEVICE — STAPLER INT L44CM 12 FIRING B FRM PWR + W/ GRIPPING SURF

## (undated) DEVICE — BITEBLOCK 54FR W/ DENT RIM BLOX

## (undated) DEVICE — INTENDED FOR TISSUE SEPARATION, AND OTHER PROCEDURES THAT REQUIRE A SHARP SURGICAL BLADE TO PUNCTURE OR CUT.: Brand: BARD-PARKER ® CARBON RIB-BACK BLADES

## (undated) DEVICE — BLADE SCALPEL NO 15 STERILE

## (undated) DEVICE — SUTURE COAT VCRL SZ 4-0 L18IN ABSRB UD L19MM PS-2 1/2 CIR J496G

## (undated) DEVICE — TOWEL,OR,DSP,ST,NATURAL,DLX,4/PK,20PK/CS: Brand: MEDLINE

## (undated) DEVICE — SEAL

## (undated) DEVICE — SYRINGE IRRIG 60ML SFT PLIABLE BLB EZ TO GRP 1 HND USE W/

## (undated) DEVICE — AGENT HEMSTAT W6XL9IN OXIDIZED REGENERATED CELOS ABSRB FOR

## (undated) DEVICE — RELOAD STPL L45MM H2-4.1MM THCK TISS GRN GRIPPING SURF B

## (undated) DEVICE — TROCAR: Brand: KII FIOS FIRST ENTRY

## (undated) DEVICE — GOWN,AURORA,NONREINFORCED,LARGE: Brand: MEDLINE

## (undated) DEVICE — GLOVE ORANGE PI 7   MSG9070

## (undated) DEVICE — Device

## (undated) DEVICE — CONTAINER,SPECIMEN,4OZ,OR STRL: Brand: MEDLINE

## (undated) DEVICE — CATHETER THOR 36FR L23CM DIA12MM POLYVI CHL TAPR CONN TIP

## (undated) DEVICE — PAD N ADH W3XL4IN POLY COT SFT PERF FLM EASILY CUT ABSRB

## (undated) DEVICE — SUTURE PROL SZ 4-0 L36IN NONABSORBABLE BLU L26MM SH 1/2 CIR 8521H

## (undated) DEVICE — SOLUTION ANTIFOG VIS SYS CLEARIFY LAPSCP

## (undated) DEVICE — CONNECTOR TBNG WHT PLAS SUCT STR 5IN1 LTWT W/ M CONN

## (undated) DEVICE — CONNECTOR TBNG Y 6IN 1 PLAS LTWT

## (undated) DEVICE — TUBING, SUCTION, 9/32" X 20', STRAIGHT: Brand: MEDLINE INDUSTRIES, INC.

## (undated) DEVICE — SYRINGE MED 50ML LUERSLIP TIP

## (undated) DEVICE — BARD® PTFE FELT, 2.5 CM X 15.2 CM: Brand: BARD® PTFE FELT

## (undated) DEVICE — GOWN,POLY REINFORCED,LG: Brand: MEDLINE

## (undated) DEVICE — DEFENDO AIR WATER SUCTION AND BIOPSY VALVE KIT FOR  OLYMPUS: Brand: DEFENDO AIR/WATER/SUCTION AND BIOPSY VALVE

## (undated) DEVICE — SPONGE LAP W18XL18IN WHT COT 4 PLY FLD STRUNG RADPQ DISP ST

## (undated) DEVICE — DRAPE,REIN 53X77,STERILE: Brand: MEDLINE

## (undated) DEVICE — STAPLER 30 RELOAD WHITE: Brand: ENDOWRIST

## (undated) DEVICE — ST CHARLES BRONCHOSCOPY PACK: Brand: MEDLINE INDUSTRIES, INC.

## (undated) DEVICE — SUTURE NONABSORBABLE MONOFILAMENT 4-0 RB-1 36 IN BLU PROLENE 8557H

## (undated) DEVICE — ELECTRO LUBE IS A SINGLE PATIENT USE DEVICE THAT IS INTENDED TO BE USED ON ELECTROSURGICAL ELECTRODES TO REDUCE STICKING.: Brand: KEY SURGICAL ELECTRO LUBE

## (undated) DEVICE — SUTURE VCRL + SZ 0 L27IN ABSRB UD CT-1 L36MM 1/2 CIR TAPR VCP260H

## (undated) DEVICE — DRESSING,GAUZE,XEROFORM,CURAD,1"X8",ST: Brand: CURAD

## (undated) DEVICE — SUTURE PERMAHAND SZ 0 L30IN NONABSORBABLE BLK L26MM SH 1/2 K834H

## (undated) DEVICE — COVER,LIGHT HANDLE,FLX,2/PK: Brand: MEDLINE INDUSTRIES, INC.

## (undated) DEVICE — SOLUTION SCRB 4OZ 4% CHG H2O AIDED FOR PREOPERATIVE SKIN

## (undated) DEVICE — RELOAD STPL L45MM H1-2.6MM MESENTERY THN TISS WHT GRIPPING

## (undated) DEVICE — SUTURE PROL SZ 3-0 L48IN NONABSORBABLE BLU SH L26MM 1/2 CIR 8534H

## (undated) DEVICE — GLOVE ORANGE PI 8   MSG9080

## (undated) DEVICE — STAPLER BLADELESS OBTURATOR, LONG: Brand: WECK VISTA

## (undated) DEVICE — SUTURE VCRL SZ 2-0 L27IN ABSRB VLT L26MM UR-6 5/8 CIR J602H

## (undated) DEVICE — YANKAUER,FLEXIBLE HANDLE,REGLR CAPACITY: Brand: MEDLINE INDUSTRIES, INC.

## (undated) DEVICE — JELLY,LUBE,STERILE,FLIP TOP,TUBE,2-OZ: Brand: MEDLINE

## (undated) DEVICE — POLYP TRAP: Brand: TRAPEASE®

## (undated) DEVICE — CONNECTOR,TUBING,5-IN-1,NON-STERILE: Brand: MEDLINE INDUSTRIES, INC.

## (undated) DEVICE — REDUCER: Brand: ENDOWRIST

## (undated) DEVICE — TOWEL,OR,DSP,ST,BLUE,DLX,XR,4/PK,20PK/CS: Brand: MEDLINE

## (undated) DEVICE — GARMENT,MEDLINE,DVT,INT,CALF,MED, GEN2: Brand: MEDLINE

## (undated) DEVICE — ADHESIVE SKIN CLSR 0.7ML TOP DERMBND ADV

## (undated) DEVICE — LOOP VES W25MM THK1MM MAXI RED SIL FLD REPELLENT 100 PER

## (undated) DEVICE — ERBE NESSY® OMEGA PLATE USA (85+23)CM² , WITH CABLE 3 M: Brand: ERBE

## (undated) DEVICE — SCISSOR SURG METZ CRV TIP

## (undated) DEVICE — YANKAUER,BULB TIP,W/O VENT,RIGID,STERILE: Brand: MEDLINE

## (undated) DEVICE — GAUZE,SPONGE,FLUFF,6"X6.75",STRL,5/TRAY: Brand: MEDLINE

## (undated) DEVICE — TIP COVER ACCESSORY

## (undated) DEVICE — UNIT DRNGE SGL COLL W/ INLINE CONN EXPR

## (undated) DEVICE — TUBING, SUCTION, 3/16" X 10', STRAIGHT: Brand: MEDLINE

## (undated) DEVICE — SPONGE,PEANUT,XRAY,ST,SM,3/8",5/CARD: Brand: MEDLINE INDUSTRIES, INC.

## (undated) DEVICE — SUTURE PERMAHAND SZ 0 L30IN NONABSORBABLE BLK SILK BRAID A306H

## (undated) DEVICE — STAPLER SHEATH: Brand: ENDOWRIST

## (undated) DEVICE — ARM DRAPE

## (undated) DEVICE — STAPLER INT L30MM THCK TISS GRN B FRM 8 FIRING 2 ROW AUTO

## (undated) DEVICE — PENCIL ES L3M BTTN SWCH HOLSTER W/ BLDE ELECTRD EDGE

## (undated) DEVICE — Z DISCONTINUED BY MEDLINE USE 2711682 TRAY SKIN PREP DRY W/ PREM GLV

## (undated) DEVICE — INSUFFLATION TUBING SET WITH FILTER, FUNNEL CONNECTOR AND LUER LOCK: Brand: JOSNOE MEDICAL INC

## (undated) DEVICE — BLADELESS OBTURATOR: Brand: WECK VISTA

## (undated) DEVICE — DRAPE,UTILITY,XL,4/PK,STERILE: Brand: MEDLINE

## (undated) DEVICE — BLADE ES L6IN ELASTOMERIC COAT EXT DURABLE BEND UPTO 90DEG

## (undated) DEVICE — APPLICATOR MEDICATED 26 CC SOLUTION HI LT ORNG CHLORAPREP

## (undated) DEVICE — 3M™ IOBAN™ 2 ANTIMICROBIAL INCISE DRAPE 6650EZ: Brand: IOBAN™ 2